# Patient Record
Sex: MALE | Race: WHITE | Employment: FULL TIME | URBAN - NONMETROPOLITAN AREA
[De-identification: names, ages, dates, MRNs, and addresses within clinical notes are randomized per-mention and may not be internally consistent; named-entity substitution may affect disease eponyms.]

---

## 2020-02-26 ENCOUNTER — HOSPITAL ENCOUNTER (EMERGENCY)
Age: 55
Discharge: HOME | End: 2020-02-26

## 2020-02-26 VITALS
OXYGEN SATURATION: 97 % | WEIGHT: 150 LBS | HEART RATE: 86 BPM | RESPIRATION RATE: 18 BRPM | DIASTOLIC BLOOD PRESSURE: 89 MMHG | TEMPERATURE: 98 F | BODY MASS INDEX: 21.47 KG/M2 | HEIGHT: 70 IN | SYSTOLIC BLOOD PRESSURE: 138 MMHG

## 2020-02-26 LAB
A/G RATIO: 1.2 (ref 1.1–2.2)
ALBUMIN SERPL-MCNC: 3.8 G/DL (ref 3.4–5)
ALP BLD-CCNC: 144 U/L (ref 40–129)
ALT SERPL-CCNC: 17 U/L (ref 10–40)
ANION GAP SERPL CALCULATED.3IONS-SCNC: 12 MMOL/L (ref 3–16)
AST SERPL-CCNC: 24 U/L (ref 15–37)
BASOPHILS ABSOLUTE: 0.1 K/UL (ref 0–0.2)
BASOPHILS RELATIVE PERCENT: 0.6 %
BILIRUB SERPL-MCNC: 0.4 MG/DL (ref 0–1)
BUN BLDV-MCNC: 17 MG/DL (ref 7–20)
CALCIUM SERPL-MCNC: 9.3 MG/DL (ref 8.3–10.6)
CHLORIDE BLD-SCNC: 101 MMOL/L (ref 99–110)
CO2: 27 MMOL/L (ref 21–32)
CREAT SERPL-MCNC: 0.8 MG/DL (ref 0.9–1.3)
EOSINOPHILS ABSOLUTE: 0.5 K/UL (ref 0–0.6)
EOSINOPHILS RELATIVE PERCENT: 3.8 %
GFR AFRICAN AMERICAN: >60
GFR NON-AFRICAN AMERICAN: >60
GLOBULIN: 3.2 G/DL
GLUCOSE BLD-MCNC: 153 MG/DL (ref 70–99)
HCT VFR BLD CALC: 38.1 % (ref 40.5–52.5)
HEMOGLOBIN: 12.9 G/DL (ref 13.5–17.5)
LACTIC ACID, SEPSIS: 1.7 MMOL/L (ref 0.4–1.9)
LYMPHOCYTES ABSOLUTE: 1.5 K/UL (ref 1–5.1)
LYMPHOCYTES RELATIVE PERCENT: 11.3 %
MCH RBC QN AUTO: 30.6 PG (ref 26–34)
MCHC RBC AUTO-ENTMCNC: 33.9 G/DL (ref 31–36)
MCV RBC AUTO: 90.3 FL (ref 80–100)
MONOCYTES ABSOLUTE: 1.2 K/UL (ref 0–1.3)
MONOCYTES RELATIVE PERCENT: 8.8 %
NEUTROPHILS ABSOLUTE: 10.2 K/UL (ref 1.7–7.7)
NEUTROPHILS RELATIVE PERCENT: 75.5 %
PDW BLD-RTO: 12.6 % (ref 12.4–15.4)
PLATELET # BLD: 230 K/UL (ref 135–450)
PMV BLD AUTO: 8.3 FL (ref 5–10.5)
POTASSIUM REFLEX MAGNESIUM: 4 MMOL/L (ref 3.5–5.1)
RBC # BLD: 4.22 M/UL (ref 4.2–5.9)
SODIUM BLD-SCNC: 140 MMOL/L (ref 136–145)
TOTAL PROTEIN: 7 G/DL (ref 6.4–8.2)
WBC # BLD: 13.5 K/UL (ref 4–11)

## 2020-02-26 PROCEDURE — 96375 TX/PRO/DX INJ NEW DRUG ADDON: CPT

## 2020-02-26 PROCEDURE — 90715 TDAP VACCINE 7 YRS/> IM: CPT | Performed by: PHYSICIAN ASSISTANT

## 2020-02-26 PROCEDURE — 83605 ASSAY OF LACTIC ACID: CPT

## 2020-02-26 PROCEDURE — 87070 CULTURE OTHR SPECIMN AEROBIC: CPT

## 2020-02-26 PROCEDURE — 6360000002 HC RX W HCPCS: Performed by: PHYSICIAN ASSISTANT

## 2020-02-26 PROCEDURE — 99282 EMERGENCY DEPT VISIT SF MDM: CPT

## 2020-02-26 PROCEDURE — 90471 IMMUNIZATION ADMIN: CPT | Performed by: PHYSICIAN ASSISTANT

## 2020-02-26 PROCEDURE — 87077 CULTURE AEROBIC IDENTIFY: CPT

## 2020-02-26 PROCEDURE — 87205 SMEAR GRAM STAIN: CPT

## 2020-02-26 PROCEDURE — 80053 COMPREHEN METABOLIC PANEL: CPT

## 2020-02-26 PROCEDURE — 87186 SC STD MICRODIL/AGAR DIL: CPT

## 2020-02-26 PROCEDURE — 10061 I&D ABSCESS COMP/MULTIPLE: CPT

## 2020-02-26 PROCEDURE — 36415 COLL VENOUS BLD VENIPUNCTURE: CPT

## 2020-02-26 PROCEDURE — 96365 THER/PROPH/DIAG IV INF INIT: CPT

## 2020-02-26 PROCEDURE — 87040 BLOOD CULTURE FOR BACTERIA: CPT

## 2020-02-26 PROCEDURE — 2580000003 HC RX 258: Performed by: PHYSICIAN ASSISTANT

## 2020-02-26 PROCEDURE — 85025 COMPLETE CBC W/AUTO DIFF WBC: CPT

## 2020-02-26 RX ORDER — KETOROLAC TROMETHAMINE 30 MG/ML
15 INJECTION, SOLUTION INTRAMUSCULAR; INTRAVENOUS ONCE
Status: COMPLETED | OUTPATIENT
Start: 2020-02-26 | End: 2020-02-26

## 2020-02-26 RX ORDER — SULFAMETHOXAZOLE AND TRIMETHOPRIM 800; 160 MG/1; MG/1
1 TABLET ORAL 2 TIMES DAILY
Qty: 20 TABLET | Refills: 0 | Status: SHIPPED | OUTPATIENT
Start: 2020-02-26 | End: 2020-03-07

## 2020-02-26 RX ORDER — CEPHALEXIN 500 MG/1
500 CAPSULE ORAL 4 TIMES DAILY
Qty: 40 CAPSULE | Refills: 0 | Status: SHIPPED | OUTPATIENT
Start: 2020-02-26 | End: 2020-03-07

## 2020-02-26 RX ADMIN — VANCOMYCIN HYDROCHLORIDE 1000 MG: 1 INJECTION, POWDER, LYOPHILIZED, FOR SOLUTION INTRAVENOUS at 15:33

## 2020-02-26 RX ADMIN — TETANUS TOXOID, REDUCED DIPHTHERIA TOXOID AND ACELLULAR PERTUSSIS VACCINE, ADSORBED 0.5 ML: 5; 2.5; 8; 8; 2.5 SUSPENSION INTRAMUSCULAR at 15:28

## 2020-02-26 RX ADMIN — KETOROLAC TROMETHAMINE 15 MG: 30 INJECTION, SOLUTION INTRAMUSCULAR; INTRAVENOUS at 15:28

## 2020-02-26 RX ADMIN — CEFEPIME 2 G: 2 INJECTION, POWDER, FOR SOLUTION INTRAVENOUS at 15:28

## 2020-02-26 ASSESSMENT — ENCOUNTER SYMPTOMS
SHORTNESS OF BREATH: 0
COLOR CHANGE: 1
VOMITING: 0
NAUSEA: 0

## 2020-02-26 ASSESSMENT — PAIN DESCRIPTION - DESCRIPTORS: DESCRIPTORS: PATIENT UNABLE TO DESCRIBE

## 2020-02-26 ASSESSMENT — PAIN DESCRIPTION - LOCATION: LOCATION: ARM;CHEST;BACK

## 2020-02-26 ASSESSMENT — PAIN SCALES - GENERAL: PAINLEVEL_OUTOF10: 7

## 2020-02-26 ASSESSMENT — PAIN DESCRIPTION - PAIN TYPE: TYPE: ACUTE PAIN

## 2020-02-26 ASSESSMENT — PAIN DESCRIPTION - ORIENTATION: ORIENTATION: RIGHT
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
## 2020-02-29 LAB
GRAM STAIN RESULT: ABNORMAL
ORGANISM: ABNORMAL
WOUND/ABSCESS: ABNORMAL

## 2020-02-29 NOTE — RESULT ENCOUNTER NOTE
Culture reviewed, culture is positive but resistant to antibiotics prescribed at discharge. Antibiotic needs to be changed to clindamycin 450 mg p.o. 3 times daily for 7 days. Please let patient know that he grew out MRSA.

## 2020-03-01 LAB
BLOOD CULTURE, ROUTINE: NORMAL
CULTURE, BLOOD 2: NORMAL

## 2021-06-01 ENCOUNTER — HOSPITAL ENCOUNTER (EMERGENCY)
Age: 56
Discharge: HOME | End: 2021-06-01
Attending: EMERGENCY MEDICINE
Payer: MEDICAID

## 2021-06-01 ENCOUNTER — APPOINTMENT (OUTPATIENT)
Dept: GENERAL RADIOLOGY | Age: 56
End: 2021-06-01
Payer: MEDICAID

## 2021-06-01 VITALS
TEMPERATURE: 98.3 F | WEIGHT: 140 LBS | RESPIRATION RATE: 18 BRPM | BODY MASS INDEX: 18.96 KG/M2 | DIASTOLIC BLOOD PRESSURE: 74 MMHG | OXYGEN SATURATION: 99 % | HEIGHT: 72 IN | HEART RATE: 78 BPM | SYSTOLIC BLOOD PRESSURE: 132 MMHG

## 2021-06-01 DIAGNOSIS — J18.9 PNEUMONIA DUE TO ORGANISM: Primary | ICD-10-CM

## 2021-06-01 LAB
A/G RATIO: 1.3 (ref 1.1–2.2)
ALBUMIN SERPL-MCNC: 4.1 G/DL (ref 3.4–5)
ALP BLD-CCNC: 124 U/L (ref 40–129)
ALT SERPL-CCNC: 12 U/L (ref 10–40)
ANION GAP SERPL CALCULATED.3IONS-SCNC: 9 MMOL/L (ref 3–16)
AST SERPL-CCNC: 19 U/L (ref 15–37)
BASOPHILS ABSOLUTE: 0.1 K/UL (ref 0–0.2)
BASOPHILS RELATIVE PERCENT: 0.3 %
BILIRUB SERPL-MCNC: 0.5 MG/DL (ref 0–1)
BUN BLDV-MCNC: 11 MG/DL (ref 7–20)
CALCIUM SERPL-MCNC: 9.4 MG/DL (ref 8.3–10.6)
CHLORIDE BLD-SCNC: 99 MMOL/L (ref 99–110)
CO2: 30 MMOL/L (ref 21–32)
CREAT SERPL-MCNC: 0.7 MG/DL (ref 0.9–1.3)
EKG ATRIAL RATE: 72 BPM
EKG DIAGNOSIS: NORMAL
EKG P AXIS: 45 DEGREES
EKG P-R INTERVAL: 150 MS
EKG Q-T INTERVAL: 382 MS
EKG QRS DURATION: 90 MS
EKG QTC CALCULATION (BAZETT): 418 MS
EKG R AXIS: 92 DEGREES
EKG T AXIS: -22 DEGREES
EKG VENTRICULAR RATE: 72 BPM
EOSINOPHILS ABSOLUTE: 0 K/UL (ref 0–0.6)
EOSINOPHILS RELATIVE PERCENT: 0.3 %
GFR AFRICAN AMERICAN: >60
GFR NON-AFRICAN AMERICAN: >60
GLOBULIN: 3.2 G/DL
GLUCOSE BLD-MCNC: 126 MG/DL (ref 70–99)
HCT VFR BLD CALC: 41.1 % (ref 40.5–52.5)
HEMOGLOBIN: 13.8 G/DL (ref 13.5–17.5)
LYMPHOCYTES ABSOLUTE: 0.5 K/UL (ref 1–5.1)
LYMPHOCYTES RELATIVE PERCENT: 2.7 %
MCH RBC QN AUTO: 31.2 PG (ref 26–34)
MCHC RBC AUTO-ENTMCNC: 33.5 G/DL (ref 31–36)
MCV RBC AUTO: 93.3 FL (ref 80–100)
MONOCYTES ABSOLUTE: 1.5 K/UL (ref 0–1.3)
MONOCYTES RELATIVE PERCENT: 8.1 %
NEUTROPHILS ABSOLUTE: 16 K/UL (ref 1.7–7.7)
NEUTROPHILS RELATIVE PERCENT: 88.6 %
PDW BLD-RTO: 13.5 % (ref 12.4–15.4)
PLATELET # BLD: 209 K/UL (ref 135–450)
PMV BLD AUTO: 8.6 FL (ref 5–10.5)
POTASSIUM REFLEX MAGNESIUM: 4.3 MMOL/L (ref 3.5–5.1)
RBC # BLD: 4.4 M/UL (ref 4.2–5.9)
SODIUM BLD-SCNC: 138 MMOL/L (ref 136–145)
TOTAL PROTEIN: 7.3 G/DL (ref 6.4–8.2)
TROPONIN: <0.01 NG/ML
WBC # BLD: 18.1 K/UL (ref 4–11)

## 2021-06-01 PROCEDURE — 93005 ELECTROCARDIOGRAM TRACING: CPT | Performed by: EMERGENCY MEDICINE

## 2021-06-01 PROCEDURE — 36415 COLL VENOUS BLD VENIPUNCTURE: CPT

## 2021-06-01 PROCEDURE — 80053 COMPREHEN METABOLIC PANEL: CPT

## 2021-06-01 PROCEDURE — U0005 INFEC AGEN DETEC AMPLI PROBE: HCPCS

## 2021-06-01 PROCEDURE — 84484 ASSAY OF TROPONIN QUANT: CPT

## 2021-06-01 PROCEDURE — 85025 COMPLETE CBC W/AUTO DIFF WBC: CPT

## 2021-06-01 PROCEDURE — 93010 ELECTROCARDIOGRAM REPORT: CPT | Performed by: INTERNAL MEDICINE

## 2021-06-01 PROCEDURE — 6370000000 HC RX 637 (ALT 250 FOR IP): Performed by: EMERGENCY MEDICINE

## 2021-06-01 PROCEDURE — 6360000002 HC RX W HCPCS: Performed by: EMERGENCY MEDICINE

## 2021-06-01 PROCEDURE — U0003 INFECTIOUS AGENT DETECTION BY NUCLEIC ACID (DNA OR RNA); SEVERE ACUTE RESPIRATORY SYNDROME CORONAVIRUS 2 (SARS-COV-2) (CORONAVIRUS DISEASE [COVID-19]), AMPLIFIED PROBE TECHNIQUE, MAKING USE OF HIGH THROUGHPUT TECHNOLOGIES AS DESCRIBED BY CMS-2020-01-R: HCPCS

## 2021-06-01 PROCEDURE — 71045 X-RAY EXAM CHEST 1 VIEW: CPT

## 2021-06-01 PROCEDURE — 99285 EMERGENCY DEPT VISIT HI MDM: CPT

## 2021-06-01 RX ORDER — FAMOTIDINE 20 MG/1
20 TABLET, FILM COATED ORAL ONCE
Status: COMPLETED | OUTPATIENT
Start: 2021-06-01 | End: 2021-06-01

## 2021-06-01 RX ORDER — LEVOFLOXACIN 750 MG/1
750 TABLET ORAL DAILY
Qty: 5 TABLET | Refills: 0 | Status: SHIPPED | OUTPATIENT
Start: 2021-06-01 | End: 2021-06-06

## 2021-06-01 RX ORDER — BUPRENORPHINE HYDROCHLORIDE AND NALOXONE HYDROCHLORIDE DIHYDRATE 8; 2 MG/1; MG/1
1 TABLET SUBLINGUAL DAILY
Status: DISCONTINUED | OUTPATIENT
Start: 2021-06-01 | End: 2021-06-01 | Stop reason: HOSPADM

## 2021-06-01 RX ORDER — LEVOFLOXACIN 500 MG/1
500 TABLET, FILM COATED ORAL ONCE
Status: DISCONTINUED | OUTPATIENT
Start: 2021-06-01 | End: 2021-06-01

## 2021-06-01 RX ORDER — BUPRENORPHINE HYDROCHLORIDE 8 MG/1
TABLET SUBLINGUAL
Status: DISCONTINUED
Start: 2021-06-01 | End: 2021-06-01 | Stop reason: HOSPADM

## 2021-06-01 RX ORDER — LEVOFLOXACIN 500 MG/1
750 TABLET, FILM COATED ORAL ONCE
Status: COMPLETED | OUTPATIENT
Start: 2021-06-01 | End: 2021-06-01

## 2021-06-01 RX ORDER — ASPIRIN 325 MG
325 TABLET ORAL ONCE
Status: COMPLETED | OUTPATIENT
Start: 2021-06-01 | End: 2021-06-01

## 2021-06-01 RX ADMIN — BUPRENORPHINE HYDROCHLORIDE AND NALOXONE HYDROCHLORIDE DIHYDRATE 1 TABLET: 8; 2 TABLET SUBLINGUAL at 13:20

## 2021-06-01 RX ADMIN — FAMOTIDINE 20 MG: 20 TABLET ORAL at 13:19

## 2021-06-01 RX ADMIN — ASPIRIN 325 MG: 325 TABLET, FILM COATED ORAL at 13:19

## 2021-06-01 RX ADMIN — LEVOFLOXACIN 750 MG: 500 TABLET, FILM COATED ORAL at 14:28

## 2021-06-01 RX ADMIN — LIDOCAINE HYDROCHLORIDE: 20 SOLUTION ORAL; TOPICAL at 13:19

## 2021-06-01 ASSESSMENT — ENCOUNTER SYMPTOMS
CHEST TIGHTNESS: 1
COUGH: 1
RHINORRHEA: 0
VOMITING: 0
PHOTOPHOBIA: 0
ABDOMINAL PAIN: 0
WHEEZING: 0
NAUSEA: 0
BACK PAIN: 0
DIARRHEA: 0
SHORTNESS OF BREATH: 0

## 2021-06-01 ASSESSMENT — PAIN SCALES - GENERAL: PAINLEVEL_OUTOF10: 4

## 2021-06-01 ASSESSMENT — PAIN DESCRIPTION - ORIENTATION: ORIENTATION: RIGHT;LEFT

## 2021-06-01 ASSESSMENT — PAIN DESCRIPTION - DESCRIPTORS: DESCRIPTORS: SHARP

## 2021-06-01 ASSESSMENT — PAIN DESCRIPTION - LOCATION: LOCATION: RIB CAGE

## 2021-06-01 ASSESSMENT — PAIN DESCRIPTION - FREQUENCY: FREQUENCY: INTERMITTENT

## 2021-06-01 NOTE — ED PROVIDER NOTES
Emergency Department Provider Note  Location: [facility] EMERGENCY DEPARTMENT  6/1/2021     Patient Identification  Terra Craig is a 54 y.o. male    Chief Complaint  URI (C/o NP couch. States \"I think I inhaled some of my stomach fluid. \")          HPI  (History provided by patient)  Patient is a 51-year-old male current smoker presents with \"bad chest cold\". Patient is overall difficult historian. He presents from Suboxone clinic where he was told to go to the emergency department because he complained of chest discomfort and a nonproductive cough. Patient states symptoms started yesterday. States he has burning sensation in his anterior chest that is worse when he coughs. Describes nonproductive cough. Endorses chills but denies fevers. States he has had some sputum production. No known sick contacts or exposures to COVID-19. He has not been vaccinated for COVID-19. Patient denies any exertional symptoms no pleurisy no associated diaphoresis nausea or other autonomic symptoms. I have reviewed the following nursing documentation:  Allergies: No Known Allergies    Past medical history:  has a past medical history of Back pain, chronic, Chronic back pain, and MRSA (methicillin resistant staph aureus) culture positive (02/26/2020). Past surgical history:  has no past surgical history on file. Home medications:   Prior to Admission medications    Medication Sig Start Date End Date Taking? Authorizing Provider   Buprenorphine HCl-Naloxone HCl (SUBOXONE SL) Place 8 mg under the tongue daily   Yes Historical Provider, MD   levoFLOXacin (LEVAQUIN) 750 MG tablet Take 1 tablet by mouth daily for 5 days 6/1/21 6/6/21 Yes Diogenes Calvo MD       Social history:  reports that he has quit smoking. His smoking use included cigarettes. He has never used smokeless tobacco. He reports that he does not drink alcohol and does not use drugs. Family history:  History reviewed.  No pertinent family history. ROS  Review of Systems   Constitutional: Positive for chills. Negative for fever. HENT: Negative for congestion and rhinorrhea. Eyes: Negative for photophobia and visual disturbance. Respiratory: Positive for cough and chest tightness. Negative for shortness of breath and wheezing. Cardiovascular: Negative for chest pain and palpitations. Gastrointestinal: Negative for abdominal pain, diarrhea, nausea and vomiting. Genitourinary: Negative for dysuria and hematuria. Musculoskeletal: Negative for back pain and neck pain. Skin: Negative for rash and wound. Neurological: Negative for syncope and weakness. Psychiatric/Behavioral: Negative for agitation and confusion. Exam  ED Triage Vitals [06/01/21 1235]   BP Temp Temp Source Pulse Resp SpO2 Height Weight   118/74 98.1 °F (36.7 °C) Oral 68 18 97 % 6' (1.829 m) 140 lb (63.5 kg)       Physical Exam  Vitals and nursing note reviewed. Constitutional:       General: He is not in acute distress. Appearance: He is well-developed. HENT:      Head: Normocephalic and atraumatic. Nose: Nose normal. No congestion. Eyes:      Extraocular Movements: Extraocular movements intact. Pupils: Pupils are equal, round, and reactive to light. Cardiovascular:      Rate and Rhythm: Normal rate and regular rhythm. Heart sounds: No murmur heard. Comments: Equal radial pulses  Pulmonary:      Effort: Pulmonary effort is normal.      Comments: Coarse breath sounds bilaterally otherwise no wheezes rales or rhonchi  Abdominal:      General: There is no distension. Palpations: Abdomen is soft. Tenderness: There is no abdominal tenderness. Musculoskeletal:         General: No deformity. Normal range of motion. Cervical back: Normal range of motion and neck supple. Skin:     General: Skin is warm. Findings: No rash. Neurological:      Mental Status: He is alert and oriented to person, place, and time. Motor: No abnormal muscle tone.       Coordination: Coordination normal.   Psychiatric:         Mood and Affect: Mood normal.         Behavior: Behavior normal.           ED Course    ED Medication Orders (From admission, onward)    Start Ordered     Status Ordering Provider    06/01/21 1445 06/01/21 1420  levoFLOXacin (LEVAQUIN) tablet 750 mg  ONCE     Question:  Antimicrobial Indications  Answer:  Pneumonia (CAP)    Acknowledged BELL GARCIA     06/01/21 1330 06/01/21 1310  buprenorphine-naloxone (SUBOXONE) 8-2 MG SL tablet 1 tablet  DAILY      Last MAR action: [facility] - [facility]    06/01/21 1315 06/01/21 1256  aspirin tablet 325 mg  ONCE      Last MAR action: Given - by Van Anderson on 06/01/21 at [facility]    06/01/21 1315 06/01/21 1256  aluminum & magnesium hydroxide-simethicone (MAALOX) 30 mL, lidocaine viscous hcl (XYLOCAINE) 5 mL (GI COCKTAIL)  ONCE      Last MAR action: Given - by Van Anderson on 06/01/21 at [facility]    06/01/21 1315 06/01/21 1256  famotidine (PEPCID) tablet 20 mg  ONCE      Last MAR action: Given - by Van Anderson on 06/01/21 at [facility]    06/01/21 1313 06/01/21 1313  buprenorphine (SUBUTEX) 8 MG SL tablet     Note to Pharmacy: Snehal Fajardo: cabinet override    Ordered           EKG  Normal sinus rhythm rate of 70 right axis deviation, borderline right bundle branch block pattern, no diagnostic ischemic changes noted, patient slightly uncooperative Best obtainable with baseline artifact, no STEMI pattern,       Radiology  XR CHEST PORTABLE    Result Date: 6/1/2021  EXAMINATION: ONE XRAY VIEW OF THE CHEST 6/1/2021 12:59 pm COMPARISON: 03/07/2009 HISTORY: ORDERING SYSTEM PROVIDED HISTORY: CP TECHNOLOGIST PROVIDED HISTORY: Reason for exam:->CP Reason for Exam: cp, uri symptoms ( cough) Acuity: Acute Type of Exam: Initial FINDINGS: There is bibasilar airspace R Axis 92 degrees    T Axis -22 degrees    Diagnosis       Normal sinus rhythmRightward axisLeft ventricular hypertrophy with repolarization abnormality vs ischemia Abnormal ECGConfirmed by Leigh Angela MD, Harley Medrano ([id number]) on 6/1/2021 2:21:11 PM         [facility]  Patient seen and evaluated. Relevant records reviewed. 42-year-old male who presents with cough chills sputum production chest tightness felt with coughing. On exam is overall well-appearing no acute distress reassuring vitals. His exam significant for coarse breath sounds though no focal findings. Chest x-ray is consistent with bibasilar pneumonia and he has a leukocytosis as well which supports this. Will order Covid swab given collection of his symptoms. This does not appear to be consistent with ACS. His EKG does show pulmonary disease pattern though no STEMI criteria. I have low concern for PE or dissection at this time given his story and exam.  We will plan for discharge PCP follow-up and return precautions and antibiotics. Patient agreeable to plan expressed understanding of plan. Clinical Impression:  1. Pneumonia due to organism          Disposition:  Discharge to home in good condition. Blood pressure 118/74, pulse 68, temperature 98.1 °F (36.7 °C), temperature source Oral, resp. rate 18, height 6' (1.829 m), weight 140 lb (63.5 kg), SpO2 97 %. Patient was given scripts for the following medications. I counseled patient how to take these medications. New Prescriptions    LEVOFLOXACIN (LEVAQUIN) 750 MG TABLET    Take 1 tablet by mouth daily for 5 days       Disposition referral (if applicable):  No follow-up provider specified. Total critical care time is 0 minutes, which excludes separately billable procedures and updating family. Time spent is specifically for management of the presenting complaint and symptoms initially, direct bedside care, reevaluation, review of records, and consultation.   There was a high probability of clinically significant life-threatening deterioration in the patient's condition, which required my urgent intervention. This chart was generated in part by using Dragon Dictation system and may contain errors related to that system including errors in grammar, punctuation, and spelling, as well as words and phrases that may be inappropriate. If there are any questions or concerns please feel free to contact the dictating provider for clarification.      Savi Lora MD  [address] Delgado MD  06/01/21 2322

## 2021-06-02 ENCOUNTER — CARE COORDINATION (OUTPATIENT)
Dept: CARE COORDINATION | Age: 56
End: 2021-06-02

## 2021-06-02 LAB — SARS-COV-2, PCR: NOT DETECTED

## 2021-06-02 NOTE — CARE COORDINATION
Outreach attempt regarding recent visit from the ED. Patient was unable to reach, d/t one number was busy, and other number could not be completed as dialed.      Plan    F/U ED attempt [id number] Valorie Lynch RN  Ambulatory Care Manager  [phone]

## 2021-06-03 ENCOUNTER — CARE COORDINATION (OUTPATIENT)
Dept: CARE COORDINATION | Age: 56
End: 2021-06-03

## 2021-06-03 NOTE — CARE COORDINATION
Outreach attempt regarding recent ED Visit.  Patient was unable to reach, d/t 1 number being invalid, and other number fast busy signal.       Plan   No further outreach at this time     [facility]  [phone]

## 2022-05-05 ENCOUNTER — HOSPITAL ENCOUNTER (EMERGENCY)
Age: 57
Discharge: HOME | End: 2022-05-05
Attending: EMERGENCY MEDICINE
Payer: MEDICAID

## 2022-05-05 VITALS
RESPIRATION RATE: 16 BRPM | HEIGHT: 70 IN | OXYGEN SATURATION: 100 % | WEIGHT: 150 LBS | HEART RATE: 91 BPM | BODY MASS INDEX: 21.47 KG/M2 | SYSTOLIC BLOOD PRESSURE: 140 MMHG | TEMPERATURE: 98.5 F | DIASTOLIC BLOOD PRESSURE: 86 MMHG

## 2022-05-05 DIAGNOSIS — S61.214A LACERATION OF RIGHT RING FINGER W/O FOREIGN BODY W/O DAMAGE TO NAIL, INITIAL ENCOUNTER: Primary | ICD-10-CM

## 2022-05-05 PROCEDURE — 99283 EMERGENCY DEPT VISIT LOW MDM: CPT

## 2022-05-05 ASSESSMENT — ENCOUNTER SYMPTOMS
SHORTNESS OF BREATH: 0
WHEEZING: 0

## 2022-05-05 ASSESSMENT — PAIN - FUNCTIONAL ASSESSMENT: PAIN_FUNCTIONAL_ASSESSMENT: NONE - DENIES PAIN

## 2022-05-05 NOTE — ED PROVIDER NOTES
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 Bernadine Slaughter is a 64 y.o. male who presents to the emergency department     Patient presents emergency department having a heavy object at work fall on his fingertips of the right hand with a special note to the middle ring and little fingers he sustained very superficial lacerations they need to be cleansed. We did so, and then cleansed him  He is up-to-date on his tetanus having received 1 4 years ago he says. He has no skeletal or bony abnormality no disruption of the nail plates is noted. Patient has no other abnormalities or injuries noted he is on Suboxone we see    The history is provided by the patient. Nursing Notes were reviewed. REVIEW OF SYSTEMS    (2-9 systems for level 4, 10 or more for level 5)     Review of Systems   Constitutional: Positive for activity change. Eyes: Negative for visual disturbance. Respiratory: Negative for shortness of breath and wheezing. Skin: Positive for wound. Allergic/Immunologic: Negative for immunocompromised state. All other systems reviewed and are negative. Except as noted above the remainder of the review of systems was reviewed and negative. PAST MEDICAL HISTORY     Past Medical History:   Diagnosis Date    Back pain, chronic     Chronic back pain     MRSA (methicillin resistant staph aureus) culture positive 02/26/2020    RIGHT FOREARM         SURGICAL HISTORY     History reviewed.  No pertinent surgical history. CURRENT MEDICATIONS       Previous Medications    BUPRENORPHINE HCL-NALOXONE HCL (SUBOXONE SL)    Place 8 mg under the tongue daily       ALLERGIES     Patient has no known allergies. FAMILY HISTORY     History reviewed. No pertinent family history. SOCIAL HISTORY       Social History     Socioeconomic History    Marital status:      Spouse name: None    Number of children: None    Years of education: None    Highest education level: None   Occupational History    None   Tobacco Use    Smoking status: Former Smoker     Types: Cigarettes    Smokeless tobacco: Never Used   Vaping Use    Vaping Use: Never used   Substance and Sexual Activity    Alcohol use: No    Drug use: No    Sexual activity: Not Currently   Other Topics Concern    None   Social History Narrative    None     Social Determinants of Health     Financial Resource Strain:     Difficulty of Paying Living Expenses: Not on file   Food Insecurity:     Worried About Running Out of Food in the Last Year: Not on file    Angel of Food in the Last Year: Not on file   Transportation Needs:     Lack of Transportation (Medical): Not on file    Lack of Transportation (Non-Medical):  Not on file   Physical Activity:     Days of Exercise per Week: Not on file    Minutes of Exercise per Session: Not on file   Stress:     Feeling of Stress : Not on file   Social Connections:     Frequency of Communication with Friends and Family: Not on file    Frequency of Social Gatherings with Friends and Family: Not on file    Attends Worship Services: Not on file    Active Member of Clubs or Organizations: Not on file    Attends Club or Organization Meetings: Not on file    Marital Status: Not on file   Intimate Partner Violence:     Fear of Current or Ex-Partner: Not on file    Emotionally Abused: Not on file    Physically Abused: Not on file    Sexually Abused: Not on file   Housing Stability:     Unable to Pay for Housing in the Last Year: Not on file    Number of Places Lived in the Last Year: Not on file    Unstable Housing in the Last Year: Not on file       SCREENINGS    [place] Coma Scale  Eye Opening: Spontaneous  Best Verbal Response: Oriented  Best Motor Response: Obeys commands  [place] Coma Scale Score: 15          PHYSICAL EXAM    (up to 7 for level 4, 8 or more for level 5)     ED Triage Vitals [05/05/22 1616]   BP Temp Temp Source Pulse Resp SpO2 Height Weight   (!) 140/86 98.5 °F (36.9 °C) Oral 91 16 100 % 5' 10\" (1.778 m) 150 lb (68 kg)       Physical Exam  Vitals and nursing note reviewed. Constitutional:       Appearance: Normal appearance. HENT:      Head: Normocephalic. Right Ear: Ear canal and external ear normal.      Left Ear: Ear canal and external ear normal.      Nose: Nose normal.      Mouth/Throat:      Mouth: Mucous membranes are moist.   Eyes:      Extraocular Movements: Extraocular movements intact. Pupils: Pupils are equal, round, and reactive to light. Cardiovascular:      Rate and Rhythm: Normal rate. Pulses: Normal pulses. Pulmonary:      Effort: Pulmonary effort is normal.   Musculoskeletal:         General: Tenderness present. Skin:     Capillary Refill: Capillary refill takes less than 2 seconds. Findings: Laceration and lesion present. Neurological:      General: No focal deficit present. Mental Status: He is alert.          DIAGNOSTIC RESULTS     EKG: All EKG's are interpreted by the Emergency Department Physician who either signs or Co-signs this chart in the absence of a cardiologist.        RADIOLOGY:   Non-plain film images such as CT, Ultrasound and MRI are read by the radiologist. Plain radiographic images are visualized and preliminarily interpreted by the emergency physician with the below findings:        Interpretation per the Radiologist below, if available at the time of this note:    No orders to display           LABS:  No results found for this visit on 05/05/22. EMERGENCY DEPARTMENT COURSE and DIFFERENTIAL DIAGNOSIS/MDM:     Vitals:    05/05/22 1616   BP: (!) 140/86   Pulse: 91   Resp: 16   Temp: 98.5 °F (36.9 °C)   TempSrc: Oral   SpO2: 100%   Weight: 150 lb (68 kg)   Height: 5' 10\" (1.778 m)           MDM      REASSESSMENT          CRITICAL CARE TIME     CONSULTS:  None      PROCEDURES:     Procedures    MEDICATIONS GIVEN THIS VISIT:  Medications - No data to display     FINAL IMPRESSION      1. Laceration of right ring finger w/o foreign body w/o damage to nail, initial encounter            DISPOSITION/PLAN   DISPOSITION Decision To Discharge 05/05/2022 06:39:34 PM      PATIENT REFERRED TO:  [facility]) Pre-Services  [phone]          DISCHARGE MEDICATIONS:  New Prescriptions    No medications on file       Controlled Substances Monitoring  No flowsheet data found. (Please note that portions of this note were completed with a voice recognition program.  Efforts were made to edit the dictations but occasionally words are mis-transcribed.)    Patient was advised to return to the Emergency Department if there was any worsening.     Jodi Aguilar MD (electronically signed)  Attending Emergency Physician         Teresita Puri MD  05/05/22 0630

## 2022-05-05 NOTE — Clinical Note
Georgie Stratton was seen and treated in our emergency department on 5/5/2022. He may return to work on 05/06/2022. Left hand work only x5 days     If you have any questions or concerns, please don't hesitate to call.       Murphy Pagan MD

## 2022-05-05 NOTE — ED NOTES
PSO and bandaids applied to wounds by Vielka Medellin, ED Tech with verbal order by Dr. Matthew Garcia.       Alicia Mueller RN  05/05/22 9317

## 2022-05-05 NOTE — ED NOTES
AVS provided and reviewed with the patient. The patient verbalized understanding of care at home, follow up care, and emergent symptoms to return for. No questions or concerns verbalized at this time. The patient is alert, oriented, stable, and ambulatory out of the department at the time of discharge.        Akanksha Roberts RN  05/05/22 4925

## 2022-12-15 ENCOUNTER — APPOINTMENT (OUTPATIENT)
Dept: GENERAL RADIOLOGY | Age: 57
End: 2022-12-15
Payer: MEDICAID

## 2022-12-15 ENCOUNTER — HOSPITAL ENCOUNTER (EMERGENCY)
Age: 57
Discharge: HOME | End: 2022-12-15
Attending: EMERGENCY MEDICINE
Payer: MEDICAID

## 2022-12-15 VITALS
HEIGHT: 70 IN | HEART RATE: 62 BPM | DIASTOLIC BLOOD PRESSURE: 68 MMHG | BODY MASS INDEX: 20.76 KG/M2 | SYSTOLIC BLOOD PRESSURE: 99 MMHG | RESPIRATION RATE: 18 BRPM | OXYGEN SATURATION: 98 % | TEMPERATURE: 98.2 F | WEIGHT: 145 LBS

## 2022-12-15 DIAGNOSIS — R07.9 CHEST PAIN, UNSPECIFIED TYPE: Primary | ICD-10-CM

## 2022-12-15 DIAGNOSIS — F19.10 DRUG ABUSE (HCC): ICD-10-CM

## 2022-12-15 LAB
A/G RATIO: 1.5 (ref 1.1–2.2)
ALBUMIN SERPL-MCNC: 4.2 G/DL (ref 3.4–5)
ALP BLD-CCNC: 105 U/L (ref 40–129)
ALT SERPL-CCNC: 23 U/L (ref 10–40)
ANION GAP SERPL CALCULATED.3IONS-SCNC: 11 MMOL/L (ref 3–16)
AST SERPL-CCNC: 32 U/L (ref 15–37)
BASOPHILS ABSOLUTE: 0.1 K/UL (ref 0–0.2)
BASOPHILS RELATIVE PERCENT: 1 %
BILIRUB SERPL-MCNC: 0.6 MG/DL (ref 0–1)
BUN BLDV-MCNC: 25 MG/DL (ref 7–20)
CALCIUM SERPL-MCNC: 9.4 MG/DL (ref 8.3–10.6)
CHLORIDE BLD-SCNC: 99 MMOL/L (ref 99–110)
CO2: 26 MMOL/L (ref 21–32)
CREAT SERPL-MCNC: 0.9 MG/DL (ref 0.9–1.3)
EKG ATRIAL RATE: 80 BPM
EKG DIAGNOSIS: NORMAL
EKG P AXIS: 81 DEGREES
EKG P-R INTERVAL: 142 MS
EKG Q-T INTERVAL: 402 MS
EKG QRS DURATION: 98 MS
EKG QTC CALCULATION (BAZETT): 463 MS
EKG R AXIS: 81 DEGREES
EKG T AXIS: 70 DEGREES
EKG VENTRICULAR RATE: 80 BPM
EOSINOPHILS ABSOLUTE: 0.4 K/UL (ref 0–0.6)
EOSINOPHILS RELATIVE PERCENT: 5.9 %
GFR SERPL CREATININE-BSD FRML MDRD: >60 ML/MIN/{1.73_M2}
GLUCOSE BLD-MCNC: 138 MG/DL (ref 70–99)
HCT VFR BLD CALC: 42.5 % (ref 40.5–52.5)
HEMOGLOBIN: 14.5 G/DL (ref 13.5–17.5)
LYMPHOCYTES ABSOLUTE: 1.2 K/UL (ref 1–5.1)
LYMPHOCYTES RELATIVE PERCENT: 18.3 %
MCH RBC QN AUTO: 30.1 PG (ref 26–34)
MCHC RBC AUTO-ENTMCNC: 34.2 G/DL (ref 31–36)
MCV RBC AUTO: 88.1 FL (ref 80–100)
MONOCYTES ABSOLUTE: 0.7 K/UL (ref 0–1.3)
MONOCYTES RELATIVE PERCENT: 10 %
NEUTROPHILS ABSOLUTE: 4.4 K/UL (ref 1.7–7.7)
NEUTROPHILS RELATIVE PERCENT: 64.8 %
PDW BLD-RTO: 13.2 % (ref 12.4–15.4)
PLATELET # BLD: 188 K/UL (ref 135–450)
PMV BLD AUTO: 8.7 FL (ref 5–10.5)
POTASSIUM REFLEX MAGNESIUM: 3.9 MMOL/L (ref 3.5–5.1)
RBC # BLD: 4.82 M/UL (ref 4.2–5.9)
SODIUM BLD-SCNC: 136 MMOL/L (ref 136–145)
TOTAL PROTEIN: 7 G/DL (ref 6.4–8.2)
TROPONIN: <0.01 NG/ML
WBC # BLD: 6.7 K/UL (ref 4–11)

## 2022-12-15 PROCEDURE — 84484 ASSAY OF TROPONIN QUANT: CPT

## 2022-12-15 PROCEDURE — 36415 COLL VENOUS BLD VENIPUNCTURE: CPT

## 2022-12-15 PROCEDURE — 93010 ELECTROCARDIOGRAM REPORT: CPT | Performed by: INTERNAL MEDICINE

## 2022-12-15 PROCEDURE — 93005 ELECTROCARDIOGRAM TRACING: CPT | Performed by: EMERGENCY MEDICINE

## 2022-12-15 PROCEDURE — 71045 X-RAY EXAM CHEST 1 VIEW: CPT

## 2022-12-15 PROCEDURE — 85025 COMPLETE CBC W/AUTO DIFF WBC: CPT

## 2022-12-15 PROCEDURE — 80053 COMPREHEN METABOLIC PANEL: CPT

## 2022-12-15 PROCEDURE — 99285 EMERGENCY DEPT VISIT HI MDM: CPT

## 2022-12-15 ASSESSMENT — PAIN - FUNCTIONAL ASSESSMENT
PAIN_FUNCTIONAL_ASSESSMENT: NONE - DENIES PAIN
PAIN_FUNCTIONAL_ASSESSMENT: NONE - DENIES PAIN

## 2022-12-15 NOTE — ED NOTES
Discharge instructions and medications reviewed with patient. Patient verbalized understanding of medications and follow up. All questions answered at this time. IV removed, dressing applied, and bleeding controlled. Skin warm, pink, and dry. Patient alert and oriented x4. Pt ambulated to lobby with a stable gait. Patient discharged home with 0 prescriptions.        Arlene Kim RN  12/15/22 5777

## 2022-12-15 NOTE — ED PROVIDER NOTES
eMERGENCY dEPARTMENT eNCOUnter      PtName: Dayana Capone  MRN: [id number]  [place] 1965  Date of evaluation: 12/15/2022  Provider: Angeline Stubbs, [address] [place] [address]       Chief Complaint   Patient presents with    Chest Pain     Patient states he had left sided chest pain 9/10, squeezing sensation and racing feeling last night. He currently denies pain, but states this pain has been intermittent over the last two weeks. Patient also states he used meth 2 weeks ago and is on suboxone and his last use prior to that was a year ago. HISTORY OF PRESENT ILLNESS   (Location/Symptom, Timing/Onset,Context/Setting, Quality, Duration, Modifying Factors, Severity) Note limiting factors. HPI    Dayana Capone is a 62 y.o. male who presents to the emergency department with chief complaint of chest pain. Has been ongoing intermittently for the last couple of months central no radiation pressure. Does admit to methamphetamine abuse 2 weeks ago. Not actively having chest pain. No fever or coughing. Denies history of endocarditis. Nursing Notes were reviewed. REVIEW OF SYSTEMS    (2+ forlevel 4; 10+ for level 5)     Review of Systems  See hpi for further details. Complete 10 point review of all systems performed and is otherwise negative unless noted above. PAST MEDICAL HISTORY     Past Medical History:   Diagnosis Date    Back pain, chronic     Chronic back pain     MRSA (methicillin resistant staph aureus) culture positive 02/26/2020    RIGHT FOREARM       SURGICAL HISTORY     History reviewed. No pertinent surgical history. CURRENT MEDICATIONS       Previous Medications    BUPRENORPHINE HCL-NALOXONE HCL (SUBOXONE SL)    Place 8 mg under the tongue daily       ALLERGIES     Patient has no known allergies. FAMILY HISTORY     History reviewed. No pertinent family history.        SOCIAL HISTORY       Social History     Socioeconomic History    Marital status:      Spouse name: None    Number of children: None    Years of education: None    Highest education level: None   Tobacco Use    Smoking status: Former     Types: Cigarettes    Smokeless tobacco: Never   Vaping Use    Vaping Use: Never used   Substance and Sexual Activity    Alcohol use: No    Drug use: Yes     Types: Methamphetamines (Crystal Meth)     Comment: Currently in rehab on suboxone, last reported use 2 weeks ago then a year prior to that. Sexual activity: Not Currently       SCREENINGS    [place] Coma Scale  Eye Opening: Spontaneous  Best Verbal Response: Oriented  Best Motor Response: Obeys commands  [place]y Coma Scale Score: 15      PHYSICAL EXAM    (5+ for level 4, 8+ for level 5)     ED Triage Vitals [12/15/22 1504]   BP Temp Temp Source Heart Rate Resp SpO2 Height Weight   112/68 98.2 °F (36.8 °C) Oral 71 22 98 % 5' 10\" (1.778 m) 145 lb (65.8 kg)       Physical Exam  Vitals and nursing note reviewed. Constitutional:       General: He is not in acute distress. Appearance: Normal appearance. He is well-developed. He is not ill-appearing, toxic-appearing or diaphoretic. HENT:      Head: Normocephalic and atraumatic. Right Ear: External ear normal.      Left Ear: External ear normal.      Nose: Nose normal.      Mouth/Throat:      Mouth: Mucous membranes are moist.      Pharynx: Oropharynx is clear. Eyes:      General: No scleral icterus. Right eye: No discharge. Left eye: No discharge. Conjunctiva/sclera: Conjunctivae normal.      Pupils: Pupils are equal, round, and reactive to light. Neck:      Vascular: No JVD. Trachea: No tracheal deviation. Cardiovascular:      Rate and Rhythm: Normal rate and regular rhythm. Pulses: Normal pulses. Heart sounds: Normal heart sounds. No murmur heard. No friction rub. No gallop. Pulmonary:      Effort: Pulmonary effort is normal. No respiratory distress. Breath sounds: No stridor.    Abdominal:      General: Abdomen is hours.  Troponin is negative. Recommend outpatient follow-up. Consideration of outpatient stress test. Pt given strict return precautions. Cxr reviewed by me as radiology is not currently reading plain films shows no acute abnormalities. Patient instructed to follow up with their primary care doctor in one-two days and return to the emergency department if worsening of the condition or any other concerns. Please see discharge instructions for further delineation regarding the specific discharge instructions explained and given to the patient. ED Course as of 12/15/22 1612   Thu Dec 15, 2022   1508 EKG 12 Lead  The interpretation by ED physician: Normal axis, sinus rhythm 80 bpm.  No obvious ischemic changes appreciated [JK]      ED Course User Index  [JK] Niurka Kennedy DO       CONSULTS:  None    PROCEDURES:  Unless otherwise noted below, none     Procedures    FINAL IMPRESSION      1. Chest pain, unspecified type    2. Drug abuse [facility])          DISPOSITION/PLAN   DISPOSITION Decision To Discharge 12/15/2022 03:37:49 PM      PATIENT REFERRED TO:  Md Thanh Babb    Schedule an appointment as soon as possible for a visit       [facility]) Pre-Services  [phone]  Schedule an appointment as soon as possible for a visit   if you cannot get an appointment w your pcp. [facility]  [address]  [phone]    If symptoms worsen      DISCHARGE MEDICATIONS:  New Prescriptions    No medications on file          (Please note:  Portions of this note were completed with a voice recognition program. Efforts were made to edit the dictations but occasionally words and phrases are mis-transcribed.)    Form v2016. J.[id number]    Marilyn Gordon DO (electronically signed)  Emergency Medicine Provider              Niurka Kennedy DO  12/15/22 1612

## 2023-09-07 ENCOUNTER — HOSPITAL ENCOUNTER (EMERGENCY)
Age: 58
Discharge: HOME | End: 2023-09-07
Attending: EMERGENCY MEDICINE
Payer: MEDICAID

## 2023-09-07 VITALS
OXYGEN SATURATION: 99 % | SYSTOLIC BLOOD PRESSURE: 137 MMHG | BODY MASS INDEX: 20.76 KG/M2 | TEMPERATURE: 98.1 F | WEIGHT: 145 LBS | RESPIRATION RATE: 16 BRPM | DIASTOLIC BLOOD PRESSURE: 76 MMHG | HEIGHT: 70 IN | HEART RATE: 63 BPM

## 2023-09-07 DIAGNOSIS — L03.116 CELLULITIS OF LEFT FOOT: Primary | ICD-10-CM

## 2023-09-07 PROCEDURE — 6370000000 HC RX 637 (ALT 250 FOR IP): Performed by: EMERGENCY MEDICINE

## 2023-09-07 PROCEDURE — 99283 EMERGENCY DEPT VISIT LOW MDM: CPT

## 2023-09-07 RX ORDER — DOXYCYCLINE HYCLATE 100 MG/1
100 CAPSULE ORAL 2 TIMES DAILY
Qty: 20 CAPSULE | Refills: 0 | Status: SHIPPED | OUTPATIENT
Start: 2023-09-07 | End: 2023-09-17

## 2023-09-07 RX ORDER — DOXYCYCLINE HYCLATE 100 MG
100 TABLET ORAL ONCE
Status: COMPLETED | OUTPATIENT
Start: 2023-09-07 | End: 2023-09-07

## 2023-09-07 RX ORDER — DOXYCYCLINE HYCLATE 100 MG/1
100 CAPSULE ORAL 2 TIMES DAILY
Qty: 20 CAPSULE | Refills: 0 | Status: SHIPPED | OUTPATIENT
Start: 2023-09-07 | End: 2023-09-07 | Stop reason: SDUPTHER

## 2023-09-07 RX ORDER — CEPHALEXIN 500 MG/1
500 CAPSULE ORAL 4 TIMES DAILY
Qty: 40 CAPSULE | Refills: 0 | Status: SHIPPED | OUTPATIENT
Start: 2023-09-07 | End: 2023-09-17

## 2023-09-07 RX ORDER — CEPHALEXIN 500 MG/1
500 CAPSULE ORAL ONCE
Status: COMPLETED | OUTPATIENT
Start: 2023-09-07 | End: 2023-09-07

## 2023-09-07 RX ORDER — CEPHALEXIN 500 MG/1
500 CAPSULE ORAL 4 TIMES DAILY
Qty: 40 CAPSULE | Refills: 0 | Status: SHIPPED | OUTPATIENT
Start: 2023-09-07 | End: 2023-09-07 | Stop reason: SDUPTHER

## 2023-09-07 RX ADMIN — CEPHALEXIN 500 MG: 500 CAPSULE ORAL at 18:23

## 2023-09-07 RX ADMIN — DOXYCYCLINE HYCLATE 100 MG: 100 TABLET, COATED ORAL at 18:23

## 2023-09-07 ASSESSMENT — PAIN DESCRIPTION - LOCATION: LOCATION: FOOT

## 2023-09-07 ASSESSMENT — PAIN SCALES - GENERAL: PAINLEVEL_OUTOF10: 5

## 2023-09-07 ASSESSMENT — PAIN - FUNCTIONAL ASSESSMENT: PAIN_FUNCTIONAL_ASSESSMENT: 0-10

## 2023-09-07 ASSESSMENT — PAIN DESCRIPTION - PAIN TYPE: TYPE: ACUTE PAIN

## 2023-09-07 ASSESSMENT — PAIN DESCRIPTION - ORIENTATION: ORIENTATION: LEFT

## 2023-09-07 ASSESSMENT — PAIN DESCRIPTION - DESCRIPTORS: DESCRIPTORS: ACHING

## 2023-09-07 NOTE — ED NOTES
Pt discharge instructions, follow up and rx x 2 reviewed with pt. Pt verbalized understanding. No further needs. Pt discharged at this time.         Tatiana Tran RN  09/07/23 8579

## 2023-09-07 NOTE — DISCHARGE INSTRUCTIONS
Keep your foot elevated is much as possible and leave it open to the air  Take both antibiotics till gone  Take Tylenol and ibuprofen for pain  If you do get worse proceed to [facility] for admission

## 2023-09-07 NOTE — ED PROVIDER NOTES
[facility]      Pt Name: Aziza Avial  MRN: [id number]  [address] [facility] [place] 1965  Date of evaluation: 9/7/2023  Provider: Hang Noel MD    [address]       Chief Complaint   Patient presents with    Foot Pain     Pt states left foot pain X2 days denies injury         HISTORY OF PRESENT ILLNESS   (Location/Symptom, Timing/Onset, Context/Setting, Quality, Duration, Modifying Factors, Severity)  Note limiting factors. Aziza Avila is a 62 y.o. male who presents to the emergency department     Patient presents with redness and warmth on the outer aspect of the left foot. He does have some very old shoes that are kind of like boots. He is on Suboxone for previous drug abuse. He is picking quite frequently in the room during my history says its been red for couple of days. He denies any nausea vomiting denies known high fever    The history is provided by the patient. Nursing Notes were reviewed. REVIEW OF SYSTEMS    (2-9 systems for level 4, 10 or more for level 5)     Review of Systems   Constitutional:  Positive for activity change and chills. Negative for fever. Skin:  Positive for rash. Neurological:  Negative for light-headedness. Psychiatric/Behavioral:  Negative for confusion. All other systems reviewed and are negative. Except as noted above the remainder of the review of systems was reviewed and negative. PAST MEDICAL HISTORY     Past Medical History:   Diagnosis Date    Back pain, chronic     Chronic back pain     MRSA (methicillin resistant staph aureus) culture positive 02/26/2020    RIGHT FOREARM         SURGICAL HISTORY     History reviewed. No pertinent surgical history. CURRENT MEDICATIONS       Previous Medications    BUPRENORPHINE HCL-NALOXONE HCL (SUBOXONE SL)    Place 8 mg under the tongue daily       ALLERGIES     Patient has no known allergies. FAMILY HISTORY     History reviewed.  No pertinent family

## 2024-07-02 ENCOUNTER — APPOINTMENT (OUTPATIENT)
Dept: GENERAL RADIOLOGY | Age: 59
DRG: 190 | End: 2024-07-02
Payer: MEDICAID

## 2024-07-02 ENCOUNTER — HOSPITAL ENCOUNTER (INPATIENT)
Age: 59
LOS: 2 days | Discharge: LEFT BEFORE DISCHARGE | DRG: 190 | End: 2024-07-04
Attending: EMERGENCY MEDICINE | Admitting: INTERNAL MEDICINE
Payer: MEDICAID

## 2024-07-02 DIAGNOSIS — R79.89 ELEVATED BRAIN NATRIURETIC PEPTIDE (BNP) LEVEL: ICD-10-CM

## 2024-07-02 DIAGNOSIS — R01.1 HEART MURMUR: ICD-10-CM

## 2024-07-02 DIAGNOSIS — I21.4 NSTEMI (NON-ST ELEVATED MYOCARDIAL INFARCTION) (HCC): Primary | ICD-10-CM

## 2024-07-02 DIAGNOSIS — J18.9 COMMUNITY ACQUIRED PNEUMONIA, UNSPECIFIED LATERALITY: ICD-10-CM

## 2024-07-02 DIAGNOSIS — F19.90 SUBSTANCE USE DISORDER: ICD-10-CM

## 2024-07-02 DIAGNOSIS — R07.9 CHEST PAIN, UNSPECIFIED TYPE: ICD-10-CM

## 2024-07-02 DIAGNOSIS — N17.9 AKI (ACUTE KIDNEY INJURY): ICD-10-CM

## 2024-07-02 LAB
ALBUMIN SERPL-MCNC: 4.1 G/DL (ref 3.4–5)
ALBUMIN/GLOB SERPL: 1.1 {RATIO} (ref 1.1–2.2)
ALP SERPL-CCNC: 200 U/L (ref 40–129)
ALT SERPL-CCNC: 59 U/L (ref 10–40)
ANION GAP SERPL CALCULATED.3IONS-SCNC: 12 MMOL/L (ref 3–16)
ANTI-XA UNFRAC HEPARIN: 0.1 IU/ML (ref 0.3–0.7)
ANTI-XA UNFRAC HEPARIN: <0.1 IU/ML (ref 0.3–0.7)
APTT BLD: 34.1 SEC (ref 22.1–36.4)
AST SERPL-CCNC: 75 U/L (ref 15–37)
BASE EXCESS BLDA CALC-SCNC: -5.7 MMOL/L (ref -3–3)
BASOPHILS # BLD: 0 K/UL (ref 0–0.2)
BASOPHILS NFR BLD: 0.5 %
BILIRUB SERPL-MCNC: 0.9 MG/DL (ref 0–1)
BUN SERPL-MCNC: 35 MG/DL (ref 7–20)
CALCIUM SERPL-MCNC: 9.4 MG/DL (ref 8.3–10.6)
CHLORIDE SERPL-SCNC: 97 MMOL/L (ref 99–110)
CK SERPL-CCNC: 278 U/L (ref 39–308)
CO2 BLDA-SCNC: 19.7 MMOL/L
CO2 SERPL-SCNC: 27 MMOL/L (ref 21–32)
COHGB MFR BLDA: 0.5 % (ref 0–1.5)
CREAT SERPL-MCNC: 1.7 MG/DL (ref 0.9–1.3)
DEPRECATED RDW RBC AUTO: 13.2 % (ref 12.4–15.4)
EOSINOPHIL # BLD: 0 K/UL (ref 0–0.6)
EOSINOPHIL NFR BLD: 0 %
GFR SERPLBLD CREATININE-BSD FMLA CKD-EPI: 46 ML/MIN/{1.73_M2}
GLUCOSE BLD-MCNC: 141 MG/DL (ref 70–99)
GLUCOSE SERPL-MCNC: 192 MG/DL (ref 70–99)
HCO3 BLDA-SCNC: 18.6 MMOL/L (ref 21–29)
HCT VFR BLD AUTO: 37.6 % (ref 40.5–52.5)
HGB BLD-MCNC: 12.7 G/DL (ref 13.5–17.5)
HGB BLDA-MCNC: 13.7 G/DL (ref 13.5–17.5)
INR PPP: 1.38 (ref 0.85–1.15)
LACTATE BLDV-SCNC: 2.5 MMOL/L (ref 0.4–1.9)
LACTATE BLDV-SCNC: 3.6 MMOL/L (ref 0.4–2)
LACTATE BLDV-SCNC: 4.7 MMOL/L (ref 0.4–1.9)
LYMPHOCYTES # BLD: 0.8 K/UL (ref 1–5.1)
LYMPHOCYTES NFR BLD: 8 %
MCH RBC QN AUTO: 30.7 PG (ref 26–34)
MCHC RBC AUTO-ENTMCNC: 33.8 G/DL (ref 31–36)
MCV RBC AUTO: 90.7 FL (ref 80–100)
METHGB MFR BLDA: 0.3 %
MONOCYTES # BLD: 1.3 K/UL (ref 0–1.3)
MONOCYTES NFR BLD: 13 %
NEUTROPHILS # BLD: 7.6 K/UL (ref 1.7–7.7)
NEUTROPHILS NFR BLD: 78.5 %
NT-PROBNP SERPL-MCNC: ABNORMAL PG/ML (ref 0–124)
O2 THERAPY: ABNORMAL
PCO2 BLDA: 33.2 MMHG (ref 35–45)
PERFORMED ON: ABNORMAL
PH BLDA: 7.37 [PH] (ref 7.35–7.45)
PLATELET # BLD AUTO: 239 K/UL (ref 135–450)
PMV BLD AUTO: 9.6 FL (ref 5–10.5)
PO2 BLDA: 140.3 MMHG (ref 75–108)
POTASSIUM SERPL-SCNC: 4.5 MMOL/L (ref 3.5–5.1)
PROCALCITONIN SERPL IA-MCNC: 6.64 NG/ML (ref 0–0.15)
PROT SERPL-MCNC: 7.9 G/DL (ref 6.4–8.2)
PROTHROMBIN TIME: 17.2 SEC (ref 11.9–14.9)
RBC # BLD AUTO: 4.15 M/UL (ref 4.2–5.9)
SAO2 % BLDA: 98.9 %
SODIUM SERPL-SCNC: 136 MMOL/L (ref 136–145)
TROPONIN, HIGH SENSITIVITY: 163 NG/L (ref 0–22)
TROPONIN, HIGH SENSITIVITY: 164 NG/L (ref 0–22)
TROPONIN, HIGH SENSITIVITY: 186 NG/L (ref 0–22)
WBC # BLD AUTO: 9.6 K/UL (ref 4–11)

## 2024-07-02 PROCEDURE — 83880 ASSAY OF NATRIURETIC PEPTIDE: CPT

## 2024-07-02 PROCEDURE — 6370000000 HC RX 637 (ALT 250 FOR IP): Performed by: INTERNAL MEDICINE

## 2024-07-02 PROCEDURE — 36600 WITHDRAWAL OF ARTERIAL BLOOD: CPT

## 2024-07-02 PROCEDURE — 94761 N-INVAS EAR/PLS OXIMETRY MLT: CPT

## 2024-07-02 PROCEDURE — 99285 EMERGENCY DEPT VISIT HI MDM: CPT

## 2024-07-02 PROCEDURE — 6370000000 HC RX 637 (ALT 250 FOR IP): Performed by: PHYSICIAN ASSISTANT

## 2024-07-02 PROCEDURE — 96365 THER/PROPH/DIAG IV INF INIT: CPT

## 2024-07-02 PROCEDURE — 96374 THER/PROPH/DIAG INJ IV PUSH: CPT

## 2024-07-02 PROCEDURE — 84484 ASSAY OF TROPONIN QUANT: CPT

## 2024-07-02 PROCEDURE — 80053 COMPREHEN METABOLIC PANEL: CPT

## 2024-07-02 PROCEDURE — 6360000002 HC RX W HCPCS: Performed by: PHYSICIAN ASSISTANT

## 2024-07-02 PROCEDURE — 80074 ACUTE HEPATITIS PANEL: CPT

## 2024-07-02 PROCEDURE — 85610 PROTHROMBIN TIME: CPT

## 2024-07-02 PROCEDURE — 2580000003 HC RX 258: Performed by: INTERNAL MEDICINE

## 2024-07-02 PROCEDURE — 85025 COMPLETE CBC W/AUTO DIFF WBC: CPT

## 2024-07-02 PROCEDURE — 6360000002 HC RX W HCPCS: Performed by: NURSE PRACTITIONER

## 2024-07-02 PROCEDURE — 2580000003 HC RX 258: Performed by: PHYSICIAN ASSISTANT

## 2024-07-02 PROCEDURE — 93005 ELECTROCARDIOGRAM TRACING: CPT | Performed by: NURSE PRACTITIONER

## 2024-07-02 PROCEDURE — 36415 COLL VENOUS BLD VENIPUNCTURE: CPT

## 2024-07-02 PROCEDURE — 84145 PROCALCITONIN (PCT): CPT

## 2024-07-02 PROCEDURE — 82550 ASSAY OF CK (CPK): CPT

## 2024-07-02 PROCEDURE — 85520 HEPARIN ASSAY: CPT

## 2024-07-02 PROCEDURE — 85730 THROMBOPLASTIN TIME PARTIAL: CPT

## 2024-07-02 PROCEDURE — 71045 X-RAY EXAM CHEST 1 VIEW: CPT

## 2024-07-02 PROCEDURE — 2700000000 HC OXYGEN THERAPY PER DAY

## 2024-07-02 PROCEDURE — 82803 BLOOD GASES ANY COMBINATION: CPT

## 2024-07-02 PROCEDURE — 93005 ELECTROCARDIOGRAM TRACING: CPT | Performed by: EMERGENCY MEDICINE

## 2024-07-02 PROCEDURE — 2060000000 HC ICU INTERMEDIATE R&B

## 2024-07-02 PROCEDURE — 83605 ASSAY OF LACTIC ACID: CPT

## 2024-07-02 PROCEDURE — 87040 BLOOD CULTURE FOR BACTERIA: CPT

## 2024-07-02 RX ORDER — SODIUM CHLORIDE 9 MG/ML
INJECTION, SOLUTION INTRAVENOUS CONTINUOUS
Status: DISCONTINUED | OUTPATIENT
Start: 2024-07-02 | End: 2024-07-03

## 2024-07-02 RX ORDER — 0.9 % SODIUM CHLORIDE 0.9 %
1000 INTRAVENOUS SOLUTION INTRAVENOUS ONCE
Status: DISCONTINUED | OUTPATIENT
Start: 2024-07-02 | End: 2024-07-04 | Stop reason: HOSPADM

## 2024-07-02 RX ORDER — LORAZEPAM 2 MG/ML
0.5 INJECTION INTRAMUSCULAR ONCE
Status: COMPLETED | OUTPATIENT
Start: 2024-07-02 | End: 2024-07-02

## 2024-07-02 RX ORDER — SODIUM CHLORIDE 0.9 % (FLUSH) 0.9 %
5-40 SYRINGE (ML) INJECTION EVERY 12 HOURS SCHEDULED
Status: DISCONTINUED | OUTPATIENT
Start: 2024-07-02 | End: 2024-07-04 | Stop reason: HOSPADM

## 2024-07-02 RX ORDER — ASPIRIN 81 MG/1
81 TABLET, CHEWABLE ORAL DAILY
Status: DISCONTINUED | OUTPATIENT
Start: 2024-07-03 | End: 2024-07-04 | Stop reason: HOSPADM

## 2024-07-02 RX ORDER — NITROGLYCERIN 20 MG/100ML
5-200 INJECTION INTRAVENOUS CONTINUOUS
Status: DISCONTINUED | OUTPATIENT
Start: 2024-07-02 | End: 2024-07-03

## 2024-07-02 RX ORDER — ONDANSETRON 4 MG/1
4 TABLET, ORALLY DISINTEGRATING ORAL EVERY 8 HOURS PRN
Status: DISCONTINUED | OUTPATIENT
Start: 2024-07-02 | End: 2024-07-04 | Stop reason: HOSPADM

## 2024-07-02 RX ORDER — ATORVASTATIN CALCIUM 40 MG/1
40 TABLET, FILM COATED ORAL NIGHTLY
Status: DISCONTINUED | OUTPATIENT
Start: 2024-07-02 | End: 2024-07-04 | Stop reason: HOSPADM

## 2024-07-02 RX ORDER — SODIUM CHLORIDE 9 MG/ML
INJECTION, SOLUTION INTRAVENOUS PRN
Status: DISCONTINUED | OUTPATIENT
Start: 2024-07-02 | End: 2024-07-04 | Stop reason: HOSPADM

## 2024-07-02 RX ORDER — HEPARIN SODIUM 1000 [USP'U]/ML
30 INJECTION, SOLUTION INTRAVENOUS; SUBCUTANEOUS PRN
Status: DISCONTINUED | OUTPATIENT
Start: 2024-07-02 | End: 2024-07-04 | Stop reason: HOSPADM

## 2024-07-02 RX ORDER — NITROGLYCERIN 0.4 MG/1
0.4 TABLET SUBLINGUAL EVERY 5 MIN PRN
Status: COMPLETED | OUTPATIENT
Start: 2024-07-02 | End: 2024-07-02

## 2024-07-02 RX ORDER — ACETAMINOPHEN 650 MG/1
650 SUPPOSITORY RECTAL EVERY 6 HOURS PRN
Status: DISCONTINUED | OUTPATIENT
Start: 2024-07-02 | End: 2024-07-04 | Stop reason: HOSPADM

## 2024-07-02 RX ORDER — 0.9 % SODIUM CHLORIDE 0.9 %
1000 INTRAVENOUS SOLUTION INTRAVENOUS ONCE
Status: COMPLETED | OUTPATIENT
Start: 2024-07-02 | End: 2024-07-02

## 2024-07-02 RX ORDER — HEPARIN SODIUM 1000 [USP'U]/ML
60 INJECTION, SOLUTION INTRAVENOUS; SUBCUTANEOUS ONCE
Status: DISCONTINUED | OUTPATIENT
Start: 2024-07-02 | End: 2024-07-03 | Stop reason: ALTCHOICE

## 2024-07-02 RX ORDER — HEPARIN SODIUM 10000 [USP'U]/100ML
1450 INJECTION, SOLUTION INTRAVENOUS CONTINUOUS
Status: DISCONTINUED | OUTPATIENT
Start: 2024-07-02 | End: 2024-07-04 | Stop reason: HOSPADM

## 2024-07-02 RX ORDER — HEPARIN SODIUM 1000 [USP'U]/ML
60 INJECTION, SOLUTION INTRAVENOUS; SUBCUTANEOUS PRN
Status: DISCONTINUED | OUTPATIENT
Start: 2024-07-02 | End: 2024-07-04 | Stop reason: HOSPADM

## 2024-07-02 RX ORDER — ACETAMINOPHEN 325 MG/1
650 TABLET ORAL EVERY 6 HOURS PRN
Status: DISCONTINUED | OUTPATIENT
Start: 2024-07-02 | End: 2024-07-04 | Stop reason: HOSPADM

## 2024-07-02 RX ORDER — ONDANSETRON 2 MG/ML
4 INJECTION INTRAMUSCULAR; INTRAVENOUS EVERY 6 HOURS PRN
Status: DISCONTINUED | OUTPATIENT
Start: 2024-07-02 | End: 2024-07-04 | Stop reason: HOSPADM

## 2024-07-02 RX ORDER — POLYETHYLENE GLYCOL 3350 17 G/17G
17 POWDER, FOR SOLUTION ORAL DAILY PRN
Status: DISCONTINUED | OUTPATIENT
Start: 2024-07-02 | End: 2024-07-04 | Stop reason: HOSPADM

## 2024-07-02 RX ORDER — SODIUM CHLORIDE 0.9 % (FLUSH) 0.9 %
5-40 SYRINGE (ML) INJECTION PRN
Status: DISCONTINUED | OUTPATIENT
Start: 2024-07-02 | End: 2024-07-04 | Stop reason: HOSPADM

## 2024-07-02 RX ADMIN — NITROGLYCERIN 0.4 MG: 0.4 TABLET SUBLINGUAL at 22:21

## 2024-07-02 RX ADMIN — SODIUM CHLORIDE 1000 ML: 9 INJECTION, SOLUTION INTRAVENOUS at 17:28

## 2024-07-02 RX ADMIN — CEFTRIAXONE SODIUM 1000 MG: 1 INJECTION, POWDER, FOR SOLUTION INTRAMUSCULAR; INTRAVENOUS at 19:37

## 2024-07-02 RX ADMIN — LORAZEPAM 0.5 MG: 2 INJECTION INTRAMUSCULAR; INTRAVENOUS at 22:01

## 2024-07-02 RX ADMIN — NITROGLYCERIN 0.5 INCH: 20 OINTMENT TOPICAL at 17:30

## 2024-07-02 RX ADMIN — NITROGLYCERIN 0.4 MG: 0.4 TABLET SUBLINGUAL at 22:15

## 2024-07-02 RX ADMIN — NITROGLYCERIN 0.4 MG: 0.4 TABLET SUBLINGUAL at 22:08

## 2024-07-02 RX ADMIN — SODIUM CHLORIDE, PRESERVATIVE FREE 10 ML: 5 INJECTION INTRAVENOUS at 22:02

## 2024-07-02 RX ADMIN — HEPARIN SODIUM 730 UNITS/HR: 10000 INJECTION, SOLUTION INTRAVENOUS at 17:46

## 2024-07-02 RX ADMIN — AZITHROMYCIN MONOHYDRATE 500 MG: 500 INJECTION, POWDER, LYOPHILIZED, FOR SOLUTION INTRAVENOUS at 20:35

## 2024-07-02 RX ADMIN — HEPARIN SODIUM 3700 UNITS: 1000 INJECTION INTRAVENOUS; SUBCUTANEOUS at 17:41

## 2024-07-02 ASSESSMENT — PAIN DESCRIPTION - DESCRIPTORS
DESCRIPTORS: SHARP;SHOOTING
DESCRIPTORS: SHARP;STABBING
DESCRIPTORS: PRESSURE
DESCRIPTORS: SHARP;SHOOTING
DESCRIPTORS: SHOOTING;SHARP

## 2024-07-02 ASSESSMENT — PAIN DESCRIPTION - ORIENTATION
ORIENTATION: LEFT

## 2024-07-02 ASSESSMENT — PAIN SCALES - GENERAL
PAINLEVEL_OUTOF10: 8
PAINLEVEL_OUTOF10: 10
PAINLEVEL_OUTOF10: 8

## 2024-07-02 ASSESSMENT — PAIN DESCRIPTION - LOCATION
LOCATION: CHEST

## 2024-07-02 ASSESSMENT — ENCOUNTER SYMPTOMS
EYE REDNESS: 0
CHEST TIGHTNESS: 1
RHINORRHEA: 0
VOMITING: 0
DIARRHEA: 0
SORE THROAT: 0
CONSTIPATION: 0
COLOR CHANGE: 0
SHORTNESS OF BREATH: 1
NAUSEA: 0
COUGH: 0
SINUS PRESSURE: 0
EYE DISCHARGE: 0
ABDOMINAL PAIN: 0
SINUS PAIN: 0

## 2024-07-02 ASSESSMENT — PAIN DESCRIPTION - PAIN TYPE
TYPE: ACUTE PAIN

## 2024-07-02 ASSESSMENT — PAIN - FUNCTIONAL ASSESSMENT: PAIN_FUNCTIONAL_ASSESSMENT: 0-10

## 2024-07-02 NOTE — ED NOTES
ED CARDIOLOGY CONSULT  RE-ABN EKG  1710-paged [facility] cardio  1720- returned page, transferred to [facility]

## 2024-07-02 NOTE — CONSULTS
Pharmacy to Manage Heparin Infusion per Hospital Nomogram    Dx: Chest Pain  Pt wt = 61.2 Kg  Baseline aPTT = pending    Heparin (weight-based) Infusion: CAD/STEMI/NSTEMI/UA/AFib)   Heparin 60 units/kg IVP bolus (max 4,000 units) followed by Heparin infusion at 12 units/kg/hr (recommended max initial rate: 1000 units/hr).  Recheck anti-Xa (unless aPTT being used) in 6 hours.  Goal anti-Xa 0.3-0.7 IU/mL  Goal aPTT =  seconds.    Pharmacy to manage Heparin - contact for questions.    Heparin 60 units/kg IV x 1 (max 4,000 units), then 12 units/kg/hr (recommended max initial rate 1,000 units/hr). Adjust infusion rate based off anti-Xa results below.     anti-Xa < 0.1    Heparin 60 units/kg bolus  Increase infusion by 4 units/kg/hr  anti-Xa 0.1-0.29 Heparin 30 units/kg bolus Increase infusion by 2 units/kg/hr  anti-Xa 0.3-0.7    No bolus No change   anti-Xa 0.71-0.8   No bolus Decrease infusion by 1 units/kg/hr  anti-Xa 0.81-0.99    No bolus Decrease infusion by 2 units/kg/hr  anti-Xa 1 or more     Hold heparin for 1 hour Decrease infusion by 3 units/kg/hr    Obtain anti-Xa 6 hours after bolus and 6 hours after any dose change until two consecutive therapeutic anti-Xa are achieved- then daily.    Tam Laguna, Yris  7/2/2024 at 5:14 PM    7/3/2024  Recent Labs     07/02/24  2337   ANTIXAUHEP 0.10*   - Heparin _1800_ units IVP bolus and then increase Heparin infusion to _850_ units/hr.   - Recheck Anti-Xa in 6 hours at 0630.  Shadi Mujica PharmD    7/3/2024 12:18 AM     7/3/2024  Recent Labs     07/03/24  0457   ANTIXAUHEP <0.10*   - Heparin _3700_ units IVP bolus and then increase Heparin infusion to _1090_ units/hr.   - Recheck Anti-Xa in 6 hours at 1200.  Shadi Mujica PharmD    7/3/2024 5:43 AM     7/3 1201  Anti Xa 0.22 IU/mL  Heparin 1800 units bolus and increase drip rate to 1210 units/hr  Recheck Anti Xa at 1900  Tam Laguna, PharmD  7/3/2024 at 12:48 PM    7/3 1911  Anti-Xa 0.25 at

## 2024-07-02 NOTE — ED PROVIDER NOTES
(0.5 inches Topical Given 7/2/24 1730)       ED Course as of 07/02/24 1910 Tue Jul 02, 2024 1708 EKG discussed with cardiologist who sent a message through her nursing staff who are taking the EKG over to the Cath Lab to be evaluated by them, advised to initiate heparin and additional nitro if he continues to have pain.  They will evaluate the patient.  Definitely admit as EKG changes are seen [AR]   1720 Spoke with Dr. Zamarripa cardiologist he advised he is currently at [place] he will touch base with the interventional list to make sure that the EKG is evaluated. [AR]   1738 Okay discussed patient again with Dr. Neri in person this time he advised to initiate IV nitro and admit to the ICU.  [AR]   1819 Went back to bedside and discussed further with the patient why we cannot let him have anything to drink or ice chips at this time.  I discussed the findings and our plan that we will admit him to the ICU.   [AR]   1820 We considered the diagnosis of PE was suspect that this is cardiac in origin however due to patient's creatinine and the fact that we have already initiated heparin due to our concerns over CAD we chose to hold off on CT in his chest at this time. [AR]      ED Course User Index  [AR] Shelly Gonzalez, DIMITRIOS        Is this patient to be included in the SEP-1 Core Measure due to severe sepsis or septic shock?   No   Exclusion criteria - the patient is NOT to be included for SEP-1 Core Measure due to:  2+ SIRS criteria are not met    CONSULTS: (Who and What was discussed)  IP CONSULT TO CARDIOLOGY  See above   Admitting team: accepted     CC/HPI Summary, DDx, ED Course, and Reassessment:   EKG just changes concerning for possible STEMI equivalent.  I discussed with attending ED provider who requested that we discussed this with cardiology page cardiology.  admission for NSTEMI, LUIS FELIPE. Labs also show elevated liver enzymes. EKG has a wellens sign, elevated troponin. Spoke with Dr seals, advised heparin

## 2024-07-02 NOTE — ED PROVIDER NOTES
Emergency Department Attending Provider Note  Location: [facility]  ED  7/2/2024     Patient Identification  Dayton Soto is a 59 y.o. male      Dayton Soto was evaluated in the Emergency Department for chest pain since yesterday evening.  Patient endorses snorting methamphetamine last night and shortly after developed chest pain.  Continues today.  Denies cardiac history.  Current smoker..     HPI: as noted above    Physical Exam: Nontoxic-appearing vital signs within normal limits.  No chest wall tenderness or crepitus clear breath sounds bilaterally regular rate and rhythm no murmur      EKG Interpretation  Rhythm is normal sinus rhythm  Rate is 90  Axis is normal  There is bilateral T wave in lead V3 and ST depressions in the lateral leads concerning for Wellens or acute ischemia  Qtc is 482      Patient seen and evaluated.  Relevant records reviewed.  Patient presents with symptoms noted above.  Patient presents with chest pain in setting of recent methamphetamine use.  EKG showing questionable Wellens sign nonspecific ischemic changes in the septal and lateral leads.  Serial troponins elevated in the upper 100s.  Started on nitro and heparin per recommendations of cardiology.  Noted BNP elevation as well.  Considered PE but think that this is less likely and considered PE study however he has elevated creatinine 1.7.  Therefore we will hold on PE study at this time and continue heparin.  Active chest pain.  Already received aspirin en route.  Will start nitroglycerin IV fluids heparin.  Discussed with cardiology recommends heparin and admission will continue to monitor.    Although initial history and physical exam information was obtained by LONNIE/NPP/MD/ (who also dictated a record of this visit), I personally saw the patient and made/approved the management plan and take responsibility for patient management. I, Dr. Quiroga, am the primary clinician of record.     I personally saw the  patient and independently provided 30 minutes of non-concurrent critical care out of the total shared critical care time excluding separately billed procedures.    This chart was generated in part by using Dragon Dictation system and may contain errors related to that system including errors in grammar, punctuation, and spelling, as well as words and phrases that may be inappropriate. If there are any questions or concerns please feel free to contact the dictating provider for clarification.     Elgin Quiroga MD   [employer]        Elgin Quiroga MD  07/02/24 1721       Elgin Quiroga MD  07/02/24 2100

## 2024-07-03 ENCOUNTER — APPOINTMENT (OUTPATIENT)
Dept: ULTRASOUND IMAGING | Age: 59
DRG: 190 | End: 2024-07-03
Payer: MEDICAID

## 2024-07-03 ENCOUNTER — APPOINTMENT (OUTPATIENT)
Age: 59
DRG: 190 | End: 2024-07-03
Attending: INTERNAL MEDICINE
Payer: MEDICAID

## 2024-07-03 LAB
ALBUMIN SERPL-MCNC: 3.4 G/DL (ref 3.4–5)
ALP SERPL-CCNC: 233 U/L (ref 40–129)
ALT SERPL-CCNC: 420 U/L (ref 10–40)
AMPHETAMINES UR QL SCN>1000 NG/ML: POSITIVE
ANION GAP SERPL CALCULATED.3IONS-SCNC: 13 MMOL/L (ref 3–16)
ANTI-XA UNFRAC HEPARIN: 0.22 IU/ML (ref 0.3–0.7)
ANTI-XA UNFRAC HEPARIN: 0.25 IU/ML (ref 0.3–0.7)
ANTI-XA UNFRAC HEPARIN: <0.1 IU/ML (ref 0.3–0.7)
AST SERPL-CCNC: 650 U/L (ref 15–37)
BARBITURATES UR QL SCN>200 NG/ML: ABNORMAL
BENZODIAZ UR QL SCN>200 NG/ML: ABNORMAL
BILIRUB DIRECT SERPL-MCNC: 0.8 MG/DL (ref 0–0.3)
BILIRUB INDIRECT SERPL-MCNC: 0.7 MG/DL (ref 0–1)
BILIRUB SERPL-MCNC: 1.5 MG/DL (ref 0–1)
BUN SERPL-MCNC: 41 MG/DL (ref 7–20)
CALCIUM SERPL-MCNC: 8.8 MG/DL (ref 8.3–10.6)
CANNABINOIDS UR QL SCN>50 NG/ML: ABNORMAL
CHLORIDE SERPL-SCNC: 101 MMOL/L (ref 99–110)
CHOLEST SERPL-MCNC: 125 MG/DL (ref 0–199)
CO2 SERPL-SCNC: 23 MMOL/L (ref 21–32)
COCAINE UR QL SCN: ABNORMAL
CREAT SERPL-MCNC: 1.7 MG/DL (ref 0.9–1.3)
DEPRECATED RDW RBC AUTO: 13.4 % (ref 12.4–15.4)
DRUG SCREEN COMMENT UR-IMP: ABNORMAL
ECHO AO ASC DIAM: 3 CM
ECHO AO ASCENDING AORTA INDEX: 1.74 CM/M2
ECHO AO ROOT DIAM: 2.9 CM
ECHO AO ROOT INDEX: 1.69 CM/M2
ECHO AR MAX VEL PISA: 3.9 M/S
ECHO AV AREA PEAK VELOCITY: 3.1 CM2
ECHO AV AREA/BSA PEAK VELOCITY: 1.8 CM2/M2
ECHO AV CUSP MM: 2 CM
ECHO AV PEAK GRADIENT: 4 MMHG
ECHO AV PEAK VELOCITY: 1.1 M/S
ECHO AV REGURGITANT PHT: 561 MS
ECHO AV VELOCITY RATIO: 0.73
ECHO BSA: 1.74 M2
ECHO EST RA PRESSURE: 15 MMHG
ECHO IVC PROX: 2.7 CM
ECHO LA AREA 2C: 18.9 CM2
ECHO LA AREA 4C: 18.4 CM2
ECHO LA DIAMETER INDEX: 1.8 CM/M2
ECHO LA DIAMETER: 3.1 CM
ECHO LA MAJOR AXIS: 6.1 CM
ECHO LA MINOR AXIS: 5.5 CM
ECHO LA TO AORTIC ROOT RATIO: 1.07
ECHO LA VOL BP: 51 ML (ref 18–58)
ECHO LA VOL MOD A2C: 53 ML (ref 18–58)
ECHO LA VOL MOD A4C: 44 ML (ref 18–58)
ECHO LA VOL/BSA BIPLANE: 30 ML/M2 (ref 16–34)
ECHO LA VOLUME INDEX MOD A2C: 31 ML/M2 (ref 16–34)
ECHO LA VOLUME INDEX MOD A4C: 26 ML/M2 (ref 16–34)
ECHO LV E' LATERAL VELOCITY: 10 CM/S
ECHO LV E' SEPTAL VELOCITY: 8 CM/S
ECHO LV EDV 3D: 133 ML
ECHO LV EDV A2C: 123 ML
ECHO LV EDV A4C: 107 ML
ECHO LV EDV INDEX 3D: 77 ML/M2
ECHO LV EDV INDEX A4C: 62 ML/M2
ECHO LV EDV NDEX A2C: 72 ML/M2
ECHO LV EJECTION FRACTION 3D: 34 %
ECHO LV EJECTION FRACTION A2C: 25 %
ECHO LV EJECTION FRACTION A4C: 40 %
ECHO LV EJECTION FRACTION BIPLANE: 33 % (ref 55–100)
ECHO LV ESV 3D: 87 ML
ECHO LV ESV A2C: 93 ML
ECHO LV ESV A4C: 64 ML
ECHO LV ESV INDEX 3D: 51 ML/M2
ECHO LV ESV INDEX A2C: 54 ML/M2
ECHO LV ESV INDEX A4C: 37 ML/M2
ECHO LV FRACTIONAL SHORTENING: 30 % (ref 28–44)
ECHO LV GLOBAL LONGITUDINAL STRAIN (GLS): -11.4 %
ECHO LV INTERNAL DIMENSION DIASTOLE INDEX: 2.67 CM/M2
ECHO LV INTERNAL DIMENSION DIASTOLIC: 4.6 CM (ref 4.2–5.9)
ECHO LV INTERNAL DIMENSION SYSTOLIC INDEX: 1.86 CM/M2
ECHO LV INTERNAL DIMENSION SYSTOLIC: 3.2 CM
ECHO LV ISOVOLUMETRIC RELAXATION TIME (IVRT): 148 MS
ECHO LV IVSD: 1.1 CM (ref 0.6–1)
ECHO LV MASS 2D: 169.9 G (ref 88–224)
ECHO LV MASS INDEX 2D: 98.8 G/M2 (ref 49–115)
ECHO LV POSTERIOR WALL DIASTOLIC: 1 CM (ref 0.6–1)
ECHO LV RELATIVE WALL THICKNESS RATIO: 0.43
ECHO LVOT AREA: 4.2 CM2
ECHO LVOT DIAM: 2.3 CM
ECHO LVOT MEAN GRADIENT: 1 MMHG
ECHO LVOT PEAK GRADIENT: 2 MMHG
ECHO LVOT PEAK VELOCITY: 0.8 M/S
ECHO LVOT STROKE VOLUME INDEX: 26.6 ML/M2
ECHO LVOT SV: 45.7 ML
ECHO LVOT VTI: 11 CM
ECHO MAIN PULMONARY ARTERY DIAMETER: 3.2 CM
ECHO MV A VELOCITY: 0.54 M/S
ECHO MV E DECELERATION TIME (DT): 161 MS
ECHO MV E VELOCITY: 0.41 M/S
ECHO MV E/A RATIO: 0.76
ECHO MV E/E' LATERAL: 4.1
ECHO MV E/E' RATIO (AVERAGED): 4.61
ECHO MV E/E' SEPTAL: 5.13
ECHO RA AREA 4C: 23.5 CM2
ECHO RA END SYSTOLIC VOLUME APICAL 4 CHAMBER INDEX BSA: 46 ML/M2
ECHO RA VOLUME: 79 ML
ECHO RIGHT VENTRICULAR SYSTOLIC PRESSURE (RVSP): 40 MMHG
ECHO RV BASAL DIMENSION: 5.3 CM
ECHO RV EJECTION FRACTION 3D: 31 %
ECHO RV FRACTIONAL AREA CHANGE: 26 %
ECHO RV FREE WALL PEAK S': 7 CM/S
ECHO RV GLOBAL SYSTOLIC STRAIN (GLS): -7.5 %
ECHO RV INTERNAL DIMENSION: 4.5 CM
ECHO RV LONGITUDINAL DIMENSION: 7.7 CM
ECHO RV MID DIMENSION: 4.2 CM
ECHO RV TAPSE: 0.9 CM (ref 1.7–?)
ECHO RV TAPSE: 0.9 CM (ref 1.7–?)
ECHO TV ALIASING VELOCITY (NYQUIST): 31 CM/S
ECHO TV REGURGITANT MAX VELOCITY: 2.5 M/S
ECHO TV REGURGITANT PEAK GRADIENT: 25 MMHG
ECHO TV REGURGITANT RADIUS PISA: 0.7 CM
EKG ATRIAL RATE: 87 BPM
EKG ATRIAL RATE: 93 BPM
EKG ATRIAL RATE: 93 BPM
EKG DIAGNOSIS: NORMAL
EKG P AXIS: 73 DEGREES
EKG P AXIS: 77 DEGREES
EKG P AXIS: 78 DEGREES
EKG P-R INTERVAL: 136 MS
EKG P-R INTERVAL: 138 MS
EKG P-R INTERVAL: 138 MS
EKG Q-T INTERVAL: 226 MS
EKG Q-T INTERVAL: 246 MS
EKG Q-T INTERVAL: 388 MS
EKG Q-T INTERVAL: 394 MS
EKG Q-T INTERVAL: 452 MS
EKG QRS DURATION: 86 MS
EKG QRS DURATION: 92 MS
EKG QRS DURATION: 96 MS
EKG QRS DURATION: 96 MS
EKG QRS DURATION: 98 MS
EKG QTC CALCULATION (BAZETT): 403 MS
EKG QTC CALCULATION (BAZETT): 422 MS
EKG QTC CALCULATION (BAZETT): 482 MS
EKG QTC CALCULATION (BAZETT): 489 MS
EKG QTC CALCULATION (BAZETT): 543 MS
EKG R AXIS: 51 DEGREES
EKG R AXIS: 58 DEGREES
EKG R AXIS: 62 DEGREES
EKG R AXIS: 69 DEGREES
EKG R AXIS: 80 DEGREES
EKG T AXIS: -1 DEGREES
EKG T AXIS: -12 DEGREES
EKG T AXIS: 11 DEGREES
EKG T AXIS: 16 DEGREES
EKG T AXIS: 21 DEGREES
EKG VENTRICULAR RATE: 177 BPM
EKG VENTRICULAR RATE: 191 BPM
EKG VENTRICULAR RATE: 87 BPM
EKG VENTRICULAR RATE: 93 BPM
EKG VENTRICULAR RATE: 93 BPM
FENTANYL SCREEN, URINE: ABNORMAL
GFR SERPLBLD CREATININE-BSD FMLA CKD-EPI: 46 ML/MIN/{1.73_M2}
GLUCOSE BLD-MCNC: 117 MG/DL (ref 70–99)
GLUCOSE SERPL-MCNC: 143 MG/DL (ref 70–99)
HAV IGM SERPL QL IA: NORMAL
HBV CORE IGM SERPL QL IA: NORMAL
HBV SURFACE AG SERPL QL IA: NORMAL
HCT VFR BLD AUTO: 36.4 % (ref 40.5–52.5)
HCV AB SERPL QL IA: NORMAL
HDLC SERPL-MCNC: 71 MG/DL (ref 40–60)
HGB BLD-MCNC: 12.3 G/DL (ref 13.5–17.5)
LACTATE BLDV-SCNC: 2.2 MMOL/L (ref 0.4–2)
LDLC SERPL CALC-MCNC: 39 MG/DL
MAGNESIUM SERPL-MCNC: 2 MG/DL (ref 1.8–2.4)
MCH RBC QN AUTO: 30.6 PG (ref 26–34)
MCHC RBC AUTO-ENTMCNC: 33.9 G/DL (ref 31–36)
MCV RBC AUTO: 90.4 FL (ref 80–100)
METHADONE UR QL SCN>300 NG/ML: ABNORMAL
OPIATES UR QL SCN>300 NG/ML: ABNORMAL
OXYCODONE UR QL SCN: ABNORMAL
PCP UR QL SCN>25 NG/ML: ABNORMAL
PERFORMED ON: ABNORMAL
PH UR STRIP: 6 [PH]
PLATELET # BLD AUTO: 211 K/UL (ref 135–450)
PMV BLD AUTO: 10 FL (ref 5–10.5)
POTASSIUM SERPL-SCNC: 5 MMOL/L (ref 3.5–5.1)
PROT SERPL-MCNC: 6.8 G/DL (ref 6.4–8.2)
RBC # BLD AUTO: 4.03 M/UL (ref 4.2–5.9)
SODIUM SERPL-SCNC: 137 MMOL/L (ref 136–145)
TRIGL SERPL-MCNC: 74 MG/DL (ref 0–150)
TROPONIN, HIGH SENSITIVITY: 161 NG/L (ref 0–22)
VLDLC SERPL CALC-MCNC: 15 MG/DL
WBC # BLD AUTO: 11 K/UL (ref 4–11)

## 2024-07-03 PROCEDURE — 6360000002 HC RX W HCPCS

## 2024-07-03 PROCEDURE — 2580000003 HC RX 258: Performed by: NURSE PRACTITIONER

## 2024-07-03 PROCEDURE — 2580000003 HC RX 258: Performed by: INTERNAL MEDICINE

## 2024-07-03 PROCEDURE — 6370000000 HC RX 637 (ALT 250 FOR IP): Performed by: INTERNAL MEDICINE

## 2024-07-03 PROCEDURE — 6360000002 HC RX W HCPCS: Performed by: INTERNAL MEDICINE

## 2024-07-03 PROCEDURE — 99291 CRITICAL CARE FIRST HOUR: CPT | Performed by: STUDENT IN AN ORGANIZED HEALTH CARE EDUCATION/TRAINING PROGRAM

## 2024-07-03 PROCEDURE — 93005 ELECTROCARDIOGRAM TRACING: CPT | Performed by: NURSE PRACTITIONER

## 2024-07-03 PROCEDURE — 80061 LIPID PANEL: CPT

## 2024-07-03 PROCEDURE — 80076 HEPATIC FUNCTION PANEL: CPT

## 2024-07-03 PROCEDURE — 85520 HEPARIN ASSAY: CPT

## 2024-07-03 PROCEDURE — 93010 ELECTROCARDIOGRAM REPORT: CPT | Performed by: INTERNAL MEDICINE

## 2024-07-03 PROCEDURE — 6370000000 HC RX 637 (ALT 250 FOR IP): Performed by: NURSE PRACTITIONER

## 2024-07-03 PROCEDURE — 83036 HEMOGLOBIN GLYCOSYLATED A1C: CPT

## 2024-07-03 PROCEDURE — 99254 IP/OBS CNSLTJ NEW/EST MOD 60: CPT | Performed by: INTERNAL MEDICINE

## 2024-07-03 PROCEDURE — 2000000000 HC ICU R&B

## 2024-07-03 PROCEDURE — 76376 3D RENDER W/INTRP POSTPROCES: CPT

## 2024-07-03 PROCEDURE — 93306 TTE W/DOPPLER COMPLETE: CPT | Performed by: INTERNAL MEDICINE

## 2024-07-03 PROCEDURE — 85027 COMPLETE CBC AUTOMATED: CPT

## 2024-07-03 PROCEDURE — 99223 1ST HOSP IP/OBS HIGH 75: CPT | Performed by: INTERNAL MEDICINE

## 2024-07-03 PROCEDURE — 83605 ASSAY OF LACTIC ACID: CPT

## 2024-07-03 PROCEDURE — 76376 3D RENDER W/INTRP POSTPROCES: CPT | Performed by: INTERNAL MEDICINE

## 2024-07-03 PROCEDURE — 83735 ASSAY OF MAGNESIUM: CPT

## 2024-07-03 PROCEDURE — 6360000002 HC RX W HCPCS: Performed by: NURSE PRACTITIONER

## 2024-07-03 PROCEDURE — 80307 DRUG TEST PRSMV CHEM ANLYZR: CPT

## 2024-07-03 PROCEDURE — 2500000003 HC RX 250 WO HCPCS: Performed by: NURSE PRACTITIONER

## 2024-07-03 PROCEDURE — 76705 ECHO EXAM OF ABDOMEN: CPT

## 2024-07-03 PROCEDURE — 93356 MYOCRD STRAIN IMG SPCKL TRCK: CPT | Performed by: INTERNAL MEDICINE

## 2024-07-03 PROCEDURE — 93005 ELECTROCARDIOGRAM TRACING: CPT | Performed by: STUDENT IN AN ORGANIZED HEALTH CARE EDUCATION/TRAINING PROGRAM

## 2024-07-03 PROCEDURE — 36415 COLL VENOUS BLD VENIPUNCTURE: CPT

## 2024-07-03 PROCEDURE — 80048 BASIC METABOLIC PNL TOTAL CA: CPT

## 2024-07-03 RX ORDER — HEPARIN SODIUM 1000 [USP'U]/ML
30 INJECTION, SOLUTION INTRAVENOUS; SUBCUTANEOUS ONCE
Status: COMPLETED | OUTPATIENT
Start: 2024-07-03 | End: 2024-07-03

## 2024-07-03 RX ORDER — ADENOSINE 3 MG/ML
6 INJECTION, SOLUTION INTRAVENOUS ONCE
Status: DISCONTINUED | OUTPATIENT
Start: 2024-07-03 | End: 2024-07-04

## 2024-07-03 RX ORDER — LORAZEPAM 2 MG/ML
1 INJECTION INTRAMUSCULAR ONCE
Status: COMPLETED | OUTPATIENT
Start: 2024-07-03 | End: 2024-07-03

## 2024-07-03 RX ORDER — METHOCARBAMOL 750 MG/1
750 TABLET, FILM COATED ORAL EVERY 6 HOURS PRN
Status: DISCONTINUED | OUTPATIENT
Start: 2024-07-03 | End: 2024-07-04 | Stop reason: HOSPADM

## 2024-07-03 RX ORDER — ESMOLOL HYDROCHLORIDE 10 MG/ML
25-300 INJECTION, SOLUTION INTRAVENOUS CONTINUOUS
Status: DISCONTINUED | OUTPATIENT
Start: 2024-07-03 | End: 2024-07-03

## 2024-07-03 RX ORDER — HEPARIN SODIUM 1000 [USP'U]/ML
3700 INJECTION, SOLUTION INTRAVENOUS; SUBCUTANEOUS ONCE
Status: COMPLETED | OUTPATIENT
Start: 2024-07-03 | End: 2024-07-03

## 2024-07-03 RX ORDER — HEPARIN SODIUM 1000 [USP'U]/ML
1800 INJECTION, SOLUTION INTRAVENOUS; SUBCUTANEOUS ONCE
Status: COMPLETED | OUTPATIENT
Start: 2024-07-03 | End: 2024-07-03

## 2024-07-03 RX ORDER — ADENOSINE 3 MG/ML
12 INJECTION, SOLUTION INTRAVENOUS ONCE
Status: DISCONTINUED | OUTPATIENT
Start: 2024-07-03 | End: 2024-07-04

## 2024-07-03 RX ORDER — MUPIROCIN 20 MG/G
OINTMENT TOPICAL 2 TIMES DAILY
Status: DISCONTINUED | OUTPATIENT
Start: 2024-07-03 | End: 2024-07-04 | Stop reason: HOSPADM

## 2024-07-03 RX ORDER — METOPROLOL SUCCINATE 25 MG/1
25 TABLET, EXTENDED RELEASE ORAL DAILY
Status: DISCONTINUED | OUTPATIENT
Start: 2024-07-03 | End: 2024-07-04

## 2024-07-03 RX ORDER — TRAZODONE HYDROCHLORIDE 50 MG/1
50 TABLET ORAL NIGHTLY PRN
Status: DISCONTINUED | OUTPATIENT
Start: 2024-07-03 | End: 2024-07-04 | Stop reason: HOSPADM

## 2024-07-03 RX ORDER — ADENOSINE 3 MG/ML
12 INJECTION, SOLUTION INTRAVENOUS ONCE
Status: COMPLETED | OUTPATIENT
Start: 2024-07-03 | End: 2024-07-03

## 2024-07-03 RX ORDER — BUPRENORPHINE AND NALOXONE 2; .5 MG/1; MG/1
2 FILM, SOLUBLE BUCCAL; SUBLINGUAL PRN
Status: DISCONTINUED | OUTPATIENT
Start: 2024-07-03 | End: 2024-07-04 | Stop reason: HOSPADM

## 2024-07-03 RX ORDER — LORAZEPAM 2 MG/ML
INJECTION INTRAMUSCULAR
Status: COMPLETED
Start: 2024-07-03 | End: 2024-07-03

## 2024-07-03 RX ORDER — LOPERAMIDE HYDROCHLORIDE 2 MG/1
2 CAPSULE ORAL 4 TIMES DAILY PRN
Status: DISCONTINUED | OUTPATIENT
Start: 2024-07-03 | End: 2024-07-04 | Stop reason: HOSPADM

## 2024-07-03 RX ORDER — NICOTINE 21 MG/24HR
1 PATCH, TRANSDERMAL 24 HOURS TRANSDERMAL DAILY PRN
Status: DISCONTINUED | OUTPATIENT
Start: 2024-07-03 | End: 2024-07-04 | Stop reason: HOSPADM

## 2024-07-03 RX ORDER — HYDROXYZINE PAMOATE 25 MG/1
50 CAPSULE ORAL EVERY 8 HOURS PRN
Status: DISCONTINUED | OUTPATIENT
Start: 2024-07-03 | End: 2024-07-04 | Stop reason: HOSPADM

## 2024-07-03 RX ADMIN — SODIUM CHLORIDE, PRESERVATIVE FREE 10 ML: 5 INJECTION INTRAVENOUS at 11:23

## 2024-07-03 RX ADMIN — AMIODARONE HYDROCHLORIDE 1 MG/MIN: 50 INJECTION, SOLUTION INTRAVENOUS at 12:48

## 2024-07-03 RX ADMIN — MUPIROCIN: 20 OINTMENT TOPICAL at 19:59

## 2024-07-03 RX ADMIN — HEPARIN SODIUM 3700 UNITS: 1000 INJECTION INTRAVENOUS; SUBCUTANEOUS at 05:56

## 2024-07-03 RX ADMIN — HEPARIN SODIUM 1800 UNITS: 1000 INJECTION INTRAVENOUS; SUBCUTANEOUS at 19:51

## 2024-07-03 RX ADMIN — HYDROXYZINE PAMOATE 50 MG: 25 CAPSULE ORAL at 02:05

## 2024-07-03 RX ADMIN — ASPIRIN 81 MG 81 MG: 81 TABLET ORAL at 11:23

## 2024-07-03 RX ADMIN — AMIODARONE HYDROCHLORIDE 0.5 MG/MIN: 50 INJECTION, SOLUTION INTRAVENOUS at 23:57

## 2024-07-03 RX ADMIN — DOXYCYCLINE 100 MG: 100 INJECTION, POWDER, LYOPHILIZED, FOR SOLUTION INTRAVENOUS at 15:14

## 2024-07-03 RX ADMIN — HEPARIN SODIUM 1800 UNITS: 1000 INJECTION INTRAVENOUS; SUBCUTANEOUS at 13:07

## 2024-07-03 RX ADMIN — LORAZEPAM 1 MG: 2 INJECTION INTRAMUSCULAR at 11:36

## 2024-07-03 RX ADMIN — CEFTRIAXONE SODIUM 1000 MG: 1 INJECTION, POWDER, FOR SOLUTION INTRAMUSCULAR; INTRAVENOUS at 10:38

## 2024-07-03 RX ADMIN — AMIODARONE HYDROCHLORIDE 150 MG: 50 INJECTION, SOLUTION INTRAVENOUS at 12:31

## 2024-07-03 RX ADMIN — LORAZEPAM 1 MG: 2 INJECTION INTRAMUSCULAR; INTRAVENOUS at 11:36

## 2024-07-03 RX ADMIN — AZITHROMYCIN MONOHYDRATE 500 MG: 500 INJECTION, POWDER, LYOPHILIZED, FOR SOLUTION INTRAVENOUS at 12:23

## 2024-07-03 RX ADMIN — ATORVASTATIN CALCIUM 40 MG: 40 TABLET, FILM COATED ORAL at 19:59

## 2024-07-03 RX ADMIN — ESMOLOL HYDROCHLORIDE 50 MCG/KG/MIN: 10 INJECTION INTRAVENOUS at 12:10

## 2024-07-03 RX ADMIN — BUPRENORPHINE AND NALOXONE 2 FILM: 2; .5 FILM, SOLUBLE BUCCAL; SUBLINGUAL at 02:05

## 2024-07-03 RX ADMIN — ADENOSINE 12 MG: 3 INJECTION INTRAVENOUS at 11:20

## 2024-07-03 RX ADMIN — BUPRENORPHINE AND NALOXONE 2 FILM: 2; .5 FILM, SOLUBLE BUCCAL; SUBLINGUAL at 09:26

## 2024-07-03 RX ADMIN — HEPARIN SODIUM 1800 UNITS: 1000 INJECTION INTRAVENOUS; SUBCUTANEOUS at 00:26

## 2024-07-03 RX ADMIN — TRAZODONE HYDROCHLORIDE 50 MG: 50 TABLET ORAL at 02:05

## 2024-07-03 RX ADMIN — AMIODARONE HYDROCHLORIDE 0.5 MG/MIN: 50 INJECTION, SOLUTION INTRAVENOUS at 18:19

## 2024-07-03 RX ADMIN — SODIUM CHLORIDE, PRESERVATIVE FREE 10 ML: 5 INJECTION INTRAVENOUS at 20:51

## 2024-07-03 ASSESSMENT — PAIN DESCRIPTION - DESCRIPTORS: DESCRIPTORS: DULL

## 2024-07-03 ASSESSMENT — PAIN DESCRIPTION - PAIN TYPE: TYPE: ACUTE PAIN

## 2024-07-03 ASSESSMENT — PAIN SCALES - GENERAL
PAINLEVEL_OUTOF10: 3
PAINLEVEL_OUTOF10: 6
PAINLEVEL_OUTOF10: 0

## 2024-07-03 ASSESSMENT — PAIN DESCRIPTION - LOCATION: LOCATION: CHEST

## 2024-07-03 ASSESSMENT — PAIN DESCRIPTION - ORIENTATION: ORIENTATION: LEFT;MID

## 2024-07-03 NOTE — PROGRESS NOTES
prescription bottle of generic Suboxone found in patient pocket, writer confiscated prescription bottle and counted pills with second RN. After counting and witnessing writer placed prescription bottle in a security sealed bag with patient information and took to pharmacy to be locked and stored during patient's hospital admission. 1 sealed bag was signed in and given to pharmacy at this time.

## 2024-07-03 NOTE — PROGRESS NOTES
Called to see around 9.45 p  Restless   Alert and communicative , BP   - one reading was low , now WNL  He was placed on NRB, does not seem to be in any resp  distress   Lungs - no wheezing or crackles , AE diminished  Urine tox pending   Rpt EKG_ NO acute finding no ST  elevation - my reading   Trop / ABG , CXR ordered   Trop down trending   CXR - no new findings - my reading   Given elevated BNP - IVF - on hold      ? Drug withdrawal  Pending ABG < consider ativan   Change O2 source - pending ABG  Stays in PCU now   BAM JEFFERS

## 2024-07-03 NOTE — CONSULTS
PULMONARY AND CRITICAL CARE INPATIENT CONSULTATION      7/3/2024    Patient Name:  Dayton Soto       1965       Evaluation was requested by Dr. Acosta regarding agitation.    HPI:   Patient is a 59 y.o. male with significant PMHx illicit drug use that presents to [facility] for chest pain. Pt was altered/agitated and given sedating medications. He is unable to give history. According to EMR, patient was admitted on 7/2/24 for chest pains and SOB. He had an elevated troponin and Cardiology was consulted for evaluation of NSTEMI. He also had an LUIS FELIPE and hypotension which was fluid resuscitated.    Today, pt's heart rate was above 150 and he became agitated wanting to go home and CODE VIOLET was called. He was given Ativan and transferred to ICU for further management.       Invasive Lines: PICC D#None   CVC D#None  Art Line D#None            ROS:   Review of Systems   Unable to perform ROS: Acuity of condition          Past Medical History:   Diagnosis Date    Back pain, chronic     Chronic back pain     MRSA (methicillin resistant staph aureus) culture positive 02/26/2020    RIGHT FOREARM        History reviewed. No pertinent surgical history.     History reviewed. No pertinent family history.     Social History     Tobacco Use    Smoking status: Former     Types: Cigarettes    Smokeless tobacco: Never   Substance Use Topics    Alcohol use: No        No Known Allergies      Vital Signs:  Blood pressure 93/75, pulse (!) 183, temperature 97.7 °F (36.5 °C), temperature source Oral, resp. rate 27, height 1.778 m (5' 10\"), weight 57.3 kg (126 lb 4.8 oz), SpO2 100 %.' on RA  Body mass index is 18.12 kg/m².  CVP:      Intake/Output Summary (Last 24 hours) at 7/3/2024 1300  Last data filed at 7/3/2024 1003  Gross per 24 hour   Intake 240 ml   Output 325 ml   Net -85 ml         PHYSICAL EXAM:  Physical Exam  Constitutional:       Appearance: He is ill-appearing.      Comments: sleepy   HENT:      Head:  Normocephalic and atraumatic.      Nose: Nose normal.      Mouth/Throat:      Pharynx: No oropharyngeal exudate.   Eyes:      General: No scleral icterus.        Right eye: No discharge.         Left eye: No discharge.   Cardiovascular:      Rate and Rhythm: Normal rate.      Heart sounds: No murmur heard.     No gallop.   Pulmonary:      Effort: Pulmonary effort is normal.      Breath sounds: Rales present. No wheezing.   Abdominal:      General: Abdomen is flat. Bowel sounds are normal. There is no distension.      Tenderness: There is no abdominal tenderness.   Musculoskeletal:         General: No swelling.      Cervical back: Normal range of motion.   Skin:     General: Skin is warm and dry.   Neurological:      Mental Status: He is oriented to person, place, and time.          Results:  CBC:   Recent Labs     07/02/24  1700 07/03/24  0430   WBC 9.6 11.0   HGB 12.7* 12.3*   HCT 37.6* 36.4*   MCV 90.7 90.4    211     BMP:   Recent Labs     07/02/24  1700 07/03/24  0430    137   K 4.5 5.0   CL 97* 101   CO2 27 23   BUN 35* 41*   CREATININE 1.7* 1.7*       Echocardiogram:  PFT:    Imaging:  I have reviewed radiology images personally.    US LIVER    Result Date: 7/3/2024  1. Findings may suggest acalculous cholecystitis, with mild gallbladder wall thickening and a mild-to-moderate amount of adjacent pericholecystic fluid and edema.  Suggest clinical correlation, and consider a HIDA scan for further characterization. 2. No evidence of biliary ductal dilatation or obstruction. 3. Nonspecific 2.5 cm nodular focus of increased echogenicity within the liver parenchyma, which appears to lie near the falciform ligament, most likely focal fatty deposition.  However, consider a routine non urgent dedicated liver mass protocol MRI for further characterization. 4. Mild perihepatic ascites. 5. Unremarkable sonographic appearance of the common bile duct, right kidney, and visualized pancreas.     XR CHEST

## 2024-07-03 NOTE — PLAN OF CARE
Problem: Pain  Goal: Verbalizes/displays adequate comfort level or baseline comfort level  7/3/2024 1247 by Shandra Kidd, RN  Outcome: Progressing  Flowsheets (Taken 7/3/2024 0800)  Verbalizes/displays adequate comfort level or baseline comfort level:   Encourage patient to monitor pain and request assistance   Assess pain using appropriate pain scale     Problem: Safety - Adult  Goal: Free from fall injury  Outcome: Progressing     Problem: ABCDS Injury Assessment  Goal: Absence of physical injury  Outcome: Progressing

## 2024-07-03 NOTE — PLAN OF CARE
Problem: Discharge Planning  Goal: Discharge to home or other facility with appropriate resources  7/3/2024 1839 by Che New RN  Outcome: Progressing  7/3/2024 1247 by Shandra Kidd RN  Outcome: Progressing  Flowsheets (Taken 7/3/2024 0800)  Discharge to home or other facility with appropriate resources:   Identify barriers to discharge with patient and caregiver   Identify discharge learning needs (meds, wound care, etc)   Arrange for needed discharge resources and transportation as appropriate     Problem: Pain  Goal: Verbalizes/displays adequate comfort level or baseline comfort level  7/3/2024 1839 by Che New RN  Outcome: Progressing  7/3/2024 1247 by Shandra Kidd RN  Outcome: Progressing  Flowsheets (Taken 7/3/2024 0800)  Verbalizes/displays adequate comfort level or baseline comfort level:   Encourage patient to monitor pain and request assistance   Assess pain using appropriate pain scale     Problem: Safety - Adult  Goal: Free from fall injury  7/3/2024 1839 by Che New RN  Outcome: Progressing  7/3/2024 1247 by Shandra Kidd RN  Outcome: Progressing     Problem: ABCDS Injury Assessment  Goal: Absence of physical injury  7/3/2024 1839 by Che New RN  Outcome: Progressing  7/3/2024 1247 by Shandra Kidd RN  Outcome: Progressing

## 2024-07-03 NOTE — PROGRESS NOTES
Responded to overhead page for code violet. Debrief form completed. Checked in with Pt to offer support after code. No needs at this time. Assured Pt of our continued availability for support if needed.    Joey Tsai         07/03/24 1159   Encounter Summary   Encounter Overview/Reason Crisis   Service Provided For Patient   Last Encounter    (7/3 code violet, debrief form completed, Pt had no needs)   Complexity of Encounter High   Begin Time 1130   End Time  1159   Total Time Calculated 29 min   Crisis   Type Code (comment)  (Violet)

## 2024-07-03 NOTE — PROGRESS NOTES
Pt admitted to floor around 2100 very restless/anxious, tachypneic, c/o chest pain, with low BP. Patient placed on NRB at this time, blood glucose was checked, and EKG was obtained.     @2126 SATINDER Fernandez paged tp come to bedside to assess patients status    @2136 Jim at bedside. Stat labs and CXR ordered.IVF held d/t elevated BNP. MD Tyler also at bedside to check patient. Spoke about possible COWS scale for drug withdrawal.     @2200 IV lorazepam ordered and given (see eMAR). Pt still appears anxious and is restless    @2208 patient c/o chest pain. BP improved, nitro subling. PRN given x3 with VSS (see eMAR).    @2230 pt appears more calm, RR is WNL with VSS. Still c/o CP after nitro PRN given. SATINDER Fernandez aware.

## 2024-07-03 NOTE — PROGRESS NOTES
Pt remains restless and anxious. COWS assessment ordered and performed at this time. Pt scored an 18 and was given PRN medications at this time (see eMAR). Plan of care ongoing

## 2024-07-03 NOTE — PROGRESS NOTES
PRN suboxone given for COWS 12. Patient off the unit at this time for renal ultrasound. [facility] notified. Marylin Gudino RN

## 2024-07-03 NOTE — PROGRESS NOTES
mod ar, severe tr,      Hypotension-secondary to nitroglycerin-nitroglycerin on hold, gentle IV fluids given     LUIS FELIPE-avoid nephrotoxic medications avoid hypotension gentle hydration with normal saline 75 cc/h total of 1 L     Elevated LFTs-, checked for hepatitis, repeat liver function test in the morning, liver, avoid hypotension   -hep panel neg  -u/s ordered,     Questionable ASD/ CXR  Given elevated procalcitonin-antibiotics started in the emergency room  Continue     Cardiac murmur, given history of drug abuse-rule out infective fsrcwqmjzjxw-jtjvlmlsowxkzq-hvyocdx     History of narcotic abuse and currently on Suboxone- sister confirmed that pt goes to [facility]  -on cows protocol    Physical Exam Performed:      General appearance:  No apparent distress, cachetic appearing and restless  Respiratory:  Normal respiratory effort.   Cardiovascular:  Regular rate and rhythm.  Abdomen:  Soft, non-tender, non-distended.  Musculoskeletal:  No edema  Neurologic:  Non-focal  Psychiatric:  Alert and oriented    /88   Pulse 73   Temp 97.7 °F (36.5 °C) (Oral)   Resp 18   Ht 1.778 m (5' 10\")   Wt 57.3 kg (126 lb 4.8 oz)   SpO2 98%   BMI 18.12 kg/m²     Diet: ADULT DIET; Regular  DVT Prophylaxis: []PPx LMWH  []SQ Heparin  []IPC/SCDs  []Eliquis  []Xarelto  []Coumadin  [x]Other -  heparin ggt    Code status: Full Code  PT/OT Eval Status:   [x]NOT yet ordered  []Ordered and Pending   []Seen with Recommendations for:  []Home independently  []Home w/ assist  []HHC  []SNF  []Acute Rehab    Anticipated Discharge Day/Date: transfer to  icu care    Anticipated Discharge Location: []Home  []HHC  []SNF  []Acute Rehab  []ECF  []LTAC  []Hospice  []Other -      Consults:      IP CONSULT TO CARDIOLOGY  IP CONSULT TO CARDIOLOGY  IP CONSULT TO CRITICAL CARE      This patient has a high likelihood of being discharged tomorrow and is appropriate for A1 Discharge Unit in AM pending clinical course overnight: []Yes   02/26/2020 03:18 PM     Organism:   Lab Results   Component Value Date/Time    ORG Staph aureus MRSA 02/26/2020 03:55 PM         Andrade Acosta MD

## 2024-07-03 NOTE — H&P
Hospital Medicine History & Physical      Date of Admission: 7/2/2024    Date of Service:  Pt seen/examined on 7/2/2024    [x]Admitted to Inpatient with expected LOS greater than two midnights due to medical therapy.  []Placed in Observation status.    Chief Admission Complaint: Chest pain    Presenting Admission History:      59 y.o. male who presented to [facility] with chest pain.  PMHx significant for narcotic abuse currently on Suboxoneand  active meth user.    He started with chest pain last evening.  Left-sided,varying  intensity associated with shortness of breath, and no radiation  He told that he suspects that the beer he had  must have had  methamphetamine.  Because it tasted different  He attributed his symptoms to this  Because of elevated troponin and EKG changes cardiology was consulted and started on IV heparin and nitroglycerin in the emergency room  Blood pressure dropped with nitroglycerin and currently nitroglycerin on hold  He does not seem to be in pain or respiratory distress  He is edentulous  and speech is not clear  He denies nausea vomiting diarrhea cough sputum or any urinary complaints.  Currently no focal neurological symptoms  Assessment/Plan:      Current Principal Problem:  NSTEMI (non-ST elevated myocardial infarction) (HCC)    Chest pain-/elevated troponin -non-ST elevation MI-admit to progressive care unit, telemetry, continue with IV heparin, pharmacy to dose, nitro was stopped because of low blood pressure  Aspirin, statin  No beta-blocker because of soft blood pressure  N.p.o.  Cardiology was consulted from the emergency room  Given history of meth-urine tox pending  Echocardiogram    Hypotension-secondary to nitroglycerin-nitroglycerin on hold, gentle IV fluids    LUIS FELIPE-avoid nephrotoxic medications avoid hypotension gentle hydration with normal saline 75 cc/h total of 1 L    Elevated liver function test-, check for hepatitis, repeat liver function test in the morning,  37.6*        Recent Labs     07/02/24  1700      K 4.5   CL 97*   CO2 27   BUN 35*   CREATININE 1.7*   CALCIUM 9.4     Recent Labs     07/02/24  1700 07/02/24  1801   PROBNP 30,208*  --    TROPHS 186* 164*     No results for input(s): \"LABA1C\" in the last 72 hours.  Recent Labs     07/02/24  1700   AST 75*   ALT 59*   BILITOT 0.9   ALKPHOS 200*     Recent Labs     07/02/24  1727   INR 1.38*        Sin Tyler MD

## 2024-07-03 NOTE — PLAN OF CARE
Asked to evaluate patient given sudden onset of tachycardia. Telemetry showed suspected SVT with HR in the 170's. Dr. Ochoa called to bedside.  Adenosine 12 mg given IV push by myself.  Patient with transient conversion to sinus before going back into rapid atrial arrhythmia.  Arrhythmia felt to be AVNRT.  IV amiodarone and IV esmolol ordered with plans to transfer to ICU however patient reported he was going to leave AMA.  Ultimately he was transferred to the ICU.  IV amiodarone was started however esmolol was held due to hypotension. Formal EP consult to come. Please reach out with needs.     Of note, contact by critical care team. Patient converted to SR after amiodarone bolus. Would continue amiodarone for now.

## 2024-07-03 NOTE — CONSULTS
changes.      CXR:      Assessment:  59 y.o. patient with:  Principal Problem:    NSTEMI (non-ST elevated myocardial infarction) ([facility])  Resolved Problems:    * No resolved hospital problems. *      Problem List Items Addressed This Visit       * (Principal) NSTEMI (non-ST elevated myocardial infarction) ([facility]) - Primary    Relevant Medications    heparin (porcine) injection 3,700 Units    heparin (porcine) injection 3,700 Units    heparin (porcine) injection 1,800 Units    heparin 25,000 units in dextrose 5% 250 mL (premix) infusion    nitroGLYCERIN 200 mcg/mL in dextrose 5%    aspirin chewable tablet 81 mg (Start on 7/3/2024  9:00 AM)    atorvastatin (LIPITOR) tablet 40 mg     Other Visit Diagnoses       Chest pain, unspecified type        Relevant Orders    Echo (TTE) complete (PRN contrast/bubble/strain/3D)    LUIS FELIPE (acute kidney injury) ([facility])        Elevated brain natriuretic peptide (BNP) level        Community acquired pneumonia, unspecified laterality        Relevant Medications    cefTRIAXone (ROCEPHIN) 1,000 mg in sodium chloride 0.9 % 50 mL IVPB (mini-bag) (Completed)    azithromycin (ZITHROMAX) 500 mg in sodium chloride 0.9 % 250 mL IVPB ([id number]) (Completed)    cefTRIAXone (ROCEPHIN) 1,000 mg in sodium chloride 0.9 % 50 mL IVPB (mini-bag) (Start on 7/3/2024  9:00 AM)    azithromycin (ZITHROMAX) 500 mg in sodium chloride 0.9 % 250 mL IVPB ([id number]) (Start on 7/3/2024  9:00 AM)    Substance use disorder        Heart murmur        Relevant Medications    heparin (porcine) injection 3,700 Units    heparin (porcine) injection 3,700 Units    heparin (porcine) injection 1,800 Units    heparin 25,000 units in dextrose 5% 250 mL (premix) infusion    nitroglycerin (NITRO-BID) 2 % ointment 0.5 inch (Completed)    nitroGLYCERIN 200 mcg/mL in dextrose 5%    aspirin chewable tablet 81 mg (Start on 7/3/2024  9:00 AM)    atorvastatin (LIPITOR) tablet 40 mg    nitroGLYCERIN (NITROSTAT) SL tablet 0.4 mg (Completed)

## 2024-07-04 ENCOUNTER — HOSPITAL ENCOUNTER (EMERGENCY)
Age: 59
Discharge: ANOTHER HOSPITAL | End: 2024-07-05
Attending: STUDENT IN AN ORGANIZED HEALTH CARE EDUCATION/TRAINING PROGRAM
Payer: MEDICAID

## 2024-07-04 ENCOUNTER — APPOINTMENT (OUTPATIENT)
Dept: GENERAL RADIOLOGY | Age: 59
End: 2024-07-04
Attending: STUDENT IN AN ORGANIZED HEALTH CARE EDUCATION/TRAINING PROGRAM
Payer: MEDICAID

## 2024-07-04 VITALS
HEART RATE: 72 BPM | BODY MASS INDEX: 18.08 KG/M2 | SYSTOLIC BLOOD PRESSURE: 119 MMHG | RESPIRATION RATE: 15 BRPM | HEIGHT: 70 IN | WEIGHT: 126.3 LBS | DIASTOLIC BLOOD PRESSURE: 76 MMHG | OXYGEN SATURATION: 100 % | TEMPERATURE: 97.8 F

## 2024-07-04 DIAGNOSIS — R94.31 PROLONGED Q-T INTERVAL ON ECG: ICD-10-CM

## 2024-07-04 DIAGNOSIS — E87.6 HYPOKALEMIA: ICD-10-CM

## 2024-07-04 DIAGNOSIS — I50.20 HEART FAILURE WITH REDUCED EJECTION FRACTION (HCC): ICD-10-CM

## 2024-07-04 DIAGNOSIS — E83.42 HYPOMAGNESEMIA: ICD-10-CM

## 2024-07-04 DIAGNOSIS — I21.4 NSTEMI (NON-ST ELEVATED MYOCARDIAL INFARCTION) (HCC): Primary | ICD-10-CM

## 2024-07-04 PROBLEM — I47.10 PSVT (PAROXYSMAL SUPRAVENTRICULAR TACHYCARDIA) (HCC): Status: ACTIVE | Noted: 2024-07-04

## 2024-07-04 PROBLEM — N17.9 AKI (ACUTE KIDNEY INJURY) (HCC): Status: ACTIVE | Noted: 2024-07-04

## 2024-07-04 LAB
ALBUMIN SERPL-MCNC: 3.7 G/DL (ref 3.4–5)
ALBUMIN/GLOB SERPL: 1.2 {RATIO} (ref 1.1–2.2)
ALP SERPL-CCNC: 253 U/L (ref 40–129)
ALT SERPL-CCNC: 264 U/L (ref 10–40)
ANION GAP SERPL CALCULATED.3IONS-SCNC: 13 MMOL/L (ref 3–16)
ANTI-XA UNFRAC HEPARIN: 0.26 IU/ML (ref 0.3–0.7)
ANTI-XA UNFRAC HEPARIN: 0.41 IU/ML (ref 0.3–0.7)
APTT BLD: 33.4 SEC (ref 22.1–36.4)
AST SERPL-CCNC: 176 U/L (ref 15–37)
BASOPHILS # BLD: 0.1 K/UL (ref 0–0.2)
BASOPHILS NFR BLD: 2 %
BILIRUB SERPL-MCNC: 0.4 MG/DL (ref 0–1)
BUN SERPL-MCNC: 23 MG/DL (ref 7–20)
CALCIUM SERPL-MCNC: 8.4 MG/DL (ref 8.3–10.6)
CHLORIDE SERPL-SCNC: 102 MMOL/L (ref 99–110)
CO2 SERPL-SCNC: 26 MMOL/L (ref 21–32)
CREAT SERPL-MCNC: 0.9 MG/DL (ref 0.9–1.3)
DEPRECATED RDW RBC AUTO: 13.3 % (ref 12.4–15.4)
EOSINOPHIL # BLD: 0.1 K/UL (ref 0–0.6)
EOSINOPHIL NFR BLD: 1 %
EST. AVERAGE GLUCOSE BLD GHB EST-MCNC: 102.5 MG/DL
FLUAV RNA UPPER RESP QL NAA+PROBE: NEGATIVE
FLUBV AG NPH QL: NEGATIVE
GFR SERPLBLD CREATININE-BSD FMLA CKD-EPI: >90 ML/MIN/{1.73_M2}
GLUCOSE SERPL-MCNC: 86 MG/DL (ref 70–99)
HBA1C MFR BLD: 5.2 %
HCT VFR BLD AUTO: 35.6 % (ref 40.5–52.5)
HGB BLD-MCNC: 12 G/DL (ref 13.5–17.5)
LYMPHOCYTES # BLD: 1.1 K/UL (ref 1–5.1)
LYMPHOCYTES NFR BLD: 16 %
MAGNESIUM SERPL-MCNC: 1.7 MG/DL (ref 1.8–2.4)
MCH RBC QN AUTO: 30.2 PG (ref 26–34)
MCHC RBC AUTO-ENTMCNC: 33.8 G/DL (ref 31–36)
MCV RBC AUTO: 89.3 FL (ref 80–100)
METAMYELOCYTES NFR BLD MANUAL: 2 %
MONOCYTES # BLD: 0.7 K/UL (ref 0–1.3)
MONOCYTES NFR BLD: 10 %
MYELOCYTES NFR BLD MANUAL: 3 %
NEUTROPHILS # BLD: 4.8 K/UL (ref 1.7–7.7)
NEUTROPHILS NFR BLD: 64 %
NEUTS BAND NFR BLD MANUAL: 1 % (ref 0–7)
NT-PROBNP SERPL-MCNC: 8665 PG/ML (ref 0–124)
PLATELET # BLD AUTO: 220 K/UL (ref 135–450)
PLATELET BLD QL SMEAR: ADEQUATE
PMV BLD AUTO: 9.5 FL (ref 5–10.5)
POTASSIUM SERPL-SCNC: 3.4 MMOL/L (ref 3.5–5.1)
PROMYELOCYTES NFR BLD MANUAL: 1 %
PROT SERPL-MCNC: 6.8 G/DL (ref 6.4–8.2)
RBC # BLD AUTO: 3.99 M/UL (ref 4.2–5.9)
SARS-COV-2 RDRP RESP QL NAA+PROBE: NOT DETECTED
SLIDE REVIEW: ABNORMAL
SODIUM SERPL-SCNC: 141 MMOL/L (ref 136–145)
TROPONIN, HIGH SENSITIVITY: 63 NG/L (ref 0–22)
WBC # BLD AUTO: 6.7 K/UL (ref 4–11)

## 2024-07-04 PROCEDURE — 6360000002 HC RX W HCPCS: Performed by: INTERNAL MEDICINE

## 2024-07-04 PROCEDURE — 96376 TX/PRO/DX INJ SAME DRUG ADON: CPT

## 2024-07-04 PROCEDURE — 96366 THER/PROPH/DIAG IV INF ADDON: CPT

## 2024-07-04 PROCEDURE — 80053 COMPREHEN METABOLIC PANEL: CPT

## 2024-07-04 PROCEDURE — 36415 COLL VENOUS BLD VENIPUNCTURE: CPT

## 2024-07-04 PROCEDURE — 2580000003 HC RX 258: Performed by: INTERNAL MEDICINE

## 2024-07-04 PROCEDURE — 87635 SARS-COV-2 COVID-19 AMP PRB: CPT

## 2024-07-04 PROCEDURE — 83880 ASSAY OF NATRIURETIC PEPTIDE: CPT

## 2024-07-04 PROCEDURE — 71045 X-RAY EXAM CHEST 1 VIEW: CPT

## 2024-07-04 PROCEDURE — 85520 HEPARIN ASSAY: CPT

## 2024-07-04 PROCEDURE — 99285 EMERGENCY DEPT VISIT HI MDM: CPT

## 2024-07-04 PROCEDURE — 85025 COMPLETE CBC W/AUTO DIFF WBC: CPT

## 2024-07-04 PROCEDURE — 96375 TX/PRO/DX INJ NEW DRUG ADDON: CPT

## 2024-07-04 PROCEDURE — 96365 THER/PROPH/DIAG IV INF INIT: CPT

## 2024-07-04 PROCEDURE — 87804 INFLUENZA ASSAY W/OPTIC: CPT

## 2024-07-04 PROCEDURE — 96368 THER/DIAG CONCURRENT INF: CPT

## 2024-07-04 PROCEDURE — 6370000000 HC RX 637 (ALT 250 FOR IP): Performed by: INTERNAL MEDICINE

## 2024-07-04 PROCEDURE — 93005 ELECTROCARDIOGRAM TRACING: CPT | Performed by: STUDENT IN AN ORGANIZED HEALTH CARE EDUCATION/TRAINING PROGRAM

## 2024-07-04 PROCEDURE — 6370000000 HC RX 637 (ALT 250 FOR IP): Performed by: STUDENT IN AN ORGANIZED HEALTH CARE EDUCATION/TRAINING PROGRAM

## 2024-07-04 PROCEDURE — 6360000002 HC RX W HCPCS: Performed by: STUDENT IN AN ORGANIZED HEALTH CARE EDUCATION/TRAINING PROGRAM

## 2024-07-04 PROCEDURE — 83735 ASSAY OF MAGNESIUM: CPT

## 2024-07-04 PROCEDURE — 84484 ASSAY OF TROPONIN QUANT: CPT

## 2024-07-04 PROCEDURE — 99291 CRITICAL CARE FIRST HOUR: CPT | Performed by: STUDENT IN AN ORGANIZED HEALTH CARE EDUCATION/TRAINING PROGRAM

## 2024-07-04 PROCEDURE — 2580000003 HC RX 258: Performed by: STUDENT IN AN ORGANIZED HEALTH CARE EDUCATION/TRAINING PROGRAM

## 2024-07-04 PROCEDURE — 85730 THROMBOPLASTIN TIME PARTIAL: CPT

## 2024-07-04 PROCEDURE — 2580000003 HC RX 258: Performed by: NURSE PRACTITIONER

## 2024-07-04 PROCEDURE — 96367 TX/PROPH/DG ADDL SEQ IV INF: CPT

## 2024-07-04 PROCEDURE — 99233 SBSQ HOSP IP/OBS HIGH 50: CPT | Performed by: INTERNAL MEDICINE

## 2024-07-04 PROCEDURE — 2500000003 HC RX 250 WO HCPCS: Performed by: NURSE PRACTITIONER

## 2024-07-04 RX ORDER — HEPARIN SODIUM 1000 [USP'U]/ML
60 INJECTION, SOLUTION INTRAVENOUS; SUBCUTANEOUS PRN
Status: DISCONTINUED | OUTPATIENT
Start: 2024-07-04 | End: 2024-07-05 | Stop reason: HOSPADM

## 2024-07-04 RX ORDER — METOPROLOL SUCCINATE 25 MG/1
12.5 TABLET, EXTENDED RELEASE ORAL DAILY
Status: DISCONTINUED | OUTPATIENT
Start: 2024-07-04 | End: 2024-07-04 | Stop reason: HOSPADM

## 2024-07-04 RX ORDER — HEPARIN SODIUM 1000 [USP'U]/ML
1800 INJECTION, SOLUTION INTRAVENOUS; SUBCUTANEOUS ONCE
Status: COMPLETED | OUTPATIENT
Start: 2024-07-04 | End: 2024-07-04

## 2024-07-04 RX ORDER — HEPARIN SODIUM 1000 [USP'U]/ML
60 INJECTION, SOLUTION INTRAVENOUS; SUBCUTANEOUS ONCE
Status: COMPLETED | OUTPATIENT
Start: 2024-07-04 | End: 2024-07-04

## 2024-07-04 RX ORDER — HEPARIN SODIUM 10000 [USP'U]/100ML
5-30 INJECTION, SOLUTION INTRAVENOUS CONTINUOUS
Status: DISCONTINUED | OUTPATIENT
Start: 2024-07-04 | End: 2024-07-04 | Stop reason: ALTCHOICE

## 2024-07-04 RX ORDER — HEPARIN SODIUM 10000 [USP'U]/100ML
16 INJECTION, SOLUTION INTRAVENOUS CONTINUOUS
Status: DISCONTINUED | OUTPATIENT
Start: 2024-07-04 | End: 2024-07-05 | Stop reason: HOSPADM

## 2024-07-04 RX ORDER — MAGNESIUM SULFATE IN WATER 40 MG/ML
2000 INJECTION, SOLUTION INTRAVENOUS ONCE
Status: COMPLETED | OUTPATIENT
Start: 2024-07-04 | End: 2024-07-05

## 2024-07-04 RX ORDER — LORAZEPAM 1 MG/1
1 TABLET ORAL EVERY 12 HOURS PRN
Status: DISCONTINUED | OUTPATIENT
Start: 2024-07-04 | End: 2024-07-04 | Stop reason: HOSPADM

## 2024-07-04 RX ORDER — HEPARIN SODIUM 1000 [USP'U]/ML
30 INJECTION, SOLUTION INTRAVENOUS; SUBCUTANEOUS PRN
Status: DISCONTINUED | OUTPATIENT
Start: 2024-07-04 | End: 2024-07-05 | Stop reason: HOSPADM

## 2024-07-04 RX ORDER — BUPRENORPHINE AND NALOXONE 8; 2 MG/1; MG/1
1 FILM, SOLUBLE BUCCAL; SUBLINGUAL DAILY
Status: DISCONTINUED | OUTPATIENT
Start: 2024-07-04 | End: 2024-07-04 | Stop reason: HOSPADM

## 2024-07-04 RX ORDER — POTASSIUM CHLORIDE 750 MG/1
40 TABLET, EXTENDED RELEASE ORAL ONCE
Status: COMPLETED | OUTPATIENT
Start: 2024-07-04 | End: 2024-07-04

## 2024-07-04 RX ADMIN — HEPARIN SODIUM 12 UNITS/KG/HR: 10000 INJECTION, SOLUTION INTRAVENOUS at 23:37

## 2024-07-04 RX ADMIN — DOXYCYCLINE 100 MG: 100 INJECTION, POWDER, LYOPHILIZED, FOR SOLUTION INTRAVENOUS at 01:29

## 2024-07-04 RX ADMIN — ASPIRIN 81 MG 81 MG: 81 TABLET ORAL at 08:56

## 2024-07-04 RX ADMIN — MAGNESIUM SULFATE HEPTAHYDRATE 2000 MG: 40 INJECTION, SOLUTION INTRAVENOUS at 23:05

## 2024-07-04 RX ADMIN — POTASSIUM CHLORIDE 40 MEQ: 750 TABLET, EXTENDED RELEASE ORAL at 23:04

## 2024-07-04 RX ADMIN — HEPARIN SODIUM 3800 UNITS: 1000 INJECTION INTRAVENOUS; SUBCUTANEOUS at 23:38

## 2024-07-04 RX ADMIN — BUPRENORPHINE AND NALOXONE 1 FILM: 8; 2 FILM, SOLUBLE BUCCAL; SUBLINGUAL at 08:57

## 2024-07-04 RX ADMIN — METOPROLOL SUCCINATE 12.5 MG: 25 TABLET, EXTENDED RELEASE ORAL at 08:56

## 2024-07-04 RX ADMIN — HEPARIN SODIUM 1800 UNITS: 1000 INJECTION INTRAVENOUS; SUBCUTANEOUS at 03:27

## 2024-07-04 RX ADMIN — CEFTRIAXONE SODIUM 1000 MG: 1 INJECTION, POWDER, FOR SOLUTION INTRAMUSCULAR; INTRAVENOUS at 08:56

## 2024-07-04 RX ADMIN — CEFTRIAXONE SODIUM 1000 MG: 1 INJECTION, POWDER, FOR SOLUTION INTRAMUSCULAR; INTRAVENOUS at 23:04

## 2024-07-04 ASSESSMENT — PAIN DESCRIPTION - LOCATION
LOCATION: CHEST
LOCATION: CHEST

## 2024-07-04 ASSESSMENT — PAIN DESCRIPTION - DESCRIPTORS
DESCRIPTORS: DISCOMFORT
DESCRIPTORS: DISCOMFORT;CRAMPING

## 2024-07-04 ASSESSMENT — PAIN SCALES - GENERAL
PAINLEVEL_OUTOF10: 7
PAINLEVEL_OUTOF10: 4

## 2024-07-04 ASSESSMENT — PAIN - FUNCTIONAL ASSESSMENT
PAIN_FUNCTIONAL_ASSESSMENT: 0-10
PAIN_FUNCTIONAL_ASSESSMENT: 0-10

## 2024-07-04 ASSESSMENT — PAIN DESCRIPTION - PAIN TYPE: TYPE: ACUTE PAIN

## 2024-07-04 NOTE — PROGRESS NOTES
Pulmonary & Critical Care Medicine ICU Progress Note      Events of Last 24 hours:   Pt with no complaints. He is asking for food this morning.       Invasive Lines: PICC D#None   CVC D#None  Art Line D#None            Vitals:  /82   Pulse 55   Temp 97.7 °F (36.5 °C) (Oral)   Resp 16   Ht 1.778 m (5' 10\")   Wt 57.3 kg (126 lb 4.8 oz)   SpO2 100%   BMI 18.12 kg/m²   Tmax:  CVP:        Intake/Output Summary (Last 24 hours) at 7/4/2024 0812  Last data filed at 7/4/2024 0400  Gross per 24 hour   Intake 1934.41 ml   Output 700 ml   Net 1234.41 ml       EXAM:  Physical Exam  Constitutional:       Appearance: He is ill-appearing.   HENT:      Head: Normocephalic and atraumatic.      Nose: Nose normal.      Mouth/Throat:      Pharynx: No oropharyngeal exudate.   Eyes:      General: No scleral icterus.        Right eye: No discharge.         Left eye: No discharge.   Cardiovascular:      Rate and Rhythm: Normal rate.      Heart sounds: No murmur heard.     No gallop.   Pulmonary:      Effort: Pulmonary effort is normal.      Breath sounds: Rales present. No wheezing.   Abdominal:      General: Abdomen is flat. Bowel sounds are normal. There is no distension.      Tenderness: There is no abdominal tenderness.   Musculoskeletal:         General: No swelling.      Cervical back: Normal range of motion.   Skin:     General: Skin is warm and dry.   Neurological:      Mental Status: He is alert and oriented to person, place, and time.          Medications:  Scheduled Meds:   metoprolol succinate  25 mg Oral Daily    mupirocin   Each Nostril BID    doxycycline (VIBRAMYCIN) IV  100 mg IntraVENous Q12H    sodium chloride flush  5-40 mL IntraVENous 2 times per day    aspirin  81 mg Oral Daily    atorvastatin  40 mg Oral Nightly    cefTRIAXone (ROCEPHIN) IV  1,000 mg IntraVENous Q24H    [Held by provider] sodium chloride  1,000 mL IntraVENous Once       PRN Meds:  nicotine, buprenorphine-naloxone, hydrOXYzine pamoate,  arrhythmias     Pt on RA and tolerating. Cardiology and EP following for arrhythmias and NSTEMI. Critical care will sign off, please call with any questions/concerns.      Ppx/Zavaleta/Lines  - PIV  - No zavaleta  - No GI ppx, Heparin for ACS        Critical care time spent reviewing labs/films, examining patient, collaborating with other physicians but excluding procedures for life threatening organ failure is 42 minutes.        Electronically signed by:  Matt Palmer MD    7/4/2024    8:12 AM.

## 2024-07-04 NOTE — PROGRESS NOTES
Followed up with Dayton Soto on 7/4/2024 at 1:32 PM. Patient left the ICU with a disposition of AMA. Patient cited personal and financial/legal obligations as reason. Advised patient to follow up with a primary care physician or return to the Emergency Department if symptoms worsen.   Leigh Oliveros RN

## 2024-07-04 NOTE — CONSULTS
Methamphetamines (Crystal Meth). He reports that he does not drink alcohol.     Family History:  Reviewed. family history is not on file.   No premature CAD.     Review of System:  All other systems reviewed except for that noted above. Pertinent negatives and positives are:   General: negative for fever, chills   Ophthalmic ROS: negative for - eye pain or loss of vision  ENT ROS: negative for - headaches, sore throat   Respiratory: negative for - cough, sputum  Cardiovascular: Reviewed in HPI  Gastrointestinal: negative for - abdominal pain, diarrhea, N/V  Hematology: negative for - bleeding, blood clots, bruising or jaundice  Genito-Urinary:  negative for - Dysuria or incontinence  Musculoskeletal: negative for - Joint swelling, muscle pain  Neurological: negative for - confusion, dizziness, headaches   Psychiatric: No anxiety, no depression.  Dermatological: negative for - rash    Physical Examination:  Vitals:    24 2100   BP: 109/86   Pulse: 73   Resp: 21   Temp:    SpO2: 100%        Intake/Output Summary (Last 24 hours) at 7/3/2024 2320  Last data filed at 7/3/2024 1808  Gross per 24 hour   Intake 1208.59 ml   Output 1025 ml   Net 183.59 ml     In: 1208.6 [P.O.:480; I.V.:266]  Out: 1025    Wt Readings from Last 3 Encounters:   24 57.3 kg (126 lb 4.8 oz)   23 65.8 kg (145 lb)   12/15/22 65.8 kg (145 lb)     Temp  Av.1 °F (36.7 °C)  Min: 97.7 °F (36.5 °C)  Max: 98.4 °F (36.9 °C)  Pulse  Av.5  Min: 65  Max: 188  BP  Min: 57/34  Max: 125/87  SpO2  Av.1 %  Min: 94 %  Max: 100 %    Telemetry: Sinus rhythm and supraventricular tachycardia  Constitutional: Alert.  Moderate distress.   Head: Normocephalic and atraumatic.   Mouth/Throat: Lips appear moist. Oropharynx is clear and moist.  Eyes: Conjunctivae normal. EOM are normal.   Neck: Neck supple. No lymphadenopathy. No rigidity. No JVD present.   Cardiovascular: Tachycardic rhythm.  No murmurs noted.  Pulmonary/Chest: Bilateral  tachycardia.  2. NSTEMI.  3. Cardiomyopathy.    Assessment and Plan:  1. Supraventricular tachycardia.  Patient is a follow-up.  59-year-old male with a medical history significant for polysubstance abuse who presents from home with chest pain and was found to be positive for methamphetamines.  He was found to have an NSTEMI however medical management was recommended.  He was started on heparin drip and went into supraventricular tachycardia.  EKG shows an irregular supraventricular bedside in the emergency prolongation the MT interval followed by termination of tachycardia.  Tachycardia reinitiated shortly on the prolongation of MT interval.  While baseline EKG while in tachycardia would be most consistent with atrial fibrillation, this response to adenosine is contrary to what she would expect with atrial fibrillation (see telemetry).  No clear RP relationship in SVT.   No significant change in QRS noted between tachycardia and NSR.  Based on response to adenosine, suspect AVNRT > AVRT.  AT unlikely given clear termination of tachycardia and P waves. Patient would like to be discharged however I think he can be convinced to stay in the hospital.  As he continues to be tachycardic, is having elevated troponin enzymes and possible strain on his myocardial blood flow we will start him on some amiodarone and metoprolol.    - Amiodarone bolus with drip.  Would consider weaning once starts on metoprolol.  - May consider esmolol drip.  - Continue metoprolol succinate.  - We will continue to follow along with you.    2. NSTEMI.  - Defer to cardiology.    3. Cardiomyopathy.  - Defer to cardiology.    Thank you for allowing me to participate in the care of Dayton Soto . If you have any questions/comments, please do not hesitate to contact us.    Markie Ochoa MD  Cardiac Electrophysiology  [facility]  [phone] [place] Office

## 2024-07-04 NOTE — PROGRESS NOTES
[facility]          Cardiology                                                                Progress Note    Admission date:  2024    Subjective:   CC PSVT  HPI pt feels better. Rhythm has been sinus since 1245 7/3/24.    Vitals:  Blood pressure 119/76, pulse 72, temperature 97.8 °F (36.6 °C), temperature source Axillary, resp. rate 15, height 1.778 m (5' 10\"), weight 57.3 kg (126 lb 4.8 oz), SpO2 100 %.  Temp  Av.8 °F (36.6 °C)  Min: 97.8 °F (36.6 °C)  Max: 97.8 °F (36.6 °C)  Pulse  Av  Min: 54  Max: 90  BP  Min: 80/59  Max: 125/87  SpO2  Av.2 %  Min: 94 %  Max: 100 %    24 hour I/O    Intake/Output Summary (Last 24 hours) at 2024 1247  Last data filed at 2024 0400  Gross per 24 hour   Intake 1694.41 ml   Output 700 ml   Net 994.41 ml       Objective:     Telemetry monitor: SR 60's    Physical Exam:  General Appearance:  comfortable  HEENT: pupils equal and reactive, no scleral  icterus  Skin:  Warm and dry  Heart:  Regular, normal apex, S1 and S2 normal  Lungs:  Clear, no wheezing  Abd: soft, non tender  Extremities:  No edema, no cyanosis  Psych: normal affect, oriented X 3      Lab Review     Renal Profile:   Lab Results   Component Value Date/Time    CREATININE 1.7 2024 04:30 AM    BUN 41 2024 04:30 AM     2024 04:30 AM    K 5.0 2024 04:30 AM     2024 04:30 AM    CO2 23 2024 04:30 AM     CBC:    Lab Results   Component Value Date/Time    WBC 11.0 2024 04:30 AM    RBC 4.03 2024 04:30 AM    HGB 12.3 2024 04:30 AM    HCT 36.4 2024 04:30 AM    MCV 90.4 2024 04:30 AM    RDW 13.4 2024 04:30 AM     2024 04:30 AM     BNP:  No results found for: \"BNP\"  Fasting Lipid Panel:    Lab Results   Component Value Date/Time    CHOL 125 2024 04:30 AM    HDL 71 2024 04:30 AM    TRIG 74 2024 04:30 AM     Cardiac Enzymes:  CK/MbTroponin  Lab Results   Component

## 2024-07-04 NOTE — DISCHARGE SUMMARY
deposition.  However, consider a routine non urgent dedicated liver mass protocol MRI for further characterization. 4. Mild perihepatic ascites. 5. Unremarkable sonographic appearance of the common bile duct, right kidney, and visualized pancreas.     XR CHEST PORTABLE    Result Date: 7/2/2024  EXAMINATION: ONE XRAY VIEW OF THE CHEST 7/2/2024 9:38 pm COMPARISON: Earlier today HISTORY: ORDERING SYSTEM PROVIDED HISTORY: hypoxia, tachypnea, poss. left lung PNA TECHNOLOGIST PROVIDED HISTORY: Reason for exam:->hypoxia, tachypnea, poss. left lung PNA Reason for Exam: hypoxia, tachypnea, poss. left lung PNA FINDINGS: Stable mild cardiomegaly.  Bibasilar consolidation is similar to the recent exam.  No pneumothorax.  No pleural effusion.     Bibasilar patchy consolidation is similar to the recent exam.     XR CHEST PORTABLE    Result Date: 7/2/2024  EXAMINATION: ONE XRAY VIEW OF THE CHEST 7/2/2024 5:00 pm COMPARISON: 12/15/2022 HISTORY: Acute chest pain. FINDINGS: Patient is mildly rotated.  Cardiomediastinal silhouette at the upper limit of normal.  Patchy opacity in the left lung.  Right lung is relatively clear. No significant pleural effusion.  No pneumothorax.  Osteopenia.     Patchy opacity in the left lung could be due to patient rotation versus acute airspace disease.       Consults:     IP CONSULT TO CARDIOLOGY  IP CONSULT TO CARDIOLOGY  IP CONSULT TO CRITICAL CARE    Labs:     Recent Labs     07/02/24 1700 07/03/24  0430   WBC 9.6 11.0   HGB 12.7* 12.3*   HCT 37.6* 36.4*    211     Recent Labs     07/02/24  1700 07/03/24  0430    137   K 4.5 5.0   CL 97* 101   CO2 27 23   BUN 35* 41*   CREATININE 1.7* 1.7*   CALCIUM 9.4 8.8   MG  --  2.00     Recent Labs     07/02/24  1700 07/02/24  1801 07/02/24  2143 07/02/24  2337   PROBNP 30,208*  --   --   --    TROPHS 186* 164* 163* 161*     Recent Labs     07/03/24  0430   LABA1C 5.2     Recent Labs     07/02/24  1700 07/03/24  0430   AST 75* 650*   ALT 59*  420*   BILIDIR  --  0.8*   BILITOT 0.9 1.5*   ALKPHOS 200* 233*     Recent Labs     07/02/24  1727 07/02/24  2337 07/03/24  0430   INR 1.38*  --   --    LACTA  --  3.6* 2.2*       Urine Cultures: No results found for: \"LABURIN\"  Blood Cultures:   Lab Results   Component Value Date/Time    BC  07/02/2024 07:32 PM     No Growth to date.  Any change in status will be called.     Lab Results   Component Value Date/Time    BLOODCULT2  07/02/2024 07:32 PM     No Growth to date.  Any change in status will be called.     Organism:   Lab Results   Component Value Date/Time    ORG Staph aureus MRSA 02/26/2020 03:55 PM       Signed:    Andrade Acosta MD

## 2024-07-05 ENCOUNTER — HOSPITAL ENCOUNTER (INPATIENT)
Age: 59
LOS: 4 days | Discharge: HOME | DRG: 190 | End: 2024-07-09
Attending: EMERGENCY MEDICINE | Admitting: INTERNAL MEDICINE
Payer: MEDICAID

## 2024-07-05 VITALS
HEIGHT: 70 IN | RESPIRATION RATE: 18 BRPM | WEIGHT: 140 LBS | TEMPERATURE: 98.4 F | BODY MASS INDEX: 20.04 KG/M2 | DIASTOLIC BLOOD PRESSURE: 79 MMHG | HEART RATE: 60 BPM | OXYGEN SATURATION: 97 % | SYSTOLIC BLOOD PRESSURE: 105 MMHG

## 2024-07-05 DIAGNOSIS — R93.1 ABNORMAL ECHOCARDIOGRAM: Primary | ICD-10-CM

## 2024-07-05 PROBLEM — R07.9 CHEST PAIN: Status: ACTIVE | Noted: 2024-07-05

## 2024-07-05 LAB
ALBUMIN SERPL-MCNC: 2.9 G/DL (ref 3.4–5)
ALBUMIN/GLOB SERPL: 0.9 {RATIO} (ref 1.1–2.2)
ALP SERPL-CCNC: 196 U/L (ref 40–129)
ALT SERPL-CCNC: 196 U/L (ref 10–40)
ANION GAP SERPL CALCULATED.3IONS-SCNC: 6 MMOL/L (ref 3–16)
ANTI-XA UNFRAC HEPARIN: <0.1 IU/ML (ref 0.3–0.7)
APTT BLD: 42.2 SEC (ref 22.1–36.4)
AST SERPL-CCNC: 112 U/L (ref 15–37)
BILIRUB SERPL-MCNC: 0.3 MG/DL (ref 0–1)
BUN SERPL-MCNC: 15 MG/DL (ref 7–20)
CALCIUM SERPL-MCNC: 8.1 MG/DL (ref 8.3–10.6)
CHLORIDE SERPL-SCNC: 104 MMOL/L (ref 99–110)
CO2 SERPL-SCNC: 27 MMOL/L (ref 21–32)
CREAT SERPL-MCNC: 0.7 MG/DL (ref 0.9–1.3)
EKG ATRIAL RATE: 67 BPM
EKG ATRIAL RATE: 92 BPM
EKG DIAGNOSIS: NORMAL
EKG DIAGNOSIS: NORMAL
EKG P AXIS: 67 DEGREES
EKG P AXIS: 73 DEGREES
EKG P-R INTERVAL: 120 MS
EKG P-R INTERVAL: 140 MS
EKG Q-T INTERVAL: 530 MS
EKG Q-T INTERVAL: 560 MS
EKG QRS DURATION: 108 MS
EKG QRS DURATION: 108 MS
EKG QTC CALCULATION (BAZETT): 591 MS
EKG QTC CALCULATION (BAZETT): 655 MS
EKG R AXIS: 65 DEGREES
EKG R AXIS: 86 DEGREES
EKG T AXIS: 32 DEGREES
EKG T AXIS: 49 DEGREES
EKG VENTRICULAR RATE: 67 BPM
EKG VENTRICULAR RATE: 92 BPM
GFR SERPLBLD CREATININE-BSD FMLA CKD-EPI: >90 ML/MIN/{1.73_M2}
GLUCOSE SERPL-MCNC: 89 MG/DL (ref 70–99)
POTASSIUM SERPL-SCNC: 3.8 MMOL/L (ref 3.5–5.1)
PROT SERPL-MCNC: 6 G/DL (ref 6.4–8.2)
SODIUM SERPL-SCNC: 137 MMOL/L (ref 136–145)
TROPONIN, HIGH SENSITIVITY: 46 NG/L (ref 0–22)
TROPONIN, HIGH SENSITIVITY: 48 NG/L (ref 0–22)
TROPONIN, HIGH SENSITIVITY: 51 NG/L (ref 0–22)

## 2024-07-05 PROCEDURE — 2580000003 HC RX 258: Performed by: STUDENT IN AN ORGANIZED HEALTH CARE EDUCATION/TRAINING PROGRAM

## 2024-07-05 PROCEDURE — 6370000000 HC RX 637 (ALT 250 FOR IP): Performed by: INTERNAL MEDICINE

## 2024-07-05 PROCEDURE — 99254 IP/OBS CNSLTJ NEW/EST MOD 60: CPT | Performed by: INTERNAL MEDICINE

## 2024-07-05 PROCEDURE — 80053 COMPREHEN METABOLIC PANEL: CPT

## 2024-07-05 PROCEDURE — 36415 COLL VENOUS BLD VENIPUNCTURE: CPT

## 2024-07-05 PROCEDURE — 6360000002 HC RX W HCPCS: Performed by: STUDENT IN AN ORGANIZED HEALTH CARE EDUCATION/TRAINING PROGRAM

## 2024-07-05 PROCEDURE — 2500000003 HC RX 250 WO HCPCS: Performed by: STUDENT IN AN ORGANIZED HEALTH CARE EDUCATION/TRAINING PROGRAM

## 2024-07-05 PROCEDURE — 85730 THROMBOPLASTIN TIME PARTIAL: CPT

## 2024-07-05 PROCEDURE — 2060000000 HC ICU INTERMEDIATE R&B

## 2024-07-05 PROCEDURE — 6360000002 HC RX W HCPCS: Performed by: INTERNAL MEDICINE

## 2024-07-05 PROCEDURE — 85520 HEPARIN ASSAY: CPT

## 2024-07-05 PROCEDURE — 93010 ELECTROCARDIOGRAM REPORT: CPT | Performed by: INTERNAL MEDICINE

## 2024-07-05 PROCEDURE — 2580000003 HC RX 258: Performed by: INTERNAL MEDICINE

## 2024-07-05 PROCEDURE — 93005 ELECTROCARDIOGRAM TRACING: CPT | Performed by: NURSE PRACTITIONER

## 2024-07-05 PROCEDURE — 84484 ASSAY OF TROPONIN QUANT: CPT

## 2024-07-05 PROCEDURE — 99232 SBSQ HOSP IP/OBS MODERATE 35: CPT | Performed by: INTERNAL MEDICINE

## 2024-07-05 RX ORDER — BUPRENORPHINE AND NALOXONE 8; 2 MG/1; MG/1
1 FILM, SOLUBLE BUCCAL; SUBLINGUAL DAILY
Status: DISCONTINUED | OUTPATIENT
Start: 2024-07-06 | End: 2024-07-05 | Stop reason: DRUGHIGH

## 2024-07-05 RX ORDER — HEPARIN SODIUM 1000 [USP'U]/ML
60 INJECTION, SOLUTION INTRAVENOUS; SUBCUTANEOUS ONCE
Status: COMPLETED | OUTPATIENT
Start: 2024-07-05 | End: 2024-07-05

## 2024-07-05 RX ORDER — MIDODRINE HYDROCHLORIDE 5 MG/1
5 TABLET ORAL 3 TIMES DAILY PRN
Status: DISCONTINUED | OUTPATIENT
Start: 2024-07-05 | End: 2024-07-09 | Stop reason: HOSPADM

## 2024-07-05 RX ORDER — SODIUM CHLORIDE 0.9 % (FLUSH) 0.9 %
5-40 SYRINGE (ML) INJECTION PRN
Status: DISCONTINUED | OUTPATIENT
Start: 2024-07-05 | End: 2024-07-09 | Stop reason: HOSPADM

## 2024-07-05 RX ORDER — ASPIRIN 325 MG
325 TABLET, DELAYED RELEASE (ENTERIC COATED) ORAL DAILY
Status: DISCONTINUED | OUTPATIENT
Start: 2024-07-05 | End: 2024-07-05

## 2024-07-05 RX ORDER — BUPRENORPHINE AND NALOXONE 8; 2 MG/1; MG/1
1 FILM, SOLUBLE BUCCAL; SUBLINGUAL DAILY
Status: CANCELLED | OUTPATIENT
Start: 2024-07-06

## 2024-07-05 RX ORDER — SODIUM CHLORIDE 0.9 % (FLUSH) 0.9 %
5-40 SYRINGE (ML) INJECTION EVERY 12 HOURS SCHEDULED
Status: DISCONTINUED | OUTPATIENT
Start: 2024-07-05 | End: 2024-07-09 | Stop reason: HOSPADM

## 2024-07-05 RX ORDER — SODIUM CHLORIDE 0.9 % (FLUSH) 0.9 %
5-40 SYRINGE (ML) INJECTION EVERY 12 HOURS SCHEDULED
Status: CANCELLED | OUTPATIENT
Start: 2024-07-05

## 2024-07-05 RX ORDER — ASPIRIN 81 MG/1
81 TABLET ORAL DAILY
Status: DISCONTINUED | OUTPATIENT
Start: 2024-07-06 | End: 2024-07-09 | Stop reason: HOSPADM

## 2024-07-05 RX ORDER — SODIUM CHLORIDE 9 MG/ML
INJECTION, SOLUTION INTRAVENOUS PRN
Status: DISCONTINUED | OUTPATIENT
Start: 2024-07-05 | End: 2024-07-09 | Stop reason: HOSPADM

## 2024-07-05 RX ORDER — FUROSEMIDE 10 MG/ML
20 INJECTION INTRAMUSCULAR; INTRAVENOUS DAILY
Status: DISCONTINUED | OUTPATIENT
Start: 2024-07-05 | End: 2024-07-06

## 2024-07-05 RX ORDER — NITROGLYCERIN 0.4 MG/1
0.4 TABLET SUBLINGUAL EVERY 5 MIN PRN
Status: DISCONTINUED | OUTPATIENT
Start: 2024-07-05 | End: 2024-07-09 | Stop reason: HOSPADM

## 2024-07-05 RX ORDER — SODIUM CHLORIDE 9 MG/ML
INJECTION, SOLUTION INTRAVENOUS PRN
Status: CANCELLED | OUTPATIENT
Start: 2024-07-05

## 2024-07-05 RX ORDER — BUPRENORPHINE AND NALOXONE 8; 2 MG/1; MG/1
2 FILM, SOLUBLE BUCCAL; SUBLINGUAL DAILY
Status: DISCONTINUED | OUTPATIENT
Start: 2024-07-05 | End: 2024-07-09 | Stop reason: HOSPADM

## 2024-07-05 RX ORDER — HEPARIN SODIUM 1000 [USP'U]/ML
60 INJECTION, SOLUTION INTRAVENOUS; SUBCUTANEOUS PRN
Status: DISCONTINUED | OUTPATIENT
Start: 2024-07-05 | End: 2024-07-06

## 2024-07-05 RX ORDER — BUPRENORPHINE AND NALOXONE 2; .5 MG/1; MG/1
2 FILM, SOLUBLE BUCCAL; SUBLINGUAL DAILY
Status: DISCONTINUED | OUTPATIENT
Start: 2024-07-05 | End: 2024-07-09 | Stop reason: HOSPADM

## 2024-07-05 RX ORDER — MAGNESIUM SULFATE IN WATER 40 MG/ML
2000 INJECTION, SOLUTION INTRAVENOUS PRN
Status: DISCONTINUED | OUTPATIENT
Start: 2024-07-05 | End: 2024-07-09 | Stop reason: HOSPADM

## 2024-07-05 RX ORDER — POTASSIUM CHLORIDE 7.45 MG/ML
10 INJECTION INTRAVENOUS PRN
Status: DISCONTINUED | OUTPATIENT
Start: 2024-07-05 | End: 2024-07-09 | Stop reason: HOSPADM

## 2024-07-05 RX ORDER — SODIUM CHLORIDE 0.9 % (FLUSH) 0.9 %
5-40 SYRINGE (ML) INJECTION PRN
Status: CANCELLED | OUTPATIENT
Start: 2024-07-05

## 2024-07-05 RX ORDER — ACETAMINOPHEN 650 MG/1
650 SUPPOSITORY RECTAL EVERY 6 HOURS PRN
Status: DISCONTINUED | OUTPATIENT
Start: 2024-07-05 | End: 2024-07-09 | Stop reason: HOSPADM

## 2024-07-05 RX ORDER — HEPARIN SODIUM 10000 [USP'U]/100ML
1020 INJECTION, SOLUTION INTRAVENOUS CONTINUOUS
Status: DISCONTINUED | OUTPATIENT
Start: 2024-07-05 | End: 2024-07-05

## 2024-07-05 RX ORDER — ACETAMINOPHEN 325 MG/1
650 TABLET ORAL EVERY 6 HOURS PRN
Status: DISCONTINUED | OUTPATIENT
Start: 2024-07-05 | End: 2024-07-09 | Stop reason: HOSPADM

## 2024-07-05 RX ORDER — HEPARIN SODIUM 1000 [USP'U]/ML
30 INJECTION, SOLUTION INTRAVENOUS; SUBCUTANEOUS PRN
Status: DISCONTINUED | OUTPATIENT
Start: 2024-07-05 | End: 2024-07-06

## 2024-07-05 RX ORDER — ONDANSETRON 4 MG/1
4 TABLET, ORALLY DISINTEGRATING ORAL EVERY 8 HOURS PRN
Status: DISCONTINUED | OUTPATIENT
Start: 2024-07-05 | End: 2024-07-09 | Stop reason: HOSPADM

## 2024-07-05 RX ORDER — POTASSIUM CHLORIDE 1500 MG/1
40 TABLET, EXTENDED RELEASE ORAL PRN
Status: DISCONTINUED | OUTPATIENT
Start: 2024-07-05 | End: 2024-07-09 | Stop reason: HOSPADM

## 2024-07-05 RX ORDER — POLYETHYLENE GLYCOL 3350 17 G/17G
17 POWDER, FOR SOLUTION ORAL DAILY PRN
Status: DISCONTINUED | OUTPATIENT
Start: 2024-07-05 | End: 2024-07-09 | Stop reason: HOSPADM

## 2024-07-05 RX ORDER — ONDANSETRON 2 MG/ML
4 INJECTION INTRAMUSCULAR; INTRAVENOUS EVERY 6 HOURS PRN
Status: DISCONTINUED | OUTPATIENT
Start: 2024-07-05 | End: 2024-07-09 | Stop reason: HOSPADM

## 2024-07-05 RX ORDER — SPIRONOLACTONE 25 MG/1
25 TABLET ORAL DAILY
Status: DISCONTINUED | OUTPATIENT
Start: 2024-07-05 | End: 2024-07-09 | Stop reason: HOSPADM

## 2024-07-05 RX ADMIN — ASPIRIN 325 MG: 325 TABLET, COATED ORAL at 12:15

## 2024-07-05 RX ADMIN — BUPRENORPHINE AND NALOXONE 2 FILM: 2; .5 FILM, SOLUBLE BUCCAL; SUBLINGUAL at 12:15

## 2024-07-05 RX ADMIN — CEFTRIAXONE SODIUM 1000 MG: 1 INJECTION, POWDER, FOR SOLUTION INTRAMUSCULAR; INTRAVENOUS at 20:26

## 2024-07-05 RX ADMIN — SODIUM CHLORIDE, PRESERVATIVE FREE 10 ML: 5 INJECTION INTRAVENOUS at 12:28

## 2024-07-05 RX ADMIN — SPIRONOLACTONE 25 MG: 25 TABLET ORAL at 14:42

## 2024-07-05 RX ADMIN — DOXYCYCLINE 100 MG: 100 INJECTION, POWDER, LYOPHILIZED, FOR SOLUTION INTRAVENOUS at 00:25

## 2024-07-05 RX ADMIN — MIDODRINE HYDROCHLORIDE 5 MG: 5 TABLET ORAL at 14:42

## 2024-07-05 RX ADMIN — BUPRENORPHINE AND NALOXONE 2 FILM: 8; 2 FILM, SOLUBLE BUCCAL; SUBLINGUAL at 12:15

## 2024-07-05 RX ADMIN — HEPARIN SODIUM 3800 UNITS: 1000 INJECTION INTRAVENOUS; SUBCUTANEOUS at 07:43

## 2024-07-05 RX ADMIN — HEPARIN SODIUM 1020 UNITS/HR: 10000 INJECTION, SOLUTION INTRAVENOUS at 12:26

## 2024-07-05 RX ADMIN — FUROSEMIDE 20 MG: 10 INJECTION, SOLUTION INTRAMUSCULAR; INTRAVENOUS at 14:41

## 2024-07-05 ASSESSMENT — PAIN - FUNCTIONAL ASSESSMENT
PAIN_FUNCTIONAL_ASSESSMENT: NONE - DENIES PAIN

## 2024-07-05 ASSESSMENT — PAIN SCALES - GENERAL
PAINLEVEL_OUTOF10: 0

## 2024-07-05 ASSESSMENT — LIFESTYLE VARIABLES
HOW MANY STANDARD DRINKS CONTAINING ALCOHOL DO YOU HAVE ON A TYPICAL DAY: PATIENT DOES NOT DRINK
HOW OFTEN DO YOU HAVE A DRINK CONTAINING ALCOHOL: NEVER

## 2024-07-05 ASSESSMENT — PAIN DESCRIPTION - PAIN TYPE: TYPE: ACUTE PAIN

## 2024-07-05 NOTE — PROGRESS NOTES
[facility]   Electrophysiology Follow up     Date: 2024  Reason for Consultation: Long QT   Consult Requesting Physician: Andrade Acosta MD     S:   - denies symptoms  - QT prolonged on EKG review  - No history of racing heart, palpitations or syncope    Physical Examination:  /62   Pulse 76   Temp 97.6 °F (36.4 °C) (Oral)   Resp 16   Ht 1.778 m (5' 10\")   Wt 59 kg (130 lb 1.1 oz)   SpO2 99%   BMI 18.66 kg/m²   Temp  Av °F (36.7 °C)  Min: 97.5 °F (36.4 °C)  Max: 98.4 °F (36.9 °C)  Pulse  Av  Min: 56  Max: 88  BP  Min: 93/60  Max: 146/94  SpO2  Av.2 %  Min: 97 %  Max: 100 %  No intake or output data in the 24 hours ending 24 1636    No acute distress  Chest clear, nonlabored, no rhonchi or wheeze  Heart is regular rate, regular rhythm, no murmurs, no lower extremity swelling  Abdomen is rounded, soft, nontender  Strength is grossly preserved, normal tone  No focal neurologic deficits  Appropriate mood and affect  No rashes or ecchymoses    Labs:    Lab Results   Component Value Date/Time     2024 11:14 AM    K 3.8 2024 11:14 AM     2024 11:14 AM    CO2 27 2024 11:14 AM    BUN 15 2024 11:14 AM    CREATININE 0.7 2024 11:14 AM    GLUCOSE 89 2024 11:14 AM    CALCIUM 8.1 2024 11:14 AM      No results found for: \"TSH\", \"TSHFT4\", \"TSHELE\", \"HHP5TAZ\", \"TSHHS\"  Lab Results   Component Value Date/Time    ALKPHOS 196 2024 11:14 AM     2024 11:14 AM     2024 11:14 AM    BILITOT 0.3 2024 11:14 AM    BILIDIR 0.8 2024 04:30 AM      Lab Results   Component Value Date    CKTOTAL 278 2024    TROPONINI <0.01 12/15/2022    TROPHS 48 (H) 2024        EKG 2024  Sinus rhythm  T wave inversions     ECHO     Left Ventricle: Moderately reduced left ventricular systolic function with a visually estimated EF of 30 - 35%. EF 3D is 34%. Left ventricle size is normal. Mild

## 2024-07-05 NOTE — CONSULTS
Low dose heparin hospitals  per Dr. Acosta :    - Dx: CP, pt.was admitted at [facility] on 7/2 left AMA on 7/4 ~13:00, went to [facility] ~21:00 on same day   - Therapeutic aPTT range :  sec/Anti-Xa : 0.3-0.7  - PLT=  220K on 7/4  - Wt: 59kg  -  Drip rate = 1020 units /hr per protocol  -  Will check level at 13:00.  Gifty Schwarz/Melissa. 7/5/24 12:01 PM EDT

## 2024-07-05 NOTE — PROGRESS NOTES
Pharmacy to Manage Heparin Infusion per Hospital Policy    Dx: chest pain  Pt wt = 63.5 kg Actual  Baseline aPTT = 33.4 seconds   Platelets: 220      Low Dose Heparin Infusion  Heparin 60 units/kg IVP bolus followed by Heparin infusion at 12 units/kg/hr (recommended initial max dose 1000 units/hr).  Recheck anti-Xa in 6 -8 hours after bolus.    Goal Anti-Xa = 0.3 - 0.7 IU/ml  Initial Bolus Dose: 3800 units  Initial Drip Rate: 12 units/kg/hr  Next anti-Xa 7/5 @ 0630    Pharmacy will continue to monitor.

## 2024-07-05 NOTE — ED NOTES
[facility] here to transport pt. Pt remains alert and without c/o's. Resps even and unlab

## 2024-07-05 NOTE — ED NOTES
Pt care assumed, pt continues to rest quietly with OU closed, resps even and unlab. Awaiting [facility] for transport

## 2024-07-05 NOTE — ED PROVIDER NOTES
Emergency Department Encounter    Patient: Dayton Soto  MRN: [id number]  : 1965  Date of Evaluation: 2024  ED Provider:  Bladimir Rene MD    Triage Chief Complaint:   Chest Pain (Pt signed out AMA from [facility] because he \"had to take care of some stuff at home.  Got home and started having chest pain and elevated heart rate again\")    [place]:  Dayton Soto is a 59 y.o. male with history seen below presenting with complaints of chest pain.  Patient was recently admitted to [facility] for NSTEMI, heart failure with reduced ejection fraction, intermittent tachycardia and possible pneumonia.  Patient was on heparin drip and being followed by electrophysiology.  Patient was also receiving IV antibiotics.  Patient left AGAINST MEDICAL ADVICE earlier today stating he \"needed to get some things done\".  States that when he has been home he felt like his heart rate out of data once again and so return to the ED.  Patient states he also had chest pain at home which is improving now that he is in the emergency department.  Denies shortness of breath.  States he does have mild cough with sputum production.  Denies fevers.  Denies abdominal pain, nausea vomiting, diarrhea constipation or urinary symptoms.  States he does have a history of meth abuse.  States he did not use today when he went home.    ROS - see HPI, below listed is current ROS at time of my eval:  At least 14 systems reviewed, negative other than HPI    Past Medical History:   Diagnosis Date    Back pain, chronic     Chronic back pain     MRSA (methicillin resistant staph aureus) culture positive 2020    RIGHT FOREARM     History reviewed. No pertinent surgical history.  History reviewed. No pertinent family history.  Social History     Socioeconomic History    Marital status:      Spouse name: Not on file    Number of children: Not on file    Years of education: Not on file    Highest education level: Not on file  words and phrases that may be inappropriate.  Efforts were made to edit the dictations.        Bladimir Rene MD  07/05/24 021

## 2024-07-05 NOTE — PLAN OF CARE
CHF Care Plan      Patient's EF (Ejection Fraction) is less than 40%    Heart Failure Medications:  Diuretics:: Furosemide and Spironolactone    (One of the following REQUIRED for EF </= 40%/SYSTOLIC FAILURE but MAY be used in EF% >40%/DIASTOLIC FAILURE)        ACE:: None        ARB:: None         ARNI:: None    (Beta Blockers)  NON- Evidenced Based Beta Blocker (for EF% >40%/DIASTOLIC FAILURE): None    Evidenced Based Beta Blocker::(REQUIRED for EF% <40%/SYSTOLIC FAILURE) None  ...................................................................................................................................................    Failed to redirect to the Timeline version of the [employer] SmartLink.      Patient's weights and intake/output reviewed    Daily Weight log at bedside, patient/family participation in use of log: \"yes    Patient's current weight today shows a difference of  10 lbs less than last documented weight.      Intake/Output Summary (Last 24 hours) at 7/5/2024 1714  Last data filed at 7/5/2024 1645  Gross per 24 hour   Intake --   Output 1100 ml   Net -1100 ml       Education Booklet Provided: yes    Comorbidities Reviewed Yes    Patient has a past medical history of Back pain, chronic, Chronic back pain, and MRSA (methicillin resistant staph aureus) culture positive.     >>For CHF and Comorbidity documentation on Education Time and Topics, please see Education Tab      Pt resting in bed at this time on room air. Pt denies shortness of breath. Pt without lower extremity edema.     Patient and/or Family's stated Goal of Care this Admission: reduce shortness of breath, increase activity tolerance, better understand heart failure and disease management, and be more comfortable prior to discharge        :

## 2024-07-05 NOTE — PROGRESS NOTES
Cardiology consult called to Cristina. Notified of pt's request for a diet order. Awaiting new orders. Amparo Romero RN

## 2024-07-05 NOTE — PROGRESS NOTES
Pt arrived to room 440 from [facility] ED. Provider notified of pt arrival. Pt AA&O x 4. Oriented to unit, room and call light. Bed alarm on. Amparo Romero RN

## 2024-07-05 NOTE — PROGRESS NOTES
Goal APTT =  sec  APTT (0535) = 42.2.  Subtherapeutic.  Give Heparin 3800 unit bolus and increase Heparin infusion to 16 units/kg/hr.  Next APTT scheduled for 6 hours post rate increase.  Calin Schreiber R.Ph.7/5/20247:42 AM

## 2024-07-05 NOTE — PLAN OF CARE
Problem: Safety - Adult  Goal: Free from fall injury  Outcome: Progressing     Problem: Discharge Planning  Goal: Discharge to home or other facility with appropriate resources  Outcome: Progressing     Problem: Pain  Goal: Verbalizes/displays adequate comfort level or baseline comfort level  Outcome: Progressing     Problem: ABCDS Injury Assessment  Goal: Absence of physical injury  Outcome: Progressing     Problem: Risk for Elopement  Goal: Patient will not exit the unit/facility without proper excort  Outcome: Progressing  Flowsheets (Taken 7/5/2024 1400)  Nursing Interventions for Elopement Risk:   Assist with personal care needs such as toileting, eating, dressing, as needed to reduce the risk of wandering   Collaborate with treatment team for drug withdrawal symptoms treatment   Communicate/escalate to charge nurse the risk of elopement

## 2024-07-05 NOTE — H&P
[facility] Medicine History & Physical      Date of Admission: 7/5/2024    Date of Service:  Pt seen/examined on 7/5/24     [x]Admitted to Inpatient with expected LOS greater than two midnights due to medical therapy.  []Placed in Observation status.    Chief Admission Complaint:  chest pain    Presenting Admission History:      59 y.o. male with hx of substance abuse(amphetamine) who presented to [facility] with chest pain and sob. Pt just left AMA on 7/4/24(where he was diagnosed with new cardiomyopathy/SVT/required icu care) and returned to [facility] ER with complaints of rapid ht rate and developing chest pain/pressure. He denies sob/f/chills/n/v/diarrhea. Pt was transferred back to [facility] for further workup. Heparin ggt was restarted.  -he notes ongoing cough with sputum production  -he denies any complaints currently    Assessment/Plan:      Current Principal Problem:  NSTEMI (non-ST elevated myocardial infarction) (HCC)    Chest pain- concern for nstemi  -tele  -cards consulted  -trended frida  -heparin ggt restarted  -restart asa  -prn nitro  -did not start statin given lfts  -No beta-blocker because of soft blood pressure    Tachycardia- noted on prior admit, currently resolved, concern for AVNRT  -EP was previously on board, given adenosine, required icu care  -had started amio ggton prior admit, weaned off  -monitored tele     Cardiomyopathy- new dx, echo done(ef 30-35%, mod global hk, mod ar, severe tr, )   -mgmt per cards    -on aldactone  -on iv lasix  -daily wts, I/os    Elevated LFTs-, checked for hepatitis, repeat liver function test    avoid hypotension   -acute hep panel neg  -u/s ordered(suggested acalculous cholecystitis, mild gb wall thickening, mod adj perichole fluid and edema, no biliary ductal dilatation or obstruction), 2.5 cm nodular focus in liver parenchyma likely focal fatty deposition-rec nonurgent liver mass protocol MRI)   -asked GI input    Questionable CXR  Given elevated

## 2024-07-05 NOTE — CONSULTS
CARDIOLOGY CONSULTATION        Patient Name: Dayton Soto  Date of admission: 7/5/2024  9:43 AM  Admission Dx: NSTEMI (non-ST elevated myocardial infarction) (HCC) [I21.4]  Chest pain [R07.9]  Requesting Physician: Andrade Acosta MD  Primary Care physician: Not, On File (Inactive)    Reason for Consultation/Chief Complaint: SOB    History of Present Illness:     Dayton Soto is a 59 y.o. man with h/o polysubstance abuse and new onset CHF admitted for SOB and atypical chest pain.  Patient was recently admitted for NSTEMI and left AMA yesterday before treatment could be completed.  Early in the morning today, he presented to [facility] with persistent symptoms so he was transferred back to [facility] for further care.  Patient not too talkative during the exam, but he reports fatigue and malaise.  Newly reduced LVEF of 30-34% was noted earlier this week, and patient was also treated for SVT and prolonged QT by EP service with amiodarone drip and beta blockers.  In ED, labs showed down trending troponin from peak of 186 now down to 63, elevated transaminases, low potassium and evidence of methamphetamine use.  Patient is currently mildly hypotensive, but he denies dizziness, SOB, palpitations or chest pain.  He is on a heparin drip that was started in the ED.    Past Medical History:   has a past medical history of Back pain, chronic, Chronic back pain, and MRSA (methicillin resistant staph aureus) culture positive.    Surgical History:   has no past surgical history on file.     Social History:   reports that he has quit smoking. His smoking use included cigarettes. He has never used smokeless tobacco. He reports current drug use. Drug: Methamphetamines (Crystal Meth). He reports that he does not drink alcohol.     Family History:  family history is not on file.      Home Medications:  Were reviewed and are listed in nursing record and/or below  Prior to Admission medications    Medication Sig Start Date End  results found for: \"PTINR\"  HgBA1c:  Lab Results   Component Value Date    LABA1C 5.2 07/03/2024     Lab Results   Component Value Date    CKTOTAL 278 07/02/2024    TROPONINI <0.01 12/15/2022       Lab Results   Component Value Date    CHOL 125 07/03/2024     Lab Results   Component Value Date    TRIG 74 07/03/2024     Lab Results   Component Value Date    HDL 71 (H) 07/03/2024     No components found for: \"LDLCHOLESTEROL\", \"LDLCALC\"  Lab Results   Component Value Date    VLDL 15 07/03/2024     No results found for: \"CHOLHDLRATIO\"     Cardiac Data:     Telemetry personally reviewed: sinus bradycardia    EKG 07/04/2024: NSR @ 92 bpm with PACs, prolonged QT, and possible anterior ischemia    Echo 07/02/2024: Summary    Left Ventricle: Moderately reduced left ventricular systolic function with a visually estimated EF of 30 - 35%. EF 3D is 34%. Left ventricle size is normal. Mild septal thickening. Septal flattening in diastole consistent with right ventricular volume overload. Moderate global hypokinesis present and more marked in the anterior and septal walls Global longitudinal strain is reduced with a value of -11.4%. Grade I diastolic dysfunction with normal LAP.    Right Ventricle: Reduced systolic function. RV EF 3D is 31%. Fractional area change (FAC) is 26%. Global longitudinal strain is -7.5%. RV Peak S' is 7 cm/s.    Aortic Valve: Mild to moderate regurgitation with a centrally directed jet.    Tricuspid Valve: Severe regurgitation. Reversed hepatic vein systolic flow. Mildly elevated RVSP, consistent with mild pulmonary hypertension. The estimated RVSP is 40 mmHg.    Right Atrium: Right atrium is severely dilated.    IVC/SVC: IVC diameter is greater than 21 mm and decreases less than 50% during inspiration; therefore the estimated right atrial pressure is elevated (~15 mmHg).    Image quality is adequate. Procedure performed with the patient in a supine position.       Stress Test:    Cardiac

## 2024-07-05 NOTE — PROGRESS NOTES
Pt had home dose of Suboxone in his pocket. Pt agreeable for nurse to send to pharmacy to locked up until he is discharged. Medication counted by two RN's. 8 mg tablets x 6 pills and 2 mg tablets x 10 pills were taken to security in a sealed security bag. Amparo Romero RN

## 2024-07-06 ENCOUNTER — APPOINTMENT (OUTPATIENT)
Age: 59
DRG: 190 | End: 2024-07-06
Attending: INTERNAL MEDICINE
Payer: MEDICAID

## 2024-07-06 PROBLEM — I50.21 ACUTE HFREF (HEART FAILURE WITH REDUCED EJECTION FRACTION) (HCC): Status: ACTIVE | Noted: 2024-07-06

## 2024-07-06 PROBLEM — R94.31 PROLONGED QT INTERVAL: Status: ACTIVE | Noted: 2024-07-06

## 2024-07-06 PROBLEM — I42.9 CARDIOMYOPATHY (HCC): Status: ACTIVE | Noted: 2024-07-06

## 2024-07-06 PROBLEM — I35.1 MODERATE AORTIC REGURGITATION: Status: ACTIVE | Noted: 2024-07-06

## 2024-07-06 PROBLEM — I07.1 SEVERE TRICUSPID REGURGITATION: Status: ACTIVE | Noted: 2024-07-06

## 2024-07-06 PROBLEM — F19.10 POLYSUBSTANCE ABUSE (HCC): Status: ACTIVE | Noted: 2024-07-06

## 2024-07-06 PROBLEM — R74.01 TRANSAMINITIS: Status: ACTIVE | Noted: 2024-07-06

## 2024-07-06 LAB
ANISOCYTOSIS BLD QL SMEAR: ABNORMAL
BACTERIA BLD CULT ORG #2: NORMAL
BACTERIA BLD CULT: NORMAL
BASOPHILS # BLD: 0.1 K/UL (ref 0–0.2)
BASOPHILS NFR BLD: 1 %
DEPRECATED RDW RBC AUTO: 13.3 % (ref 12.4–15.4)
EOSINOPHIL # BLD: 0.4 K/UL (ref 0–0.6)
EOSINOPHIL NFR BLD: 7 %
HCT VFR BLD AUTO: 35.1 % (ref 40.5–52.5)
HGB BLD-MCNC: 11.7 G/DL (ref 13.5–17.5)
LYMPHOCYTES # BLD: 1.3 K/UL (ref 1–5.1)
LYMPHOCYTES NFR BLD: 19 %
MACROCYTES BLD QL SMEAR: ABNORMAL
MCH RBC QN AUTO: 30.2 PG (ref 26–34)
MCHC RBC AUTO-ENTMCNC: 33.5 G/DL (ref 31–36)
MCV RBC AUTO: 90.2 FL (ref 80–100)
METAMYELOCYTES NFR BLD MANUAL: 1 %
MONOCYTES # BLD: 0.4 K/UL (ref 0–1.3)
MONOCYTES NFR BLD: 6 %
MYELOCYTES NFR BLD MANUAL: 3 %
NEUTROPHILS # BLD: 4 K/UL (ref 1.7–7.7)
NEUTROPHILS NFR BLD: 56 %
NEUTS BAND NFR BLD MANUAL: 5 % (ref 0–7)
PLATELET # BLD AUTO: 210 K/UL (ref 135–450)
PMV BLD AUTO: 9.3 FL (ref 5–10.5)
POIKILOCYTOSIS BLD QL SMEAR: ABNORMAL
POLYCHROMASIA BLD QL SMEAR: ABNORMAL
RBC # BLD AUTO: 3.89 M/UL (ref 4.2–5.9)
TOXIC GRANULES BLD QL SMEAR: PRESENT
VARIANT LYMPHS NFR BLD MANUAL: 2 % (ref 0–6)
WBC # BLD AUTO: 6.2 K/UL (ref 4–11)

## 2024-07-06 PROCEDURE — 6360000002 HC RX W HCPCS: Performed by: INTERNAL MEDICINE

## 2024-07-06 PROCEDURE — 2580000003 HC RX 258: Performed by: INTERNAL MEDICINE

## 2024-07-06 PROCEDURE — 6370000000 HC RX 637 (ALT 250 FOR IP): Performed by: INTERNAL MEDICINE

## 2024-07-06 PROCEDURE — 99232 SBSQ HOSP IP/OBS MODERATE 35: CPT | Performed by: NURSE PRACTITIONER

## 2024-07-06 PROCEDURE — 6370000000 HC RX 637 (ALT 250 FOR IP): Performed by: NURSE PRACTITIONER

## 2024-07-06 PROCEDURE — 2060000000 HC ICU INTERMEDIATE R&B

## 2024-07-06 PROCEDURE — 85025 COMPLETE CBC W/AUTO DIFF WBC: CPT

## 2024-07-06 RX ORDER — FUROSEMIDE 20 MG/1
20 TABLET ORAL DAILY
Status: DISCONTINUED | OUTPATIENT
Start: 2024-07-06 | End: 2024-07-09 | Stop reason: HOSPADM

## 2024-07-06 RX ORDER — VALSARTAN 80 MG/1
40 TABLET ORAL DAILY
Status: DISCONTINUED | OUTPATIENT
Start: 2024-07-06 | End: 2024-07-07

## 2024-07-06 RX ADMIN — SPIRONOLACTONE 25 MG: 25 TABLET ORAL at 09:39

## 2024-07-06 RX ADMIN — VALSARTAN 40 MG: 80 TABLET, FILM COATED ORAL at 11:49

## 2024-07-06 RX ADMIN — SODIUM CHLORIDE, PRESERVATIVE FREE 10 ML: 5 INJECTION INTRAVENOUS at 22:04

## 2024-07-06 RX ADMIN — ASPIRIN 81 MG: 81 TABLET, COATED ORAL at 09:39

## 2024-07-06 RX ADMIN — SODIUM CHLORIDE, PRESERVATIVE FREE 10 ML: 5 INJECTION INTRAVENOUS at 09:39

## 2024-07-06 RX ADMIN — FUROSEMIDE 20 MG: 10 INJECTION, SOLUTION INTRAMUSCULAR; INTRAVENOUS at 09:39

## 2024-07-06 RX ADMIN — CEFTRIAXONE SODIUM 1000 MG: 1 INJECTION, POWDER, FOR SOLUTION INTRAMUSCULAR; INTRAVENOUS at 21:08

## 2024-07-06 RX ADMIN — BUPRENORPHINE AND NALOXONE 2 FILM: 8; 2 FILM, SOLUBLE BUCCAL; SUBLINGUAL at 09:41

## 2024-07-06 RX ADMIN — BUPRENORPHINE AND NALOXONE 2 FILM: 2; .5 FILM, SOLUBLE BUCCAL; SUBLINGUAL at 09:41

## 2024-07-06 NOTE — PROGRESS NOTES
Hospital Medicine Progress Note      Date of Admission: 7/5/2024  Hospital Day: 2    Chief Admission Complaint:  chest pain     Subjective:  feels well, no overnight events    Presenting Admission History:         59 y.o. male with hx of substance abuse(amphetamine) who presented to [facility] with chest pain and sob. Pt just left AMA on 7/4/24(where he was diagnosed with new cardiomyopathy/SVT/required icu care) and returned to [facility] ER with complaints of rapid ht rate and developing chest pain/pressure. He denies sob/f/chills/n/v/diarrhea. Pt was transferred back to [facility] for further workup. Heparin ggt was restarted.  -he notes ongoing cough with sputum production  -he denies any complaints currently    Assessment/Plan:      Current Principal Problem:  NSTEMI (non-ST elevated myocardial infarction) (HCC)      Chest pain- concern for nstemi  -tele  -cards consulted  -trended frida  -heparin ggt restarted  -restart asa  -prn nitro  -did not start statin given lfts  -No beta-blocker because of soft blood pressure     Tachycardia- noted on prior admit, currently resolved, concern for AVNRT  -EP was previously on board, given adenosine, required icu care  -had started amio ggton prior admit, weaned off  -monitored tele   -qtc prolonged on EKG, EP felt acquired from amphet use), needs to abstain    Cardiomyopathy- new dx, echo done(ef 30-35%, mod global hk, mod ar, severe tr, )   -mgmt per cards    -on aldactone  -on iv lasix  -daily wts, I/os   -NILE planned 7/8 to rule out valve dz    Elevated LFTs-, checked for hepatitis, repeat liver function test    avoid hypotension   -acute hep panel neg  -u/s ordered(suggested acalculous cholecystitis, mild gb wall thickening, mod adj perichole fluid and edema, no biliary ductal dilatation or obstruction), 2.5 cm nodular focus in liver parenchyma likely focal fatty deposition-rec nonurgent liver mass protocol MRI)   -asked GI input     Questionable CXR  Given elevated  conjunction w/ labs as documented for evidence of drug toxicity.     Infusion Medications    sodium chloride       Scheduled Medications    furosemide  20 mg Oral Daily    valsartan  40 mg Oral Daily    cefTRIAXone (ROCEPHIN) IV  1,000 mg IntraVENous Q24H    sodium chloride flush  5-40 mL IntraVENous 2 times per day    buprenorphine-naloxone  2 Film SubLINGual Daily    And    buprenorphine-naloxone  2 Film SubLINGual Daily    aspirin  81 mg Oral Daily    spironolactone  25 mg Oral Daily     PRN Meds: sodium chloride flush, sodium chloride, potassium chloride **OR** potassium alternative oral replacement **OR** potassium chloride, magnesium sulfate, ondansetron **OR** ondansetron, polyethylene glycol, acetaminophen **OR** acetaminophen, nitroGLYCERIN, midodrine     Labs:  Personally reviewed and interpreted for clinical significance.     Recent Labs     07/04/24 2147 07/06/24  0527   WBC 6.7 6.2   HGB 12.0* 11.7*   HCT 35.6* 35.1*    210     Recent Labs     07/04/24 2147 07/05/24  1114    137   K 3.4* 3.8    104   CO2 26 27   BUN 23* 15   CREATININE 0.9 0.7*   CALCIUM 8.4 8.1*   MG 1.70*  --      Recent Labs     07/04/24 2147 07/05/24  1114 07/05/24  1837 07/05/24  2240   PROBNP 8,665*  --   --   --    TROPHS 63* 48* 51* 46*     No results for input(s): \"LABA1C\" in the last 72 hours.  Recent Labs     07/04/24 2147 07/05/24  1114   * 112*   * 196*   BILITOT 0.4 0.3   ALKPHOS 253* 196*     No results for input(s): \"INR\", \"LACTA\", \"TSH\" in the last 72 hours.    Urine Cultures: No results found for: \"LABURIN\"  Blood Cultures:   Lab Results   Component Value Date/Time    BC  07/02/2024 07:32 PM     No Growth to date.  Any change in status will be called.     Lab Results   Component Value Date/Time    BLOODCULT2  07/02/2024 07:32 PM     No Growth to date.  Any change in status will be called.     Organism:   Lab Results   Component Value Date/Time    ORG Staph aureus MRSA 02/26/2020

## 2024-07-06 NOTE — CONSULTS
Consultation Note    Patient Name: Dayton Soto  : 1965  Age: 59 y.o.     Admitting Physician: Andrade Acosta MD   Date of Admission: 2024  9:43 AM   Primary Care Physician: Not, On File (Inactive)        Dayton Soto is being seen at the request of Andrade Acosta MD for elevated liver enzymes, gallbladder wall thickening.    History of Present Illness:  59-year-old male history of polysubstance abuse with a amphetamine screen that is positive.  He has been admitted with chest pain and shortness of breath.  HE is being treated for an NSTEMI.  He had left AMA on .  He was diagnosed with new cardiomyopathy SVT and required ICU care.  He represented 2 days ago for admission after being transferred from [place].  He has a moderate elevation of his liver enzymes since last admission.  Gallbladder ultrasound with gallbladder wall edema.  No cholelithiasis.  He denies any pain to me.  No ductal dilation    He is not a great historian.    Liver enzymes are downtrending peak of alkaline phosphatase 233   total bilirubin 1.5.  2024 alkaline phosphatase 196   total bilirubin 1.3    GI History:  No prior EGD colonoscopy I can see.    Past Medical History:  Past Medical History:   Diagnosis Date    Back pain, chronic     Chronic back pain     MRSA (methicillin resistant staph aureus) culture positive 2020    RIGHT FOREARM        Past Surgical History:  No past surgical history on file.     Historical Medications:  Prior to Visit Medications    Medication Sig Taking? Authorizing Provider   Buprenorphine HCl-Naloxone HCl (SUBOXONE SL) Place 20 mg under the tongue daily Pt takes (2) 8 mg tablets and (2) 2 mg tablets daily in the morning  Provider, MD Dirk        Hospital Medications:  Current Facility-Administered Medications: furosemide (LASIX) tablet 20 mg, 20 mg, Oral, Daily  valsartan (DIOVAN) tablet 40 mg, 40 mg, Oral, Daily  cefTRIAXone    K 3.4* 3.8  --         Assessment:    Hospital Problems             Last Modified POA    * (Principal) NSTEMI (non-ST elevated myocardial infarction) (HCC) 7/5/2024 Yes    Chest pain 7/5/2024 Yes    Abnormal echocardiogram 7/5/2024 Yes    Acute HFrEF (heart failure with reduced ejection fraction) (HCC) 7/6/2024 Yes    Cardiomyopathy (HCC) 7/6/2024 Yes    Prolonged QT interval 7/6/2024 Yes    Severe tricuspid regurgitation 7/6/2024 Yes    Moderate aortic regurgitation 7/6/2024 Yes    Transaminitis 7/6/2024 Yes    Polysubstance abuse (HCC) 7/6/2024 Yes       Elevated liver enzymes with transient hyperbilirubinemia.  He denies any abdominal pain to me.  He is a poor historian overall.  Suspect the gallbladder wall edema is not truly a calculus cholecystitis.  He has potentially drug-induced liver injury from his amphetamines will also screen for hepatitis viruses. Also new cardiac issues with reduced EF He did have a elevated procalcitonin questionable chest x-ray and he is on antibiotics at this time.    Acute hepatitis panel negative.  However this does not rule out acute hepatitis C will order the RNA.    Plan:  HCV RNA  Favor DILI from amphetamine vs congestive hepatopathy seems to be resolving   Less concern for acalculous fanta      Joey Sarah MD    [facility]    [phone]. Also available via [employer]    Please note that some or all of this record was generated using voice recognition software. If there are any questions about the content of this document, please contact the author as some errors in translation may have occurred.

## 2024-07-06 NOTE — FLOWSHEET NOTE
07/05/24 2004   Assessment   Charting Type Shift assessment   Psychosocial   Psychosocial (WDL) WDL   Neurological   Neuro (WDL) WDL   HEENT (Head, Ears, Eyes, Nose, & Throat)   HEENT (WDL) X   Voice Difficulty talking   Teeth Edentulous   Respiratory   Respiratory (WDL) WDL   Respiratory Interventions H.O.B. elevated   Subcutaneous Air/Crepitus None   Respiratory Pattern Regular   Respiratory Depth Normal   Respiratory Quality/Effort Unlabored   Chest Assessment Chest expansion symmetrical;Trachea midline   L Breath Sounds Clear   R Breath Sounds Clear   Level of Activity/Mobility 1   Cardiac   Cardiac (WDL) X  (NSTEMI)   Cardiac Regularity Regular   Heart Sounds S1, S2   Cardiac Rhythm Sinus rhythm   Cardiac Monitor   Cardiac/Telemetry Monitor On Portable telemetry pack applied   Alarm Audible Yes   Alarms Set No   Gastrointestinal   Abdominal (WDL) WDL   Genitourinary   Genitourinary (WDL) WDL   Suprapubic Tenderness No   Dysuria (Pain/Burning w/Urination) No   Difficulty Urinating/Starting Stream No   Urine Assessment   Urinary Status Voiding   Peripheral Vascular   Peripheral Vascular (WDL) WDL   Edema None   Skin Integumentary    Skin Integumentary (WDL) X   Skin Color Pale;Pink   Skin Condition/Temp Dry;Cool   Skin Integrity Ecchymosis  (scabs)   Location scattered   Nails X   Nail Condition Thick;Discolored   Musculoskeletal   Musculoskeletal (WDL) WDL

## 2024-07-06 NOTE — PROGRESS NOTES
Bedside report received from Amparo Tipton RN  . Plan of care reviewed with patient. All questions answered. Bed locked and in lowest position. Bed alarm on, call light and bedside table within reach. White board updated. Patient reports all care needs are met at this time. Patient care is now transferred to writer.

## 2024-07-06 NOTE — PROGRESS NOTES
[facility]   Daily Progress Note    Admit Date:  7/5/2024  HPI:    Dayton Soto presented to [facility] ER with chest pain and shortness of breath.    Cardiology consulted for elevated troponin, new onset CHF, prolonged QT    PMH: Polysubstance abuse, recent diagnosis CHF and cardiomyopathy, SVT, chronic pain    Subjective:  Mr. Soto sitting up in bed, feeling much better.  Denies chest pain or palpitations.  Breathing improved.     Objective:   Patient Vitals for the past 24 hrs:   BP Temp Temp src Pulse Resp SpO2 Height Weight   07/06/24 0615 -- -- -- -- -- -- -- 58.4 kg (128 lb 11.2 oz)   07/06/24 0416 122/68 97.2 °F (36.2 °C) Oral 68 16 98 % -- --   07/05/24 2311 116/70 97.6 °F (36.4 °C) Oral 62 18 100 % -- --   07/05/24 2029 108/64 97.7 °F (36.5 °C) Oral 58 18 97 % -- --   07/05/24 2025 -- 97.6 °F (36.4 °C) -- 58 -- -- -- --   07/05/24 1645 115/71 98 °F (36.7 °C) Oral 69 18 100 % -- --   07/05/24 1430 100/62 -- -- -- -- -- -- --   07/05/24 1245 -- -- -- -- 16 -- -- --   07/05/24 1227 101/61 97.6 °F (36.4 °C) Oral 76 16 99 % -- --   07/05/24 1014 93/60 97.5 °F (36.4 °C) Oral 58 16 98 % 1.778 m (5' 10\") 59 kg (130 lb 1.1 oz)       Intake/Output Summary (Last 24 hours) at 7/6/2024 0926  Last data filed at 7/6/2024 0418  Gross per 24 hour   Intake 480 ml   Output 1100 ml   Net -620 ml     Wt Readings from Last 3 Encounters:   07/06/24 58.4 kg (128 lb 11.2 oz)   07/04/24 63.5 kg (140 lb)   07/03/24 57.3 kg (126 lb 4.8 oz)         ASSESSMENT:   Chest pain: resolved  HFrEF, acute: 1100 ml UOP on IV Lasix, symptoms and exam improved  Cardiomyopathy, non-ischemic: EF 30-35%, on spironolactone  NSTEMI type II: felt to be supply/ demand mismatch from CHF, tachycardia and substance abuse  PSVT: no further overnight  Prolonged QT: improved  Severe tricuspid regurgitation, moderate aortic regurgitation: will need NILE to r/o endocarditis with hx of substance abuse  Elevated liver transaminases:

## 2024-07-06 NOTE — PLAN OF CARE
Problem: Pain  Goal: Verbalizes/displays adequate comfort level or baseline comfort level  Outcome: Progressing  Note: Pt able to use pain scale 0-10, denies pain this shift.       Problem: Risk for Elopement  Goal: Patient will not exit the unit/facility without proper excort  Outcome: Progressing  Note: Pt A&O, independent, no actions this shift lead to risk of elopement.       Problem: Respiratory - Adult  Goal: Achieves optimal ventilation and oxygenation  Outcome: Progressing  Note: O2 sats greater than 92% on room air.      Problem: Cardiovascular - Adult  Goal: Absence of cardiac dysrhythmias or at baseline  Outcome: Progressing  Note: SB/NSR on tele.     CHF Care Plan      Patient's EF (Ejection Fraction) is less than 40%    Heart Failure Medications:  Diuretics:: Furosemide and Spironolactone    (One of the following REQUIRED for EF </= 40%/SYSTOLIC FAILURE but MAY be used in EF% >40%/DIASTOLIC FAILURE)        ACE:: None        ARB:: Valsartan         ARNI:: None    (Beta Blockers)  NON- Evidenced Based Beta Blocker (for EF% >40%/DIASTOLIC FAILURE): None    Evidenced Based Beta Blocker::(REQUIRED for EF% <40%/SYSTOLIC FAILURE) None  ...................................................................................................................................................    Failed to redirect to the Timeline version of the [employer] SmartLink.      Patient's weights and intake/output reviewed    Daily Weight log at bedside, patient/family participation in use of log: \"yes, provided    Patient's current weight today shows a difference of 2 lbs less than last documented weight.      Intake/Output Summary (Last 24 hours) at 7/6/2024 1810  Last data filed at 7/6/2024 1556  Gross per 24 hour   Intake 1330 ml   Output 2300 ml   Net -970 ml       Education Booklet Provided: yes    Comorbidities Reviewed Yes    Patient has a past medical history of Back pain, chronic, Chronic back pain, and MRSA (methicillin

## 2024-07-06 NOTE — PLAN OF CARE
Problem: Safety - Adult  Goal: Free from fall injury  7/5/2024 2316 by Hayley Naranjo RN  Outcome: Progressing  7/5/2024 1711 by Amparo Romero RN  Outcome: Progressing     Problem: Discharge Planning  Goal: Discharge to home or other facility with appropriate resources  7/5/2024 2316 by Hayley Naranjo RN  Outcome: Progressing  7/5/2024 1711 by Amparo Romero RN  Outcome: Progressing     Problem: Pain  Goal: Verbalizes/displays adequate comfort level or baseline comfort level  7/5/2024 2316 by Hayley Naranjo RN  Outcome: Progressing  7/5/2024 1711 by Amparo Romero RN  Outcome: Progressing     Problem: ABCDS Injury Assessment  Goal: Absence of physical injury  7/5/2024 2316 by Hayley Naranjo RN  Outcome: Progressing  7/5/2024 1711 by Amparo Romero RN  Outcome: Progressing     Problem: Risk for Elopement  Goal: Patient will not exit the unit/facility without proper excort  7/5/2024 2316 by Hayley Naranjo RN  Outcome: Progressing  Flowsheets (Taken 7/5/2024 2316)  Nursing Interventions for Elopement Risk: Assist with personal care needs such as toileting, eating, dressing, as needed to reduce the risk of wandering  7/5/2024 1711 by Amparo Romero RN  Outcome: Progressing  Flowsheets (Taken 7/5/2024 1400)  Nursing Interventions for Elopement Risk:   Assist with personal care needs such as toileting, eating, dressing, as needed to reduce the risk of wandering   Collaborate with treatment team for drug withdrawal symptoms treatment   Communicate/escalate to charge nurse the risk of elopement

## 2024-07-06 NOTE — PROGRESS NOTES
/70   Pulse 69   Temp 98 °F (36.7 °C) (Oral)   Resp 16   Ht 1.778 m (5' 10\")   Wt 58.4 kg (128 lb 11.2 oz)   SpO2 94%   BMI 18.47 kg/m²  on room air.  Pt resting quietly in bed, denies pain, discomfort or shortness of breath.  Lungs clear upper and middle lobes, fine crackles in bases.  NSR on tele.  Pt denies any needs at this time.  Bedside table, urinal, call light, fluids within reach.  Pt instructed to call out for any needs and assistance.  Will continue to monitor.  Jennifer Sweeney, RN  7/6/2024

## 2024-07-07 LAB
ALBUMIN SERPL-MCNC: 3 G/DL (ref 3.4–5)
ALP SERPL-CCNC: 175 U/L (ref 40–129)
ALT SERPL-CCNC: 148 U/L (ref 10–40)
ANION GAP SERPL CALCULATED.3IONS-SCNC: 8 MMOL/L (ref 3–16)
AST SERPL-CCNC: 83 U/L (ref 15–37)
BASOPHILS # BLD: 0 K/UL (ref 0–0.2)
BASOPHILS NFR BLD: 0 %
BILIRUB DIRECT SERPL-MCNC: <0.2 MG/DL (ref 0–0.3)
BILIRUB INDIRECT SERPL-MCNC: ABNORMAL MG/DL (ref 0–1)
BILIRUB SERPL-MCNC: <0.2 MG/DL (ref 0–1)
BUN SERPL-MCNC: 18 MG/DL (ref 7–20)
CALCIUM SERPL-MCNC: 8.4 MG/DL (ref 8.3–10.6)
CHLORIDE SERPL-SCNC: 101 MMOL/L (ref 99–110)
CO2 SERPL-SCNC: 29 MMOL/L (ref 21–32)
CREAT SERPL-MCNC: 0.8 MG/DL (ref 0.9–1.3)
DEPRECATED RDW RBC AUTO: 13.2 % (ref 12.4–15.4)
EOSINOPHIL # BLD: 0.2 K/UL (ref 0–0.6)
EOSINOPHIL NFR BLD: 2 %
GFR SERPLBLD CREATININE-BSD FMLA CKD-EPI: >90 ML/MIN/{1.73_M2}
GLUCOSE SERPL-MCNC: 127 MG/DL (ref 70–99)
HCT VFR BLD AUTO: 38.9 % (ref 40.5–52.5)
HGB BLD-MCNC: 13.2 G/DL (ref 13.5–17.5)
LYMPHOCYTES # BLD: 1.7 K/UL (ref 1–5.1)
LYMPHOCYTES NFR BLD: 19 %
MAGNESIUM SERPL-MCNC: 1.9 MG/DL (ref 1.8–2.4)
MCH RBC QN AUTO: 30.9 PG (ref 26–34)
MCHC RBC AUTO-ENTMCNC: 34 G/DL (ref 31–36)
MCV RBC AUTO: 90.8 FL (ref 80–100)
MONOCYTES # BLD: 0.9 K/UL (ref 0–1.3)
MONOCYTES NFR BLD: 10 %
MYELOCYTES NFR BLD MANUAL: 1 %
NEUTROPHILS # BLD: 6.2 K/UL (ref 1.7–7.7)
NEUTROPHILS NFR BLD: 62 %
NEUTS BAND NFR BLD MANUAL: 6 % (ref 0–7)
PHOSPHATE SERPL-MCNC: 3.3 MG/DL (ref 2.5–4.9)
PLATELET # BLD AUTO: 237 K/UL (ref 135–450)
PLATELET BLD QL SMEAR: ADEQUATE
PMV BLD AUTO: 8.8 FL (ref 5–10.5)
POTASSIUM SERPL-SCNC: 4.1 MMOL/L (ref 3.5–5.1)
PROT SERPL-MCNC: 6.2 G/DL (ref 6.4–8.2)
RBC # BLD AUTO: 4.29 M/UL (ref 4.2–5.9)
RBC MORPH BLD: NORMAL
SLIDE REVIEW: ABNORMAL
SODIUM SERPL-SCNC: 138 MMOL/L (ref 136–145)
WBC # BLD AUTO: 9 K/UL (ref 4–11)

## 2024-07-07 PROCEDURE — 2060000000 HC ICU INTERMEDIATE R&B

## 2024-07-07 PROCEDURE — 6370000000 HC RX 637 (ALT 250 FOR IP): Performed by: NURSE PRACTITIONER

## 2024-07-07 PROCEDURE — 87522 HEPATITIS C REVRS TRNSCRPJ: CPT

## 2024-07-07 PROCEDURE — 80076 HEPATIC FUNCTION PANEL: CPT

## 2024-07-07 PROCEDURE — 99232 SBSQ HOSP IP/OBS MODERATE 35: CPT | Performed by: NURSE PRACTITIONER

## 2024-07-07 PROCEDURE — 85025 COMPLETE CBC W/AUTO DIFF WBC: CPT

## 2024-07-07 PROCEDURE — 6370000000 HC RX 637 (ALT 250 FOR IP): Performed by: INTERNAL MEDICINE

## 2024-07-07 PROCEDURE — 80069 RENAL FUNCTION PANEL: CPT

## 2024-07-07 PROCEDURE — 2580000003 HC RX 258: Performed by: INTERNAL MEDICINE

## 2024-07-07 PROCEDURE — 83735 ASSAY OF MAGNESIUM: CPT

## 2024-07-07 PROCEDURE — 6360000002 HC RX W HCPCS: Performed by: INTERNAL MEDICINE

## 2024-07-07 PROCEDURE — 36415 COLL VENOUS BLD VENIPUNCTURE: CPT

## 2024-07-07 RX ORDER — HYDROXYZINE PAMOATE 25 MG/1
50 CAPSULE ORAL 3 TIMES DAILY PRN
Status: DISCONTINUED | OUTPATIENT
Start: 2024-07-07 | End: 2024-07-09 | Stop reason: HOSPADM

## 2024-07-07 RX ORDER — HYDROXYZINE HYDROCHLORIDE 50 MG/ML
50 INJECTION, SOLUTION INTRAMUSCULAR EVERY 6 HOURS PRN
Status: DISCONTINUED | OUTPATIENT
Start: 2024-07-07 | End: 2024-07-07

## 2024-07-07 RX ORDER — VALSARTAN 80 MG/1
40 TABLET ORAL EVERY EVENING
Status: DISCONTINUED | OUTPATIENT
Start: 2024-07-07 | End: 2024-07-09 | Stop reason: HOSPADM

## 2024-07-07 RX ADMIN — SODIUM CHLORIDE, PRESERVATIVE FREE 10 ML: 5 INJECTION INTRAVENOUS at 20:34

## 2024-07-07 RX ADMIN — BUPRENORPHINE AND NALOXONE 2 FILM: 8; 2 FILM, SOLUBLE BUCCAL; SUBLINGUAL at 09:30

## 2024-07-07 RX ADMIN — ASPIRIN 81 MG: 81 TABLET, COATED ORAL at 09:30

## 2024-07-07 RX ADMIN — BUPRENORPHINE AND NALOXONE 2 FILM: 2; .5 FILM, SOLUBLE BUCCAL; SUBLINGUAL at 09:30

## 2024-07-07 RX ADMIN — FUROSEMIDE 20 MG: 20 TABLET ORAL at 09:31

## 2024-07-07 RX ADMIN — SPIRONOLACTONE 25 MG: 25 TABLET ORAL at 09:30

## 2024-07-07 RX ADMIN — CEFTRIAXONE SODIUM 1000 MG: 1 INJECTION, POWDER, FOR SOLUTION INTRAMUSCULAR; INTRAVENOUS at 20:33

## 2024-07-07 RX ADMIN — SODIUM CHLORIDE, PRESERVATIVE FREE 10 ML: 5 INJECTION INTRAVENOUS at 09:31

## 2024-07-07 RX ADMIN — VALSARTAN 40 MG: 80 TABLET, FILM COATED ORAL at 17:39

## 2024-07-07 RX ADMIN — HYDROXYZINE PAMOATE 50 MG: 25 CAPSULE ORAL at 19:04

## 2024-07-07 NOTE — PROGRESS NOTES
Hospital Medicine Progress Note      Date of Admission: 7/5/2024  Hospital Day: 3    Chief Admission Complaint:  chest pain     Subjective:  feels well, no overnight events, aware of luciana tomorrow and npo mn     Presenting Admission History:          59 y.o. male with hx of substance abuse(amphetamine) who presented to [facility] with chest pain and sob. Pt just left AMA on 7/4/24(where he was diagnosed with new cardiomyopathy/SVT/required icu care) and returned to [facility] ER with complaints of rapid ht rate and developing chest pain/pressure. He denies sob/f/chills/n/v/diarrhea. Pt was transferred back to [facility] for further workup. Heparin ggt was restarted.  -he notes ongoing cough with sputum production  -he denies any complaints currently     Assessment/Plan:       Current Principal Problem:  NSTEMI (non-ST elevated myocardial infarction) (HCC)       Chest pain- concern for nstemi  -tele  -cards consulted  -trended frida  -heparin ggt restarted  -restart asa  -prn nitro  -did not start statin given lfts  -No beta-blocker because of soft blood pressure     Tachycardia- noted on prior admit, currently resolved, concern for AVNRT  -EP was previously on board, given adenosine, required icu care  -had started amio ggton prior admit, weaned off  -monitored tele   -qtc prolonged on EKG, EP felt acquired from amphet use), needs to abstain     Cardiomyopathy- new dx, echo done(ef 30-35%, mod global hk, mod ar, severe tr, )   -mgmt per cards    -on aldactone, arb  -on iv lasix, switched to po  -daily wts, I/os   -LUCIANA planned 7/8 to rule out valve dz     Elevated LFTs-, checked for hepatitis, repeat liver function test    avoid hypotension   -acute hep panel neg  -u/s ordered(suggested acalculous cholecystitis, mild gb wall thickening, mod adj perichole fluid and edema, no biliary ductal dilatation or obstruction), 2.5 cm nodular focus in liver parenchyma likely focal fatty deposition-rec nonurgent liver mass protocol    [x] Appropriate follow-up labs were ordered  [] Collateral history obtained from:        Medications:  Personally reviewed in detail in conjunction w/ labs as documented for evidence of drug toxicity.     Infusion Medications    sodium chloride       Scheduled Medications    valsartan  40 mg Oral QPM    furosemide  20 mg Oral Daily    cefTRIAXone (ROCEPHIN) IV  1,000 mg IntraVENous Q24H    sodium chloride flush  5-40 mL IntraVENous 2 times per day    buprenorphine-naloxone  2 Film SubLINGual Daily    And    buprenorphine-naloxone  2 Film SubLINGual Daily    aspirin  81 mg Oral Daily    spironolactone  25 mg Oral Daily     PRN Meds: sodium chloride flush, sodium chloride, potassium chloride **OR** potassium alternative oral replacement **OR** potassium chloride, magnesium sulfate, ondansetron **OR** ondansetron, polyethylene glycol, acetaminophen **OR** acetaminophen, nitroGLYCERIN, midodrine     Labs:  Personally reviewed and interpreted for clinical significance.     Recent Labs     07/04/24 2147 07/06/24 0527 07/07/24 0718   WBC 6.7 6.2 9.0   HGB 12.0* 11.7* 13.2*   HCT 35.6* 35.1* 38.9*    210 237     Recent Labs     07/04/24 2147 07/05/24 1114 07/07/24 0718    137 138   K 3.4* 3.8 4.1    104 101   CO2 26 27 29   BUN 23* 15 18   CREATININE 0.9 0.7* 0.8*   CALCIUM 8.4 8.1* 8.4   MG 1.70*  --  1.90   PHOS  --   --  3.3     Recent Labs     07/04/24 2147 07/05/24 1114 07/05/24  1837 07/05/24  2240   PROBNP 8,665*  --   --   --    TROPHS 63* 48* 51* 46*     No results for input(s): \"LABA1C\" in the last 72 hours.  Recent Labs     07/04/24 2147 07/05/24 1114 07/07/24  0718   * 112* 83*   * 196* 148*   BILIDIR  --   --  <0.2   BILITOT 0.4 0.3 <0.2   ALKPHOS 253* 196* 175*     No results for input(s): \"INR\", \"LACTA\", \"TSH\" in the last 72 hours.    Urine Cultures: No results found for: \"LABURIN\"  Blood Cultures:   Lab Results   Component Value Date/Time    BC No Growth after 4  days of incubation. 07/02/2024 07:32 PM     Lab Results   Component Value Date/Time    BLOODCULT2 No Growth after 4 days of incubation. 07/02/2024 07:32 PM     Organism:   Lab Results   Component Value Date/Time    ORG Staph aureus MRSA 02/26/2020 03:55 PM         Andrade Acosta MD

## 2024-07-07 NOTE — PLAN OF CARE
Problem: Pain  Goal: Verbalizes/displays adequate comfort level or baseline comfort level  Outcome: Progressing  Note: Pt denies chest pain or pain this shift. Pt anxious this evening, cross cover sent message regarding.       Problem: Respiratory - Adult  Goal: Achieves optimal ventilation and oxygenation  Outcome: Progressing  Note: O2 sats greater than 92% on room air.       Problem: Cardiovascular - Adult  Goal: Absence of cardiac dysrhythmias or at baseline  Outcome: Progressing  Note: SB/NSR on tele.     Problem: Genitourinary - Adult  Goal: Absence of urinary retention  Outcome: Progressing  Note: pt voiding per urinal, good urine output, see doc flow sheet.      CHF Care Plan      Patient's EF (Ejection Fraction) is less than 40%    Heart Failure Medications:  Diuretics:: Furosemide and Spironolactone    (One of the following REQUIRED for EF </= 40%/SYSTOLIC FAILURE but MAY be used in EF% >40%/DIASTOLIC FAILURE)        ACE:: None        ARB:: Valsartan         ARNI:: None    (Beta Blockers)  NON- Evidenced Based Beta Blocker (for EF% >40%/DIASTOLIC FAILURE): None    Evidenced Based Beta Blocker::(REQUIRED for EF% <40%/SYSTOLIC FAILURE) None  ...................................................................................................................................................    Failed to redirect to the Timeline version of the [employer] SmartLink.      Patient's weights and intake/output reviewed    Daily Weight log at bedside, patient/family participation in use of log: \"yes    Patient's current weight today shows a difference of 3 lbs less than last documented weight.      Intake/Output Summary (Last 24 hours) at 7/7/2024 1832  Last data filed at 7/7/2024 1740  Gross per 24 hour   Intake 2510 ml   Output 2400 ml   Net 110 ml       Education Booklet Provided: yes, already at bedside    Comorbidities Reviewed Yes    Patient has a past medical history of Back pain, chronic, Chronic back pain, and MRSA

## 2024-07-07 NOTE — PLAN OF CARE
HEART FAILURE CARE PLAN:    Comorbidities Reviewed: Yes   Patient has a past medical history of Back pain, chronic, Chronic back pain, and MRSA (methicillin resistant staph aureus) culture positive.     Weights Reviewed: Yes   Admission weight: 59 kg (130 lb 1.1 oz)   Wt Readings from Last 3 Encounters:   07/07/24 57.1 kg (125 lb 12.8 oz)   07/04/24 63.5 kg (140 lb)   07/03/24 57.3 kg (126 lb 4.8 oz)     Intake & Output Reviewed: Yes     Intake/Output Summary (Last 24 hours) at 7/7/2024 0639  Last data filed at 7/7/2024 0634  Gross per 24 hour   Intake 2510 ml   Output 2300 ml   Net 210 ml       ECHOCARDIOGRAM Reviewed: Yes   Patient's Ejection Fraction (EF) is Less than 40%    Medications Reviewed: Yes   SCHEDULED HOSPITAL MEDICATIONS:   furosemide  20 mg Oral Daily    valsartan  40 mg Oral Daily    cefTRIAXone (ROCEPHIN) IV  1,000 mg IntraVENous Q24H    sodium chloride flush  5-40 mL IntraVENous 2 times per day    buprenorphine-naloxone  2 Film SubLINGual Daily    And    buprenorphine-naloxone  2 Film SubLINGual Daily    aspirin  81 mg Oral Daily    spironolactone  25 mg Oral Daily     HOME MEDICATIONS:  Prior to Admission medications    Medication Sig Start Date End Date Taking? Authorizing Provider   Buprenorphine HCl-Naloxone HCl (SUBOXONE SL) Place 20 mg under the tongue daily Pt takes (2) 8 mg tablets and (2) 2 mg tablets daily in the morning    Provider, MD Dirk      Diet Reviewed: Yes   ADULT DIET; Regular    Goal of Care Reviewed: Yes   Patient and/or Family's stated Goal of Care this Admission: Reduce shortness of breath, increase activity tolerance, better understand heart failure and disease management, be more comfortable, and reduce lower extremity edema prior to discharge.     Electronically signed by Brandon Laguna RN on 7/7/2024 at 6:39 AM

## 2024-07-07 NOTE — PROGRESS NOTES
/61   Pulse 56   Temp 97.8 °F (36.6 °C) (Oral)   Resp 16   Ht 1.778 m (5' 10\")   Wt 57.1 kg (125 lb 12.8 oz)   SpO2 99%   BMI 18.05 kg/m²  on room air.  Pt resting quietly in bed, denies pain, discomfort or shortness of breath. Lungs clear upper and middle lobes, diminished with fine crackles in bases.  SB/NSR on tele.  Pt denies any needs at this time.  Bedside table, call light, fluids within reach.  Urinal in bathroom, pt up independently.  Pt instructed to call out for any needs and assistance.  Will continue to monitor.  Jennifer Sweeney RN  7/7/2024

## 2024-07-07 NOTE — PROGRESS NOTES
[facility]   Daily Progress Note    Admit Date:  7/5/2024  HPI:    Dayton Soto presented to [facility] ER with chest pain and shortness of breath.    Cardiology consulted for elevated troponin, new onset CHF, prolonged QT    PMH: Polysubstance abuse, recent diagnosis CHF and cardiomyopathy, SVT, chronic pain    Subjective:  Mr. Soto sitting up in bed, denies any shortness of breath or chest pain.    Objective:   Patient Vitals for the past 24 hrs:   BP Temp Temp src Pulse Resp SpO2 Weight   07/07/24 0855 102/61 97.8 °F (36.6 °C) Oral 56 16 99 % --   07/07/24 0634 110/65 97.4 °F (36.3 °C) Oral 50 16 97 % --   07/07/24 0600 -- -- -- -- -- -- 57.1 kg (125 lb 12.8 oz)   07/06/24 2102 104/74 97.4 °F (36.3 °C) Oral 61 16 96 % --   07/06/24 1553 93/60 97.5 °F (36.4 °C) Oral 59 16 97 % --   07/06/24 1146 122/75 97.7 °F (36.5 °C) Oral 60 16 96 % --         Intake/Output Summary (Last 24 hours) at 7/7/2024 0939  Last data filed at 7/7/2024 0933  Gross per 24 hour   Intake 2630 ml   Output 2300 ml   Net 330 ml       Wt Readings from Last 3 Encounters:   07/07/24 57.1 kg (125 lb 12.8 oz)   07/04/24 63.5 kg (140 lb)   07/03/24 57.3 kg (126 lb 4.8 oz)         ASSESSMENT:   Chest pain: resolved  HFrEF, acute: Weight down 3 pounds, 2300 mL urine output, euvolemia on exam, symptoms resolved  Cardiomyopathy, non-ischemic: EF 30-35%, on spironolactone and valsartan  NSTEMI type II: felt to be supply/ demand mismatch from CHF, tachycardia and substance abuse  PSVT: no further overnight  Prolonged QT: improved  Severe tricuspid regurgitation, moderate aortic regurgitation: will need NILE to r/o endocarditis with hx of substance abuse  Elevated liver transaminases: improving  Polysubstance abuse      PLAN:  Continue Lasix 20 mg p.o. daily  Continue spironolactone 25 qd  Retime valsartan to p.m.  NILE Monday to evaluate valvular disease  Ischemia evaluation as outpatient  Keep K >4 and Mag > 2   Daily renal panel and  mag Deirdre Schaffer, APRN - CNP, 7/7/2024, 9:39 AM  [facility]   [phone]       Telemetry independently reviewed and interpreted by me today and shows: normal sinus rhythm and sinus bradycardia, rate=50's  NYHA: III    Physical Exam:  General:  Awake, alert, NAD  Skin:  Warm and dry  Neck:  JVP normal upright  Chest:  Clear to auscultation posteriorly  Cardiovascular:  RRR, normal S1S2, + murmur, no g/r  Abdomen:  Soft, nontender, +bowel sounds  Extremities:  No edema      Medications:    furosemide  20 mg Oral Daily    valsartan  40 mg Oral Daily    cefTRIAXone (ROCEPHIN) IV  1,000 mg IntraVENous Q24H    sodium chloride flush  5-40 mL IntraVENous 2 times per day    buprenorphine-naloxone  2 Film SubLINGual Daily    And    buprenorphine-naloxone  2 Film SubLINGual Daily    aspirin  81 mg Oral Daily    spironolactone  25 mg Oral Daily      sodium chloride         Lab Data: Lab results independently reviewed and analyzed by myself 7/7/2024    CBC:   Recent Labs     07/04/24 2147 07/06/24  0527 07/07/24  0718   WBC 6.7 6.2 9.0   HGB 12.0* 11.7* 13.2*    210 237       BMP:    Recent Labs     07/04/24 2147 07/05/24  1114 07/07/24  0718    137 138   K 3.4* 3.8 4.1   CO2 26 27 29   BUN 23* 15 18   CREATININE 0.9 0.7* 0.8*       INR:  No results for input(s): \"INR\" in the last 72 hours.  BNP:    Recent Labs     07/04/24 2147   PROBNP 8,665*       Cardiac Enzymes:   Lab Results   Component Value Date    TROPHS 46 (H) 07/05/2024    TROPHS 51 (H) 07/05/2024    TROPHS 48 (H) 07/05/2024     Lipids:   Lab Results   Component Value Date/Time    TRIG 74 07/03/2024 04:30 AM    HDL 71 07/03/2024 04:30 AM       Cardiac Imaging:    Echo 07/02/2024: Summary    Left Ventricle: Moderately reduced left ventricular systolic function with a visually estimated EF of 30 - 35%. EF 3D is 34%. Left ventricle size is normal. Mild septal thickening. Septal flattening in diastole consistent with right ventricular

## 2024-07-07 NOTE — PROGRESS NOTES
Progress Note    Patient Dayton Soto  MRN: [id number]  YOB: 1965 Age: 59 y.o. Sex: male  Room: [address] [place] [address]       Admitting Physician: Andrade Acosta MD   Date of Admission: 7/5/2024  9:43 AM   Primary Care Physician: Not, On File (Inactive)     Subjective:  Dayton Soto was seen and examined. We are following for elevated liver enzymes.  -- NAEO having NILE tomorrow     ROS:  Constitutional: Denies fever, no change in appetite  Respiratory: Denies cough or shortness of breath  Cardiovascular: Denies chest pain or edema    Objective:  Vital Signs:   Vitals:    07/07/24 0855   BP: 102/61   Pulse: 56   Resp: 16   Temp: 97.8 °F (36.6 °C)   SpO2: 99%         Physical Exam:  General: Chronically ill-appearing 59-year-old male  HEENT: Sclera anicteric, mucosal membranes moist  Cardiovascular: Regular rate and rhythm.  No murmurs.  Respiratory: Respirations nonlabored, no crepitus  GI: Abdomen nondistended, soft, and nontender.  Normal active bowel sounds.  No masses palpable.   Rectal: Deferred  Musculoskeletal: No pitting edema of the lower legs.  Neurological: Gross memory appears intact.  Patient is alert and oriented, slightly tangential       Intake/Output:    Intake/Output Summary (Last 24 hours) at 7/7/2024 1132  Last data filed at 7/7/2024 0941  Gross per 24 hour   Intake 2390 ml   Output 1350 ml   Net 1040 ml        Current Medications:  Current Facility-Administered Medications   Medication Dose Route Frequency Provider Last Rate Last Admin    [START ON 7/8/2024] valsartan (DIOVAN) tablet 40 mg  40 mg Oral QPM Deirdre Schaffer APRN - CNP        furosemide (LASIX) tablet 20 mg  20 mg Oral Daily Deirdre Schaffer APRN - CNP   20 mg at 07/07/24 0931    cefTRIAXone (ROCEPHIN) 1,000 mg in sodium chloride 0.9 % 50 mL IVPB (mini-bag)  1,000 mg IntraVENous Q24H Andrade Acosta MD   Stopped at 07/06/24 2204    sodium chloride flush 0.9 % injection 5-40 mL  5-40 mL IntraVENous 2 times

## 2024-07-07 NOTE — PLAN OF CARE
Problem: Safety - Adult  Goal: Free from fall injury  Outcome: Progressing     Problem: Discharge Planning  Goal: Discharge to home or other facility with appropriate resources  Outcome: Progressing     Problem: Pain  Goal: Verbalizes/displays adequate comfort level or baseline comfort level  7/6/2024 2344 by Brandon Laguna RN  Outcome: Progressing  7/6/2024 1807 by Jennifer Sweeney RN  Outcome: Progressing  Note: Pt able to use pain scale 0-10, denies pain this shift.       Problem: ABCDS Injury Assessment  Goal: Absence of physical injury  Outcome: Progressing     Problem: Risk for Elopement  Goal: Patient will not exit the unit/facility without proper excort  7/6/2024 2344 by Brandon Laguna RN  Outcome: Progressing  7/6/2024 1807 by Jennifer Sweeney RN  Outcome: Progressing  Note: Pt A&O, independent, no actions this shift lead to risk of elopement.       Problem: Respiratory - Adult  Goal: Achieves optimal ventilation and oxygenation  7/6/2024 2344 by Brandon Laguna RN  Outcome: Progressing  7/6/2024 1807 by Jennifer Sweeney RN  Outcome: Progressing  Note: O2 sats greater than 92% on room air.      Problem: Cardiovascular - Adult  Goal: Absence of cardiac dysrhythmias or at baseline  7/6/2024 2344 by Brandon Laguna RN  Outcome: Progressing  7/6/2024 1807 by Jennifer Sweeney RN  Outcome: Progressing  Note: SB/NSR on tele.     CHF Care Plan      Patient's EF (Ejection Fraction) is less than 40%    Heart Failure Medications:  Diuretics:: Furosemide and Spironolactone    (One of the following REQUIRED for EF </= 40%/SYSTOLIC FAILURE but MAY be used in EF% >40%/DIASTOLIC FAILURE)        ACE:: None        ARB:: Valsartan         ARNI:: None    (Beta Blockers)  NON- Evidenced Based Beta Blocker (for EF% >40%/DIASTOLIC FAILURE): None    Evidenced Based Beta Blocker::(REQUIRED for EF% <40%/SYSTOLIC FAILURE)  None  ...................................................................................................................................................    Failed to redirect to the Timeline version of the [employer] SmartLink.      Patient's weights and intake/output reviewed    Daily Weight log at bedside, patient/family participation in use of log: \"yes, provided    Patient's current weight today shows a difference of 2 lbs less than last documented weight.      Intake/Output Summary (Last 24 hours) at 7/6/2024 1810  Last data filed at 7/6/2024 1556  Gross per 24 hour   Intake 1330 ml   Output 2300 ml   Net -970 ml       Education Booklet Provided: yes    Comorbidities Reviewed Yes    Patient has a past medical history of Back pain, chronic, Chronic back pain, and MRSA (methicillin resistant staph aureus) culture positive.     >>For CHF and Comorbidity documentation on Education Time and Topics, please see Education Tab      Pt resting in bed at this time on room air. Pt denies shortness of breath. Pt without lower extremity edema.     Patient and/or Family's stated Goal of Care this Admission: reduce shortness of breath, increase activity tolerance, better understand heart failure and disease management, and be more comfortable prior to discharge       Problem: Musculoskeletal - Adult  Goal: Return mobility to safest level of function  7/6/2024 2344 by Brandon Laguna RN  Outcome: Progressing  7/6/2024 1807 by Jennifer Sweeney RN  Outcome: Progressing  Note: Pt A&O, up independently.       Problem: Genitourinary - Adult  Goal: Absence of urinary retention  7/6/2024 2344 by Brandon Laguna RN  Outcome: Progressing  7/6/2024 1807 by Jennifer Sweeney RN  Outcome: Progressing  Note: Pt voided per urinal this shift.

## 2024-07-08 ENCOUNTER — APPOINTMENT (OUTPATIENT)
Dept: CARDIAC CATH/INVASIVE PROCEDURES | Age: 59
DRG: 190 | End: 2024-07-08
Attending: INTERNAL MEDICINE
Payer: MEDICAID

## 2024-07-08 ENCOUNTER — ANESTHESIA (OUTPATIENT)
Dept: CARDIAC CATH/INVASIVE PROCEDURES | Age: 59
End: 2024-07-08
Payer: MEDICAID

## 2024-07-08 ENCOUNTER — HOSPITAL ENCOUNTER (INPATIENT)
Dept: CARDIAC CATH/INVASIVE PROCEDURES | Age: 59
Discharge: HOME | DRG: 190 | End: 2024-07-10
Attending: EMERGENCY MEDICINE
Payer: MEDICAID

## 2024-07-08 ENCOUNTER — ANESTHESIA EVENT (OUTPATIENT)
Dept: CARDIAC CATH/INVASIVE PROCEDURES | Age: 59
End: 2024-07-08
Payer: MEDICAID

## 2024-07-08 PROBLEM — F19.11 HISTORY OF SUBSTANCE ABUSE (HCC): Status: ACTIVE | Noted: 2024-07-08

## 2024-07-08 PROBLEM — I36.1 NONRHEUMATIC TRICUSPID VALVE REGURGITATION: Status: ACTIVE | Noted: 2024-07-08

## 2024-07-08 LAB
ALBUMIN SERPL-MCNC: 3.4 G/DL (ref 3.4–5)
ANION GAP SERPL CALCULATED.3IONS-SCNC: 5 MMOL/L (ref 3–16)
BASOPHILS # BLD: 0.1 K/UL (ref 0–0.2)
BASOPHILS NFR BLD: 1 %
BUN SERPL-MCNC: 21 MG/DL (ref 7–20)
CALCIUM SERPL-MCNC: 8.8 MG/DL (ref 8.3–10.6)
CHLORIDE SERPL-SCNC: 100 MMOL/L (ref 99–110)
CO2 SERPL-SCNC: 33 MMOL/L (ref 21–32)
CREAT SERPL-MCNC: 0.8 MG/DL (ref 0.9–1.3)
DEPRECATED RDW RBC AUTO: 13.3 % (ref 12.4–15.4)
ECHO BSA: 1.71 M2
ECHO LV EDV A2C: 110 ML
ECHO LV EDV A4C: 125 ML
ECHO LV EDV INDEX A4C: 73 ML/M2
ECHO LV EDV NDEX A2C: 64 ML/M2
ECHO LV EJECTION FRACTION A2C: 36 %
ECHO LV EJECTION FRACTION A4C: 48 %
ECHO LV EJECTION FRACTION BIPLANE: 43 % (ref 55–100)
ECHO LV ESV A2C: 71 ML
ECHO LV ESV A4C: 65 ML
ECHO LV ESV INDEX A2C: 42 ML/M2
ECHO LV ESV INDEX A4C: 38 ML/M2
EOSINOPHIL # BLD: 0.6 K/UL (ref 0–0.6)
EOSINOPHIL NFR BLD: 7 %
GFR SERPLBLD CREATININE-BSD FMLA CKD-EPI: >90 ML/MIN/{1.73_M2}
GLUCOSE SERPL-MCNC: 95 MG/DL (ref 70–99)
HCT VFR BLD AUTO: 40 % (ref 40.5–52.5)
HGB BLD-MCNC: 13.4 G/DL (ref 13.5–17.5)
LYMPHOCYTES # BLD: 1.2 K/UL (ref 1–5.1)
LYMPHOCYTES NFR BLD: 14 %
MAGNESIUM SERPL-MCNC: 2.1 MG/DL (ref 1.8–2.4)
MCH RBC QN AUTO: 30 PG (ref 26–34)
MCHC RBC AUTO-ENTMCNC: 33.5 G/DL (ref 31–36)
MCV RBC AUTO: 89.5 FL (ref 80–100)
MONOCYTES # BLD: 0.9 K/UL (ref 0–1.3)
MONOCYTES NFR BLD: 10 %
MYELOCYTES NFR BLD MANUAL: 5 %
NEUTROPHILS # BLD: 6 K/UL (ref 1.7–7.7)
NEUTROPHILS NFR BLD: 60 %
NEUTS BAND NFR BLD MANUAL: 3 % (ref 0–7)
PHOSPHATE SERPL-MCNC: 3 MG/DL (ref 2.5–4.9)
PLATELET # BLD AUTO: 254 K/UL (ref 135–450)
PLATELET BLD QL SMEAR: ADEQUATE
PMV BLD AUTO: 8.7 FL (ref 5–10.5)
POTASSIUM SERPL-SCNC: 4.4 MMOL/L (ref 3.5–5.1)
RBC # BLD AUTO: 4.47 M/UL (ref 4.2–5.9)
RBC MORPH BLD: NORMAL
SLIDE REVIEW: ABNORMAL
SODIUM SERPL-SCNC: 138 MMOL/L (ref 136–145)
TOXIC GRANULES BLD QL SMEAR: PRESENT
WBC # BLD AUTO: 8.8 K/UL (ref 4–11)

## 2024-07-08 PROCEDURE — 6360000002 HC RX W HCPCS: Performed by: NURSE ANESTHETIST, CERTIFIED REGISTERED

## 2024-07-08 PROCEDURE — 93319 3D ECHO IMG CGEN CAR ANOMAL: CPT | Performed by: STUDENT IN AN ORGANIZED HEALTH CARE EDUCATION/TRAINING PROGRAM

## 2024-07-08 PROCEDURE — 93319 3D ECHO IMG CGEN CAR ANOMAL: CPT

## 2024-07-08 PROCEDURE — B24BZZ4 ULTRASONOGRAPHY OF HEART WITH AORTA, TRANSESOPHAGEAL: ICD-10-PCS | Performed by: STUDENT IN AN ORGANIZED HEALTH CARE EDUCATION/TRAINING PROGRAM

## 2024-07-08 PROCEDURE — 7100000011 HC PHASE II RECOVERY - ADDTL 15 MIN

## 2024-07-08 PROCEDURE — 3700000001 HC ADD 15 MINUTES (ANESTHESIA)

## 2024-07-08 PROCEDURE — 36415 COLL VENOUS BLD VENIPUNCTURE: CPT

## 2024-07-08 PROCEDURE — 2500000003 HC RX 250 WO HCPCS: Performed by: NURSE ANESTHETIST, CERTIFIED REGISTERED

## 2024-07-08 PROCEDURE — 2580000003 HC RX 258: Performed by: NURSE ANESTHETIST, CERTIFIED REGISTERED

## 2024-07-08 PROCEDURE — 85025 COMPLETE CBC W/AUTO DIFF WBC: CPT

## 2024-07-08 PROCEDURE — 93320 DOPPLER ECHO COMPLETE: CPT | Performed by: STUDENT IN AN ORGANIZED HEALTH CARE EDUCATION/TRAINING PROGRAM

## 2024-07-08 PROCEDURE — 6360000002 HC RX W HCPCS: Performed by: STUDENT IN AN ORGANIZED HEALTH CARE EDUCATION/TRAINING PROGRAM

## 2024-07-08 PROCEDURE — 99232 SBSQ HOSP IP/OBS MODERATE 35: CPT | Performed by: STUDENT IN AN ORGANIZED HEALTH CARE EDUCATION/TRAINING PROGRAM

## 2024-07-08 PROCEDURE — 80069 RENAL FUNCTION PANEL: CPT

## 2024-07-08 PROCEDURE — 2580000003 HC RX 258: Performed by: INTERNAL MEDICINE

## 2024-07-08 PROCEDURE — 2060000000 HC ICU INTERMEDIATE R&B

## 2024-07-08 PROCEDURE — 93312 ECHO TRANSESOPHAGEAL: CPT | Performed by: STUDENT IN AN ORGANIZED HEALTH CARE EDUCATION/TRAINING PROGRAM

## 2024-07-08 PROCEDURE — 83735 ASSAY OF MAGNESIUM: CPT

## 2024-07-08 PROCEDURE — 6370000000 HC RX 637 (ALT 250 FOR IP): Performed by: INTERNAL MEDICINE

## 2024-07-08 PROCEDURE — 7100000010 HC PHASE II RECOVERY - FIRST 15 MIN

## 2024-07-08 PROCEDURE — 6360000002 HC RX W HCPCS: Performed by: INTERNAL MEDICINE

## 2024-07-08 PROCEDURE — 6370000000 HC RX 637 (ALT 250 FOR IP): Performed by: NURSE PRACTITIONER

## 2024-07-08 PROCEDURE — 3700000000 HC ANESTHESIA ATTENDED CARE

## 2024-07-08 RX ORDER — PROPOFOL 10 MG/ML
INJECTION, EMULSION INTRAVENOUS PRN
Status: DISCONTINUED | OUTPATIENT
Start: 2024-07-08 | End: 2024-07-08 | Stop reason: SDUPTHER

## 2024-07-08 RX ORDER — LIDOCAINE HYDROCHLORIDE 20 MG/ML
INJECTION, SOLUTION EPIDURAL; INFILTRATION; INTRACAUDAL; PERINEURAL PRN
Status: DISCONTINUED | OUTPATIENT
Start: 2024-07-08 | End: 2024-07-08 | Stop reason: SDUPTHER

## 2024-07-08 RX ORDER — FUROSEMIDE 10 MG/ML
20 INJECTION INTRAMUSCULAR; INTRAVENOUS ONCE
Status: COMPLETED | OUTPATIENT
Start: 2024-07-08 | End: 2024-07-08

## 2024-07-08 RX ORDER — SODIUM CHLORIDE, SODIUM LACTATE, POTASSIUM CHLORIDE, CALCIUM CHLORIDE 600; 310; 30; 20 MG/100ML; MG/100ML; MG/100ML; MG/100ML
INJECTION, SOLUTION INTRAVENOUS CONTINUOUS PRN
Status: DISCONTINUED | OUTPATIENT
Start: 2024-07-08 | End: 2024-07-08 | Stop reason: SDUPTHER

## 2024-07-08 RX ADMIN — PROPOFOL 40 MG: 10 INJECTION, EMULSION INTRAVENOUS at 08:34

## 2024-07-08 RX ADMIN — BUPRENORPHINE AND NALOXONE 2 FILM: 2; .5 FILM, SOLUBLE BUCCAL; SUBLINGUAL at 10:21

## 2024-07-08 RX ADMIN — FUROSEMIDE 20 MG: 20 TABLET ORAL at 10:21

## 2024-07-08 RX ADMIN — CEFTRIAXONE SODIUM 1000 MG: 1 INJECTION, POWDER, FOR SOLUTION INTRAMUSCULAR; INTRAVENOUS at 20:38

## 2024-07-08 RX ADMIN — SPIRONOLACTONE 25 MG: 25 TABLET ORAL at 10:21

## 2024-07-08 RX ADMIN — ASPIRIN 81 MG: 81 TABLET, COATED ORAL at 10:21

## 2024-07-08 RX ADMIN — VALSARTAN 40 MG: 80 TABLET, FILM COATED ORAL at 17:30

## 2024-07-08 RX ADMIN — SODIUM CHLORIDE, SODIUM LACTATE, POTASSIUM CHLORIDE, AND CALCIUM CHLORIDE: .6; .31; .03; .02 INJECTION, SOLUTION INTRAVENOUS at 07:58

## 2024-07-08 RX ADMIN — BUPRENORPHINE AND NALOXONE 2 FILM: 8; 2 FILM, SOLUBLE BUCCAL; SUBLINGUAL at 10:21

## 2024-07-08 RX ADMIN — FUROSEMIDE 20 MG: 10 INJECTION, SOLUTION INTRAMUSCULAR; INTRAVENOUS at 14:48

## 2024-07-08 RX ADMIN — SODIUM CHLORIDE 100 ML/HR: 9 INJECTION, SOLUTION INTRAVENOUS at 20:35

## 2024-07-08 RX ADMIN — LIDOCAINE HYDROCHLORIDE 50 MG: 20 INJECTION, SOLUTION EPIDURAL; INFILTRATION; INTRACAUDAL; PERINEURAL at 08:30

## 2024-07-08 RX ADMIN — SODIUM CHLORIDE, PRESERVATIVE FREE 10 ML: 5 INJECTION INTRAVENOUS at 10:22

## 2024-07-08 RX ADMIN — PROPOFOL 100 MG: 10 INJECTION, EMULSION INTRAVENOUS at 08:30

## 2024-07-08 RX ADMIN — SODIUM CHLORIDE, PRESERVATIVE FREE 10 ML: 5 INJECTION INTRAVENOUS at 22:30

## 2024-07-08 RX ADMIN — ACETAMINOPHEN 650 MG: 325 TABLET ORAL at 20:40

## 2024-07-08 ASSESSMENT — PAIN SCALES - GENERAL: PAINLEVEL_OUTOF10: 1

## 2024-07-08 ASSESSMENT — PAIN - FUNCTIONAL ASSESSMENT: PAIN_FUNCTIONAL_ASSESSMENT: ACTIVITIES ARE NOT PREVENTED

## 2024-07-08 NOTE — PROCEDURES
7/8/2024    PROCEDURE PERFORMED:     1. A Complete Transesophageal echocardiogram with color and spectral doppler and 3-D imaging was performed.     INDICATIONS: Severe TR, Moderate AI; hx of substance abuse     PROCEDURE: The patient was brought to the lab in fasting state. The patient received adequate sedation with the assistance of anesthesia for the case. After ensuring adequate sedation, the esophagus was intubated and a complete transesophageal echocardiogram was completed. There were no complications related to the study.       CONCLUSION:   Mild TR/Mild AI with no evidence of vegetations/endocarditis.   2.   Possible small PFO; evidence of small right to left shunt with bubble study   3.   Mild global HK; LVEF 40-45%.       See formal report for full details.     PLAN:   1. Continue current cardiac medications.    Anoop Zamarripa DO  [facility] [place]   [phone] [place] office   [phone] [place]

## 2024-07-08 NOTE — PLAN OF CARE
Problem: Safety - Adult  Goal: Free from fall injury  Outcome: Progressing     Problem: Discharge Planning  Goal: Discharge to home or other facility with appropriate resources  Outcome: Progressing     Problem: Pain  Goal: Verbalizes/displays adequate comfort level or baseline comfort level  7/7/2024 2205 by Brandon Laguna RN  Outcome: Progressing  7/7/2024 1828 by Jennifer Sweeney RN  Outcome: Progressing  Note: Pt denies chest pain or pain this shift. Pt anxious this evening, cross cover sent message regarding.       Problem: ABCDS Injury Assessment  Goal: Absence of physical injury  Outcome: Progressing     Problem: Risk for Elopement  Goal: Patient will not exit the unit/facility without proper excort  Outcome: Progressing     Problem: Respiratory - Adult  Goal: Achieves optimal ventilation and oxygenation  7/7/2024 2205 by Brandon Laguna RN  Outcome: Progressing  7/7/2024 1828 by Jennifer Sweeney RN  Outcome: Progressing  Note: O2 sats greater than 92% on room air.       Problem: Cardiovascular - Adult  Goal: Absence of cardiac dysrhythmias or at baseline  7/7/2024 2205 by Brandon Laguna RN  Outcome: Progressing  7/7/2024 1828 by Jennifer Sweeney RN  Outcome: Progressing  Note: SB/NSR on tele.     Problem: Musculoskeletal - Adult  Goal: Return mobility to safest level of function  Outcome: Progressing     Problem: Genitourinary - Adult  Goal: Absence of urinary retention  7/7/2024 2205 by Brandon Laguna RN  Outcome: Progressing  7/7/2024 1828 by Jennifer Sweeney RN  Outcome: Progressing  Note: External catheter in place.

## 2024-07-08 NOTE — PROGRESS NOTES
/68   Pulse 51   Temp 97.6 °F (36.4 °C) (Oral)   Resp 16   Ht 1.778 m (5' 10\")   Wt 56.7 kg (125 lb 1.6 oz)   SpO2 98%   BMI 17.95 kg/m²  on room air.  Pt resting quietly in bed, denies pain, discomfort or shortness of breath.  Lungs clear upper and middle lobes, diminished with fine crackles in bases.  SB on tele.  Pt NPO, voided in urinal, consent for NILE was already completed and is in front of chart.  Pt awaiting transport for cath lab.  Pt denies any needs at this time.  Bedside table, call light within reach.  Jennifer Sweeney RN  7/8/2024     822 Pt off floor to cath lab, CMU made aware.  Jennifer Sweeney RN  7/8/2024

## 2024-07-08 NOTE — PROGRESS NOTES
[facility]   Daily Progress Note    Admit Date:  7/5/2024  HPI:    Dayton Soto presented to [facility] ER with chest pain and shortness of breath.    Cardiology consulted for elevated troponin, new onset CHF, prolonged QT    PMH: Polysubstance abuse, recent diagnosis CHF and cardiomyopathy, SVT, chronic pain    Subjective:  Mr. Soto seen before and after NILE.  NILE performed which showed only mild TR and mild AI.  No evidence of endocarditis.  Post sedation hypotension requiring 600 cc IV fluids during NILE.    Otherwise, patient denies any chest pain or shortness of breath.    Objective:   Patient Vitals for the past 24 hrs:   BP Temp Temp src Pulse Resp SpO2 Weight   07/08/24 1145 (!) 98/59 98 °F (36.7 °C) Oral 60 16 96 % --   07/08/24 1112 -- -- -- 55 -- 96 % --   07/08/24 1012 119/74 98.1 °F (36.7 °C) Oral 51 16 (!) 0 % --   07/08/24 0900 -- -- -- (!) 49 -- -- --   07/08/24 0857 -- -- -- -- -- (!) 82 % --   07/08/24 0826 97/81 -- -- 52 -- 97 % --   07/08/24 0820 114/77 -- -- (!) 49 -- -- --   07/08/24 0743 112/68 97.6 °F (36.4 °C) Oral 51 16 98 % --   07/08/24 0538 -- -- -- -- -- -- 56.7 kg (125 lb 1.6 oz)   07/08/24 0537 112/66 97.8 °F (36.6 °C) Oral (!) 49 16 96 % --   07/07/24 1924 120/73 97.7 °F (36.5 °C) Oral 54 16 98 % --   07/07/24 1737 (!) 100/47 -- -- 60 -- 97 % --   07/07/24 1549 (!) 100/57 97.9 °F (36.6 °C) Oral 54 16 96 % --       Intake/Output Summary (Last 24 hours) at 7/8/2024 1208  Last data filed at 7/8/2024 1021  Gross per 24 hour   Intake 1610 ml   Output 2850 ml   Net -1240 ml     Wt Readings from Last 3 Encounters:   07/08/24 56.7 kg (125 lb 1.6 oz)   07/04/24 63.5 kg (140 lb)   07/03/24 57.3 kg (126 lb 4.8 oz)         ASSESSMENT:   Chest pain: resolved  HFrEF, acute:  Cardiomyopathy, non-ischemic: EF 30-35%, on spironolactone and valsartan  NSTEMI type II: 2/2 supply/ demand mismatch from CHF, tachycardia and substance abuse  PSVT: no further overnight  Prolonged QT:      INR:  No results for input(s): \"INR\" in the last 72 hours.  BNP:    No results for input(s): \"PROBNP\" in the last 72 hours.  Cardiac Enzymes:   Lab Results   Component Value Date    TROPHS 46 (H) 07/05/2024    TROPHS 51 (H) 07/05/2024    TROPHS 48 (H) 07/05/2024     Lipids:   Lab Results   Component Value Date/Time    TRIG 74 07/03/2024 04:30 AM    HDL 71 07/03/2024 04:30 AM       Cardiac Imaging:    Echo 07/02/2024: Summary    Left Ventricle: Moderately reduced left ventricular systolic function with a visually estimated EF of 30 - 35%. EF 3D is 34%. Left ventricle size is normal. Mild septal thickening. Septal flattening in diastole consistent with right ventricular volume overload. Moderate global hypokinesis present and more marked in the anterior and septal walls Global longitudinal strain is reduced with a value of -11.4%. Grade I diastolic dysfunction with normal LAP.    Right Ventricle: Reduced systolic function. RV EF 3D is 31%. Fractional area change (FAC) is 26%. Global longitudinal strain is -7.5%. RV Peak S' is 7 cm/s.    Aortic Valve: Mild to moderate regurgitation with a centrally directed jet.    Tricuspid Valve: Severe regurgitation. Reversed hepatic vein systolic flow. Mildly elevated RVSP, consistent with mild pulmonary hypertension. The estimated RVSP is 40 mmHg.    Right Atrium: Right atrium is severely dilated.    IVC/SVC: IVC diameter is greater than 21 mm and decreases less than 50% during inspiration; therefore the estimated right atrial pressure is elevated (~15 mmHg).    Image quality is adequate. Procedure performed with the patient in a supine position.

## 2024-07-08 NOTE — ANESTHESIA PRE PROCEDURE
Department of Anesthesiology  Preprocedure Note       Name:  Dayton Soto   Age:  59 y.o.  :  1965                                          MRN:  [id number]         Date:  2024      Surgeon: * No surgeons listed *    Procedure: * No procedures listed *    Medications prior to admission:   Prior to Admission medications    Medication Sig Start Date End Date Taking? Authorizing Provider   Buprenorphine HCl-Naloxone HCl (SUBOXONE SL) Place 20 mg under the tongue daily Pt takes (2) 8 mg tablets and (2) 2 mg tablets daily in the morning    ProviderDirk MD       Current medications:    Current Facility-Administered Medications   Medication Dose Route Frequency Provider Last Rate Last Admin    valsartan (DIOVAN) tablet 40 mg  40 mg Oral QPM Deirdre Schaffer APRN - CNP   40 mg at 24 1739    hydrOXYzine pamoate (VISTARIL) capsule 50 mg  50 mg Oral TID PRN Casey Ma MD   50 mg at 24 1904    furosemide (LASIX) tablet 20 mg  20 mg Oral Daily Deirdre Schaffer APRN - CNP   20 mg at 24 0931    cefTRIAXone (ROCEPHIN) 1,000 mg in sodium chloride 0.9 % 50 mL IVPB (mini-bag)  1,000 mg IntraVENous Q24H Andrade Acosta MD   Stopped at 24    sodium chloride flush 0.9 % injection 5-40 mL  5-40 mL IntraVENous 2 times per day Andrade Acosta MD   10 mL at 24    sodium chloride flush 0.9 % injection 5-40 mL  5-40 mL IntraVENous PRN Andrade Acosta MD        0.9 % sodium chloride infusion   IntraVENous PRN Andrade Acosta MD        potassium chloride (KLOR-CON M) extended release tablet 40 mEq  40 mEq Oral PRN Andrade Acosta MD        Or    potassium bicarb-citric acid (EFFER-K) effervescent tablet 40 mEq  40 mEq Oral PRN Andrade Acosta MD        Or    potassium chloride 10 mEq/100 mL IVPB (Peripheral Line)  10 mEq IntraVENous PRN Andrade Acosta MD        magnesium sulfate 2000 mg in 50 mL IVPB premix  2,000 mg IntraVENous PRN Andrade Acosta

## 2024-07-08 NOTE — PLAN OF CARE
Problem: Respiratory - Adult  Goal: Achieves optimal ventilation and oxygenation  Outcome: Adequate for Discharge  Note: O2 sats greater than 92% on room air.       Problem: Cardiovascular - Adult  Goal: Absence of cardiac dysrhythmias or at baseline  Outcome: Adequate for Discharge  Note: SB/NSR on tele    CHF Care Plan      Patient's EF (Ejection Fraction) is less than 40%    Heart Failure Medications:  Diuretics:: Furosemide and Spironolactone    (One of the following REQUIRED for EF </= 40%/SYSTOLIC FAILURE but MAY be used in EF% >40%/DIASTOLIC FAILURE)        ACE:: None        ARB:: Valsartan         ARNI:: None    (Beta Blockers)  NON- Evidenced Based Beta Blocker (for EF% >40%/DIASTOLIC FAILURE): None    Evidenced Based Beta Blocker::(REQUIRED for EF% <40%/SYSTOLIC FAILURE) None  ...................................................................................................................................................    Failed to redirect to the Timeline version of the [facility] SmartLink.      Patient's weights and intake/output reviewed    Daily Weight log at bedside, patient/family participation in use of log: \"yes    Patient's current weight today shows a difference of 0 lbs less than last documented weight.      Intake/Output Summary (Last 24 hours) at 7/8/2024 1711  Last data filed at 7/8/2024 1635  Gross per 24 hour   Intake 2010 ml   Output 3225 ml   Net -1215 ml       Education Booklet Provided: yes    Comorbidities Reviewed Yes    Patient has a past medical history of Back pain, chronic, Chronic back pain, and MRSA (methicillin resistant staph aureus) culture positive.     >>For CHF and Comorbidity documentation on Education Time and Topics, please see Education Tab      Pt resting in bed at this time on room air. Pt denies shortness of breath. Pt without lower extremity edema.     Patient and/or Family's stated Goal of Care this Admission: increase activity tolerance, better understand heart

## 2024-07-08 NOTE — PROGRESS NOTES
Brief Pre-Op Note/Sedation Assessment      Dayton Soto  1965  [id number]  9:00 AM    Planned Procedure: Transesophageal Echocardiogram   Post Procedure Plan: Return to same level of care  Consent: I have discussed with the patient and/or the patient representative the indication, alternatives, and the possible risks and/or complications of the planned procedure and the anesthesia methods. The patient and/or patient representative appear to understand and agree to proceed.        Chief Complaint/Indication  Severe TR and Moderate AI on TTE; hx of substance abuse       Vital Signs:  BP 97/81   Pulse 52   Temp 97.6 °F (36.4 °C) (Oral)   Resp 16   Ht 1.778 m (5' 10\")   Wt 56.7 kg (125 lb 1.6 oz)   SpO2 97%   BMI 17.95 kg/m²     Allergies:  No Known Allergies  Medications:    Current Facility-Administered Medications   Medication Dose Route Frequency Provider Last Rate Last Admin    valsartan (DIOVAN) tablet 40 mg  40 mg Oral QPM Deirdre Schaffer APRN - CNP   40 mg at 07/07/24 1739    hydrOXYzine pamoate (VISTARIL) capsule 50 mg  50 mg Oral TID PRN Casey Ma MD   50 mg at 07/07/24 1904    furosemide (LASIX) tablet 20 mg  20 mg Oral Daily Deirdre Schaffer APRN - CNP   20 mg at 07/07/24 0931    cefTRIAXone (ROCEPHIN) 1,000 mg in sodium chloride 0.9 % 50 mL IVPB (mini-bag)  1,000 mg IntraVENous Q24H Andrade Acosta MD   Stopped at 07/07/24 2111    sodium chloride flush 0.9 % injection 5-40 mL  5-40 mL IntraVENous 2 times per day Andrade Acosta MD   10 mL at 07/07/24 2034    sodium chloride flush 0.9 % injection 5-40 mL  5-40 mL IntraVENous PRN Andrade Acosta MD        0.9 % sodium chloride infusion   IntraVENous PRN Andrade Acosta MD        potassium chloride (KLOR-CON M) extended release tablet 40 mEq  40 mEq Oral PRN Andrade Acosta MD        Or    potassium bicarb-citric acid (EFFER-K) effervescent tablet 40 mEq  40 mEq Oral PRN Andrade Acosta MD        Or    potassium  chloride 10 mEq/100 mL IVPB (Peripheral Line)  10 mEq IntraVENous PRN Andrade Acosta MD        magnesium sulfate 2000 mg in 50 mL IVPB premix  2,000 mg IntraVENous PRN Andrade Acosta MD        ondansetron (ZOFRAN-ODT) disintegrating tablet 4 mg  4 mg Oral Q8H PRN Andrade Acosta MD        Or    ondansetron (ZOFRAN) injection 4 mg  4 mg IntraVENous Q6H PRN Andrade Acosta MD        polyethylene glycol (GLYCOLAX) packet 17 g  17 g Oral Daily PRN Andrade Acosta MD        acetaminophen (TYLENOL) tablet 650 mg  650 mg Oral Q6H PRN Andrade Acosta MD        Or    acetaminophen (TYLENOL) suppository 650 mg  650 mg Rectal Q6H PRN Andrade Acosta MD        nitroGLYCERIN (NITROSTAT) SL tablet 0.4 mg  0.4 mg SubLINGual Q5 Min PRN Andrade Acosta MD        buprenorphine-naloxone (SUBOXONE) 8-2 MG SL film 2 Film  2 Film SubLINGual Daily Andrade Acosta MD   2 Film at 07/07/24 0930    And    buprenorphine-naloxone (SUBOXONE) 2-0.5 MG SL film 2 Film  2 Film SubLINGual Daily Andrade Acosta MD   2 Film at 07/07/24 0930    aspirin EC tablet 81 mg  81 mg Oral Daily José Miguel Cooney MD   81 mg at 07/07/24 0930    midodrine (PROAMATINE) tablet 5 mg  5 mg Oral TID PRN José Miguel Cooney MD   5 mg at 07/05/24 1442    spironolactone (ALDACTONE) tablet 25 mg  25 mg Oral Daily José Miguel Cooney MD   25 mg at 07/07/24 0930       Past Medical History:    Past Medical History:   Diagnosis Date    Back pain, chronic     Chronic back pain     MRSA (methicillin resistant staph aureus) culture positive 02/26/2020    RIGHT FOREARM       Surgical History:  No past surgical history on file.          Pre-Sedation:  Pre-Sedation Documentation and Exam:  I have assessed the patient and reviewed the H&P on the chart.    Prior History of Anesthesia Complications:   none    Modified Mallampati:  III (soft palate, base of uvula visible)    ASA Classification:  Class 3 - A patient with severe systemic disease that limits

## 2024-07-08 NOTE — ANESTHESIA POSTPROCEDURE EVALUATION
Department of Anesthesiology  Postprocedure Note    Patient: Dayton Soto  MRN: [id number]  YOB: 1965  Date of evaluation: 7/8/2024    Procedure Summary       Date: 07/08/24 Room / Location: [facility]    Anesthesia Start: 0827 Anesthesia Stop: 0854    Procedure: NILE (PRN CONTRAST/BUBBLE/3D) Diagnosis: Abnormal echocardiogram    Scheduled Providers: Anoop Zamarripa DO Responsible Provider: Cait Wallace MD    Anesthesia Type: MAC ASA Status: 3            Anesthesia Type: No value filed.    Dulce Phase I:      Dulce Phase II:      Anesthesia Post Evaluation    Comments: Postoperative Anesthesia Note    Name:    Dayton Soto  MRN:      [id number]    Patient Vitals in the past 12 hrs:  07/08/24 1012, BP:119/74, Temp:98.1 °F (36.7 °C), Temp src:Oral, Pulse:51, Resp:16, SpO2:(!) 0 %  07/08/24 0900, Pulse:(!) 49  07/08/24 0857, SpO2:(!) 82 %  07/08/24 0826, BP:97/81, Pulse:52, SpO2:97 %  07/08/24 0820, BP:114/77, Pulse:(!) 49  07/08/24 0743, BP:112/68, Temp:97.6 °F (36.4 °C), Temp src:Oral, Pulse:51, Resp:16, SpO2:98 %  07/08/24 0538, Weight:56.7 kg (125 lb 1.6 oz)  07/08/24 0537, BP:112/66, Temp:97.8 °F (36.6 °C), Temp src:Oral, Pulse:(!) 49, Resp:16, SpO2:96 %     LABS:    CBC  Lab Results       Component                Value               Date/Time                  WBC                      8.8                 07/08/2024 05:32 AM        HGB                      13.4 (L)            07/08/2024 05:32 AM        HCT                      40.0 (L)            07/08/2024 05:32 AM        PLT                      254                 07/08/2024 05:32 AM   RENAL  Lab Results       Component                Value               Date/Time                  NA                       138                 07/08/2024 05:32 AM        K                        4.4                 07/08/2024 05:32 AM        K                        3.8                 07/05/2024 11:14 AM        CL

## 2024-07-08 NOTE — PROGRESS NOTES
Gastroenterology Progress Note      Patient Dayton Soto  MRN: [id number]  YOB: 1965 Age: 59 y.o. Sex: male  Room: [address] [place] [address]       Admitting Physician: Andrade Acosta MD   Date of Admission: 7/5/2024  9:43 AM   Primary Care Physician: Not, On File (Inactive)     Subjective:  We are following for elevated liver tests. Dayton Soto was seen and examined. Offered no complaint.  Denies abdominal pain, nausea, vomiting, fever, chills, constipation, diarrhea, hematochezia or melenic stools.     Current Hospital Schedued Meds   valsartan  40 mg Oral QPM    furosemide  20 mg Oral Daily    cefTRIAXone (ROCEPHIN) IV  1,000 mg IntraVENous Q24H    sodium chloride flush  5-40 mL IntraVENous 2 times per day    buprenorphine-naloxone  2 Film SubLINGual Daily    And    buprenorphine-naloxone  2 Film SubLINGual Daily    aspirin  81 mg Oral Daily    spironolactone  25 mg Oral Daily     Current Hospital IV Meds   sodium chloride       Current Hospital Prn Meds  hydrOXYzine pamoate, sodium chloride flush, sodium chloride, potassium chloride **OR** potassium alternative oral replacement **OR** potassium chloride, magnesium sulfate, ondansetron **OR** ondansetron, polyethylene glycol, acetaminophen **OR** acetaminophen, nitroGLYCERIN, midodrine    I/O last 3 completed shifts:  In: 2730 [P.O.:2720; I.V.:10]  Out: 2400 [Urine:2400]    Physical Exam:  VITAL SIGNS: /77   Pulse (!) 49   Temp 97.6 °F (36.4 °C) (Oral)   Resp 16   Ht 1.778 m (5' 10\")   Wt 56.7 kg (125 lb 1.6 oz)   SpO2 98%   BMI 17.95 kg/m²   Wt Readings from Last 3 Encounters:   07/08/24 56.7 kg (125 lb 1.6 oz)   07/04/24 63.5 kg (140 lb)   07/03/24 57.3 kg (126 lb 4.8 oz)     Constitutional: Well developed, Well nourished, No acute distress, Non-toxic appearance.   HENT: Normocephalic, Atraumatic, Bilateral external ears normal, Oropharynx moist, No oral exudates, Nose normal.   Eyes: Conjunctiva normal, No discharge.   Neck:  Normal range of motion, No tenderness  Cardiovascular: Normal heart rate, Normal rhythm, No murmurs, No rubs, No gallops.   Thorax & Lungs: Normal breath sounds, No respiratory distress  Abdomen:  normal bowel sounds, soft, non tender, non distended,  Skin: Warm, Dry, No erythema, No rash  Lower Extremities: Intact distal pulses, No edema  Neurologic: Alert & oriented x 3      Labs:  Recent Labs     07/06/24  0527 07/07/24  0718 07/08/24  0532   WBC 6.2 9.0 8.8   HGB 11.7* 13.2* 13.4*   HCT 35.1* 38.9* 40.0*    237 254       Recent Labs     07/05/24  1114 07/07/24  0718 07/08/24  0532    138 138   K 3.8 4.1 4.4    101 100   CO2 27 29 33*   BUN 15 18 21*       Lab Results   Component Value Date/Time    ALKPHOS 175 07/07/2024 07:18 AM     07/07/2024 07:18 AM    AST 83 07/07/2024 07:18 AM    BILITOT <0.2 07/07/2024 07:18 AM    BILIDIR <0.2 07/07/2024 07:18 AM         Lab Results   Component Value Date    INR 1.38 (H) 07/02/2024    PROTIME 17.2 (H) 07/02/2024       Assessment and Plan:   59-year-old male with medical history of substance use disorder (amphetamine), SVT and recent diagnosis of CHF admitted for chest pain and shortness of breath.  Found to have acute congestive heart failure and cardiomyopathy.  GI consulted for elevated liver enzymes.     CT abdomen and pelvis 7/2/2024 noted suggestive acalculous cholecystitis with mild gallbladder wall thickening and mild to moderate amount of pericholecystic fluid and edema.  No evidence of biliary ductal dilation or obstruction.  Nonspecific 2.5 cm nodular focus within the liver parenchyma near the falciform ligament likely focal fat deposition.  Mild perihepatic ascites.    Impression:   Acute exacerbation of congestive heart failure (LVEF 30-35%)  Severe tricuspid regurgitation, moderate aortic regurgitation.  Polysubstance abuse  Abnormal liver test - improving.  Likely due to congestive hepatopathy versus drug-induced liver

## 2024-07-08 NOTE — CARE COORDINATION
Case Management Assessment  Initial Evaluation    Date/Time of Evaluation: 7/8/2024 10:40 AM  Assessment Completed by: Deann Bonds RN    If patient is discharged prior to next notation, then this note serves as note for discharge by case management.    Patient Name: Dayton Soto                   YOB: 1965  Diagnosis: NSTEMI (non-ST elevated myocardial infarction) (HCC) [I21.4]  Chest pain [R07.9]                   Date / Time: 7/5/2024  9:43 AM    Patient Admission Status: Inpatient   Readmission Risk (Low < 19, Mod (19-27), High > 27): Readmission Risk Score: 12.3    Current PCP: Not, On File (Inactive)  PCP verified by CM? (P) No (does not have PCP at this time. Has insurance. ref. to resident clinic. added to AVS.)    Chart Reviewed: Yes      History Provided by: (P) Patient  Patient Orientation: (P) Alert and Oriented    Patient Cognition: (P) Alert    Hospitalization in the last 30 days (Readmission):  Yes    If yes, Readmission Assessment in CM Navigator will be completed.    Advance Directives:      Code Status: Full Code   Patient's Primary Decision Maker is: (P) Legal Next of Kin      Discharge Planning:    Patient lives with: (P) Family Members, Other (Comment) (staying with sister in [place], does not know address) Type of Home: (P) Trailer/Mobile Home  Primary Care Giver: (P) Self  Patient Support Systems include: (P) Family Members, Friends/Neighbors   Current Financial resources: (P) Medicaid  Current community resources: (P) None  Current services prior to admission: (P) None            Current DME:              Type of Home Care services:  (P) None    ADLS  Prior functional level: (P) Independent in ADLs/IADLs  Current functional level: (P) Independent in ADLs/IADLs    PT AM-PAC:   /24  OT AM-PAC:   /24    Family can provide assistance at DC: (P) No  Would you like Case Management to discuss the discharge plan with any other family members/significant others, and if so, who? (P)  No  Plans to Return to Present Housing: (P) Yes  Other Identified Issues/Barriers to RETURNING to current housing: none  Potential Assistance needed at discharge: (P) Transportation            Potential DME:  none  Patient expects to discharge to: (P) Trailer/mobile home  Plan for transportation at discharge: (P) Self    Financial    Payor: [employer] [place] MEDICAID / Plan: [facility] MEDICA / Product Type: *No Product type* /     Does insurance require precert for SNF: Yes    Potential assistance Purchasing Medications: (P) Yes (meds to beds at d/c.)  Meds-to-Beds request: Yes      [id number] - [place] - [address] [facility] - P [phone] - F [phone]  [address] [facility] [address]  Phone: [phone] Fax: [phone]    [id number] - [place], [place] - [address] - P [phone] - F [phone]  [address] [place] [address]  Phone: [phone] Fax: [phone]      Notes:    Factors facilitating achievement of predicted outcomes: Family support, Motivated, Cooperative, and Pleasant    Barriers to discharge: none    Additional Case Management Notes: Met with the Pt at the bedside. Pt is IPTA. Sitting up at edge of bed eating breakfast during assessment. Pt was AMA discharge last admit, readmitted a few hours later. Pt is currently staying with sister in [place] and will plan to dc there again. May need transport at d/c. Meds to beds at d/c.     The Plan for Transition of Care is related to the following treatment goals of NSTEMI (non-ST elevated myocardial infarction) (HCC) [I21.4]  Chest pain [R07.9]       The Patient and/or Patient Representative Agree with the Discharge Plan? (P) Yes    Deann Bonds RN  Case Management Department  Ph: [phone] Fax: [phone]

## 2024-07-08 NOTE — PROGRESS NOTES
Hospital Medicine Progress Note      Date of Admission: 7/5/2024  Hospital Day: 4      Chief Admission Complaint:  chest pain     Subjective:  feels well, no overnight events, had NILE today     Presenting Admission History:          59 y.o. male with hx of substance abuse(amphetamine) who presented to [facility] with chest pain and sob. Pt just left AMA on 7/4/24(where he was diagnosed with new cardiomyopathy/SVT/required icu care) and returned to [facility] ER with complaints of rapid ht rate and developing chest pain/pressure. He denies sob/f/chills/n/v/diarrhea. Pt was transferred back to [facility] for further workup. Heparin ggt was restarted.  -he notes ongoing cough with sputum production  -he denies any complaints currently     Assessment/Plan:       Current Principal Problem:  NSTEMI (non-ST elevated myocardial infarction) (HCC)       Chest pain- concern for nstemi  -tele  -cards consulted  -trended frida  -heparin ggt restarted  -restart asa  -prn nitro  -did not start statin given lfts  -No beta-blocker because of soft blood pressure     Tachycardia- noted on prior admit, currently resolved, concern for AVNRT  -EP was previously on board, given adenosine, required icu care  -had started amio ggton prior admit, weaned off  -monitored tele   -qtc prolonged on EKG, EP felt acquired from amphet use), needs to abstain     Cardiomyopathy- new dx, echo done(ef 30-35%, mod global hk, mod ar, severe tr, )   -mgmt per cards    -on aldactone, arb  -on iv lasix, switched to po  -daily wts, I/os   -NILE  7/8 to rule out valve dz , noted mild TR/mild AI w/o veg/endocarditis, possible small PFO(small rt to left shunt)     Elevated LFTs-, checked for hepatitis, repeat liver function test    avoid hypotension   -acute hep panel neg  -u/s ordered(suggested acalculous cholecystitis, mild gb wall thickening, mod adj perichole fluid and edema, no biliary ductal dilatation or obstruction), 2.5 cm nodular focus in liver parenchyma

## 2024-07-08 NOTE — PLAN OF CARE
HEART FAILURE CARE PLAN:    Comorbidities Reviewed: Yes   Patient has a past medical history of Back pain, chronic, Chronic back pain, and MRSA (methicillin resistant staph aureus) culture positive.     Weights Reviewed: Yes   Admission weight: 59 kg (130 lb 1.1 oz)   Wt Readings from Last 3 Encounters:   07/08/24 56.7 kg (125 lb 1.6 oz)   07/04/24 63.5 kg (140 lb)   07/03/24 57.3 kg (126 lb 4.8 oz)     Intake & Output Reviewed: Yes     Intake/Output Summary (Last 24 hours) at 7/8/2024 0541  Last data filed at 7/8/2024 0537  Gross per 24 hour   Intake 2010 ml   Output 2400 ml   Net -390 ml         ECHOCARDIOGRAM Reviewed: Yes   Patient's Ejection Fraction (EF) is Less than 40%    Medications Reviewed: Yes   SCHEDULED HOSPITAL MEDICATIONS:   valsartan  40 mg Oral QPM    furosemide  20 mg Oral Daily    cefTRIAXone (ROCEPHIN) IV  1,000 mg IntraVENous Q24H    sodium chloride flush  5-40 mL IntraVENous 2 times per day    buprenorphine-naloxone  2 Film SubLINGual Daily    And    buprenorphine-naloxone  2 Film SubLINGual Daily    aspirin  81 mg Oral Daily    spironolactone  25 mg Oral Daily     HOME MEDICATIONS:  Prior to Admission medications    Medication Sig Start Date End Date Taking? Authorizing Provider   Buprenorphine HCl-Naloxone HCl (SUBOXONE SL) Place 20 mg under the tongue daily Pt takes (2) 8 mg tablets and (2) 2 mg tablets daily in the morning    Provider, MD Dirk      Diet Reviewed: Yes   ADULT DIET; Regular    Goal of Care Reviewed: Yes   Patient and/or Family's stated Goal of Care this Admission: Reduce shortness of breath, increase activity tolerance, better understand heart failure and disease management, be more comfortable, and reduce lower extremity edema prior to discharge.     Electronically signed by Brandon Laguna RN on 7/8/2024 at 5:41 AM

## 2024-07-08 NOTE — PROGRESS NOTES
Pt back to floor from cath lab.  Vital signs stable.  Gag reflex present.  A.m medications given at this time.  Pt denies any needs at this time.  Bedside table, call light, fluids within reach.  Pt instructed to call out for any needs and assistance.  Jennifer Sweeney RN  7/8/2024

## 2024-07-08 NOTE — PROGRESS NOTES
Report given to patients nurse. Pt transferred to room 440. CMU called and monitor is on and verified.

## 2024-07-09 VITALS
TEMPERATURE: 97.7 F | BODY MASS INDEX: 17.77 KG/M2 | OXYGEN SATURATION: 96 % | HEIGHT: 70 IN | HEART RATE: 60 BPM | SYSTOLIC BLOOD PRESSURE: 110 MMHG | WEIGHT: 124.1 LBS | RESPIRATION RATE: 18 BRPM | DIASTOLIC BLOOD PRESSURE: 66 MMHG

## 2024-07-09 LAB
ALBUMIN SERPL-MCNC: 3.5 G/DL (ref 3.4–5)
ALP SERPL-CCNC: 160 U/L (ref 40–129)
ALT SERPL-CCNC: 120 U/L (ref 10–40)
ANION GAP SERPL CALCULATED.3IONS-SCNC: 8 MMOL/L (ref 3–16)
AST SERPL-CCNC: 60 U/L (ref 15–37)
BASOPHILS # BLD: 0 K/UL (ref 0–0.2)
BASOPHILS NFR BLD: 0 %
BILIRUB DIRECT SERPL-MCNC: <0.2 MG/DL (ref 0–0.3)
BILIRUB INDIRECT SERPL-MCNC: ABNORMAL MG/DL (ref 0–1)
BILIRUB SERPL-MCNC: <0.2 MG/DL (ref 0–1)
BUN SERPL-MCNC: 23 MG/DL (ref 7–20)
CALCIUM SERPL-MCNC: 9.1 MG/DL (ref 8.3–10.6)
CHLORIDE SERPL-SCNC: 98 MMOL/L (ref 99–110)
CO2 SERPL-SCNC: 33 MMOL/L (ref 21–32)
CREAT SERPL-MCNC: 1 MG/DL (ref 0.9–1.3)
DEPRECATED RDW RBC AUTO: 13.4 % (ref 12.4–15.4)
EOSINOPHIL # BLD: 0.5 K/UL (ref 0–0.6)
EOSINOPHIL NFR BLD: 6 %
GFR SERPLBLD CREATININE-BSD FMLA CKD-EPI: 87 ML/MIN/{1.73_M2}
GLUCOSE SERPL-MCNC: 102 MG/DL (ref 70–99)
HCT VFR BLD AUTO: 40.2 % (ref 40.5–52.5)
HCV RNA SERPL NAA+PROBE-ACNC: NOT DETECTED IU/ML
HCV RNA SERPL NAA+PROBE-LOG IU: NOT DETECTED LOG IU/ML
HCV RNA SERPL QL NAA+PROBE: NOT DETECTED
HGB BLD-MCNC: 13.6 G/DL (ref 13.5–17.5)
LYMPHOCYTES # BLD: 2.3 K/UL (ref 1–5.1)
LYMPHOCYTES NFR BLD: 27 %
MAGNESIUM SERPL-MCNC: 2.2 MG/DL (ref 1.8–2.4)
MCH RBC QN AUTO: 30.2 PG (ref 26–34)
MCHC RBC AUTO-ENTMCNC: 33.8 G/DL (ref 31–36)
MCV RBC AUTO: 89.5 FL (ref 80–100)
MONOCYTES # BLD: 0.6 K/UL (ref 0–1.3)
MONOCYTES NFR BLD: 7 %
MYELOCYTES NFR BLD MANUAL: 1 %
NEUTROPHILS # BLD: 5.1 K/UL (ref 1.7–7.7)
NEUTROPHILS NFR BLD: 46 %
NEUTS BAND NFR BLD MANUAL: 13 % (ref 0–7)
PHOSPHATE SERPL-MCNC: 3.6 MG/DL (ref 2.5–4.9)
PLATELET # BLD AUTO: 271 K/UL (ref 135–450)
PLATELET BLD QL SMEAR: ADEQUATE
PMV BLD AUTO: 8.2 FL (ref 5–10.5)
POTASSIUM SERPL-SCNC: 4.4 MMOL/L (ref 3.5–5.1)
PROT SERPL-MCNC: 7 G/DL (ref 6.4–8.2)
RBC # BLD AUTO: 4.5 M/UL (ref 4.2–5.9)
RBC MORPH BLD: NORMAL
SLIDE REVIEW: ABNORMAL
SODIUM SERPL-SCNC: 139 MMOL/L (ref 136–145)
TOXIC GRANULES BLD QL SMEAR: PRESENT
WBC # BLD AUTO: 8.5 K/UL (ref 4–11)

## 2024-07-09 PROCEDURE — 83735 ASSAY OF MAGNESIUM: CPT

## 2024-07-09 PROCEDURE — 2580000003 HC RX 258: Performed by: INTERNAL MEDICINE

## 2024-07-09 PROCEDURE — 80069 RENAL FUNCTION PANEL: CPT

## 2024-07-09 PROCEDURE — 6370000000 HC RX 637 (ALT 250 FOR IP): Performed by: INTERNAL MEDICINE

## 2024-07-09 PROCEDURE — 6360000002 HC RX W HCPCS: Performed by: INTERNAL MEDICINE

## 2024-07-09 PROCEDURE — 80076 HEPATIC FUNCTION PANEL: CPT

## 2024-07-09 PROCEDURE — 36415 COLL VENOUS BLD VENIPUNCTURE: CPT

## 2024-07-09 PROCEDURE — 6370000000 HC RX 637 (ALT 250 FOR IP): Performed by: NURSE PRACTITIONER

## 2024-07-09 PROCEDURE — 85025 COMPLETE CBC W/AUTO DIFF WBC: CPT

## 2024-07-09 RX ORDER — ASPIRIN 81 MG/1
81 TABLET ORAL DAILY
Qty: 30 TABLET | Refills: 3 | Status: SHIPPED | OUTPATIENT
Start: 2024-07-10

## 2024-07-09 RX ORDER — FUROSEMIDE 20 MG/1
20 TABLET ORAL DAILY
Qty: 60 TABLET | Refills: 3 | Status: SHIPPED | OUTPATIENT
Start: 2024-07-10

## 2024-07-09 RX ORDER — VALSARTAN 40 MG/1
40 TABLET ORAL EVERY EVENING
Qty: 30 TABLET | Refills: 3 | Status: ON HOLD | OUTPATIENT
Start: 2024-07-09 | End: 2025-01-31 | Stop reason: HOSPADM

## 2024-07-09 RX ORDER — SPIRONOLACTONE 25 MG/1
25 TABLET ORAL DAILY
Qty: 30 TABLET | Refills: 3 | Status: SHIPPED | OUTPATIENT
Start: 2024-07-10

## 2024-07-09 RX ADMIN — BUPRENORPHINE AND NALOXONE 2 FILM: 2; .5 FILM, SOLUBLE BUCCAL; SUBLINGUAL at 10:12

## 2024-07-09 RX ADMIN — ASPIRIN 81 MG: 81 TABLET, COATED ORAL at 10:11

## 2024-07-09 RX ADMIN — SODIUM CHLORIDE, PRESERVATIVE FREE 10 ML: 5 INJECTION INTRAVENOUS at 10:11

## 2024-07-09 RX ADMIN — FUROSEMIDE 20 MG: 20 TABLET ORAL at 10:11

## 2024-07-09 RX ADMIN — SPIRONOLACTONE 25 MG: 25 TABLET ORAL at 10:11

## 2024-07-09 RX ADMIN — BUPRENORPHINE AND NALOXONE 2 FILM: 8; 2 FILM, SOLUBLE BUCCAL; SUBLINGUAL at 10:12

## 2024-07-09 RX ADMIN — CEFTRIAXONE SODIUM 1000 MG: 1 INJECTION, POWDER, FOR SOLUTION INTRAMUSCULAR; INTRAVENOUS at 14:58

## 2024-07-09 NOTE — CARE COORDINATION
DC order noted. NN identified. Please consult if needs arise.      Lyft ride requested.  to call RN upon arrival.

## 2024-07-09 NOTE — PLAN OF CARE
Problem: Safety - Adult  Goal: Free from fall injury  7/9/2024 0553 by Venita Martinez RN  Outcome: Progressing  7/9/2024 0258 by Ellen Jain RN  Outcome: Progressing     Problem: Discharge Planning  Goal: Discharge to home or other facility with appropriate resources  7/9/2024 0553 by Venita Martinez RN  Outcome: Progressing  7/9/2024 0258 by Ellen Jain RN  Outcome: Progressing     Problem: Pain  Goal: Verbalizes/displays adequate comfort level or baseline comfort level  7/9/2024 0553 by Venita Martinez RN  Outcome: Progressing  7/9/2024 0258 by Ellen Jain RN  Outcome: Progressing     Problem: ABCDS Injury Assessment  Goal: Absence of physical injury  7/9/2024 0553 by Venita Martinez RN  Outcome: Progressing  7/9/2024 0258 by Ellen Jain RN  Outcome: Progressing     Problem: Risk for Elopement  Goal: Patient will not exit the unit/facility without proper excort  7/9/2024 0553 by Venita Martinez RN  Outcome: Progressing  7/9/2024 0258 by Ellen Jain RN  Outcome: Progressing     Problem: Respiratory - Adult  Goal: Achieves optimal ventilation and oxygenation  7/9/2024 0553 by Venita Martinez RN  Outcome: Progressing  7/9/2024 0258 by Ellen Jain RN  Outcome: Progressing  7/8/2024 1710 by Jennifer Sweeney RN  Outcome: Adequate for Discharge  Note: O2 sats greater than 92% on room air.       Problem: Cardiovascular - Adult  Goal: Absence of cardiac dysrhythmias or at baseline  7/9/2024 0553 by Venita Martinez RN  Outcome: Progressing  7/9/2024 0258 by Ellen Jain RN  Outcome: Adequate for Discharge  7/8/2024 1710 by Jennifer Sweeney RN  Outcome: Adequate for Discharge  Note: SB/NSR on tele     Problem: Musculoskeletal - Adult  Goal: Return mobility to safest level of function  7/9/2024 0553 by Venita Martinez RN  Outcome: Progressing  7/9/2024 0258 by Ellen Jain RN  Outcome: Progressing  7/8/2024 1710 by Jennifer Sweeney, RN  Outcome:

## 2024-07-09 NOTE — DISCHARGE SUMMARY
Hospital Medicine Discharge Summary    Patient: Dayton Soto   : 1965     Admit Date: 2024   Discharge Date: 2024    Disposition:  [x]Home   []HHC  []SNF  []ECF  []Acute Rehab  []LTAC  []Hospice  Code status:  [x]Full  []DNR/CCA  []Limited (DNR/CCA with Do Not Intubate)  []DNRCC  Condition at Discharge: Stable  Primary Care Provider: Not, On File (Inactive)    Admitting Provider: Andrade Acosta MD  Discharge Provider: Andrade Acosta MD     Discharge Diagnoses:      Active Hospital Problems    Diagnosis     Nonrheumatic tricuspid valve regurgitation [I36.1]     History of substance abuse ([facility]) [F19.11]     Acute HFrEF (heart failure with reduced ejection fraction) ([facility]) [I50.21]     Cardiomyopathy ([facility]) [I42.9]     Prolonged QT interval [R94.31]     Severe tricuspid regurgitation [I07.1]     Nonrheumatic aortic valve insufficiency [I35.1]     Transaminitis [R74.01]     Polysubstance abuse ([facility]) [F19.10]     Chest pain [R07.9]     Abnormal echocardiogram [R93.1]     NSTEMI (non-ST elevated myocardial infarction) ([facility]) [I21.4]        Presenting Admission History:        59 y.o. male with hx of substance abuse(amphetamine) who presented to [facility] with chest pain and sob. Pt just left AMA on 24(where he was diagnosed with new cardiomyopathy/SVT/required icu care) and returned to [facility] ER with complaints of rapid ht rate and developing chest pain/pressure. He denies sob/f/chills/n/v/diarrhea. Pt was transferred back to [facility] for further workup. Heparin ggt was restarted.  -he notes ongoing cough with sputum production  -he denies any complaints currently     Assessment/Plan:        Chest pain- concern for nstemi type 2(demand from CHF)  -tele  -cards consulted  -trended frida  -heparin ggt restarted  -restarted asa, continued on dc  -prn nitro  -did not start statin given lfts  -No beta-blocker because of soft blood pressure   -will need formal ischemic eval as outpt, cards will  (SUBOXONE SL) Place 20 mg under the tongue daily Pt takes (2) 8 mg tablets and (2) 2 mg tablets daily in the morningHistorical Med           Discharge Medication List as of 7/9/2024  3:16 PM          Significant Test Results    NILE (PRN contrast/bubbles/3D) - Inpatient Order    Result Date: 7/8/2024    Left Ventricle: Mildly reduced left ventricular systolic function with a visually estimated EF of 40 - 45%. EF by 2D Simpsons Biplane is 43%. Left ventricle size is normal. Mild global hypokinesis present. No evidence of LV thrombus.   No evidence of vegetation(s)/endocarditis visualized.   Aortic Valve: Mild regurgitation with a centrally directed jet.   Mitral Valve: Valve structure is normal. Mild regurgitation.   Tricuspid Valve: Valve structure is normal. Mild regurgitation.   Pulmonic Valve: Trace regurgitation.   Left Atrium: Normal sized appendage. No left atrial appendage thrombus noted. No left atrial appendage mass noted.   Interatrial Septum: Grade I Positive (1 to 9 bubbles). Agitated saline study was suggestive of intracardiac shunt across the atrial septum. Mild right to left shunt was noted.   Image quality is excellent.     XR CHEST PORTABLE    Result Date: 7/4/2024  EXAMINATION: ONE XRAY VIEW OF THE CHEST 7/4/2024 10:14 pm COMPARISON: 07/02/2024 HISTORY: ORDERING SYSTEM PROVIDED HISTORY: chest pain TECHNOLOGIST PROVIDED HISTORY: Reason for exam:->chest pain Reason for Exam: chest pain FINDINGS: Heart is mildly enlarged.  There are prominent perihilar/interstitial markings, with bibasilar opacities, slightly increased from previous exam. There is no definite pleural effusion or pneumothorax.  The visualized bones are unremarkable.     Cardiomegaly with slightly increased bibasilar airspace opacities, concerning for pulmonary edema or pneumonia.     Echo (TTE) complete (PRN contrast/bubble/strain/3D)    Result Date: 7/3/2024    Left Ventricle: Moderately reduced left ventricular systolic function with

## 2024-07-09 NOTE — PLAN OF CARE
CHF Care Plan      Patient's EF (Ejection Fraction) is less than 40%    Heart Failure Medications:  Diuretics:: Furosemide and Spironolactone    (One of the following REQUIRED for EF </= 40%/SYSTOLIC FAILURE but MAY be used in EF% >40%/DIASTOLIC FAILURE)        ACE:: None        ARB:: Valsartan         ARNI:: None    (Beta Blockers)  NON- Evidenced Based Beta Blocker (for EF% >40%/DIASTOLIC FAILURE): None    Evidenced Based Beta Blocker::(REQUIRED for EF% <40%/SYSTOLIC FAILURE) None  ...................................................................................................................................................    Failed to redirect to the Timeline version of the nvite SmartLink.      Patient's weights and intake/output reviewed    Daily Weight log at bedside, patient/family participation in use of log: \"yes    Patient's last wt.  entered in epic shows a difference of 15 lbs less than last documented weight.wt. from previous day.      Intake/Output Summary (Last 24 hours) at 7/9/2024 0249  Last data filed at 7/9/2024 0022  Gross per 24 hour   Intake 2237.92 ml   Output 3700 ml   Net -1462.08 ml       Education Booklet Provided: yes    Comorbidities Reviewed Yes    Patient has a past medical history of Back pain, chronic, Chronic back pain, and MRSA (methicillin resistant staph aureus) culture positive.     >>For CHF and Comorbidity documentation on Education Time and Topics, please see Education Tab      Pt resting in bed at this time on room air. Pt denies shortness of breath. Pt without lower extremity edema.     Patient and/or Family's stated Goal of Care this Admission: increase activity tolerance prior to discharge        :

## 2024-07-09 NOTE — PLAN OF CARE
Problem: Safety - Adult  Goal: Free from fall injury  7/9/2024 1634 by Christoph Rao RN  Outcome: Adequate for Discharge  7/9/2024 0553 by Venita Martinez RN  Outcome: Progressing  7/9/2024 0258 by Ellen Jain RN  Outcome: Progressing     Problem: Discharge Planning  Goal: Discharge to home or other facility with appropriate resources  7/9/2024 1634 by Christoph Rao RN  Outcome: Adequate for Discharge  7/9/2024 0553 by Venita Martinez RN  Outcome: Progressing  7/9/2024 0258 by Ellen Jain RN  Outcome: Progressing     Problem: Pain  Goal: Verbalizes/displays adequate comfort level or baseline comfort level  7/9/2024 1634 by Christoph Rao RN  Outcome: Adequate for Discharge  7/9/2024 0553 by Venita Martinez RN  Outcome: Progressing  7/9/2024 0258 by Ellen Jain RN  Outcome: Progressing     Problem: ABCDS Injury Assessment  Goal: Absence of physical injury  7/9/2024 1634 by Christoph Rao RN  Outcome: Adequate for Discharge  7/9/2024 0553 by Venita Martinez RN  Outcome: Progressing  7/9/2024 0258 by Ellen Jain RN  Outcome: Progressing     Problem: Risk for Elopement  Goal: Patient will not exit the unit/facility without proper excort  7/9/2024 1634 by Christoph Rao RN  Outcome: Adequate for Discharge  7/9/2024 0553 by Venita Martinez RN  Outcome: Progressing  7/9/2024 0258 by Ellen Jain RN  Outcome: Progressing     Problem: Respiratory - Adult  Goal: Achieves optimal ventilation and oxygenation  7/9/2024 1634 by Christoph Rao RN  Outcome: Adequate for Discharge  7/9/2024 0553 by Venita Martinez RN  Outcome: Progressing  7/9/2024 0258 by Ellen Jain RN  Outcome: Progressing     Problem: Cardiovascular - Adult  Goal: Absence of cardiac dysrhythmias or at baseline  7/9/2024 1634 by Christoph Rao RN  Outcome: Adequate for Discharge  7/9/2024 0553 by Martinez, Venita, RN  Outcome: Progressing  7/9/2024 0258 by Ellen Jain, TARIQ  Outcome:  Adequate for Discharge     Problem: Musculoskeletal - Adult  Goal: Return mobility to safest level of function  7/9/2024 1634 by Christoph Rao RN  Outcome: Adequate for Discharge  7/9/2024 0553 by Venita Martinez RN  Outcome: Progressing  7/9/2024 0258 by Ellen Jain RN  Outcome: Progressing     Problem: Genitourinary - Adult  Goal: Absence of urinary retention  7/9/2024 1634 by Christoph Rao RN  Outcome: Adequate for Discharge  7/9/2024 0553 by Venita Martinez RN  Outcome: Progressing  7/9/2024 0258 by Ellen Jain RN  Outcome: Progressing

## 2024-07-09 NOTE — PROGRESS NOTES
Pt discharged. Transport via uber arranged by CM. Patient taken down to main entrance via wheelchair to await ride.

## 2024-07-12 ENCOUNTER — FOLLOWUP TELEPHONE ENCOUNTER (OUTPATIENT)
Dept: TELEMETRY | Age: 59
End: 2024-07-12

## 2024-07-12 NOTE — TELEPHONE ENCOUNTER
Second attempt at hospital follow up.  No voicemail box.   Will attempt again at a later time. Nenita Maza RN

## 2024-07-12 NOTE — TELEPHONE ENCOUNTER
Third and final attempt at hospital follow up.  No answer and no availability to leave VM.   Nenita Maza RN

## 2024-07-21 NOTE — PROGRESS NOTES
Physician Progress Note      PATIENT:               TIFFANIE MARES  CSN #:                  [id number]  :                       1965  ADMIT DATE:       2024 9:43 AM  DISCH DATE:        2024 4:42 PM  RESPONDING  PROVIDER #:        Andrade Cabezas MD          QUERY TEXT:    Pt admitted with CHF.  Noted documentation of \"pneumonia\" on the assessment   and plan per EP cardiology consultant .  If possible, please document in   progress notes and discharge summary:    The medical record reflects the following:  Risk Factors: CHF, substance abuse, SVT, cardiomyopathy,    Clinical Indicators: Per assessment/plan  EP cardiology note -\"Pneumonia\"  Per Attending provider note -\"he notes ongoing cough with sputum   production...Questionable CXR, Given elevated procalcitonin-antibiotics   started in the emergency room , Continued iv rocephin\"  on - Procalcitonin 6.64 prior ED visit  on  CXR- Cardiomegaly with slightly increased bibasilar airspace opacities,   concerning for pulmonary edema or pneumonia.  WBC ranges 6.2-9.0/Bands 1-13% during admission    Treatment: IV Rocephin, cardiology consult, labs, imaging    Thank you,  Maude Davalos RN BSN CRCR CCDS  [email or web address]  Options provided:  -- Treated for suspected bacterial pneumonia  -- Pneumonia ruled out  -- Other - I will add my own diagnosis  -- Disagree - Not applicable / Not valid  -- Disagree - Clinically unable to determine / Unknown  -- Refer to Clinical Documentation Reviewer    PROVIDER RESPONSE TEXT:    Pneumonia ruled out    Query created by: Irene Davalos on 2024 3:01 PM      Electronically signed by:  Andrade Cabezas MD 2024 1:53 PM

## 2024-11-06 NOTE — PROGRESS NOTES
Pt notified, voiced understanding.  No PT at this time.    Shift assessments completed and documented. Scheduled medications administered as ordered. VSS. Care plan and education reviewed with patient. Patient acknowledges understanding. Patient reports all care needs are met at this time and is resting comfortably in bed. Call light and bedside table within reach. Bed locked and in lowest position.

## 2025-01-27 ENCOUNTER — APPOINTMENT (OUTPATIENT)
Dept: GENERAL RADIOLOGY | Age: 60
End: 2025-01-27
Payer: MEDICAID

## 2025-01-27 ENCOUNTER — APPOINTMENT (OUTPATIENT)
Dept: CT IMAGING | Age: 60
End: 2025-01-27
Payer: MEDICAID

## 2025-01-27 ENCOUNTER — HOSPITAL ENCOUNTER (INPATIENT)
Age: 60
LOS: 5 days | Discharge: HOME | End: 2025-02-01
Attending: EMERGENCY MEDICINE | Admitting: INTERNAL MEDICINE
Payer: MEDICAID

## 2025-01-27 DIAGNOSIS — J18.9 PNEUMONIA OF LEFT LOWER LOBE DUE TO INFECTIOUS ORGANISM: ICD-10-CM

## 2025-01-27 DIAGNOSIS — R79.89 ELEVATED D-DIMER: ICD-10-CM

## 2025-01-27 DIAGNOSIS — J44.9 CHRONIC OBSTRUCTIVE PULMONARY DISEASE, UNSPECIFIED COPD TYPE (HCC): ICD-10-CM

## 2025-01-27 DIAGNOSIS — E86.0 DEHYDRATION: ICD-10-CM

## 2025-01-27 DIAGNOSIS — I42.9 CARDIOMYOPATHY, UNSPECIFIED TYPE (HCC): ICD-10-CM

## 2025-01-27 DIAGNOSIS — R09.02 HYPOXEMIA: ICD-10-CM

## 2025-01-27 DIAGNOSIS — J18.9 MULTIFOCAL PNEUMONIA: Primary | ICD-10-CM

## 2025-01-27 DIAGNOSIS — Z86.79 HISTORY OF CARDIAC ARRHYTHMIA: ICD-10-CM

## 2025-01-27 DIAGNOSIS — R07.9 LEFT-SIDED CHEST PAIN: ICD-10-CM

## 2025-01-27 DIAGNOSIS — E87.20 LACTIC ACIDOSIS: ICD-10-CM

## 2025-01-27 DIAGNOSIS — I24.9 ACUTE CORONARY SYNDROME (HCC): ICD-10-CM

## 2025-01-27 PROBLEM — A41.9 SEPSIS (HCC): Status: ACTIVE | Noted: 2025-01-27

## 2025-01-27 LAB
ALBUMIN SERPL-MCNC: 3.7 G/DL (ref 3.4–5)
ALBUMIN/GLOB SERPL: 0.9 {RATIO} (ref 1.1–2.2)
ALP SERPL-CCNC: 50 U/L (ref 40–129)
ALT SERPL-CCNC: 9 U/L (ref 10–40)
AMORPH SED URNS QL MICRO: ABNORMAL /HPF
ANION GAP SERPL CALCULATED.3IONS-SCNC: 14 MMOL/L (ref 3–16)
AST SERPL-CCNC: 33 U/L (ref 15–37)
BACTERIA URNS QL MICRO: ABNORMAL /HPF
BASE EXCESS BLDV CALC-SCNC: 1.6 MMOL/L (ref -3–3)
BASOPHILS # BLD: 0 K/UL (ref 0–0.2)
BASOPHILS NFR BLD: 1 %
BILIRUB SERPL-MCNC: 1.6 MG/DL (ref 0–1)
BILIRUB UR QL STRIP.AUTO: NEGATIVE
BUN SERPL-MCNC: 60 MG/DL (ref 7–20)
CALCIUM SERPL-MCNC: 8.9 MG/DL (ref 8.3–10.6)
CHLORIDE SERPL-SCNC: 90 MMOL/L (ref 99–110)
CLARITY UR: CLEAR
CO2 BLDV-SCNC: 30 MMOL/L
CO2 SERPL-SCNC: 29 MMOL/L (ref 21–32)
COHGB MFR BLDV: 2.4 % (ref 0–1.5)
COLOR UR: ABNORMAL
CREAT SERPL-MCNC: 1.5 MG/DL (ref 0.9–1.3)
D-DIMER QUANTITATIVE: 3.04 UG/ML FEU (ref 0–0.6)
DEPRECATED RDW RBC AUTO: 13.4 % (ref 12.4–15.4)
EKG ATRIAL RATE: 103 BPM
EKG DIAGNOSIS: NORMAL
EKG P AXIS: 65 DEGREES
EKG P-R INTERVAL: 144 MS
EKG Q-T INTERVAL: 376 MS
EKG QRS DURATION: 100 MS
EKG QTC CALCULATION (BAZETT): 492 MS
EKG R AXIS: 43 DEGREES
EKG T AXIS: 67 DEGREES
EKG VENTRICULAR RATE: 103 BPM
EOSINOPHIL # BLD: 0 K/UL (ref 0–0.6)
EOSINOPHIL NFR BLD: 0 %
EPI CELLS #/AREA URNS HPF: ABNORMAL /HPF (ref 0–5)
FLUAV RNA UPPER RESP QL NAA+PROBE: NEGATIVE
FLUBV AG NPH QL: NEGATIVE
GFR SERPLBLD CREATININE-BSD FMLA CKD-EPI: 53 ML/MIN/{1.73_M2}
GLUCOSE SERPL-MCNC: 111 MG/DL (ref 70–99)
GLUCOSE UR STRIP.AUTO-MCNC: NEGATIVE MG/DL
HCO3 BLDV-SCNC: 28 MMOL/L (ref 23–29)
HCT VFR BLD AUTO: 40.6 % (ref 40.5–52.5)
HGB BLD-MCNC: 13.5 G/DL (ref 13.5–17.5)
HGB UR QL STRIP.AUTO: ABNORMAL
KETONES UR STRIP.AUTO-MCNC: NEGATIVE MG/DL
LACTATE BLDV-SCNC: 3.6 MMOL/L (ref 0.4–1.9)
LEUKOCYTE ESTERASE UR QL STRIP.AUTO: NEGATIVE
LYMPHOCYTES # BLD: 0.3 K/UL (ref 1–5.1)
LYMPHOCYTES NFR BLD: 7 %
MAGNESIUM SERPL-MCNC: 1.8 MG/DL (ref 1.8–2.4)
MCH RBC QN AUTO: 30.5 PG (ref 26–34)
MCHC RBC AUTO-ENTMCNC: 33.3 G/DL (ref 31–36)
MCV RBC AUTO: 91.7 FL (ref 80–100)
METHGB MFR BLDV: 0.3 %
MONOCYTES # BLD: 0.2 K/UL (ref 0–1.3)
MONOCYTES NFR BLD: 5 %
NEUTROPHILS # BLD: 3.8 K/UL (ref 1.7–7.7)
NEUTROPHILS NFR BLD: 77 %
NEUTS BAND NFR BLD MANUAL: 10 % (ref 0–7)
NITRITE UR QL STRIP.AUTO: NEGATIVE
NT-PROBNP SERPL-MCNC: 3863 PG/ML (ref 0–124)
O2 CT VFR BLDV CALC: 13 VOL %
O2 THERAPY: ABNORMAL
PCO2 BLDV: 50.5 MMHG (ref 40–50)
PH BLDV: 7.36 [PH] (ref 7.35–7.45)
PH UR STRIP.AUTO: 6 [PH] (ref 5–8)
PLATELET # BLD AUTO: 137 K/UL (ref 135–450)
PMV BLD AUTO: 10 FL (ref 5–10.5)
PO2 BLDV: 34.7 MMHG (ref 25–40)
POTASSIUM SERPL-SCNC: 4 MMOL/L (ref 3.5–5.1)
PROT SERPL-MCNC: 7.6 G/DL (ref 6.4–8.2)
PROT UR STRIP.AUTO-MCNC: 100 MG/DL
RBC # BLD AUTO: 4.43 M/UL (ref 4.2–5.9)
RBC #/AREA URNS HPF: ABNORMAL /HPF (ref 0–4)
SAO2 % BLDV: 64 %
SARS-COV-2 RDRP RESP QL NAA+PROBE: NOT DETECTED
SODIUM SERPL-SCNC: 133 MMOL/L (ref 136–145)
SP GR UR STRIP.AUTO: 1.02 (ref 1–1.03)
TROPONIN, HIGH SENSITIVITY: 9 NG/L (ref 0–22)
UA COMPLETE W REFLEX CULTURE PNL UR: ABNORMAL
UA DIPSTICK W REFLEX MICRO PNL UR: YES
URN SPEC COLLECT METH UR: ABNORMAL
UROBILINOGEN UR STRIP-ACNC: 1 E.U./DL
WBC # BLD AUTO: 4.4 K/UL (ref 4–11)
WBC #/AREA URNS HPF: ABNORMAL /HPF (ref 0–5)

## 2025-01-27 PROCEDURE — 6360000002 HC RX W HCPCS: Performed by: EMERGENCY MEDICINE

## 2025-01-27 PROCEDURE — 87804 INFLUENZA ASSAY W/OPTIC: CPT

## 2025-01-27 PROCEDURE — 84484 ASSAY OF TROPONIN QUANT: CPT

## 2025-01-27 PROCEDURE — 96365 THER/PROPH/DIAG IV INF INIT: CPT

## 2025-01-27 PROCEDURE — 94761 N-INVAS EAR/PLS OXIMETRY MLT: CPT

## 2025-01-27 PROCEDURE — 36415 COLL VENOUS BLD VENIPUNCTURE: CPT

## 2025-01-27 PROCEDURE — 85025 COMPLETE CBC W/AUTO DIFF WBC: CPT

## 2025-01-27 PROCEDURE — 96367 TX/PROPH/DG ADDL SEQ IV INF: CPT

## 2025-01-27 PROCEDURE — 6360000004 HC RX CONTRAST MEDICATION: Performed by: EMERGENCY MEDICINE

## 2025-01-27 PROCEDURE — 2500000003 HC RX 250 WO HCPCS: Performed by: EMERGENCY MEDICINE

## 2025-01-27 PROCEDURE — 6360000002 HC RX W HCPCS: Performed by: INTERNAL MEDICINE

## 2025-01-27 PROCEDURE — 87635 SARS-COV-2 COVID-19 AMP PRB: CPT

## 2025-01-27 PROCEDURE — 2580000003 HC RX 258: Performed by: INTERNAL MEDICINE

## 2025-01-27 PROCEDURE — 5A0945A ASSISTANCE WITH RESPIRATORY VENTILATION, 24-96 CONSECUTIVE HOURS, HIGH NASAL FLOW/VELOCITY: ICD-10-PCS | Performed by: INTERNAL MEDICINE

## 2025-01-27 PROCEDURE — 81001 URINALYSIS AUTO W/SCOPE: CPT

## 2025-01-27 PROCEDURE — 93005 ELECTROCARDIOGRAM TRACING: CPT | Performed by: EMERGENCY MEDICINE

## 2025-01-27 PROCEDURE — 85379 FIBRIN DEGRADATION QUANT: CPT

## 2025-01-27 PROCEDURE — 99285 EMERGENCY DEPT VISIT HI MDM: CPT

## 2025-01-27 PROCEDURE — 2060000000 HC ICU INTERMEDIATE R&B

## 2025-01-27 PROCEDURE — 80053 COMPREHEN METABOLIC PANEL: CPT

## 2025-01-27 PROCEDURE — 2580000003 HC RX 258: Performed by: EMERGENCY MEDICINE

## 2025-01-27 PROCEDURE — 96368 THER/DIAG CONCURRENT INF: CPT

## 2025-01-27 PROCEDURE — 83880 ASSAY OF NATRIURETIC PEPTIDE: CPT

## 2025-01-27 PROCEDURE — 82803 BLOOD GASES ANY COMBINATION: CPT

## 2025-01-27 PROCEDURE — 2500000003 HC RX 250 WO HCPCS: Performed by: INTERNAL MEDICINE

## 2025-01-27 PROCEDURE — 87040 BLOOD CULTURE FOR BACTERIA: CPT

## 2025-01-27 PROCEDURE — 93010 ELECTROCARDIOGRAM REPORT: CPT | Performed by: INTERNAL MEDICINE

## 2025-01-27 PROCEDURE — 83605 ASSAY OF LACTIC ACID: CPT

## 2025-01-27 PROCEDURE — 2700000000 HC OXYGEN THERAPY PER DAY

## 2025-01-27 PROCEDURE — 87641 MR-STAPH DNA AMP PROBE: CPT

## 2025-01-27 PROCEDURE — 6370000000 HC RX 637 (ALT 250 FOR IP): Performed by: EMERGENCY MEDICINE

## 2025-01-27 PROCEDURE — 71260 CT THORAX DX C+: CPT

## 2025-01-27 PROCEDURE — 83735 ASSAY OF MAGNESIUM: CPT

## 2025-01-27 PROCEDURE — 96375 TX/PRO/DX INJ NEW DRUG ADDON: CPT

## 2025-01-27 RX ORDER — IOPAMIDOL 755 MG/ML
75 INJECTION, SOLUTION INTRAVASCULAR
Status: COMPLETED | OUTPATIENT
Start: 2025-01-27 | End: 2025-01-27

## 2025-01-27 RX ORDER — ACETAMINOPHEN 325 MG/1
650 TABLET ORAL EVERY 6 HOURS PRN
Status: DISCONTINUED | OUTPATIENT
Start: 2025-01-27 | End: 2025-02-01 | Stop reason: HOSPADM

## 2025-01-27 RX ORDER — SODIUM CHLORIDE 0.9 % (FLUSH) 0.9 %
5-40 SYRINGE (ML) INJECTION PRN
Status: DISCONTINUED | OUTPATIENT
Start: 2025-01-27 | End: 2025-02-01 | Stop reason: HOSPADM

## 2025-01-27 RX ORDER — IPRATROPIUM BROMIDE AND ALBUTEROL SULFATE 2.5; .5 MG/3ML; MG/3ML
2 SOLUTION RESPIRATORY (INHALATION) ONCE
Status: COMPLETED | OUTPATIENT
Start: 2025-01-27 | End: 2025-01-28

## 2025-01-27 RX ORDER — ONDANSETRON 2 MG/ML
4 INJECTION INTRAMUSCULAR; INTRAVENOUS EVERY 6 HOURS PRN
Status: DISCONTINUED | OUTPATIENT
Start: 2025-01-27 | End: 2025-02-01 | Stop reason: HOSPADM

## 2025-01-27 RX ORDER — SODIUM CHLORIDE 9 MG/ML
INJECTION, SOLUTION INTRAVENOUS PRN
Status: DISCONTINUED | OUTPATIENT
Start: 2025-01-27 | End: 2025-02-01 | Stop reason: HOSPADM

## 2025-01-27 RX ORDER — IPRATROPIUM BROMIDE AND ALBUTEROL SULFATE 2.5; .5 MG/3ML; MG/3ML
2 SOLUTION RESPIRATORY (INHALATION) ONCE
Status: COMPLETED | OUTPATIENT
Start: 2025-01-27 | End: 2025-01-27

## 2025-01-27 RX ORDER — ONDANSETRON 4 MG/1
4 TABLET, ORALLY DISINTEGRATING ORAL EVERY 8 HOURS PRN
Status: DISCONTINUED | OUTPATIENT
Start: 2025-01-27 | End: 2025-02-01 | Stop reason: HOSPADM

## 2025-01-27 RX ORDER — ACETAMINOPHEN 650 MG/1
650 SUPPOSITORY RECTAL EVERY 6 HOURS PRN
Status: DISCONTINUED | OUTPATIENT
Start: 2025-01-27 | End: 2025-02-01 | Stop reason: HOSPADM

## 2025-01-27 RX ORDER — PREDNISONE 20 MG/1
40 TABLET ORAL DAILY
Status: COMPLETED | OUTPATIENT
Start: 2025-01-28 | End: 2025-02-01

## 2025-01-27 RX ORDER — ENOXAPARIN SODIUM 100 MG/ML
40 INJECTION SUBCUTANEOUS DAILY
Status: DISCONTINUED | OUTPATIENT
Start: 2025-01-28 | End: 2025-01-28 | Stop reason: SDUPTHER

## 2025-01-27 RX ORDER — POLYETHYLENE GLYCOL 3350 17 G/17G
17 POWDER, FOR SOLUTION ORAL DAILY PRN
Status: DISCONTINUED | OUTPATIENT
Start: 2025-01-27 | End: 2025-02-01 | Stop reason: HOSPADM

## 2025-01-27 RX ORDER — SODIUM CHLORIDE 0.9 % (FLUSH) 0.9 %
5-40 SYRINGE (ML) INJECTION EVERY 12 HOURS SCHEDULED
Status: DISCONTINUED | OUTPATIENT
Start: 2025-01-27 | End: 2025-02-01 | Stop reason: HOSPADM

## 2025-01-27 RX ORDER — SODIUM CHLORIDE 9 MG/ML
INJECTION, SOLUTION INTRAVENOUS CONTINUOUS
Status: DISCONTINUED | OUTPATIENT
Start: 2025-01-27 | End: 2025-01-29

## 2025-01-27 RX ORDER — ASPIRIN 81 MG/1
81 TABLET ORAL DAILY
Status: DISCONTINUED | OUTPATIENT
Start: 2025-01-28 | End: 2025-01-29

## 2025-01-27 RX ADMIN — IOPAMIDOL 75 ML: 755 INJECTION, SOLUTION INTRAVENOUS at 14:39

## 2025-01-27 RX ADMIN — SODIUM CHLORIDE: 9 INJECTION, SOLUTION INTRAVENOUS at 13:55

## 2025-01-27 RX ADMIN — SODIUM CHLORIDE 1000 MG: 900 INJECTION INTRAVENOUS at 13:22

## 2025-01-27 RX ADMIN — WATER 125 MG: 1 INJECTION INTRAMUSCULAR; INTRAVENOUS; SUBCUTANEOUS at 13:12

## 2025-01-27 RX ADMIN — CEFEPIME 2000 MG: 2 INJECTION, POWDER, FOR SOLUTION INTRAVENOUS at 22:03

## 2025-01-27 RX ADMIN — IPRATROPIUM BROMIDE AND ALBUTEROL SULFATE 2 DOSE: 2.5; .5 SOLUTION RESPIRATORY (INHALATION) at 13:15

## 2025-01-27 RX ADMIN — SODIUM CHLORIDE: 9 INJECTION, SOLUTION INTRAVENOUS at 22:01

## 2025-01-27 RX ADMIN — AZITHROMYCIN 500 MG: 500 INJECTION, POWDER, LYOPHILIZED, FOR SOLUTION INTRAVENOUS at 14:06

## 2025-01-27 RX ADMIN — VANCOMYCIN HYDROCHLORIDE 1500 MG: 1 INJECTION, POWDER, LYOPHILIZED, FOR SOLUTION INTRAVENOUS at 17:05

## 2025-01-27 RX ADMIN — SODIUM CHLORIDE, PRESERVATIVE FREE 10 ML: 5 INJECTION INTRAVENOUS at 22:04

## 2025-01-27 ASSESSMENT — PAIN - FUNCTIONAL ASSESSMENT
PAIN_FUNCTIONAL_ASSESSMENT: NONE - DENIES PAIN
PAIN_FUNCTIONAL_ASSESSMENT: NONE - DENIES PAIN
PAIN_FUNCTIONAL_ASSESSMENT: 0-10
PAIN_FUNCTIONAL_ASSESSMENT: NONE - DENIES PAIN

## 2025-01-27 ASSESSMENT — ENCOUNTER SYMPTOMS
COUGH: 1
SHORTNESS OF BREATH: 1

## 2025-01-27 ASSESSMENT — PAIN DESCRIPTION - LOCATION: LOCATION: CHEST

## 2025-01-27 ASSESSMENT — PAIN DESCRIPTION - ORIENTATION: ORIENTATION: LEFT

## 2025-01-27 ASSESSMENT — PAIN DESCRIPTION - DESCRIPTORS: DESCRIPTORS: SHARP

## 2025-01-27 NOTE — ED NOTES
Pt will not keep oxygen mask and pulse ox on, RN explained to pt that he needs to keep the equipment on to improve his condition. Pt verbalized understanding.

## 2025-01-27 NOTE — ED NOTES
Pt continues to remove pulse ox and mask. RN at bedside encouraging pt to keep mask on  and to leave pulse ox in place so that he can be monitored. Pt verbalized understanding.

## 2025-01-27 NOTE — PLAN OF CARE
58 yo male PMH of methamphetamine abuse, cardiomyopathy, presents with dyspnea, found to have PNA and requiring 6 L O2.     PCU

## 2025-01-27 NOTE — ED NOTES
PT continues to take pulses ox and O2 off, RN explained to pt again why he needs to keep equipment on him. RN explained to pt that if he continues to take oxygen off, that he will need to be placed on Bi-pap. Pt states that he will not wear bi-pap and does not want it placed on him. Pt verbalized understanding of what can happen if he does not keep oxygen and pulse ox on him.

## 2025-01-27 NOTE — ED TRIAGE NOTES
Pt has been having sob for 4 days, increasing in severity. Pt at 86% on mask at this time. Pt complains of chest pain

## 2025-01-27 NOTE — ED PROVIDER NOTES
[facility] EMERGENCY DEPARTMENT  EMERGENCY DEPARTMENT ENCOUNTER      Pt Name: Dayton Soto  MRN: [id number]  Birthdate 1965  Date of evaluation: 1/27/2025  Provider: RONNI HOGAN MD    CHIEF COMPLAINT       Chief Complaint   Patient presents with    Shortness of Breath     Pt states that he developed sob 4 days ago. He states that it just has been increasing         HISTORY OF PRESENT ILLNESS   (Location/Symptom, Timing/Onset, Context/Setting, Quality, Duration, Modifying Factors, Severity)  Note limiting factors.     Dayton Soto is a 59 y.o. male who presents to the emergency department     This patient presented to the emergency department history of apparently coughing for the last 4 days at home he is unaware of possibilities as far as aspiration he said that his girlfriend apparently came from [place] and was coughing and he thinks that she gave it to him  He is coughing more he did have what sounds like maybe a non-STEMI on July 2 to July 4 he was admitted he does have a history of AMA history of narcotic abuse, methamphetamine abuse  Suboxone usage  Previous echo did show a moderate MR and severe TR but no endocarditis  His echo in July showed an EF of 35% the patient really voiced no other complaints his main complaint was chest pain and congestion more on the left side          Nursing Notes were reviewed.    REVIEW OF SYSTEMS    (2-9 systems for level 4, 10 or more for level 5)     Review of Systems   Constitutional:  Positive for activity change, appetite change, chills, fatigue and fever.   HENT:  Positive for congestion.    Respiratory:  Positive for cough and shortness of breath.    Neurological:  Positive for light-headedness. Negative for facial asymmetry.   All other systems reviewed and are negative.      Except as noted above the remainder of the review of systems was reviewed and negative.       PAST MEDICAL HISTORY     Past Medical History:   Diagnosis Date    Back pain,

## 2025-01-28 ENCOUNTER — APPOINTMENT (OUTPATIENT)
Dept: ULTRASOUND IMAGING | Age: 60
End: 2025-01-28
Payer: MEDICAID

## 2025-01-28 PROBLEM — J18.9 MULTIFOCAL PNEUMONIA: Status: ACTIVE | Noted: 2025-01-28

## 2025-01-28 PROBLEM — R79.89 ELEVATED D-DIMER: Status: ACTIVE | Noted: 2025-01-28

## 2025-01-28 PROBLEM — J96.01 ACUTE RESPIRATORY FAILURE WITH HYPOXIA: Status: ACTIVE | Noted: 2025-01-28

## 2025-01-28 PROBLEM — J44.9 CHRONIC OBSTRUCTIVE PULMONARY DISEASE (HCC): Status: ACTIVE | Noted: 2025-01-28

## 2025-01-28 PROBLEM — E43 SEVERE PROTEIN-CALORIE MALNUTRITION (HCC): Status: ACTIVE | Noted: 2025-01-28

## 2025-01-28 PROBLEM — Z86.79 HISTORY OF CARDIAC ARRHYTHMIA: Status: ACTIVE | Noted: 2025-01-28

## 2025-01-28 LAB
AMPHETAMINES UR QL SCN>1000 NG/ML: ABNORMAL
ANION GAP SERPL CALCULATED.3IONS-SCNC: 12 MMOL/L (ref 3–16)
BARBITURATES UR QL SCN>200 NG/ML: ABNORMAL
BASE EXCESS BLDA CALC-SCNC: 1.8 MMOL/L (ref -3–3)
BASE EXCESS BLDV CALC-SCNC: 6.1 MMOL/L
BASOPHILS # BLD: 0 K/UL (ref 0–0.2)
BASOPHILS NFR BLD: 0 %
BENZODIAZ UR QL SCN>200 NG/ML: ABNORMAL
BUN SERPL-MCNC: 39 MG/DL (ref 7–20)
CALCIUM SERPL-MCNC: 8 MG/DL (ref 8.3–10.6)
CANNABINOIDS UR QL SCN>50 NG/ML: ABNORMAL
CHLORIDE SERPL-SCNC: 97 MMOL/L (ref 99–110)
CO2 BLDA-SCNC: 26.5 MMOL/L
CO2 BLDV-SCNC: 31 MMOL/L
CO2 SERPL-SCNC: 28 MMOL/L (ref 21–32)
COCAINE UR QL SCN: ABNORMAL
COHGB MFR BLDA: 0.1 % (ref 0–1.5)
COHGB MFR BLDV: 0.6 %
CREAT SERPL-MCNC: 1 MG/DL (ref 0.9–1.3)
DEPRECATED RDW RBC AUTO: 13.3 % (ref 12.4–15.4)
DRUG SCREEN COMMENT UR-IMP: ABNORMAL
EKG ATRIAL RATE: 141 BPM
EKG DIAGNOSIS: NORMAL
EKG Q-T INTERVAL: 280 MS
EKG QRS DURATION: 98 MS
EKG QTC CALCULATION (BAZETT): 413 MS
EKG R AXIS: 48 DEGREES
EKG T AXIS: 38 DEGREES
EKG VENTRICULAR RATE: 131 BPM
EOSINOPHIL # BLD: 0 K/UL (ref 0–0.6)
EOSINOPHIL NFR BLD: 0 %
FENTANYL SCREEN, URINE: ABNORMAL
GFR SERPLBLD CREATININE-BSD FMLA CKD-EPI: 86 ML/MIN/{1.73_M2}
GLUCOSE BLD-MCNC: 121 MG/DL (ref 70–99)
GLUCOSE BLD-MCNC: 129 MG/DL (ref 70–99)
GLUCOSE BLD-MCNC: 143 MG/DL (ref 70–99)
GLUCOSE SERPL-MCNC: 145 MG/DL (ref 70–99)
HCO3 BLDA-SCNC: 25.3 MMOL/L (ref 21–29)
HCO3 BLDV-SCNC: 30 MMOL/L (ref 23–29)
HCT VFR BLD AUTO: 34.9 % (ref 40.5–52.5)
HGB BLD-MCNC: 12 G/DL (ref 13.5–17.5)
HGB BLDA-MCNC: 13.9 G/DL (ref 13.5–17.5)
LACTATE BLDV-SCNC: 2 MMOL/L (ref 0.4–2)
LYMPHOCYTES # BLD: 0.1 K/UL (ref 1–5.1)
LYMPHOCYTES NFR BLD: 2 %
MAGNESIUM SERPL-MCNC: 2.35 MG/DL (ref 1.8–2.4)
MCH RBC QN AUTO: 31.1 PG (ref 26–34)
MCHC RBC AUTO-ENTMCNC: 34.4 G/DL (ref 31–36)
MCV RBC AUTO: 90.4 FL (ref 80–100)
METAMYELOCYTES NFR BLD MANUAL: 1 %
METHADONE UR QL SCN>300 NG/ML: POSITIVE
METHGB MFR BLDA: 0.1 %
METHGB MFR BLDV: 0.1 %
MONOCYTES # BLD: 0.1 K/UL (ref 0–1.3)
MONOCYTES NFR BLD: 2 %
MRSA DNA SPEC QL NAA+PROBE: ABNORMAL
NEUTROPHILS # BLD: 3.6 K/UL (ref 1.7–7.7)
NEUTROPHILS NFR BLD: 85 %
NEUTS BAND NFR BLD MANUAL: 10 % (ref 0–7)
O2 CT VFR BLDV CALC: 18 ML/DL
O2 THERAPY: ABNORMAL
O2 THERAPY: ABNORMAL
OPIATES UR QL SCN>300 NG/ML: ABNORMAL
ORGANISM: ABNORMAL
OXYCODONE UR QL SCN: ABNORMAL
PCO2 BLDA: 36.2 MMHG (ref 35–45)
PCO2 BLDV: 40 MMHG (ref 40–50)
PCP UR QL SCN>25 NG/ML: ABNORMAL
PERFORMED ON: ABNORMAL
PH BLDA: 7.46 [PH] (ref 7.35–7.45)
PH BLDV: 7.49 [PH] (ref 7.35–7.45)
PH UR STRIP: 6 [PH]
PLATELET # BLD AUTO: 127 K/UL (ref 135–450)
PLATELET BLD QL SMEAR: ABNORMAL
PMV BLD AUTO: 10.7 FL (ref 5–10.5)
PO2 BLDA: 50.6 MMHG (ref 75–108)
PO2 BLDV: 95 MMHG
POIKILOCYTOSIS BLD QL SMEAR: ABNORMAL
POTASSIUM SERPL-SCNC: 3.9 MMOL/L (ref 3.5–5.1)
PROCALCITONIN SERPL IA-MCNC: >100 NG/ML (ref 0–0.15)
RBC # BLD AUTO: 3.86 M/UL (ref 4.2–5.9)
SAO2 % BLDA: 88.1 %
SAO2 % BLDV: 98 %
SLIDE REVIEW: ABNORMAL
SODIUM SERPL-SCNC: 137 MMOL/L (ref 136–145)
TSH SERPL DL<=0.005 MIU/L-ACNC: 0.73 UIU/ML (ref 0.27–4.2)
VANCOMYCIN SERPL-MCNC: 4.4 UG/ML
WBC # BLD AUTO: 3.7 K/UL (ref 4–11)

## 2025-01-28 PROCEDURE — 6370000000 HC RX 637 (ALT 250 FOR IP): Performed by: INTERNAL MEDICINE

## 2025-01-28 PROCEDURE — 87077 CULTURE AEROBIC IDENTIFY: CPT

## 2025-01-28 PROCEDURE — 2580000003 HC RX 258: Performed by: INTERNAL MEDICINE

## 2025-01-28 PROCEDURE — 87070 CULTURE OTHR SPECIMN AEROBIC: CPT

## 2025-01-28 PROCEDURE — 2500000003 HC RX 250 WO HCPCS: Performed by: INTERNAL MEDICINE

## 2025-01-28 PROCEDURE — 84443 ASSAY THYROID STIM HORMONE: CPT

## 2025-01-28 PROCEDURE — 36600 WITHDRAWAL OF ARTERIAL BLOOD: CPT

## 2025-01-28 PROCEDURE — 6360000002 HC RX W HCPCS: Performed by: INTERNAL MEDICINE

## 2025-01-28 PROCEDURE — 2000000000 HC ICU R&B

## 2025-01-28 PROCEDURE — 36415 COLL VENOUS BLD VENIPUNCTURE: CPT

## 2025-01-28 PROCEDURE — 2700000000 HC OXYGEN THERAPY PER DAY

## 2025-01-28 PROCEDURE — 99291 CRITICAL CARE FIRST HOUR: CPT | Performed by: INTERNAL MEDICINE

## 2025-01-28 PROCEDURE — 94761 N-INVAS EAR/PLS OXIMETRY MLT: CPT

## 2025-01-28 PROCEDURE — 82803 BLOOD GASES ANY COMBINATION: CPT

## 2025-01-28 PROCEDURE — 80307 DRUG TEST PRSMV CHEM ANLYZR: CPT

## 2025-01-28 PROCEDURE — 93010 ELECTROCARDIOGRAM REPORT: CPT | Performed by: INTERNAL MEDICINE

## 2025-01-28 PROCEDURE — 94640 AIRWAY INHALATION TREATMENT: CPT

## 2025-01-28 PROCEDURE — 83735 ASSAY OF MAGNESIUM: CPT

## 2025-01-28 PROCEDURE — 87449 NOS EACH ORGANISM AG IA: CPT

## 2025-01-28 PROCEDURE — 80048 BASIC METABOLIC PNL TOTAL CA: CPT

## 2025-01-28 PROCEDURE — 93005 ELECTROCARDIOGRAM TRACING: CPT | Performed by: INTERNAL MEDICINE

## 2025-01-28 PROCEDURE — 87186 SC STD MICRODIL/AGAR DIL: CPT

## 2025-01-28 PROCEDURE — 85025 COMPLETE CBC W/AUTO DIFF WBC: CPT

## 2025-01-28 PROCEDURE — 87205 SMEAR GRAM STAIN: CPT

## 2025-01-28 PROCEDURE — 83605 ASSAY OF LACTIC ACID: CPT

## 2025-01-28 PROCEDURE — 84145 PROCALCITONIN (PCT): CPT

## 2025-01-28 PROCEDURE — 80202 ASSAY OF VANCOMYCIN: CPT

## 2025-01-28 PROCEDURE — 86403 PARTICLE AGGLUT ANTBDY SCRN: CPT

## 2025-01-28 RX ORDER — MECOBALAMIN 5000 MCG
5 TABLET,DISINTEGRATING ORAL NIGHTLY
Status: DISCONTINUED | OUTPATIENT
Start: 2025-01-28 | End: 2025-02-01 | Stop reason: HOSPADM

## 2025-01-28 RX ORDER — VANCOMYCIN 750 MG/150ML
750 INJECTION, SOLUTION INTRAVENOUS EVERY 8 HOURS
Status: DISCONTINUED | OUTPATIENT
Start: 2025-01-28 | End: 2025-01-29

## 2025-01-28 RX ORDER — MUPIROCIN 20 MG/G
OINTMENT TOPICAL 2 TIMES DAILY
Status: DISCONTINUED | OUTPATIENT
Start: 2025-01-28 | End: 2025-02-01 | Stop reason: HOSPADM

## 2025-01-28 RX ORDER — VALSARTAN 80 MG/1
40 TABLET ORAL EVERY EVENING
Status: DISCONTINUED | OUTPATIENT
Start: 2025-01-28 | End: 2025-01-31

## 2025-01-28 RX ORDER — FUROSEMIDE 20 MG/1
20 TABLET ORAL DAILY
Status: DISCONTINUED | OUTPATIENT
Start: 2025-01-28 | End: 2025-02-01 | Stop reason: HOSPADM

## 2025-01-28 RX ORDER — BUPRENORPHINE AND NALOXONE 8; 2 MG/1; MG/1
2.5 FILM, SOLUBLE BUCCAL; SUBLINGUAL DAILY
Status: DISCONTINUED | OUTPATIENT
Start: 2025-01-28 | End: 2025-02-01 | Stop reason: HOSPADM

## 2025-01-28 RX ORDER — DEXMEDETOMIDINE HYDROCHLORIDE 4 UG/ML
.1-1.5 INJECTION, SOLUTION INTRAVENOUS CONTINUOUS
Status: DISCONTINUED | OUTPATIENT
Start: 2025-01-28 | End: 2025-01-30

## 2025-01-28 RX ORDER — SPIRONOLACTONE 25 MG/1
25 TABLET ORAL DAILY
Status: DISCONTINUED | OUTPATIENT
Start: 2025-01-28 | End: 2025-02-01 | Stop reason: HOSPADM

## 2025-01-28 RX ORDER — ENOXAPARIN SODIUM 100 MG/ML
40 INJECTION SUBCUTANEOUS DAILY
Status: DISCONTINUED | OUTPATIENT
Start: 2025-01-28 | End: 2025-01-29

## 2025-01-28 RX ADMIN — AMIODARONE HYDROCHLORIDE 150 MG: 50 INJECTION, SOLUTION INTRAVENOUS at 10:44

## 2025-01-28 RX ADMIN — MUPIROCIN: 20 OINTMENT TOPICAL at 23:20

## 2025-01-28 RX ADMIN — PREDNISONE 40 MG: 20 TABLET ORAL at 08:48

## 2025-01-28 RX ADMIN — SODIUM CHLORIDE, PRESERVATIVE FREE 10 ML: 5 INJECTION INTRAVENOUS at 21:30

## 2025-01-28 RX ADMIN — VANCOMYCIN 750 MG: 750 INJECTION, SOLUTION INTRAVENOUS at 23:32

## 2025-01-28 RX ADMIN — CEFEPIME 2000 MG: 2 INJECTION, POWDER, FOR SOLUTION INTRAVENOUS at 23:24

## 2025-01-28 RX ADMIN — AMIODARONE HYDROCHLORIDE 0.5 MG/MIN: 50 INJECTION, SOLUTION INTRAVENOUS at 23:30

## 2025-01-28 RX ADMIN — ASPIRIN 81 MG: 81 TABLET, COATED ORAL at 08:47

## 2025-01-28 RX ADMIN — CEFEPIME 2000 MG: 2 INJECTION, POWDER, FOR SOLUTION INTRAVENOUS at 08:58

## 2025-01-28 RX ADMIN — VANCOMYCIN 750 MG: 750 INJECTION, SOLUTION INTRAVENOUS at 08:43

## 2025-01-28 RX ADMIN — VANCOMYCIN 750 MG: 750 INJECTION, SOLUTION INTRAVENOUS at 16:10

## 2025-01-28 RX ADMIN — CEFEPIME 2000 MG: 2 INJECTION, POWDER, FOR SOLUTION INTRAVENOUS at 15:13

## 2025-01-28 RX ADMIN — BUPRENORPHINE HYDROCHLORIDE, NALOXONE HYDROCHLORIDE 2.5 FILM: 8; 2 FILM, SOLUBLE BUCCAL; SUBLINGUAL at 08:48

## 2025-01-28 RX ADMIN — Medication 5 MG: at 21:22

## 2025-01-28 RX ADMIN — AMIODARONE HYDROCHLORIDE 1 MG/MIN: 50 INJECTION, SOLUTION INTRAVENOUS at 10:58

## 2025-01-28 RX ADMIN — IPRATROPIUM BROMIDE AND ALBUTEROL SULFATE 2 DOSE: 2.5; .5 SOLUTION RESPIRATORY (INHALATION) at 02:54

## 2025-01-28 RX ADMIN — DEXMEDETOMIDINE HYDROCHLORIDE 0.4 MCG/KG/HR: 4 INJECTION, SOLUTION INTRAVENOUS at 10:09

## 2025-01-28 ASSESSMENT — PAIN SCALES - GENERAL: PAINLEVEL_OUTOF10: 0

## 2025-01-28 NOTE — PROGRESS NOTES
Patients Oxygen 73%. MD at [facility]. On 15L. Non rebreather added intermittently by RT.  Stat EKG. ABG. Cardizem. Plans to transfer to icu.

## 2025-01-28 NOTE — PROGRESS NOTES
Comprehensive Nutrition Assessment    Type and Reason for Visit:  Initial, Consult (consult for malnutrition)    Nutrition Recommendations/Plan:   Continue NPO status until patient is medically cleared to receive nutrition therapy.   Monitor respiratory status and plan of care.   Monitor nutrition-related labs, bowel function, and weight trends.      Malnutrition Assessment:  Malnutrition Status:  Severe malnutrition (01/28/25 1116)    Context:  Acute Illness     Findings of the 6 clinical characteristics of malnutrition:  Energy Intake:  50% or less of estimated energy requirements for 5 or more days  Weight Loss:  No weight loss     Body Fat Loss:  Moderate body fat loss Orbital, Buccal region   Muscle Mass Loss:  Moderate muscle mass loss Temples (temporalis), Clavicles (pectoralis & deltoids), Hand (interosseous)  Fluid Accumulation:  No fluid accumulation     Strength:  Not Performed    Nutrition Assessment:    patient is severely malnourished r/t impaired respiratory function and inadequate protein-energy intake AEB patient presented with SOB + cough x 4 days PTA, polysubstance abuse, poor po intake PTA, and muscle wasting + fat loss; he is at risk for further compromise d/t patient is not being cooperative with RN and interventions at this time, NPO status, and impaired respiratory status; will continue NPO status and monitor nutrition plan of care    Nutrition Related Findings:    patient appears confused this am during rounds; he wasn't cooperating with the RN and interventions this am during rounds; possible intubation today; patient is edentulous; he presented with c/o SOB and cough x 4 days PTA; patient is very thin; hx of polysubstance abuse; patient reported that he his girlfriend visited him from [place] and that she was sick and passed it onto him; patient was transferred to ICU this am Wound Type: None       Current Nutrition Intake & Therapies:    Average Meal Intake: NPO  Average Supplements

## 2025-01-28 NOTE — PROGRESS NOTES
Pt transferred to ICU from Pcu for increased o2 demands, + pneumonia.    Pt awake, alert & oriented- following commands, pulling at NRB.      RR 24 BP 98/86 (92) o2 94% on 100% NRB     Dr. Pacheco at bedside, new orders released.     4 Eyes Skin Assessment     NAME:  Dayton Soto  YOB: 1965  MEDICAL RECORD NUMBER:  [id number]    The patient is being assessed for  Transfer to New Unit    I agree that at least one RN has performed a thorough Head to Toe Skin Assessment on the patient. ALL assessment sites listed below have been assessed.      Areas assessed by both nurses:    Head, Face, Ears, Shoulders, Back, Chest, Arms, Elbows, Hands, Sacrum. Buttock, Coccyx, Ischium, Legs. Feet and Heels, and Under Medical Devices         Does the Patient have a Wound? No noted wound(s)       Alex Prevention initiated by RN: Yes  Wound Care Orders initiated by RN: No    Pressure Injury (Stage 3,4, Unstageable, DTI, NWPT, and Complex wounds) if present, place Wound referral order by RN under : No    New Ostomies, if present place, Ostomy referral order under : No     Nurse 1 eSignature: Electronically signed by Ada Anand RN on 1/28/25 at 10:04 AM EST    **SHARE this note so that the co-signing nurse can place an eSignature**    Nurse 2 eSignature: {Esignature:[id number]}

## 2025-01-28 NOTE — PROGRESS NOTES
01/28/25 1013   Oxygen Therapy/Pulse Ox   Pulse (!) 177   Respirations 16   SpO2 93 %   Blood Gas  Performed? Yes   $ABG $Arterial Puncture   Danny's Test #1 Pos   Site #1 Right Radial   Site Prepped #1 Yes   Number of Attempts #1 1   Pressure Held #1 Yes   Complications #1 None   Post-procedure #1 Standard   Specimen Status #1 To lab   How Tolerated? Tolerated well

## 2025-01-28 NOTE — CONSULTS
[facility]                                 Pulmonary/Critical care  Consult /Progress Note :                                                                   ,    CC :worsening SOB ,hypotension ,A fib   Patient is being seen at the request of Dr Villegas  for a consultation for Sepsos ,pneumonia    Subjective  HPI   59 y.o. male who has history of 40 PPY smoking and drug abuse . Hospital with past  MRSA/ bactermia >? Pneumonia  , meth usage, cardiomyopathy presented to ED with chief complaint of cough, shortness of breath     Patient was transferred at [facility].      Patient reported that the cough started 4 days ago progressively worsening reports that he is able to eat and drink and that he does not aspirate normally.        Was in contact with sick people .     Patient otherwise reports having shortness of breath on exertion no alleviating factor no associated chest pain abdominal pain diarrhea or dysuria.    PAST MEDICAL HISTORY:  Past Medical History:   Diagnosis Date    Back pain, chronic     Chronic back pain     MRSA (methicillin resistant staph aureus) culture positive 02/26/2020    RIGHT FOREARM     PAST SURGICAL HISTORY:  History reviewed. No pertinent surgical history.    FAMILY HISTORY:  family history is not on file.    SOCIAL HISTORY:   reports that he has quit smoking. His smoking use included cigarettes. He has never used smokeless tobacco.    Scheduled Meds:   [Held by provider] furosemide  20 mg Oral Daily    buprenorphine-naloxone  2.5 Film SubLINGual Daily    [Held by provider] spironolactone  25 mg Oral Daily    [Held by provider] valsartan  40 mg Oral QPM    melatonin  5 mg Oral Nightly    enoxaparin  40 mg SubCUTAneous Daily    vancomycin  750 mg IntraVENous Q8H    cefepime  2,000 mg IntraVENous Q8H    aspirin  81 mg Oral Daily    sodium chloride flush  5-40 mL

## 2025-01-28 NOTE — PROGRESS NOTES
Dr. Pacheco saw the pt. Pt  can be agitated at times and get out of bed or demand things he can't do or have. Pt refusing CVC at this time and at times talking about signing out AMA. Education done with the pt. Dr. Pacheco ok with a MAP>60 if pt awake and arousable.

## 2025-01-28 NOTE — CONSULTS
[facility]   Pharmacy Pharmacokinetic Monitoring Service - Vancomycin     Dayton Soto is a 59 y.o. male starting on vancomycin therapy for CAP. Pharmacy consulted by Dr. Hameed for monitoring and adjustment.    Target Concentration: Goal AUC/ELIANA 400-600 mg*hr/L    Additional Antimicrobials: cefepime    Pertinent Laboratory Values:   Wt Readings from Last 1 Encounters:   01/27/25 58.4 kg (128 lb 12.8 oz)     Temp Readings from Last 1 Encounters:   01/27/25 98.7 °F (37.1 °C) (Oral)     Estimated Creatinine Clearance: 44 mL/min (A) (based on SCr of 1.5 mg/dL (H)).  Recent Labs     01/27/25  1229   CREATININE 1.5*   BUN 60*   WBC 4.4     Procalcitonin:     Pertinent Cultures:  Culture Date Source Results        MRSA Nasal Swab: not ordered. Order placed by pharmacy.    Plan:  Dosing recommendations based on Bayesian software  Vancomycin 1500 mg x1 given in ED  SeCr is elevated above baseline.  Obtain a random vanc level with AM labs and re-dose according to results and current renal function.  Renal labs as indicated   Vancomycin concentration ordered for 1/28 with AM labs  Pharmacy will continue to monitor patient and adjust therapy as indicated    Thank you for the consult,  Isabel Thomason RPH  1/27/2025 9:42 PM

## 2025-01-28 NOTE — PROGRESS NOTES
1/28  Vanc random = 4.4 mcg/mL at 0430.  Renal function has improved since last night.  Will schedule vancomycin 750 mg q8h.  Check vanc trough this evening (1/28 at 2300).  Jefferson Black, PharmD  1/28/2025 6:35 AM

## 2025-01-28 NOTE — H&P
Hospital Medicine History & Physical      PCP: Not, On File (Inactive)    Date of Admission: 1/27/2025    Date of Service: Pt seen/examined on 1/27/2025    Pt seen/examined face to face on and admitted as inpatient with expected LOS to be two days but can change depending on diagnostic work up and treatment response.     Chief Complaint:    Chief Complaint   Patient presents with    Shortness of Breath     Pt states that he developed sob 4 days ago. He states that it just has been increasing            ASSESSMENT AND PLAN:    Active Hospital Problems    Diagnosis Date Noted    Sepsis (HCC) [A41.9] 01/27/2025     Community-acquired pneumonia:  Was started on vancomycin, cefepime  Will continue for now  MRSA, strep antigen, Legionella pending    Acute COPD exacerbation:  Etiology due to pneumonia  DuoNebs, prednisone, antibiotic as above    Malnutrition,  Nutrition consulted, appreciated    Acute renal failure: IVF and recheck next    Elevated bilirubin:  Right upper quadrant ultrasound to check for intra and extrahepatic dilation  No current any abdominal symptoms, soft      .Due to the above diagnosis makes the patient higher risk for morbidity and mortality requiring testing and treatment      Discussion with the primary ER physician in regards to symptoms, history, physical exam, diagnosis and treatment, collaborative decision was to admit the patient.    Diet: NPO except meds ordered past midnight    DVT Prophylaxis: lovenox    Dispo:   Expected LOS of two days      History Of Present Illness:      59 y.o. male who presented to [facility] with past medical history of MRSA, meth usage, cardiomyopathy presented to ED with chief complaint of cough, shortness of breath    Patient was transferred at [place].  Patient reported that the cough started 4 days ago progressively worsening reports that he is able to eat and drink and that he does not aspirate normally.  Patient reports that his girlfriend came

## 2025-01-28 NOTE — PLAN OF CARE
Problem: Discharge Planning  Goal: Discharge to home or other facility with appropriate resources  Outcome: Progressing     Problem: Respiratory - Adult  Goal: Achieves optimal ventilation and oxygenation  Outcome: Progressing     Problem: Skin/Tissue Integrity - Adult  Goal: Skin integrity remains intact  Outcome: Progressing  Goal: Incisions, wounds, or drain sites healing without S/S of infection  Outcome: Progressing  Goal: Oral mucous membranes remain intact  Outcome: Progressing

## 2025-01-28 NOTE — PROGRESS NOTES
Pt arrived to unit via transport. Upon arrival pt handed writer his personal bottle of buprenorphine with 1 tablet inside. Verified with Charge Nurse. Will send down to pharmacy.   Electronically signed by Deborah Avila RN on 1/27/2025 at 9:38 PM      Arelis Bautista RN

## 2025-01-28 NOTE — CONSULTS
CARDIOLOGY CONSULTATION        Patient Name: Dayton oSto  Date of admission: 1/27/2025 11:55 AM  Admission Dx: Dehydration [E86.0]  Hypoxemia [R09.02]  Lactic acidosis [E87.20]  History of cardiac arrhythmia [Z86.79]  Elevated d-dimer [R79.89]  Sepsis (HCC) [A41.9]  Pneumonia of left lower lobe due to infectious organism [J18.9]  Chronic obstructive pulmonary disease, unspecified COPD type (HCC) [J44.9]  Multifocal pneumonia [J18.9]  Cardiomyopathy, unspecified type (HCC) [I42.9]  Left-sided chest pain [R07.9]  Requesting Physician: Cindy Hameed DO  Primary Care physician: Not, On File (Inactive)    Reason for Consultation/Chief Complaint: Acute hypoxic respiratory failure, new onset A-fib    History of Present Illness:     Dayton Soto is a 59 y.o. patient with a prior medical history notable for substance abuse/methamphetamines, cardiomyopathy with moderate LV dsyfunction noted at time of evaluation for IE 7/2024, who presented to the hospital with complaints of shortness of breath and cough.    Patient has not been following up with cardiology on OP basis that I can tell.     ED course/Vital signs on presentation: Patient reported 4 days of progressively worsening symptoms with inability to eat/drink.  Tachycardic at presentation with low-normal blood pressure.  Required high flow/nonrebreather at 1 point yesterday evening but oxygen requirements had seemingly improved.  CT chest showed multifocal pneumonia versus aspiration, worst appearing left lower lobe, esophagitis, mediastinal adenopathy, no PE.  EKG showed sinus tachycardia with artifact.  Labs notable for elevated proBNP 3800, lactate 3.6, creatinine 1.5 from baseline 0.8-1.0.    This morning patients oxygen requirements abruptly worsened around 10 AM. Notedonset of atrial fibrillation at 9AM. Sinus with PACs on tele review 80's prior to this. At time of Rapid response called, patient was tachycardic to 180s, noted new atrial fibrillation.  Mild regurgitation.    Tricuspid Valve: Valve structure is normal. Mild regurgitation.    Pulmonic Valve: Trace regurgitation.    Left Atrium: Normal sized appendage. No left atrial appendage thrombus noted. No left atrial appendage mass noted.    Interatrial Septum: Grade I Positive (1 to 9 bubbles). Agitated saline study was suggestive of intracardiac shunt across the atrial septum. Mild right to left shunt was noted.    Image quality is excellent.      TTE 7/2024    Left Ventricle: Moderately reduced left ventricular systolic function with a visually estimated EF of 30 - 35%. EF 3D is 34%. Left ventricle size is normal. Mild septal thickening. Septal flattening in diastole consistent with right ventricular volume overload. Moderate global hypokinesis present and more marked in the anterior and septal walls Global longitudinal strain is reduced with a value of -11.4%. Grade I diastolic dysfunction with normal LAP.    Right Ventricle: Reduced systolic function. RV EF 3D is 31%. Fractional area change (FAC) is 26%. Global longitudinal strain is -7.5%. RV Peak S' is 7 cm/s.    Aortic Valve: Mild to moderate regurgitation with a centrally directed jet.    Tricuspid Valve: Severe regurgitation. Reversed hepatic vein systolic flow. Mildly elevated RVSP, consistent with mild pulmonary hypertension. The estimated RVSP is 40 mmHg.    Right Atrium: Right atrium is severely dilated.    IVC/SVC: IVC diameter is greater than 21 mm and decreases less than 50% during inspiration; therefore the estimated right atrial pressure is elevated (~15 mmHg).    Image quality is adequate. Procedure performed with the patient in a supine position.      Impression and Plan:      New onset atrial fibrillation with RVR  Prior Hx SVT   -atrial fibrillation new diagnosis, Rates 160-180s.    -with improved oxygenation, expected RVR will improve   -I rec starting amiodarone bolus and gtt next 24 hrs given onset 9 AM this morning, and see if we can

## 2025-01-28 NOTE — PROGRESS NOTES
Patient transferred to icu. Report given to icu nurse. Care transferred at this time. Doctor at bedside. RT at bedside.    Attempted to call sibling on contact list with no answer.

## 2025-01-28 NOTE — PROGRESS NOTES
Progress Note    Admit Date:  1/27/2025    Admitted for multifocal PNA    Subjective:  Mr. Soto noted to be hypoxic today.  Unable to tolerate Vapotherm or NRBM.  Possible new onset atrial fibrillation.  Discussed with Dr. Gallagher, transfer to ICU for further care.     I was called for severe hypoxia. He also went into rapid a fib.    Objective:   Patient Vitals for the past 4 hrs:   BP Temp Temp src Pulse Resp SpO2   01/28/25 0942 (!) 93/57 -- -- -- -- --   01/28/25 0732 99/69 97.7 °F (36.5 °C) Oral 84 21 94 %          Intake/Output Summary (Last 24 hours) at 1/28/2025 0950  Last data filed at 1/28/2025 0732  Gross per 24 hour   Intake 60 ml   Output 350 ml   Net -290 ml       Physical Exam:      Gen: Ill appearing Alert.   Eyes: PERRL. No sclera icterus. No conjunctival injection.   ENT: No discharge. Pharynx clear.   Neck: No JVD.  Trachea midline.  Resp: + accessory muscle use.+ crackles. No wheezes. + rhonchi.   CV: Tachycardia, irregular rate and rhythm. No murmur.  No rub. No edema.   Capillary Refill: Brisk,< 3 seconds   Peripheral Pulses: +2 palpable, equal bilaterally   GI: Non-tender. Non-distended.  Normal bowel sounds.  Skin: Warm and dry. No nodule on exposed extremities. No rash on exposed extremities.   M/S: No cyanosis. No joint deformity. No clubbing.   Neuro: Awake. Grossly nonfocal    Psych: Oriented x 3. No anxiety or agitation.      Scheduled Meds:   furosemide  20 mg Oral Daily    buprenorphine-naloxone  2.5 Film SubLINGual Daily    spironolactone  25 mg Oral Daily    valsartan  40 mg Oral QPM    melatonin  5 mg Oral Nightly    enoxaparin  40 mg SubCUTAneous Daily    vancomycin  750 mg IntraVENous Q8H    cefepime  2,000 mg IntraVENous Q8H    aspirin  81 mg Oral Daily    sodium chloride flush  5-40 mL IntraVENous 2 times per day    predniSONE  40 mg Oral Daily       Continuous Infusions:   sodium chloride 125 mL/hr at 01/27/25 2201    sodium chloride         PRN Meds:  sodium chloride flush,

## 2025-01-28 NOTE — PROGRESS NOTES
Speech Language Pathology  Attempt Note     Name: Dayton Soto  : 1965  Medical Diagnosis: Dehydration [E86.0]  Hypoxemia [R09.02]  Lactic acidosis [E87.20]  History of cardiac arrhythmia [Z86.79]  Elevated d-dimer [R79.89]  Sepsis (HCC) [A41.9]  Pneumonia of left lower lobe due to infectious organism [J18.9]  Chronic obstructive pulmonary disease, unspecified COPD type (HCC) [J44.9]  Multifocal pneumonia [J18.9]  Cardiomyopathy, unspecified type (HCC) [I42.9]  Left-sided chest pain [R07.9]      Pt transferred to ICU after order for BSE received. SLP will need new orders to eval when pt is appropriate for assessment for po intake. No charges.    Thank you,    Daisha Lord M.A. SLP  Speech-Language Pathologist  [place] Lic #[id number]  [phone]

## 2025-01-28 NOTE — PROGRESS NOTES
Blood pressure 99/69, pulse 84, temperature 97.7 °F (36.5 °C), temperature source Oral, resp. rate 21, height 1.778 m (5' 10\"), weight 58.4 kg (128 lb 12.8 oz), SpO2 94%.    Shift assessment completed see flow sheet. Patient in bed alert and oriented x4. Patient on 7L O2, showing no signs of distress. Morning medications given per order. Patient has no other needs at this time. Standard safety measures in place.

## 2025-01-28 NOTE — PROGRESS NOTES
Admission questions and med rec complete. Perfect serve sent to MD to make him aware. Deborah Avila RN

## 2025-01-28 NOTE — CARE COORDINATION
Case Management Assessment  Initial Evaluation    Date/Time of Evaluation: 1/28/2025 4:30  Assessment Completed by: Teresa Boeck, RN    If patient is discharged prior to next notation, then this note serves as note for discharge by case management.    Patient Name: Dayton Soto                   YOB: 1965  Diagnosis: Dehydration [E86.0]  Hypoxemia [R09.02]  Lactic acidosis [E87.20]  History of cardiac arrhythmia [Z86.79]  Elevated d-dimer [R79.89]  Sepsis (HCC) [A41.9]  Pneumonia of left lower lobe due to infectious organism [J18.9]  Chronic obstructive pulmonary disease, unspecified COPD type (HCC) [J44.9]  Multifocal pneumonia [J18.9]  Cardiomyopathy, unspecified type (HCC) [I42.9]  Left-sided chest pain [R07.9]                   Date / Time: 1/27/2025 11:55 AM    Patient Admission Status: Inpatient   Readmission Risk (Low < 19, Mod (19-27), High > 27): Readmission Risk Score: 14.6    Current PCP: Not, On File (Inactive)  PCP verified by CM? (P) Yes (No PCP. [facility] information given.)    Chart Reviewed: Yes      History Provided by: Patient  Patient Orientation: Alert and Oriented, Person, Place, Situation    Patient Cognition: Alert    Hospitalization in the last 30 days (Readmission):  No    If yes, Readmission Assessment in CM Navigator will be completed.    Advance Directives:      Code Status: Full Code   Patient's Primary Decision Maker is: Legal Next of Kin (Sister listed in EPIC)      Discharge Planning:    Patient lives with: (P) Friends Type of Home: (P) Apartment  Primary Care Giver: Self  Patient Support Systems include: Friends/Neighbors   Current Financial resources: (P) Medicaid  Current community resources: (P) None  Current services prior to admission: (P) None            Current DME:              Type of Home Care services:  (P) None    ADLS  Prior functional level: (P) Independent in ADLs/IADLs  Current functional level: (P) Independent in ADLs/IADLs    PT AM-PAC:

## 2025-01-28 NOTE — PROGRESS NOTES
Wife came to pcu to see patient. I explained how the patient status declined and he is now in icu.

## 2025-01-28 NOTE — PROGRESS NOTES
Speech Language Pathology  Attempt Note     Name: Dayton Soto  : 1965  Medical Diagnosis: Dehydration [E86.0]  Hypoxemia [R09.02]  Lactic acidosis [E87.20]  History of cardiac arrhythmia [Z86.79]  Elevated d-dimer [R79.89]  Sepsis (HCC) [A41.9]  Pneumonia of left lower lobe due to infectious organism [J18.9]  Chronic obstructive pulmonary disease, unspecified COPD type (HCC) [J44.9]  Multifocal pneumonia [J18.9]  Cardiomyopathy, unspecified type (HCC) [I42.9]  Left-sided chest pain [R07.9]      SLP attempted to see pt for bedside swallow evaluation (BSE). Order acknowledged and chart reviewed for completion of eval. Rhonda/RN okays entry but then joins SLP in luke to state pt's HR is elevated. O2 sats also 80-84%. Rhonda/RN present and tending to pt. Evaluation / treatment unable to be completed due to pt status. SLP to re-attempt as pt's condition and schedule allows. RN aware. No charges.    Thank you,    Daisha Lord M.A. SLP  Speech-Language Pathologist  [place] Lic #[id number]  [phone]

## 2025-01-28 NOTE — ACP (ADVANCE CARE PLANNING)
Advance Care Planning     General Advance Care Planning (ACP) Conversation    Date of Conversation: 1/28/2025  Conducted with:   Other persons present: None    Healthcare Decision Maker: No healthcare decision makers have been documented.     Today we - patient has a sister listed in chart that can act as POA.   Content/Action Overview:  DECLINED ACP Conversation - will revisit periodically  Reviewed DNR/DNI and patient elects Full Code (Attempt Resuscitation)        Length of Voluntary ACP Conversation in minutes:  <16 minutes (Non-Billable)    Teresa Boeck, RN

## 2025-01-28 NOTE — PROGRESS NOTES
Speech Language Pathology  Swallowing Disorders and Dysphagia  Clinical Bedside Swallow Assessment  [facility]    ***paste diet rec's here    NAME:Dayton Soto  : 1965 (59 y.o.)   MRN: [id number]  ROOM: /0303-01  ADMISSION DATE: 2025  Chief Complaint   Patient presents with    Shortness of Breath     Pt states that he developed sob 4 days ago. He states that it just has been increasing     Past Medical History:   Diagnosis Date    Back pain, chronic     Chronic back pain     MRSA (methicillin resistant staph aureus) culture positive 2020    RIGHT FOREARM     History reviewed. No pertinent surgical history.      DATE ONSET: 2025    Date of Evaluation: 2025   Evaluating Therapist: AYESHA Holland    Chart Reviewed: : [x] Yes [] No     Pain: The patient does not complain of pain***    RN Ok'd SLP Entry: Yes [x]  Requested Hold []  RN Name: ***      Data Review:        Current Diet: Diet NPO Exceptions are: Sips of Water with Meds    Lab Values:    CBC:  Lab Results   Component Value Date    WBC 3.7 (L) 2025    HGB 12.0 (L) 2025    HCT 34.9 (L) 2025    MCV 90.4 2025     (L) 2025       Basic Metabolic Panel  Lab Results   Component Value Date/Time     2025 04:30 AM    CL 97 2025 04:30 AM    CO2 28 2025 04:30 AM    GLUCOSE 145 2025 04:30 AM    BUN 39 2025 04:30 AM    CREATININE 1.0 2025 04:30 AM       HEPATIC PANEL   Lab Results   Component Value Date/Time    AST 33 2025 12:29 PM    ALT 9 2025 12:29 PM       Lab Results   Component Value Date    CKTOTAL 278 2024    TROPONINI <0.01 12/15/2022       Lab Results   Component Value Date/Time    INR 1.38 2024 05:27 PM       Magnesium:    Lab Results   Component Value Date/Time    MG 1.80 2025 12:29 PM       U/A:    Lab Results   Component Value Date/Time    COLORU DARK YELLOW 2025 03:53 PM

## 2025-01-29 ENCOUNTER — APPOINTMENT (OUTPATIENT)
Age: 60
End: 2025-01-29
Attending: INTERNAL MEDICINE
Payer: MEDICAID

## 2025-01-29 PROBLEM — I48.91 NEW ONSET A-FIB (HCC): Status: ACTIVE | Noted: 2025-01-29

## 2025-01-29 PROBLEM — I95.9 HYPOTENSION: Status: ACTIVE | Noted: 2025-01-29

## 2025-01-29 PROBLEM — J44.1 COPD WITH ACUTE EXACERBATION (HCC): Status: ACTIVE | Noted: 2025-01-28

## 2025-01-29 LAB
ANION GAP SERPL CALCULATED.3IONS-SCNC: 12 MMOL/L (ref 3–16)
ANISOCYTOSIS BLD QL SMEAR: ABNORMAL
BASOPHILS # BLD: 0 K/UL (ref 0–0.2)
BASOPHILS NFR BLD: 0 %
BUN SERPL-MCNC: 49 MG/DL (ref 7–20)
CALCIUM SERPL-MCNC: 7.8 MG/DL (ref 8.3–10.6)
CHLORIDE SERPL-SCNC: 105 MMOL/L (ref 99–110)
CO2 SERPL-SCNC: 22 MMOL/L (ref 21–32)
CREAT SERPL-MCNC: 1 MG/DL (ref 0.9–1.3)
DEPRECATED RDW RBC AUTO: 13.7 % (ref 12.4–15.4)
ECHO AO ASC DIAM: 3.1 CM
ECHO AO ASCENDING AORTA INDEX: 1.78 CM/M2
ECHO AO ROOT DIAM: 3.4 CM
ECHO AO ROOT INDEX: 1.95 CM/M2
ECHO AR MAX VEL PISA: 3 M/S
ECHO AV MEAN GRADIENT: 2 MMHG
ECHO AV MEAN VELOCITY: 0.8 M/S
ECHO AV PEAK GRADIENT: 4 MMHG
ECHO AV PEAK VELOCITY: 1 M/S
ECHO AV VELOCITY RATIO: 0.7
ECHO AV VTI: 17.6 CM
ECHO BSA: 1.74 M2
ECHO EST RA PRESSURE: 8 MMHG
ECHO IVC EXP: 2.1 CM
ECHO IVC INSP: 1.3 CM
ECHO LA AREA 2C: 16.7 CM2
ECHO LA AREA 4C: 16.7 CM2
ECHO LA MAJOR AXIS: 6.5 CM
ECHO LA MINOR AXIS: 4.9 CM
ECHO LA VOL BP: 41 ML (ref 18–58)
ECHO LA VOL MOD A2C: 41 ML (ref 18–58)
ECHO LA VOL MOD A4C: 53 ML (ref 18–58)
ECHO LA VOL/BSA BIPLANE: 24 ML/M2 (ref 16–34)
ECHO LA VOLUME INDEX MOD A2C: 24 ML/M2 (ref 16–34)
ECHO LA VOLUME INDEX MOD A4C: 30 ML/M2 (ref 16–34)
ECHO LV EF PHYSICIAN: 33 %
ECHO LV FRACTIONAL SHORTENING: 18 % (ref 28–44)
ECHO LV INTERNAL DIMENSION DIASTOLE INDEX: 2.53 CM/M2
ECHO LV INTERNAL DIMENSION DIASTOLIC: 4.4 CM (ref 4.2–5.9)
ECHO LV INTERNAL DIMENSION SYSTOLIC INDEX: 2.07 CM/M2
ECHO LV INTERNAL DIMENSION SYSTOLIC: 3.6 CM
ECHO LV ISOVOLUMETRIC RELAXATION TIME (IVRT): 98 MS
ECHO LV IVSD: 0.9 CM (ref 0.6–1)
ECHO LV MASS 2D: 147.8 G (ref 88–224)
ECHO LV MASS INDEX 2D: 85 G/M2 (ref 49–115)
ECHO LV POSTERIOR WALL DIASTOLIC: 1.1 CM (ref 0.6–1)
ECHO LV RELATIVE WALL THICKNESS RATIO: 0.5
ECHO LVOT AV VTI INDEX: 0.67
ECHO LVOT MEAN GRADIENT: 1 MMHG
ECHO LVOT PEAK GRADIENT: 2 MMHG
ECHO LVOT PEAK VELOCITY: 0.7 M/S
ECHO LVOT VTI: 11.8 CM
ECHO MV E VELOCITY: 0.6 M/S
ECHO MV LVOT VTI INDEX: 1.46
ECHO MV MAX VELOCITY: 0.8 M/S
ECHO MV MEAN GRADIENT: 3 MMHG
ECHO MV MEAN VELOCITY: 0.6 M/S
ECHO MV PEAK GRADIENT: 3 MMHG
ECHO MV VTI: 17.2 CM
ECHO PV MAX VELOCITY: 0.6 M/S
ECHO PV MEAN GRADIENT: 1 MMHG
ECHO PV MEAN VELOCITY: 0.5 M/S
ECHO PV PEAK GRADIENT: 2 MMHG
ECHO PV VTI: 11 CM
ECHO RA AREA 4C: 18.1 CM2
ECHO RA END SYSTOLIC VOLUME APICAL 4 CHAMBER INDEX BSA: 28 ML/M2
ECHO RA VOLUME: 48 ML
ECHO RIGHT VENTRICULAR SYSTOLIC PRESSURE (RVSP): 24 MMHG
ECHO RV BASAL DIMENSION: 4.5 CM
ECHO RV FREE WALL PEAK S': 8.1 CM/S
ECHO RV LONGITUDINAL DIMENSION: 7 CM
ECHO RV MID DIMENSION: 3.5 CM
ECHO RV TAPSE: 1.1 CM (ref 1.7–?)
ECHO TV REGURGITANT MAX VELOCITY: 2 M/S
ECHO TV REGURGITANT PEAK GRADIENT: 16 MMHG
EOSINOPHIL # BLD: 0 K/UL (ref 0–0.6)
EOSINOPHIL NFR BLD: 0 %
GFR SERPLBLD CREATININE-BSD FMLA CKD-EPI: 86 ML/MIN/{1.73_M2}
GLUCOSE BLD-MCNC: 116 MG/DL (ref 70–99)
GLUCOSE BLD-MCNC: 123 MG/DL (ref 70–99)
GLUCOSE BLD-MCNC: 193 MG/DL (ref 70–99)
GLUCOSE SERPL-MCNC: 139 MG/DL (ref 70–99)
HCT VFR BLD AUTO: 37.9 % (ref 40.5–52.5)
HGB BLD-MCNC: 12.9 G/DL (ref 13.5–17.5)
LEGIONELLA AG UR QL: NORMAL
LEGIONELLA AG UR QL: NORMAL
LYMPHOCYTES # BLD: 0.3 K/UL (ref 1–5.1)
LYMPHOCYTES NFR BLD: 3 %
MCH RBC QN AUTO: 30.9 PG (ref 26–34)
MCHC RBC AUTO-ENTMCNC: 33.9 G/DL (ref 31–36)
MCV RBC AUTO: 91.2 FL (ref 80–100)
MONOCYTES # BLD: 0.6 K/UL (ref 0–1.3)
MONOCYTES NFR BLD: 9 %
MYELOCYTES NFR BLD MANUAL: 1 %
NEUTROPHILS # BLD: 5.8 K/UL (ref 1.7–7.7)
NEUTROPHILS NFR BLD: 81 %
NEUTS BAND NFR BLD MANUAL: 4 % (ref 0–7)
PERFORMED ON: ABNORMAL
PLATELET # BLD AUTO: 158 K/UL (ref 135–450)
PLATELET BLD QL SMEAR: ADEQUATE
PMV BLD AUTO: 10.7 FL (ref 5–10.5)
POIKILOCYTOSIS BLD QL SMEAR: ABNORMAL
POTASSIUM SERPL-SCNC: 4 MMOL/L (ref 3.5–5.1)
RBC # BLD AUTO: 4.16 M/UL (ref 4.2–5.9)
S PNEUM AG UR QL: ABNORMAL
S PNEUM AG UR QL: ABNORMAL
SLIDE REVIEW: ABNORMAL
SODIUM SERPL-SCNC: 139 MMOL/L (ref 136–145)
VANCOMYCIN TROUGH SERPL-MCNC: 15.1 UG/ML (ref 10–20)
VARIANT LYMPHS NFR BLD MANUAL: 2 % (ref 0–6)
WBC # BLD AUTO: 6.7 K/UL (ref 4–11)

## 2025-01-29 PROCEDURE — 2580000003 HC RX 258: Performed by: INTERNAL MEDICINE

## 2025-01-29 PROCEDURE — 36415 COLL VENOUS BLD VENIPUNCTURE: CPT

## 2025-01-29 PROCEDURE — 6360000002 HC RX W HCPCS: Performed by: INTERNAL MEDICINE

## 2025-01-29 PROCEDURE — 99233 SBSQ HOSP IP/OBS HIGH 50: CPT | Performed by: INTERNAL MEDICINE

## 2025-01-29 PROCEDURE — 93306 TTE W/DOPPLER COMPLETE: CPT

## 2025-01-29 PROCEDURE — 93306 TTE W/DOPPLER COMPLETE: CPT | Performed by: INTERNAL MEDICINE

## 2025-01-29 PROCEDURE — 80048 BASIC METABOLIC PNL TOTAL CA: CPT

## 2025-01-29 PROCEDURE — 6370000000 HC RX 637 (ALT 250 FOR IP): Performed by: INTERNAL MEDICINE

## 2025-01-29 PROCEDURE — 2700000000 HC OXYGEN THERAPY PER DAY

## 2025-01-29 PROCEDURE — 85025 COMPLETE CBC W/AUTO DIFF WBC: CPT

## 2025-01-29 PROCEDURE — 2500000003 HC RX 250 WO HCPCS: Performed by: INTERNAL MEDICINE

## 2025-01-29 PROCEDURE — 94761 N-INVAS EAR/PLS OXIMETRY MLT: CPT

## 2025-01-29 PROCEDURE — 2000000000 HC ICU R&B

## 2025-01-29 RX ORDER — ENOXAPARIN SODIUM 100 MG/ML
1 INJECTION SUBCUTANEOUS 2 TIMES DAILY
Status: DISCONTINUED | OUTPATIENT
Start: 2025-01-29 | End: 2025-01-30

## 2025-01-29 RX ORDER — VANCOMYCIN 1 G/200ML
1000 INJECTION, SOLUTION INTRAVENOUS EVERY 12 HOURS
Status: DISCONTINUED | OUTPATIENT
Start: 2025-01-29 | End: 2025-01-29

## 2025-01-29 RX ORDER — PANTOPRAZOLE SODIUM 40 MG/1
40 TABLET, DELAYED RELEASE ORAL
Status: DISCONTINUED | OUTPATIENT
Start: 2025-01-29 | End: 2025-02-01 | Stop reason: HOSPADM

## 2025-01-29 RX ADMIN — BUPRENORPHINE HYDROCHLORIDE, NALOXONE HYDROCHLORIDE 2.5 FILM: 8; 2 FILM, SOLUBLE BUCCAL; SUBLINGUAL at 08:15

## 2025-01-29 RX ADMIN — PREDNISONE 40 MG: 20 TABLET ORAL at 08:15

## 2025-01-29 RX ADMIN — Medication 5 MG: at 19:57

## 2025-01-29 RX ADMIN — PANTOPRAZOLE SODIUM 40 MG: 40 TABLET, DELAYED RELEASE ORAL at 10:54

## 2025-01-29 RX ADMIN — ENOXAPARIN SODIUM 60 MG: 100 INJECTION SUBCUTANEOUS at 19:57

## 2025-01-29 RX ADMIN — CEFEPIME 2000 MG: 2 INJECTION, POWDER, FOR SOLUTION INTRAVENOUS at 08:16

## 2025-01-29 RX ADMIN — CEFTRIAXONE SODIUM 2000 MG: 2 INJECTION, POWDER, FOR SOLUTION INTRAMUSCULAR; INTRAVENOUS at 10:55

## 2025-01-29 RX ADMIN — SODIUM CHLORIDE, PRESERVATIVE FREE 10 ML: 5 INJECTION INTRAVENOUS at 19:57

## 2025-01-29 RX ADMIN — MUPIROCIN: 20 OINTMENT TOPICAL at 08:15

## 2025-01-29 RX ADMIN — SODIUM CHLORIDE, PRESERVATIVE FREE 10 ML: 5 INJECTION INTRAVENOUS at 08:17

## 2025-01-29 RX ADMIN — MUPIROCIN: 20 OINTMENT TOPICAL at 19:57

## 2025-01-29 RX ADMIN — ENOXAPARIN SODIUM 60 MG: 100 INJECTION SUBCUTANEOUS at 10:54

## 2025-01-29 RX ADMIN — SODIUM CHLORIDE: 9 INJECTION, SOLUTION INTRAVENOUS at 08:18

## 2025-01-29 RX ADMIN — AMIODARONE HYDROCHLORIDE 0.5 MG/MIN: 50 INJECTION, SOLUTION INTRAVENOUS at 16:33

## 2025-01-29 NOTE — PROGRESS NOTES
Valve: Mild regurgitation with a centrally directed jet.    Mitral Valve: Valve structure is normal. Mild regurgitation.    Tricuspid Valve: Valve structure is normal. Mild regurgitation.    Pulmonic Valve: Trace regurgitation.    Left Atrium: Normal sized appendage. No left atrial appendage thrombus noted. No left atrial appendage mass noted.    Interatrial Septum: Grade I Positive (1 to 9 bubbles). Agitated saline study was suggestive of intracardiac shunt across the atrial septum. Mild right to left shunt was noted.    Image quality is excellent.    Signed by: Anoop Zamarripa DO on 7/8/2024 12:41 PM   Assessment/Plan:    Acute Hypoxic Respiratory Failure  - O2 sats 73 on RA, no home O2 at baseline   - unable to tolerate vapotherm and NRM  - + tachypnea, +dyspnea, +hypoxia and distress   - discussed with Dr. Pacheco, transfer to ICU  - likely will need intubated as unable to tolerate high oxygen- keeps - pulling off vapo therm.  - ABG ordered  -Transferred to ICU and placed on Vapotherm.  Currently on 8 L of oxygen.    Pneumonia- multifocal   - community acquired, suspect a gram positive organism    Strep pneumo Ag positive   - Treat with antibiotics Cefepime and Vancomycin D#3  Change abx to ceftriaxone   - check cultures  - d-dimer elevated- CTPA as above    COPD  AE  - prednisone ordered  - inhaled bronchodilators   - check sputum cultures   - pulmonary consulted     New onset Atrial Fibrillation   - EKG ordered   - cardiology consulted.   - TSH ordered and pending   - check magnesium today, pending   - monitor on telemetry   -On amiodarone drip.  Full dose anticoagulation.    Hx of CHF  Cardiomyopathy  - echo as above EF 40-45%  - holding Diovan, lasix and aldactone at this time due to hypotension and elevated creatinine   - appears compensated  - daily weights, low sodium diet (when advanced) and strict I/O    Agitation   On precedex drip  Wean and dc if possible     Thickening of esophagus on CT   Likely

## 2025-01-29 NOTE — PROGRESS NOTES
Patient is on bed sitting. Ongoing Echo. On precedex 0.1 mcg/kg/hr, on Amiodarone 0.5 mg/min. Not in distress. HFNC at 8l/min saturation of 98%. Hand off report given to Nurse Llamas

## 2025-01-29 NOTE — PROGRESS NOTES
4 Eyes Skin Assessment     NAME:  Dayton Soto  YOB: 1965  MEDICAL RECORD NUMBER:  [id number]    The patient is being assessed for  Shift Handoff    I agree that at least one RN has performed a thorough Head to Toe Skin Assessment on the patient. ALL assessment sites listed below have been assessed.      Areas assessed by both nurses:    Head, Face, Ears, Shoulders, Back, Chest, Arms, Elbows, Hands, Sacrum. Buttock, Coccyx, Ischium, Legs. Feet and Heels, and Under Medical Devices             Does the Patient have a Wound? No noted wound(s)       Alex Prevention initiated by RN: Yes  Wound Care Orders initiated by RN: No    Pressure Injury (Stage 3,4, Unstageable, DTI, NWPT, and Complex wounds) if present, place Wound referral order by RN under : No    New Ostomies, if present place, Ostomy referral order under : No     Nurse 1 eSignature: Electronically signed by Bladimir Rose RN on 1/29/25 at 5:33 AM EST    **SHARE this note so that the co-signing nurse can place an eSignature**    Nurse 2 eSignature: Electronically signed by Flora Welsh RN on 1/29/25 at 5:37 AM EST    Patient is able to demonstrate the ability to move from a reclining position to an upright position within the recliner.

## 2025-01-29 NOTE — PROGRESS NOTES
1/28  Vanc trough = 15.1 mcg/mL at 2306.  Calculated AUC of 601.  Will change vancomycin to 1000 mg q12.  Recheck vanc trough this evening (1/29 at 2000).  Corey CrenshawD  1/29/2025 12:31 AM

## 2025-01-29 NOTE — PROGRESS NOTES
Patient on bed, awake, alert and oriented. On high flow nasal  cannula at 10l/min saturation 98%. Following commands. HR - Afib range . On precedex 0.2mcg/kg/hr. Amiodarone 0.5mg/min. Decrease precedex to 0.1mcg/kg/hr. Witng ongoing NS at 50ml/hr. NPO maintained with sips of water. Can turn to side with minimal assistance. Ambulatory going to [facility]. Uses urinal. Has 3 PIV on the right hand. BP is soft - 70/57 MAP 63 - Hospitalist was aware, followed up by cardiologist. EF 40-45%. Active bowel sounds. RR - 14. Rhonchi sounds heard on the lower lobes. Positioned sitting on bed. With pillow support on his back. Call light given and instructed. Bed alarm on. Side rails up x2.

## 2025-01-29 NOTE — PROGRESS NOTES
[facility]                                 Pulmonary/Critical care  Consult /Progress Note :                                                                   ,    CC :worsening SOB ,hypotension ,A fib   Patient is being seen at the request of Dr Villegas  for a consultation for Sepsos ,pneumonia    Subjective  Doing better  On precedex 0.3  Off IVF   Start diet   No chest pain   MAP 65       PHYSICAL EXAM:  Vitals:    01/29/25 0800   BP:    Pulse: (!) 106   Resp: 29   Temp: 97.7 °F (36.5 °C)   SpO2: 99%         Gen: No distress.   Eyes: PERRL. No sclera icterus. No conjunctival injection.   ENT: No discharge. Pharynx clear.   Neck: Trachea midline. No obvious mass.    Resp: Rhonchi bilateral  CV: Regular rate. Regular rhythm. No murmur or rub. No edema. Peripheral pulses are 2+.  Capillary refill is less than 3 seconds.  GI: Non-tender. Non-distended. No hernia.   Skin: Warm and dry. No nodule on exposed extremities.   Lymph: No cervical LAD. No supraclavicular LAD.   M/S: No cyanosis. No joint deformity. No clubbing.   Neuro: Awake. Alert. Moves all four extremities.   Psych: Oriented x 3. No anxiety.     LABS:  CBC:   Recent Labs     01/27/25  1229 01/28/25  0431 01/29/25  0432   WBC 4.4 3.7* 6.7   HGB 13.5 12.0* 12.9*   HCT 40.6 34.9* 37.9*   MCV 91.7 90.4 91.2    127* 158     BMP:   Recent Labs     01/27/25  1229 01/28/25  0430 01/29/25  0432   * 137 139   K 4.0 3.9 4.0   CL 90* 97* 105   CO2 29 28 22   BUN 60* 39* 49*   CREATININE 1.5* 1.0 1.0     LIVER PROFILE:   Recent Labs     01/27/25  1229   AST 33   ALT 9*   BILITOT 1.6*   ALKPHOS 50     PT/INR: No results for input(s): \"PROTIME\", \"INR\" in the last 72 hours.  APTT: No results for input(s): \"APTT\" in the last 72 hours.  UA:  Recent Labs     01/27/25  1553 01/28/25  0055   COLORU DARK YELLOW*  --    PHUR 6.0 6.0   WBCUA

## 2025-01-29 NOTE — PROGRESS NOTES
Physician Progress Note      PATIENT:               TIFFANIE MARES  [facility] #:                  [id number]  :                       1965  ADMIT DATE:       2025 11:55 AM  DISCH DATE:  RESPONDING  PROVIDER #:        Brittany Shin MD          QUERY TEXT:    Patient admitted with pneumonia. Documentation reflects sepsis on H/P. If   possible, please document in the progress notes and discharge summary if ***   was:    The medical record reflects the following:  Risk Factors: PNA, acute respiratory failure, COPD, CHF, severe malnutrition    Clinical Indicators: Per H/P- \"sepsis 2025\"  Per attending progress note -\"Pneumonia- multifocal\"  Per cardiology note -\"Multi-focal PNA/sepsis\"  On arrival to ED - , RR 24, acute respiratory failure, WBC   4.4/Bands 10%->WBC 3.7/Bands 10, procalcitonin >100, lactic acid 3.6    Treatment: labs, blood culture-NGTD, IV Vancomycin/cefepime, CT imaging, O2,  Options provided:  -- Sepsis present on arrival confirmed  -- Sepsis ruled out after study  -- Other - I will add my own diagnosis  -- Disagree - Not applicable / Not valid  -- Disagree - Clinically unable to determine / Unknown  -- Refer to Clinical Documentation Reviewer    PROVIDER RESPONSE TEXT:    Sepsis ruled out after study.    Query created by: Irene Davalos on 2025 12:50 PM      Electronically signed by:  Brittany Shin MD 2025 12:55 PM

## 2025-01-29 NOTE — PROGRESS NOTES
[facility] Daily Progress Note      Admit Date:  1/27/2025    Subjective:  Mr. Soto is being seen today for f/u afib and cardiomyopathy. He feels better today and says SOB is improved. No complaints now. He refused central line for pressors and is on 8L HFNC.     Objective:   BP (!) 77/59   Pulse 92   Temp 97.5 °F (36.4 °C) (Temporal)   Resp 14   Ht 1.778 m (5' 10\")   Wt 61 kg (134 lb 7.7 oz)   SpO2 99%   BMI 19.30 kg/m²     Intake/Output Summary (Last 24 hours) at 1/29/2025 0729  Last data filed at 1/29/2025 0015  Gross per 24 hour   Intake 3770.54 ml   Output 950 ml   Net 2820.54 ml       TELEMETRY: afib 97-109bpm     Physical Exam:  General:  Awake, alert, NAD  Skin:  Warm and dry  Neck:  JVD none  Chest:  +crackles right base  Cardiovascular: +irregularly irregular; S1S2  Abdomen:  Soft NT  Extremities:  no edema    Medications:    vancomycin  1,000 mg IntraVENous Q12H    [Held by provider] furosemide  20 mg Oral Daily    buprenorphine-naloxone  2.5 Film SubLINGual Daily    [Held by provider] spironolactone  25 mg Oral Daily    [Held by provider] valsartan  40 mg Oral QPM    melatonin  5 mg Oral Nightly    enoxaparin  40 mg SubCUTAneous Daily    cefepime  2,000 mg IntraVENous Q8H    mupirocin   Topical BID    aspirin  81 mg Oral Daily    sodium chloride flush  5-40 mL IntraVENous 2 times per day    predniSONE  40 mg Oral Daily      dexmedeTOMIDine HCl in NaCl 0.1 mcg/kg/hr (01/29/25 0015)    amiodarone 0.5 mg/min (01/29/25 0015)    sodium chloride Stopped (01/28/25 4704)    sodium chloride       perflutren lipid microspheres, sodium chloride flush, sodium chloride, ondansetron **OR** ondansetron, polyethylene glycol, acetaminophen **OR** acetaminophen    Lab Data:  CBC:   Recent Labs     01/27/25  1229 01/28/25  0431 01/29/25  0432   WBC 4.4 3.7* 6.7   HGB 13.5 12.0* 12.9*   HCT 40.6 34.9* 37.9*   MCV 91.7 90.4 91.2    127* 158     BMP:   Recent Labs     01/27/25  1224  gtt   -HR's improved but remains afib   -given CM history I would AC with full dose lovenox 60mg SQ BID   -d/c baby aspirin; no definite indication     2.    Cardiomyopathy:   -HFmEF dx July 2024 and presumed NICM   -prior type 2 NSTEMI and ischemia eval planned but lost to f/u   -no angina and would not pursue ischemia eval currently   -no OP f/u or regular GDMT   -cannot use GDMT with hypotension now   -restart diovan 40 qd, aldactone 25 qd when BP improved   -consider switch to Entresto and will need beta blocker and SGLT2i ideally   -ECHO pending to compare to 7/24 study    3.       Multi-focal PNA/sepsis:   -IV abx per ICU team   -ween O2 as tolerated   -hypotensive 70's systolic but refuses central line to use pressors    4.        Hx substance abuse:   -tox screen positive for methatone   -continue suboxone    Note afib RVR and cardiomyopathy increases his morbidity and mortality requiring med tx and testing.    Patient Active Problem List    Diagnosis Date Noted    Multifocal pneumonia 01/28/2025    Chronic obstructive pulmonary disease (HCC) 01/28/2025    History of cardiac arrhythmia 01/28/2025    Elevated d-dimer 01/28/2025    Acute respiratory failure with hypoxia 01/28/2025    Severe protein-calorie malnutrition ([facility]) 01/28/2025    Sepsis ([facility]) 01/27/2025    Nonrheumatic tricuspid valve regurgitation 07/08/2024    History of substance abuse ([facility]) 07/08/2024    Acute HFrEF (heart failure with reduced ejection fraction) ([facility]) 07/06/2024    Cardiomyopathy ([facility]) 07/06/2024    Prolonged QT interval 07/06/2024    Severe tricuspid regurgitation 07/06/2024    Nonrheumatic aortic valve insufficiency 07/06/2024    Transaminitis 07/06/2024    Polysubstance abuse ([facility]) 07/06/2024    Left-sided chest pain 07/05/2024    Abnormal echocardiogram 07/05/2024    PSVT (paroxysmal supraventricular tachycardia) ([facility]) 07/04/2024    LUIS FELIPE (acute kidney injury) ([facility]) 07/04/2024    NSTEMI (non-ST elevated myocardial infarction)

## 2025-01-29 NOTE — PLAN OF CARE
Problem: Respiratory - Adult  Goal: Achieves optimal ventilation and oxygenation  Outcome: Progressing  Flowsheets (Taken 1/28/2025 2000)  Achieves optimal ventilation and oxygenation: Assess for changes in respiratory status     Problem: Skin/Tissue Integrity - Adult  Goal: Skin integrity remains intact  Description: 1.  Monitor for areas of redness and/or skin breakdown  2.  Assess vascular access sites hourly  3.  Every 4-6 hours minimum:  Change oxygen saturation probe site  4.  Every 4-6 hours:  If on nasal continuous positive airway pressure, respiratory therapy assess nares and determine need for appliance change or resting period  Recent Flowsheet Documentation  Taken 1/28/2025 2000 by Bladimir Rose RN  Skin Integrity Remains Intact: Monitor for areas of redness and/or skin breakdown  Goal: Incisions, wounds, or drain sites healing without S/S of infection  Recent Flowsheet Documentation  Taken 1/28/2025 2000 by Bladimir Rose RN  Incisions, Wounds, or Drain Sites Healing Without Sign and Symptoms of Infection: ADMISSION and DAILY: Assess and document risk factors for pressure ulcer development  Goal: Oral mucous membranes remain intact  Recent Flowsheet Documentation  Taken 1/28/2025 2000 by Bladimir Rose RN  Oral Mucous Membranes Remain Intact: Assess oral mucosa and hygiene practices     Problem: Discharge Planning  Goal: Discharge to home or other facility with appropriate resources  Recent Flowsheet Documentation  Taken 1/28/2025 2000 by Bladimir Rose RN  Discharge to home or other facility with appropriate resources: Identify barriers to discharge with patient and caregiver  Taken 1/28/2025 1040 by Jennifer Elizabeth RN  Discharge to home or other facility with appropriate resources:   Identify barriers to discharge with patient and caregiver   Arrange for needed discharge resources and transportation as appropriate   Identify discharge learning needs

## 2025-01-29 NOTE — DISCHARGE INSTRUCTIONS
Heart Failure Resources:  Heart Failure Interactive Workbook:  Go to [email or web address].Valderm/[email or web address] for a Free Heart Failure Interactive Workbook provided by The American [employer] Association. This interactive workbook will provide information on Healthier Living with Heart Failure. Please copy and paste link into search bar. Use your mouse to scroll through the pages.    HF Lanse yovany:   Heart Failure Free smart phone yovany available for iPhone and Android download. Use your phone to track sodium intake, fluid intake, symptoms, and weight.     Low Sodium Diet / Recipes:  Go to www.[employer].Semetric website for “renal” diet which is Low Sodium! [employer] is a dialysis company, but this website offers free seasonal cookbooks. Each quarter, they will release 25-30 new recipes with a breakdown of calories, sodium, and glucose. You can also go to wwwNG [employer]/recipes website for free recipes.     Discharge Instruction Video:  Scan the QR code below with your camera and click the canva.com link to open the video and watch educational information on Heart Failure and Medications from one of our nurses.   [email or web address]/edit    Home Exercise Program:   Identification of Green/Yellow/Red zones:  You should be able to identify when you feel good (green zone), if you have 1-2 symptoms of HF (yellow zone), or if you are in need of medical attention (red zone).  In your CHF education folder you were provided a “stop light tool” to outline this information.     We want to you to rate your exertion levels:    Our therapy team has discussed means of identification with you such as the \"Mann scale.\"  The Mann rating scale ranges from 6 to 20, where 6 means \"no exertion at all\" and 20 means \"maximal exertion.\" The goal is to use this to gauge how much effort it is taking for you to do your normal daily tasks.   You should be able to recognize when too much exertion is  being expended.    Elements of Energy Conservation:   Prioritize/Plan: Decide what needs to be done today, and what can wait for a later date, write to do lists, plan ahead to avoid extra trips, and gather supplies and equipment needed before starting an activity.   Position: Avoid tiring and awkward posture that may impair breathing.  Pace: Slow and steady pace, never rushing!  Pursed lip breathing.  Pursed Lip Breathing: \"Smell the roses, blow out the candles\".

## 2025-01-30 LAB
ANION GAP SERPL CALCULATED.3IONS-SCNC: 8 MMOL/L (ref 3–16)
ANISOCYTOSIS BLD QL SMEAR: ABNORMAL
BACTERIA SPEC RESP CULT: ABNORMAL
BACTERIA SPEC RESP CULT: ABNORMAL
BASOPHILS # BLD: 0 K/UL (ref 0–0.2)
BASOPHILS NFR BLD: 0 %
BUN SERPL-MCNC: 39 MG/DL (ref 7–20)
CALCIUM SERPL-MCNC: 7.9 MG/DL (ref 8.3–10.6)
CHLORIDE SERPL-SCNC: 101 MMOL/L (ref 99–110)
CO2 SERPL-SCNC: 28 MMOL/L (ref 21–32)
CREAT SERPL-MCNC: 0.8 MG/DL (ref 0.9–1.3)
DEPRECATED RDW RBC AUTO: 14.1 % (ref 12.4–15.4)
EOSINOPHIL # BLD: 0.1 K/UL (ref 0–0.6)
EOSINOPHIL NFR BLD: 1 %
GFR SERPLBLD CREATININE-BSD FMLA CKD-EPI: >90 ML/MIN/{1.73_M2}
GLUCOSE SERPL-MCNC: 122 MG/DL (ref 70–99)
GRAM STN SPEC: ABNORMAL
HCT VFR BLD AUTO: 36.7 % (ref 40.5–52.5)
HGB BLD-MCNC: 12.5 G/DL (ref 13.5–17.5)
LYMPHOCYTES # BLD: 1 K/UL (ref 1–5.1)
LYMPHOCYTES NFR BLD: 12 %
MCH RBC QN AUTO: 31 PG (ref 26–34)
MCHC RBC AUTO-ENTMCNC: 34 G/DL (ref 31–36)
MCV RBC AUTO: 91.1 FL (ref 80–100)
MONOCYTES # BLD: 0.2 K/UL (ref 0–1.3)
MONOCYTES NFR BLD: 3 %
NEUTROPHILS # BLD: 6.7 K/UL (ref 1.7–7.7)
NEUTROPHILS NFR BLD: 83 %
NEUTS BAND NFR BLD MANUAL: 1 % (ref 0–7)
ORGANISM: ABNORMAL
PLATELET # BLD AUTO: 159 K/UL (ref 135–450)
PLATELET BLD QL SMEAR: ADEQUATE
PMV BLD AUTO: 9.7 FL (ref 5–10.5)
POIKILOCYTOSIS BLD QL SMEAR: ABNORMAL
POTASSIUM SERPL-SCNC: 4.1 MMOL/L (ref 3.5–5.1)
RBC # BLD AUTO: 4.03 M/UL (ref 4.2–5.9)
SLIDE REVIEW: ABNORMAL
SODIUM SERPL-SCNC: 137 MMOL/L (ref 136–145)
WBC # BLD AUTO: 8 K/UL (ref 4–11)

## 2025-01-30 PROCEDURE — 99233 SBSQ HOSP IP/OBS HIGH 50: CPT | Performed by: INTERNAL MEDICINE

## 2025-01-30 PROCEDURE — 6370000000 HC RX 637 (ALT 250 FOR IP): Performed by: INTERNAL MEDICINE

## 2025-01-30 PROCEDURE — 2580000003 HC RX 258: Performed by: INTERNAL MEDICINE

## 2025-01-30 PROCEDURE — 36415 COLL VENOUS BLD VENIPUNCTURE: CPT

## 2025-01-30 PROCEDURE — 2700000000 HC OXYGEN THERAPY PER DAY

## 2025-01-30 PROCEDURE — 2000000000 HC ICU R&B

## 2025-01-30 PROCEDURE — 2500000003 HC RX 250 WO HCPCS: Performed by: INTERNAL MEDICINE

## 2025-01-30 PROCEDURE — 80048 BASIC METABOLIC PNL TOTAL CA: CPT

## 2025-01-30 PROCEDURE — 6360000002 HC RX W HCPCS: Performed by: INTERNAL MEDICINE

## 2025-01-30 PROCEDURE — 85025 COMPLETE CBC W/AUTO DIFF WBC: CPT

## 2025-01-30 PROCEDURE — 94761 N-INVAS EAR/PLS OXIMETRY MLT: CPT

## 2025-01-30 RX ORDER — ENOXAPARIN SODIUM 100 MG/ML
1 INJECTION SUBCUTANEOUS 2 TIMES DAILY
Status: DISCONTINUED | OUTPATIENT
Start: 2025-01-30 | End: 2025-02-01 | Stop reason: HOSPADM

## 2025-01-30 RX ORDER — DIGOXIN 0.25 MG/ML
250 INJECTION INTRAMUSCULAR; INTRAVENOUS EVERY 6 HOURS
Status: COMPLETED | OUTPATIENT
Start: 2025-01-30 | End: 2025-01-30

## 2025-01-30 RX ORDER — AMIODARONE HYDROCHLORIDE 200 MG/1
400 TABLET ORAL 2 TIMES DAILY
Status: DISCONTINUED | OUTPATIENT
Start: 2025-01-30 | End: 2025-02-01 | Stop reason: HOSPADM

## 2025-01-30 RX ORDER — METOPROLOL SUCCINATE 25 MG/1
12.5 TABLET, EXTENDED RELEASE ORAL DAILY
Status: DISCONTINUED | OUTPATIENT
Start: 2025-01-30 | End: 2025-02-01 | Stop reason: HOSPADM

## 2025-01-30 RX ADMIN — AMIODARONE HYDROCHLORIDE 400 MG: 200 TABLET ORAL at 08:15

## 2025-01-30 RX ADMIN — ENOXAPARIN SODIUM 60 MG: 100 INJECTION SUBCUTANEOUS at 08:15

## 2025-01-30 RX ADMIN — CEFTRIAXONE SODIUM 2000 MG: 2 INJECTION, POWDER, FOR SOLUTION INTRAMUSCULAR; INTRAVENOUS at 14:34

## 2025-01-30 RX ADMIN — ENOXAPARIN SODIUM 60 MG: 100 INJECTION SUBCUTANEOUS at 20:22

## 2025-01-30 RX ADMIN — MUPIROCIN: 20 OINTMENT TOPICAL at 07:36

## 2025-01-30 RX ADMIN — PANTOPRAZOLE SODIUM 40 MG: 40 TABLET, DELAYED RELEASE ORAL at 05:39

## 2025-01-30 RX ADMIN — DIGOXIN 250 MCG: 0.25 INJECTION INTRAMUSCULAR; INTRAVENOUS at 20:22

## 2025-01-30 RX ADMIN — BUPRENORPHINE HYDROCHLORIDE, NALOXONE HYDROCHLORIDE 2.5 FILM: 8; 2 FILM, SOLUBLE BUCCAL; SUBLINGUAL at 08:16

## 2025-01-30 RX ADMIN — DIGOXIN 250 MCG: 0.25 INJECTION INTRAMUSCULAR; INTRAVENOUS at 14:36

## 2025-01-30 RX ADMIN — SODIUM CHLORIDE, PRESERVATIVE FREE 10 ML: 5 INJECTION INTRAVENOUS at 20:25

## 2025-01-30 RX ADMIN — METOPROLOL SUCCINATE 12.5 MG: 25 TABLET, EXTENDED RELEASE ORAL at 08:14

## 2025-01-30 RX ADMIN — DIGOXIN 250 MCG: 0.25 INJECTION INTRAMUSCULAR; INTRAVENOUS at 08:15

## 2025-01-30 RX ADMIN — PREDNISONE 40 MG: 20 TABLET ORAL at 08:15

## 2025-01-30 RX ADMIN — Medication 5 MG: at 20:21

## 2025-01-30 RX ADMIN — MUPIROCIN: 20 OINTMENT TOPICAL at 20:24

## 2025-01-30 RX ADMIN — PANTOPRAZOLE SODIUM 40 MG: 40 TABLET, DELAYED RELEASE ORAL at 16:00

## 2025-01-30 RX ADMIN — ACETAMINOPHEN 650 MG: 325 TABLET ORAL at 20:21

## 2025-01-30 RX ADMIN — SODIUM CHLORIDE, PRESERVATIVE FREE 10 ML: 5 INJECTION INTRAVENOUS at 07:36

## 2025-01-30 RX ADMIN — AMIODARONE HYDROCHLORIDE 400 MG: 200 TABLET ORAL at 20:21

## 2025-01-30 RX ADMIN — ACETAMINOPHEN 650 MG: 325 TABLET ORAL at 08:14

## 2025-01-30 ASSESSMENT — PAIN DESCRIPTION - LOCATION
LOCATION: BACK
LOCATION: GENERALIZED

## 2025-01-30 ASSESSMENT — PAIN SCALES - GENERAL
PAINLEVEL_OUTOF10: 3
PAINLEVEL_OUTOF10: 3
PAINLEVEL_OUTOF10: 0

## 2025-01-30 ASSESSMENT — PAIN SCALES - WONG BAKER: WONGBAKER_NUMERICALRESPONSE: NO HURT

## 2025-01-30 NOTE — PROGRESS NOTES
Blood pressure 92/76, pulse (!) 123, temperature 98.1 °F (36.7 °C), temperature source Oral, resp. rate 16, height 1.702 m (5' 7\"), weight 64 kg (141 lb), SpO2 100%.    Shift assessment complete. Patient continues on 4.5 L O2 via HFNC. States he is coughing frequently today but non-productive. See doc flow. Nightly medications given see MAR. Patient with no complaints at this time. Call light and bedside table within easy reach.

## 2025-01-30 NOTE — CARE COORDINATION
Patient will dc to home with friends. Printout of [facility] locations for PCP given previously.    Cardiology has not signed off on patient yet.

## 2025-01-30 NOTE — PROGRESS NOTES
Blood pressure 95/76, pulse (!) 105, temperature 98.1 °F (36.7 °C), temperature source Oral, resp. rate 13, height 1.702 m (5' 7\"), weight 64 kg (141 lb), SpO2 100%.    Patient is on room air with O2 saturations staying >95%.   No complaints of SOB voiced.   No other changes at this time.

## 2025-01-30 NOTE — PROGRESS NOTES
P Pulmonary, Critical Care and Sleep Specialists                                 Pulmonary/Critical care  Consult /Progress Note :                                                                   ,    CC :worsening SOB ,hypotension ,A fib   Patient is being seen at the request of Dr Villegas  for a consultation for Sepsos ,pneumonia    Subjective  Doing better  Off Precedex  Off IVF     No chest pain   MAP 65       PHYSICAL EXAM:  Vitals:    01/30/25 0900   BP: (!) 70/54   Pulse: (!) 129   Resp: 15   Temp:    SpO2:          Gen: No distress.   Eyes: PERRL. No sclera icterus. No conjunctival injection.   ENT: No discharge. Pharynx clear.   Neck: Trachea midline. No obvious mass.    Resp: Rhonchi bilateral  CV: Regular rate. Regular rhythm. No murmur or rub. No edema. Peripheral pulses are 2+.  Capillary refill is less than 3 seconds.  GI: Non-tender. Non-distended. No hernia.   Skin: Warm and dry. No nodule on exposed extremities.   Lymph: No cervical LAD. No supraclavicular LAD.   M/S: No cyanosis. No joint deformity. No clubbing.   Neuro: Awake. Alert. Moves all four extremities.   Psych: Oriented x 3. No anxiety.     LABS:  CBC:   Recent Labs     01/28/25  0431 01/29/25  0432 01/30/25  0420   WBC 3.7* 6.7 8.0   HGB 12.0* 12.9* 12.5*   HCT 34.9* 37.9* 36.7*   MCV 90.4 91.2 91.1   * 158 159     BMP:   Recent Labs     01/28/25  0430 01/29/25  0432 01/30/25  0420    139 137   K 3.9 4.0 4.1   CL 97* 105 101   CO2 28 22 28   BUN 39* 49* 39*   CREATININE 1.0 1.0 0.8*     LIVER PROFILE:   Recent Labs     01/27/25  1229   AST 33   ALT 9*   BILITOT 1.6*   ALKPHOS 50     PT/INR: No results for input(s): \"PROTIME\", \"INR\" in the last 72 hours.  APTT: No results for input(s): \"APTT\" in the last 72 hours.  UA:  Recent Labs     01/27/25  1553 01/28/25  0055   COLORU DARK YELLOW*  --    PHUR 6.0 6.0   WBCUA 0-2  --    RBCUA  0-2  --    BACTERIA Rare*  --    CLARITYU Clear  --    LEUKOCYTESUR Negative  --    UROBILINOGEN 1.0  --    BILIRUBINUR Negative  --    BLOODU SMALL*  --    GLUCOSEU Negative  --    AMORPHOUS Rare  --        Microbiology:  Sent pan culture    Imaging:  Chest imaging was reviewed by me and showed large consolidation     Imaging:  Chest imaging was reviewed by me and showed   Severe multifocal pneumonia versus aspiration, greatest in the left lower  lobe.     Moderate wall thickening of the mid to distal esophagus favored to represent  esophagitis rather than mass.     Left hilar/mediastinal adenopathy.     Negative for pulmonary embolus.    ASSESSMENT:   Acute Hypoxic resp failure  Sepsis  A fib   Necrotizing Pneumonia   Cardiomyopathy   COPD   Cardiomyopathy with EF 45 %               PLAN:  Given 30 cc/k ,tolerated,stop IVF as had   Stop IVF maintenance   Get 2 d echo,EF was 35-40 %  Amio 400 mg bid   Digoxin   Metoprolol   Lovenox   POCUS dilated IVC  Awake ,alert with MAP 70 ,systolic 80-90 ,even he ambulated  *-work up for pneumonia   *-strep ,legionella  *-sputum culture strep pneumonia P strep ,MSSA ,On Rocephine Day /10   *- procal  *_ Agree with IV abx,Rocephin 2 g IV Rocephin   *-need repeat CT to make sure of resolution in 6 -8 weeks    If not improving ,will consider bronch   BD  Steroids if wheezing   No more boluses ,if hypotensive then will need line and pressors ,he declined central line for now  Urine drug screem  IV vanco ,cefepime       Ana Pacheco M.D.  1/30/2025  9:15 AM

## 2025-01-30 NOTE — PROGRESS NOTES
HEART FAILURE CARE PLAN:    Comorbidities Reviewed: Yes   Patient has a past medical history of Back pain, chronic, Chronic back pain, and MRSA (methicillin resistant staph aureus) culture positive.     Weights Reviewed: Yes   Admission weight: 63.5 kg (140 lb)   Wt Readings from Last 3 Encounters:   01/29/25 64 kg (141 lb)   07/09/24 56.3 kg (124 lb 1.6 oz)   07/04/24 63.5 kg (140 lb)     Intake & Output Reviewed: Yes     Intake/Output Summary (Last 24 hours) at 1/29/2025 1940  Last data filed at 1/29/2025 1633  Gross per 24 hour   Intake 4816.11 ml   Output 550 ml   Net 4266.11 ml       ECHOCARDIOGRAM Reviewed: Yes   Patient's Ejection Fraction (EF) is less than or equal to 40%. Discuss HFrEF Guideline Directed Medical Therapy (GDMT) with Cardiologist or Hospitalist:          Medications Reviewed: Yes   SCHEDULED HOSPITAL MEDICATIONS:   enoxaparin  1 mg/kg SubCUTAneous BID    cefTRIAXone (ROCEPHIN) IV  2,000 mg IntraVENous Q24H    pantoprazole  40 mg Oral BID AC    [Held by provider] furosemide  20 mg Oral Daily    buprenorphine-naloxone  2.5 Film SubLINGual Daily    [Held by provider] spironolactone  25 mg Oral Daily    [Held by provider] valsartan  40 mg Oral QPM    melatonin  5 mg Oral Nightly    mupirocin   Topical BID    sodium chloride flush  5-40 mL IntraVENous 2 times per day    predniSONE  40 mg Oral Daily     HOME MEDICATIONS:  Prior to Admission medications    Medication Sig Start Date End Date Taking? Authorizing Provider   Buprenorphine HCl-Naloxone HCl (SUBOXONE SL) Place 20 mg under the tongue daily Pt takes (2) 8 mg tablets and (2) 2 mg tablets daily in the morning   Yes Provider, MD Dirk   aspirin 81 MG EC tablet Take 1 tablet by mouth daily (Always take with food) 7/10/24   Andrade Acosta MD   valsartan (DIOVAN) 40 MG tablet Take 1 tablet by mouth every evening Hold for SBP < 90 7/9/24   Andrade Acosta MD   spironolactone (ALDACTONE) 25 MG tablet Take 1 tablet by mouth daily Hold

## 2025-01-30 NOTE — PLAN OF CARE
Problem: Respiratory - Adult  Goal: Achieves optimal ventilation and oxygenation  Outcome: Progressing     Problem: Skin/Tissue Integrity - Adult  Goal: Skin integrity remains intact  Description: 1.  Monitor for areas of redness and/or skin breakdown  2.  Assess vascular access sites hourly  3.  Every 4-6 hours minimum:  Change oxygen saturation probe site  4.  Every 4-6 hours:  If on nasal continuous positive airway pressure, respiratory therapy assess nares and determine need for appliance change or resting period  Outcome: Progressing  Flowsheets (Taken 1/29/2025 1939)  Skin Integrity Remains Intact:   Monitor for areas of redness and/or skin breakdown   Assess vascular access sites hourly     Problem: Neurosensory - Adult  Goal: Achieves stable or improved neurological status  Outcome: Progressing     Problem: Gastrointestinal - Adult  Goal: Minimal or absence of nausea and vomiting  Outcome: Progressing     Problem: Safety - Adult  Goal: Free from fall injury  Outcome: Progressing     Problem: Skin/Tissue Integrity  Goal: Skin integrity remains intact  Description: 1.  Monitor for areas of redness and/or skin breakdown  2.  Assess vascular access sites hourly  3.  Every 4-6 hours minimum:  Change oxygen saturation probe site  4.  Every 4-6 hours:  If on nasal continuous positive airway pressure, respiratory therapy assess nares and determine need for appliance change or resting period  Outcome: Progressing  Flowsheets (Taken 1/29/2025 1939)  Skin Integrity Remains Intact:   Monitor for areas of redness and/or skin breakdown   Assess vascular access sites hourly

## 2025-01-30 NOTE — PROGRESS NOTES
AM assessment completed. AM labs reviewed. Pt on RA. Amio gtt continues -130's at times. PIV x3 WNL. No needs expressed. Will monitor.

## 2025-01-30 NOTE — PROGRESS NOTES
Progress Note    Admit Date:  1/27/2025    Admitted for multifocal PNA    Subjective:  Mr. Soto was transferred to the ICU yesterday for worsening hypoxia.  He was initially on Vapotherm and subsequently high flow oxygen.  Currently on 8 L.  Was agitated.  Placed on Precedex drip.  On amiodarone drip for A-fib with RVR    1/30   Continues to have an elevated HR  IV amio--> po amio  Iv dig started   On RA     Objective:        Intake/Output Summary (Last 24 hours) at 1/30/2025 0747  Last data filed at 1/30/2025 0547  Gross per 24 hour   Intake 1557.24 ml   Output 250 ml   Net 1307.24 ml       Physical Exam:      Gen: Ill appearing Alert.   Eyes: PERRL. No sclera icterus. No conjunctival injection.   ENT: No discharge. Pharynx clear.   Neck: No JVD.  Trachea midline.  Resp: no accessory muscle use.+ crackles. No wheezes. no rhonchi.   CV:  irregular rate and rhythm. No murmur.  No rub. No edema.   Capillary Refill: Brisk,< 3 seconds   Peripheral Pulses: +2 palpable, equal bilaterally   GI: Non-tender. Non-distended.  Normal bowel sounds.  Skin: Warm and dry. No nodule on exposed extremities. No rash on exposed extremities.   M/S: No cyanosis. No joint deformity. No clubbing.   Neuro: Awake. Grossly nonfocal    Psych: Oriented x 3. No anxiety or agitation.      Scheduled Meds:   enoxaparin  1 mg/kg SubCUTAneous BID    cefTRIAXone (ROCEPHIN) IV  2,000 mg IntraVENous Q24H    pantoprazole  40 mg Oral BID AC    [Held by provider] furosemide  20 mg Oral Daily    buprenorphine-naloxone  2.5 Film SubLINGual Daily    [Held by provider] spironolactone  25 mg Oral Daily    [Held by provider] valsartan  40 mg Oral QPM    melatonin  5 mg Oral Nightly    mupirocin   Topical BID    sodium chloride flush  5-40 mL IntraVENous 2 times per day    predniSONE  40 mg Oral Daily       Continuous Infusions:   dexmedeTOMIDine HCl in NaCl Stopped (01/29/25 1049)    amiodarone 0.5 mg/min (01/30/25 6892)    sodium chloride         PRN

## 2025-01-30 NOTE — PROGRESS NOTES
[facility] Daily Progress Note      Admit Date:  1/27/2025    Subjective:  Mr. Soto is being seen today for f/u afib and cardiomyopathy. He feels better today denies any complaints now. He refused central line for pressors. Now on room air only     Objective:   BP 92/75   Pulse (!) 110   Temp 98.1 °F (36.7 °C) (Oral)   Resp 12   Ht 1.702 m (5' 7\")   Wt 64 kg (141 lb)   SpO2 100%   BMI 22.08 kg/m²     Intake/Output Summary (Last 24 hours) at 1/30/2025 0724  Last data filed at 1/30/2025 0547  Gross per 24 hour   Intake 1557.24 ml   Output 250 ml   Net 1307.24 ml       TELEMETRY: afib 110-120bpm     Physical Exam:  General:  Awake, alert, NAD  Skin:  Warm and dry  Neck:  JVD none  Chest:  +crackles right base but improved  Cardiovascular: +irregularly irregular; S1S2  Abdomen:  Soft NT  Extremities:  no edema    Medications:    enoxaparin  1 mg/kg SubCUTAneous BID    cefTRIAXone (ROCEPHIN) IV  2,000 mg IntraVENous Q24H    pantoprazole  40 mg Oral BID AC    [Held by provider] furosemide  20 mg Oral Daily    buprenorphine-naloxone  2.5 Film SubLINGual Daily    [Held by provider] spironolactone  25 mg Oral Daily    [Held by provider] valsartan  40 mg Oral QPM    melatonin  5 mg Oral Nightly    mupirocin   Topical BID    sodium chloride flush  5-40 mL IntraVENous 2 times per day    predniSONE  40 mg Oral Daily      dexmedeTOMIDine HCl in NaCl Stopped (01/29/25 1049)    amiodarone 0.5 mg/min (01/30/25 0583)    sodium chloride       perflutren lipid microspheres, sodium chloride flush, sodium chloride, ondansetron **OR** ondansetron, polyethylene glycol, acetaminophen **OR** acetaminophen    Lab Data:  CBC:   Recent Labs     01/28/25  0431 01/29/25  0432 01/30/25  0420   WBC 3.7* 6.7 8.0   HGB 12.0* 12.9* 12.5*   HCT 34.9* 37.9* 36.7*   MCV 90.4 91.2 91.1   * 158 159     BMP:   Recent Labs     01/28/25  0430 01/29/25  0432 01/30/25  0420    139 137   K 3.9 4.0 4.1   CL 97* 105 101   CO2

## 2025-01-31 LAB
ANION GAP SERPL CALCULATED.3IONS-SCNC: 5 MMOL/L (ref 3–16)
BUN SERPL-MCNC: 29 MG/DL (ref 7–20)
CALCIUM SERPL-MCNC: 8.3 MG/DL (ref 8.3–10.6)
CHLORIDE SERPL-SCNC: 104 MMOL/L (ref 99–110)
CO2 SERPL-SCNC: 32 MMOL/L (ref 21–32)
CREAT SERPL-MCNC: 0.8 MG/DL (ref 0.9–1.3)
DEPRECATED RDW RBC AUTO: 13.9 % (ref 12.4–15.4)
GFR SERPLBLD CREATININE-BSD FMLA CKD-EPI: >90 ML/MIN/{1.73_M2}
GLUCOSE SERPL-MCNC: 100 MG/DL (ref 70–99)
HCT VFR BLD AUTO: 34.1 % (ref 40.5–52.5)
HGB BLD-MCNC: 11.7 G/DL (ref 13.5–17.5)
MCH RBC QN AUTO: 31.5 PG (ref 26–34)
MCHC RBC AUTO-ENTMCNC: 34.3 G/DL (ref 31–36)
MCV RBC AUTO: 91.9 FL (ref 80–100)
PLATELET # BLD AUTO: 181 K/UL (ref 135–450)
PMV BLD AUTO: 9.6 FL (ref 5–10.5)
POTASSIUM SERPL-SCNC: 4.3 MMOL/L (ref 3.5–5.1)
RBC # BLD AUTO: 3.72 M/UL (ref 4.2–5.9)
SODIUM SERPL-SCNC: 141 MMOL/L (ref 136–145)
WBC # BLD AUTO: 10.4 K/UL (ref 4–11)

## 2025-01-31 PROCEDURE — 6370000000 HC RX 637 (ALT 250 FOR IP): Performed by: INTERNAL MEDICINE

## 2025-01-31 PROCEDURE — 85027 COMPLETE CBC AUTOMATED: CPT

## 2025-01-31 PROCEDURE — 99232 SBSQ HOSP IP/OBS MODERATE 35: CPT | Performed by: INTERNAL MEDICINE

## 2025-01-31 PROCEDURE — 2060000000 HC ICU INTERMEDIATE R&B

## 2025-01-31 PROCEDURE — 6360000002 HC RX W HCPCS: Performed by: INTERNAL MEDICINE

## 2025-01-31 PROCEDURE — 2580000003 HC RX 258: Performed by: INTERNAL MEDICINE

## 2025-01-31 PROCEDURE — 80048 BASIC METABOLIC PNL TOTAL CA: CPT

## 2025-01-31 PROCEDURE — 2500000003 HC RX 250 WO HCPCS: Performed by: INTERNAL MEDICINE

## 2025-01-31 PROCEDURE — 99233 SBSQ HOSP IP/OBS HIGH 50: CPT | Performed by: INTERNAL MEDICINE

## 2025-01-31 PROCEDURE — 36415 COLL VENOUS BLD VENIPUNCTURE: CPT

## 2025-01-31 RX ORDER — PREDNISONE 20 MG/1
TABLET ORAL
Qty: 13 TABLET | Refills: 0 | Status: SHIPPED | OUTPATIENT
Start: 2025-02-01

## 2025-01-31 RX ORDER — CEFDINIR 300 MG/1
300 CAPSULE ORAL 2 TIMES DAILY
Qty: 10 CAPSULE | Refills: 0 | Status: SHIPPED | OUTPATIENT
Start: 2025-01-31 | End: 2025-02-05

## 2025-01-31 RX ORDER — METOPROLOL SUCCINATE 25 MG/1
12.5 TABLET, EXTENDED RELEASE ORAL DAILY
Qty: 30 TABLET | Refills: 1 | Status: SHIPPED | OUTPATIENT
Start: 2025-02-01

## 2025-01-31 RX ORDER — PANTOPRAZOLE SODIUM 40 MG/1
40 TABLET, DELAYED RELEASE ORAL
Qty: 60 TABLET | Refills: 1 | Status: SHIPPED | OUTPATIENT
Start: 2025-01-31

## 2025-01-31 RX ORDER — AMIODARONE HYDROCHLORIDE 200 MG/1
TABLET ORAL
Qty: 60 TABLET | Refills: 0 | Status: SHIPPED | OUTPATIENT
Start: 2025-01-31

## 2025-01-31 RX ADMIN — AMIODARONE HYDROCHLORIDE 400 MG: 200 TABLET ORAL at 07:57

## 2025-01-31 RX ADMIN — CEFTRIAXONE SODIUM 2000 MG: 2 INJECTION, POWDER, FOR SOLUTION INTRAMUSCULAR; INTRAVENOUS at 09:42

## 2025-01-31 RX ADMIN — SODIUM CHLORIDE, PRESERVATIVE FREE 10 ML: 5 INJECTION INTRAVENOUS at 21:06

## 2025-01-31 RX ADMIN — PANTOPRAZOLE SODIUM 40 MG: 40 TABLET, DELAYED RELEASE ORAL at 16:38

## 2025-01-31 RX ADMIN — AMIODARONE HYDROCHLORIDE 400 MG: 200 TABLET ORAL at 21:06

## 2025-01-31 RX ADMIN — ENOXAPARIN SODIUM 60 MG: 100 INJECTION SUBCUTANEOUS at 21:06

## 2025-01-31 RX ADMIN — METOPROLOL SUCCINATE 12.5 MG: 25 TABLET, EXTENDED RELEASE ORAL at 07:56

## 2025-01-31 RX ADMIN — Medication 5 MG: at 21:06

## 2025-01-31 RX ADMIN — ENOXAPARIN SODIUM 60 MG: 100 INJECTION SUBCUTANEOUS at 09:53

## 2025-01-31 RX ADMIN — PANTOPRAZOLE SODIUM 40 MG: 40 TABLET, DELAYED RELEASE ORAL at 06:08

## 2025-01-31 RX ADMIN — PREDNISONE 40 MG: 20 TABLET ORAL at 07:56

## 2025-01-31 RX ADMIN — MUPIROCIN: 20 OINTMENT TOPICAL at 07:57

## 2025-01-31 RX ADMIN — SODIUM CHLORIDE, PRESERVATIVE FREE 10 ML: 5 INJECTION INTRAVENOUS at 09:54

## 2025-01-31 RX ADMIN — SACUBITRIL AND VALSARTAN 0.5 TABLET: 24; 26 TABLET, FILM COATED ORAL at 21:06

## 2025-01-31 RX ADMIN — BUPRENORPHINE HYDROCHLORIDE, NALOXONE HYDROCHLORIDE 2.5 FILM: 8; 2 FILM, SOLUBLE BUCCAL; SUBLINGUAL at 08:02

## 2025-01-31 RX ADMIN — SACUBITRIL AND VALSARTAN 0.5 TABLET: 24; 26 TABLET, FILM COATED ORAL at 11:32

## 2025-01-31 NOTE — PROGRESS NOTES
[facility]                                 Pulmonary/Critical care  Consult /Progress Note :                                                                   ,    CC :worsening SOB ,hypotension ,A fib   Patient is being seen at the request of Dr Villegas  for a consultation for Sepsos ,pneumonia    Subjective  Doing better  Off Precedex  Off IVF   HR sinus  80   No chest pain   MAP 65       PHYSICAL EXAM:  Vitals:    01/31/25 0900   BP: 105/71   Pulse: 67   Resp: 16   Temp:    SpO2: 94%         Gen: No distress.   Eyes: PERRL. No sclera icterus. No conjunctival injection.   ENT: No discharge. Pharynx clear.   Neck: Trachea midline. No obvious mass.    Resp: Rhonchi bilateral  CV: Regular rate. Regular rhythm. No murmur or rub. No edema. Peripheral pulses are 2+.  Capillary refill is less than 3 seconds.  GI: Non-tender. Non-distended. No hernia.   Skin: Warm and dry. No nodule on exposed extremities.   Lymph: No cervical LAD. No supraclavicular LAD.   M/S: No cyanosis. No joint deformity. No clubbing.   Neuro: Awake. Alert. Moves all four extremities.   Psych: Oriented x 3. No anxiety.     LABS:  CBC:   Recent Labs     01/29/25  0432 01/30/25  0420 01/31/25  0514   WBC 6.7 8.0 10.4   HGB 12.9* 12.5* 11.7*   HCT 37.9* 36.7* 34.1*   MCV 91.2 91.1 91.9    159 181     BMP:   Recent Labs     01/29/25  0432 01/30/25  0420 01/31/25  0514    137 141   K 4.0 4.1 4.3    101 104   CO2 22 28 32   BUN 49* 39* 29*   CREATININE 1.0 0.8* 0.8*     LIVER PROFILE:   No results for input(s): \"AST\", \"ALT\", \"LIPASE\", \"AMYLASE\", \"BILIDIR\", \"BILITOT\", \"ALKPHOS\" in the last 72 hours.    Invalid input(s): \"ALB\"    PT/INR: No results for input(s): \"PROTIME\", \"INR\" in the last 72 hours.  APTT: No results for input(s): \"APTT\" in the last 72 hours.  UA:  No results for input(s): \"NITRITE\", \"COLORU\",  \"PHUR\", \"LABCAST\", \"WBCUA\", \"RBCUA\", \"MUCUS\", \"TRICHOMONAS\", \"YEAST\", \"BACTERIA\", \"CLARITYU\", \"SPECGRAV\", \"LEUKOCYTESUR\", \"UROBILINOGEN\", \"BILIRUBINUR\", \"BLOODU\", \"GLUCOSEU\", \"AMORPHOUS\" in the last 72 hours.    Invalid input(s): \"KETONESU\"      Microbiology:  Sent pan culture    Imaging:  Chest imaging was reviewed by me and showed large consolidation     Imaging:  Chest imaging was reviewed by me and showed   Severe multifocal pneumonia versus aspiration, greatest in the left lower  lobe.     Moderate wall thickening of the mid to distal esophagus favored to represent  esophagitis rather than mass.     Left hilar/mediastinal adenopathy.     Negative for pulmonary embolus.    ASSESSMENT:   Acute Hypoxic resp failure  Sepsis  A fib   Necrotizing Pneumonia   Cardiomyopathy   COPD   Cardiomyopathy with EF 45 %               PLAN:  Given 30 cc/k ,tolerated,stop IVF as had   Stop IVF maintenance   Get 2 d echo,EF was 35-40 %  Amio 400 mg bid   Metoprolol   Lovenox as per cardiology  POCUS dilated IVC  Awake ,alert with MAP 70 ,systolic 80-90 ,even he ambulated  *-work up for pneumonia .MSSA and strep pneumonia     *-sputum culture strep pneumonia P strep ,MSSA ,On Rocephine Day /10   *- procal  *_ Agree with IV abx,Rocephin 2 g IV Rocephin Days ,had 1 day vancomycin and cefepime   *-need repeat CT to make sure of resolution in 6 -8 weeks    If not improving ,will consider bronch   BD  Steroids if wheezing   No more boluses ,if hypotensive then will need line and pressors ,he declined central line for now  Urine drug screem   Trnasfer step down   [id number] Score for Atrial Fibrillation Stroke Risk   Risk   Factors  Component Value   C CHF No 0   H HTN No 0   A2 Age >= 75 No,  (59 y.o.) 0   D DM No 0   S2 Prior Stroke/TIA No 0   V Vascular Disease Yes 1   A Age 65-74 No,  (59 y.o.) 0   Sc Sex male 0    [id number]  Score  1   Score last updated 1/31/25 9:12 AM EST    Click here for a link to the UpToDate guideline

## 2025-01-31 NOTE — PROGRESS NOTES
HEART FAILURE CARE PLAN:    Comorbidities Reviewed: Yes   Patient has a past medical history of Back pain, chronic, Chronic back pain, and MRSA (methicillin resistant staph aureus) culture positive.     Weights Reviewed: Yes   Admission weight: 63.5 kg (140 lb)   Wt Readings from Last 3 Encounters:   01/29/25 64 kg (141 lb)   07/09/24 56.3 kg (124 lb 1.6 oz)   07/04/24 63.5 kg (140 lb)     Intake & Output Reviewed: Yes     Intake/Output Summary (Last 24 hours) at 1/30/2025 2125  Last data filed at 1/30/2025 1809  Gross per 24 hour   Intake 380.46 ml   Output 700 ml   Net -319.54 ml       ECHOCARDIOGRAM Reviewed: Yes   Patient's Ejection Fraction (EF) is less than or equal to 40%. Discuss HFrEF Guideline Directed Medical Therapy (GDMT) with Cardiologist or Hospitalist:          Medications Reviewed: Yes   SCHEDULED HOSPITAL MEDICATIONS:   metoprolol succinate  12.5 mg Oral Daily    amiodarone  400 mg Oral BID    enoxaparin  1 mg/kg SubCUTAneous BID    cefTRIAXone (ROCEPHIN) IV  2,000 mg IntraVENous Q24H    pantoprazole  40 mg Oral BID AC    [Held by provider] furosemide  20 mg Oral Daily    buprenorphine-naloxone  2.5 Film SubLINGual Daily    [Held by provider] spironolactone  25 mg Oral Daily    [Held by provider] valsartan  40 mg Oral QPM    melatonin  5 mg Oral Nightly    mupirocin   Topical BID    sodium chloride flush  5-40 mL IntraVENous 2 times per day    predniSONE  40 mg Oral Daily     HOME MEDICATIONS:  Prior to Admission medications    Medication Sig Start Date End Date Taking? Authorizing Provider   Buprenorphine HCl-Naloxone HCl (SUBOXONE SL) Place 20 mg under the tongue daily Pt takes (2) 8 mg tablets and (2) 2 mg tablets daily in the morning   Yes Provider, MD Dirk   aspirin 81 MG EC tablet Take 1 tablet by mouth daily (Always take with food) 7/10/24   Andrade Acosta MD   valsartan (DIOVAN) 40 MG tablet Take 1 tablet by mouth every evening Hold for SBP < 90 7/9/24   Andrade Acosta MD

## 2025-01-31 NOTE — PROGRESS NOTES
Enoxaparin Monitoring    Recent Labs     01/30/25  0420 01/31/25  0514   CREATININE 0.8* 0.8*     Recent Labs     01/31/25  0514   HGB 11.7*   HCT 34.1*        Estimated Creatinine Clearance: 90 mL/min (A) (based on SCr of 0.8 mg/dL (L)).  Wt Readings from Last 1 Encounters:   01/29/25 64 kg (141 lb)       The guidance below is to provide initial recommendations for dosing. If recommended dose does not align well with patient's current clinical picture, communications with the care team will occur to determine most appropriate medication and dose.    Do not exceed enoxaparin 40mg daily or UFH 5000 units SUBQ TID in patients with epidurals, lumbar drains, or external ventricular drains    TABLE 2.  ENOXAPARIN TREATMENT DOSING   (Based on 1mg/kg BID for DVT/PE/AFib)   Patient Weight (kg)     50.9 and below .9 151-189.9 190 or greater         Estimated CrCl  (ml/min) 30 or greater Recommend [facility] standardized UFH infusion, apixaban or rivaroxaban 1mg/kg SUBQ BID 1mg/kg SUBQ BID if anti-Xa levels are feasible per institution.  Alternatively,  recommend switch to [facility] standardized UFH infusion     Recommend switch to [facility] standardized UFH infusion.      15-29 Recommend [facility] standardized UFH infusion or apixaban 1mg/kg SUBQ daily Recommend switch to [facility] standardized UFH infusion     Less than 15 or Dialysis Recommend switch to [facility] standardized UFH infusion.     Changed to treatment dosing last night    Waqar Corcoran [facility] 1/31/2025 12:45 PM

## 2025-01-31 NOTE — PROGRESS NOTES
Spoke with outpatient pharmacy able to run meds via insurance for Entresto and Jardiance for 30 day supply and both covered via [place] medicaid insurance with zero copay

## 2025-01-31 NOTE — PROGRESS NOTES
[facility] [place] Daily Progress Note      Admit Date:  1/27/2025    Subjective:  Mr. Soto is being seen today for f/u afib and cardiomyopathy. He feels better today denies any complaints now and comfortable. Now on room air only .Converted to NSR last night.    Objective:   /88   Pulse 62   Temp 97.8 °F (36.6 °C) (Temporal)   Resp 19   Ht 1.702 m (5' 7\")   Wt 64 kg (141 lb)   SpO2 96%   BMI 22.08 kg/m²     Intake/Output Summary (Last 24 hours) at 1/31/2025 0733  Last data filed at 1/30/2025 1809  Gross per 24 hour   Intake --   Output 700 ml   Net -700 ml       TELEMETRY: NSR 75bpm     Physical Exam:  General:  Awake, alert, NAD  Skin:  Warm and dry  Neck:  JVD none  Chest:  +soft crackles right base improved  Cardiovascular: regular; S1S2  Abdomen:  Soft NT  Extremities:  no edema    Medications:    metoprolol succinate  12.5 mg Oral Daily    amiodarone  400 mg Oral BID    enoxaparin  1 mg/kg SubCUTAneous BID    cefTRIAXone (ROCEPHIN) IV  2,000 mg IntraVENous Q24H    pantoprazole  40 mg Oral BID AC    [Held by provider] furosemide  20 mg Oral Daily    buprenorphine-naloxone  2.5 Film SubLINGual Daily    [Held by provider] spironolactone  25 mg Oral Daily    [Held by provider] valsartan  40 mg Oral QPM    melatonin  5 mg Oral Nightly    mupirocin   Topical BID    sodium chloride flush  5-40 mL IntraVENous 2 times per day    predniSONE  40 mg Oral Daily      sodium chloride       perflutren lipid microspheres, sodium chloride flush, sodium chloride, ondansetron **OR** ondansetron, polyethylene glycol, acetaminophen **OR** acetaminophen    Lab Data:  CBC:   Recent Labs     01/29/25  0432 01/30/25  0420 01/31/25  0514   WBC 6.7 8.0 10.4   HGB 12.9* 12.5* 11.7*   HCT 37.9* 36.7* 34.1*   MCV 91.2 91.1 91.9    159 181     BMP:   Recent Labs     01/29/25  0432 01/30/25  0420 01/31/25  0514    137 141   K 4.0 4.1 4.3    101 104   CO2 22 28 32   BUN 49* 39* 29*   CREATININE 1.0  cardiomyopathy increases his morbidity and mortality requiring med tx and testing.    Patient Active Problem List    Diagnosis Date Noted    New onset a-fib ([facility]) 01/29/2025    Hypotension 01/29/2025    Multifocal pneumonia 01/28/2025    COPD with acute exacerbation ([facility]) 01/28/2025    History of cardiac arrhythmia 01/28/2025    Elevated d-dimer 01/28/2025    Acute respiratory failure with hypoxia 01/28/2025    Severe protein-calorie malnutrition ([facility]) 01/28/2025    Sepsis ([facility]) 01/27/2025    Nonrheumatic tricuspid valve regurgitation 07/08/2024    History of substance abuse ([facility]) 07/08/2024    Acute HFrEF (heart failure with reduced ejection fraction) ([facility]) 07/06/2024    Cardiomyopathy ([facility]) 07/06/2024    Prolonged QT interval 07/06/2024    Severe tricuspid regurgitation 07/06/2024    Nonrheumatic aortic valve insufficiency 07/06/2024    Transaminitis 07/06/2024    Polysubstance abuse ([facility]) 07/06/2024    Left-sided chest pain 07/05/2024    Abnormal echocardiogram 07/05/2024    PSVT (paroxysmal supraventricular tachycardia) ([facility]) 07/04/2024    LUIS FELIPE (acute kidney injury) ([facility]) 07/04/2024    NSTEMI (non-ST elevated myocardial infarction) ([facility]) 07/02/2024     Julio Cesar Morin MD, MD 1/31/2025 7:33 AM

## 2025-01-31 NOTE — FLOWSHEET NOTE
01/31/25 1545   Vital Signs   Temp 97.9 °F (36.6 °C)   Temp Source Oral   Pulse 52   Heart Rate Source Monitor   Respirations 15   /66   MAP (Calculated) 79   BP Location Right upper arm   BP Method Automatic   Patient Position High fowlers     Transfer assessment & vital signs completed. Pt oriented to room and call light. Pt denies any further needs at this time. Call light within reach, pt is stable.

## 2025-01-31 NOTE — PLAN OF CARE
Problem: Respiratory - Adult  Goal: Achieves optimal ventilation and oxygenation  Outcome: Progressing     Problem: Skin/Tissue Integrity - Adult  Goal: Skin integrity remains intact  Description: 1.  Monitor for areas of redness and/or skin breakdown  2.  Assess vascular access sites hourly  3.  Every 4-6 hours minimum:  Change oxygen saturation probe site  4.  Every 4-6 hours:  If on nasal continuous positive airway pressure, respiratory therapy assess nares and determine need for appliance change or resting period  Outcome: Progressing  Flowsheets (Taken 1/30/2025 2017)  Skin Integrity Remains Intact: Monitor for areas of redness and/or skin breakdown     Problem: Cardiovascular - Adult  Goal: Maintains optimal cardiac output and hemodynamic stability  Outcome: Progressing  Flowsheets (Taken 1/30/2025 2017)  Maintains optimal cardiac output and hemodynamic stability: Monitor blood pressure and heart rate     Problem: Safety - Adult  Goal: Free from fall injury  Outcome: Progressing     Problem: Skin/Tissue Integrity  Goal: Skin integrity remains intact  Description: 1.  Monitor for areas of redness and/or skin breakdown  2.  Assess vascular access sites hourly  3.  Every 4-6 hours minimum:  Change oxygen saturation probe site  4.  Every 4-6 hours:  If on nasal continuous positive airway pressure, respiratory therapy assess nares and determine need for appliance change or resting period  Outcome: Progressing  Flowsheets (Taken 1/30/2025 2017)  Skin Integrity Remains Intact: Monitor for areas of redness and/or skin breakdown

## 2025-01-31 NOTE — FLOWSHEET NOTE
01/31/25 0700   Vital Signs   Temp 97.8 °F (36.6 °C)   Pulse 62   Heart Rate Source Monitor   Respirations 19   /88   MAP (Calculated) 98   MAP (mmHg) 98   BP Method Automatic   Oxygen Therapy   SpO2 96 %   O2 Device None (Room air)     Patient assessment complete,sitting up on side of bed.Call light in reach.

## 2025-01-31 NOTE — PROGRESS NOTES
[facility]   HEART FAILURE PROGRAM      NAME:  Dayton Soto  AGE: 59 y.o.   GENDER: male  : 1965  TODAY'S DATE:  2025    Subjective:     VISIT TYPE: Education    ADMIT DATE: 2025    PAST MEDICAL HISTORY:      Diagnosis Date    Back pain, chronic     Chronic back pain     MRSA (methicillin resistant staph aureus) culture positive 2020    RIGHT FOREARM     HOME MEDICATIONS:  Prior to Admission medications    Medication Sig Start Date End Date Taking? Authorizing Provider   amiodarone (CORDARONE) 200 MG tablet 2 bid - 5 days, 2 qd- 1 week, 1 qd thereafter 25  Yes Brittany Shin MD   empagliflozin (JARDIANCE) 10 MG tablet Take 1 tablet by mouth daily 25  Yes Brittany Shin MD   apixaban (ELIQUIS) 5 MG TABS tablet Take 1 tablet by mouth 2 times daily 25  Yes Brittany Shin MD   metoprolol succinate (TOPROL XL) 25 MG extended release tablet Take 0.5 tablets by mouth daily 25  Yes Brittany Shin MD   sacubitril-valsartan (ENTRESTO) 24-26 MG per tablet Take 0.5 tablets by mouth 2 times daily 25  Yes Brittany Shin MD   predniSONE (DELTASONE) 20 MG tablet 2 qd- 2 days, 1 1/2 qd- 3 days, 1 qd- 3 days, 1/2 qd- 3 days 25  Yes Brittany Shin MD   pantoprazole (PROTONIX) 40 MG tablet Take 1 tablet by mouth 2 times daily (before meals) 25  Yes Brittany Shin MD   cefdinir (OMNICEF) 300 MG capsule Take 1 capsule by mouth 2 times daily for 5 days 25 Yes Brittany Shin MD   Buprenorphine HCl-Naloxone HCl (SUBOXONE SL) Place 20 mg under the tongue daily Pt takes (2) 8 mg tablets and (2) 2 mg tablets daily in the morning   Yes Provider, MD Dirk   aspirin 81 MG EC tablet Take 1 tablet by mouth daily (Always take with food) 7/10/24   Andrade Acosta MD   spironolactone (ALDACTONE) 25 MG tablet Take 1 tablet by mouth daily Hold for

## 2025-01-31 NOTE — PROGRESS NOTES
Patient educated on discharge instructions as well as new medications use, dosage, administration and possible side effects.  Patient verified knowledge. IV removed without difficulty and dry dressing in place. Telemetry monitor removed. Pt left facility in stable condition to [facility] with all of their personal belongings.  Plan of care reviewed with caregivers

## 2025-01-31 NOTE — PROGRESS NOTES
Blood pressure 92/64, pulse 69, temperature 97.5 °F (36.4 °C), temperature source Oral, resp. rate 17, height 1.702 m (5' 7\"), weight 64 kg (141 lb), SpO2 98%.    Shift assessment complete. See doc flow. Nightly medications given see MAR. Patient complains of generalized pain, PRN tylenol given See MAR. Call light and bedside table within easy reach.

## 2025-01-31 NOTE — PROGRESS NOTES
[facility]   COPD PROGRAM      NAME:  Dayton Soto  AGE: 59 y.o.   GENDER: male  : 1965  TODAY'S DATE:  2025    Subjective:     VISIT TYPE: Education    ADMIT DATE: 2025    PAST MEDICAL HISTORY:      Diagnosis Date    Back pain, chronic     Chronic back pain     MRSA (methicillin resistant staph aureus) culture positive 2020    RIGHT FOREARM     HOME MEDICATIONS:  Prior to Admission medications    Medication Sig Start Date End Date Taking? Authorizing Provider   amiodarone (CORDARONE) 200 MG tablet 2 bid - 5 days, 2 qd- 1 week, 1 qd thereafter 25  Yes Brittany Shin MD   empagliflozin (JARDIANCE) 10 MG tablet Take 1 tablet by mouth daily 25  Yes Brittany Shin MD   apixaban (ELIQUIS) 5 MG TABS tablet Take 1 tablet by mouth 2 times daily 25  Yes Brittany Shin MD   metoprolol succinate (TOPROL XL) 25 MG extended release tablet Take 0.5 tablets by mouth daily 25  Yes Brittany Shin MD   sacubitril-valsartan (ENTRESTO) 24-26 MG per tablet Take 0.5 tablets by mouth 2 times daily 25  Yes Brittany Shin MD   predniSONE (DELTASONE) 20 MG tablet 2 qd- 2 days, 1 1/2 qd- 3 days, 1 qd- 3 days, 1/2 qd- 3 days 25  Yes Brittany Shin MD   pantoprazole (PROTONIX) 40 MG tablet Take 1 tablet by mouth 2 times daily (before meals) 25  Yes Brittany Shin MD   cefdinir (OMNICEF) 300 MG capsule Take 1 capsule by mouth 2 times daily for 5 days 25 Yes Brittany Shin MD   Buprenorphine HCl-Naloxone HCl (SUBOXONE SL) Place 20 mg under the tongue daily Pt takes (2) 8 mg tablets and (2) 2 mg tablets daily in the morning   Yes Provider, MD Dirk   aspirin 81 MG EC tablet Take 1 tablet by mouth daily (Always take with food) 7/10/24   Andrade Acosta MD   spironolactone (ALDACTONE) 25 MG tablet Take 1 tablet by mouth daily Hold for SBP<95 mmHg  7/10/24   Andrade Acosta MD   furosemide (LASIX) 20 MG tablet Take 1 tablet by mouth daily 7/10/24   Andrade Acosta MD      Objective:     ADMISSION DIAGNOSIS:   Dehydration [E86.0]  Hypoxemia [R09.02]  Lactic acidosis [E87.20]  History of cardiac arrhythmia [Z86.79]  Elevated d-dimer [R79.89]  Sepsis (HCC) [A41.9]  Pneumonia of left lower lobe due to infectious organism [J18.9]  Chronic obstructive pulmonary disease, unspecified COPD type (HCC) [J44.9]  Multifocal pneumonia [J18.9]  Cardiomyopathy, unspecified type (HCC) [I42.9]  Left-sided chest pain [R07.9]    CXR Findings:  No results found.    Assessment:     Patient sitting in bed at this time on room air.  Pt denies shortness of breath; no complaints of chest pain. Stated at home was having shortness of breath.    [facility] will call patient on 2/3/25 at 1pm to discuss needs/scheduling for follow up appointment.     Provided COPD Nurse Resource number at [phone] for any further questions or assistance with resources.    Education:     EDUCATION STATUS: Patient listened but would encourage reinforcement of education.  [x]  Provided both written and verbal education on COPD signs & symptoms  [x]  Received verbal acknowledgment/understanding of COPD related causes  [x]  Provided instructions on inhaler's  [x]  Provided instructions on Nebulizer device   [x]  Provided recommendations on breathing techniques / positions  [x]  Provided recommendations on managing stress and anxiety   [x]  Provided information on Pulmonary Rehabilitation   [x]  Provided information on Lung Cancer Screening  []  Provided recommendations for smoking cessation programs    Former smoker    EDUCATIONAL MATERIALS PROVIDED:    [x]  [employer]: Managing your COPD Booklet  [x]  Follow-up Appointment Reminder  [x]  ALA: Getting the Most Out of Medication Delivery Devices  [x]  ALA: My COPD Action Plan  [x]  [facility]: [facility]

## 2025-01-31 NOTE — CARE COORDINATION
INTERDISCIPLINARY PLAN OF CARE CONFERENCE    Date/Time: 1/31/2025 2:42 PM  Completed by: Julia Felipe RN, Case Management      Patient Name:  Dayton Soto  YOB: 1965  Admitting Diagnosis: Dehydration [E86.0]  Hypoxemia [R09.02]  Lactic acidosis [E87.20]  History of cardiac arrhythmia [Z86.79]  Elevated d-dimer [R79.89]  Sepsis (HCC) [A41.9]  Pneumonia of left lower lobe due to infectious organism [J18.9]  Chronic obstructive pulmonary disease, unspecified COPD type (HCC) [J44.9]  Multifocal pneumonia [J18.9]  Cardiomyopathy, unspecified type (HCC) [I42.9]  Left-sided chest pain [R07.9]     Admit Date/Time:  1/27/2025 11:55 AM    Chart reviewed. Interdisciplinary team contacted or reviewed plan related to patient progress and discharge plans.   Disciplines included Case Management, Nursing, and Dietitian.    Current Status:Stable  PT/OT recommendation for discharge plan of care: N/A    Expected D/C Disposition:  Home  Confirmed plan with patient  Yes   Met with:patient  Discharge Plan Comments: Reviewed chart and met with pt at bedside.States plan remains to return home with friends. May need home care at dc. Noted pt is on RA. Will follow.     Home O2 in place on admit: No  Pt informed of need to bring portable home O2 tank on day of discharge for nursing to connect prior to leaving:  Not Indicated  Verbalized agreement/Understanding:  Not Indicated

## 2025-01-31 NOTE — PLAN OF CARE
Problem: Discharge Planning  Goal: Discharge to home or other facility with appropriate resources  1/31/2025 0902 by Dionne Carson RN  Outcome: Progressing  1/30/2025 2122 by Dionne Guzmán RN  Outcome: Progressing  Flowsheets (Taken 1/30/2025 2017)  Discharge to home or other facility with appropriate resources: Identify barriers to discharge with patient and caregiver     Problem: Respiratory - Adult  Goal: Achieves optimal ventilation and oxygenation  1/31/2025 0902 by Dionne Carson RN  Outcome: Progressing  Flowsheets (Taken 1/31/2025 0438 by Dionne Guzmán RN)  Achieves optimal ventilation and oxygenation: Assess for changes in respiratory status  1/30/2025 2122 by Dionne Guzmán RN  Outcome: Progressing     Problem: Skin/Tissue Integrity - Adult  Goal: Skin integrity remains intact  Description: 1.  Monitor for areas of redness and/or skin breakdown  2.  Assess vascular access sites hourly  3.  Every 4-6 hours minimum:  Change oxygen saturation probe site  4.  Every 4-6 hours:  If on nasal continuous positive airway pressure, respiratory therapy assess nares and determine need for appliance change or resting period  1/31/2025 0902 by Dionne Carson RN  Outcome: Progressing  1/30/2025 2122 by Dionne Guzmán RN  Outcome: Progressing  Flowsheets (Taken 1/30/2025 2017)  Skin Integrity Remains Intact: Monitor for areas of redness and/or skin breakdown  Goal: Incisions, wounds, or drain sites healing without S/S of infection  1/31/2025 0902 by Dionne Carson RN  Outcome: Progressing  1/30/2025 2122 by Dionne Guzmán RN  Outcome: Progressing  Flowsheets (Taken 1/30/2025 2017)  Incisions, Wounds, or Drain Sites Healing Without Sign and Symptoms of Infection: ADMISSION and DAILY: Assess and document risk factors for pressure ulcer development  Goal: Oral mucous membranes remain intact  1/31/2025 0902 by Dionne Carson RN  Outcome: Progressing  1/30/2025 2122 by Doinne Guzmán

## 2025-01-31 NOTE — PROGRESS NOTES
Progress Note    Admit Date:  1/27/2025    Admitted for multifocal PNA    Subjective:  Mr. Soto was transferred to the ICU yesterday for worsening hypoxia.  He was initially on Vapotherm and subsequently high flow oxygen.  Currently on 8 L.  Was agitated.  Placed on Precedex drip.  On amiodarone drip for A-fib with RVR    1/30   Continues to have an elevated HR  IV amio--> po amio  Iv dig started   On RA     1/31   Converted to NSR    Objective:        Intake/Output Summary (Last 24 hours) at 1/31/2025 1036  Last data filed at 1/30/2025 1809  Gross per 24 hour   Intake --   Output 450 ml   Net -450 ml       Physical Exam:      Gen: Ill appearing Alert.   Eyes: PERRL. No sclera icterus. No conjunctival injection.   ENT: No discharge. Pharynx clear.   Neck: No JVD.  Trachea midline.  Resp: no accessory muscle use.+ crackles. No wheezes. no rhonchi.   CV:  regular rate and rhythm. No murmur.  No rub. No edema.   Capillary Refill: Brisk,< 3 seconds   Peripheral Pulses: +2 palpable, equal bilaterally   GI: Non-tender. Non-distended.  Normal bowel sounds.  Skin: Warm and dry. No nodule on exposed extremities. No rash on exposed extremities.   M/S: No cyanosis. No joint deformity. No clubbing.   Neuro: Awake. Grossly nonfocal    Psych: Oriented x 3. No anxiety or agitation.      Scheduled Meds:   sacubitril-valsartan  0.5 tablet Oral BID    empagliflozin  10 mg Oral Daily    metoprolol succinate  12.5 mg Oral Daily    amiodarone  400 mg Oral BID    enoxaparin  1 mg/kg SubCUTAneous BID    cefTRIAXone (ROCEPHIN) IV  2,000 mg IntraVENous Q24H    pantoprazole  40 mg Oral BID AC    [Held by provider] furosemide  20 mg Oral Daily    buprenorphine-naloxone  2.5 Film SubLINGual Daily    [Held by provider] spironolactone  25 mg Oral Daily    melatonin  5 mg Oral Nightly    mupirocin   Topical BID    sodium chloride flush  5-40 mL IntraVENous 2 times per day    predniSONE  40 mg Oral Daily       Continuous Infusions:   sodium

## 2025-01-31 NOTE — DISCHARGE SUMMARY
Name:  Dayton Soto  Room:  3001/3001-01  MRN:    [id number]    Discharge Summary      This discharge summary is in conjunction with a complete physical exam done on the day of discharge.      Discharging Physician: MARIELENA WONG MD      Admit: 1/27/2025  Discharge:  1/31/2025     Diagnoses this Admission    Principal Problem:    Sepsis (HCC)  Active Problems:    Left-sided chest pain    Multifocal pneumonia    COPD with acute exacerbation (HCC)    History of cardiac arrhythmia    Elevated d-dimer    Acute respiratory failure with hypoxia    Severe protein-calorie malnutrition (HCC)    New onset a-fib (HCC)    Hypotension  Resolved Problems:    * No resolved hospital problems. *          Procedures (Please Review Full Report for Details)      Consults    IP CONSULT TO PULMONOLOGY  PHARMACY TO DOSE VANCOMYCIN  PHARMACY TO DOSE MEDICATION  PHARMACY TO DOSE VANCOMYCIN  IP CONSULT TO DIETITIAN  IP CONSULT TO CARDIOLOGY      HPI:    59 y.o. male who presented to [facility] with past medical history of MRSA, meth usage, cardiomyopathy presented to ED with chief complaint of cough, shortness of breath     Patient was transferred at [place].  Patient reported that the cough started 4 days ago progressively worsening reports that he is able to eat and drink and that he does not aspirate normally.  Patient reports that his girlfriend came from [place] when he saw him coughing and he thinks that she may have given him something.  Patient otherwise reports having shortness of breath on exertion no alleviating factor no associated chest pain abdominal pain diarrhea or dysuria.      Physical Exam at Discharge:  /76   Pulse 67   Temp 97.8 °F (36.6 °C) (Temporal)   Resp 13   Ht 1.702 m (5' 7\")   Wt 64 kg (141 lb)   SpO2 99%   BMI 22.08 kg/m²       GenAlert.   Eyes: PERRL. No sclera icterus. No conjunctival injection.   ENT: No discharge. Pharynx clear.   Neck: No JVD.  Trachea midline.  Resp: no

## 2025-02-01 VITALS
HEART RATE: 65 BPM | TEMPERATURE: 97.5 F | WEIGHT: 137.3 LBS | BODY MASS INDEX: 21.55 KG/M2 | HEIGHT: 67 IN | RESPIRATION RATE: 18 BRPM | OXYGEN SATURATION: 96 % | SYSTOLIC BLOOD PRESSURE: 117 MMHG | DIASTOLIC BLOOD PRESSURE: 64 MMHG

## 2025-02-01 LAB
BACTERIA BLD CULT ORG #2: NORMAL
BACTERIA BLD CULT: NORMAL

## 2025-02-01 PROCEDURE — 6370000000 HC RX 637 (ALT 250 FOR IP): Performed by: INTERNAL MEDICINE

## 2025-02-01 PROCEDURE — 99239 HOSP IP/OBS DSCHRG MGMT >30: CPT | Performed by: INTERNAL MEDICINE

## 2025-02-01 PROCEDURE — 2500000003 HC RX 250 WO HCPCS: Performed by: INTERNAL MEDICINE

## 2025-02-01 PROCEDURE — 6360000002 HC RX W HCPCS: Performed by: INTERNAL MEDICINE

## 2025-02-01 PROCEDURE — 2580000003 HC RX 258: Performed by: INTERNAL MEDICINE

## 2025-02-01 RX ADMIN — PREDNISONE 40 MG: 20 TABLET ORAL at 10:04

## 2025-02-01 RX ADMIN — SODIUM CHLORIDE, PRESERVATIVE FREE 10 ML: 5 INJECTION INTRAVENOUS at 10:05

## 2025-02-01 RX ADMIN — CEFTRIAXONE SODIUM 2000 MG: 2 INJECTION, POWDER, FOR SOLUTION INTRAMUSCULAR; INTRAVENOUS at 10:08

## 2025-02-01 RX ADMIN — PANTOPRAZOLE SODIUM 40 MG: 40 TABLET, DELAYED RELEASE ORAL at 07:01

## 2025-02-01 RX ADMIN — BUPRENORPHINE HYDROCHLORIDE, NALOXONE HYDROCHLORIDE 2.5 FILM: 8; 2 FILM, SOLUBLE BUCCAL; SUBLINGUAL at 10:02

## 2025-02-01 RX ADMIN — EMPAGLIFLOZIN 10 MG: 10 TABLET, FILM COATED ORAL at 10:05

## 2025-02-01 RX ADMIN — ENOXAPARIN SODIUM 60 MG: 100 INJECTION SUBCUTANEOUS at 10:02

## 2025-02-01 RX ADMIN — AMIODARONE HYDROCHLORIDE 400 MG: 200 TABLET ORAL at 10:04

## 2025-02-01 RX ADMIN — SACUBITRIL AND VALSARTAN 0.5 TABLET: 24; 26 TABLET, FILM COATED ORAL at 10:04

## 2025-02-01 RX ADMIN — METOPROLOL SUCCINATE 12.5 MG: 25 TABLET, EXTENDED RELEASE ORAL at 10:04

## 2025-02-01 NOTE — FLOWSHEET NOTE
01/31/25 2351   Vital Signs   Temp 97.3 °F (36.3 °C)   Temp Source Oral   Pulse 55   Heart Rate Source Monitor   Respirations 16   /67   MAP (Calculated) 79   BP Location Right upper arm   BP Method Automatic   Patient Position Semi fowlers   Oxygen Therapy   SpO2 97 %   O2 Device None (Room air)     Pt reassessment complete.  No changes noted.  Denies needs.  Call light within reach.

## 2025-02-01 NOTE — FLOWSHEET NOTE
02/01/25 1001   Vital Signs   Temp 97.5 °F (36.4 °C)   Temp Source Oral   Pulse 65   Heart Rate Source Monitor   Respirations 18   /64   MAP (Calculated) 82   BP Location Left upper arm   BP Method Automatic   Patient Position Sitting   Oxygen Therapy   SpO2 96 %   Pulse Oximetry Type Intermittent   Pulse Oximeter Device Mode Intermittent   O2 Device None (Room air)     Patient is resting showing no s/s of distress. Patient is alert and oriented. Meds were given, see MAR. Patient is denying any needs. Bed is in lowest position and call light is within reach. Will continue to monitor. Shift assessment complete, see flowsheets.

## 2025-02-01 NOTE — CARE COORDINATION
Order for dc noted. Spoke with pt who will return home with friend. Noted no home O2 needed. Sat is 94% on RA with activity. Chart reviewed and no other dc needs identified.

## 2025-02-01 NOTE — FLOWSHEET NOTE
01/31/25 1925   Handoff   Communication Given Shift Handoff   Handoff Given To TARIQ Hogue   Handoff Received From TARIQ Leong   Handoff Communication Face to Face   Time Handoff Given 1925   End of Shift Check Performed Yes     EOS report given to TARIQ Hogue. Pt in bed, call light within reach. Pt is stable, care is transferred.

## 2025-02-01 NOTE — FLOWSHEET NOTE
01/31/25 2058   Vital Signs   Temp 97.9 °F (36.6 °C)   Temp Source Oral   Pulse 50   Heart Rate Source Monitor   Respirations 16   /76   MAP (Calculated) 88   BP Location Right upper arm   BP Method Automatic   Patient Position Semi fowlers   Oxygen Therapy   SpO2 97 %   O2 Device None (Room air)     Pt assessment complete.  Pt lying in bed quietly.  Lung sounds diminished with rhonchi.  Pt on room air.  Pt SB with HR of 50.  Nightly medications given.  Denies needs at this time.  Call light within reach.

## 2025-02-01 NOTE — PROGRESS NOTES
[facility]                                 Pulmonary/Critical care  Consult /Progress Note :                                                                   ,    CC :worsening SOB ,hypotension ,A fib   Patient is being seen at the request of Dr Villegas  for a consultation for Sepsos ,pneumonia    Subjective  Doing great  Room air  Wbc down       PHYSICAL EXAM:  Vitals:    02/01/25 1001   BP: 117/64   Pulse: 65   Resp: 18   Temp: 97.5 °F (36.4 °C)   SpO2: 96%         Gen: No distress.   Eyes: PERRL. No sclera icterus. No conjunctival injection.   ENT: No discharge. Pharynx clear.   Neck: Trachea midline. No obvious mass.    Resp: Rhonchi bilateral  CV: Regular rate. Regular rhythm. No murmur or rub. No edema. Peripheral pulses are 2+.  Capillary refill is less than 3 seconds.  GI: Non-tender. Non-distended. No hernia.   Skin: Warm and dry. No nodule on exposed extremities.   Lymph: No cervical LAD. No supraclavicular LAD.   M/S: No cyanosis. No joint deformity. No clubbing.   Neuro: Awake. Alert. Moves all four extremities.   Psych: Oriented x 3. No anxiety.     LABS:  CBC:   Recent Labs     01/30/25  0420 01/31/25  0514   WBC 8.0 10.4   HGB 12.5* 11.7*   HCT 36.7* 34.1*   MCV 91.1 91.9    181     BMP:   Recent Labs     01/30/25  0420 01/31/25  0514    141   K 4.1 4.3    104   CO2 28 32   BUN 39* 29*   CREATININE 0.8* 0.8*     LIVER PROFILE:   No results for input(s): \"AST\", \"ALT\", \"LIPASE\", \"AMYLASE\", \"BILIDIR\", \"BILITOT\", \"ALKPHOS\" in the last 72 hours.    Invalid input(s): \"ALB\"    PT/INR: No results for input(s): \"PROTIME\", \"INR\" in the last 72 hours.  APTT: No results for input(s): \"APTT\" in the last 72 hours.  UA:  No results for input(s): \"NITRITE\", \"COLORU\", \"PHUR\", \"LABCAST\", \"WBCUA\", \"RBCUA\", \"MUCUS\", \"TRICHOMONAS\", \"YEAST\", \"BACTERIA\", \"CLARITYU\", \"SPECGRAV\",

## 2025-02-01 NOTE — PROGRESS NOTES
RN went and got suboxone prescription that was in pharmacy and given to patient. Sweta, Charge RN verified'    Electronically signed by Kayla Gutierrez RN on 2/1/2025 at 11:08 AM

## 2025-02-01 NOTE — DISCHARGE SUMMARY
Name:  Dayton Soto  Room:  /0324-01  MRN:    [id number]    Discharge Summary      This discharge summary is in conjunction with a complete physical exam done on the day of discharge.      Discharging Physician: STEFANIE QURESHI MD      Admit: 1/27/2025  Discharge:  2/1/2025     Diagnoses this Admission    Principal Problem:    Sepsis (HCC)  Active Problems:    Left-sided chest pain    Multifocal pneumonia    Chronic obstructive pulmonary disease (HCC)    History of cardiac arrhythmia    Elevated d-dimer    Acute respiratory failure with hypoxia    Severe protein-calorie malnutrition (HCC)    New onset a-fib (HCC)    Hypotension  Resolved Problems:    * No resolved hospital problems. *      Procedures (Please Review Full Report for Details)  Mechanical ventilation and endotracheal intubation    Consults    IP CONSULT TO PULMONOLOGY  PHARMACY TO DOSE VANCOMYCIN  PHARMACY TO DOSE MEDICATION  PHARMACY TO DOSE VANCOMYCIN  IP CONSULT TO DIETITIAN  IP CONSULT TO CARDIOLOGY      HPI:    59 y.o. male who presented to [facility] with past medical history of MRSA, meth usage, cardiomyopathy presented to ED with chief complaint of cough, shortness of breath     Patient was transferred at [place].  Patient reported that the cough started 4 days ago progressively worsening reports that he is able to eat and drink and that he does not aspirate normally.  Patient reports that his girlfriend came from [place] when he saw him coughing and he thinks that she may have given him something.  Patient otherwise reports having shortness of breath on exertion no alleviating factor no associated chest pain abdominal pain diarrhea or dysuria.    Physical Exam at Discharge:  /64   Pulse 65   Temp 97.5 °F (36.4 °C) (Oral)   Resp 18   Ht 1.702 m (5' 7\")   Wt 62.3 kg (137 lb 4.8 oz)   SpO2 96%   BMI 21.50 kg/m²     Gen: awake and Alert.   Eyes: PERRL. No sclera icterus. No conjunctival injection.   ENT: No

## 2025-02-05 ENCOUNTER — APPOINTMENT (OUTPATIENT)
Dept: GENERAL RADIOLOGY | Age: 60
DRG: 720 | End: 2025-02-05
Payer: MEDICAID

## 2025-02-05 ENCOUNTER — HOSPITAL ENCOUNTER (INPATIENT)
Age: 60
DRG: 720 | End: 2025-02-05
Attending: EMERGENCY MEDICINE | Admitting: STUDENT IN AN ORGANIZED HEALTH CARE EDUCATION/TRAINING PROGRAM
Payer: MEDICAID

## 2025-02-05 ENCOUNTER — APPOINTMENT (OUTPATIENT)
Dept: CT IMAGING | Age: 60
DRG: 720 | End: 2025-02-05
Payer: MEDICAID

## 2025-02-05 DIAGNOSIS — F15.10 METHAMPHETAMINE USE (HCC): ICD-10-CM

## 2025-02-05 DIAGNOSIS — F11.29 OPIOID DEPENDENCE WITH OPIOID-INDUCED DISORDER (HCC): ICD-10-CM

## 2025-02-05 DIAGNOSIS — R79.89 ELEVATED TROPONIN: ICD-10-CM

## 2025-02-05 DIAGNOSIS — J96.01 ACUTE RESPIRATORY FAILURE WITH HYPOXIA (HCC): Primary | ICD-10-CM

## 2025-02-05 DIAGNOSIS — J18.9 MULTIFOCAL PNEUMONIA: ICD-10-CM

## 2025-02-05 PROBLEM — J96.00 ACUTE RESPIRATORY FAILURE: Status: ACTIVE | Noted: 2025-02-05

## 2025-02-05 LAB
ALBUMIN SERPL-MCNC: 3.8 G/DL (ref 3.4–5)
ALBUMIN/GLOB SERPL: 1.2 {RATIO} (ref 1.1–2.2)
ALP SERPL-CCNC: 89 U/L (ref 40–129)
ALT SERPL-CCNC: 26 U/L (ref 10–40)
AMPHETAMINES UR QL SCN>1000 NG/ML: POSITIVE
ANION GAP SERPL CALCULATED.3IONS-SCNC: 16 MMOL/L (ref 3–16)
APTT BLD: 51.7 SEC (ref 22.1–36.4)
AST SERPL-CCNC: 27 U/L (ref 15–37)
BACTERIA URNS QL MICRO: ABNORMAL /HPF
BARBITURATES UR QL SCN>200 NG/ML: ABNORMAL
BASE EXCESS BLDA CALC-SCNC: -10.5 MMOL/L (ref -3–3)
BASE EXCESS BLDA CALC-SCNC: -11.6 MMOL/L (ref -3–3)
BASE EXCESS BLDA CALC-SCNC: -13.3 MMOL/L (ref -3–3)
BASE EXCESS BLDV CALC-SCNC: -1.8 MMOL/L (ref -3–3)
BASE EXCESS BLDV CALC-SCNC: -6.2 MMOL/L (ref -3–3)
BASOPHILS # BLD: 0 K/UL (ref 0–0.2)
BASOPHILS NFR BLD: 0.1 %
BENZODIAZ UR QL SCN>200 NG/ML: POSITIVE
BILIRUB SERPL-MCNC: 0.8 MG/DL (ref 0–1)
BILIRUB UR QL STRIP.AUTO: NEGATIVE
BUN SERPL-MCNC: 21 MG/DL (ref 7–20)
CALCIUM SERPL-MCNC: 8.9 MG/DL (ref 8.3–10.6)
CANNABINOIDS UR QL SCN>50 NG/ML: ABNORMAL
CHLORIDE SERPL-SCNC: 99 MMOL/L (ref 99–110)
CLARITY UR: CLEAR
CO2 BLDA-SCNC: 23.9 MMOL/L
CO2 BLDA-SCNC: 23.9 MMOL/L
CO2 BLDA-SCNC: 24.8 MMOL/L
CO2 BLDV-SCNC: 22 MMOL/L
CO2 BLDV-SCNC: 23 MMOL/L
CO2 SERPL-SCNC: 26 MMOL/L (ref 21–32)
COARSE GRAN CASTS #/AREA URNS LPF: ABNORMAL /LPF (ref 0–2)
COCAINE UR QL SCN: ABNORMAL
COHGB MFR BLDA: 0.5 % (ref 0–1.5)
COHGB MFR BLDA: 0.7 % (ref 0–1.5)
COHGB MFR BLDA: 0.8 % (ref 0–1.5)
COHGB MFR BLDV: 2.6 % (ref 0–1.5)
COHGB MFR BLDV: 4.9 % (ref 0–1.5)
COLOR UR: YELLOW
CREAT SERPL-MCNC: 0.8 MG/DL (ref 0.9–1.3)
DEPRECATED RDW RBC AUTO: 14.3 % (ref 12.4–15.4)
DRUG SCREEN COMMENT UR-IMP: ABNORMAL
EOSINOPHIL # BLD: 0 K/UL (ref 0–0.6)
EOSINOPHIL NFR BLD: 0 %
EPI CELLS #/AREA URNS HPF: ABNORMAL /HPF (ref 0–5)
FENTANYL SCREEN, URINE: ABNORMAL
FLUAV RNA UPPER RESP QL NAA+PROBE: NEGATIVE
FLUBV AG NPH QL: NEGATIVE
GFR SERPLBLD CREATININE-BSD FMLA CKD-EPI: >90 ML/MIN/{1.73_M2}
GLUCOSE BLD-MCNC: 104 MG/DL (ref 70–99)
GLUCOSE BLD-MCNC: 199 MG/DL (ref 70–99)
GLUCOSE SERPL-MCNC: 171 MG/DL (ref 70–99)
GLUCOSE UR STRIP.AUTO-MCNC: 100 MG/DL
HCO3 BLDA-SCNC: 20.9 MMOL/L (ref 21–29)
HCO3 BLDA-SCNC: 21.5 MMOL/L (ref 21–29)
HCO3 BLDA-SCNC: 22 MMOL/L (ref 21–29)
HCO3 BLDV-SCNC: 21.2 MMOL/L (ref 23–29)
HCO3 BLDV-SCNC: 21.2 MMOL/L (ref 23–29)
HCT VFR BLD AUTO: 45.2 % (ref 40.5–52.5)
HGB BLD-MCNC: 14.4 G/DL (ref 13.5–17.5)
HGB BLDA-MCNC: 14.3 G/DL (ref 13.5–17.5)
HGB BLDA-MCNC: 15.4 G/DL (ref 13.5–17.5)
HGB BLDA-MCNC: 15.5 G/DL (ref 13.5–17.5)
HGB UR QL STRIP.AUTO: ABNORMAL
HYALINE CASTS #/AREA URNS LPF: ABNORMAL /LPF (ref 0–2)
INR PPP: 1.67 (ref 0.85–1.15)
KETONES UR STRIP.AUTO-MCNC: NEGATIVE MG/DL
LACTATE BLDV-SCNC: 2.5 MMOL/L (ref 0.4–1.9)
LACTATE BLDV-SCNC: 3.8 MMOL/L (ref 0.4–1.9)
LACTATE BLDV-SCNC: 3.8 MMOL/L (ref 0.4–2)
LEUKOCYTE ESTERASE UR QL STRIP.AUTO: NEGATIVE
LYMPHOCYTES # BLD: 1.2 K/UL (ref 1–5.1)
LYMPHOCYTES NFR BLD: 2.6 %
MAGNESIUM SERPL-MCNC: 1.81 MG/DL (ref 1.8–2.4)
MCH RBC QN AUTO: 29.3 PG (ref 26–34)
MCHC RBC AUTO-ENTMCNC: 31.8 G/DL (ref 31–36)
MCV RBC AUTO: 92.1 FL (ref 80–100)
METHADONE UR QL SCN>300 NG/ML: ABNORMAL
METHGB MFR BLDA: 0.2 %
METHGB MFR BLDA: 0.2 %
METHGB MFR BLDA: 0.4 %
METHGB MFR BLDV: 0.3 %
METHGB MFR BLDV: 0.3 %
MONOCYTES # BLD: 1.5 K/UL (ref 0–1.3)
MONOCYTES NFR BLD: 3.3 %
MUCOUS THREADS #/AREA URNS LPF: ABNORMAL /LPF
NEUTROPHILS # BLD: 44.2 K/UL (ref 1.7–7.7)
NEUTROPHILS NFR BLD: 94 %
NITRITE UR QL STRIP.AUTO: NEGATIVE
O2 THERAPY: ABNORMAL
OPIATES UR QL SCN>300 NG/ML: ABNORMAL
OXYCODONE UR QL SCN: ABNORMAL
PCO2 BLDA: 78.8 MMHG (ref 35–45)
PCO2 BLDA: 90.9 MMHG (ref 35–45)
PCO2 BLDA: 95.5 MMHG (ref 35–45)
PCO2 BLDV: 31.8 MMHG (ref 40–50)
PCO2 BLDV: 50 MMHG (ref 40–50)
PCP UR QL SCN>25 NG/ML: ABNORMAL
PERFORMED ON: ABNORMAL
PERFORMED ON: ABNORMAL
PH BLDA: 6.96 [PH] (ref 7.35–7.45)
PH BLDA: 7 [PH] (ref 7.35–7.45)
PH BLDA: 7.05 [PH] (ref 7.35–7.45)
PH BLDV: 7.25 [PH] (ref 7.35–7.45)
PH BLDV: 7.44 [PH] (ref 7.35–7.45)
PH UR STRIP.AUTO: 6 [PH] (ref 5–8)
PH UR STRIP: 6 [PH]
PLATELET # BLD AUTO: 537 K/UL (ref 135–450)
PMV BLD AUTO: 8 FL (ref 5–10.5)
PO2 BLDA: 50.2 MMHG (ref 75–108)
PO2 BLDA: 52.1 MMHG (ref 75–108)
PO2 BLDA: 59.3 MMHG (ref 75–108)
PO2 BLDV: 26.7 MMHG (ref 25–40)
PO2 BLDV: 38.4 MMHG (ref 25–40)
POTASSIUM SERPL-SCNC: 4.1 MMOL/L (ref 3.5–5.1)
PROT SERPL-MCNC: 7.1 G/DL (ref 6.4–8.2)
PROT UR STRIP.AUTO-MCNC: 30 MG/DL
PROTHROMBIN TIME: 19.8 SEC (ref 11.9–14.9)
RBC # BLD AUTO: 4.91 M/UL (ref 4.2–5.9)
RBC #/AREA URNS HPF: ABNORMAL /HPF (ref 0–4)
SAO2 % BLDA: 61.6 %
SAO2 % BLDA: 66.6 %
SAO2 % BLDA: 77.3 %
SAO2 % BLDV: 53 %
SAO2 % BLDV: 63 %
SARS-COV-2 RDRP RESP QL NAA+PROBE: NOT DETECTED
SODIUM SERPL-SCNC: 141 MMOL/L (ref 136–145)
SP GR UR STRIP.AUTO: >=1.03 (ref 1–1.03)
TROPONIN, HIGH SENSITIVITY: 177 NG/L (ref 0–22)
TROPONIN, HIGH SENSITIVITY: 186 NG/L (ref 0–22)
TROPONIN, HIGH SENSITIVITY: 265 NG/L (ref 0–22)
TROPONIN, HIGH SENSITIVITY: 313 NG/L (ref 0–22)
UA COMPLETE W REFLEX CULTURE PNL UR: YES
UA DIPSTICK W REFLEX MICRO PNL UR: YES
URN SPEC COLLECT METH UR: ABNORMAL
UROBILINOGEN UR STRIP-ACNC: 0.2 E.U./DL
WBC # BLD AUTO: 47 K/UL (ref 4–11)
WBC #/AREA URNS HPF: ABNORMAL /HPF (ref 0–5)

## 2025-02-05 PROCEDURE — 85025 COMPLETE CBC W/AUTO DIFF WBC: CPT

## 2025-02-05 PROCEDURE — 36556 INSERT NON-TUNNEL CV CATH: CPT

## 2025-02-05 PROCEDURE — 99292 CRITICAL CARE ADDL 30 MIN: CPT | Performed by: INTERNAL MEDICINE

## 2025-02-05 PROCEDURE — 80307 DRUG TEST PRSMV CHEM ANLYZR: CPT

## 2025-02-05 PROCEDURE — 0BH17EZ INSERTION OF ENDOTRACHEAL AIRWAY INTO TRACHEA, VIA NATURAL OR ARTIFICIAL OPENING: ICD-10-PCS | Performed by: EMERGENCY MEDICINE

## 2025-02-05 PROCEDURE — 94761 N-INVAS EAR/PLS OXIMETRY MLT: CPT

## 2025-02-05 PROCEDURE — 96374 THER/PROPH/DIAG INJ IV PUSH: CPT

## 2025-02-05 PROCEDURE — 85610 PROTHROMBIN TIME: CPT

## 2025-02-05 PROCEDURE — 96368 THER/DIAG CONCURRENT INF: CPT

## 2025-02-05 PROCEDURE — 2700000000 HC OXYGEN THERAPY PER DAY

## 2025-02-05 PROCEDURE — 2580000003 HC RX 258: Performed by: EMERGENCY MEDICINE

## 2025-02-05 PROCEDURE — 94002 VENT MGMT INPAT INIT DAY: CPT

## 2025-02-05 PROCEDURE — 2580000003 HC RX 258

## 2025-02-05 PROCEDURE — 87086 URINE CULTURE/COLONY COUNT: CPT

## 2025-02-05 PROCEDURE — 99292 CRITICAL CARE ADDL 30 MIN: CPT

## 2025-02-05 PROCEDURE — 99291 CRITICAL CARE FIRST HOUR: CPT | Performed by: INTERNAL MEDICINE

## 2025-02-05 PROCEDURE — 2580000003 HC RX 258: Performed by: INTERNAL MEDICINE

## 2025-02-05 PROCEDURE — 96361 HYDRATE IV INFUSION ADD-ON: CPT

## 2025-02-05 PROCEDURE — 06HN33Z INSERTION OF INFUSION DEVICE INTO LEFT FEMORAL VEIN, PERCUTANEOUS APPROACH: ICD-10-PCS | Performed by: EMERGENCY MEDICINE

## 2025-02-05 PROCEDURE — 2500000003 HC RX 250 WO HCPCS: Performed by: EMERGENCY MEDICINE

## 2025-02-05 PROCEDURE — 36592 COLLECT BLOOD FROM PICC: CPT

## 2025-02-05 PROCEDURE — 93005 ELECTROCARDIOGRAM TRACING: CPT | Performed by: EMERGENCY MEDICINE

## 2025-02-05 PROCEDURE — 87040 BLOOD CULTURE FOR BACTERIA: CPT

## 2025-02-05 PROCEDURE — 6360000004 HC RX CONTRAST MEDICATION: Performed by: EMERGENCY MEDICINE

## 2025-02-05 PROCEDURE — 96366 THER/PROPH/DIAG IV INF ADDON: CPT

## 2025-02-05 PROCEDURE — 99291 CRITICAL CARE FIRST HOUR: CPT

## 2025-02-05 PROCEDURE — 80053 COMPREHEN METABOLIC PANEL: CPT

## 2025-02-05 PROCEDURE — 96375 TX/PRO/DX INJ NEW DRUG ADDON: CPT

## 2025-02-05 PROCEDURE — 87804 INFLUENZA ASSAY W/OPTIC: CPT

## 2025-02-05 PROCEDURE — 6370000000 HC RX 637 (ALT 250 FOR IP)

## 2025-02-05 PROCEDURE — 83605 ASSAY OF LACTIC ACID: CPT

## 2025-02-05 PROCEDURE — 96367 TX/PROPH/DG ADDL SEQ IV INF: CPT

## 2025-02-05 PROCEDURE — 5A1955Z RESPIRATORY VENTILATION, GREATER THAN 96 CONSECUTIVE HOURS: ICD-10-PCS | Performed by: INTERNAL MEDICINE

## 2025-02-05 PROCEDURE — 36415 COLL VENOUS BLD VENIPUNCTURE: CPT

## 2025-02-05 PROCEDURE — 96365 THER/PROPH/DIAG IV INF INIT: CPT

## 2025-02-05 PROCEDURE — 6360000002 HC RX W HCPCS: Performed by: EMERGENCY MEDICINE

## 2025-02-05 PROCEDURE — 6360000002 HC RX W HCPCS

## 2025-02-05 PROCEDURE — 2500000003 HC RX 250 WO HCPCS

## 2025-02-05 PROCEDURE — 6370000000 HC RX 637 (ALT 250 FOR IP): Performed by: INTERNAL MEDICINE

## 2025-02-05 PROCEDURE — 2000000000 HC ICU R&B

## 2025-02-05 PROCEDURE — 82803 BLOOD GASES ANY COMBINATION: CPT

## 2025-02-05 PROCEDURE — 0DH67UZ INSERTION OF FEEDING DEVICE INTO STOMACH, VIA NATURAL OR ARTIFICIAL OPENING: ICD-10-PCS | Performed by: INTERNAL MEDICINE

## 2025-02-05 PROCEDURE — 31500 INSERT EMERGENCY AIRWAY: CPT

## 2025-02-05 PROCEDURE — 87635 SARS-COV-2 COVID-19 AMP PRB: CPT

## 2025-02-05 PROCEDURE — 94640 AIRWAY INHALATION TREATMENT: CPT

## 2025-02-05 PROCEDURE — 71045 X-RAY EXAM CHEST 1 VIEW: CPT

## 2025-02-05 PROCEDURE — 2500000003 HC RX 250 WO HCPCS: Performed by: INTERNAL MEDICINE

## 2025-02-05 PROCEDURE — 51702 INSERT TEMP BLADDER CATH: CPT

## 2025-02-05 PROCEDURE — 84484 ASSAY OF TROPONIN QUANT: CPT

## 2025-02-05 PROCEDURE — 5A09C5K ASSISTANCE WITH RESPIRATORY VENTILATION, 8-24 CONSECUTIVE HOURS, INTUBATED PRONE POSITIONING: ICD-10-PCS | Performed by: INTERNAL MEDICINE

## 2025-02-05 PROCEDURE — 6370000000 HC RX 637 (ALT 250 FOR IP): Performed by: EMERGENCY MEDICINE

## 2025-02-05 PROCEDURE — 81001 URINALYSIS AUTO W/SCOPE: CPT

## 2025-02-05 PROCEDURE — 83735 ASSAY OF MAGNESIUM: CPT

## 2025-02-05 PROCEDURE — 87641 MR-STAPH DNA AMP PROBE: CPT

## 2025-02-05 PROCEDURE — 85730 THROMBOPLASTIN TIME PARTIAL: CPT

## 2025-02-05 PROCEDURE — 3E033XZ INTRODUCTION OF VASOPRESSOR INTO PERIPHERAL VEIN, PERCUTANEOUS APPROACH: ICD-10-PCS | Performed by: INTERNAL MEDICINE

## 2025-02-05 PROCEDURE — 6360000002 HC RX W HCPCS: Performed by: INTERNAL MEDICINE

## 2025-02-05 PROCEDURE — 71260 CT THORAX DX C+: CPT

## 2025-02-05 RX ORDER — MIDAZOLAM HYDROCHLORIDE 5 MG/ML
INJECTION INTRAMUSCULAR; INTRAVENOUS
Status: DISCONTINUED
Start: 2025-02-05 | End: 2025-02-05

## 2025-02-05 RX ORDER — METHYLPREDNISOLONE SODIUM SUCCINATE 125 MG/2ML
INJECTION INTRAMUSCULAR; INTRAVENOUS
Status: DISCONTINUED
Start: 2025-02-05 | End: 2025-02-05

## 2025-02-05 RX ORDER — HYDROCORTISONE SODIUM SUCCINATE 100 MG/2ML
100 INJECTION INTRAMUSCULAR; INTRAVENOUS EVERY 8 HOURS
Status: DISCONTINUED | OUTPATIENT
Start: 2025-02-05 | End: 2025-02-07

## 2025-02-05 RX ORDER — SODIUM CHLORIDE 9 MG/ML
INJECTION, SOLUTION INTRAVENOUS PRN
Status: ACTIVE | OUTPATIENT
Start: 2025-02-05

## 2025-02-05 RX ORDER — IPRATROPIUM BROMIDE AND ALBUTEROL SULFATE 2.5; .5 MG/3ML; MG/3ML
SOLUTION RESPIRATORY (INHALATION)
Status: DISCONTINUED
Start: 2025-02-05 | End: 2025-02-05

## 2025-02-05 RX ORDER — FENTANYL CITRATE 50 UG/ML
50 INJECTION, SOLUTION INTRAMUSCULAR; INTRAVENOUS
Status: DISCONTINUED | OUTPATIENT
Start: 2025-02-05 | End: 2025-02-09 | Stop reason: SDUPTHER

## 2025-02-05 RX ORDER — METRONIDAZOLE 500 MG/100ML
500 INJECTION, SOLUTION INTRAVENOUS EVERY 8 HOURS
Status: DISCONTINUED | OUTPATIENT
Start: 2025-02-05 | End: 2025-02-08

## 2025-02-05 RX ORDER — IPRATROPIUM BROMIDE AND ALBUTEROL SULFATE 2.5; .5 MG/3ML; MG/3ML
1 SOLUTION RESPIRATORY (INHALATION) EVERY 6 HOURS
Status: DISCONTINUED | OUTPATIENT
Start: 2025-02-05 | End: 2025-02-05

## 2025-02-05 RX ORDER — IPRATROPIUM BROMIDE AND ALBUTEROL SULFATE 2.5; .5 MG/3ML; MG/3ML
1 SOLUTION RESPIRATORY (INHALATION) EVERY 6 HOURS
Status: DISCONTINUED | OUTPATIENT
Start: 2025-02-06 | End: 2025-02-13

## 2025-02-05 RX ORDER — POTASSIUM CHLORIDE 7.45 MG/ML
10 INJECTION INTRAVENOUS PRN
Status: ACTIVE | OUTPATIENT
Start: 2025-02-05

## 2025-02-05 RX ORDER — HEPARIN SODIUM 1000 [USP'U]/ML
60 INJECTION, SOLUTION INTRAVENOUS; SUBCUTANEOUS ONCE
Status: COMPLETED | OUTPATIENT
Start: 2025-02-05 | End: 2025-02-05

## 2025-02-05 RX ORDER — ACETAMINOPHEN 325 MG/1
650 TABLET ORAL EVERY 6 HOURS PRN
Status: DISPENSED | OUTPATIENT
Start: 2025-02-05

## 2025-02-05 RX ORDER — VANCOMYCIN 1 G/200ML
1000 INJECTION, SOLUTION INTRAVENOUS EVERY 12 HOURS
Status: DISCONTINUED | OUTPATIENT
Start: 2025-02-06 | End: 2025-02-05

## 2025-02-05 RX ORDER — POLYETHYLENE GLYCOL 3350 17 G/17G
17 POWDER, FOR SOLUTION ORAL DAILY PRN
Status: DISPENSED | OUTPATIENT
Start: 2025-02-05

## 2025-02-05 RX ORDER — 0.9 % SODIUM CHLORIDE 0.9 %
1000 INTRAVENOUS SOLUTION INTRAVENOUS ONCE
Status: COMPLETED | OUTPATIENT
Start: 2025-02-05 | End: 2025-02-05

## 2025-02-05 RX ORDER — ROCURONIUM BROMIDE 10 MG/ML
INJECTION, SOLUTION INTRAVENOUS
Status: COMPLETED
Start: 2025-02-05 | End: 2025-02-05

## 2025-02-05 RX ORDER — MIDAZOLAM HYDROCHLORIDE 1 MG/ML
2 INJECTION, SOLUTION INTRAMUSCULAR; INTRAVENOUS
Status: DISCONTINUED | OUTPATIENT
Start: 2025-02-05 | End: 2025-02-09

## 2025-02-05 RX ORDER — PROPOFOL 10 MG/ML
5-50 INJECTION, EMULSION INTRAVENOUS ONCE
Status: COMPLETED | OUTPATIENT
Start: 2025-02-05 | End: 2025-02-05

## 2025-02-05 RX ORDER — VECURONIUM BROMIDE 1 MG/ML
10 INJECTION, POWDER, LYOPHILIZED, FOR SOLUTION INTRAVENOUS ONCE
Status: COMPLETED | OUTPATIENT
Start: 2025-02-05 | End: 2025-02-05

## 2025-02-05 RX ORDER — PROPOFOL 10 MG/ML
5-50 INJECTION, EMULSION INTRAVENOUS CONTINUOUS
Status: DISCONTINUED | OUTPATIENT
Start: 2025-02-05 | End: 2025-02-09

## 2025-02-05 RX ORDER — KETAMINE HYDROCHLORIDE 100 MG/ML
1 INJECTION, SOLUTION INTRAMUSCULAR; INTRAVENOUS ONCE
Status: COMPLETED | OUTPATIENT
Start: 2025-02-05 | End: 2025-02-05

## 2025-02-05 RX ORDER — ROCURONIUM BROMIDE 10 MG/ML
0.6 INJECTION, SOLUTION INTRAVENOUS ONCE
Status: COMPLETED | OUTPATIENT
Start: 2025-02-05 | End: 2025-02-05

## 2025-02-05 RX ORDER — SODIUM CHLORIDE 0.9 % (FLUSH) 0.9 %
5-40 SYRINGE (ML) INJECTION EVERY 12 HOURS SCHEDULED
Status: ACTIVE | OUTPATIENT
Start: 2025-02-05

## 2025-02-05 RX ORDER — FENTANYL CITRATE-0.9 % NACL/PF 10 MCG/ML
25-200 PLASTIC BAG, INJECTION (ML) INTRAVENOUS CONTINUOUS
Status: DISCONTINUED | OUTPATIENT
Start: 2025-02-05 | End: 2025-02-09

## 2025-02-05 RX ORDER — ROCURONIUM BROMIDE 10 MG/ML
1 INJECTION, SOLUTION INTRAVENOUS ONCE
Status: COMPLETED | OUTPATIENT
Start: 2025-02-05 | End: 2025-02-05

## 2025-02-05 RX ORDER — ACETAMINOPHEN 650 MG/1
650 SUPPOSITORY RECTAL EVERY 6 HOURS PRN
Status: DISPENSED | OUTPATIENT
Start: 2025-02-05

## 2025-02-05 RX ORDER — POTASSIUM CHLORIDE 750 MG/1
40 TABLET, EXTENDED RELEASE ORAL PRN
Status: ACTIVE | OUTPATIENT
Start: 2025-02-05

## 2025-02-05 RX ORDER — PROPOFOL 10 MG/ML
INJECTION, EMULSION INTRAVENOUS
Status: COMPLETED
Start: 2025-02-05 | End: 2025-02-05

## 2025-02-05 RX ORDER — SODIUM CHLORIDE 9 MG/ML
30 INJECTION, SOLUTION INTRAVENOUS ONCE
Status: COMPLETED | OUTPATIENT
Start: 2025-02-05 | End: 2025-02-05

## 2025-02-05 RX ORDER — SODIUM CHLORIDE 0.9 % (FLUSH) 0.9 %
10 SYRINGE (ML) INJECTION PRN
Status: ACTIVE | OUTPATIENT
Start: 2025-02-05

## 2025-02-05 RX ORDER — AMIODARONE HYDROCHLORIDE 200 MG/1
400 TABLET ORAL DAILY
Status: DISCONTINUED | OUTPATIENT
Start: 2025-02-05 | End: 2025-02-11

## 2025-02-05 RX ORDER — HEPARIN SODIUM 1000 [USP'U]/ML
60 INJECTION, SOLUTION INTRAVENOUS; SUBCUTANEOUS PRN
Status: DISCONTINUED | OUTPATIENT
Start: 2025-02-05 | End: 2025-02-08

## 2025-02-05 RX ORDER — KETAMINE HYDROCHLORIDE 100 MG/ML
INJECTION, SOLUTION INTRAMUSCULAR; INTRAVENOUS
Status: COMPLETED
Start: 2025-02-05 | End: 2025-02-05

## 2025-02-05 RX ORDER — ONDANSETRON 2 MG/ML
4 INJECTION INTRAMUSCULAR; INTRAVENOUS EVERY 6 HOURS PRN
Status: DISCONTINUED | OUTPATIENT
Start: 2025-02-05 | End: 2025-02-14

## 2025-02-05 RX ORDER — MIDAZOLAM HYDROCHLORIDE 1 MG/ML
5 INJECTION, SOLUTION INTRAVENOUS CONTINUOUS
Status: DISCONTINUED | OUTPATIENT
Start: 2025-02-05 | End: 2025-02-08

## 2025-02-05 RX ORDER — NOREPINEPHRINE BITARTRATE 0.06 MG/ML
1-100 INJECTION, SOLUTION INTRAVENOUS CONTINUOUS
Status: DISCONTINUED | OUTPATIENT
Start: 2025-02-05 | End: 2025-02-09

## 2025-02-05 RX ORDER — PANTOPRAZOLE SODIUM 40 MG/1
40 TABLET, DELAYED RELEASE ORAL ONCE
Status: DISCONTINUED | OUTPATIENT
Start: 2025-02-05 | End: 2025-02-05

## 2025-02-05 RX ORDER — SODIUM CHLORIDE 9 MG/ML
INJECTION, SOLUTION INTRAVENOUS CONTINUOUS
Status: DISCONTINUED | OUTPATIENT
Start: 2025-02-05 | End: 2025-02-05

## 2025-02-05 RX ORDER — IPRATROPIUM BROMIDE AND ALBUTEROL SULFATE 2.5; .5 MG/3ML; MG/3ML
1 SOLUTION RESPIRATORY (INHALATION) ONCE
Status: COMPLETED | OUTPATIENT
Start: 2025-02-05 | End: 2025-02-05

## 2025-02-05 RX ORDER — DEXMEDETOMIDINE HYDROCHLORIDE 4 UG/ML
.1-1.5 INJECTION, SOLUTION INTRAVENOUS CONTINUOUS
Status: DISCONTINUED | OUTPATIENT
Start: 2025-02-05 | End: 2025-02-05

## 2025-02-05 RX ORDER — LORAZEPAM 2 MG/ML
1 INJECTION INTRAMUSCULAR ONCE
Status: COMPLETED | OUTPATIENT
Start: 2025-02-05 | End: 2025-02-05

## 2025-02-05 RX ORDER — HEPARIN SODIUM 10000 [USP'U]/100ML
20 INJECTION, SOLUTION INTRAVENOUS CONTINUOUS
Status: DISCONTINUED | OUTPATIENT
Start: 2025-02-05 | End: 2025-02-08

## 2025-02-05 RX ORDER — LINEZOLID 2 MG/ML
600 INJECTION, SOLUTION INTRAVENOUS EVERY 12 HOURS
Status: DISCONTINUED | OUTPATIENT
Start: 2025-02-05 | End: 2025-02-08

## 2025-02-05 RX ORDER — MAGNESIUM SULFATE 1 G/100ML
1000 INJECTION INTRAVENOUS PRN
Status: ACTIVE | OUTPATIENT
Start: 2025-02-05

## 2025-02-05 RX ORDER — HEPARIN SODIUM 1000 [USP'U]/ML
30 INJECTION, SOLUTION INTRAVENOUS; SUBCUTANEOUS ONCE
Status: COMPLETED | OUTPATIENT
Start: 2025-02-05 | End: 2025-02-05

## 2025-02-05 RX ORDER — KETAMINE HYDROCHLORIDE 100 MG/ML
25 INJECTION, SOLUTION INTRAMUSCULAR; INTRAVENOUS ONCE
Status: COMPLETED | OUTPATIENT
Start: 2025-02-05 | End: 2025-02-05

## 2025-02-05 RX ORDER — ONDANSETRON 4 MG/1
4 TABLET, ORALLY DISINTEGRATING ORAL EVERY 8 HOURS PRN
Status: DISCONTINUED | OUTPATIENT
Start: 2025-02-05 | End: 2025-02-14

## 2025-02-05 RX ORDER — HEPARIN SODIUM 1000 [USP'U]/ML
30 INJECTION, SOLUTION INTRAVENOUS; SUBCUTANEOUS PRN
Status: DISCONTINUED | OUTPATIENT
Start: 2025-02-05 | End: 2025-02-08

## 2025-02-05 RX ORDER — IOPAMIDOL 755 MG/ML
75 INJECTION, SOLUTION INTRAVASCULAR
Status: COMPLETED | OUTPATIENT
Start: 2025-02-05 | End: 2025-02-05

## 2025-02-05 RX ADMIN — IPRATROPIUM BROMIDE AND ALBUTEROL SULFATE 1 DOSE: .5; 2.5 SOLUTION RESPIRATORY (INHALATION) at 16:03

## 2025-02-05 RX ADMIN — SODIUM CHLORIDE 5 ML/HR: 0.9 INJECTION, SOLUTION INTRAVENOUS at 20:21

## 2025-02-05 RX ADMIN — IPRATROPIUM BROMIDE AND ALBUTEROL SULFATE 1 DOSE: 2.5; .5 SOLUTION RESPIRATORY (INHALATION) at 11:40

## 2025-02-05 RX ADMIN — VASOPRESSIN 0.03 UNITS/MIN: 20 INJECTION INTRAVENOUS at 18:32

## 2025-02-05 RX ADMIN — FAMOTIDINE 20 MG: 10 INJECTION INTRAVENOUS at 20:16

## 2025-02-05 RX ADMIN — SODIUM CHLORIDE 1000 ML: 9 INJECTION, SOLUTION INTRAVENOUS at 09:44

## 2025-02-05 RX ADMIN — KETAMINE HYDROCHLORIDE 30 MG: 100 INJECTION INTRAMUSCULAR; INTRAVENOUS at 13:12

## 2025-02-05 RX ADMIN — CISATRACURIUM BESYLATE 2 MCG/KG/MIN: 200 INJECTION, SOLUTION INTRAVENOUS at 18:35

## 2025-02-05 RX ADMIN — IPRATROPIUM BROMIDE AND ALBUTEROL SULFATE 1 DOSE: .5; 2.5 SOLUTION RESPIRATORY (INHALATION) at 22:34

## 2025-02-05 RX ADMIN — MIDAZOLAM HYDROCHLORIDE 5 MG/HR: 1 INJECTION, SOLUTION INTRAVENOUS at 18:27

## 2025-02-05 RX ADMIN — HEPARIN SODIUM 1900 UNITS: 1000 INJECTION INTRAVENOUS; SUBCUTANEOUS at 19:55

## 2025-02-05 RX ADMIN — CEFEPIME 2000 MG: 2 INJECTION, POWDER, FOR SOLUTION INTRAVENOUS at 11:53

## 2025-02-05 RX ADMIN — AMIODARONE HYDROCHLORIDE 400 MG: 200 TABLET ORAL at 18:20

## 2025-02-05 RX ADMIN — SODIUM CHLORIDE: 0.9 INJECTION, SOLUTION INTRAVENOUS at 18:14

## 2025-02-05 RX ADMIN — SODIUM CHLORIDE 5 MCG/MIN: 9 INJECTION, SOLUTION INTRAVENOUS at 13:00

## 2025-02-05 RX ADMIN — SODIUM BICARBONATE 50 MEQ: 84 INJECTION INTRAVENOUS at 19:47

## 2025-02-05 RX ADMIN — ROCURONIUM BROMIDE 62 MG: 10 INJECTION, SOLUTION INTRAVENOUS at 10:36

## 2025-02-05 RX ADMIN — VANCOMYCIN HYDROCHLORIDE 1500 MG: 1 INJECTION, POWDER, LYOPHILIZED, FOR SOLUTION INTRAVENOUS at 14:06

## 2025-02-05 RX ADMIN — LINEZOLID 600 MG: 600 INJECTION, SOLUTION INTRAVENOUS at 18:42

## 2025-02-05 RX ADMIN — KETAMINE HYDROCHLORIDE 60 MG: 100 INJECTION, SOLUTION INTRAMUSCULAR; INTRAVENOUS at 10:30

## 2025-02-05 RX ADMIN — PROPOFOL 30 MCG/KG/MIN: 10 INJECTION, EMULSION INTRAVENOUS at 21:35

## 2025-02-05 RX ADMIN — SODIUM CHLORIDE 30 ML/KG/HR: 9 INJECTION, SOLUTION INTRAVENOUS at 12:16

## 2025-02-05 RX ADMIN — IOPAMIDOL 75 ML: 755 INJECTION, SOLUTION INTRAVENOUS at 11:34

## 2025-02-05 RX ADMIN — KETAMINE HYDROCHLORIDE 60 MG: 100 INJECTION INTRAMUSCULAR; INTRAVENOUS at 10:30

## 2025-02-05 RX ADMIN — LORAZEPAM 1 MG: 2 INJECTION INTRAMUSCULAR; INTRAVENOUS at 09:43

## 2025-02-05 RX ADMIN — KETAMINE HYDROCHLORIDE 0.2 MG/KG/HR: 100 INJECTION, SOLUTION, CONCENTRATE INTRAMUSCULAR; INTRAVENOUS at 13:22

## 2025-02-05 RX ADMIN — PROPOFOL 20 MCG/KG/MIN: 10 INJECTION, EMULSION INTRAVENOUS at 11:38

## 2025-02-05 RX ADMIN — VECURONIUM BROMIDE 10 MG: 1 INJECTION, POWDER, LYOPHILIZED, FOR SOLUTION INTRAVENOUS at 15:00

## 2025-02-05 RX ADMIN — DEXMEDETOMIDINE HYDROCHLORIDE 1.4 MCG/KG/HR: 4 INJECTION, SOLUTION INTRAVENOUS at 18:03

## 2025-02-05 RX ADMIN — SODIUM BICARBONATE: 84 INJECTION, SOLUTION INTRAVENOUS at 20:00

## 2025-02-05 RX ADMIN — NOREPINEPHRINE BITARTRATE 40 MCG/MIN: 64 SOLUTION INTRAVENOUS at 18:06

## 2025-02-05 RX ADMIN — CEFEPIME 2000 MG: 2 INJECTION, POWDER, FOR SOLUTION INTRAVENOUS at 22:36

## 2025-02-05 RX ADMIN — HEPARIN SODIUM 12 UNITS/KG/HR: 10000 INJECTION, SOLUTION INTRAVENOUS at 15:15

## 2025-02-05 RX ADMIN — Medication 150 MCG/HR: at 18:23

## 2025-02-05 RX ADMIN — PROPOFOL 30 MCG/KG/MIN: 10 INJECTION, EMULSION INTRAVENOUS at 18:50

## 2025-02-05 RX ADMIN — SODIUM CHLORIDE, PRESERVATIVE FREE 10 ML: 5 INJECTION INTRAVENOUS at 20:17

## 2025-02-05 RX ADMIN — ACETAMINOPHEN 650 MG: 650 SUPPOSITORY RECTAL at 18:20

## 2025-02-05 RX ADMIN — VECURONIUM BROMIDE 1 MCG/KG/MIN: 1 INJECTION, POWDER, LYOPHILIZED, FOR SOLUTION INTRAVENOUS at 14:49

## 2025-02-05 RX ADMIN — METRONIDAZOLE 500 MG: 500 INJECTION, SOLUTION INTRAVENOUS at 20:26

## 2025-02-05 RX ADMIN — DEXMEDETOMIDINE HYDROCHLORIDE 0.2 MCG/KG/HR: 100 INJECTION, SOLUTION INTRAVENOUS at 10:41

## 2025-02-05 RX ADMIN — HEPARIN SODIUM 3700 UNITS: 1000 INJECTION INTRAVENOUS; SUBCUTANEOUS at 15:13

## 2025-02-05 RX ADMIN — ROCURONIUM BROMIDE 37 MG: 10 INJECTION, SOLUTION INTRAVENOUS at 13:33

## 2025-02-05 RX ADMIN — HYDROCORTISONE SODIUM SUCCINATE 100 MG: 100 INJECTION, POWDER, FOR SOLUTION INTRAMUSCULAR; INTRAVENOUS at 18:20

## 2025-02-05 ASSESSMENT — PULMONARY FUNCTION TESTS
PIF_VALUE: 39
PIF_VALUE: 41
PIF_VALUE: 41

## 2025-02-05 NOTE — ED NOTES
10:30 AM  Ketamine administered per Lennie RN with Dr. Quiroga at bedside.    10:30 AM   Jeffry administered per TARIQ Hogue with Dr. Quiroga at bedside.     10:33 AM  Pt intubated with an 8.0 tube 24 at the lip with positive color change. Breath sounds heard by Dr. Quiroga.

## 2025-02-05 NOTE — ED PROVIDER NOTES
Emergency Department Provider Note  Location: [facility] EMERGENCY DEPARTMENT  2/5/2025     Patient Identification  Dayton Soto is a 59 y.o. male    Chief Complaint  Shortness of Breath (Pt states that he used meth about 4 hours PTA and states that he has increased SOB and chest pain. Pt states that he was seen last week and diagnosed with pneumonia.)          HPI  (History provided by patient and EMS)  Patient is a 59-year-old male with history of polysubstance use, A-fib, valvular disease, on Eliquis on amiodarone, MRSA, meth use, cardiomyopathy reportedly recently diagnosed with pneumonia and on antibiotics, arrives by EMS early in the morning for shortness of breath.  Patient reports that he smoked methamphetamine about 4 to 5 hours ago and endorses progressive shortness of breath over the following hours.  Denies any chest pain.  Overall difficult historian patient is with pressured tangential speech, tachypneic, mildly tachycardic, nondiaphoretic.  With nonrebreather mask with no oxygen and a normal O2 sat that he is refusing to take off.      Nursing Notes were all reviewed and agreed with, or any disagreements were addressed in the HPI:  Allergies: No Known Allergies    Past medical history:  has a past medical history of Back pain, chronic, Chronic back pain, and MRSA (methicillin resistant staph aureus) culture positive (02/26/2020).    Past surgical history:  has no past surgical history on file.    Home medications:   Prior to Admission medications    Medication Sig Start Date End Date Taking? Authorizing Provider   amiodarone (CORDARONE) 200 MG tablet 2 bid - 5 days, 2 qd- 1 week, 1 qd thereafter 1/31/25   Brittany Shin MD   empagliflozin (JARDIANCE) 10 MG tablet Take 1 tablet by mouth daily 1/31/25   Brittany Shin MD   apixaban (ELIQUIS) 5 MG TABS tablet Take 1 tablet by mouth 2 times daily 1/31/25   Brittany Shin MD   metoprolol succinate (TOPROL

## 2025-02-05 NOTE — PROGRESS NOTES
Patient well-known to me from previous admission at [facility] for multifocal pneumonia and rapid atrial fibrillation.  There he was treated for his infection.  Echo showed LVEF moderately reduced, and in line with previous known cardiomyopathy.  History of substance abuse.  No ischemic workup undertaken during hospitalization.  Plan was for outpatient follow-up for GDMT and reassessment.  Patient presenting back to [facility] ED today with multifocal pneumonia, worsening imaging appearance, acute hypoxic respiratory failure, need for mechanical ventilation.    While high sensitive troponin is elevated, this is likely 2/2 acute myocardial injury.  No ischemic symptoms endorsed at last evaluation in January. Patient is a poor Cath Lab candidate owing to history of substance abuse and lack follow-up.  I would prioritize critical care above all else to treat multifocal PNA and respiratory failure - first bed available is where he should be placed.  Our cardiology service is happy to see him during hospitalization if acute needs.      Kehinde Blackwood MD, [facility]  Cardiovascular Disease  [facility]  [phone] [place] Office  [phone] [place] Office

## 2025-02-05 NOTE — PROGRESS NOTES
Wasted 90 ml ketamine from ketamine gtt with HOANG Aguirre RN.   Electronically signed by Lisset Mills RN on 2/5/2025 at 6:29 PM    Electronically signed by Fani Aguirre RN on 2/5/2025 at 6:30 PM

## 2025-02-05 NOTE — CONSULTS
Pharmacy Note  Vancomycin Consult    Dayton Soto is a 59 y.o. male started on Vancomycin for pneumonia; consult received from ZEYAD Nicole to manage therapy. Also receiving the following antibiotics: cefepime.    Allergies:  Patient has no known allergies.       Recent Labs     02/05/25  0936   CREATININE 0.8*       Recent Labs     02/05/25  0936   WBC 47.0*       Estimated Creatinine Clearance: 88 mL/min (A) (based on SCr of 0.8 mg/dL (L)).      Intake/Output Summary (Last 24 hours) at 2/5/2025 1801  Last data filed at 2/5/2025 1607  Gross per 24 hour   Intake 3469 ml   Output --   Net 3469 ml       Wt Readings from Last 1 Encounters:   02/01/25 62.3 kg (137 lb 4.8 oz)         There is no height or weight on file to calculate BMI.    Loading dose (critically ill or in ICU, require dialysis or renal replacement therapy): Vancomycin 25 mg/kg IVPB x 1 (maximum 2500 mg).  Maintenance dose: 10-20 mg/kg (maximum: 2000 mg/dose and 4500 mg/day) starting at the next dosing interval determined by renal function  Pulse dose: fluctuating renal function, LUIS FELIPE, ESRD  CRRT: 7.5-10 mg/kg q12h   Goal Vancomycin trough: 15-20 mcg/mL   Goal Vancomycin AUC: 400-600     Assessment/Plan:  Will initiate Vancomycin with a one time loading dose of 1500 mg x1, followed by 1000 mg IV every 12 hours. Calculated Vancomycin AUC = 494 mg/L*h with an estimated steady-state vancomycin trough = 12.7 mcg/mL. Vancomycin level ordered for 2/6 @ 1300. Timing of trough level will be determined based on culture results, renal function, and clinical response.     Thank you for the consult.

## 2025-02-05 NOTE — CONSULTS
Reason for referral and CC: Septic shock, pneumonia    HISTORY OF PRESENT ILLNESS: 58 yo male with a h/o polysubstance abuse, cardiomyopathy with EF 30% and a recent admission for pneumonia presented to [facility] ED with SOB. He admitted to using meth 4 hours before arrival.  Found working quickly progressed to where he was emergently intubated and started on vasopressors.  He was very difficult to oxygenate and ventilate on the ventilator and never maintain sats above 85% or so and was frequently lower.  SpO2 sats 70s to 80s.  On high-dose vasopressors.  Patient is sedated and unable to provide history.  Fever 103.  Only minimal sputum on suctioning    Past Medical History:   Diagnosis Date    Back pain, chronic     Chronic back pain     MRSA (methicillin resistant staph aureus) culture positive 02/26/2020    RIGHT FOREARM     History reviewed. No pertinent surgical history.  Family History  family history is not on file.    Social History:  reports that he has quit smoking. His smoking use included cigarettes. He has never used smokeless tobacco.   reports no history of alcohol use.    ALLERGIES:  Patient has No Known Allergies.  Continuous Infusions:   heparin (PORCINE) Infusion 12 Units/kg/hr (02/05/25 1515)    sodium chloride      sodium chloride 125 mL/hr at 02/05/25 1814    VASOpressin 20 Units in sodium chloride 0.9 % 100 mL infusion      norepinephrine 40 mcg/min (02/05/25 1806)    cisatracurium besylate (NIMBEX) 200 mg in sodium chloride 0.9 % 100 mL infusion      fentaNYL      midazolam       Scheduled Meds:   cefepime  2,000 mg IntraVENous Q12H    sodium chloride flush  5-40 mL IntraVENous 2 times per day    amiodarone  400 mg Oral Daily    hydrocortisone sodium succinate PF  100 mg IntraVENous Q8H    famotidine (PEPCID) injection  20 mg IntraVENous BID    linezolid  600 mg IntraVENous Q12H     PRN Meds:  heparin (porcine), heparin (porcine), sodium chloride flush, sodium chloride, potassium chloride  moderate interstitial edema is present throughout the bilateral lungs  with worsening diffuse consolidation throughout the bilateral lungs, most  significant in the bilateral lower lobes.     Upper Abdomen: A the nasogastric tube reaches the gastric antrum.     Soft Tissues/Bones: Degenerative changes involve the thoracic spine.     IMPRESSION:  1. Worsening diffuse bilateral airspace disease favoring aspiration pneumonia  rather than pulmonary edema.  2. Interstitial edema.  3. Trace left pleural effusion.  4. Mediastinal adenopathy, likely reactive.    ASSESSMENT:  Severe hypoxic and hypercarbic respiratory failure  Severe pneumonia and ARDS  Possible aspiration  Methamphetamine use  Septic shock  Cardiomyopathy of unknown etiology present on admission and EF 30 to 35% with global hypokinesis    PLAN:  Mechanical ventilation as per my orders.  The ventilator was adjusted by me at the bedside for unstable, life threatening respiratory failure.  IV Propofol for sedation, target RASS -2, with daily spontaneous awakening trial.  Fentanyl PRN pain, gtt as needed  Head of bed 30 degrees or higher at all times  Daily spontaneous breathing trial once PEEP less than 8, FiO2 less than 55%.   Propofol and fentanyl and Versed drips while paralyzed do not decrease sedation, paralyzed  Cisatracurium drip  Proning protocol  Heparin gtt  Zyvox and cefepime and flagyl  Stress dose steroids  Maintenance IV fluids  Titrate vasopressors to maintain MAP 65  The patient is critically ill and may not survive the night  Due to at least single organ failure and risk of rapid deterioration, I spent 120 minutes of Critical care time reviewing labs/films, examining patient, collaborating with other physicians. This does not include time performing critical procedures.     Thank you Willie Navarrete MD for this consult

## 2025-02-05 NOTE — ED NOTES
Pt states that he is feeling worse. Pt's resp have increased and pt's HR increased. MD aware.   Nasal Turnover Hinge Flap Text: The defect edges were debeveled with a #15 scalpel blade.  Given the size, depth, location of the defect and the defect being full thickness a nasal turnover hinge flap was deemed most appropriate.  Using a sterile surgical marker, an appropriate hinge flap was drawn incorporating the defect. The area thus outlined was incised with a #15 scalpel blade. The flap was designed to recreate the nasal mucosal lining and the alar rim. The skin margins were undermined to an appropriate distance in all directions utilizing iris scissors.

## 2025-02-05 NOTE — PROGRESS NOTES
80 mls of vecuronium gtt wasted at this time with witness.  Electronically signed by Fani Aguirre RN on 2/5/2025 at 6:40 PM     Electronically signed by Julianne Drummond RN on 2/5/2025 at 7:03 PM     syncope

## 2025-02-05 NOTE — PROGRESS NOTES
Pharmacy to Manage Heparin Infusion per Hospital Nomogram      Dx: afib  Low dose weight based heparin [place] [facility]  Pt wt 62.3 kg(actual weight)  Baseline aPTT = ordered/pending  Patient was on Eliquis at home.    Heparin low dose weight based infusion is ordered for patient. Per chart review, patient has received an oral Factor Xa inhibitor (Eliquis) within the last 72 hours. As oral Factor Xa inhibitors can impact the anti-Xa test calibrated to unfractionated heparin, Heparin infusion will be monitored and adjusted using aPtt's per [facility] Policy.   The goal is to allow a washout of oral factor Xa-inhibitors by using aPTT for 72 hours, then change to ant-Xa levels for UFH.     Heparin (weight-based) Infusion: CAD/STEMI/UA/AFIB  Heparin 60 units/kg IVP bolus (max 4000 units) followed by Heparin infusion at 12 units/kg/hr (recommended max initial rate: 1000 units/hr).  Check  aPTT in 6 hours    aPTT < 59                   Heparin 60 units/kg bolus (max 4,000 units)     Increase infusion by 4 units/kg/hr  aPTT 59 - 72.9            Heparin 30 units/kg bolus (max 2,000 units)     Increase infusion by 2 units/kg/hr  aPTT 73 - 102             No bolus                                                    No change   aPTT 102.1 - 109        No bolus                                                    Decrease infusion by 1 units/kg/hr  aPTT 109.1 - 122.9     No bolus                                                    Decrease infusion by 2 units/kg/hr  aPTT 123 or greater    Hold heparin for 1 hour                            Decrease infusion by 3 units/kg/hr    When aPTT  is within target range for two consecutive times, check aPTT once daily with morning labs.  Pharmacy will continue to monitor and adjust as necessary.

## 2025-02-06 ENCOUNTER — APPOINTMENT (OUTPATIENT)
Dept: GENERAL RADIOLOGY | Age: 60
DRG: 720 | End: 2025-02-06
Payer: MEDICAID

## 2025-02-06 PROBLEM — J80 ARDS (ADULT RESPIRATORY DISTRESS SYNDROME): Status: ACTIVE | Noted: 2025-02-06

## 2025-02-06 PROBLEM — F15.10 METHAMPHETAMINE USE (HCC): Status: ACTIVE | Noted: 2025-02-06

## 2025-02-06 PROBLEM — R65.21 SEPTIC SHOCK (HCC): Status: ACTIVE | Noted: 2025-01-27

## 2025-02-06 PROBLEM — R57.9 SHOCK: Status: ACTIVE | Noted: 2025-01-27

## 2025-02-06 LAB
ALBUMIN SERPL-MCNC: 2.5 G/DL (ref 3.4–5)
ALP SERPL-CCNC: 128 U/L (ref 40–129)
ALT SERPL-CCNC: 74 U/L (ref 10–40)
ANION GAP SERPL CALCULATED.3IONS-SCNC: 10 MMOL/L (ref 3–16)
ANION GAP SERPL CALCULATED.3IONS-SCNC: 8 MMOL/L (ref 3–16)
APPEARANCE BRONCH: ABNORMAL
APTT BLD: 68.1 SEC (ref 22.1–36.4)
APTT BLD: 69.6 SEC (ref 22.1–36.4)
APTT BLD: 83.8 SEC (ref 22.1–36.4)
AST SERPL-CCNC: 66 U/L (ref 15–37)
BACTERIA UR CULT: NORMAL
BASE EXCESS BLDA CALC-SCNC: -2 MMOL/L (ref -3–3)
BASE EXCESS BLDA CALC-SCNC: -5.4 MMOL/L (ref -3–3)
BASOPHILS # BLD: 0 K/UL (ref 0–0.2)
BASOPHILS NFR BLD: 0 %
BILIRUB DIRECT SERPL-MCNC: 0.3 MG/DL (ref 0–0.3)
BILIRUB INDIRECT SERPL-MCNC: 0.1 MG/DL (ref 0–1)
BILIRUB SERPL-MCNC: 0.4 MG/DL (ref 0–1)
BUN SERPL-MCNC: 27 MG/DL (ref 7–20)
BUN SERPL-MCNC: 30 MG/DL (ref 7–20)
CALCIUM SERPL-MCNC: 6.9 MG/DL (ref 8.3–10.6)
CALCIUM SERPL-MCNC: 7.4 MG/DL (ref 8.3–10.6)
CHLORIDE SERPL-SCNC: 101 MMOL/L (ref 99–110)
CHLORIDE SERPL-SCNC: 102 MMOL/L (ref 99–110)
CLOT SPEC QL: ABNORMAL
CO2 BLDA-SCNC: 27.1 MMOL/L
CO2 BLDA-SCNC: 28.8 MMOL/L
CO2 SERPL-SCNC: 28 MMOL/L (ref 21–32)
CO2 SERPL-SCNC: 31 MMOL/L (ref 21–32)
COHGB MFR BLDA: 0.3 % (ref 0–1.5)
COHGB MFR BLDA: 0.3 % (ref 0–1.5)
COLOR BRONCH: ABNORMAL
CREAT SERPL-MCNC: 1.2 MG/DL (ref 0.9–1.3)
CREAT SERPL-MCNC: 1.2 MG/DL (ref 0.9–1.3)
DEPRECATED RDW RBC AUTO: 14.8 % (ref 12.4–15.4)
EKG ATRIAL RATE: 103 BPM
EKG DIAGNOSIS: NORMAL
EKG P AXIS: 53 DEGREES
EKG P-R INTERVAL: 144 MS
EKG Q-T INTERVAL: 372 MS
EKG QRS DURATION: 124 MS
EKG QTC CALCULATION (BAZETT): 487 MS
EKG R AXIS: -36 DEGREES
EKG T AXIS: 98 DEGREES
EKG VENTRICULAR RATE: 103 BPM
EOSINOPHIL # BLD: 0 K/UL (ref 0–0.6)
EOSINOPHIL NFR BLD: 0 %
EOSINOPHIL NFR BRONCH MANUAL: 1 %
GFR SERPLBLD CREATININE-BSD FMLA CKD-EPI: 69 ML/MIN/{1.73_M2}
GFR SERPLBLD CREATININE-BSD FMLA CKD-EPI: 69 ML/MIN/{1.73_M2}
GLUCOSE BLD-MCNC: 196 MG/DL (ref 70–99)
GLUCOSE BLD-MCNC: 198 MG/DL (ref 70–99)
GLUCOSE BLD-MCNC: 237 MG/DL (ref 70–99)
GLUCOSE BLD-MCNC: 243 MG/DL (ref 70–99)
GLUCOSE BLD-MCNC: 291 MG/DL (ref 70–99)
GLUCOSE BLD-MCNC: 331 MG/DL (ref 70–99)
GLUCOSE SERPL-MCNC: 237 MG/DL (ref 70–99)
GLUCOSE SERPL-MCNC: 287 MG/DL (ref 70–99)
HCO3 BLDA-SCNC: 24.9 MMOL/L (ref 21–29)
HCO3 BLDA-SCNC: 26.8 MMOL/L (ref 21–29)
HCT VFR BLD AUTO: 41.2 % (ref 40.5–52.5)
HGB BLD-MCNC: 13.1 G/DL (ref 13.5–17.5)
HGB BLDA-MCNC: 12.9 G/DL (ref 13.5–17.5)
HGB BLDA-MCNC: 13.9 G/DL (ref 13.5–17.5)
INR PPP: 1.65 (ref 0.85–1.15)
LACTATE BLDV-SCNC: 3.9 MMOL/L (ref 0.4–2)
LYMPHOCYTES # BLD: 0.7 K/UL (ref 1–5.1)
LYMPHOCYTES NFR BLD: 1 %
LYMPHOCYTES NFR BRONCH MANUAL: 2 % (ref 5–10)
MACROPHAGES NFR BRONCH MANUAL: 4 % (ref 90–95)
MCH RBC QN AUTO: 29.8 PG (ref 26–34)
MCHC RBC AUTO-ENTMCNC: 31.7 G/DL (ref 31–36)
MCV RBC AUTO: 93.8 FL (ref 80–100)
METHGB MFR BLDA: 0.1 %
METHGB MFR BLDA: 0.1 %
MONOCYTES # BLD: 2.1 K/UL (ref 0–1.3)
MONOCYTES NFR BLD: 3 %
MONOCYTES NFR BRONCH MANUAL: 5 %
NEUTROPHILS # BLD: 67.2 K/UL (ref 1.7–7.7)
NEUTROPHILS NFR BLD: 95 %
NEUTROPHILS NFR BRONCH MANUAL: 88 % (ref 5–10)
NEUTS BAND NFR BLD MANUAL: 1 % (ref 0–7)
NUC CELL # BRONCH MANUAL: 8466 /CUMM
O2 CT VFR BLDA CALC: 19 ML/DL
O2 THERAPY: ABNORMAL
O2 THERAPY: ABNORMAL
PATH INTERP BLD-IMP: YES
PCO2 BLDA: 65.4 MMHG (ref 35–45)
PCO2 BLDA: 72.8 MMHG (ref 35–45)
PERFORMED ON: ABNORMAL
PH BLDA: 7.15 [PH] (ref 7.35–7.45)
PH BLDA: 7.23 [PH] (ref 7.35–7.45)
PLATELET # BLD AUTO: 399 K/UL (ref 135–450)
PLATELET BLD QL SMEAR: ADEQUATE
PMV BLD AUTO: 8.4 FL (ref 5–10.5)
PO2 BLDA: 79.9 MMHG (ref 75–108)
PO2 BLDA: 98.6 MMHG (ref 75–108)
POIKILOCYTOSIS BLD QL SMEAR: ABNORMAL
POLYCHROMASIA BLD QL SMEAR: ABNORMAL
POTASSIUM SERPL-SCNC: 5.3 MMOL/L (ref 3.5–5.1)
POTASSIUM SERPL-SCNC: 5.7 MMOL/L (ref 3.5–5.1)
PROT SERPL-MCNC: 5.5 G/DL (ref 6.4–8.2)
PROTHROMBIN TIME: 19.7 SEC (ref 11.9–14.9)
RBC # BLD AUTO: 4.39 M/UL (ref 4.2–5.9)
RBC # FLD MANUAL: ABNORMAL /CUMM
SAO2 % BLDA: 93.3 %
SAO2 % BLDA: 95.3 %
SLIDE REVIEW: ABNORMAL
SODIUM SERPL-SCNC: 140 MMOL/L (ref 136–145)
SODIUM SERPL-SCNC: 140 MMOL/L (ref 136–145)
TOTAL CELLS COUNTED BRONCH: 100
TOXIC GRANULES BLD QL SMEAR: PRESENT
WBC # BLD AUTO: 70 K/UL (ref 4–11)

## 2025-02-06 PROCEDURE — 2500000003 HC RX 250 WO HCPCS: Performed by: INTERNAL MEDICINE

## 2025-02-06 PROCEDURE — 6360000002 HC RX W HCPCS: Performed by: INTERNAL MEDICINE

## 2025-02-06 PROCEDURE — 85025 COMPLETE CBC W/AUTO DIFF WBC: CPT

## 2025-02-06 PROCEDURE — 94761 N-INVAS EAR/PLS OXIMETRY MLT: CPT

## 2025-02-06 PROCEDURE — 0B9D8ZX DRAINAGE OF RIGHT MIDDLE LUNG LOBE, VIA NATURAL OR ARTIFICIAL OPENING ENDOSCOPIC, DIAGNOSTIC: ICD-10-PCS | Performed by: INTERNAL MEDICINE

## 2025-02-06 PROCEDURE — 94640 AIRWAY INHALATION TREATMENT: CPT

## 2025-02-06 PROCEDURE — 2000000000 HC ICU R&B

## 2025-02-06 PROCEDURE — 6370000000 HC RX 637 (ALT 250 FOR IP)

## 2025-02-06 PROCEDURE — 82803 BLOOD GASES ANY COMBINATION: CPT

## 2025-02-06 PROCEDURE — 36592 COLLECT BLOOD FROM PICC: CPT

## 2025-02-06 PROCEDURE — 6360000002 HC RX W HCPCS

## 2025-02-06 PROCEDURE — 31624 DX BRONCHOSCOPE/LAVAGE: CPT | Performed by: INTERNAL MEDICINE

## 2025-02-06 PROCEDURE — 31622 DX BRONCHOSCOPE/WASH: CPT

## 2025-02-06 PROCEDURE — 99233 SBSQ HOSP IP/OBS HIGH 50: CPT | Performed by: INTERNAL MEDICINE

## 2025-02-06 PROCEDURE — 83605 ASSAY OF LACTIC ACID: CPT

## 2025-02-06 PROCEDURE — 2580000003 HC RX 258: Performed by: INTERNAL MEDICINE

## 2025-02-06 PROCEDURE — 6370000000 HC RX 637 (ALT 250 FOR IP): Performed by: INTERNAL MEDICINE

## 2025-02-06 PROCEDURE — 2700000000 HC OXYGEN THERAPY PER DAY

## 2025-02-06 PROCEDURE — 99223 1ST HOSP IP/OBS HIGH 75: CPT | Performed by: STUDENT IN AN ORGANIZED HEALTH CARE EDUCATION/TRAINING PROGRAM

## 2025-02-06 PROCEDURE — 99291 CRITICAL CARE FIRST HOUR: CPT | Performed by: INTERNAL MEDICINE

## 2025-02-06 PROCEDURE — 36415 COLL VENOUS BLD VENIPUNCTURE: CPT

## 2025-02-06 PROCEDURE — 89051 BODY FLUID CELL COUNT: CPT

## 2025-02-06 PROCEDURE — 94003 VENT MGMT INPAT SUBQ DAY: CPT

## 2025-02-06 PROCEDURE — 80076 HEPATIC FUNCTION PANEL: CPT

## 2025-02-06 PROCEDURE — 85730 THROMBOPLASTIN TIME PARTIAL: CPT

## 2025-02-06 PROCEDURE — 80048 BASIC METABOLIC PNL TOTAL CA: CPT

## 2025-02-06 PROCEDURE — 31645 BRNCHSC W/THER ASPIR 1ST: CPT | Performed by: INTERNAL MEDICINE

## 2025-02-06 PROCEDURE — 87205 SMEAR GRAM STAIN: CPT

## 2025-02-06 PROCEDURE — 2500000003 HC RX 250 WO HCPCS

## 2025-02-06 PROCEDURE — 87070 CULTURE OTHR SPECIMN AEROBIC: CPT

## 2025-02-06 PROCEDURE — 93010 ELECTROCARDIOGRAM REPORT: CPT | Performed by: INTERNAL MEDICINE

## 2025-02-06 PROCEDURE — 85610 PROTHROMBIN TIME: CPT

## 2025-02-06 RX ORDER — MUPIROCIN 20 MG/G
OINTMENT TOPICAL 2 TIMES DAILY
Status: COMPLETED | OUTPATIENT
Start: 2025-02-06 | End: 2025-02-10

## 2025-02-06 RX ORDER — INSULIN GLARGINE 100 [IU]/ML
10 INJECTION, SOLUTION SUBCUTANEOUS ONCE
Status: COMPLETED | OUTPATIENT
Start: 2025-02-06 | End: 2025-02-06

## 2025-02-06 RX ORDER — DEXTROSE MONOHYDRATE 100 MG/ML
INJECTION, SOLUTION INTRAVENOUS CONTINUOUS PRN
Status: ACTIVE | OUTPATIENT
Start: 2025-02-06

## 2025-02-06 RX ORDER — INSULIN LISPRO 100 [IU]/ML
0-4 INJECTION, SOLUTION INTRAVENOUS; SUBCUTANEOUS EVERY 4 HOURS
Status: DISCONTINUED | OUTPATIENT
Start: 2025-02-06 | End: 2025-02-20

## 2025-02-06 RX ORDER — LIDOCAINE HYDROCHLORIDE 40 MG/ML
SOLUTION TOPICAL
Status: DISPENSED
Start: 2025-02-06 | End: 2025-02-06

## 2025-02-06 RX ORDER — GLUCAGON 1 MG/ML
1 KIT INJECTION PRN
Status: ACTIVE | OUTPATIENT
Start: 2025-02-06

## 2025-02-06 RX ORDER — HEPARIN SODIUM 1000 [USP'U]/ML
30 INJECTION, SOLUTION INTRAVENOUS; SUBCUTANEOUS ONCE
Status: COMPLETED | OUTPATIENT
Start: 2025-02-06 | End: 2025-02-06

## 2025-02-06 RX ADMIN — HYDROCORTISONE SODIUM SUCCINATE 100 MG: 100 INJECTION, POWDER, FOR SOLUTION INTRAMUSCULAR; INTRAVENOUS at 01:40

## 2025-02-06 RX ADMIN — METRONIDAZOLE 500 MG: 500 INJECTION, SOLUTION INTRAVENOUS at 20:07

## 2025-02-06 RX ADMIN — HEPARIN SODIUM 1900 UNITS: 1000 INJECTION INTRAVENOUS; SUBCUTANEOUS at 18:56

## 2025-02-06 RX ADMIN — FAMOTIDINE 20 MG: 10 INJECTION INTRAVENOUS at 08:14

## 2025-02-06 RX ADMIN — CEFEPIME 2000 MG: 2 INJECTION, POWDER, FOR SOLUTION INTRAVENOUS at 22:42

## 2025-02-06 RX ADMIN — HYDROCORTISONE SODIUM SUCCINATE 100 MG: 100 INJECTION, POWDER, FOR SOLUTION INTRAMUSCULAR; INTRAVENOUS at 17:38

## 2025-02-06 RX ADMIN — METRONIDAZOLE 500 MG: 500 INJECTION, SOLUTION INTRAVENOUS at 04:15

## 2025-02-06 RX ADMIN — MIDAZOLAM HYDROCHLORIDE 5 MG/HR: 1 INJECTION, SOLUTION INTRAVENOUS at 10:17

## 2025-02-06 RX ADMIN — PROPOFOL 30 MCG/KG/MIN: 10 INJECTION, EMULSION INTRAVENOUS at 14:56

## 2025-02-06 RX ADMIN — NOREPINEPHRINE BITARTRATE 10 MCG/MIN: 64 SOLUTION INTRAVENOUS at 01:39

## 2025-02-06 RX ADMIN — Medication 150 MCG/HR: at 07:19

## 2025-02-06 RX ADMIN — FAMOTIDINE 20 MG: 10 INJECTION INTRAVENOUS at 20:50

## 2025-02-06 RX ADMIN — Medication 150 MCG/HR: at 22:35

## 2025-02-06 RX ADMIN — SODIUM ZIRCONIUM CYCLOSILICATE 10 G: 10 POWDER, FOR SUSPENSION ORAL at 14:08

## 2025-02-06 RX ADMIN — AMIODARONE HYDROCHLORIDE 400 MG: 200 TABLET ORAL at 08:14

## 2025-02-06 RX ADMIN — MUPIROCIN: 20 OINTMENT TOPICAL at 20:50

## 2025-02-06 RX ADMIN — LINEZOLID 600 MG: 600 INJECTION, SOLUTION INTRAVENOUS at 17:39

## 2025-02-06 RX ADMIN — CISATRACURIUM BESYLATE 1.5 MCG/KG/MIN: 200 INJECTION, SOLUTION INTRAVENOUS at 14:45

## 2025-02-06 RX ADMIN — IPRATROPIUM BROMIDE AND ALBUTEROL SULFATE 1 DOSE: .5; 2.5 SOLUTION RESPIRATORY (INHALATION) at 19:13

## 2025-02-06 RX ADMIN — SODIUM ZIRCONIUM CYCLOSILICATE 10 G: 10 POWDER, FOR SUSPENSION ORAL at 20:51

## 2025-02-06 RX ADMIN — INSULIN GLARGINE 10 UNITS: 100 INJECTION, SOLUTION SUBCUTANEOUS at 14:08

## 2025-02-06 RX ADMIN — IPRATROPIUM BROMIDE AND ALBUTEROL SULFATE 1 DOSE: .5; 2.5 SOLUTION RESPIRATORY (INHALATION) at 11:04

## 2025-02-06 RX ADMIN — HYDROCORTISONE SODIUM SUCCINATE 100 MG: 100 INJECTION, POWDER, FOR SOLUTION INTRAMUSCULAR; INTRAVENOUS at 10:27

## 2025-02-06 RX ADMIN — HEPARIN SODIUM 18 UNITS/KG/HR: 10000 INJECTION, SOLUTION INTRAVENOUS at 14:47

## 2025-02-06 RX ADMIN — LINEZOLID 600 MG: 600 INJECTION, SOLUTION INTRAVENOUS at 05:37

## 2025-02-06 RX ADMIN — Medication 150 MCG/HR: at 14:46

## 2025-02-06 RX ADMIN — CEFEPIME 2000 MG: 2 INJECTION, POWDER, FOR SOLUTION INTRAVENOUS at 10:28

## 2025-02-06 RX ADMIN — SODIUM BICARBONATE: 84 INJECTION, SOLUTION INTRAVENOUS at 03:58

## 2025-02-06 RX ADMIN — IPRATROPIUM BROMIDE AND ALBUTEROL SULFATE 1 DOSE: .5; 2.5 SOLUTION RESPIRATORY (INHALATION) at 06:58

## 2025-02-06 RX ADMIN — METRONIDAZOLE 500 MG: 500 INJECTION, SOLUTION INTRAVENOUS at 12:03

## 2025-02-06 RX ADMIN — MUPIROCIN: 20 OINTMENT TOPICAL at 08:14

## 2025-02-06 RX ADMIN — INSULIN LISPRO 3 UNITS: 100 INJECTION, SOLUTION INTRAVENOUS; SUBCUTANEOUS at 12:04

## 2025-02-06 RX ADMIN — SODIUM ZIRCONIUM CYCLOSILICATE 10 G: 10 POWDER, FOR SUSPENSION ORAL at 08:14

## 2025-02-06 RX ADMIN — PROPOFOL 30 MCG/KG/MIN: 10 INJECTION, EMULSION INTRAVENOUS at 07:54

## 2025-02-06 RX ADMIN — Medication 150 MCG/HR: at 00:23

## 2025-02-06 RX ADMIN — SODIUM CHLORIDE, PRESERVATIVE FREE 10 ML: 5 INJECTION INTRAVENOUS at 08:15

## 2025-02-06 RX ADMIN — VASOPRESSIN 0.03 UNITS/MIN: 20 INJECTION INTRAVENOUS at 03:59

## 2025-02-06 RX ADMIN — VASOPRESSIN 0.03 UNITS/MIN: 20 INJECTION INTRAVENOUS at 14:47

## 2025-02-06 ASSESSMENT — PULMONARY FUNCTION TESTS
PIF_VALUE: 35
PIF_VALUE: 37
PIF_VALUE: 41
PIF_VALUE: 39
PIF_VALUE: 37
PIF_VALUE: 36

## 2025-02-06 ASSESSMENT — PAIN SCALES - GENERAL
PAINLEVEL_OUTOF10: 0
PAINLEVEL_OUTOF10: 0

## 2025-02-06 NOTE — CONSULTS
CARDIOLOGY CONSULTATION        Patient Name: Dayton Soto  Date of admission: 2/5/2025  8:47 AM  Admission Dx: Acute respiratory failure [J96.00]  Methamphetamine use (HCC) [F15.10]  Elevated troponin [R79.89]  Acute respiratory failure with hypoxia [J96.01]  Multifocal pneumonia [J18.9]  Requesting Physician: Fran Palacio MD  Primary Care physician: Not, On File (Inactive)    Reason for Consultation/Chief Complaint: \"NSTEMI\"    History of Present Illness:     Dayton Soto is a 59 y.o. patient with past medical history for substance abuse/methamphetamine abuse, cardiomyopathy with moderate LV dysfunction, PAF who presented to the with acute respiratory distress.  Patient was reportedly admitted to smoking methamphetamines a few hours before coming to the hospital. emergently had to be intubated.  Patient difficult to oxygenate and remain severely hypoxemic for several hours.  Patient also with septic shock on Levophed and vasopressin.  Severe acidosis with a pH around 6.9 which is slowly improving to 7.2 today.  Patient troponin also elevated and he was started on heparin drip.  Chest x-ray shows extensive bilateral infiltrates.  He remains intubated, sedated, proned and on paralytics.  He remains critically ill.      Past Medical History:   has a past medical history of Back pain, chronic, Chronic back pain, and MRSA (methicillin resistant staph aureus) culture positive.    Surgical History:   has no past surgical history on file.     Social History:   reports that he has quit smoking. His smoking use included cigarettes. He has never used smokeless tobacco. He reports current drug use. Drug: Methamphetamines (Crystal Meth). He reports that he does not drink alcohol.     Family History:  family history is not on file.      Home Medications:  Were reviewed and are listed in nursing record and/or below  Prior to Admission medications    Medication Sig Start Date End Date Taking? Authorizing Provider  below.      Objective:     Vitals:    02/06/25 1414   BP:    Pulse:    Resp:    Temp:    SpO2: 97%            PHYSICAL EXAM:    General:  Acutely ill, proned   Head:  Normocephalic, atraumatic   Eyes:  Unable to assess   Nose: Unable to assess   Throat: ETT tube   Neck: Unable to assess for JVD as patient is prone   Lungs:   Course breath sounds bilaterally, Chemical breath sounds bilaterally   Chest Wall:     Heart:  Unable to assess, patient is proned.   Abdomen:   Unable to assess patient is prone   Extremities: No cyanosis, clubbing or pitting edema.  Warm extremities.   Vascular: 2+ radial   Skin: Skin color, texture, turgor are normal with no rashes or ulceration.    Pysch: Intubated and sedated   Neurologic: Intubated, sedated.  On paralytics        Labs:   CBC:   Lab Results   Component Value Date/Time    WBC 70.0 02/06/2025 04:54 AM    RBC 4.39 02/06/2025 04:54 AM    HGB 13.1 02/06/2025 04:54 AM    HCT 41.2 02/06/2025 04:54 AM    MCV 93.8 02/06/2025 04:54 AM    RDW 14.8 02/06/2025 04:54 AM     02/06/2025 04:54 AM     CMP:  Lab Results   Component Value Date/Time     02/06/2025 02:15 PM    K 5.3 02/06/2025 02:15 PM     02/06/2025 02:15 PM    CO2 31 02/06/2025 02:15 PM    BUN 30 02/06/2025 02:15 PM    CREATININE 1.2 02/06/2025 02:15 PM    GFRAA >60 06/01/2021 01:20 PM    AGRATIO 1.2 02/05/2025 09:36 AM    LABGLOM 69 02/06/2025 02:15 PM    LABGLOM >60 12/15/2022 03:15 PM    GLUCOSE 237 02/06/2025 02:15 PM    CALCIUM 6.9 02/06/2025 02:15 PM    BILITOT 0.4 02/06/2025 04:54 AM    ALKPHOS 128 02/06/2025 04:54 AM    AST 66 02/06/2025 04:54 AM    ALT 74 02/06/2025 04:54 AM     PT/INR:  No results found for: \"PTINR\"  HgBA1c:  Lab Results   Component Value Date    LABA1C 5.2 07/03/2024     Lab Results   Component Value Date    CKTOTAL 278 07/02/2024    TROPONINI <0.01 12/15/2022       Lab Results   Component Value Date    CHOL 125 07/03/2024     Lab Results   Component Value Date    TRIG 74  tricuspid regurgitation    Nonrheumatic aortic valve insufficiency    Transaminitis    Polysubstance abuse (HCC)    Nonrheumatic tricuspid valve regurgitation    History of substance abuse (HCC)    Septic shock (HCC)    HCAP (healthcare-associated pneumonia)    Chronic obstructive pulmonary disease (HCC)    History of cardiac arrhythmia    Elevated troponin    Acute respiratory failure with hypoxia    Severe protein-calorie malnutrition (HCC)    New onset a-fib (HCC)    Hypotension    Acute respiratory failure    Methamphetamine use (HCC)         I will address the patient's cardiac risk factors and adjusted pharmacologic treatment as needed. In addition, I have reinforced the need for patient directed risk factor modification.  All questions and concerns were addressed to the patient/family. Alternatives to my treatment were discussed.     Thank you for allowing us to participate in the care of Dayton Soto. Please call me with any questions [phone].    Anoop Zamarripa, DO   Cardiovascular Disease  [facility]  [phone] [place] Office  [phone] [place] Office  2/6/2025 2:52 PM

## 2025-02-06 NOTE — PROGRESS NOTES
02/05/25 2200   Patient Observation   Pulse 92   Respirations 26   SpO2 (!) 84 %   Breath Sounds   Right Upper Lobe Clear   Right Middle Lobe Crackles   Right Lower Lobe Crackles   Left Upper Lobe Clear   Left Lower Lobe Crackles   Vent Information   Vent Mode AC/VC   Ventilator Settings   FiO2  100 %   Vt (Set, mL) 450 mL   Resp Rate (Set) 26 bpm   PEEP/CPAP (cmH2O) 16   Vent Patient Data (Readings)   Vt (Measured) 459 mL   Peak Inspiratory Pressure (cmH2O) 41 cmH2O   Rate Measured 26 br/min   Minute Volume (L/min) 12 Liters   Peak Inspiratory Flow (lpm) 75 L/sec   Mean Airway Pressure (cmH2O) 24 cmH20   I:E Ratio 1:2.5   Flow Sensitivity 3 L/min   Vent Alarm Settings   High Pressure (cmH2O) 50 cmH2O   Low Minute Volume (lpm) 4 L/min   Low Exhaled Vt (ml) 250 mL   RR High (bpm) 40 br/min   Apnea (secs) 20 secs   Additional Respiratoray Assessments   Humidification Source HME   ETT    Placement Date: 02/05/25   Airway Tube Size: 8 mm  Location: Oral   Secured At 26 cm   Measured From Lips   ETT Placement Left   Secured By Commercial tube collins   Site Assessment Dry        Started 2 hours ago: sudden onset of fatigue, weakness, achiness and nausea.  History of anemia and HTN

## 2025-02-06 NOTE — PROGRESS NOTES
Pt arrived via squad from [facility]. Pt arrived proned.  aircare team and physician unable to get pts sats above 70%. Dr. Lei and RT at bedside adjusting ventilator. Pt moved over to bed from cot. Pt left proned and never supine. Pt turned as much as possible to remove tube collins and change to prone supplies. Some mepilex's applied to areas we could get to. 3 Rns, nurse manager, RT, ICU physician and  MICU team at bedside.

## 2025-02-06 NOTE — PROGRESS NOTES
Low dose Heparin GTT:  An aPTT was checked at 1846.  Previusly scheduled for 2130.  Result has posted at 51.7 sec.  Will adjust now given subtherapeutic.  Will give the half bolus dose of 1,900 units to be conservative given it's a little earlier than intended initially and increase the infusion to 16units/kg/hr (10mL/hr).   Ranulfo Briscoe RPH PharmD 2/5/2025 7:27 PM

## 2025-02-06 NOTE — PROGRESS NOTES
RASS -5. Medically paralyzed  - Propofol infusing at 30 mcg/kg/min.  - Fentanyl infusing at 150 mcg//h.   - Versed infusing at 5 mg/h.   - Nimbex infusing at 1.5 mcg/kg/min    81/60 (68)  LEVOPHED infusing at a rate of 9 mcg/min.   VASOPRESSIN infusing at 0.03 units/min

## 2025-02-06 NOTE — PROGRESS NOTES
Patient placed on 100% FiO2 and PC at 22Pi to achieve adequate VT. Patient returned to [facility] post bronch and BAL of RML sent to lab. Patient tolerated well.

## 2025-02-06 NOTE — CONSULTS
Pharmacy consulted to dose IV Vanco.  Patient was on IV Vanco earlier today and the order was discontinued and the patient placed on IV Zyvox by the Intensivist.  If vanco is desired please re-consult Rx and discontinue the Zyvox for clarity of orders/ antimicrobial coverage.  Thank you .  Ranulfo Briscoe RPH PharmD 2/5/2025 7:35 PM

## 2025-02-06 NOTE — PROGRESS NOTES
Pulmonary & Critical Care Medicine ICU Progress Note    CC: pneumonia    Events of Last 24 hours:   Propofol 30  Versed 5  Fentanyl 150  Levo 8  Vaspressin 0.03  Heparin gtt    Invasive Lines: Right femoral cvc    MV:  2/5/25-  Vent Mode: AC/VC Resp Rate (Set): 26 bpm/Vt (Set, mL): 450 mL/ /FiO2 : 80 %  Recent Labs     02/05/25 2013 02/06/25 0525   PHART 7.053* 7.152*   KMG6PDJ 78.8* 72.8*   PO2ART 59.3* 98.6       IV:   heparin (PORCINE) Infusion 16 Units/kg/hr (02/05/25 1956)    sodium chloride 5 mL/hr at 02/06/25 0406    VASOpressin 20 Units in sodium chloride 0.9 % 100 mL infusion 0.03 Units/min (02/06/25 0406)    norepinephrine 8 mcg/min (02/06/25 0606)    cisatracurium besylate (NIMBEX) 200 mg in sodium chloride 0.9 % 100 mL infusion 1.5 mcg/kg/min (02/06/25 0406)    fentaNYL 150 mcg/hr (02/06/25 0719)    midazolam 5 mg/hr (02/06/25 0406)    propofol 30 mcg/kg/min (02/06/25 0406)    sodium bicarbonate 150 mEq in dextrose 5 % 1,000 mL infusion 150 mL/hr at 02/06/25 0406       Vitals:  BP (!) 87/65   Pulse 77   Temp 96.8 °F (36 °C) (Bladder)   Resp 26   SpO2 95%   on vent  Eyes: PERRL. Conjunctivae anicteric.   ENT: Normal nose. ETT in place   Neck:  Trachea is midline.   Respiratory: No accessory muscle usage.  No wheezes. No rales. + Rhonchi.  Cardiovascular: Normal S1S2. No digit clubbing. No digit cyanosis. No LE edema.    Gastrointestinal: No mass palpated. No tenderness palpated.   Skin: No rash on the exposed extremities.   Psychiatric: sedated.    Scheduled Meds:   cefepime  2,000 mg IntraVENous Q12H    mupirocin   Each Nostril BID    sodium chloride flush  5-40 mL IntraVENous 2 times per day    amiodarone  400 mg Oral Daily    hydrocortisone sodium succinate PF  100 mg IntraVENous Q8H    famotidine (PEPCID) injection  20 mg IntraVENous BID    linezolid  600 mg IntraVENous Q12H    metroNIDAZOLE  500 mg IntraVENous Q8H    ipratropium 0.5 mg-albuterol 2.5 mg  1 Dose Inhalation Q6H     PRN  Meds:  heparin (porcine), heparin (porcine), sodium chloride flush, sodium chloride, potassium chloride **OR** potassium alternative oral replacement **OR** potassium chloride, magnesium sulfate, ondansetron **OR** ondansetron, polyethylene glycol, acetaminophen **OR** acetaminophen, fentanNYL, midazolam    Results:  CBC:   Recent Labs     02/05/25  0936 02/06/25  0454   WBC 47.0* 70.0*   HGB 14.4 13.1*   HCT 45.2 41.2   MCV 92.1 93.8   * 399     BMP:   Recent Labs     02/05/25  0936 02/06/25  0454    140   K 4.1 5.7*   CL 99 102   CO2 26 28   BUN 21* 27*   CREATININE 0.8* 1.2     LIVER PROFILE:   Recent Labs     02/05/25  0936 02/06/25  0454   AST 27 66*   ALT 26 74*   BILIDIR  --  0.3   BILITOT 0.8 0.4   ALKPHOS 89 128       Cultures:  2/5/25 blood cx  2/5/25 Resp cx:     Films:  Chest imaging was reviewed by me and showed CTPA: Pulmonary Arteries: Motion artifact degrades image quality.  There is no  acute pulmonary thromboembolus.     Mediastinum: The heart is unremarkable.  There are enlarged mediastinal lymph  nodes.  An index aortopulmonary window lymph node measures 1.3 x 2.1 cm.     Lungs/pleura: An endotracheal tube is insitu.  The airway is patent.  There  is no pneumothorax there is a trace left pleural effusion with atelectasis.     New moderate interstitial edema is present throughout the bilateral lungs  with worsening diffuse consolidation throughout the bilateral lungs, most significant in the bilateral lower lobes. Upper Abdomen: A the nasogastric tube reaches the gastric antrum.    IMPRESSION:  1. Worsening diffuse bilateral airspace disease favoring aspiration pneumonia  rather than pulmonary edema.  2. Interstitial edema.  3. Trace left pleural effusion.  4. Mediastinal adenopathy, likely reactive.     CXR     ASSESSMENT:  Severe hypoxic and hypercarbic respiratory failure  Severe pneumonia and ARDS  Possible aspiration  Methamphetamine use  Septic shock  Elevated

## 2025-02-06 NOTE — PROGRESS NOTES
02/05/25 1901   Patient Observation   Pulse (!) 112   Respirations 22   SpO2 (!) 78 %   Breath Sounds   Right Upper Lobe Clear   Right Middle Lobe Crackles   Right Lower Lobe Crackles   Left Upper Lobe Clear   Left Lower Lobe Crackles   Vent Information   Vent Mode AC/VC   Ventilator Settings   FiO2  100 %   Vt (Set, mL) 450 mL   Resp Rate (Set) 22 bpm   PEEP/CPAP (cmH2O) 16   Vent Patient Data (Readings)   Vt (Measured) 456 mL   Peak Inspiratory Pressure (cmH2O) 41 cmH2O   Rate Measured 22 br/min   Minute Volume (L/min) 10 Liters   Peak Inspiratory Flow (lpm) 70 L/sec   Mean Airway Pressure (cmH2O) 23 cmH20   Plateau Pressure (cm H2O) 37 cm H2O   Driving Pressure 21   I:E Ratio 1:2.9   Flow Sensitivity 3 L/min   Vent Alarm Settings   High Pressure (cmH2O) 50 cmH2O   Low Minute Volume (lpm) 4 L/min   Low Exhaled Vt (ml) 250 mL   RR High (bpm) 40 br/min   Apnea (secs) 20 secs   Additional Respiratoray Assessments   Humidification Source HME   ETT    Placement Date: 02/05/25   Airway Tube Size: 8 mm  Location: Oral   Secured At 26 cm   Measured From Lips   ETT Placement Left   Secured By Commercial tube collins   Site Assessment Dry

## 2025-02-06 NOTE — PLAN OF CARE
Problem: Discharge Planning  Goal: Discharge to home or other facility with appropriate resources  Outcome: Progressing     Problem: Safety - Adult  Goal: Free from fall injury  2/6/2025 0844 by Julianne Drummond RN  Outcome: Progressing  2/6/2025 0153 by Flora Welsh RN  Outcome: Progressing     Problem: Pain  Goal: Verbalizes/displays adequate comfort level or baseline comfort level  2/6/2025 0844 by Julianne Drummond RN  Outcome: Progressing  2/6/2025 0153 by Flora Welsh RN  Outcome: Progressing  Flowsheets (Taken 2/6/2025 0153)  Verbalizes/displays adequate comfort level or baseline comfort level:   Implement non-pharmacological measures as appropriate and evaluate response   Administer analgesics based on type and severity of pain and evaluate response     Problem: Skin/Tissue Integrity  Goal: Skin integrity remains intact  Description: 1.  Monitor for areas of redness and/or skin breakdown  2.  Assess vascular access sites hourly  3.  Every 4-6 hours minimum:  Change oxygen saturation probe site  4.  Every 4-6 hours:  If on nasal continuous positive airway pressure, respiratory therapy assess nares and determine need for appliance change or resting period  2/6/2025 0844 by Julianne Drummond RN  Outcome: Progressing  2/6/2025 0153 by Flora Welsh RN  Outcome: Progressing  Flowsheets  Taken 2/6/2025 0153  Skin Integrity Remains Intact:   Monitor for areas of redness and/or skin breakdown   Every 4-6 hours minimum: Change oxygen saturation probe site  Taken 2/6/2025 0028  Skin Integrity Remains Intact:   Monitor for areas of redness and/or skin breakdown   Every 4-6 hours minimum: Change oxygen saturation probe site     Problem: Respiratory - Adult  Goal: Achieves optimal ventilation and oxygenation  2/6/2025 0844 by Julianne Drummond RN  Outcome: Progressing  2/6/2025 0153 by Flora Welsh RN  Outcome: Progressing  Flowsheets (Taken 2/6/2025 0153)  Achieves optimal ventilation and oxygenation:   Assess for  Progressing  Flowsheets (Taken 2/6/2025 0153)  Hemodynamic stability and optimal renal function maintained:   Monitor labs and assess for signs and symptoms of volume excess or deficit   Monitor intake, output and patient weight  Goal: Glucose maintained within prescribed range  2/6/2025 0844 by Julianne Drummond RN  Outcome: Progressing  2/6/2025 0153 by Flora Welsh RN  Outcome: Progressing  Flowsheets (Taken 2/6/2025 0153)  Glucose maintained within prescribed range:   Monitor blood glucose as ordered   Assess for signs and symptoms of hyperglycemia and hypoglycemia     Problem: Hematologic - Adult  Goal: Maintains hematologic stability  2/6/2025 0844 by Julianne Drummond RN  Outcome: Progressing  2/6/2025 0153 by Flora Welsh RN  Outcome: Progressing  Flowsheets (Taken 2/6/2025 0153)  Maintains hematologic stability: Assess for signs and symptoms of bleeding or hemorrhage     Problem: ABCDS Injury Assessment  Goal: Absence of physical injury  Outcome: Progressing

## 2025-02-06 NOTE — PROGRESS NOTES
Pharmacy - Low Dose Heparin  APTT drawn at 1741 today = 69.6 seconds (goal =  seconds).  Current heparin drip rate = 18 units/kg/hr.  Per protocol, give heparin 1900 units IV bolus x 1 and increase heparin drip to 20 units/kg/hr. Draw APTT 6 hours after bolus dose given and drip rate increased. Will adjust to goal APTT =  seconds. Pharmacy will continue to monitor and adjust as necessary.

## 2025-02-06 NOTE — PROGRESS NOTES
2/6  aPTT = 68.1 sec at 0818.  Give 1900 unit heparin bolus and increase heparin gtt to 18 units/kg/hr.  Recheck aPTT in 6 hours.

## 2025-02-06 NOTE — PROGRESS NOTES
RT Inhaler-Nebulizer Bronchodilator Protocol Note    There is a bronchodilator order in the chart from a provider indicating to follow the RT Bronchodilator Protocol and there is an “Initiate RT Inhaler-Nebulizer Bronchodilator Protocol” order as well (see protocol at bottom of note).    CXR Findings:  XR CHEST PORTABLE    Result Date: 2/5/2025  1. Endotracheal tube 5.4 cm above the riley. 2. Nasogastric tube advances to conklin the stomach. 3. Extensive bilateral infiltrates worse in the mid lower lungs overall slightly worse compared to the previous evaluation.     XR CHEST PORTABLE    Result Date: 2/5/2025  1. ET tube in place, approximately 1.5 cm above the riley. It should be withdrawn by 4 cm. 2. Extensive airspace disease in the lower lung of fields, unchanged from prior examination.     XR CHEST PORTABLE    Result Date: 2/5/2025  1. Dense pneumonic infiltrates in the right lower lung field and in the left mid and lower lung field, with or without atelectatic changes.  These findings are mostly new as compared to previous portable chest x-ray. 2. Minimal bilateral pleural effusions or pleural thickening at the CP angles. 3. Minimal pulmonary vascular prominence.       The findings from the last RT Protocol Assessment were as follows:   History Pulmonary Disease: None or smoker <15 pack years  Respiratory Pattern: Use of accessory muscles, prolonged exhalation, or RR 26-30 bpm  Breath Sounds: Slightly diminished and/or crackles  Cough: Unable to generate effective cough  Indication for Bronchodilator Therapy: Decreased or absent breath sounds  Bronchodilator Assessment Score: 12    Aerosolized bronchodilator medication orders have been revised according to the RT Inhaler-Nebulizer Bronchodilator Protocol below.    Respiratory Therapist to perform RT Therapy Protocol Assessment initially then follow the protocol.  Repeat RT Therapy Protocol Assessment PRN for score 0-3 or on second treatment, BID, and PRN for

## 2025-02-06 NOTE — PROGRESS NOTES
Comprehensive Nutrition Assessment    Type and Reason for Visit:  Initial (no consult or + screen but patient is intubated, sedated, and paralyzed in ICU)    Nutrition Recommendations/Plan:   Continue NPO status until patient is medially cleared to receive nutrition therapy.   Monitor respiratory/vent status, sedation type/amount (propofol is currently infusing at 30 mcg x 24 hours which = 296 kcals from lipids), TG checks, and plan of care.   Please obtain an actual, current weight for this patient - last weight was obtained on 2/1/25 when patient was d/c'd from [facility] prior to this admission.   Monitor nutrition-related labs, bowel function, and weight trends.      Malnutrition Assessment:  Malnutrition Status:  Severe malnutrition (02/06/25 1218)    Context:  Acute Illness     Findings of the 6 clinical characteristics of malnutrition:  Energy Intake:  50% or less of estimated energy requirements for 5 or more days  Weight Loss:  No weight loss     Body Fat Loss:  Moderate body fat loss Orbital, Buccal region   Muscle Mass Loss:  Moderate muscle mass loss Temples (temporalis), Clavicles (pectoralis & deltoids), Hand (interosseous)  Fluid Accumulation:  No fluid accumulation     Strength:  Not Performed    Nutrition Assessment:    patient is severely malnourished r/t impaired respiratory status and inadequate protein-energy intake AEB intubation, poor po intake PTA, recent admission, hx of polysubstance abuse patient used meth ~ 4 hours PTA), and muscle wasting + fat loss; he is at risk for further compromise d/t intubation, NPO status, and altered nutrition-related labs; will continue NPO status and provide TF recommendations    Nutrition Related Findings:    patient is intubated, sedated with 30 mcg propofol, and paralyzed at this time; patient was recently d/c'd on 2/1/25; patient presented with SOB and chest pain after using meth ~ 4 hours PTA; + bronch today; patient was intubated on 2/5/25; MIVF d/c'd

## 2025-02-06 NOTE — PROGRESS NOTES
ABG results reported to Dr. Lei.     Increase RR by 2   Give one amp bicarb  Change IVF to D5 w/ 150meq bicarb @ 150 mL/hr.   ABG 30 minutes

## 2025-02-06 NOTE — PROCEDURES
PROCEDURE NOTE  Date: 2/6/2025   Name: Dayton Soto  YOB: 1965    Procedures  See History and Physical or progress note for additional findings. Pertinent changes recorded below if present  Pre/post procedure diagnosis: pneumonia    Allergies and medications have been reviewed    HENT: Airway patent and reviewed. ETT in place.  Cardiovascular: Normal rate, regular rhythm, normal heart sounds.   Pulmonary/Chest: No wheezes. No rhonchi. No rales.   Abdominal: Soft. Bowel sounds are normal. No distension.    ASA: Class 4 - A patient with an incapacitating systemic disease that is a constant threat to life  Level of Sedation Plan: Continue ICU sedation    Post Procedure Plan   Continue ICU care.   ______________________     The risks and benefits as well as alternatives to the procedure have been discussed with the POA.  The  POA understands and agrees to proceed. Signed Consent in chart.    PROCEDURE:  BRONCHOSCOPY      The risks and benefits as well as alternatives to the procedure have been discussed with the patient or POA.  The patient or POA understands and agrees to proceed. Consent signed.    DESCRIPTION OF PROCEDURE: A time out was taken. ICU sedation continued.      The scope was passed with ease via the ETT.  A complete airway inspection was performed. Normal anatomy. No endobronchial lesion was identified. Mucosa appeared normal. There were a couple areas of adherent bilious appearing secretions on the tracheal wall which was removed with saline lavage and suction.  There is a small amount of scattered yellow secretions on the left side.  There was some yellow secretions in the right lower lobe.  Therapeutic aspiration completed.   Washings were obtained throughout the airways.  A Bronchoalveolar lavage was obtained from the [facility] with good return.        The patient tolerated the procedure well. EBL none. Recovery will be per endoscopy protocol. Will discharge home or return to same level of

## 2025-02-06 NOTE — PROGRESS NOTES
2/6  aPTT = 83.8 sec at 0146.  Continue heparin gtt at 16 units/kg/hr.  Recheck aPTT in 6 hours.  Jefferson Black, PharmD  2/6/2025 2:23 AM

## 2025-02-06 NOTE — PROGRESS NOTES
Shift assessment completed, see flow sheet.   RASS -5. Medically paralyzed  - Propofol infusing at 30 mcg/kg/min.  - Fentanyl infusing at 150 mcg//h.   - Versed infusing at 5 mg/h.   - .Nimbex infusing at 1.5 mcg/kg/min     Intubated and sedated on AC # 8 ETT, at 24 LL. 26 / 450 / 80 %/ +14  SpO2 95%. Respirations are easy, even, and unlabored.   Bilateral lung sounds diminished.      VSS  BP: 91/68 (76)   LEVOPHED infusing at a rate of 8 mcg/min.   VASOPRESSIN infusing at 0.03 units/min    OG in place at 65.     PICC/CVC/PIV, WNL.    All lines and monitoring devices in place. Leonard is patent and secured.  Wrist restraints not needed d/t paralytic.   Bed in lowest position with wheels locked.

## 2025-02-06 NOTE — PROGRESS NOTES
IM Progress Note    Admit Date:  2/5/2025  1    Interval history:  59 y.o. male who presented to [facility] with septic shock.  PMHx significant for polysubstance use-methamphetamine, A-fib, valvular disease, on Eliquis on amiodarone, MRSA, cardiomyopathy      Intubated, paralyzed for severe hypoxic resp failure  Hypotensive shock on levo, vaso   Started heparin gtt      Subjective:  Mr. Soto seen paralyzed and proned in ICU bed on vent support   No distress  Fio2 improved from 100 to 80 %, PEEP at 14  BP remains low on pressors     Objective:   BP (!) 80/62   Pulse 77   Temp 97.8 °F (36.6 °C) (Bladder)   Resp 26   SpO2 95%     Intake/Output Summary (Last 24 hours) at 2/6/2025 0715  Last data filed at 2/6/2025 0642  Gross per 24 hour   Intake 6667.97 ml   Output 1100 ml   Net 5567.97 ml       Physical Exam:      General:  middle aged male proned on vent sedated, paralyzed  Oral ETT and OG noted  . Appears to be not in any distress  Mucous Membranes:  Pink , anicteric  Neck: No JVD, no carotid bruit, no thyromegaly  Chest: diminished leatha with scattered crackles, in prone position   Cardiovascular:  RRR S1S2 heard, no murmurs or gallops  Abdomen:  Soft, undistended, non tender, no organomegaly, BS present  Extremities: No edema or cyanosis. Distal pulses well felt  Neurological : sedated       Medications:   Scheduled Medications:    cefepime  2,000 mg IntraVENous Q12H    sodium chloride flush  5-40 mL IntraVENous 2 times per day    amiodarone  400 mg Oral Daily    hydrocortisone sodium succinate PF  100 mg IntraVENous Q8H    famotidine (PEPCID) injection  20 mg IntraVENous BID    linezolid  600 mg IntraVENous Q12H    metroNIDAZOLE  500 mg IntraVENous Q8H    ipratropium 0.5 mg-albuterol 2.5 mg  1 Dose Inhalation Q6H     I   heparin (PORCINE) Infusion 16 Units/kg/hr (02/05/25 1956)    sodium chloride 5 mL/hr at 02/06/25 0406    VASOpressin 20 Units in sodium chloride 0.9 % 100 mL infusion 0.03 Units/min  Atrium: Right atrium is mildly dilated.    Image quality is adequate. Procedure performed with the patient in a sitting position.    No significant changes compared to ECHO study July 2024.       Assessment & Plan:      # Acute hypoxic and hypercapnic respiratory failure secondary to multifocal pneumonia- cannot rule out aspirational given imaging findings   - intubated in ER  on 2/5, and now  on mechanical ventilation  -Critical care following  -Patient remains on fentanyl, propofol, Versed, Precedex for sedation and paralyzed and proned for severe hypoxia   -Patient remains critically ill  -Monitor for development/worsening ARDS and consider diuretics as needed especially in the setting of patient's known HFrEF  - continue zyvox , cefepime and flagyl     # Septic shock secondary to multifocal pneumonia  - leatha pna on imaging and wbc upto 47>70 K  -Blood cultures pending  -Continue empiric linezolid and cefepime as well as Flagyl  -Stress dose steroids with Solu-Cortef  -Remains hypotensive despite being on  Levophed and vasopressin    Maintain euglycemia   - wean as able  - poor prognosis      #Severe acidemia: Metabolic and respiratory acidosis:  -On bicarb  drip  -Appreciate ICU assistance for ventilator management    # ARF - sec to sepitc shock and hypotension  - hyperkalemic and acidotic , on bicarb fluids  - add lokelma   - might need CRRT      #Atrial fibrillation: On Eliquis  #NSTEMI, likely type II secondary to septic shock  #History of HFrEF, cardiomyopathy  - elevated troponin started on -Heparin drip  -On amiodarone for Afibb - cardiology consulted  - add statins once able         #Polysubstance use, methamphetamine use     Heparin gtt  Full code for now  Very poor prognosis    Willie Navarrete MD, 2/6/2025 7:15 AM

## 2025-02-06 NOTE — PROGRESS NOTES
Pt swam side to side today q2 hr. No plans of flipping patient until oxygenation improves hopefully tomorrow.

## 2025-02-06 NOTE — PROGRESS NOTES
4 Eyes Skin Assessment     NAME:  Dayton Soto  YOB: 1965  MEDICAL RECORD NUMBER:  [id number]    The patient is being assessed for  Admission    I agree that at least one RN has performed a thorough Head to Toe Skin Assessment on the patient. ALL assessment sites listed below have been assessed.      Areas assessed by both nurses:    Head, Face, Ears, Shoulders, Back, Chest, Arms, Elbows, Hands, Sacrum. Buttock, Coccyx, Ischium, Legs. Feet and Heels, and Under Medical Devices         Does the Patient have a Wound? Yes wound(s) were present on assessment. LDA wound assessment was Initiated and completed by RN     LYNDSAY on Right Buttock.   Alex Prevention initiated by RN: Yes  Wound Care Orders initiated by RN: Yes    Pressure Injury (Stage 3,4, Unstageable, DTI, NWPT, and Complex wounds) if present, place Wound referral order by RN under : No    New Ostomies, if present place, Ostomy referral order under : No     Nurse 1 eSignature: Electronically signed by Flora Welsh RN on 2/6/25 at 3:31 AM EST    **SHARE this note so that the co-signing nurse can place an eSignature**    Nurse 2 eSignature: Electronically signed by Dionne Guzmán RN on 2/6/25 at 5:48 AM EST     Patient is not able to demonstrated the ability to move from a reclining position to an upright position within the recliner. Patient is confused, demented and /or unable to follow instruction.

## 2025-02-06 NOTE — H&P
Hospital Medicine History & Physical      Date of Admission: 2/5/2025    Date of Service:  Pt seen/examined on 2/5/2025    [x]Admitted to Inpatient with expected LOS greater than two midnights due to medical therapy.  []Placed in Observation status.    Chief Admission Complaint: Cough, shortness of breath    Presenting Admission History:      59 y.o. male who presented to [facility] with septic shock.  PMHx significant for polysubstance use-methamphetamine, A-fib, valvular disease, on Eliquis on amiodarone, MRSA, cardiomyopathy  .      Patient was really diagnosed with pneumonia and was on antibiotics.  On arrival to the ED, noted to be short of breath and that he smoked methamphetamine about 4 to 5 hours prior to admission with worsening shortness of breath.    In the ED, initial heart rate 104, respiratory rate 36, blood pressure 156/105.  BMP was actually largely unremarkable.  Lactic acid was 3.8 on arrival, troponin was 186 initially 1.313 and then back on the 265.  CBC showing white blood cell count of 47.  INR 1.7.  Influenza, COVID-negative.  Initial VBG showing 7.44/32/27.  Initial chest x-ray showed dense pneumonic infiltrates in the right lower lung field in the left mid and lower lung field.  CTPA showed worsening diffuse bilateral airspace disease favoring aspiration pneumonia other rather than pulmonary edema.    Throughout ED stay, patient continued to decline and started breathing faster, becoming hypoxic, more tachycardic and febrile.  It appears he had a rapid decline and was subsequently intubated at 10:33 AM.      On my exam, patient was already paralyzed, prone, intubated and sedated.  He was noted to be severely hypoxic despite being on a PEEP of 16, FiO2 100% and proned.  Patient appears to have poor prognosis.  Labs on my exam revealed a positive amphetamine and benzo screen.  His ABG was showing 6.96/96/50    Assessment/Plan:      Current Principal Problem:  Acute respiratory  overall slightly worse compared to the previous evaluation.  Effusions likely.  Heart and mediastinum appear stable.     1. Endotracheal tube 5.4 cm above the riley. 2. Nasogastric tube advances to conklin the stomach. 3. Extensive bilateral infiltrates worse in the mid lower lungs overall slightly worse compared to the previous evaluation.     CT CHEST PULMONARY EMBOLISM W CONTRAST    Result Date: 2/5/2025  EXAMINATION: CTA OF THE CHEST 2/5/2025 11:34 am TECHNIQUE: CTA of the chest was performed after the administration of intravenous contrast.  Multiplanar reformatted images are provided for review.  MIP images are provided for review. Automated exposure control, iterative reconstruction, and/or weight based adjustment of the mA/kV was utilized to reduce the radiation dose to as low as reasonably achievable. COMPARISON: 02/05/2025 and 01/27/2025 HISTORY: ORDERING SYSTEM PROVIDED HISTORY: resp failure TECHNOLOGIST PROVIDED HISTORY: Reason for exam:->resp failure Additional Contrast?->1 Reason for Exam: resp failure FINDINGS: Pulmonary Arteries: Motion artifact degrades image quality.  There is no acute pulmonary thromboembolus. Mediastinum: The heart is unremarkable.  There are enlarged mediastinal lymph nodes.  An index aortopulmonary window lymph node measures 1.3 x 2.1 cm. Lungs/pleura: An endotracheal tube is insitu.  The airway is patent.  There is no pneumothorax there is a trace left pleural effusion with atelectasis. New moderate interstitial edema is present throughout the bilateral lungs with worsening diffuse consolidation throughout the bilateral lungs, most significant in the bilateral lower lobes. Upper Abdomen: A the nasogastric tube reaches the gastric antrum. Soft Tissues/Bones: Degenerative changes involve the thoracic spine.     1. Worsening diffuse bilateral airspace disease favoring aspiration pneumonia rather than pulmonary edema. 2. Interstitial edema. 3. Trace left pleural effusion. 4.  patient

## 2025-02-06 NOTE — PROGRESS NOTES
02/06/25 0200   Patient Observation   Pulse 89   Respirations 26   Breath Sounds   Right Upper Lobe Clear   Right Middle Lobe Crackles   Right Lower Lobe Crackles   Left Upper Lobe Clear   Left Lower Lobe Crackles   Vent Information   Vent Mode AC/VC   Ventilator Settings   FiO2  100 %   Vt (Set, mL) 450 mL   Resp Rate (Set) 26 bpm   PEEP/CPAP (cmH2O) 16   Vent Patient Data (Readings)   Vt (Measured) 464 mL   Peak Inspiratory Pressure (cmH2O) 41 cmH2O   Rate Measured 26 br/min   Minute Volume (L/min) 12 Liters   Peak Inspiratory Flow (lpm) 75 L/sec   Mean Airway Pressure (cmH2O) 23 cmH20   I:E Ratio 1:2.5   Flow Sensitivity 3 L/min   Vent Alarm Settings   High Pressure (cmH2O) 50 cmH2O   Low Minute Volume (lpm) 4 L/min   Low Exhaled Vt (ml) 250 mL   RR High (bpm) 40 br/min   Apnea (secs) 20 secs   Additional Respiratoray Assessments   Humidification Source HME   Airway Clearance   Suction ET Tube   Suction Device Inline suction catheter   Sputum Method Obtained Endotracheal   Sputum Amount Scant   ETT    Placement Date: 02/05/25   Airway Tube Size: 8 mm  Location: Oral   Secured At 26 cm   Measured From Lips   ETT Placement (S)  Left  (pt is prone)   Secured By Commercial tube collins   Site Assessment Dry   Ventilator Associated Pneumonia Bundle   Oral Care Performed Mouthwash with chlorhexidine;Mouth swabbed;Mouth suctioned   Anti-Embolism Intervention Medication  (Heparin drip)

## 2025-02-06 NOTE — PROGRESS NOTES
Rounding completed with Dr. lopez and multidisciplinary team. POC, labs, lines and assessment reviewed.   - Stop IVF  - Start insulin SS  - Bronch  - repeat BMP at 1500

## 2025-02-06 NOTE — PLAN OF CARE
Problem: Safety - Adult  Goal: Free from fall injury  Outcome: Progressing     Problem: Pain  Goal: Verbalizes/displays adequate comfort level or baseline comfort level  Outcome: Progressing  Flowsheets (Taken 2/6/2025 0153)  Verbalizes/displays adequate comfort level or baseline comfort level:   Implement non-pharmacological measures as appropriate and evaluate response   Administer analgesics based on type and severity of pain and evaluate response     Problem: Skin/Tissue Integrity  Goal: Skin integrity remains intact  Description: 1.  Monitor for areas of redness and/or skin breakdown  2.  Assess vascular access sites hourly  3.  Every 4-6 hours minimum:  Change oxygen saturation probe site  4.  Every 4-6 hours:  If on nasal continuous positive airway pressure, respiratory therapy assess nares and determine need for appliance change or resting period  Outcome: Progressing  Flowsheets  Taken 2/6/2025 0153  Skin Integrity Remains Intact:   Monitor for areas of redness and/or skin breakdown   Every 4-6 hours minimum: Change oxygen saturation probe site  Taken 2/6/2025 0028  Skin Integrity Remains Intact:   Monitor for areas of redness and/or skin breakdown   Every 4-6 hours minimum: Change oxygen saturation probe site     Problem: Respiratory - Adult  Goal: Achieves optimal ventilation and oxygenation  Outcome: Progressing  Flowsheets (Taken 2/6/2025 0153)  Achieves optimal ventilation and oxygenation:   Assess for changes in respiratory status   Assess for changes in mentation and behavior   Oxygen supplementation based on oxygen saturation or arterial blood gases   Position to facilitate oxygenation and minimize respiratory effort     Problem: Cardiovascular - Adult  Goal: Maintains optimal cardiac output and hemodynamic stability  Outcome: Progressing  Flowsheets (Taken 2/6/2025 0153)  Maintains optimal cardiac output and hemodynamic stability:   Monitor blood pressure and heart rate   Assess for signs of  Therapy (GDMT) with Cardiologist or Hospitalist:          Medications Reviewed: Yes   SCHEDULED HOSPITAL MEDICATIONS:   cefepime  2,000 mg IntraVENous Q12H    sodium chloride flush  5-40 mL IntraVENous 2 times per day    amiodarone  400 mg Oral Daily    hydrocortisone sodium succinate PF  100 mg IntraVENous Q8H    famotidine (PEPCID) injection  20 mg IntraVENous BID    linezolid  600 mg IntraVENous Q12H    metroNIDAZOLE  500 mg IntraVENous Q8H    ipratropium 0.5 mg-albuterol 2.5 mg  1 Dose Inhalation Q6H     HOME MEDICATIONS:  Prior to Admission medications    Medication Sig Start Date End Date Taking? Authorizing Provider   amiodarone (CORDARONE) 200 MG tablet 2 bid - 5 days, 2 qd- 1 week, 1 qd thereafter 1/31/25   Brittany Shin MD   empagliflozin (JARDIANCE) 10 MG tablet Take 1 tablet by mouth daily 1/31/25   Brittany Shin MD   apixaban (ELIQUIS) 5 MG TABS tablet Take 1 tablet by mouth 2 times daily 1/31/25   Brittany Shin MD   metoprolol succinate (TOPROL XL) 25 MG extended release tablet Take 0.5 tablets by mouth daily 2/1/25   Brittany Shin MD   sacubitril-valsartan (ENTRESTO) 24-26 MG per tablet Take 0.5 tablets by mouth 2 times daily 1/31/25   Brittany Shin MD   predniSONE (DELTASONE) 20 MG tablet 2 qd- 2 days, 1 1/2 qd- 3 days, 1 qd- 3 days, 1/2 qd- 3 days 2/1/25   Brittany Shin MD   pantoprazole (PROTONIX) 40 MG tablet Take 1 tablet by mouth 2 times daily (before meals) 1/31/25   Brittany Shin MD   aspirin 81 MG EC tablet Take 1 tablet by mouth daily (Always take with food) 7/10/24   Andrade Acosta MD   spironolactone (ALDACTONE) 25 MG tablet Take 1 tablet by mouth daily Hold for SBP<95 mmHg 7/10/24   Andrade Acosta MD   furosemide (LASIX) 20 MG tablet Take 1 tablet by mouth daily 7/10/24   Andrade Acosta MD   Buprenorphine HCl-Naloxone HCl (SUBOXONE SL) Place 20 mg under the tongue daily Pt takes

## 2025-02-06 NOTE — PROGRESS NOTES
Pt remains vented/sedated and Paralyzed on the Vent. Vent setting AC/VC 26/450/100/16.  ET Tube # 8 in place. LL 26. OG at 65 cm.  Heparin infusing at 16 Units/kg/hr.  Vasopressin infusing at 0.03 Unit/min.  Levophed infusing at 15 mcg/min.  Nimbex infusing at 1.5 mcg/kg/min.  Versed infusing at 5 mg/hr.  Propofol infusing at 30 mcg/kg/min.   Fentanyl infusing at 150 mcg//h.    Bicarb 150 mEq in Dex 5% infusing at 150 ml/hr.  CVC in Left Fem. TLC along with 2 PIV's.  Temperature sensing zavaleta in place.  Pt Prone and reverse trendelenburg position. Swimming his arms and legs.

## 2025-02-06 NOTE — PROGRESS NOTES
4 Eyes Skin Assessment     NAME:  Dayton Soto  YOB: 1965  MEDICAL RECORD NUMBER:  [id number]    The patient is being assessed for  Shift Handoff    I agree that at least one RN has performed a thorough Head to Toe Skin Assessment on the patient. ALL assessment sites listed below have been assessed.      Areas assessed by both nurses:    Head, Face, Ears, Shoulders, Back, Chest, Arms, Elbows, Hands, Sacrum. Buttock, Coccyx, Ischium, Legs. Feet and Heels, and Under Medical Devices         Does the Patient have a Wound? Yes wound(s) were present on assessment. LDA wound assessment was Initiated and completed by RN       Alex Prevention initiated by RN: Yes  Wound Care Orders initiated by RN: Yes    Pressure Injury (Stage 3,4, Unstageable, DTI, NWPT, and Complex wounds) if present, place Wound referral order by RN under : No    New Ostomies, if present place, Ostomy referral order under : No     Nurse 1 eSignature: Electronically signed by Julianne Drummond RN on 2/6/25 at 2:27 PM EST    **SHARE this note so that the co-signing nurse can place an eSignature**    Nurse 2 eSignature: Electronically signed by Anoop Koch RN on 2/7/25 at 1:24 AM EST

## 2025-02-06 NOTE — PROGRESS NOTES
RT Inhaler-Nebulizer Bronchodilator Protocol Note    There is a bronchodilator order in the chart from a provider indicating to follow the RT Bronchodilator Protocol and there is an “Initiate RT Inhaler-Nebulizer Bronchodilator Protocol” order as well (see protocol at bottom of note).    CXR Findings:  XR CHEST PORTABLE    Result Date: 2/5/2025  1. Endotracheal tube 5.4 cm above the riley. 2. Nasogastric tube advances to conklin the stomach. 3. Extensive bilateral infiltrates worse in the mid lower lungs overall slightly worse compared to the previous evaluation.     XR CHEST PORTABLE    Result Date: 2/5/2025  1. ET tube in place, approximately 1.5 cm above the riley. It should be withdrawn by 4 cm. 2. Extensive airspace disease in the lower lung of fields, unchanged from prior examination.     XR CHEST PORTABLE    Result Date: 2/5/2025  1. Dense pneumonic infiltrates in the right lower lung field and in the left mid and lower lung field, with or without atelectatic changes.  These findings are mostly new as compared to previous portable chest x-ray. 2. Minimal bilateral pleural effusions or pleural thickening at the CP angles. 3. Minimal pulmonary vascular prominence.       The findings from the last RT Protocol Assessment were as follows:   History Pulmonary Disease: (P) Chronic pulmonary disease  Respiratory Pattern: (P) Use of accessory muscles, prolonged exhalation, or RR 26-30 bpm  Breath Sounds: (P) Slightly diminished and/or crackles  Cough: (P) Unable to generate effective cough  Indication for Bronchodilator Therapy: (P) Decreased or absent breath sounds  Bronchodilator Assessment Score: (P) 14    Aerosolized bronchodilator medication orders have been revised according to the RT Inhaler-Nebulizer Bronchodilator Protocol below.    Respiratory Therapist to perform RT Therapy Protocol Assessment initially then follow the protocol.  Repeat RT Therapy Protocol Assessment PRN for score 0-3 or on second treatment,  BID, and PRN for scores above 3.    No Indications - adjust the frequency to every 6 hours PRN wheezing or bronchospasm, if no treatments needed after 48 hours then discontinue using Per Protocol order mode.     If indication present, adjust the RT bronchodilator orders based on the Bronchodilator Assessment Score as indicated below.  Use Inhaler orders unless patient has one or more of the following: on home nebulizer, not able to hold breath for 10 seconds, is not alert and oriented, cannot activate and use MDI correctly, or respiratory rate 25 breaths per minute or more, then use the equivalent nebulizer order(s) with same Frequency and PRN reasons based on the score.  If a patient is on this medication at home then do not decrease Frequency below that used at home.    0-3 - enter or revise RT bronchodilator order(s) to equivalent RT Bronchodilator order with Frequency of every 4 hours PRN for wheezing or increased work of breathing using Per Protocol order mode.        4-6 - enter or revise RT Bronchodilator order(s) to two equivalent RT bronchodilator orders with one order with BID Frequency and one order with Frequency of every 4 hours PRN wheezing or increased work of breathing using Per Protocol order mode.        7-10 - enter or revise RT Bronchodilator order(s) to two equivalent RT bronchodilator orders with one order with TID Frequency and one order with Frequency of every 4 hours PRN wheezing or increased work of breathing using Per Protocol order mode.       11-13 - enter or revise RT Bronchodilator order(s) to one equivalent RT bronchodilator order with QID Frequency and an Albuterol order with Frequency of every 4 hours PRN wheezing or increased work of breathing using Per Protocol order mode.      Greater than 13 - enter or revise RT Bronchodilator order(s) to one equivalent RT bronchodilator order with every 4 hours Frequency and an Albuterol order with Frequency of every 2 hours PRN wheezing or

## 2025-02-07 ENCOUNTER — APPOINTMENT (OUTPATIENT)
Dept: GENERAL RADIOLOGY | Age: 60
DRG: 720 | End: 2025-02-07
Payer: MEDICAID

## 2025-02-07 LAB
ANION GAP SERPL CALCULATED.3IONS-SCNC: 6 MMOL/L (ref 3–16)
APTT BLD: 84 SEC (ref 22.1–36.4)
APTT BLD: 87.4 SEC (ref 22.1–36.4)
BASE EXCESS BLDA CALC-SCNC: 4.4 MMOL/L (ref -3–3)
BASOPHILS # BLD: 0.1 K/UL (ref 0–0.2)
BASOPHILS NFR BLD: 0.3 %
BUN SERPL-MCNC: 34 MG/DL (ref 7–20)
CALCIUM SERPL-MCNC: 7.1 MG/DL (ref 8.3–10.6)
CHLORIDE SERPL-SCNC: 99 MMOL/L (ref 99–110)
CO2 BLDA-SCNC: 31.8 MMOL/L
CO2 SERPL-SCNC: 31 MMOL/L (ref 21–32)
COHGB MFR BLDA: 0 % (ref 0–1.5)
CREAT SERPL-MCNC: 1.3 MG/DL (ref 0.9–1.3)
DEPRECATED RDW RBC AUTO: 14.5 % (ref 12.4–15.4)
EOSINOPHIL # BLD: 0 K/UL (ref 0–0.6)
EOSINOPHIL NFR BLD: 0 %
GFR SERPLBLD CREATININE-BSD FMLA CKD-EPI: 63 ML/MIN/{1.73_M2}
GLUCOSE BLD-MCNC: 138 MG/DL (ref 70–99)
GLUCOSE BLD-MCNC: 165 MG/DL (ref 70–99)
GLUCOSE BLD-MCNC: 169 MG/DL (ref 70–99)
GLUCOSE BLD-MCNC: 172 MG/DL (ref 70–99)
GLUCOSE BLD-MCNC: 192 MG/DL (ref 70–99)
GLUCOSE BLD-MCNC: 200 MG/DL (ref 70–99)
GLUCOSE SERPL-MCNC: 160 MG/DL (ref 70–99)
HCO3 BLDA-SCNC: 30.3 MMOL/L (ref 21–29)
HCT VFR BLD AUTO: 32.7 % (ref 40.5–52.5)
HGB BLD-MCNC: 10.7 G/DL (ref 13.5–17.5)
HGB BLDA-MCNC: 11.7 G/DL (ref 13.5–17.5)
LYMPHOCYTES # BLD: 0.4 K/UL (ref 1–5.1)
LYMPHOCYTES NFR BLD: 1.4 %
MCH RBC QN AUTO: 29.5 PG (ref 26–34)
MCHC RBC AUTO-ENTMCNC: 32.7 G/DL (ref 31–36)
MCV RBC AUTO: 90.4 FL (ref 80–100)
METHGB MFR BLDA: 0.3 %
MONOCYTES # BLD: 1.2 K/UL (ref 0–1.3)
MONOCYTES NFR BLD: 3.8 %
MRSA DNA SPEC QL NAA+PROBE: NORMAL
NEUTROPHILS # BLD: 29 K/UL (ref 1.7–7.7)
NEUTROPHILS NFR BLD: 94.5 %
O2 THERAPY: ABNORMAL
PATH INTERP BLD-IMP: NORMAL
PCO2 BLDA: 50.8 MMHG (ref 35–45)
PERFORMED ON: ABNORMAL
PH BLDA: 7.39 [PH] (ref 7.35–7.45)
PLATELET # BLD AUTO: 338 K/UL (ref 135–450)
PMV BLD AUTO: 8.3 FL (ref 5–10.5)
PO2 BLDA: 92.5 MMHG (ref 75–108)
POTASSIUM SERPL-SCNC: 4.5 MMOL/L (ref 3.5–5.1)
RBC # BLD AUTO: 3.61 M/UL (ref 4.2–5.9)
SAO2 % BLDA: 96.9 %
SODIUM SERPL-SCNC: 136 MMOL/L (ref 136–145)
WBC # BLD AUTO: 30.7 K/UL (ref 4–11)

## 2025-02-07 PROCEDURE — 2580000003 HC RX 258: Performed by: INTERNAL MEDICINE

## 2025-02-07 PROCEDURE — 2500000003 HC RX 250 WO HCPCS: Performed by: INTERNAL MEDICINE

## 2025-02-07 PROCEDURE — 3E0G76Z INTRODUCTION OF NUTRITIONAL SUBSTANCE INTO UPPER GI, VIA NATURAL OR ARTIFICIAL OPENING: ICD-10-PCS | Performed by: INTERNAL MEDICINE

## 2025-02-07 PROCEDURE — 6370000000 HC RX 637 (ALT 250 FOR IP): Performed by: INTERNAL MEDICINE

## 2025-02-07 PROCEDURE — 36592 COLLECT BLOOD FROM PICC: CPT

## 2025-02-07 PROCEDURE — 2000000000 HC ICU R&B

## 2025-02-07 PROCEDURE — 85025 COMPLETE CBC W/AUTO DIFF WBC: CPT

## 2025-02-07 PROCEDURE — 71045 X-RAY EXAM CHEST 1 VIEW: CPT

## 2025-02-07 PROCEDURE — 99233 SBSQ HOSP IP/OBS HIGH 50: CPT | Performed by: STUDENT IN AN ORGANIZED HEALTH CARE EDUCATION/TRAINING PROGRAM

## 2025-02-07 PROCEDURE — 94761 N-INVAS EAR/PLS OXIMETRY MLT: CPT

## 2025-02-07 PROCEDURE — 80048 BASIC METABOLIC PNL TOTAL CA: CPT

## 2025-02-07 PROCEDURE — 6360000002 HC RX W HCPCS

## 2025-02-07 PROCEDURE — 99233 SBSQ HOSP IP/OBS HIGH 50: CPT | Performed by: INTERNAL MEDICINE

## 2025-02-07 PROCEDURE — 99291 CRITICAL CARE FIRST HOUR: CPT | Performed by: INTERNAL MEDICINE

## 2025-02-07 PROCEDURE — 6360000002 HC RX W HCPCS: Performed by: INTERNAL MEDICINE

## 2025-02-07 PROCEDURE — 94640 AIRWAY INHALATION TREATMENT: CPT

## 2025-02-07 PROCEDURE — 82803 BLOOD GASES ANY COMBINATION: CPT

## 2025-02-07 PROCEDURE — 2500000003 HC RX 250 WO HCPCS

## 2025-02-07 PROCEDURE — 94003 VENT MGMT INPAT SUBQ DAY: CPT

## 2025-02-07 PROCEDURE — 85730 THROMBOPLASTIN TIME PARTIAL: CPT

## 2025-02-07 PROCEDURE — 2700000000 HC OXYGEN THERAPY PER DAY

## 2025-02-07 PROCEDURE — 6370000000 HC RX 637 (ALT 250 FOR IP)

## 2025-02-07 RX ORDER — HYDROCORTISONE SODIUM SUCCINATE 100 MG/2ML
100 INJECTION INTRAMUSCULAR; INTRAVENOUS EVERY 12 HOURS
Status: DISCONTINUED | OUTPATIENT
Start: 2025-02-07 | End: 2025-02-08

## 2025-02-07 RX ADMIN — PROPOFOL 30 MCG/KG/MIN: 10 INJECTION, EMULSION INTRAVENOUS at 01:12

## 2025-02-07 RX ADMIN — METRONIDAZOLE 500 MG: 500 INJECTION, SOLUTION INTRAVENOUS at 20:59

## 2025-02-07 RX ADMIN — SODIUM CHLORIDE, PRESERVATIVE FREE 10 ML: 5 INJECTION INTRAVENOUS at 21:04

## 2025-02-07 RX ADMIN — FAMOTIDINE 20 MG: 10 INJECTION INTRAVENOUS at 10:28

## 2025-02-07 RX ADMIN — CEFEPIME 2000 MG: 2 INJECTION, POWDER, FOR SOLUTION INTRAVENOUS at 10:33

## 2025-02-07 RX ADMIN — HYDROCORTISONE SODIUM SUCCINATE 100 MG: 100 INJECTION, POWDER, FOR SOLUTION INTRAMUSCULAR; INTRAVENOUS at 01:40

## 2025-02-07 RX ADMIN — SODIUM CHLORIDE, PRESERVATIVE FREE 10 ML: 5 INJECTION INTRAVENOUS at 10:29

## 2025-02-07 RX ADMIN — MUPIROCIN: 20 OINTMENT TOPICAL at 21:03

## 2025-02-07 RX ADMIN — CISATRACURIUM BESYLATE 1.5 MCG/KG/MIN: 200 INJECTION, SOLUTION INTRAVENOUS at 03:48

## 2025-02-07 RX ADMIN — PROPOFOL 30 MCG/KG/MIN: 10 INJECTION, EMULSION INTRAVENOUS at 08:16

## 2025-02-07 RX ADMIN — HEPARIN SODIUM 20 UNITS/KG/HR: 10000 INJECTION, SOLUTION INTRAVENOUS at 13:23

## 2025-02-07 RX ADMIN — VASOPRESSIN 0.03 UNITS/MIN: 20 INJECTION INTRAVENOUS at 03:47

## 2025-02-07 RX ADMIN — METRONIDAZOLE 500 MG: 500 INJECTION, SOLUTION INTRAVENOUS at 04:07

## 2025-02-07 RX ADMIN — METRONIDAZOLE 500 MG: 500 INJECTION, SOLUTION INTRAVENOUS at 12:38

## 2025-02-07 RX ADMIN — INSULIN LISPRO 1 UNITS: 100 INJECTION, SOLUTION INTRAVENOUS; SUBCUTANEOUS at 10:27

## 2025-02-07 RX ADMIN — INSULIN LISPRO 1 UNITS: 100 INJECTION, SOLUTION INTRAVENOUS; SUBCUTANEOUS at 20:59

## 2025-02-07 RX ADMIN — IPRATROPIUM BROMIDE AND ALBUTEROL SULFATE 1 DOSE: .5; 2.5 SOLUTION RESPIRATORY (INHALATION) at 19:14

## 2025-02-07 RX ADMIN — MUPIROCIN: 20 OINTMENT TOPICAL at 10:29

## 2025-02-07 RX ADMIN — LINEZOLID 600 MG: 600 INJECTION, SOLUTION INTRAVENOUS at 06:14

## 2025-02-07 RX ADMIN — MIDAZOLAM HYDROCHLORIDE 5 MG/HR: 1 INJECTION, SOLUTION INTRAVENOUS at 06:17

## 2025-02-07 RX ADMIN — CEFEPIME 2000 MG: 2 INJECTION, POWDER, FOR SOLUTION INTRAVENOUS at 23:30

## 2025-02-07 RX ADMIN — IPRATROPIUM BROMIDE AND ALBUTEROL SULFATE 1 DOSE: .5; 2.5 SOLUTION RESPIRATORY (INHALATION) at 08:01

## 2025-02-07 RX ADMIN — NOREPINEPHRINE BITARTRATE 8 MCG/MIN: 64 SOLUTION INTRAVENOUS at 13:21

## 2025-02-07 RX ADMIN — AMIODARONE HYDROCHLORIDE 400 MG: 200 TABLET ORAL at 10:28

## 2025-02-07 RX ADMIN — FAMOTIDINE 20 MG: 10 INJECTION INTRAVENOUS at 21:03

## 2025-02-07 RX ADMIN — Medication 150 MCG/HR: at 06:18

## 2025-02-07 RX ADMIN — LINEZOLID 600 MG: 600 INJECTION, SOLUTION INTRAVENOUS at 18:28

## 2025-02-07 RX ADMIN — IPRATROPIUM BROMIDE AND ALBUTEROL SULFATE 1 DOSE: .5; 2.5 SOLUTION RESPIRATORY (INHALATION) at 02:44

## 2025-02-07 RX ADMIN — IPRATROPIUM BROMIDE AND ALBUTEROL SULFATE 1 DOSE: .5; 2.5 SOLUTION RESPIRATORY (INHALATION) at 13:09

## 2025-02-07 RX ADMIN — HYDROCORTISONE SODIUM SUCCINATE 100 MG: 100 INJECTION, POWDER, FOR SOLUTION INTRAMUSCULAR; INTRAVENOUS at 14:00

## 2025-02-07 RX ADMIN — Medication 100 MCG/HR: at 13:12

## 2025-02-07 ASSESSMENT — PULMONARY FUNCTION TESTS
PIF_VALUE: 25
PIF_VALUE: 33
PIF_VALUE: 35
PIF_VALUE: 32
PIF_VALUE: 29
PIF_VALUE: 31

## 2025-02-07 ASSESSMENT — PAIN SCALES - GENERAL
PAINLEVEL_OUTOF10: 0

## 2025-02-07 NOTE — PROGRESS NOTES
02/06/25 2300   Patient Observation   Pulse 70   Respirations 28   Breath Sounds   Right Upper Lobe Diminished   Right Middle Lobe Diminished   Right Lower Lobe Diminished   Left Upper Lobe Diminished   Left Lower Lobe Diminished   Vent Information   Vent Mode AC/VC   Ventilator Settings   FiO2  (S)  50 %  (decreased to 50%)   Vt (Set, mL) 450 mL   Resp Rate (Set) 28 bpm   PEEP/CPAP (cmH2O) 14   Vent Patient Data (Readings)   Vt (Measured) 461 mL   Peak Inspiratory Pressure (cmH2O) 37 cmH2O   Rate Measured 28 br/min   Minute Volume (L/min) 12.9 Liters   Peak Inspiratory Flow (lpm) 75 L/sec   Mean Airway Pressure (cmH2O) 21 cmH20   I:E Ratio 1:2.3   Flow Sensitivity 3 L/min   Vent Alarm Settings   High Pressure (cmH2O) 50 cmH2O   Low Minute Volume (lpm) 4 L/min   Low Exhaled Vt (ml) 250 mL   RR High (bpm) 40 br/min   Apnea (secs) 20 secs   Additional Respiratoray Assessments   Humidification Source HME   Airway Clearance   Suction ET Tube   Suction Device Inline suction catheter   Sputum Method Obtained Endotracheal   Sputum Amount Scant   ETT    Placement Date: 02/05/25   Airway Tube Size: 8 mm  Location: Oral   Secured At 26 cm   Measured From Lips   ETT Placement Left   Secured By Commercial tube collins   Site Assessment Dry

## 2025-02-07 NOTE — PROGRESS NOTES
OPO called for update. Updated on current plan of care, continuous gtts, and current VS.    FiO2 decreased to 60%, PEEP remains at 14. SpO2 currently 100%.    Levophed down to 9 mcg/min, vaso remains at 0.03. BP currently 98/67 (78).    No other changes assessed or reported. Will continue to monitor.

## 2025-02-07 NOTE — PROGRESS NOTES
HEART FAILURE CARE PLAN:    Comorbidities Reviewed: Yes   Patient has a past medical history of Back pain, chronic, Chronic back pain, and MRSA (methicillin resistant staph aureus) culture positive.     Weights Reviewed: Yes   Admission weight:     Wt Readings from Last 3 Encounters:   02/01/25 62.3 kg (137 lb 4.8 oz)   07/09/24 56.3 kg (124 lb 1.6 oz)   07/04/24 63.5 kg (140 lb)     Intake & Output Reviewed: Yes     Intake/Output Summary (Last 24 hours) at 2/6/2025 2153  Last data filed at 2/6/2025 2012  Gross per 24 hour   Intake 4783.38 ml   Output 1100 ml   Net 3683.38 ml       ECHOCARDIOGRAM Reviewed: Yes   Patient's Ejection Fraction (EF) is less than or equal to 40%. Discuss HFrEF Guideline Directed Medical Therapy (GDMT) with Cardiologist or Hospitalist:          Medications Reviewed: Yes   SCHEDULED HOSPITAL MEDICATIONS:   cefepime  2,000 mg IntraVENous Q12H    mupirocin   Each Nostril BID    sodium zirconium cyclosilicate  10 g Oral TID    insulin lispro  0-4 Units SubCUTAneous Q4H    lidocaine        sodium chloride flush  5-40 mL IntraVENous 2 times per day    amiodarone  400 mg Oral Daily    hydrocortisone sodium succinate PF  100 mg IntraVENous Q8H    famotidine (PEPCID) injection  20 mg IntraVENous BID    linezolid  600 mg IntraVENous Q12H    metroNIDAZOLE  500 mg IntraVENous Q8H    ipratropium 0.5 mg-albuterol 2.5 mg  1 Dose Inhalation Q6H     HOME MEDICATIONS:  Prior to Admission medications    Medication Sig Start Date End Date Taking? Authorizing Provider   amiodarone (CORDARONE) 200 MG tablet 2 bid - 5 days, 2 qd- 1 week, 1 qd thereafter 1/31/25   Brittany Shin MD   empagliflozin (JARDIANCE) 10 MG tablet Take 1 tablet by mouth daily 1/31/25   Brittany Shin MD   apixaban (ELIQUIS) 5 MG TABS tablet Take 1 tablet by mouth 2 times daily 1/31/25   Brittany Shin MD   metoprolol succinate (TOPROL XL) 25 MG extended release tablet Take 0.5 tablets by mouth daily

## 2025-02-07 NOTE — WOUND CARE
Pt seen for wound care.  Pt turned supine this am.  No redness or skin breakdown noted to bony prominences.  Foam dressings removed and skin assessed.  Call for any signs of skin breakdown.  Discussed with staff rn.

## 2025-02-07 NOTE — PROGRESS NOTES
RT Inhaler-Nebulizer Bronchodilator Protocol Note    There is a bronchodilator order in the chart from a provider indicating to follow the RT Bronchodilator Protocol and there is an “Initiate RT Inhaler-Nebulizer Bronchodilator Protocol” order as well (see protocol at bottom of note).    CXR Findings:  XR CHEST PORTABLE    Result Date: 2/5/2025  1. Endotracheal tube 5.4 cm above the riley. 2. Nasogastric tube advances to conklin the stomach. 3. Extensive bilateral infiltrates worse in the mid lower lungs overall slightly worse compared to the previous evaluation.     XR CHEST PORTABLE    Result Date: 2/5/2025  1. ET tube in place, approximately 1.5 cm above the riley. It should be withdrawn by 4 cm. 2. Extensive airspace disease in the lower lung of fields, unchanged from prior examination.     XR CHEST PORTABLE    Result Date: 2/5/2025  1. Dense pneumonic infiltrates in the right lower lung field and in the left mid and lower lung field, with or without atelectatic changes.  These findings are mostly new as compared to previous portable chest x-ray. 2. Minimal bilateral pleural effusions or pleural thickening at the CP angles. 3. Minimal pulmonary vascular prominence.       The findings from the last RT Protocol Assessment were as follows:   History Pulmonary Disease: Chronic pulmonary disease  Respiratory Pattern: Use of accessory muscles, prolonged exhalation, or RR 26-30 bpm  Breath Sounds: Slightly diminished and/or crackles  Cough: Unable to generate effective cough  Indication for Bronchodilator Therapy: Decreased or absent breath sounds  Bronchodilator Assessment Score: 14    Aerosolized bronchodilator medication orders have been revised according to the RT Inhaler-Nebulizer Bronchodilator Protocol below.    Respiratory Therapist to perform RT Therapy Protocol Assessment initially then follow the protocol.  Repeat RT Therapy Protocol Assessment PRN for score 0-3 or on second treatment, BID, and PRN for scores

## 2025-02-07 NOTE — PROGRESS NOTES
Rate was decreased from 28 to 26, PEEP was decreased from 12 to 10 and FiO2 was decreased from 50% to 45% by Dr. Lei.

## 2025-02-07 NOTE — PROGRESS NOTES
02/07/25 0246   Patient Observation   Pulse 66   Respirations 28   Breath Sounds   Right Upper Lobe Diminished   Right Middle Lobe Diminished   Right Lower Lobe Diminished   Left Upper Lobe Diminished   Left Lower Lobe Diminished   Ventilator Settings   FiO2  (S)  40 %  (decreased to 40%)   Vt (Set, mL) 450 mL   Resp Rate (Set) 28 bpm   PEEP/CPAP (cmH2O) 12   Vent Patient Data (Readings)   Vt (Measured) 464 mL   Peak Inspiratory Pressure (cmH2O) 32 cmH2O   Rate Measured 28 br/min   Minute Volume (L/min) 12.9 Liters   Peak Inspiratory Flow (lpm) 75 L/sec   Mean Airway Pressure (cmH2O) 19 cmH20   I:E Ratio 1:2.3   Flow Sensitivity 3 L/min   Vent Alarm Settings   High Pressure (cmH2O) 50 cmH2O   Low Minute Volume (lpm) 4 L/min   Low Exhaled Vt (ml) 250 mL   RR High (bpm) 40 br/min   Apnea (secs) 20 secs   Additional Respiratoray Assessments   Humidification Source HME   ETT    Placement Date: 02/05/25   Airway Tube Size: 8 mm  Location: Oral   Secured At 26 cm   Measured From Lips   ETT Placement Left   Secured By Commercial tube collins   Site Assessment Dry   Treatment   $Treatment Type $Inhaled Therapy/Meds

## 2025-02-07 NOTE — PROGRESS NOTES
2/7  aPTT = 84 sec at 0640.  Continue heparin gtt at 20 units/kg/hr.  Check Anti-Xa tomorrow in the AM (2/8 at 0600).  Jefferson Black PharmD  2/7/2025 7:06 AM

## 2025-02-07 NOTE — PROGRESS NOTES
Interdisciplinary rounding completed. Orders received from Dr. Lei to supine Pt, stop nimbex, wean versed gtt off, obtain a CXR and initiate TF's. Pt's Sister updated on plan of care via telephone. Will continue to monitor.

## 2025-02-07 NOTE — PROGRESS NOTES
IM Progress Note    Admit Date:  2/5/2025  2    Interval history:  59 y.o. male who presented to [facility] with septic shock.  PMHx significant for polysubstance use-methamphetamine, A-fib, valvular disease, on Eliquis on amiodarone, MRSA, cardiomyopathy      Intubated, paralyzed for severe hypoxic resp failure  Hypotensive shock on levo, vaso   Started heparin gtt  Remains in prone ventilation     2/6  Fio2 improved from 100 to 80 %, PEEP at 14  BP remains low on pressors   Remains on nimbex , proned         Subjective:  Mr. Soto seen now on 50 %fio2 and PEEP down to 12   Remains paralyzed and proned     on vent support   No distress  Had bronch with clearance of biliary secretions     Objective:   BP 90/63   Pulse 71   Temp 97.3 °F (36.3 °C) (Temporal)   Resp 28   Ht 1.702 m (5' 7.01\")   Wt 64.3 kg (141 lb 12.8 oz)   SpO2 95%   BMI 22.20 kg/m²     Intake/Output Summary (Last 24 hours) at 2/7/2025 0709  Last data filed at 2/7/2025 0634  Gross per 24 hour   Intake 3619.29 ml   Output 995 ml   Net 2624.29 ml       Physical Exam:      General:  middle aged male on vent sedated, paralyzed  Oral ETT and OG noted  . Appears to be not in any distress  Mucous Membranes:  Pink , anicteric  Neck: No JVD, no carotid bruit, no thyromegaly  Chest: improving leatha air entry with resolved crackles,    Cardiovascular:  RRR S1S2 heard, no murmurs or gallops  Abdomen:  Soft, undistended, non tender, no organomegaly, BS present  Extremities: No edema or cyanosis. Distal pulses well felt  Neurological : sedated       Medications:   Scheduled Medications:    cefepime  2,000 mg IntraVENous Q12H    mupirocin   Each Nostril BID    sodium zirconium cyclosilicate  10 g Oral TID    insulin lispro  0-4 Units SubCUTAneous Q4H    sodium chloride flush  5-40 mL IntraVENous 2 times per day    amiodarone  400 mg Oral Daily    hydrocortisone sodium succinate PF  100 mg IntraVENous Q8H    famotidine (PEPCID) injection  20 mg IntraVENous  Findings consistent with myxomatous degeneration. Moderately thickened leaflets. Mild regurgitation. No stenosis noted.    Tricuspid Valve: Thickened leaflets. Mild to moderate regurgitation. Normal RVSP. Est RA pressure is 8 mmHg. The estimated RVSP is 32mmHg.    Interatrial Septum: No obvious color flow across atrial septum on current study. Previous positive right to left bubble study on NILE 7/8/24.    Right Atrium: Right atrium is mildly dilated.    Image quality is adequate. Procedure performed with the patient in a sitting position.    No significant changes compared to ECHO study July 2024.       Bronchoscopy     No endobronchial lesion was identified. Mucosa appeared normal. There were a couple areas of adherent bilious appearing secretions on the tracheal wall which was removed with saline lavage and suction.  There is a small amount of scattered yellow secretions on the left side.  There was some yellow secretions in the right lower lobe.  Therapeutic aspiration completed     Cultures  Blood- NGTD  Sputum - pending  BAL pending      Assessment & Plan:      # Acute hypoxic and hypercapnic respiratory failure secondary to multifocal pneumonia- cannot rule out aspirational given imaging findings       - intubated in ER  on 2/5, and now  on mechanical ventilation  -Critical care following  -Patient remains on fentanyl, propofol, Versed, Precedex for sedation and paralyzed and proned for severe hypoxia   -Patient remains critically ill  -Monitor for development/worsening ARDS and consider diuretics as needed especially in the setting of patient's known HFrEF  - continue zyvox , cefepime and flagyl  - BP improving, wbc better     # Septic shock secondary to multifocal pneumonia  - leatha pna on imaging and wbc upto 47>70 K  -Blood cultures pending  -Continue empiric linezolid and cefepime as well as Flagyl  -Stress dose steroids with Solu-Cortef  -Remains hypotensive despite being on  Levophed and vasopressin

## 2025-02-07 NOTE — PROGRESS NOTES
CARDIOLOGY Progress Note        Patient Name: Dayton Soto  Date of admission: 2/5/2025  8:47 AM  Admission Dx: Acute respiratory failure [J96.00]  Methamphetamine use (HCC) [F15.10]  Elevated troponin [R79.89]  Acute respiratory failure with hypoxia [J96.01]  Multifocal pneumonia [J18.9]  Requesting Physician: Fran Palacio MD  Primary Care physician: Not, On File (Inactive)    Reason for Consultation/Chief Complaint: \"NSTEMI\"    Subjective: Patient seen and examined.  Patient is prone.  Oxygenation seems to be improving.  Levophed being weaned.  Still hypotensive.    History of Present Illness:     Dayton Soto is a 59 y.o. patient with past medical history for substance abuse/methamphetamine abuse, cardiomyopathy with moderate LV dysfunction, PAF who presented to the with acute respiratory distress.  Patient was reportedly admitted to smoking methamphetamines a few hours before coming to the hospital. emergently had to be intubated.  Patient difficult to oxygenate and remain severely hypoxemic for several hours.  Patient also with septic shock on Levophed and vasopressin.  Severe acidosis with a pH around 6.9 which is slowly improving to 7.2 today.  Patient troponin also elevated and he was started on heparin drip.  Chest x-ray shows extensive bilateral infiltrates.  He remains intubated, sedated, proned and on paralytics.  He remains critically ill.      Past Medical History:   has a past medical history of Back pain, chronic, Chronic back pain, and MRSA (methicillin resistant staph aureus) culture positive.    Surgical History:   has no past surgical history on file.     Social History:   reports that he has quit smoking. His smoking use included cigarettes. He has never used smokeless tobacco. He reports current drug use. Drug: Methamphetamines (Crystal Meth). He reports that he does not drink alcohol.     Family History:  family history is not on file.      Home Medications:  Were reviewed and  \"LDLCHOLESTEROL\", \"LDLCALC\"  Lab Results   Component Value Date    VLDL 15 07/03/2024     No results found for: \"CHOLHDLRATIO\"     Cardiac Data:     EKG: Sinus tachycardia, left axis deviation, LVH, nonspecific ST abnormalities in lateral leads    Telemetry personally reviewed: Sinus rhythm 80s    07/05/24     TTE EF=30-35%     NILE (PRN CONTRAST/BUBBLE/3D) 07/08/2024 12:41 PM (Final)   Interpretation Summary    Left Ventricle: Mildly reduced left ventricular systolic function with a visually estimated EF of 40 - 45%. EF by 2D Simpsons Biplane is 43%. Left ventricle size is normal. Mild global hypokinesis present. No evidence of LV thrombus.    No evidence of vegetation(s)/endocarditis visualized.    Aortic Valve: Mild regurgitation with a centrally directed jet.    Mitral Valve: Valve structure is normal. Mild regurgitation.    Tricuspid Valve: Valve structure is normal. Mild regurgitation.    Pulmonic Valve: Trace regurgitation.    Left Atrium: Normal sized appendage. No left atrial appendage thrombus noted. No left atrial appendage mass noted.    Interatrial Septum: Grade I Positive (1 to 9 bubbles). Agitated saline study was suggestive of intracardiac shunt across the atrial septum. Mild right to left shunt was noted.    Image quality is excellent.        ECHO 1/29/25:    Left Ventricle: Moderately reduced left ventricular systolic function with a visually estimated EF of 30 - 35%. Left ventricle size is normal. Moderate global hypokinesis present. Indeterminate diastolic function due to atrial fibrillation.    Right Ventricle: Right ventricle is mildly dilated. Moderately reduced systolic function.    Aortic Valve: Trileaflet valve. Mild sclerosis of the aortic valve cusps. Mild to moderate regurgitation.    Mitral Valve: Findings consistent with myxomatous degeneration. Moderately thickened leaflets. Mild regurgitation. No stenosis noted.    Tricuspid Valve: Thickened leaflets. Mild to moderate regurgitation.

## 2025-02-07 NOTE — PROGRESS NOTES
02/06/25 1913   Patient Observation   Pulse 72   Respirations 28   Breath Sounds   Right Upper Lobe Diminished   Right Middle Lobe Diminished   Right Lower Lobe Diminished   Left Lower Lobe Diminished   Vent Information   Vent Mode AC/VC   Ventilator Settings   FiO2  (S)  60 %  (decreased to 60%)   Vt (Set, mL) 450 mL   Resp Rate (Set) 28 bpm   PEEP/CPAP (cmH2O) 14   Vent Patient Data (Readings)   Vt (Measured) 462 mL   Peak Inspiratory Pressure (cmH2O) 37 cmH2O   Rate Measured 28 br/min   Minute Volume (L/min) 13 Liters   Peak Inspiratory Flow (lpm) 75 L/sec   Mean Airway Pressure (cmH2O) 22 cmH20   I:E Ratio 1:2.3   Flow Sensitivity 3 L/min   Vent Alarm Settings   High Pressure (cmH2O) 50 cmH2O   Low Minute Volume (lpm) 4 L/min   Low Exhaled Vt (ml) 250 mL   RR High (bpm) 40 br/min   Apnea (secs) 20 secs   Additional Respiratoray Assessments   Humidification Source HME   ETT    Placement Date: 02/05/25   Airway Tube Size: 8 mm  Location: Oral   Secured At 26 cm   Measured From Lips   ETT Placement Left   Secured By Commercial tube collins   Site Assessment Dry   Treatment   $Treatment Type $Inhaled Therapy/Meds

## 2025-02-07 NOTE — FLOWSHEET NOTE
02/07/25 0900   Vitals   Temp 98.5 °F (36.9 °C)   Temp Source Bladder   Pulse 74   Heart Rate Source Monitor   Respirations 22   BP (!) 91/59   MAP (Calculated) 70   MAP (mmHg) 69   BP Method Automatic   RASS   Mathews Agitation Sedation Scale (RASS) -5   Oxygen Therapy   SpO2 96 %   O2 Device Ventilator   FiO2  50 %   PEEP/CPAP (cm H2O) 12 cm H20     Vital signs stable. Shift assessment, completed, see flow sheet. RASS -5. Intubated and sedated with # 8 ETT, at 26 LL. 28 / 450 / 50 % / 12. Remains prone at this time. NSR, Respirations are easy, even, and unlabored. Bilateral lung sounds diminished. OG in place at 65, with 270 ml residual, Bowel sounds active. L femoral triple lumen CVC WNL, with Nimbex @ 1.5 mcg/kg/min, Fentanyl @ 125 mcg/hr, versed 5 mg/hr, heparin 20 units/kg/hr, Versed 4 mg/HR, Vasopressin 0.03 units/min, Levophed 4 mcg/min Leonard in place and draining clear yellow urine.  Bed in lowest position. Bed alarm on. Will continue to monitor

## 2025-02-07 NOTE — PROGRESS NOTES
Pulmonary & Critical Care Medicine ICU Progress Note    CC: pneumonia    Events of Last 24 hours: oxygenation improving quickly. This may have been mostly a chemical pneumonitis from aspiration  Propofol 30  Versed 5  Fentanyl 150  Levo 4  Vaspressin 0.03  Heparin gtt    Invasive Lines: Right femoral cvc    MV:  2/5/25-  Vent Mode: AC/VC Resp Rate (Set): 28 bpm/Vt (Set, mL): 450 mL/ /FiO2 : 40 %  Recent Labs     02/06/25  1435 02/07/25  0456   PHART 7.230* 7.393   PRX3EXC 65.4* 50.8*   PO2ART 79.9 92.5       IV:   dextrose      heparin (PORCINE) Infusion 20 Units/kg/hr (02/07/25 0634)    sodium chloride 5 mL/hr at 02/07/25 0634    VASOpressin 20 Units in sodium chloride 0.9 % 100 mL infusion 0.03 Units/min (02/07/25 0634)    norepinephrine 4 mcg/min (02/07/25 0634)    cisatracurium besylate (NIMBEX) 200 mg in sodium chloride 0.9 % 100 mL infusion 1.5 mcg/kg/min (02/07/25 0634)    fentaNYL 150 mcg/hr (02/07/25 0634)    midazolam 5 mg/hr (02/07/25 0634)    propofol 30 mcg/kg/min (02/07/25 0634)       Vitals:  BP 90/63   Pulse 71   Temp 97.3 °F (36.3 °C) (Temporal)   Resp 28   Ht 1.702 m (5' 7.01\")   Wt 64.3 kg (141 lb 12.8 oz)   SpO2 95%   BMI 22.20 kg/m²   on vent  Eyes: PERRL. Conjunctivae anicteric.   ENT: Normal nose. ETT in place   Neck:  Trachea is midline.   Respiratory: No accessory muscle usage.  No wheezes. No rales. + Rhonchi.  Cardiovascular: Normal S1S2. No digit clubbing. No digit cyanosis. No LE edema.    Gastrointestinal: No mass palpated. No tenderness palpated.   Skin: No rash on the exposed extremities.   Psychiatric: sedated.    Scheduled Meds:   cefepime  2,000 mg IntraVENous Q12H    mupirocin   Each Nostril BID    sodium zirconium cyclosilicate  10 g Oral TID    insulin lispro  0-4 Units SubCUTAneous Q4H    sodium chloride flush  5-40 mL IntraVENous 2 times per day    amiodarone  400 mg Oral Daily    hydrocortisone sodium succinate PF  100 mg IntraVENous Q8H    famotidine (PEPCID)  injection  20 mg IntraVENous BID    linezolid  600 mg IntraVENous Q12H    metroNIDAZOLE  500 mg IntraVENous Q8H    ipratropium 0.5 mg-albuterol 2.5 mg  1 Dose Inhalation Q6H     PRN Meds:  glucose, dextrose bolus **OR** dextrose bolus, glucagon (rDNA), dextrose, heparin (porcine), heparin (porcine), sodium chloride flush, sodium chloride, potassium chloride **OR** potassium alternative oral replacement **OR** potassium chloride, magnesium sulfate, ondansetron **OR** ondansetron, polyethylene glycol, acetaminophen **OR** acetaminophen, fentanNYL, midazolam    Results:  CBC:   Recent Labs     02/05/25  0936 02/06/25  0454 02/07/25  0456   WBC 47.0* 70.0* 30.7*   HGB 14.4 13.1* 10.7*   HCT 45.2 41.2 32.7*   MCV 92.1 93.8 90.4   * 399 338     BMP:   Recent Labs     02/06/25  0454 02/06/25  1415 02/07/25  0456    140 136   K 5.7* 5.3* 4.5    101 99   CO2 28 31 31   BUN 27* 30* 34*   CREATININE 1.2 1.2 1.3     LIVER PROFILE:   Recent Labs     02/05/25  0936 02/06/25  0454   AST 27 66*   ALT 26 74*   BILIDIR  --  0.3   BILITOT 0.8 0.4   ALKPHOS 89 128       Cultures:  2/5/25 blood cx  2/5/25 Resp cx:     Films:  Chest imaging was reviewed by me and showed CTPA: Pulmonary Arteries: Motion artifact degrades image quality.  There is no  acute pulmonary thromboembolus.     Mediastinum: The heart is unremarkable.  There are enlarged mediastinal lymph  nodes.  An index aortopulmonary window lymph node measures 1.3 x 2.1 cm.     Lungs/pleura: An endotracheal tube is insitu.  The airway is patent.  There  is no pneumothorax there is a trace left pleural effusion with atelectasis.     New moderate interstitial edema is present throughout the bilateral lungs  with worsening diffuse consolidation throughout the bilateral lungs, most significant in the bilateral lower lobes. Upper Abdomen: A the nasogastric tube reaches the gastric antrum.    IMPRESSION:  1. Worsening diffuse bilateral airspace disease favoring

## 2025-02-07 NOTE — PROGRESS NOTES
100 ml's of nimbex wasted and witnessed with TARIQ Caballero. Electronically signed by Vinod Handley RN on 2/7/2025 at 12:53 PM     Ada Anand RN, BSN

## 2025-02-07 NOTE — PROGRESS NOTES
Upon completing pt belongings assessment, bottle of suboxone found in pants pocket. Med count completed with TARIQ High. Suboxone bagged and walked to pharmacy.

## 2025-02-07 NOTE — PROGRESS NOTES
2/7  aPTT = 87.4 sec at 0105.  Continue heparin gtt at 20 units/kg/hr.  Recheck aPTT in 6 hours.  Jefferson Black PharmD  2/7/2025 2:19 AM

## 2025-02-08 ENCOUNTER — APPOINTMENT (OUTPATIENT)
Dept: GENERAL RADIOLOGY | Age: 60
DRG: 720 | End: 2025-02-08
Payer: MEDICAID

## 2025-02-08 LAB
ANION GAP SERPL CALCULATED.3IONS-SCNC: 6 MMOL/L (ref 3–16)
ANTI-XA UNFRAC HEPARIN: 0.64 IU/ML (ref 0.3–0.7)
BACTERIA SPEC RESP CULT: NORMAL
BASE EXCESS BLDA CALC-SCNC: 4.9 MMOL/L (ref -3–3)
BASOPHILS # BLD: 0.1 K/UL (ref 0–0.2)
BASOPHILS NFR BLD: 0.5 %
BUN SERPL-MCNC: 41 MG/DL (ref 7–20)
CALCIUM SERPL-MCNC: 7.1 MG/DL (ref 8.3–10.6)
CHLORIDE SERPL-SCNC: 103 MMOL/L (ref 99–110)
CO2 BLDA-SCNC: 30.5 MMOL/L
CO2 SERPL-SCNC: 31 MMOL/L (ref 21–32)
COHGB MFR BLDA: 0.3 % (ref 0–1.5)
CREAT SERPL-MCNC: 1.4 MG/DL (ref 0.9–1.3)
DEPRECATED RDW RBC AUTO: 14.4 % (ref 12.4–15.4)
EOSINOPHIL # BLD: 0 K/UL (ref 0–0.6)
EOSINOPHIL NFR BLD: 0 %
GFR SERPLBLD CREATININE-BSD FMLA CKD-EPI: 58 ML/MIN/{1.73_M2}
GLUCOSE BLD-MCNC: 167 MG/DL (ref 70–99)
GLUCOSE BLD-MCNC: 173 MG/DL (ref 70–99)
GLUCOSE BLD-MCNC: 192 MG/DL (ref 70–99)
GLUCOSE BLD-MCNC: 207 MG/DL (ref 70–99)
GLUCOSE BLD-MCNC: 250 MG/DL (ref 70–99)
GLUCOSE SERPL-MCNC: 205 MG/DL (ref 70–99)
GRAM STN SPEC: NORMAL
HCO3 BLDA-SCNC: 29.2 MMOL/L (ref 21–29)
HCT VFR BLD AUTO: 29.4 % (ref 40.5–52.5)
HGB BLD-MCNC: 9.8 G/DL (ref 13.5–17.5)
HGB BLDA-MCNC: 10.7 G/DL (ref 13.5–17.5)
LYMPHOCYTES # BLD: 0.2 K/UL (ref 1–5.1)
LYMPHOCYTES NFR BLD: 0.8 %
MCH RBC QN AUTO: 30.6 PG (ref 26–34)
MCHC RBC AUTO-ENTMCNC: 33.5 G/DL (ref 31–36)
MCV RBC AUTO: 91.4 FL (ref 80–100)
METHGB MFR BLDA: 0.1 %
MONOCYTES # BLD: 0.6 K/UL (ref 0–1.3)
MONOCYTES NFR BLD: 3.4 %
NEUTROPHILS # BLD: 17.3 K/UL (ref 1.7–7.7)
NEUTROPHILS NFR BLD: 95.3 %
O2 THERAPY: ABNORMAL
PCO2 BLDA: 41.9 MMHG (ref 35–45)
PERFORMED ON: ABNORMAL
PH BLDA: 7.46 [PH] (ref 7.35–7.45)
PLATELET # BLD AUTO: 287 K/UL (ref 135–450)
PMV BLD AUTO: 8.4 FL (ref 5–10.5)
PO2 BLDA: 121.3 MMHG (ref 75–108)
POTASSIUM SERPL-SCNC: 4 MMOL/L (ref 3.5–5.1)
RBC # BLD AUTO: 3.21 M/UL (ref 4.2–5.9)
SAO2 % BLDA: 98.5 %
SODIUM SERPL-SCNC: 140 MMOL/L (ref 136–145)
WBC # BLD AUTO: 18.2 K/UL (ref 4–11)

## 2025-02-08 PROCEDURE — 80048 BASIC METABOLIC PNL TOTAL CA: CPT

## 2025-02-08 PROCEDURE — 6370000000 HC RX 637 (ALT 250 FOR IP): Performed by: INTERNAL MEDICINE

## 2025-02-08 PROCEDURE — 71045 X-RAY EXAM CHEST 1 VIEW: CPT

## 2025-02-08 PROCEDURE — 36592 COLLECT BLOOD FROM PICC: CPT

## 2025-02-08 PROCEDURE — 6360000002 HC RX W HCPCS

## 2025-02-08 PROCEDURE — 2500000003 HC RX 250 WO HCPCS

## 2025-02-08 PROCEDURE — 6370000000 HC RX 637 (ALT 250 FOR IP)

## 2025-02-08 PROCEDURE — 82803 BLOOD GASES ANY COMBINATION: CPT

## 2025-02-08 PROCEDURE — 94003 VENT MGMT INPAT SUBQ DAY: CPT

## 2025-02-08 PROCEDURE — 2580000003 HC RX 258: Performed by: INTERNAL MEDICINE

## 2025-02-08 PROCEDURE — 2000000000 HC ICU R&B

## 2025-02-08 PROCEDURE — 94640 AIRWAY INHALATION TREATMENT: CPT

## 2025-02-08 PROCEDURE — 2700000000 HC OXYGEN THERAPY PER DAY

## 2025-02-08 PROCEDURE — 99233 SBSQ HOSP IP/OBS HIGH 50: CPT | Performed by: INTERNAL MEDICINE

## 2025-02-08 PROCEDURE — 85025 COMPLETE CBC W/AUTO DIFF WBC: CPT

## 2025-02-08 PROCEDURE — 99291 CRITICAL CARE FIRST HOUR: CPT | Performed by: INTERNAL MEDICINE

## 2025-02-08 PROCEDURE — 2500000003 HC RX 250 WO HCPCS: Performed by: INTERNAL MEDICINE

## 2025-02-08 PROCEDURE — 85520 HEPARIN ASSAY: CPT

## 2025-02-08 PROCEDURE — 94761 N-INVAS EAR/PLS OXIMETRY MLT: CPT

## 2025-02-08 PROCEDURE — 6360000002 HC RX W HCPCS: Performed by: INTERNAL MEDICINE

## 2025-02-08 RX ORDER — CARBOXYMETHYLCELLULOSE SODIUM 10 MG/ML
1 GEL OPHTHALMIC 3 TIMES DAILY
Status: DISCONTINUED | OUTPATIENT
Start: 2025-02-08 | End: 2025-02-10

## 2025-02-08 RX ORDER — LEVOFLOXACIN 5 MG/ML
750 INJECTION, SOLUTION INTRAVENOUS EVERY 24 HOURS
Status: DISCONTINUED | OUTPATIENT
Start: 2025-02-08 | End: 2025-02-10

## 2025-02-08 RX ORDER — ENOXAPARIN SODIUM 100 MG/ML
1 INJECTION SUBCUTANEOUS 2 TIMES DAILY
Status: DISCONTINUED | OUTPATIENT
Start: 2025-02-08 | End: 2025-02-08

## 2025-02-08 RX ORDER — DEXMEDETOMIDINE HYDROCHLORIDE 4 UG/ML
.1-1.5 INJECTION, SOLUTION INTRAVENOUS CONTINUOUS
Status: DISCONTINUED | OUTPATIENT
Start: 2025-02-08 | End: 2025-02-11

## 2025-02-08 RX ADMIN — APIXABAN 5 MG: 5 TABLET, FILM COATED ORAL at 11:17

## 2025-02-08 RX ADMIN — FENTANYL CITRATE 50 MCG: 50 INJECTION, SOLUTION INTRAMUSCULAR; INTRAVENOUS at 17:02

## 2025-02-08 RX ADMIN — IPRATROPIUM BROMIDE AND ALBUTEROL SULFATE 1 DOSE: .5; 2.5 SOLUTION RESPIRATORY (INHALATION) at 03:08

## 2025-02-08 RX ADMIN — HEPARIN SODIUM 20 UNITS/KG/HR: 10000 INJECTION, SOLUTION INTRAVENOUS at 02:36

## 2025-02-08 RX ADMIN — PROPOFOL 30 MCG/KG/MIN: 10 INJECTION, EMULSION INTRAVENOUS at 08:23

## 2025-02-08 RX ADMIN — INSULIN LISPRO 2 UNITS: 100 INJECTION, SOLUTION INTRAVENOUS; SUBCUTANEOUS at 08:35

## 2025-02-08 RX ADMIN — MIDAZOLAM HYDROCHLORIDE 2 MG: 1 INJECTION, SOLUTION INTRAMUSCULAR; INTRAVENOUS at 02:46

## 2025-02-08 RX ADMIN — MIDAZOLAM HYDROCHLORIDE 2 MG: 1 INJECTION, SOLUTION INTRAMUSCULAR; INTRAVENOUS at 11:22

## 2025-02-08 RX ADMIN — MUPIROCIN: 20 OINTMENT TOPICAL at 20:48

## 2025-02-08 RX ADMIN — INSULIN LISPRO 1 UNITS: 100 INJECTION, SOLUTION INTRAVENOUS; SUBCUTANEOUS at 20:58

## 2025-02-08 RX ADMIN — INSULIN LISPRO 1 UNITS: 100 INJECTION, SOLUTION INTRAVENOUS; SUBCUTANEOUS at 16:12

## 2025-02-08 RX ADMIN — MIDAZOLAM HYDROCHLORIDE 2 MG: 1 INJECTION, SOLUTION INTRAMUSCULAR; INTRAVENOUS at 16:54

## 2025-02-08 RX ADMIN — FAMOTIDINE 20 MG: 10 INJECTION INTRAVENOUS at 20:59

## 2025-02-08 RX ADMIN — MUPIROCIN: 20 OINTMENT TOPICAL at 08:22

## 2025-02-08 RX ADMIN — DEXMEDETOMIDINE HYDROCHLORIDE 0.6 MCG/KG/HR: 4 INJECTION, SOLUTION INTRAVENOUS at 11:17

## 2025-02-08 RX ADMIN — CARBOXYMETHYLCELLULOSE SODIUM 1 DROP: 10 GEL OPHTHALMIC at 16:55

## 2025-02-08 RX ADMIN — CARBOXYMETHYLCELLULOSE SODIUM 1 DROP: 10 GEL OPHTHALMIC at 20:48

## 2025-02-08 RX ADMIN — Medication 75 MCG/HR: at 07:39

## 2025-02-08 RX ADMIN — PROPOFOL 20 MCG/KG/MIN: 10 INJECTION, EMULSION INTRAVENOUS at 17:09

## 2025-02-08 RX ADMIN — SODIUM CHLORIDE, PRESERVATIVE FREE 10 ML: 5 INJECTION INTRAVENOUS at 20:48

## 2025-02-08 RX ADMIN — IPRATROPIUM BROMIDE AND ALBUTEROL SULFATE 1 DOSE: .5; 2.5 SOLUTION RESPIRATORY (INHALATION) at 14:38

## 2025-02-08 RX ADMIN — IPRATROPIUM BROMIDE AND ALBUTEROL SULFATE 1 DOSE: .5; 2.5 SOLUTION RESPIRATORY (INHALATION) at 08:22

## 2025-02-08 RX ADMIN — FAMOTIDINE 20 MG: 10 INJECTION INTRAVENOUS at 08:22

## 2025-02-08 RX ADMIN — LINEZOLID 600 MG: 600 INJECTION, SOLUTION INTRAVENOUS at 06:28

## 2025-02-08 RX ADMIN — APIXABAN 5 MG: 5 TABLET, FILM COATED ORAL at 20:59

## 2025-02-08 RX ADMIN — IPRATROPIUM BROMIDE AND ALBUTEROL SULFATE 1 DOSE: .5; 2.5 SOLUTION RESPIRATORY (INHALATION) at 18:57

## 2025-02-08 RX ADMIN — MIDAZOLAM HYDROCHLORIDE 2 MG: 1 INJECTION, SOLUTION INTRAMUSCULAR; INTRAVENOUS at 15:03

## 2025-02-08 RX ADMIN — MIDAZOLAM HYDROCHLORIDE 2 MG: 1 INJECTION, SOLUTION INTRAMUSCULAR; INTRAVENOUS at 10:17

## 2025-02-08 RX ADMIN — METRONIDAZOLE 500 MG: 500 INJECTION, SOLUTION INTRAVENOUS at 03:55

## 2025-02-08 RX ADMIN — Medication 100 MCG/HR: at 19:58

## 2025-02-08 RX ADMIN — LEVOFLOXACIN 750 MG: 5 INJECTION, SOLUTION INTRAVENOUS at 08:37

## 2025-02-08 RX ADMIN — DEXMEDETOMIDINE HYDROCHLORIDE 1.4 MCG/KG/HR: 4 INJECTION, SOLUTION INTRAVENOUS at 22:47

## 2025-02-08 RX ADMIN — NOREPINEPHRINE BITARTRATE 2 MCG/MIN: 64 SOLUTION INTRAVENOUS at 02:38

## 2025-02-08 RX ADMIN — AMIODARONE HYDROCHLORIDE 400 MG: 200 TABLET ORAL at 08:22

## 2025-02-08 RX ADMIN — DEXMEDETOMIDINE HYDROCHLORIDE 1 MCG/KG/HR: 4 INJECTION, SOLUTION INTRAVENOUS at 17:06

## 2025-02-08 RX ADMIN — HYDROCORTISONE SODIUM SUCCINATE 100 MG: 100 INJECTION, POWDER, FOR SOLUTION INTRAMUSCULAR; INTRAVENOUS at 02:25

## 2025-02-08 RX ADMIN — PROPOFOL 20 MCG/KG/MIN: 10 INJECTION, EMULSION INTRAVENOUS at 03:06

## 2025-02-08 ASSESSMENT — PULMONARY FUNCTION TESTS
PIF_VALUE: 29
PIF_VALUE: 23
PIF_VALUE: 25
PIF_VALUE: 28
PIF_VALUE: 27
PIF_VALUE: 27

## 2025-02-08 ASSESSMENT — PAIN SCALES - GENERAL
PAINLEVEL_OUTOF10: 0

## 2025-02-08 NOTE — PROGRESS NOTES
Pulmonary & Critical Care Medicine ICU Progress Note    CC: pneumonia    Events of Last 24 hours: oxygenation improving quickly. This may have been mostly a chemical pneumonitis from aspiration  Propofol 30  Fentanyl 150  Levo 4  Heparin gtt    Invasive Lines: Right femoral cvc    MV:  2/5/25-  Vent Mode: AC/VC Resp Rate (Set): 26 bpm/Vt (Set, mL): 450 mL/ /FiO2 : 40 %  Recent Labs     02/07/25  0456 02/08/25  0552   PHART 7.393 7.461*   BQN0HIA 50.8* 41.9   PO2ART 92.5 121.3*       IV:   dextrose      heparin (PORCINE) Infusion 20 Units/kg/hr (02/08/25 0624)    sodium chloride 5 mL/hr at 02/08/25 0624    norepinephrine 2 mcg/min (02/08/25 0624)    fentaNYL 75 mcg/hr (02/08/25 0739)    propofol 40 mcg/kg/min (02/08/25 0624)       Vitals:  BP (!) 91/57   Pulse 73   Temp 98 °F (36.7 °C) (Bladder)   Resp 26   Ht 1.702 m (5' 7.01\")   Wt 66.2 kg (145 lb 15.1 oz)   SpO2 95%   BMI 22.85 kg/m²   on vent  Eyes: PERRL. Conjunctivae anicteric.   ENT: Normal nose. ETT in place   Neck:  Trachea is midline.   Respiratory: No accessory muscle usage.  No wheezes. No rales. + Rhonchi.  Cardiovascular: Normal S1S2. No digit clubbing. No digit cyanosis. No LE edema.    Gastrointestinal: No mass palpated. No tenderness palpated.   Skin: No rash on the exposed extremities.   Psychiatric: sedated.    Scheduled Meds:   hydrocortisone sodium succinate PF  100 mg IntraVENous Q12H    cefepime  2,000 mg IntraVENous Q12H    mupirocin   Each Nostril BID    insulin lispro  0-4 Units SubCUTAneous Q4H    sodium chloride flush  5-40 mL IntraVENous 2 times per day    amiodarone  400 mg Oral Daily    famotidine (PEPCID) injection  20 mg IntraVENous BID    linezolid  600 mg IntraVENous Q12H    metroNIDAZOLE  500 mg IntraVENous Q8H    ipratropium 0.5 mg-albuterol 2.5 mg  1 Dose Inhalation Q6H     PRN Meds:  glucose, dextrose bolus **OR** dextrose bolus, glucagon (rDNA), dextrose, heparin (porcine), heparin (porcine), sodium chloride flush,  sodium chloride, potassium chloride **OR** potassium alternative oral replacement **OR** potassium chloride, magnesium sulfate, ondansetron **OR** ondansetron, polyethylene glycol, acetaminophen **OR** acetaminophen, fentanNYL, midazolam    Results:  CBC:   Recent Labs     02/06/25  0454 02/07/25  0456 02/08/25  0552   WBC 70.0* 30.7* 18.2*   HGB 13.1* 10.7* 9.8*   HCT 41.2 32.7* 29.4*   MCV 93.8 90.4 91.4    338 287     BMP:   Recent Labs     02/06/25  1415 02/07/25  0456 02/08/25  0552    136 140   K 5.3* 4.5 4.0    99 103   CO2 31 31 31   BUN 30* 34* 41*   CREATININE 1.2 1.3 1.4*     LIVER PROFILE:   Recent Labs     02/05/25  0936 02/06/25  0454   AST 27 66*   ALT 26 74*   BILIDIR  --  0.3   BILITOT 0.8 0.4   ALKPHOS 89 128       Cultures:  2/5/25 blood cx NGTD  2/6/25 Resp cx: ngtd  Urine cx negative    Films:  Chest imaging was reviewed by me and showed CTPA: Pulmonary Arteries: Motion artifact degrades image quality.  There is no  acute pulmonary thromboembolus.     Mediastinum: The heart is unremarkable.  There are enlarged mediastinal lymph  nodes.  An index aortopulmonary window lymph node measures 1.3 x 2.1 cm.     Lungs/pleura: An endotracheal tube is insitu.  The airway is patent.  There  is no pneumothorax there is a trace left pleural effusion with atelectasis.     New moderate interstitial edema is present throughout the bilateral lungs  with worsening diffuse consolidation throughout the bilateral lungs, most significant in the bilateral lower lobes. Upper Abdomen: A the nasogastric tube reaches the gastric antrum.    IMPRESSION:  1. Worsening diffuse bilateral airspace disease favoring aspiration pneumonia  rather than pulmonary edema.  2. Interstitial edema.  3. Trace left pleural effusion.  4. Mediastinal adenopathy, likely reactive.     2/8/25 CXR: IMPRESSION:  Scattered infiltrates most pronounced in the mid and lower lungs. Support tubes as described

## 2025-02-08 NOTE — PROGRESS NOTES
Reassessment complete.    Patient continues intubated. Patient awake and following commands, agitated at this time. RASS + 2. Sedation increased. Sister at bedside. Physical assessment unchanged at this time. Patient repositioned for comfort. Bilateral soft wrist restraints in place for patient safety and the safety of all lines and tubes.     Propofol infusing at 40 mcg/kg/min.  Fentanyl infusing at 75 mcg//h.   Precedex infusing at 0.6 mcg/kg/h.

## 2025-02-08 NOTE — PROGRESS NOTES
02/07/25 1914   Patient Observation   Pulse 71   Respirations 26   SpO2 95 %   Breath Sounds   Right Upper Lobe Diminished   Right Middle Lobe Diminished   Right Lower Lobe Diminished   Left Upper Lobe Diminished   Left Lower Lobe Diminished   Vent Information   Vent Mode AC/VC   Ventilator Settings   FiO2  40 %   Vt (Set, mL) 450 mL   Resp Rate (Set) 26 bpm   PEEP/CPAP (cmH2O) 10   Vent Patient Data (Readings)   Vt (Measured) 462 mL   Peak Inspiratory Pressure (cmH2O) 29 cmH2O   Rate Measured 26 br/min   Minute Volume (L/min) 12 Liters   Peak Inspiratory Flow (lpm) 78 L/sec   Mean Airway Pressure (cmH2O) 16 cmH20   I:E Ratio 1:2.5   Flow Sensitivity 3 L/min   Vent Alarm Settings   High Pressure (cmH2O) 40 cmH2O   Low Minute Volume (lpm) 4 L/min   Low Exhaled Vt (ml) 250 mL   RR High (bpm) 40 br/min   Apnea (secs) 20 secs   Additional Respiratoray Assessments   Humidification Source HME   Airway Clearance   Suction ET Tube   Suction Device Inline suction catheter   Sputum Method Obtained Endotracheal   Sputum Amount Scant   ETT    Placement Date: 02/05/25   Airway Tube Size: 8 mm  Location: Oral   Secured At 26 cm   Measured From Lips   ETT Placement (S)  Left  (moved to left)   Secured By Commercial tube collins   Site Assessment Dry   Treatment   $Treatment Type $Inhaled Therapy/Meds

## 2025-02-08 NOTE — PROGRESS NOTES
Shift assessment complete.    Patient seen intubated and sedated. RASS - 3, CPOT 0. Patient intubated with a # 8.0 ETT at the 26 LL. Ventilator settings are currently AC 26/450/40/+8. Patient with no s/s of immediate distress noted.    Physical assessment as charted in flow sheets.    Scheduled medications given per orders.    Propofol infusing at 30 mcg/kg/min.  Fentanyl infusing at 75 mcg//h.   LEVOPHED infusing at a rate of 1 mcg/min.   Heparin gtt STOPPED at this time.    Peripheral IV C/D/I and functioning properly.  Central line dressing is clean, dry and intact with no signs of drainage. All tubing is current and dated. IPA caps applied to all access hubs.     OG intact and secure, tube feed running at goal rate of 40 mL/hr with 60 q3h water flushes.    Leonard intact and secure, clear yellow urine draining.    Patient repositioned for comfort. Bilateral soft wrist restraints in place for patient safety and the safety of all lines and tubes.

## 2025-02-08 NOTE — PLAN OF CARE
Problem: Discharge Planning  Goal: Discharge to home or other facility with appropriate resources  Outcome: Progressing     Problem: Safety - Adult  Goal: Free from fall injury  Outcome: Progressing     Problem: Pain  Goal: Verbalizes/displays adequate comfort level or baseline comfort level  Outcome: Progressing     Problem: Skin/Tissue Integrity  Goal: Skin integrity remains intact  Description: 1.  Monitor for areas of redness and/or skin breakdown  2.  Assess vascular access sites hourly  3.  Every 4-6 hours minimum:  Change oxygen saturation probe site  4.  Every 4-6 hours:  If on nasal continuous positive airway pressure, respiratory therapy assess nares and determine need for appliance change or resting period  Outcome: Progressing  Flowsheets (Taken 2/8/2025 1214)  Skin Integrity Remains Intact: Monitor for areas of redness and/or skin breakdown     Problem: Respiratory - Adult  Goal: Achieves optimal ventilation and oxygenation  Outcome: Progressing     Problem: Cardiovascular - Adult  Goal: Maintains optimal cardiac output and hemodynamic stability  Outcome: Progressing  Goal: Absence of cardiac dysrhythmias or at baseline  Outcome: Progressing     Problem: Skin/Tissue Integrity - Adult  Goal: Skin integrity remains intact  Description: 1.  Monitor for areas of redness and/or skin breakdown  2.  Assess vascular access sites hourly  3.  Every 4-6 hours minimum:  Change oxygen saturation probe site  4.  Every 4-6 hours:  If on nasal continuous positive airway pressure, respiratory therapy assess nares and determine need for appliance change or resting period  Outcome: Progressing  Flowsheets (Taken 2/8/2025 1214)  Skin Integrity Remains Intact: Monitor for areas of redness and/or skin breakdown  Goal: Incisions, wounds, or drain sites healing without S/S of infection  Outcome: Progressing  Goal: Oral mucous membranes remain intact  Outcome: Progressing     Problem: Infection - Adult  Goal: Absence of  from restraints (restraint for interference with medical device): Every 2 hours: Monitor safety, psychosocial status, comfort, nutrition and hydration     Problem: Anxiety  Goal: Will report anxiety at manageable levels  Description: INTERVENTIONS:  1. Administer medication as ordered  2. Teach and rehearse alternative coping skills  3. Provide emotional support with 1:1 interaction with staff  Outcome: Progressing     Problem: Coping  Goal: Pt/Family able to verbalize concerns and demonstrate effective coping strategies  Description: INTERVENTIONS:  1. Assist patient/family to identify coping skills, available support systems and cultural and spiritual values  2. Provide emotional support, including active listening and acknowledgement of concerns of patient and caregivers  3. Reduce environmental stimuli, as able  4. Instruct patient/family in relaxation techniques, as appropriate  5. Assess for spiritual pain/suffering and initiate Spiritual Care, Psychosocial Clinical Specialist consults as needed  Outcome: Progressing

## 2025-02-08 NOTE — PROGRESS NOTES
02/08/25 1438   Patient Observation   Pulse 57   Respirations 22   SpO2 96 %   Patient Observations 8.0 ETT at the 26cm lip   Breath Sounds   Breath Sounds Bilateral Diminished   Vent Information   Equipment Changed HME   Vent Mode AC/VC   Ventilator Settings   FiO2  40 %   Vt (Set, mL) 450 mL   Resp Rate (Set) 22 bpm   PEEP/CPAP (cmH2O) 5   Vent Patient Data (Readings)   Vt Mandatory Exp (mL) 450 mL   Vt (Measured) 463 mL   Peak Inspiratory Pressure (cmH2O) 23 cmH2O   Rate Measured 22 br/min   Minute Volume (L/min) 10.2 Liters   Peak Inspiratory Flow (lpm) 75 L/sec   Mean Airway Pressure (cmH2O) 10 cmH20   I:E Ratio 1:2   Flow Sensitivity 3 L/min   Backup Apnea On   Additional Respiratoray Assessments   Humidification Source HME   Ambu Bag With Mask At Bedside Yes   ETT    Placement Date: 02/05/25   Airway Tube Size: 8 mm  Location: Oral   Secured At 26 cm   Measured From Lips   ETT Placement Left   Secured By Commercial tube parker   Site Assessment Dry   Tie/Parker Changed No   Treatment   $Treatment Type $Inhaled Therapy/Meds

## 2025-02-08 NOTE — PROGRESS NOTES
02/07/25 2300   Patient Observation   Pulse 70   Respirations 26   SpO2 97 %   Breath Sounds   Right Upper Lobe Diminished   Right Middle Lobe Diminished   Right Lower Lobe Diminished   Left Upper Lobe Diminished   Left Lower Lobe Diminished   Vent Information   Vent Mode AC/VC   Ventilator Settings   FiO2  40 %   Vt (Set, mL) 450 mL   Resp Rate (Set) 26 bpm   PEEP/CPAP (cmH2O) (S)  8  (decreased to 8)   Vent Patient Data (Readings)   Vt (Measured) 455 mL   Peak Inspiratory Pressure (cmH2O) 25 cmH2O   Rate Measured 28 br/min   Minute Volume (L/min) 13 Liters   Peak Inspiratory Flow (lpm) 75 L/sec   Mean Airway Pressure (cmH2O) 13 cmH20   I:E Ratio 1:2.5   Flow Sensitivity 3 L/min   Vent Alarm Settings   High Pressure (cmH2O) 40 cmH2O   Low Minute Volume (lpm) 4 L/min   Low Exhaled Vt (ml) 250 mL   RR High (bpm) 40 br/min   Apnea (secs) 20 secs   Additional Respiratoray Assessments   Humidification Source HME   ETT    Placement Date: 02/05/25   Airway Tube Size: 8 mm  Location: Oral   Secured At 26 cm   Measured From Lips   ETT Placement (S)  Right  (moved to right)   Secured By Commercial tube collins   Site Assessment Dry

## 2025-02-08 NOTE — PROGRESS NOTES
Care rounds complete with Dr. Lei. Add precedex to facilitate decrease in other sedation. RT started SBT at this time. Decrease RR to 22 when off SBT.

## 2025-02-08 NOTE — CARE COORDINATION
Case Management Assessment  Initial Evaluation    Date/Time of Evaluation: 2/8/2025 3:21 PM  Assessment Completed by: Teresa Boeck, RN    If patient is discharged prior to next notation, then this note serves as note for discharge by case management.    Patient Name: Dayton Soto                   YOB: 1965  Diagnosis: Acute respiratory failure [J96.00]  Methamphetamine use (HCC) [F15.10]  Elevated troponin [R79.89]  Acute respiratory failure with hypoxia [J96.01]  Multifocal pneumonia [J18.9]                   Date / Time: 2/5/2025  8:47 AM    Patient Admission Status: Inpatient   Readmission Risk (Low < 19, Mod (19-27), High > 27): Readmission Risk Score: 22.1    Current PCP: Not, On File (Inactive)  PCP verified by ?      Chart Reviewed: Yes      History Provided by: (P)  (Spoke with sister for assessment questions. Patient on ventilator)  Patient Orientation: (P)  (Spoke with sister for assessment questions. Patient on ventilator)    Patient Cognition: (P)  (Spoke with sister for assessment questions. Patient on ventilator)    Hospitalization in the last 30 days (Readmission):  Yes    If yes, Readmission Assessment in  Navigator will be completed.    Advance Directives:      Code Status: Full Code   Patient's Primary Decision Maker is: (P) Legal Next of Kin (Currently the daughter but when able pt will create a POA)      Discharge Planning:    Patient lives with: (P) Friends Type of Home: (P) Other (Comment) (has been staying with a friend but is not working out. Plan TBD)  Primary Care Giver: (P) Self  Patient Support Systems include: (P) Family Members   Current Financial resources: (P) Medicaid  Current community resources: (P) None  Current services prior to admission: (P) None            Current DME:              Type of Home Care services:  (P) None    ADLS  Prior functional level: (P) Independent in ADLs/IADLs  Current functional level: (P) Independent in ADLs/IADLs    PT AM-PAC:    /24  OT AM-PAC:   /24    Family can provide assistance at DC: (P) Yes  Would you like Case Management to discuss the discharge plan with any other family members/significant others, and if so, who? (P) No  Plans to Return to Present Housing: (P) Unknown at present  Other Identified Issues/Barriers to RETURNING to current housing: TBD  Potential Assistance needed at discharge: (P) N/A            Potential DME:    Patient expects to discharge to: (P) Apartment  Plan for transportation at discharge:      Financial    Payor: [email or web address] [place] MEDICAID / Plan: [email or web address] [place] MEDICA / Product Type: *No Product type* /     Does insurance require precert for SNF: Yes    Potential assistance Purchasing Medications: (P) No  Meds-to-Beds request: Yes      [id number] - [place], [place] - [address] - P [phone] - F [phone]  [address] [facility] [place] [address]  Phone: [phone] Fax: [phone]    [id number] - [place], [place] - [address] - P [phone] - F [phone]  [address] [place] [address]  Phone: [phone] Fax: [phone]      Notes:    Factors facilitating achievement of predicted outcomes: Family support    Barriers to discharge: TBD    Additional Case Management Notes: Spoke with sister of patient for assessment questions. Patient on ventilator. Patient was independent with self care prior to recent health issues. The patient drives. No oxygen, cpap, bipap or walking devices used.    The patient has been staying with a friend but that has not been working out per the sister.     The patient has a daughter that had been living in [place] for the last 5 years. The daughter has been back in the [place] since October.     The patient will need to speak to the  about POA paperwork one is is off the ventilator and is alert.   Following for needs.         The Plan for Transition of Care is related to the following treatment goals of Acute

## 2025-02-08 NOTE — PROGRESS NOTES
Reassessment complete.    Patient continues intubated. Patient agitated at this time, attempting to sit up and pull at ETT. PRN sedation given and precedex increased. Physical assessment unchanged from previous. Patient with bilateral soft wrist restraints in place for patient safety.    Propofol infusing at 20 mcg/kg/min.  Fentanyl infusing at 75 mcg//h.   Precedex infusing at 0.8 mcg/kg/h.

## 2025-02-08 NOTE — PROGRESS NOTES
02/08/25 0237   Patient Observation   Pulse 76   Respirations 26   SpO2 93 %   Breath Sounds   Right Upper Lobe Diminished   Right Middle Lobe Diminished   Right Lower Lobe Diminished   Left Upper Lobe Diminished   Left Lower Lobe Diminished   Vent Information   Vent Mode AC/VC   $Ventilation $Subsequent Day   Ventilator Settings   FiO2  40 %   Vt (Set, mL) 450 mL   Resp Rate (Set) 26 bpm   PEEP/CPAP (cmH2O) 8   Vent Patient Data (Readings)   Vt (Measured) 462 mL   Peak Inspiratory Pressure (cmH2O) 25 cmH2O   Rate Measured 31 br/min   Minute Volume (L/min) 12.7 Liters   Peak Inspiratory Flow (lpm) 75 L/sec   Mean Airway Pressure (cmH2O) 13 cmH20   I:E Ratio 1:2.5   Flow Sensitivity 3 L/min   Vent Alarm Settings   High Pressure (cmH2O) 40 cmH2O   Low Minute Volume (lpm) 4 L/min   Low Exhaled Vt (ml) 250 mL   RR High (bpm) 40 br/min   Apnea (secs) 20 secs   Additional Respiratoray Assessments   Humidification Source HME   ETT    Placement Date: 02/05/25   Airway Tube Size: 8 mm  Location: Oral   Secured At 26 cm   Measured From Lips   ETT Placement (S)  Center  (moved to center)   Secured By Commercial tube collins   Site Assessment Dry

## 2025-02-08 NOTE — PROGRESS NOTES
RT Inhaler-Nebulizer Bronchodilator Protocol Note    There is a bronchodilator order in the chart from a provider indicating to follow the RT Bronchodilator Protocol and there is an “Initiate RT Inhaler-Nebulizer Bronchodilator Protocol” order as well (see protocol at bottom of note).    CXR Findings:  No results found.    The findings from the last RT Protocol Assessment were as follows:   History Pulmonary Disease: Chronic pulmonary disease  Respiratory Pattern: Use of accessory muscles, prolonged exhalation, or RR 26-30 bpm  Breath Sounds: Slightly diminished and/or crackles  Cough: Unable to generate effective cough  Indication for Bronchodilator Therapy: Decreased or absent breath sounds  Bronchodilator Assessment Score: 14    Aerosolized bronchodilator medication orders have been revised according to the RT Inhaler-Nebulizer Bronchodilator Protocol below.    Respiratory Therapist to perform RT Therapy Protocol Assessment initially then follow the protocol.  Repeat RT Therapy Protocol Assessment PRN for score 0-3 or on second treatment, BID, and PRN for scores above 3.    No Indications - adjust the frequency to every 6 hours PRN wheezing or bronchospasm, if no treatments needed after 48 hours then discontinue using Per Protocol order mode.     If indication present, adjust the RT bronchodilator orders based on the Bronchodilator Assessment Score as indicated below.  Use Inhaler orders unless patient has one or more of the following: on home nebulizer, not able to hold breath for 10 seconds, is not alert and oriented, cannot activate and use MDI correctly, or respiratory rate 25 breaths per minute or more, then use the equivalent nebulizer order(s) with same Frequency and PRN reasons based on the score.  If a patient is on this medication at home then do not decrease Frequency below that used at home.    0-3 - enter or revise RT bronchodilator order(s) to equivalent RT Bronchodilator order with Frequency of  every 4 hours PRN for wheezing or increased work of breathing using Per Protocol order mode.        4-6 - enter or revise RT Bronchodilator order(s) to two equivalent RT bronchodilator orders with one order with BID Frequency and one order with Frequency of every 4 hours PRN wheezing or increased work of breathing using Per Protocol order mode.        7-10 - enter or revise RT Bronchodilator order(s) to two equivalent RT bronchodilator orders with one order with TID Frequency and one order with Frequency of every 4 hours PRN wheezing or increased work of breathing using Per Protocol order mode.       11-13 - enter or revise RT Bronchodilator order(s) to one equivalent RT bronchodilator order with QID Frequency and an Albuterol order with Frequency of every 4 hours PRN wheezing or increased work of breathing using Per Protocol order mode.      Greater than 13 - enter or revise RT Bronchodilator order(s) to one equivalent RT bronchodilator order with every 4 hours Frequency and an Albuterol order with Frequency of every 2 hours PRN wheezing or increased work of breathing using Per Protocol order mode.       Electronically signed by Efrain Wright RCP on 2/7/2025 at 7:18 PM

## 2025-02-08 NOTE — PROGRESS NOTES
SBT started at 1020 5/5. RSBI 36.       02/08/25 1058   Patient Observation   Pulse 79   Respirations 17   SpO2 95 %   Patient Observations 8.0 ETT at the 26cm lip   Breath Sounds   Respiratory Pattern Regular   Breath Sounds Bilateral Diminished   Vent Information   Ventilator Safety Check Performed Pre-Use Yes   Equipment Changed HME   Vent Mode CPAP/PS   Ventilator Settings   FiO2  40 %   PEEP/CPAP (cmH2O) 5   Pressure Support (cm H2O) 5 cm H2O   Vent Patient Data (Readings)   Vt (Measured) 630 mL   Peak Inspiratory Pressure (cmH2O) 27 cmH2O   Rate Measured 24 br/min   Minute Volume (L/min) 12 Liters   Peak Inspiratory Flow (lpm) 75 L/sec   Mean Airway Pressure (cmH2O) 12 cmH20   I:E Ratio 1:2   Flow Sensitivity 3 L/min   Backup Apnea On   Backup Rate 18 Breaths Per Minute   Backup Vt 400   Additional Respiratoray Assessments   Humidification Source HME   Ambu Bag With Mask At Bedside Yes   ETT    Placement Date: 02/05/25   Airway Tube Size: 8 mm  Location: Oral   Secured At 26 cm   Measured From Lips   ETT Placement Right   Secured By Commercial tube parker   Site Assessment Dry   Tie/Parker Changed No   Ventilator Associated Pneumonia Bundle   Elevation of Head of Bed to 30-45 Degrees  Yes

## 2025-02-08 NOTE — PROGRESS NOTES
IM Progress Note    Admit Date:  2/5/2025  3    Interval history:  59 y.o. male who presented to [facility] with septic shock.  PMHx significant for polysubstance use-methamphetamine, A-fib, valvular disease, on Eliquis on amiodarone, MRSA, cardiomyopathy      Intubated, paralyzed for severe hypoxic resp failure  Hypotensive shock on levo, vaso   Started heparin gtt  Remains in prone ventilation     2/6  Fio2 improved from 100 to 80 %, PEEP at 14  BP remains low on pressors   Remains on nimbex , proned     2/7- on 50 %fio2 and PEEP down to 12   Remains paralyzed and proned     Subjective:  Mr. Soto seen now on 40 %fio2 and PEEP down to 8  Off paralysis  and now supine    Weaning levo      on vent support   No distress  Had bronch with clearance of biliary secretions     Objective:   BP (!) 91/57   Pulse 73   Temp 98 °F (36.7 °C) (Bladder)   Resp 26   Ht 1.702 m (5' 7.01\")   Wt 66.2 kg (145 lb 15.1 oz)   SpO2 95%   BMI 22.85 kg/m²     Intake/Output Summary (Last 24 hours) at 2/8/2025 0701  Last data filed at 2/8/2025 0624  Gross per 24 hour   Intake 2363.09 ml   Output 1850 ml   Net 513.09 ml       Physical Exam:      General:  middle aged male on vent sedated  Oral ETT and OG noted  . Appears to be not in any distress  Mucous Membranes:  Pink , anicteric  Neck: No JVD, no carotid bruit, no thyromegaly  Chest: improving leatha air entry with resolved crackles,    Cardiovascular:  RRR S1S2 heard, no murmurs or gallops  Abdomen:  Soft, undistended, non tender, no organomegaly, BS present  Extremities: No edema or cyanosis. Distal pulses well felt  Neurological : sedated       Medications:   Scheduled Medications:    hydrocortisone sodium succinate PF  100 mg IntraVENous Q12H    cefepime  2,000 mg IntraVENous Q12H    mupirocin   Each Nostril BID    insulin lispro  0-4 Units SubCUTAneous Q4H    sodium chloride flush  5-40 mL IntraVENous 2 times per day    amiodarone  400 mg Oral Daily    famotidine (PEPCID)  consistent with myxomatous degeneration. Moderately thickened leaflets. Mild regurgitation. No stenosis noted.    Tricuspid Valve: Thickened leaflets. Mild to moderate regurgitation. Normal RVSP. Est RA pressure is 8 mmHg. The estimated RVSP is 32mmHg.    Interatrial Septum: No obvious color flow across atrial septum on current study. Previous positive right to left bubble study on NILE 7/8/24.    Right Atrium: Right atrium is mildly dilated.    Image quality is adequate. Procedure performed with the patient in a sitting position.    No significant changes compared to ECHO study July 2024.       Bronchoscopy     No endobronchial lesion was identified. Mucosa appeared normal. There were a couple areas of adherent bilious appearing secretions on the tracheal wall which was removed with saline lavage and suction.  There is a small amount of scattered yellow secretions on the left side.  There was some yellow secretions in the right lower lobe.  Therapeutic aspiration completed     Cultures  Blood- NGTD  Sputum - pending  BAL pending      Assessment & Plan:      # Acute hypoxic and hypercapnic respiratory failure secondary to multifocal pneumonia- cannot rule out aspirational given imaging findings       - intubated in ER  on 2/5, and now  on mechanical ventilation  -Critical care following  -Patient remains on fentanyl, propofol, Versed, Precedex for sedation and paralyzed and proned for severe hypoxia   -Patient remains critically ill  -Monitor for development/worsening ARDS and consider diuretics as needed especially in the setting of patient's known HFrEF  - continue zyvox , cefepime and flagyl  - BP improving, wbc better     # Septic shock secondary to multifocal pneumonia  - leatha pna on imaging and wbc upto 47>70 K  -Blood cultures pending  -Continue empiric linezolid and cefepime as well as Flagyl  -Stress dose steroids with Solu-Cortef  -was hypotensive  and was on  Levophed and vasopressin   - weaning levo now

## 2025-02-08 NOTE — PROGRESS NOTES
2/8  Anti-Xa = 0.64 at 0552.  Continue heparin gtt at 20 units/kg/hr.  Continue to check Anti-Xa daily.  Jefferson Black PharmD  2/8/2025 6:40 AM

## 2025-02-09 ENCOUNTER — APPOINTMENT (OUTPATIENT)
Dept: GENERAL RADIOLOGY | Age: 60
DRG: 720 | End: 2025-02-09
Payer: MEDICAID

## 2025-02-09 LAB
ANION GAP SERPL CALCULATED.3IONS-SCNC: 7 MMOL/L (ref 3–16)
BASE EXCESS BLDA CALC-SCNC: 5.1 MMOL/L (ref -3–3)
BASE EXCESS BLDA CALC-SCNC: 5.6 MMOL/L (ref -3–3)
BUN SERPL-MCNC: 46 MG/DL (ref 7–20)
CALCIUM SERPL-MCNC: 7.3 MG/DL (ref 8.3–10.6)
CHLORIDE SERPL-SCNC: 104 MMOL/L (ref 99–110)
CO2 BLDA-SCNC: 29.7 MMOL/L
CO2 BLDA-SCNC: 30.7 MMOL/L
CO2 SERPL-SCNC: 31 MMOL/L (ref 21–32)
COHGB MFR BLDA: 0.3 % (ref 0–1.5)
COHGB MFR BLDA: 0.3 % (ref 0–1.5)
CREAT SERPL-MCNC: 1.4 MG/DL (ref 0.9–1.3)
DEPRECATED RDW RBC AUTO: 14.3 % (ref 12.4–15.4)
GFR SERPLBLD CREATININE-BSD FMLA CKD-EPI: 58 ML/MIN/{1.73_M2}
GLUCOSE BLD-MCNC: 109 MG/DL (ref 70–99)
GLUCOSE BLD-MCNC: 127 MG/DL (ref 70–99)
GLUCOSE BLD-MCNC: 165 MG/DL (ref 70–99)
GLUCOSE BLD-MCNC: 85 MG/DL (ref 70–99)
GLUCOSE BLD-MCNC: 90 MG/DL (ref 70–99)
GLUCOSE BLD-MCNC: 98 MG/DL (ref 70–99)
GLUCOSE SERPL-MCNC: 160 MG/DL (ref 70–99)
HCO3 BLDA-SCNC: 28.5 MMOL/L (ref 21–29)
HCO3 BLDA-SCNC: 29.5 MMOL/L (ref 21–29)
HCT VFR BLD AUTO: 28.1 % (ref 40.5–52.5)
HGB BLD-MCNC: 9.4 G/DL (ref 13.5–17.5)
HGB BLDA-MCNC: 10.3 G/DL (ref 13.5–17.5)
HGB BLDA-MCNC: 10.5 G/DL (ref 13.5–17.5)
MCH RBC QN AUTO: 30.3 PG (ref 26–34)
MCHC RBC AUTO-ENTMCNC: 33.3 G/DL (ref 31–36)
MCV RBC AUTO: 90.9 FL (ref 80–100)
METHGB MFR BLDA: 0.1 %
METHGB MFR BLDA: 0.2 %
O2 THERAPY: ABNORMAL
O2 THERAPY: ABNORMAL
PCO2 BLDA: 37.5 MMHG (ref 35–45)
PCO2 BLDA: 40.3 MMHG (ref 35–45)
PERFORMED ON: ABNORMAL
PERFORMED ON: NORMAL
PH BLDA: 7.48 [PH] (ref 7.35–7.45)
PH BLDA: 7.5 [PH] (ref 7.35–7.45)
PLATELET # BLD AUTO: 241 K/UL (ref 135–450)
PMV BLD AUTO: 8.3 FL (ref 5–10.5)
PO2 BLDA: 65.6 MMHG (ref 75–108)
PO2 BLDA: 80.2 MMHG (ref 75–108)
POTASSIUM SERPL-SCNC: 3.7 MMOL/L (ref 3.5–5.1)
RBC # BLD AUTO: 3.09 M/UL (ref 4.2–5.9)
SAO2 % BLDA: 94.5 %
SAO2 % BLDA: 96.6 %
SODIUM SERPL-SCNC: 142 MMOL/L (ref 136–145)
TROPONIN, HIGH SENSITIVITY: 46 NG/L (ref 0–22)
WBC # BLD AUTO: 10.1 K/UL (ref 4–11)

## 2025-02-09 PROCEDURE — 99291 CRITICAL CARE FIRST HOUR: CPT | Performed by: INTERNAL MEDICINE

## 2025-02-09 PROCEDURE — 84484 ASSAY OF TROPONIN QUANT: CPT

## 2025-02-09 PROCEDURE — 6370000000 HC RX 637 (ALT 250 FOR IP): Performed by: INTERNAL MEDICINE

## 2025-02-09 PROCEDURE — 6360000002 HC RX W HCPCS

## 2025-02-09 PROCEDURE — 94761 N-INVAS EAR/PLS OXIMETRY MLT: CPT

## 2025-02-09 PROCEDURE — 2580000003 HC RX 258: Performed by: INTERNAL MEDICINE

## 2025-02-09 PROCEDURE — 94640 AIRWAY INHALATION TREATMENT: CPT

## 2025-02-09 PROCEDURE — 85027 COMPLETE CBC AUTOMATED: CPT

## 2025-02-09 PROCEDURE — 71045 X-RAY EXAM CHEST 1 VIEW: CPT

## 2025-02-09 PROCEDURE — 99233 SBSQ HOSP IP/OBS HIGH 50: CPT | Performed by: INTERNAL MEDICINE

## 2025-02-09 PROCEDURE — 94003 VENT MGMT INPAT SUBQ DAY: CPT

## 2025-02-09 PROCEDURE — 5A0935A ASSISTANCE WITH RESPIRATORY VENTILATION, LESS THAN 24 CONSECUTIVE HOURS, HIGH NASAL FLOW/VELOCITY: ICD-10-PCS | Performed by: INTERNAL MEDICINE

## 2025-02-09 PROCEDURE — 93005 ELECTROCARDIOGRAM TRACING: CPT | Performed by: INTERNAL MEDICINE

## 2025-02-09 PROCEDURE — 6360000002 HC RX W HCPCS: Performed by: INTERNAL MEDICINE

## 2025-02-09 PROCEDURE — 80048 BASIC METABOLIC PNL TOTAL CA: CPT

## 2025-02-09 PROCEDURE — 82803 BLOOD GASES ANY COMBINATION: CPT

## 2025-02-09 PROCEDURE — 2500000003 HC RX 250 WO HCPCS: Performed by: INTERNAL MEDICINE

## 2025-02-09 PROCEDURE — 2000000000 HC ICU R&B

## 2025-02-09 PROCEDURE — 6370000000 HC RX 637 (ALT 250 FOR IP)

## 2025-02-09 PROCEDURE — 2700000000 HC OXYGEN THERAPY PER DAY

## 2025-02-09 PROCEDURE — 2500000003 HC RX 250 WO HCPCS

## 2025-02-09 RX ORDER — FENTANYL CITRATE 50 UG/ML
50 INJECTION, SOLUTION INTRAMUSCULAR; INTRAVENOUS
Status: DISCONTINUED | OUTPATIENT
Start: 2025-02-09 | End: 2025-02-09

## 2025-02-09 RX ORDER — BUPRENORPHINE HYDROCHLORIDE AND NALOXONE HYDROCHLORIDE DIHYDRATE 8; 2 MG/1; MG/1
1 TABLET SUBLINGUAL 2 TIMES DAILY
Status: DISPENSED | OUTPATIENT
Start: 2025-02-09

## 2025-02-09 RX ORDER — ATORVASTATIN CALCIUM 40 MG/1
40 TABLET, FILM COATED ORAL NIGHTLY
Status: DISPENSED | OUTPATIENT
Start: 2025-02-09

## 2025-02-09 RX ADMIN — DEXMEDETOMIDINE HYDROCHLORIDE 1.5 MCG/KG/HR: 4 INJECTION, SOLUTION INTRAVENOUS at 03:02

## 2025-02-09 RX ADMIN — IPRATROPIUM BROMIDE AND ALBUTEROL SULFATE 1 DOSE: .5; 2.5 SOLUTION RESPIRATORY (INHALATION) at 08:22

## 2025-02-09 RX ADMIN — DEXMEDETOMIDINE HYDROCHLORIDE 1.5 MCG/KG/HR: 4 INJECTION, SOLUTION INTRAVENOUS at 06:33

## 2025-02-09 RX ADMIN — MUPIROCIN: 20 OINTMENT TOPICAL at 19:42

## 2025-02-09 RX ADMIN — MIDAZOLAM HYDROCHLORIDE 2 MG: 1 INJECTION, SOLUTION INTRAMUSCULAR; INTRAVENOUS at 07:44

## 2025-02-09 RX ADMIN — IPRATROPIUM BROMIDE AND ALBUTEROL SULFATE 1 DOSE: .5; 2.5 SOLUTION RESPIRATORY (INHALATION) at 19:00

## 2025-02-09 RX ADMIN — Medication 125 MCG/HR: at 06:29

## 2025-02-09 RX ADMIN — ATORVASTATIN CALCIUM 40 MG: 40 TABLET, FILM COATED ORAL at 19:42

## 2025-02-09 RX ADMIN — FENTANYL CITRATE 50 MCG: 50 INJECTION, SOLUTION INTRAMUSCULAR; INTRAVENOUS at 04:10

## 2025-02-09 RX ADMIN — SODIUM CHLORIDE, PRESERVATIVE FREE 10 ML: 5 INJECTION INTRAVENOUS at 19:56

## 2025-02-09 RX ADMIN — MUPIROCIN: 20 OINTMENT TOPICAL at 08:43

## 2025-02-09 RX ADMIN — DEXMEDETOMIDINE HYDROCHLORIDE 0.5 MCG/KG/HR: 4 INJECTION, SOLUTION INTRAVENOUS at 20:38

## 2025-02-09 RX ADMIN — CARBOXYMETHYLCELLULOSE SODIUM 1 DROP: 10 GEL OPHTHALMIC at 19:56

## 2025-02-09 RX ADMIN — BUPRENORPHINE HYDROCHLORIDE AND NALOXONE HYDROCHLORIDE DIHYDRATE 1 TABLET: 8; 2 TABLET SUBLINGUAL at 19:42

## 2025-02-09 RX ADMIN — CARBOXYMETHYLCELLULOSE SODIUM 1 DROP: 10 GEL OPHTHALMIC at 08:43

## 2025-02-09 RX ADMIN — PROPOFOL 20 MCG/KG/MIN: 10 INJECTION, EMULSION INTRAVENOUS at 06:30

## 2025-02-09 RX ADMIN — IPRATROPIUM BROMIDE AND ALBUTEROL SULFATE 1 DOSE: .5; 2.5 SOLUTION RESPIRATORY (INHALATION) at 00:30

## 2025-02-09 RX ADMIN — APIXABAN 5 MG: 5 TABLET, FILM COATED ORAL at 08:43

## 2025-02-09 RX ADMIN — DEXMEDETOMIDINE HYDROCHLORIDE 1.5 MCG/KG/HR: 4 INJECTION, SOLUTION INTRAVENOUS at 10:19

## 2025-02-09 RX ADMIN — LEVOFLOXACIN 750 MG: 5 INJECTION, SOLUTION INTRAVENOUS at 09:20

## 2025-02-09 RX ADMIN — APIXABAN 5 MG: 5 TABLET, FILM COATED ORAL at 19:42

## 2025-02-09 RX ADMIN — IPRATROPIUM BROMIDE AND ALBUTEROL SULFATE 1 DOSE: .5; 2.5 SOLUTION RESPIRATORY (INHALATION) at 14:09

## 2025-02-09 RX ADMIN — FAMOTIDINE 20 MG: 10 INJECTION INTRAVENOUS at 19:42

## 2025-02-09 RX ADMIN — ONDANSETRON 4 MG: 2 INJECTION INTRAMUSCULAR; INTRAVENOUS at 13:05

## 2025-02-09 RX ADMIN — MIDAZOLAM HYDROCHLORIDE 2 MG: 1 INJECTION, SOLUTION INTRAMUSCULAR; INTRAVENOUS at 01:11

## 2025-02-09 RX ADMIN — ACETAMINOPHEN 650 MG: 325 TABLET ORAL at 15:53

## 2025-02-09 RX ADMIN — MIDAZOLAM HYDROCHLORIDE 2 MG: 1 INJECTION, SOLUTION INTRAMUSCULAR; INTRAVENOUS at 04:00

## 2025-02-09 RX ADMIN — FAMOTIDINE 20 MG: 10 INJECTION INTRAVENOUS at 08:43

## 2025-02-09 ASSESSMENT — PULMONARY FUNCTION TESTS
PIF_VALUE: 20
PIF_VALUE: 27

## 2025-02-09 ASSESSMENT — PAIN SCALES - WONG BAKER: WONGBAKER_NUMERICALRESPONSE: NO HURT

## 2025-02-09 ASSESSMENT — PAIN SCALES - GENERAL
PAINLEVEL_OUTOF10: 6
PAINLEVEL_OUTOF10: 0
PAINLEVEL_OUTOF10: 0
PAINLEVEL_OUTOF10: 5
PAINLEVEL_OUTOF10: 0
PAINLEVEL_OUTOF10: 8

## 2025-02-09 NOTE — PROGRESS NOTES
Patient with c/o chest pain. Troponin obtained. EKG obtained. Jessy JIMÉNEZ shown EKG and updated on troponin with no new orders at this time. EKG placed in soft chart.

## 2025-02-09 NOTE — PROGRESS NOTES
Report given to TARIQ White.    Propofol at 15, precedex at 1.5, and fentanyl at 125.  RASS -2/-3, no kicking or swinging arms. Still following commands despite increased sedation.  HR now dropping to the high 50s when pt is not being stimulated.  MAP remains > 65, levo off the entire shift.   ml this shift.  WBC normalized, went from 18.2 to 10.1.   Vent settings unchanged, ABG mostly unchanged.    Pt stable, care transferred.

## 2025-02-09 NOTE — PROGRESS NOTES
02/09/25 0300   Patient Observation   Pulse 68   Respirations 17   SpO2 91 %   Patient Observations ETT 8.0/ 26@lips   Breath Sounds   Respiratory Pattern Regular   Right Upper Lobe Diminished   Right Middle Lobe Diminished   Right Lower Lobe Diminished   Left Upper Lobe Diminished   Left Lower Lobe Diminished   Vent Information   Vent Mode AC/VC   Ventilator Settings   FiO2  40 %   Vt (Set, mL) 450 mL   Resp Rate (Set) 22 bpm   PEEP/CPAP (cmH2O) 5   Vent Patient Data (Readings)   Vt (Measured) 535 mL   Peak Inspiratory Pressure (cmH2O) 20 cmH2O   Minute Volume (L/min) 14.7 Liters   Peak Inspiratory Flow (lpm) 75 L/sec   I:E Ratio 1:3.1   Flow Sensitivity 3 L/min   Backup Apnea On   Backup Rate 22 Breaths Per Minute   Backup Vt 450   Vent Alarm Settings   High Pressure (cmH2O) 50 cmH2O   Low Minute Volume (lpm) 4 L/min   High Minute Volume (lpm) 20 L/min   Low Exhaled Vt (ml) 250 mL   High Exhaled Vt (ml) 1000 mL   RR High (bpm) 40 br/min   Apnea (secs) 20 secs   Additional Respiratoray Assessments   Humidification Source HME   Ambu Bag With Mask At Bedside Yes   Airway Clearance   Suction ET Tube   Suction Device Inline suction catheter   Sputum Method Obtained Endotracheal   Sputum Amount Moderate   Sputum Color/Odor Tan;Bloody   Sputum Consistency Thick   ETT    Placement Date: 02/05/25   Airway Tube Size: 8 mm  Location: Oral   Secured At 26 cm   Measured From Lips   ETT Placement Left  (moved by RT)   Secured By Commercial tube parker   Site Assessment Dry   Cuff Pressure   (mov)   Tie/Parker Changed No

## 2025-02-09 NOTE — PROGRESS NOTES
Shift assessment complete.    Patient seen intubated and sedated. RASS +1, CPOT 0. Patient sitting up in bed and kicking arms/legs, but tolerating ventilator at this time. Patient intubated with a # 8.0 ETT at the 26 LL. Ventilator settings are currently AC 22/450/40/+8. Patient with no s/s of immediate distress noted. PRN Versed given to calm patient.    Physical assessment as charted in flow sheets.    Scheduled medications given per order. PO amiodarone held r/t HR 59.    Propofol infusing at 15 mcg/kg/min.  Fentanyl infusing at 125 mcg//h.   Precedex infusing at 1.5 mcg/kg/h.     Peripheral IV C/D/I and functioning properly.  Central line dressing is clean, dry and intact with no signs of drainage. All tubing is current and dated. IPA caps applied to all access hubs.     Leonard intact and secure, clear yellow urine draining.    OG intact and secure, tube feed running at goal rate of 40 mL/hr with 60 q3h water flushes.    Patient repositioned for comfort. Bilateral soft wrist restraints in place for patient safety and the safety of all lines and tubes.

## 2025-02-09 NOTE — PROGRESS NOTES
Result   1. Stable support tubes, as described.   2. Stable cardiomegaly, with a persistent small left pleural effusion.   3. Stable multifocal airspace opacities throughout both lungs, likely a   combination of asymmetric edema and multifocal pneumonia.         XR CHEST PORTABLE   Final Result   Scattered infiltrates most pronounced in the mid and lower lungs.      Support tubes as described above.         XR CHEST 1 VIEW   Final Result   1. Patchy consolidative changes of the bilateral lungs are mildly improved   compared to the prior exam.   2. Lines and tubes as above.         XR CHEST PORTABLE   Final Result   1. Endotracheal tube 5.4 cm above the riley.   2. Nasogastric tube advances to conklin the stomach.   3. Extensive bilateral infiltrates worse in the mid lower lungs overall   slightly worse compared to the previous evaluation.         CT CHEST PULMONARY EMBOLISM W CONTRAST   Final Result   1. Worsening diffuse bilateral airspace disease favoring aspiration pneumonia   rather than pulmonary edema.   2. Interstitial edema.   3. Trace left pleural effusion.   4. Mediastinal adenopathy, likely reactive.         XR CHEST PORTABLE   Final Result   1. ET tube in place, approximately 1.5 cm above the riley. It should be   withdrawn by 4 cm.   2. Extensive airspace disease in the lower lung of fields, unchanged from   prior examination.         XR CHEST PORTABLE   Final Result   1. Dense pneumonic infiltrates in the right lower lung field and in the left   mid and lower lung field, with or without atelectatic changes.  These   findings are mostly new as compared to previous portable chest x-ray.   2. Minimal bilateral pleural effusions or pleural thickening at the CP angles.   3. Minimal pulmonary vascular prominence.         XR CHEST PORTABLE    (Results Pending)     Urine drug screen - amphetamine, benzo      ECHO 1/25      Left Ventricle: Moderately reduced left ventricular systolic function with a visually  estimated EF of 30 - 35%. Left ventricle size is normal. Moderate global hypokinesis present. Indeterminate diastolic function due to atrial fibrillation.    Right Ventricle: Right ventricle is mildly dilated. Moderately reduced systolic function.    Aortic Valve: Trileaflet valve. Mild sclerosis of the aortic valve cusps. Mild to moderate regurgitation.    Mitral Valve: Findings consistent with myxomatous degeneration. Moderately thickened leaflets. Mild regurgitation. No stenosis noted.    Tricuspid Valve: Thickened leaflets. Mild to moderate regurgitation. Normal RVSP. Est RA pressure is 8 mmHg. The estimated RVSP is 32mmHg.    Interatrial Septum: No obvious color flow across atrial septum on current study. Previous positive right to left bubble study on NILE 7/8/24.    Right Atrium: Right atrium is mildly dilated.    Image quality is adequate. Procedure performed with the patient in a sitting position.    No significant changes compared to ECHO study July 2024.       Bronchoscopy     No endobronchial lesion was identified. Mucosa appeared normal. There were a couple areas of adherent bilious appearing secretions on the tracheal wall which was removed with saline lavage and suction.  There is a small amount of scattered yellow secretions on the left side.  There was some yellow secretions in the right lower lobe.  Therapeutic aspiration completed     Cultures  Blood- NGTD    BAL NRF      Assessment & Plan:      # Acute hypoxic and hypercapnic respiratory failure secondary to multifocal pneumonia- cannot rule out aspirational given imaging findings       - intubated in ER  on 2/5, and now  on mechanical ventilation  -Critical care following  -Patient remains on fentanyl, propofol, Versed, Precedex for sedation and paralyzed and proned for severe hypoxia   -Patient remains critically ill  -Monitor for development/worsening ARDS and consider diuretics as needed especially in the setting of patient's known HFrEF  -  continue zyvox , cefepime and flagyl  - BP improving, wbc better     # Septic shock secondary to multifocal pneumonia  - leatha pna on imaging and wbc upto 47>70 K >10  -Blood cultures remain neg  -given empiric linezolid and cefepime as well as Flagyl  -Stress dose steroids with Solu-Cortef given and now off   -was hypotensive  and was on  Levophed and vasopressin   - BP improved and off pressors. Stop steroids,   - - BAL with NRF - downgraded abx to levaquin      #Severe acidemia: Metabolic and respiratory acidosis:  -was On bicarb  drip and improved       # ARF - sec to sepitc shock and hypotension  - hyperkalemic and acidotic , on bicarb fluids -given lokelma improved  - UOP maintained       #Atrial fibrillation: On Eliquis  #NSTEMI, likely type II secondary to septic shock  #History of HFrEF, cardiomyopathy  - elevated troponin started on -Heparin >eliquis   -On amiodarone for Afibb - cardiology consulted  - add statins once able         #Polysubstance use, methamphetamine use     Heparin gtt>eliquis   Can start TF  Full code for now  Improving prognosis     Willie Navarrete MD, 2/9/2025 7:34 AM

## 2025-02-09 NOTE — PROGRESS NOTES
Dr. Lei updated on ABG results. Patient with increasing agitation. Ok to extubate to precedex only. RT notified. Fentanyl gtt stopped.

## 2025-02-09 NOTE — PROGRESS NOTES
02/08/25 1858   Patient Observation   Pulse 59   Respirations 22   SpO2 94 %   Patient Observations ETT 8.0/ 26@lips   Breath Sounds   Respiratory Pattern Regular   Right Upper Lobe Diminished   Right Middle Lobe Diminished   Right Lower Lobe Diminished   Left Upper Lobe Diminished   Left Lower Lobe Diminished   Vent Information   Vent Mode AC/VC   Ventilator Settings   FiO2  40 %   Vt (Set, mL) 450 mL   Resp Rate (Set) 22 bpm   PEEP/CPAP (cmH2O) 5   Vent Patient Data (Readings)   Vt (Measured) 461 mL   Peak Inspiratory Pressure (cmH2O) 27 cmH2O   Rate Measured 22 br/min   Minute Volume (L/min) 10.1 Liters   Peak Inspiratory Flow (lpm) 75 L/sec   Mean Airway Pressure (cmH2O) 11 cmH20   I:E Ratio 1:3.1   Flow Sensitivity 3 L/min   Backup Apnea On   Backup Rate 22 Breaths Per Minute   Backup Vt 450   Vent Alarm Settings   High Pressure (cmH2O) 50 cmH2O   Low Minute Volume (lpm) 4 L/min   High Minute Volume (lpm) 20 L/min   Low Exhaled Vt (ml) 250 mL   High Exhaled Vt (ml) 1000 mL   RR High (bpm) 40 br/min   Apnea (secs) 20 secs   Additional Respiratoray Assessments   Humidification Source HME   Ambu Bag With Mask At Bedside Yes   Airway Clearance   Suction ET Tube   Suction Device Inline suction catheter   Sputum Method Obtained Endotracheal   Sputum Amount Scant   Sputum Color/Odor Brown   Sputum Consistency Thick   ETT    Placement Date: 02/05/25   Airway Tube Size: 8 mm  Location: Oral   Secured At 26 cm   Measured From Lips   ETT Placement Right  (moved by RT)   Secured By Commercial tube parker   Site Assessment Dry   Cuff Pressure   (mov)   Tie/Parker Changed No   Treatment   $Treatment Type $Inhaled Therapy/Meds        Humira Counseling:  I discussed with the patient the risks of adalimumab including but not limited to myelosuppression, immunosuppression, autoimmune hepatitis, demyelinating diseases, lymphoma, and serious infections.  The patient understands that monitoring is required including a PPD at baseline and must alert us or the primary physician if symptoms of infection or other concerning signs are noted.

## 2025-02-09 NOTE — PROGRESS NOTES
RN attempted to decrease propofol while increasing precedex throughout the night. Pt had remained mostly calm, occasionally kicking feet or swinging arms but was able to be redirected by RN and would go back to resting calmly, only requiring a PRN versed one time.     Precedex was at max 1.5, fentanyl was up to 125, and propofol was down to 5. After being on this sedation for about 50 minutes; pt began thrashing wildly in bed, punching side rails, swinging legs in the air attempting to kick RN. Very drastic increase in agitation from decreasing propofol by 5. RR had jumped to the 30s-40s, SpO2 down to the 70s, /97.    To get pt lower than a RASS +3/+4, pt required a propofol increase from 5 to 15 and then 20. Precedex remained at 1.5 and fentanyl remained at 125. Pt also had to receive a PRN versed 2 mg and PRN fentanyl 50 mcg.    Will continue to assess sedation needs for possible weaning if pt remains calm.

## 2025-02-09 NOTE — PROGRESS NOTES
Pulmonary & Critical Care Medicine ICU Progress Note    CC: pneumonia    Events of Last 24 hours: agitated    Invasive Lines: Right femoral cvc    MV:  2/5/25-  Vent Mode: AC/VC Resp Rate (Set): 22 bpm/Vt (Set, mL): 450 mL/ /FiO2 : 40 %  Recent Labs     02/08/25 0552 02/09/25  0348   PHART 7.461* 7.482*   [id number] 41.9 40.3   PO2ART 121.3* 80.2       IV:   dexmedeTOMIDine HCl in NaCl 1.5 mcg/kg/hr (02/09/25 0636)    dextrose      sodium chloride 5 mL/hr at 02/09/25 0636    norepinephrine Stopped (02/08/25 0932)    fentaNYL 125 mcg/hr (02/09/25 0636)    propofol 15 mcg/kg/min (02/09/25 0636)       Vitals:  BP 95/65   Pulse 57   Temp 98 °F (36.7 °C) (Bladder)   Resp 24   Ht 1.702 m (5' 7.01\")   Wt 67.4 kg (148 lb 9.4 oz)   SpO2 92%   BMI 23.27 kg/m²   on vent  Eyes: PERRL. Conjunctivae anicteric.   ENT: Normal nose. ETT in place   Neck:  Trachea is midline.   Respiratory: No accessory muscle usage.  No wheezes. No rales. + Rhonchi.  Cardiovascular: Normal S1S2. No digit clubbing. No digit cyanosis. No LE edema.    Gastrointestinal: No mass palpated. No tenderness palpated.   Skin: No rash on the exposed extremities.   Psychiatric: sedated.    Scheduled Meds:   levofloxacin  750 mg IntraVENous Q24H    apixaban  5 mg Oral BID    carboxymethylcellulose PF  1 drop Both Eyes TID    mupirocin   Each Nostril BID    insulin lispro  0-4 Units SubCUTAneous Q4H    sodium chloride flush  5-40 mL IntraVENous 2 times per day    amiodarone  400 mg Oral Daily    famotidine (PEPCID) injection  20 mg IntraVENous BID    ipratropium 0.5 mg-albuterol 2.5 mg  1 Dose Inhalation Q6H     PRN Meds:  fentanNYL, glucose, dextrose bolus **OR** dextrose bolus, glucagon (rDNA), dextrose, sodium chloride flush, sodium chloride, potassium chloride **OR** potassium alternative oral replacement **OR** potassium chloride, magnesium sulfate, ondansetron **OR** ondansetron, polyethylene glycol, acetaminophen **OR** acetaminophen,  midazolam    Results:  CBC:   Recent Labs     02/07/25  0456 02/08/25  0552 02/09/25  0348   WBC 30.7* 18.2* 10.1   HGB 10.7* 9.8* 9.4*   HCT 32.7* 29.4* 28.1*   MCV 90.4 91.4 90.9    287 241     BMP:   Recent Labs     02/07/25  0456 02/08/25  0552 02/09/25  0348    140 142   K 4.5 4.0 3.7   CL 99 103 104   CO2 31 31 31   BUN 34* 41* 46*   CREATININE 1.3 1.4* 1.4*     LIVER PROFILE:   No results for input(s): \"AST\", \"ALT\", \"LIPASE\", \"AMYLASE\", \"BILIDIR\", \"BILITOT\", \"ALKPHOS\" in the last 72 hours.    Invalid input(s): \"ALB\"      Cultures:  2/5/25 blood cx NGTD  2/6/25 Resp cx: ngtd  Urine cx negative    Films:  Chest imaging was reviewed by me and showed CTPA: Pulmonary Arteries: Motion artifact degrades image quality.  There is no  acute pulmonary thromboembolus.     Mediastinum: The heart is unremarkable.  There are enlarged mediastinal lymph  nodes.  An index aortopulmonary window lymph node measures 1.3 x 2.1 cm.     Lungs/pleura: An endotracheal tube is insitu.  The airway is patent.  There  is no pneumothorax there is a trace left pleural effusion with atelectasis.     New moderate interstitial edema is present throughout the bilateral lungs  with worsening diffuse consolidation throughout the bilateral lungs, most significant in the bilateral lower lobes. Upper Abdomen: A the nasogastric tube reaches the gastric antrum.    IMPRESSION:  1. Worsening diffuse bilateral airspace disease favoring aspiration pneumonia  rather than pulmonary edema.  2. Interstitial edema.  3. Trace left pleural effusion.  4. Mediastinal adenopathy, likely reactive.     2/8/25 CXR: IMPRESSION:  Scattered infiltrates most pronounced in the mid and lower lungs. Support tubes as described above.      ASSESSMENT:  Severe hypoxic and hypercarbic respiratory failure  Aspiration pneumonia   Methamphetamine use  Septic shock  Elevated troponin  Cardiomyopathy of unknown etiology present on admission and EF 30 to 35% with global

## 2025-02-09 NOTE — PROGRESS NOTES
HEART FAILURE CARE PLAN:    Comorbidities Reviewed: Yes   Patient has a past medical history of Back pain, chronic, Chronic back pain, and MRSA (methicillin resistant staph aureus) culture positive.     Weights Reviewed: Yes   Admission weight: 64.3 kg (141 lb 12.8 oz)   Wt Readings from Last 3 Encounters:   02/08/25 66.2 kg (145 lb 15.1 oz)   02/01/25 62.3 kg (137 lb 4.8 oz)   07/09/24 56.3 kg (124 lb 1.6 oz)     Intake & Output Reviewed: Yes     Intake/Output Summary (Last 24 hours) at 2/8/2025 2151  Last data filed at 2/8/2025 2050  Gross per 24 hour   Intake 2647.39 ml   Output 1150 ml   Net 1497.39 ml       ECHOCARDIOGRAM Reviewed: Yes   Patient's Ejection Fraction (EF) is less than or equal to 40%. Discuss HFrEF Guideline Directed Medical Therapy (GDMT) with Cardiologist or Hospitalist:          Medications Reviewed: Yes   SCHEDULED HOSPITAL MEDICATIONS:   levofloxacin  750 mg IntraVENous Q24H    apixaban  5 mg Oral BID    carboxymethylcellulose PF  1 drop Both Eyes TID    mupirocin   Each Nostril BID    insulin lispro  0-4 Units SubCUTAneous Q4H    sodium chloride flush  5-40 mL IntraVENous 2 times per day    amiodarone  400 mg Oral Daily    famotidine (PEPCID) injection  20 mg IntraVENous BID    ipratropium 0.5 mg-albuterol 2.5 mg  1 Dose Inhalation Q6H     HOME MEDICATIONS:  Prior to Admission medications    Medication Sig Start Date End Date Taking? Authorizing Provider   amiodarone (CORDARONE) 200 MG tablet 2 bid - 5 days, 2 qd- 1 week, 1 qd thereafter 1/31/25   Brittany Shin MD   empagliflozin (JARDIANCE) 10 MG tablet Take 1 tablet by mouth daily 1/31/25   Brittany Shin MD   apixaban (ELIQUIS) 5 MG TABS tablet Take 1 tablet by mouth 2 times daily 1/31/25   Brittany Shin MD   metoprolol succinate (TOPROL XL) 25 MG extended release tablet Take 0.5 tablets by mouth daily 2/1/25   Brittany Shin MD   sacubitril-valsartan (ENTRESTO) 24-26 MG per tablet

## 2025-02-09 NOTE — PROGRESS NOTES
02/08/25 2239   Patient Observation   Pulse 60   Respirations 23   SpO2 95 %   Patient Observations ETT 8.0/26@lips   Breath Sounds   Respiratory Pattern Regular   Right Upper Lobe Diminished   Right Middle Lobe Diminished   Right Lower Lobe Diminished   Left Upper Lobe Diminished   Left Lower Lobe Diminished   Vent Information   Equipment Changed HME   Vent Mode AC/VC   Ventilator Settings   FiO2  40 %   Vt (Set, mL) 450 mL   Resp Rate (Set) 22 bpm   PEEP/CPAP (cmH2O) 5   Vent Patient Data (Readings)   Vt (Measured) 462 mL   Peak Inspiratory Pressure (cmH2O) 29 cmH2O   Minute Volume (L/min) 10.2 Liters   Peak Inspiratory Flow (lpm) 75 L/sec   Mean Airway Pressure (cmH2O) 10 cmH20   I:E Ratio 1:3.1   Flow Sensitivity 3 L/min   Backup Apnea On   Backup Rate 22 Breaths Per Minute   Backup Vt 450   Vent Alarm Settings   High Pressure (cmH2O) 50 cmH2O   Low Minute Volume (lpm) 4 L/min   High Minute Volume (lpm) 20 L/min   Low Exhaled Vt (ml) 250 mL   High Exhaled Vt (ml) 1000 mL   RR High (bpm) 40 br/min   Apnea (secs) 20 secs   Additional Respiratoray Assessments   Humidification Source HME   Ambu Bag With Mask At Bedside Yes   Airway Clearance   Suction ET Tube   Suction Device Inline suction catheter   Sputum Method Obtained Endotracheal   Sputum Amount Moderate   Sputum Color/Odor Tan;Bloody   Sputum Consistency Thick   ETT    Placement Date: 02/05/25   Airway Tube Size: 8 mm  Location: Oral   Secured At 26 cm   Measured From Lips   ETT Placement Center  (moved by RT)   Site Assessment Dry   Cuff Pressure   (mov)   Tie/Parker Changed No

## 2025-02-09 NOTE — PROGRESS NOTES
70 mL fentanyl wasted with Polina POTTER witness  Electronically signed by Cindy Romero RN on 2/9/2025 at 5:52 PM    Electronically signed by Polina Zazueta RN on 2/9/2025 at 5:53 PM

## 2025-02-10 PROBLEM — I48.0 PAF (PAROXYSMAL ATRIAL FIBRILLATION) (HCC): Status: ACTIVE | Noted: 2025-01-29

## 2025-02-10 PROBLEM — J69.0 ASPIRATION PNEUMONIA (HCC): Status: ACTIVE | Noted: 2025-02-10

## 2025-02-10 PROBLEM — R45.1 AGITATION: Status: ACTIVE | Noted: 2025-02-10

## 2025-02-10 LAB
ANION GAP SERPL CALCULATED.3IONS-SCNC: 8 MMOL/L (ref 3–16)
BACTERIA BLD CULT ORG #2: NORMAL
BACTERIA BLD CULT: NORMAL
BUN SERPL-MCNC: 35 MG/DL (ref 7–20)
CALCIUM SERPL-MCNC: 7.5 MG/DL (ref 8.3–10.6)
CHLORIDE SERPL-SCNC: 105 MMOL/L (ref 99–110)
CO2 SERPL-SCNC: 30 MMOL/L (ref 21–32)
CREAT SERPL-MCNC: 1.2 MG/DL (ref 0.9–1.3)
DEPRECATED RDW RBC AUTO: 14.5 % (ref 12.4–15.4)
EKG ATRIAL RATE: 77 BPM
EKG DIAGNOSIS: NORMAL
EKG P AXIS: 57 DEGREES
EKG P-R INTERVAL: 144 MS
EKG Q-T INTERVAL: 354 MS
EKG QRS DURATION: 102 MS
EKG QTC CALCULATION (BAZETT): 400 MS
EKG R AXIS: 24 DEGREES
EKG T AXIS: 117 DEGREES
EKG VENTRICULAR RATE: 77 BPM
GFR SERPLBLD CREATININE-BSD FMLA CKD-EPI: 69 ML/MIN/{1.73_M2}
GLUCOSE BLD-MCNC: 125 MG/DL (ref 70–99)
GLUCOSE BLD-MCNC: 62 MG/DL (ref 70–99)
GLUCOSE BLD-MCNC: 67 MG/DL (ref 70–99)
GLUCOSE BLD-MCNC: 72 MG/DL (ref 70–99)
GLUCOSE BLD-MCNC: 75 MG/DL (ref 70–99)
GLUCOSE BLD-MCNC: 80 MG/DL (ref 70–99)
GLUCOSE BLD-MCNC: 88 MG/DL (ref 70–99)
GLUCOSE SERPL-MCNC: 72 MG/DL (ref 70–99)
HCT VFR BLD AUTO: 36.2 % (ref 40.5–52.5)
HGB BLD-MCNC: 12.1 G/DL (ref 13.5–17.5)
MCH RBC QN AUTO: 30.5 PG (ref 26–34)
MCHC RBC AUTO-ENTMCNC: 33.6 G/DL (ref 31–36)
MCV RBC AUTO: 91 FL (ref 80–100)
NT-PROBNP SERPL-MCNC: ABNORMAL PG/ML (ref 0–124)
PERFORMED ON: ABNORMAL
PERFORMED ON: NORMAL
PLATELET # BLD AUTO: 209 K/UL (ref 135–450)
PMV BLD AUTO: 8.3 FL (ref 5–10.5)
POTASSIUM SERPL-SCNC: 4.3 MMOL/L (ref 3.5–5.1)
RBC # BLD AUTO: 3.97 M/UL (ref 4.2–5.9)
SODIUM SERPL-SCNC: 143 MMOL/L (ref 136–145)
WBC # BLD AUTO: 8.3 K/UL (ref 4–11)

## 2025-02-10 PROCEDURE — 6370000000 HC RX 637 (ALT 250 FOR IP): Performed by: INTERNAL MEDICINE

## 2025-02-10 PROCEDURE — 85027 COMPLETE CBC AUTOMATED: CPT

## 2025-02-10 PROCEDURE — 94761 N-INVAS EAR/PLS OXIMETRY MLT: CPT

## 2025-02-10 PROCEDURE — 2700000000 HC OXYGEN THERAPY PER DAY

## 2025-02-10 PROCEDURE — 76937 US GUIDE VASCULAR ACCESS: CPT

## 2025-02-10 PROCEDURE — 94640 AIRWAY INHALATION TREATMENT: CPT

## 2025-02-10 PROCEDURE — 6370000000 HC RX 637 (ALT 250 FOR IP)

## 2025-02-10 PROCEDURE — 2000000000 HC ICU R&B

## 2025-02-10 PROCEDURE — 99233 SBSQ HOSP IP/OBS HIGH 50: CPT | Performed by: INTERNAL MEDICINE

## 2025-02-10 PROCEDURE — 92526 ORAL FUNCTION THERAPY: CPT

## 2025-02-10 PROCEDURE — 83880 ASSAY OF NATRIURETIC PEPTIDE: CPT

## 2025-02-10 PROCEDURE — 93010 ELECTROCARDIOGRAM REPORT: CPT | Performed by: INTERNAL MEDICINE

## 2025-02-10 PROCEDURE — 2580000003 HC RX 258: Performed by: INTERNAL MEDICINE

## 2025-02-10 PROCEDURE — 6360000002 HC RX W HCPCS: Performed by: INTERNAL MEDICINE

## 2025-02-10 PROCEDURE — 92610 EVALUATE SWALLOWING FUNCTION: CPT

## 2025-02-10 PROCEDURE — 80048 BASIC METABOLIC PNL TOTAL CA: CPT

## 2025-02-10 PROCEDURE — 2500000003 HC RX 250 WO HCPCS: Performed by: INTERNAL MEDICINE

## 2025-02-10 PROCEDURE — 2500000003 HC RX 250 WO HCPCS

## 2025-02-10 PROCEDURE — 36592 COLLECT BLOOD FROM PICC: CPT

## 2025-02-10 PROCEDURE — 99291 CRITICAL CARE FIRST HOUR: CPT | Performed by: INTERNAL MEDICINE

## 2025-02-10 RX ORDER — FUROSEMIDE 10 MG/ML
20 INJECTION INTRAMUSCULAR; INTRAVENOUS ONCE
Status: COMPLETED | OUTPATIENT
Start: 2025-02-10 | End: 2025-02-10

## 2025-02-10 RX ORDER — AMOXICILLIN AND CLAVULANATE POTASSIUM 562.5; 437.5; 62.5 MG/1; MG/1; MG/1
1 TABLET, MULTILAYER, EXTENDED RELEASE ORAL EVERY 12 HOURS SCHEDULED
Status: COMPLETED | OUTPATIENT
Start: 2025-02-10 | End: 2025-02-11

## 2025-02-10 RX ADMIN — AMIODARONE HYDROCHLORIDE 400 MG: 200 TABLET ORAL at 07:51

## 2025-02-10 RX ADMIN — AMOXICILLIN AND CLAVULANATE POTASSIUM 1 TABLET: 562.5; 437.5; 62.5 TABLET, MULTILAYER, EXTENDED RELEASE ORAL at 21:16

## 2025-02-10 RX ADMIN — FAMOTIDINE 20 MG: 10 INJECTION INTRAVENOUS at 20:22

## 2025-02-10 RX ADMIN — IPRATROPIUM BROMIDE AND ALBUTEROL SULFATE 1 DOSE: .5; 2.5 SOLUTION RESPIRATORY (INHALATION) at 19:06

## 2025-02-10 RX ADMIN — IPRATROPIUM BROMIDE AND ALBUTEROL SULFATE 1 DOSE: .5; 2.5 SOLUTION RESPIRATORY (INHALATION) at 06:37

## 2025-02-10 RX ADMIN — APIXABAN 5 MG: 5 TABLET, FILM COATED ORAL at 20:23

## 2025-02-10 RX ADMIN — BUPRENORPHINE HYDROCHLORIDE AND NALOXONE HYDROCHLORIDE DIHYDRATE 1 TABLET: 8; 2 TABLET SUBLINGUAL at 07:51

## 2025-02-10 RX ADMIN — AMOXICILLIN AND CLAVULANATE POTASSIUM 1 TABLET: 562.5; 437.5; 62.5 TABLET, MULTILAYER, EXTENDED RELEASE ORAL at 09:53

## 2025-02-10 RX ADMIN — IPRATROPIUM BROMIDE AND ALBUTEROL SULFATE 1 DOSE: .5; 2.5 SOLUTION RESPIRATORY (INHALATION) at 13:10

## 2025-02-10 RX ADMIN — FAMOTIDINE 20 MG: 10 INJECTION INTRAVENOUS at 07:51

## 2025-02-10 RX ADMIN — ATORVASTATIN CALCIUM 40 MG: 40 TABLET, FILM COATED ORAL at 20:23

## 2025-02-10 RX ADMIN — DEXTROSE MONOHYDRATE 125 ML: 100 INJECTION, SOLUTION INTRAVENOUS at 04:40

## 2025-02-10 RX ADMIN — SODIUM CHLORIDE, PRESERVATIVE FREE 10 ML: 5 INJECTION INTRAVENOUS at 07:51

## 2025-02-10 RX ADMIN — ACETAMINOPHEN 650 MG: 325 TABLET ORAL at 23:18

## 2025-02-10 RX ADMIN — BUPRENORPHINE HYDROCHLORIDE AND NALOXONE HYDROCHLORIDE DIHYDRATE 1 TABLET: 8; 2 TABLET SUBLINGUAL at 20:22

## 2025-02-10 RX ADMIN — APIXABAN 5 MG: 5 TABLET, FILM COATED ORAL at 07:51

## 2025-02-10 RX ADMIN — DEXTROSE MONOHYDRATE 125 ML: 100 INJECTION, SOLUTION INTRAVENOUS at 22:16

## 2025-02-10 RX ADMIN — IPRATROPIUM BROMIDE AND ALBUTEROL SULFATE 1 DOSE: .5; 2.5 SOLUTION RESPIRATORY (INHALATION) at 00:35

## 2025-02-10 RX ADMIN — MUPIROCIN: 20 OINTMENT TOPICAL at 07:51

## 2025-02-10 RX ADMIN — SODIUM CHLORIDE, PRESERVATIVE FREE 10 ML: 5 INJECTION INTRAVENOUS at 20:23

## 2025-02-10 RX ADMIN — MUPIROCIN: 20 OINTMENT TOPICAL at 20:22

## 2025-02-10 RX ADMIN — ACETAMINOPHEN 650 MG: 325 TABLET ORAL at 14:19

## 2025-02-10 RX ADMIN — FUROSEMIDE 20 MG: 10 INJECTION, SOLUTION INTRAMUSCULAR; INTRAVENOUS at 09:53

## 2025-02-10 ASSESSMENT — PAIN DESCRIPTION - ORIENTATION: ORIENTATION: LEFT

## 2025-02-10 ASSESSMENT — PAIN DESCRIPTION - LOCATION
LOCATION: CHEST
LOCATION: HEAD

## 2025-02-10 ASSESSMENT — PAIN SCALES - GENERAL
PAINLEVEL_OUTOF10: 4
PAINLEVEL_OUTOF10: 3
PAINLEVEL_OUTOF10: 0
PAINLEVEL_OUTOF10: 0

## 2025-02-10 ASSESSMENT — PAIN SCALES - WONG BAKER: WONGBAKER_NUMERICALRESPONSE: NO HURT

## 2025-02-10 ASSESSMENT — PAIN DESCRIPTION - DESCRIPTORS: DESCRIPTORS: ACHING

## 2025-02-10 NOTE — PROGRESS NOTES
HEART FAILURE CARE PLAN:    Comorbidities Reviewed: Yes   Patient has a past medical history of Back pain, chronic, Chronic back pain, and MRSA (methicillin resistant staph aureus) culture positive.     Weights Reviewed: Yes   Admission weight: 64.3 kg (141 lb 12.8 oz)   Wt Readings from Last 3 Encounters:   02/09/25 67.4 kg (148 lb 9.4 oz)   02/01/25 62.3 kg (137 lb 4.8 oz)   07/09/24 56.3 kg (124 lb 1.6 oz)     Intake & Output Reviewed: Yes     Intake/Output Summary (Last 24 hours) at 2/9/2025 2024  Last data filed at 2/9/2025 2002  Gross per 24 hour   Intake 1806.73 ml   Output 1575 ml   Net 231.73 ml       ECHOCARDIOGRAM Reviewed: Yes   Patient's Ejection Fraction (EF) is less than or equal to 40%. Discuss HFrEF Guideline Directed Medical Therapy (GDMT) with Cardiologist or Hospitalist:          Medications Reviewed: Yes   SCHEDULED HOSPITAL MEDICATIONS:   atorvastatin  40 mg Oral Nightly    buprenorphine-naloxone  1 tablet SubLINGual BID    levofloxacin  750 mg IntraVENous Q24H    apixaban  5 mg Oral BID    carboxymethylcellulose PF  1 drop Both Eyes TID    mupirocin   Each Nostril BID    insulin lispro  0-4 Units SubCUTAneous Q4H    sodium chloride flush  5-40 mL IntraVENous 2 times per day    amiodarone  400 mg Oral Daily    famotidine (PEPCID) injection  20 mg IntraVENous BID    ipratropium 0.5 mg-albuterol 2.5 mg  1 Dose Inhalation Q6H     HOME MEDICATIONS:  Prior to Admission medications    Medication Sig Start Date End Date Taking? Authorizing Provider   amiodarone (CORDARONE) 200 MG tablet 2 bid - 5 days, 2 qd- 1 week, 1 qd thereafter 1/31/25   Brittany Shin MD   empagliflozin (JARDIANCE) 10 MG tablet Take 1 tablet by mouth daily 1/31/25   Brittany Shin MD   apixaban (ELIQUIS) 5 MG TABS tablet Take 1 tablet by mouth 2 times daily 1/31/25   Brittany Shin MD   metoprolol succinate (TOPROL XL) 25 MG extended release tablet Take 0.5 tablets by mouth daily 2/1/25

## 2025-02-10 NOTE — CARE COORDINATION
Met with patient at bedside to discuss who the patient would like to be his POA. The patient would like his sister to be POA.     services ordered.  Spoke with  to discuss the plan.

## 2025-02-10 NOTE — PROGRESS NOTES
IM Progress Note    Admit Date:  2/5/2025  5    Interval history:  59 y.o. male who presented to [facility] with septic shock.  PMHx significant for polysubstance use-methamphetamine, A-fib, valvular disease, on Eliquis on amiodarone, MRSA, cardiomyopathy      Intubated, paralyzed for severe hypoxic resp failure  Hypotensive shock on levo, vaso   Started heparin gtt  Remains in prone ventilation       Subjective:     Mr. Soto seen sitting up in bed  Extubated yesterday  on Vapotherm 25L/75%  On precedex     Objective:   /78   Pulse 77   Temp 99.2 °F (37.3 °C) (Bladder)   Resp 20   Ht 1.702 m (5' 7.01\")   Wt 67.2 kg (148 lb 2.4 oz)   SpO2 96%   BMI 23.20 kg/m²     Intake/Output Summary (Last 24 hours) at 2/10/2025 0747  Last data filed at 2/10/2025 0629  Gross per 24 hour   Intake 764.09 ml   Output 1285 ml   Net -520.91 ml       Physical Exam:      General:  middle aged male   . Appears to be not in any distress  Mucous Membranes:  Pink , anicteric  Neck: No JVD, no carotid bruit, no thyromegaly  Chest: improving leatha air entry with resolved crackles,    Cardiovascular:  RRR S1S2 heard, no murmurs or gallops  Abdomen:  Soft, undistended, non tender, no organomegaly, BS present  Extremities: No edema or cyanosis. Distal pulses well felt  Neurological :  on vapotherm      Medications:   Scheduled Medications:    atorvastatin  40 mg Oral Nightly    buprenorphine-naloxone  1 tablet SubLINGual BID    levofloxacin  750 mg IntraVENous Q24H    apixaban  5 mg Oral BID    mupirocin   Each Nostril BID    insulin lispro  0-4 Units SubCUTAneous Q4H    sodium chloride flush  5-40 mL IntraVENous 2 times per day    amiodarone  400 mg Oral Daily    famotidine (PEPCID) injection  20 mg IntraVENous BID    ipratropium 0.5 mg-albuterol 2.5 mg  1 Dose Inhalation Q6H     I   dexmedeTOMIDine HCl in NaCl 0.5 mcg/kg/hr (02/10/25 0629)    dextrose      sodium chloride 5 mL/hr at 02/10/25 0629     glucose, dextrose bolus  Est RA pressure is 8 mmHg. The estimated RVSP is 32mmHg.    Interatrial Septum: No obvious color flow across atrial septum on current study. Previous positive right to left bubble study on NILE 7/8/24.    Right Atrium: Right atrium is mildly dilated.    Image quality is adequate. Procedure performed with the patient in a sitting position.    No significant changes compared to ECHO study July 2024.       Bronchoscopy     No endobronchial lesion was identified. Mucosa appeared normal. There were a couple areas of adherent bilious appearing secretions on the tracheal wall which was removed with saline lavage and suction.  There is a small amount of scattered yellow secretions on the left side.  There was some yellow secretions in the right lower lobe.  Therapeutic aspiration completed     Cultures  Blood- NGTD    BAL NRF      Assessment & Plan:      # Acute hypoxic and hypercapnic respiratory failure secondary to multifocal pneumonia- cannot rule out aspirational given imaging findings   - intubated in ER  on 2/5, and now  on mechanical ventilation  -Critical care following  -Patient remains on fentanyl, propofol, Versed, Precedex for sedation and paralyzed and proned for severe hypoxia   -Patient remains critically ill  -Monitor for development/worsening ARDS and consider diuretics as needed especially in the setting of patient's known HFrEF  - continue zyvox , cefepime and flagyl  - BP improving, wbc better  -extubated 2/9  Now on Vapotherm  On precedex      # Septic shock secondary to multifocal pneumonia  - leatha pna on imaging and wbc upto 47>70 K >10  -Blood cultures remain neg  -given empiric linezolid and cefepime as well as Flagyl  -Stress dose steroids with Solu-Cortef given and now off   -was hypotensive  and was on  Levophed and vasopressin   - BP improved and off pressors. Stop steroids,   - - BAL with NRF - downgraded abx to augmentin     #Severe acidemia: Metabolic and respiratory acidosis:  -was On bicarb

## 2025-02-10 NOTE — ACP (ADVANCE CARE PLANNING)
Advance Care Planning     Advance Care Planning Inpatient Note  [facility] Department    Today's Date: 2/10/2025  Unit: [facility] ICU    Received request from IDT Member.  Upon review of chart and communication with care team, patient's decision making abilities are not in question.. Patient was/were present in the room during visit.    Goals of ACP Conversation:  Discuss advance care planning documents    Health Care Decision Makers:       Primary Decision Maker: GomezReubenKeira - Brother/Sister - [phone]  Summary:  Completed New Documents    Advance Care Planning Documents (Patient Wishes):  Healthcare Power of /Advance Directive Appointment of Health Care Agent  Living Will/Advance Directive     Assessment:    Interventions:  Discussed and provided education on state decision maker hierarchy  Assisted in the completion of documents according to patient's wishes at this time    Care Preferences Communicated:   No    Outcomes/Plan:  ACP Discussion: Completed  New advance directive completed.  Returned original document(s) to patient, as well as copies for distribution to appointed agents  Copy of advance directive given to staff to scan into medical record.    Electronically signed by DESMOND Yoon on 2/10/2025 at 11:24 AM

## 2025-02-10 NOTE — PROGRESS NOTES
Shift assessment complete- see flowsheets.  Pt is A&Ox4.  VSS  Pt is anxious causing RR to increase. 25L 75% on vapotherm.  Precedex infusing at 0.5 mcg/kg/h.   PIV x2 WDL. L fem CVC to be removed since 2nd line obtained. Flushed at this time.  Plan of care and goals reviewed. Call light within reach.  Bed in lowest position with wheels locked. No needs expressed at this time. Will continue to monitor.

## 2025-02-10 NOTE — PROGRESS NOTES
Comprehensive Nutrition Assessment    Type and Reason for Visit:  Reassess    Nutrition Recommendations/Plan:   Continue NPO status until patient is medically cleared + cleared by SLP for po nutrition with appropriate po consistencies.   Monitor SLP notes and whether diet can be advanced on 2/11/25.   Monitor nutrition-related labs, bowel function, and weight trends.      Malnutrition Assessment:  Malnutrition Status:  Severe malnutrition (02/10/25 1254)    Context:  Acute Illness     Findings of the 6 clinical characteristics of malnutrition:  Energy Intake:  50% or less of estimated energy requirements for 5 or more days  Weight Loss:  No weight loss     Body Fat Loss:  Moderate body fat loss Orbital, Buccal region   Muscle Mass Loss:  Moderate muscle mass loss Temples (temporalis), Clavicles (pectoralis & deltoids), Hand (interosseous)  Fluid Accumulation:  No fluid accumulation     Strength:  Not Performed    Nutrition Assessment:    patient is declining from a nutritional standpoint AEB patient was extubated on 2/9/25 and is NPO at this time + waiting to be evaluated by SLP; he remains at risk for further compromise d/t NPO status, malnutrition, and poor po intake PTA; will continue NPO status and monitor nutrition status    Nutrition Related Findings:    patient was extubated on 2/9/25; he is NPO at this time; SLP evaluated patient after rounds today and recommended NPO status continue; no documented BM; he is A & O x 4; on low-dose SSI, pepcid, and suboxone Wound Type: Deep Tissue Injury (DTI on R buttocks)       Current Nutrition Intake & Therapies:    Average Meal Intake: NPO  Average Supplements Intake: NPO  Diet NPO    Anthropometric Measures:  Height: 178 cm (5' 10.08\")  Ideal Body Weight (IBW): 166 lbs (75 kg)    Admission Body Weight: 62.3 kg (137 lb 5.6 oz) (obtained on 2/1/25; actual weight)  Current Body Weight: 67.2 kg (148 lb 2.4 oz) (obtained on 2/10/25; actual weight), 89.2 % IBW. Weight  Source: Bed scale  Current BMI (kg/m2): 21.2  Usual Body Weight: 58.4 kg (128 lb 12 oz) (obtained on 1/27/25; actual weight)     % Weight Change (Calculated): 15.1  Weight Adjustment For: No Adjustment                 BMI Categories: Normal Weight (BMI 18.5-24.9)    Estimated Daily Nutrient Needs:  Energy Requirements Based On: Kcal/kg  Weight Used for Energy Requirements: Current  Energy (kcal/day): 1882 - 2016 kcals based on 28-30 kcals/kg/CBW  Weight Used for Protein Requirements: Current  Protein (g/day): 81 - 101 g protein based on 1.2-1.5 g/kg/CBW  Method Used for Fluid Requirements: 1 ml/kcal  Fluid (ml/day): 1882 - 2016 ml    Nutrition Diagnosis:   Severe malnutrition, in context of acute illness or injury related to inadequate protein-energy intake, impaired respiratory function as evidenced by NPO or clear liquid status due to medical condition, swallow study results, poor intake prior to admission, loss of subcutaneous fat, muscle loss, lab values, wounds    Nutrition Interventions:   Food and/or Nutrient Delivery: Continue NPO  Nutrition Education/Counseling: No recommendation at this time  Coordination of Nutrition Care: Continue to monitor while inpatient, Coordination of Care, Interdisciplinary Rounds, Speech Therapy  Plan of Care discussed with: ICU Team    Goals:  Goals: Initiate PO diet, by next RD assessment  Type of Goal: New goal  Previous Goal Met: Goal(s) Achieved    Nutrition Monitoring and Evaluation:   Behavioral-Environmental Outcomes: None Identified  Food/Nutrient Intake Outcomes: Progression of Nutrition  Physical Signs/Symptoms Outcomes: Biochemical Data, Chewing or Swallowing, Nutrition Focused Physical Findings, Skin, Weight    Discharge Planning:    Too soon to determine     Ameena Whiting RD, LD  Contact: [phone]

## 2025-02-10 NOTE — PLAN OF CARE
Problem: Discharge Planning  Goal: Discharge to home or other facility with appropriate resources  2/10/2025 0807 by Julianne Drummond RN  Outcome: Progressing  2/9/2025 2024 by Anoop Koch RN  Outcome: Progressing  Flowsheets (Taken 2/9/2025 2000)  Discharge to home or other facility with appropriate resources: Identify barriers to discharge with patient and caregiver     Problem: Safety - Adult  Goal: Free from fall injury  2/10/2025 0807 by Julianne Drummond RN  Outcome: Progressing  2/9/2025 2024 by Anoop Koch RN  Outcome: Progressing     Problem: Pain  Goal: Verbalizes/displays adequate comfort level or baseline comfort level  2/10/2025 0807 by Julianne Drummond RN  Outcome: Progressing  2/9/2025 2024 by Anoop Koch RN  Outcome: Progressing  Flowsheets (Taken 2/9/2025 2000)  Verbalizes/displays adequate comfort level or baseline comfort level: Encourage patient to monitor pain and request assistance     Problem: Skin/Tissue Integrity  Goal: Skin integrity remains intact  Description: 1.  Monitor for areas of redness and/or skin breakdown  2.  Assess vascular access sites hourly  3.  Every 4-6 hours minimum:  Change oxygen saturation probe site  4.  Every 4-6 hours:  If on nasal continuous positive airway pressure, respiratory therapy assess nares and determine need for appliance change or resting period  2/10/2025 0807 by Julianne Drummond RN  Outcome: Progressing  2/9/2025 2024 by Anoop Koch RN  Outcome: Progressing  Flowsheets (Taken 2/9/2025 2000)  Skin Integrity Remains Intact: Monitor for areas of redness and/or skin breakdown     Problem: Respiratory - Adult  Goal: Achieves optimal ventilation and oxygenation  2/10/2025 0807 by Julianne Drummond RN  Outcome: Progressing  2/9/2025 2024 by Anoop Koch RN  Outcome: Progressing  Flowsheets (Taken 2/9/2025 2000)  Achieves optimal ventilation and oxygenation: Assess for changes in respiratory status     Problem: Cardiovascular - Adult  Goal:  Adult  Goal: Absence of infection at discharge  2/10/2025 0807 by Julianne Drummond RN  Outcome: Progressing  2/9/2025 2024 by Anoop Koch RN  Outcome: Progressing  Flowsheets (Taken 2/9/2025 2000)  Absence of infection at discharge: Assess and monitor for signs and symptoms of infection  Goal: Absence of infection during hospitalization  2/10/2025 0807 by Julianne Drummond RN  Outcome: Progressing  2/9/2025 2024 by Anoop Koch RN  Outcome: Progressing  Flowsheets (Taken 2/9/2025 2000)  Absence of infection during hospitalization: Assess and monitor for signs and symptoms of infection  Goal: Absence of fever/infection during anticipated neutropenic period  2/10/2025 0807 by Julianne Drummond RN  Outcome: Progressing  2/9/2025 2024 by Anoop Koch RN  Outcome: Progressing  Flowsheets (Taken 2/9/2025 2000)  Absence of fever/infection during anticipated neutropenic period: Monitor white blood cell count     Problem: Metabolic/Fluid and Electrolytes - Adult  Goal: Electrolytes maintained within normal limits  2/10/2025 0807 by Julianne Drummond RN  Outcome: Progressing  2/9/2025 2024 by Anoop Koch RN  Outcome: Progressing  Flowsheets (Taken 2/9/2025 2000)  Electrolytes maintained within normal limits: Monitor labs and assess patient for signs and symptoms of electrolyte imbalances  Goal: Hemodynamic stability and optimal renal function maintained  2/10/2025 0807 by Julianne Drummond RN  Outcome: Progressing  2/9/2025 2024 by Anoop Koch RN  Outcome: Progressing  Flowsheets (Taken 2/9/2025 2000)  Hemodynamic stability and optimal renal function maintained: Monitor labs and assess for signs and symptoms of volume excess or deficit  Goal: Glucose maintained within prescribed range  2/10/2025 0807 by Julianne Drummond RN  Outcome: Progressing  2/9/2025 2024 by Anoop Koch RN  Outcome: Progressing  Flowsheets (Taken 2/9/2025 2000)  Glucose maintained within prescribed range: Monitor blood glucose as ordered      Problem: Hematologic - Adult  Goal: Maintains hematologic stability  2/10/2025 0807 by Julianne Drummond RN  Outcome: Progressing  2/9/2025 2024 by Anoop Koch RN  Outcome: Progressing  Flowsheets (Taken 2/9/2025 2000)  Maintains hematologic stability: Assess for signs and symptoms of bleeding or hemorrhage     Problem: ABCDS Injury Assessment  Goal: Absence of physical injury  2/10/2025 0807 by Julianne Drummond RN  Outcome: Progressing  2/9/2025 2024 by Anoop Koch RN  Outcome: Progressing     Problem: Nutrition Deficit:  Goal: Optimize nutritional status  2/10/2025 0807 by Julianne Drummond RN  Outcome: Progressing  2/9/2025 2024 by Anoop Koch RN  Outcome: Progressing     Problem: Safety - Medical Restraint  Goal: Remains free of injury from restraints (Restraint for Interference with Medical Device)  Description: INTERVENTIONS:  1. Determine that other, less restrictive measures have been tried or would not be effective before applying the restraint  2. Evaluate the patient's condition at the time of restraint application  3. Inform patient/family regarding the reason for restraint  4. Q2H: Monitor safety, psychosocial status, comfort, nutrition and hydration  2/10/2025 0807 by Julianne Drummond RN  Outcome: Progressing  2/9/2025 2024 by Anoop Koch RN  Outcome: Progressing  Flowsheets  Taken 2/9/2025 1000 by Cindy Romero RN  Remains free of injury from restraints (restraint for interference with medical device): Every 2 hours: Monitor safety, psychosocial status, comfort, nutrition and hydration  Taken 2/9/2025 0800 by Cindy Romero RN  Remains free of injury from restraints (restraint for interference with medical device): Every 2 hours: Monitor safety, psychosocial status, comfort, nutrition and hydration     Problem: Anxiety  Goal: Will report anxiety at manageable levels  Description: INTERVENTIONS:  1. Administer medication as ordered  2. Teach and rehearse alternative coping skills  3. Provide

## 2025-02-10 NOTE — PROGRESS NOTES
[facility] Vascular Access  Ultrasound Guided Peripheral Insertion Procedure Note.     Dayton Soto   Admitted- 2/5/2025  8:47 AM  Admission diagnosis- Acute respiratory failure [J96.00]  Methamphetamine use (HCC) [F15.10]  Elevated troponin [R79.89]  Acute respiratory failure with hypoxia [J96.01]  Multifocal pneumonia [J18.9]      Attending Physician- Willie Navarrete MD  Ordering Physician- Dr. Shin  Indication for Insertion: Limited Access          USG PIV placed to right upper arm using sterile technique. Brisk blood return noted, line flushes with ease. Patient tolerated well. Bed returned to lowest position, call light within reach. See US images below.

## 2025-02-10 NOTE — PROGRESS NOTES
Report given to TARIQ Whitaker.    Vapotherm was initially weaned down to 60% FiO2 and 20 LPM, then had to be increased to 90% FiO2 momentarily d/t desaturations. RT gave breathing trx and FiO2 was able to be weaned down some, then increased again. Pt very congested and coughing up thick, green, blood tinged mucous. Currently at 75% FiO2 and 25 LPM.    Precedex remains at 0.5 mcg/kg/hr. Attempted to wean but pt gets anxious about his breathing which in turn decreases his SpO2. Calling RN into room stating he can't breathe throughout the night, stating \"I'm gonna die\".    Leonard catheter removed, urinal at bedside. Pt has voided since catheter removal.    Pt stable, care transferred.

## 2025-02-10 NOTE — PROGRESS NOTES
Pulmonary & Critical Care Medicine ICU Progress Note    CC: PNA    Events of Last 24 hours:   Extubated yesterday   Vapotherm: Flow 25 LPM  FiO2 75% FiO2  agitated  Precedex 0.5 mcg/kg/hr for agitation   Hemoptysis x 1       Invasive Lines: Right femoral cvc    MV:  2/5/25-  Vent Mode: AC/VC Resp Rate (Set): 22 bpm/Vt (Set, mL): 450 mL/ /FiO2 : 75 %  Recent Labs     02/09/25  0348 02/09/25  1051   PHART 7.482* 7.499*   WYH8TWM 40.3 37.5   PO2ART 80.2 65.6*       IV:   dexmedeTOMIDine HCl in NaCl 0.5 mcg/kg/hr (02/10/25 0629)    dextrose      sodium chloride 5 mL/hr at 02/10/25 0629       Vitals:  /78   Pulse 77   Temp 99.2 °F (37.3 °C) (Bladder)   Resp 20   Ht 1.702 m (5' 7.01\")   Wt 67.2 kg (148 lb 2.4 oz)   SpO2 96%   BMI 23.20 kg/m²   on Vaptherm   Constitutional:  No acute distress. .   HEENT: no scleral icterus  Neck: No tracheal deviation present.    Cardiovascular: Normal heart sounds.   Pulmonary/Chest: No wheezes. + rhonchi. No rales. No decreased breath sounds.  No accessory muscle usage or stridor.   Abdominal: Soft.   Musculoskeletal: No cyanosis. No clubbing.  Skin: Skin is warm and dry.        Scheduled Meds:   atorvastatin  40 mg Oral Nightly    buprenorphine-naloxone  1 tablet SubLINGual BID    levofloxacin  750 mg IntraVENous Q24H    apixaban  5 mg Oral BID    carboxymethylcellulose PF  1 drop Both Eyes TID    mupirocin   Each Nostril BID    insulin lispro  0-4 Units SubCUTAneous Q4H    sodium chloride flush  5-40 mL IntraVENous 2 times per day    amiodarone  400 mg Oral Daily    famotidine (PEPCID) injection  20 mg IntraVENous BID    ipratropium 0.5 mg-albuterol 2.5 mg  1 Dose Inhalation Q6H     PRN Meds:  glucose, dextrose bolus **OR** dextrose bolus, glucagon (rDNA), dextrose, sodium chloride flush, sodium chloride, potassium chloride **OR** potassium alternative oral replacement **OR** potassium chloride, magnesium sulfate, ondansetron **OR** ondansetron, polyethylene glycol,  acetaminophen **OR** acetaminophen    Results:  CBC:   Recent Labs     02/08/25  0552 02/09/25  0348 02/10/25  0435   WBC 18.2* 10.1 8.3   HGB 9.8* 9.4* 12.1*   HCT 29.4* 28.1* 36.2*   MCV 91.4 90.9 91.0    241 209     BMP:   Recent Labs     02/08/25  0552 02/09/25  0348 02/10/25  0435    142 143   K 4.0 3.7 4.3    104 105   CO2 31 31 30   BUN 41* 46* 35*   CREATININE 1.4* 1.4* 1.2     LIVER PROFILE:   No results for input(s): \"AST\", \"ALT\", \"LIPASE\", \"AMYLASE\", \"BILIDIR\", \"BILITOT\", \"ALKPHOS\" in the last 72 hours.    Invalid input(s): \"ALB\"      Cultures:  2/5/25 blood cx NGTD  2/6/25 Resp cx: ngtd  2/5/25 urine cx negative    Films:  Chest x-ray 2/9 imaging reviewed by me and showed  1. Stable support tubes, as described.   2. Stable cardiomegaly, with a persistent small left pleural effusion.   3. Stable multifocal airspace opacities throughout both lungs, likely a   combination of asymmetric edema and multifocal pneumonia.       CTPA 2/5  1. Worsening diffuse bilateral airspace disease favoring aspiration pneumonia  rather than pulmonary edema.  2. Interstitial edema.  3. Trace left pleural effusion.  4. Mediastinal adenopathy, likely reactive.         ASSESSMENT:  Severe acute hypoxic respiratory failure required MV/Prone  Aspiration pneumonia   Methamphetamine use  Agitation   Septic shock  Cardiomyopathy of unknown etiology present on admission and EF 30 to 35% with global hypokinesis  History of Afib on Eliquis and Amiodarone      PLAN:  HFNC/Vapotherm for life-threatening acute hypoxemic respiratory failure and titrate to maintain SaO2 >92%  Wean off precedex drip   Levaquin D#3---> Augmentin. Completed Zyvox, cefepime and Flagyl for 3 days.   Follow up Cx   Home medications were addressed   Speech pathology evaluation   D/C Femoral line   Watch hemoptysis   Lasix 20 mg IV x 1   Blood sugar control ISS, with goal 150-180  GI prophylaxis: Pepcid  DVT prophylaxis: Eliquis   MRSA prophylaxis:  Bactroban        This patient is critically ill. I spent 32 minutes directly engaged in the delivery of critical care to this patient, which was directed towards the patient's critical illness as listed above. This included direct patient contact, management of life support systems review of data (i.e.: imaging and lab), discussion with team members, and this time excludes time spent on procedures.

## 2025-02-10 NOTE — PROGRESS NOTES
Speech Language Pathology  Swallowing Disorders and Dysphagia  Clinical Bedside Swallow Assessment  Facility/Department: [facility]    Instrumentation: Not clinically indicated at this time. Will monitor for need during scheduled follow-up sessions  Diet recommendation: NPO; Ice chips with SLP/RN only (oral care must be completed prior); defer po meds to physician risk tolerance, recommend consider meds whole in puree if administered  Risk management: upright for all intake, stay upright for at least 30 mins after intake, small bites/sips, 1:1 supervision with intake, oral care q4 hrs to reduce adverse affects in the event of aspiration, increase physical mobility as able, slow rate of intake, STRICT aspiration precautions, and hold PO and contact SLP if s/s of aspiration or worsening respiratory status develop.    NAME:Dayton Soto  : 1965 (59 y.o.)   MRN: [id number]  ROOM: [address] [place] [address]  ADMISSION DATE: 2025  Chief Complaint   Patient presents with    Shortness of Breath     Pt states that he used meth about 4 hours PTA and states that he has increased SOB and chest pain. Pt states that he was seen last week and diagnosed with pneumonia.     Past Medical History:   Diagnosis Date    Back pain, chronic     Chronic back pain     MRSA (methicillin resistant staph aureus) culture positive 2020    RIGHT FOREARM     History reviewed. No pertinent surgical history.      DATE ONSET: 2025    Date of Evaluation: 2/10/2025   Evaluating Therapist: AYESHA Holland    Chart Reviewed: : [x] Yes [] No     Pain: The patient does not complain of pain    RN Ok'd SLP Entry: Yes [x]  Requested Hold []  RN Name: Julianne      Data Review:        Current Diet: Diet NPO    Lab Values:    CBC:  Lab Results   Component Value Date    WBC 8.3 02/10/2025    HGB 12.1 (L) 02/10/2025    HCT 36.2 (L) 02/10/2025    MCV 91.0 02/10/2025     02/10/2025       Basic Metabolic Panel  Lab Results   Component Value Date/Time  administered  Risk management: upright for all intake, stay upright for at least 30 mins after intake, small bites/sips, 1:1 supervision with intake, oral care q4 hrs to reduce adverse affects in the event of aspiration, increase physical mobility as able, slow rate of intake, STRICT aspiration precautions, and hold PO and contact SLP if s/s of aspiration or worsening respiratory status develop.    Prognosis: Good    Recommended Intervention:   Dysphagia treatment  Ice chips with SLP/RN only                 Dysphagia Therapeutic Intervention:   Oral care  Patient/family education  Therapeutic PO trials      Goals:  Short Term Goals:  Timeframe for Short Term Goals: (5 days 02/15/2025)  Goal 1: The patient will tolerate repeat BSE when able for ongoing assessment  Goal 2: The patient will tolerate recommended diet with no clinical s/s of aspiration 5/5  Goal 3: The patient will recall/perform recommended compensatory strategies given min cues  Consider instrumental assessment pending presentations on clinical re-assessment and respiratory status  Long Term Goals:   Timeframe for Long Term Goals: (7 days 02/17/2025)  Goal 1: The patient will tolerate least restrictive diet with no clinical s/s of aspiration or worsening respiratory/pulmonary status    Treatment:  Skilled instruction completed with patient re: evidenced based practice regarding recommendations and POC, importance of oral care to reduce adverse affects in the event of aspiration, and instruction of recommended compensatory strategies developed based upon clinical exam. Pt able to recall/demonstrate compensatory strategies with mod cues.      Pt Education: SLP educated the patient re: Role of SLP, rationale for completion of assessment, anatomical components of swallow structures as they pertain to airway protection, results of assessment, recommendations, and POC  Pt Education Response: verbalized understanding, demonstrated understanding, would benefit

## 2025-02-10 NOTE — PROGRESS NOTES
02/10/25 1131   Encounter Summary   Encounter Overview/Reason Advance Care Planning   Encounter Code  Assessment by  services   Service Provided For Patient   Referral/Consult From Nurse   Support System Family members   Last Encounter  02/10/25   Complexity of Encounter High   Begin Time 1000   End Time  1100   Total Time Calculated 60 min   Advance Care Planning   Type ACP conversation;Completed AD/ACP document(s)

## 2025-02-11 ENCOUNTER — APPOINTMENT (OUTPATIENT)
Dept: GENERAL RADIOLOGY | Age: 60
DRG: 720 | End: 2025-02-11
Payer: MEDICAID

## 2025-02-11 PROBLEM — R04.2 HEMOPTYSIS: Status: ACTIVE | Noted: 2025-02-11

## 2025-02-11 PROBLEM — I50.23 ACUTE ON CHRONIC SYSTOLIC CONGESTIVE HEART FAILURE (HCC): Status: ACTIVE | Noted: 2024-07-06

## 2025-02-11 LAB
ANION GAP SERPL CALCULATED.3IONS-SCNC: 11 MMOL/L (ref 3–16)
BUN SERPL-MCNC: 30 MG/DL (ref 7–20)
CALCIUM SERPL-MCNC: 8.2 MG/DL (ref 8.3–10.6)
CHLORIDE SERPL-SCNC: 103 MMOL/L (ref 99–110)
CO2 SERPL-SCNC: 30 MMOL/L (ref 21–32)
CREAT SERPL-MCNC: 1.2 MG/DL (ref 0.9–1.3)
DEPRECATED RDW RBC AUTO: 14.2 % (ref 12.4–15.4)
EKG ATRIAL RATE: 77 BPM
EKG DIAGNOSIS: NORMAL
EKG P AXIS: 63 DEGREES
EKG P-R INTERVAL: 142 MS
EKG Q-T INTERVAL: 434 MS
EKG QRS DURATION: 106 MS
EKG QTC CALCULATION (BAZETT): 491 MS
EKG R AXIS: 55 DEGREES
EKG T AXIS: 153 DEGREES
EKG VENTRICULAR RATE: 77 BPM
GFR SERPLBLD CREATININE-BSD FMLA CKD-EPI: 69 ML/MIN/{1.73_M2}
GLUCOSE BLD-MCNC: 105 MG/DL (ref 70–99)
GLUCOSE BLD-MCNC: 117 MG/DL (ref 70–99)
GLUCOSE BLD-MCNC: 133 MG/DL (ref 70–99)
GLUCOSE BLD-MCNC: 55 MG/DL (ref 70–99)
GLUCOSE BLD-MCNC: 66 MG/DL (ref 70–99)
GLUCOSE BLD-MCNC: 69 MG/DL (ref 70–99)
GLUCOSE BLD-MCNC: 77 MG/DL (ref 70–99)
GLUCOSE BLD-MCNC: 82 MG/DL (ref 70–99)
GLUCOSE BLD-MCNC: 90 MG/DL (ref 70–99)
GLUCOSE BLD-MCNC: 98 MG/DL (ref 70–99)
GLUCOSE SERPL-MCNC: 85 MG/DL (ref 70–99)
HCT VFR BLD AUTO: 32.9 % (ref 40.5–52.5)
HGB BLD-MCNC: 11 G/DL (ref 13.5–17.5)
MAGNESIUM SERPL-MCNC: 1.78 MG/DL (ref 1.8–2.4)
MAGNESIUM SERPL-MCNC: 2.03 MG/DL (ref 1.8–2.4)
MCH RBC QN AUTO: 30.7 PG (ref 26–34)
MCHC RBC AUTO-ENTMCNC: 33.6 G/DL (ref 31–36)
MCV RBC AUTO: 91.3 FL (ref 80–100)
PERFORMED ON: ABNORMAL
PERFORMED ON: NORMAL
PLATELET # BLD AUTO: 290 K/UL (ref 135–450)
PMV BLD AUTO: 8.4 FL (ref 5–10.5)
POTASSIUM SERPL-SCNC: 3.7 MMOL/L (ref 3.5–5.1)
RBC # BLD AUTO: 3.6 M/UL (ref 4.2–5.9)
SODIUM SERPL-SCNC: 144 MMOL/L (ref 136–145)
TROPONIN, HIGH SENSITIVITY: 26 NG/L (ref 0–22)
WBC # BLD AUTO: 10.5 K/UL (ref 4–11)

## 2025-02-11 PROCEDURE — 99222 1ST HOSP IP/OBS MODERATE 55: CPT | Performed by: INTERNAL MEDICINE

## 2025-02-11 PROCEDURE — 6370000000 HC RX 637 (ALT 250 FOR IP): Performed by: INTERNAL MEDICINE

## 2025-02-11 PROCEDURE — 94640 AIRWAY INHALATION TREATMENT: CPT

## 2025-02-11 PROCEDURE — 80048 BASIC METABOLIC PNL TOTAL CA: CPT

## 2025-02-11 PROCEDURE — 83735 ASSAY OF MAGNESIUM: CPT

## 2025-02-11 PROCEDURE — 94010 BREATHING CAPACITY TEST: CPT

## 2025-02-11 PROCEDURE — 2580000003 HC RX 258: Performed by: INTERNAL MEDICINE

## 2025-02-11 PROCEDURE — 2500000003 HC RX 250 WO HCPCS

## 2025-02-11 PROCEDURE — 93005 ELECTROCARDIOGRAM TRACING: CPT | Performed by: INTERNAL MEDICINE

## 2025-02-11 PROCEDURE — 99233 SBSQ HOSP IP/OBS HIGH 50: CPT | Performed by: INTERNAL MEDICINE

## 2025-02-11 PROCEDURE — 85027 COMPLETE CBC AUTOMATED: CPT

## 2025-02-11 PROCEDURE — 94669 MECHANICAL CHEST WALL OSCILL: CPT

## 2025-02-11 PROCEDURE — 6360000002 HC RX W HCPCS: Performed by: INTERNAL MEDICINE

## 2025-02-11 PROCEDURE — 71045 X-RAY EXAM CHEST 1 VIEW: CPT

## 2025-02-11 PROCEDURE — 2000000000 HC ICU R&B

## 2025-02-11 PROCEDURE — 94761 N-INVAS EAR/PLS OXIMETRY MLT: CPT

## 2025-02-11 PROCEDURE — 36415 COLL VENOUS BLD VENIPUNCTURE: CPT

## 2025-02-11 PROCEDURE — 2060000000 HC ICU INTERMEDIATE R&B

## 2025-02-11 PROCEDURE — 84484 ASSAY OF TROPONIN QUANT: CPT

## 2025-02-11 PROCEDURE — 2500000003 HC RX 250 WO HCPCS: Performed by: INTERNAL MEDICINE

## 2025-02-11 PROCEDURE — 6370000000 HC RX 637 (ALT 250 FOR IP)

## 2025-02-11 PROCEDURE — 99232 SBSQ HOSP IP/OBS MODERATE 35: CPT | Performed by: INTERNAL MEDICINE

## 2025-02-11 PROCEDURE — 93010 ELECTROCARDIOGRAM REPORT: CPT | Performed by: INTERNAL MEDICINE

## 2025-02-11 PROCEDURE — 2700000000 HC OXYGEN THERAPY PER DAY

## 2025-02-11 PROCEDURE — 92526 ORAL FUNCTION THERAPY: CPT

## 2025-02-11 RX ORDER — METOPROLOL SUCCINATE 25 MG/1
25 TABLET, EXTENDED RELEASE ORAL DAILY
Status: DISCONTINUED | OUTPATIENT
Start: 2025-02-11 | End: 2025-02-15

## 2025-02-11 RX ORDER — FUROSEMIDE 10 MG/ML
20 INJECTION INTRAMUSCULAR; INTRAVENOUS DAILY
Status: DISCONTINUED | OUTPATIENT
Start: 2025-02-12 | End: 2025-02-13

## 2025-02-11 RX ORDER — DEXTROSE MONOHYDRATE AND SODIUM CHLORIDE 5; .9 G/100ML; G/100ML
INJECTION, SOLUTION INTRAVENOUS CONTINUOUS
Status: DISCONTINUED | OUTPATIENT
Start: 2025-02-11 | End: 2025-02-14

## 2025-02-11 RX ORDER — SPIRONOLACTONE 25 MG/1
25 TABLET ORAL DAILY
Status: DISPENSED | OUTPATIENT
Start: 2025-02-11

## 2025-02-11 RX ORDER — SACUBITRIL AND VALSARTAN 24; 26 MG/1; MG/1
1 TABLET, FILM COATED ORAL 2 TIMES DAILY
Status: DISPENSED | OUTPATIENT
Start: 2025-02-11

## 2025-02-11 RX ORDER — FUROSEMIDE 10 MG/ML
20 INJECTION INTRAMUSCULAR; INTRAVENOUS ONCE
Status: COMPLETED | OUTPATIENT
Start: 2025-02-11 | End: 2025-02-11

## 2025-02-11 RX ORDER — AMIODARONE HYDROCHLORIDE 200 MG/1
100 TABLET ORAL DAILY
Status: DISPENSED | OUTPATIENT
Start: 2025-02-12

## 2025-02-11 RX ADMIN — FAMOTIDINE 20 MG: 10 INJECTION INTRAVENOUS at 19:52

## 2025-02-11 RX ADMIN — FUROSEMIDE 20 MG: 10 INJECTION, SOLUTION INTRAMUSCULAR; INTRAVENOUS at 09:55

## 2025-02-11 RX ADMIN — SODIUM CHLORIDE, PRESERVATIVE FREE 10 ML: 5 INJECTION INTRAVENOUS at 19:59

## 2025-02-11 RX ADMIN — POTASSIUM BICARBONATE 30 MEQ: 391 TABLET, EFFERVESCENT ORAL at 09:55

## 2025-02-11 RX ADMIN — SACUBITRIL AND VALSARTAN 1 TABLET: 24; 26 TABLET, FILM COATED ORAL at 14:41

## 2025-02-11 RX ADMIN — DEXTROSE AND SODIUM CHLORIDE: 5; .9 INJECTION, SOLUTION INTRAVENOUS at 10:39

## 2025-02-11 RX ADMIN — IPRATROPIUM BROMIDE AND ALBUTEROL SULFATE 1 DOSE: .5; 2.5 SOLUTION RESPIRATORY (INHALATION) at 06:32

## 2025-02-11 RX ADMIN — AMOXICILLIN AND CLAVULANATE POTASSIUM 1 TABLET: 562.5; 437.5; 62.5 TABLET, MULTILAYER, EXTENDED RELEASE ORAL at 09:55

## 2025-02-11 RX ADMIN — ACETAMINOPHEN 650 MG: 325 TABLET ORAL at 12:05

## 2025-02-11 RX ADMIN — APIXABAN 2.5 MG: 5 TABLET, FILM COATED ORAL at 19:53

## 2025-02-11 RX ADMIN — AMOXICILLIN AND CLAVULANATE POTASSIUM 1 TABLET: 562.5; 437.5; 62.5 TABLET, MULTILAYER, EXTENDED RELEASE ORAL at 19:56

## 2025-02-11 RX ADMIN — APIXABAN 5 MG: 5 TABLET, FILM COATED ORAL at 09:55

## 2025-02-11 RX ADMIN — BUPRENORPHINE HYDROCHLORIDE AND NALOXONE HYDROCHLORIDE DIHYDRATE 1 TABLET: 8; 2 TABLET SUBLINGUAL at 08:14

## 2025-02-11 RX ADMIN — ATORVASTATIN CALCIUM 40 MG: 40 TABLET, FILM COATED ORAL at 19:53

## 2025-02-11 RX ADMIN — SPIRONOLACTONE 25 MG: 25 TABLET ORAL at 14:41

## 2025-02-11 RX ADMIN — DEXTROSE MONOHYDRATE 125 ML: 100 INJECTION, SOLUTION INTRAVENOUS at 06:04

## 2025-02-11 RX ADMIN — SACUBITRIL AND VALSARTAN 1 TABLET: 24; 26 TABLET, FILM COATED ORAL at 19:52

## 2025-02-11 RX ADMIN — FAMOTIDINE 20 MG: 10 INJECTION INTRAVENOUS at 08:14

## 2025-02-11 RX ADMIN — BUPRENORPHINE HYDROCHLORIDE AND NALOXONE HYDROCHLORIDE DIHYDRATE 1 TABLET: 8; 2 TABLET SUBLINGUAL at 19:52

## 2025-02-11 RX ADMIN — DEXTROSE MONOHYDRATE 125 ML: 100 INJECTION, SOLUTION INTRAVENOUS at 05:31

## 2025-02-11 RX ADMIN — METOPROLOL SUCCINATE 25 MG: 25 TABLET, EXTENDED RELEASE ORAL at 14:41

## 2025-02-11 RX ADMIN — IPRATROPIUM BROMIDE AND ALBUTEROL SULFATE 1 DOSE: .5; 2.5 SOLUTION RESPIRATORY (INHALATION) at 12:56

## 2025-02-11 RX ADMIN — IPRATROPIUM BROMIDE AND ALBUTEROL SULFATE 1 DOSE: .5; 2.5 SOLUTION RESPIRATORY (INHALATION) at 18:58

## 2025-02-11 ASSESSMENT — COPD QUESTIONNAIRES
CAT_TOTALSCORE: 0
QUESTION2_CHESTPHLEGM: 0
QUESTION4_WALKINCLINE: 0
QUESTION8_ENERGYLEVEL: 0
QUESTION7_SLEEPQUALITY: 0
QUESTION5_HOMEACTIVITIES: 0
QUESTION3_CHESTTIGHTNESS: 0
QUESTION1_COUGHFREQUENCY: 0
QUESTION6_LEAVINGHOUSE: 0

## 2025-02-11 ASSESSMENT — PAIN DESCRIPTION - LOCATION: LOCATION: HEAD

## 2025-02-11 ASSESSMENT — PAIN SCALES - GENERAL: PAINLEVEL_OUTOF10: 2

## 2025-02-11 NOTE — CARE COORDINATION
Patient is on 8L of oxygen. Downgraded to PCU.   Patient is from [facility] with friend. Sister is POA. Document in paper chart.   No oxygen at home. Watch for new 02.

## 2025-02-11 NOTE — PLAN OF CARE
Problem: Safety - Adult  Goal: Free from fall injury  2/10/2025 1944 by Dionne Guzmán RN  Outcome: Progressing  2/10/2025 0807 by Julianne Drummond RN  Outcome: Progressing     Problem: Pain  Goal: Verbalizes/displays adequate comfort level or baseline comfort level  2/10/2025 1944 by Dionne Guzmán RN  Outcome: Progressing  2/10/2025 0807 by Julianne Drummond RN  Outcome: Progressing     Problem: Skin/Tissue Integrity  Goal: Skin integrity remains intact  Description: 1.  Monitor for areas of redness and/or skin breakdown  2.  Assess vascular access sites hourly  3.  Every 4-6 hours minimum:  Change oxygen saturation probe site  4.  Every 4-6 hours:  If on nasal continuous positive airway pressure, respiratory therapy assess nares and determine need for appliance change or resting period  2/10/2025 1944 by Dionne Guzmán RN  Outcome: Progressing  Flowsheets (Taken 2/10/2025 1943)  Skin Integrity Remains Intact:   Monitor for areas of redness and/or skin breakdown   Assess vascular access sites hourly   Every 4-6 hours minimum: Change oxygen saturation probe site  2/10/2025 0807 by Julianne Drummond RN  Outcome: Progressing     Problem: Respiratory - Adult  Goal: Achieves optimal ventilation and oxygenation  2/10/2025 1944 by Dionne Guzmán RN  Outcome: Progressing  2/10/2025 0807 by Julianne Drummond RN  Outcome: Progressing

## 2025-02-11 NOTE — PROGRESS NOTES
4 Eyes Skin Assessment     NAME:  Dayton Soto  YOB: 1965  MEDICAL RECORD NUMBER:  [id number]    The patient is being assessed for  Other Low Alex Scale    I agree that at least one RN has performed a thorough Head to Toe Skin Assessment on the patient. ALL assessment sites listed below have been assessed.      Areas assessed by both nurses:    Head, Face, Ears, Shoulders, Back, Chest, Arms, Elbows, Hands, Sacrum. Buttock, Coccyx, Ischium, Legs. Feet and Heels, and Under Medical Devices   Scattered bruising.       Does the Patient have a Wound? No noted wound(s)       Alex Prevention initiated by RN: Yes  Wound Care Orders initiated by RN: No    Pressure Injury (Stage 3,4, Unstageable, DTI, NWPT, and Complex wounds) if present, place Wound referral order by RN under : No    New Ostomies, if present place, Ostomy referral order under : No     Patient is not able to demonstrate the ability to move from a reclining position to an upright position within the recliner due to Weakness.    Nurse 1 eSignature: Electronically signed by Dionne Guzmán RN on 2/11/25 at 5:44 AM EST    **SHARE this note so that the co-signing nurse can place an eSignature**    Nurse 2 eSignature: Electronically signed by Jessika Johns RN on 2/11/25 at 5:46 AM EST

## 2025-02-11 NOTE — CONSULTS
[facility]   CONSULTATION  [phone]        Reason for Consultation/Chief Complaint: \"I have been having chest pain.\"  Cardiology re consulted for chest pain per Dr. LEXY Zamarripa signed off on patient on 2/7/2025 for NSTEMI  Today c/o chest pain. Difficult to maintain oxygenation  Intermittent hemoptysis.  Chest pain in left upper quadrant of abdomen associated with fluttering in chest.      History of Present Illness:  Dayton Soto is a 59 y.o. patient who presented to [facility] 2/5/2025 with acute respiratory distress and was intubated.  He had admitted to smoking methamphetamines a few hours prior to coming to hospital.       Dayton Soto is a 59 y.o. patient with past medical history for substance abuse/methamphetamine abuse, cardiomyopathy with moderate LV dysfunction, PAF who presented to the with acute respiratory distress.  Patient was reportedly admitted to smoking methamphetamines a few hours before coming to the hospital. emergently had to be intubated.  Patient difficult to oxygenate and remain severely hypoxemic for several hours.  Patient also with septic shock on Levophed and vasopressin.  Severe acidosis with a pH around 6.9 which is slowly improving to 7.2 today.  Patient troponin also elevated and he was started on heparin drip.  Chest x-ray shows extensive bilateral infiltrates.  He remains intubated, sedated, proned and on paralytics.  He remains critically ill.     PMH  MRSA and chronic back pain    Past Medical History:   has a past medical history of Back pain, chronic, Chronic back pain, and MRSA (methicillin resistant staph aureus) culture positive.    Surgical History:   has no past surgical history on file.     Social History:   reports that he has quit smoking. His smoking use included cigarettes. He has never used smokeless tobacco. He reports current drug use. Drug: Methamphetamines (Crystal Meth). He reports that he does not drink alcohol.     Family

## 2025-02-11 NOTE — PROGRESS NOTES
Patient called out stating his vision seemed off and everything was looking hazy. Writer checked patients blood sugar results = 62. PRN dextrose bolus given. See MAR.

## 2025-02-11 NOTE — PROGRESS NOTES
Pulmonary & Critical Care Medicine ICU Progress Note    CC: PNA    Events of Last 24 hours:   Vapotherm---> HFNC 8 L   Precedex off    Hemoptysis x 1 - trace today       Invasive Lines: Right femoral cvc- removed 2/10    MV:  2/5/25-  Vent Mode: AC/VC Resp Rate (Set): 22 bpm/Vt (Set, mL): 450 mL/ /FiO2 : 75 %  Recent Labs     02/09/25  0348 02/09/25  1051   PHART 7.482* 7.499*   ZCI4PDR 40.3 37.5   PO2ART 80.2 65.6*       IV:   dexmedeTOMIDine HCl in NaCl 0.5 mcg/kg/hr (02/10/25 0629)    dextrose      sodium chloride 5 mL/hr at 02/10/25 0629       Vitals:  BP (!) 151/92   Pulse 80   Temp 98 °F (36.7 °C) (Oral)   Resp 20   Ht 1.78 m (5' 10.08\")   Wt 67 kg (147 lb 11.3 oz)   SpO2 94%   BMI 21.15 kg/m²   on 8L  Constitutional:  No acute distress. .   HEENT: no scleral icterus  Neck: No tracheal deviation present.    Cardiovascular: Normal heart sounds.   Pulmonary/Chest: Few wheezes. + rhonchi. Few rales. No decreased breath sounds.  No accessory muscle usage or stridor.   Abdominal: Soft.   Musculoskeletal: No cyanosis. No clubbing.  Skin: Skin is warm and dry.        Scheduled Meds:   amoxicillin-clavulanate  1 tablet Oral 2 times per day    atorvastatin  40 mg Oral Nightly    buprenorphine-naloxone  1 tablet SubLINGual BID    apixaban  5 mg Oral BID    insulin lispro  0-4 Units SubCUTAneous Q4H    sodium chloride flush  5-40 mL IntraVENous 2 times per day    amiodarone  400 mg Oral Daily    famotidine (PEPCID) injection  20 mg IntraVENous BID    ipratropium 0.5 mg-albuterol 2.5 mg  1 Dose Inhalation Q6H     PRN Meds:  glucose, dextrose bolus **OR** dextrose bolus, glucagon (rDNA), dextrose, sodium chloride flush, sodium chloride, potassium chloride **OR** potassium alternative oral replacement **OR** potassium chloride, magnesium sulfate, ondansetron **OR** ondansetron, polyethylene glycol, acetaminophen **OR** acetaminophen    Results:  CBC:   Recent Labs     02/09/25  0348 02/10/25  0435 02/11/25  0520

## 2025-02-11 NOTE — PROGRESS NOTES
Speech Language Pathology  Swallowing Disorders and Dysphagia    Dysphagia Treatment/Follow-Up Note  [place] [facility]    Recommendations: SLP recommends to continue with NPO; Ice chips with SLP/RN only (oral care must be completed prior) until MBS can be complete;  defer po meds to physician risk tolerance, recommend consider meds whole in puree if administered   Risk Management: upright for all intake, stay upright for at least 30 mins after intake, small bites/sips, 1:1 supervision with intake, oral care q4 hrs to reduce adverse affects in the event of aspiration, increase physical mobility as able, slow rate of intake, and STRICT aspiration precautions    Recommend MBS to assess oropharyngeal swallow function    NAME:Dayton Soto  : 1965 (59 y.o.)   MRN: [id number]  ROOM: [address] [place] [address]  ADMISSION DATE: 2025  PATIENT DIAGNOSIS(ES): Acute respiratory failure [J96.00]  Methamphetamine use (HCC) [F15.10]  Elevated troponin [R79.89]  Acute respiratory failure with hypoxia [J96.01]  Multifocal pneumonia [J18.9]  No Known Allergies    DATE ONSET: 2025    Pain: The patient does not complain of pain       Current Diet: Diet NPO      Dysphagia Treatment and Impressions:  Subjective: Pt seen in room at bedside with RN (Julianne) permission  Noteworthy events reported/Chart Review: RN and RT report O2 decreased to 6L  Patient tolerance to current diet and treatment:Pt stats he does not feel very well today and is anxious.      Respiratory Status: Pt with SPO2% of 91 on 6 LPM HFNC with RR of 21  Oral Care Status:    Edentulous  Oral Care Completed?   [x] Yes   [] No  Completed by pt after set up by SLP via pasted toothbrush  Pt may benefit from ongoing education re: benefits of oral care for decreasing adverse outcomes associated with aspiration    Liquid PO Trials:   Ice chips: Oral swallow appears WFL. No overt clinical signs of dysphagia observed.  IDDSI 0 Thin:  Assessed via tsp (5cc), cup,

## 2025-02-11 NOTE — PROGRESS NOTES
IM Progress Note    Admit Date:  2/5/2025  6    Interval history:  59 y.o. male who presented to [facility] with septic shock.  PMHx significant for polysubstance use-methamphetamine, A-fib, valvular disease, on Eliquis on amiodarone, MRSA, cardiomyopathy      Intubated, paralyzed for severe hypoxic resp failure  Hypotensive shock on levo, vaso   Started heparin gtt  Remains in prone ventilation       Subjective:     Mr. Soto seen sitting up in bed  on Vapotherm 25L/75%--> 6 L HFNC  Off precedex     Objective:   BP (!) 151/92   Pulse 80   Temp 98 °F (36.7 °C) (Oral)   Resp 20   Ht 1.78 m (5' 10.08\")   Wt 67 kg (147 lb 11.3 oz)   SpO2 94%   BMI 21.15 kg/m²     Intake/Output Summary (Last 24 hours) at 2/11/2025 0713  Last data filed at 2/11/2025 0402  Gross per 24 hour   Intake --   Output 2000 ml   Net -2000 ml       Physical Exam:      General:  middle aged male   . Appears to be not in any distress  Mucous Membranes:  Pink , anicteric  Neck: No JVD, no carotid bruit, no thyromegaly  Chest: improving leatha air entry with resolved crackles,    Cardiovascular:  RRR S1S2 heard, no murmurs or gallops  Abdomen:  Soft, undistended, non tender, no organomegaly, BS present  Extremities: No edema or cyanosis. Distal pulses well felt  Neurological :  on vapotherm      Medications:   Scheduled Medications:    amoxicillin-clavulanate  1 tablet Oral 2 times per day    atorvastatin  40 mg Oral Nightly    buprenorphine-naloxone  1 tablet SubLINGual BID    apixaban  5 mg Oral BID    insulin lispro  0-4 Units SubCUTAneous Q4H    sodium chloride flush  5-40 mL IntraVENous 2 times per day    amiodarone  400 mg Oral Daily    famotidine (PEPCID) injection  20 mg IntraVENous BID    ipratropium 0.5 mg-albuterol 2.5 mg  1 Dose Inhalation Q6H     I   dexmedeTOMIDine HCl in NaCl 0.5 mcg/kg/hr (02/10/25 0629)    dextrose      sodium chloride 5 mL/hr at 02/10/25 0629     glucose, dextrose bolus **OR** dextrose bolus, glucagon  estimated RVSP is 32mmHg.    Interatrial Septum: No obvious color flow across atrial septum on current study. Previous positive right to left bubble study on NILE 7/8/24.    Right Atrium: Right atrium is mildly dilated.    Image quality is adequate. Procedure performed with the patient in a sitting position.    No significant changes compared to ECHO study July 2024.       Bronchoscopy     No endobronchial lesion was identified. Mucosa appeared normal. There were a couple areas of adherent bilious appearing secretions on the tracheal wall which was removed with saline lavage and suction.  There is a small amount of scattered yellow secretions on the left side.  There was some yellow secretions in the right lower lobe.  Therapeutic aspiration completed     Cultures  Blood- NGTD    BAL NRF      Assessment & Plan:      # Acute hypoxic and hypercapnic respiratory failure secondary to multifocal pneumonia- cannot rule out aspirational given imaging findings   - intubated in ER  on 2/5, and now  on mechanical ventilation  -Critical care following  -Patient remains on fentanyl, propofol, Versed, Precedex for sedation and paralyzed and proned for severe hypoxia   -Patient remains critically ill  -Monitor for development/worsening ARDS and consider diuretics as needed especially in the setting of patient's known HFrEF  - continue zyvox , cefepime and flagyl  - BP improving, wbc better  -extubated 2/9  Now on Vapotherm--> 6 L HFNC  On precedex --> off precedex now      # Septic shock secondary to multifocal pneumonia  - leatha pna on imaging and wbc upto 47>70 K >10  -Blood cultures remain neg  -given empiric linezolid and cefepime as well as Flagyl  -Stress dose steroids with Solu-Cortef given and now off   -was hypotensive  and was on  Levophed and vasopressin   - BP improved and off pressors. Stop steroids,   - - BAL with NRF - downgraded abx to augmentin     #Severe acidemia: Metabolic and respiratory acidosis:  -was On

## 2025-02-11 NOTE — PLAN OF CARE
Problem: Discharge Planning  Goal: Discharge to home or other facility with appropriate resources  Outcome: Progressing  Flowsheets (Taken 2/11/2025 0430 by Dionne Guzmán RN)  Discharge to home or other facility with appropriate resources: Identify barriers to discharge with patient and caregiver     Problem: Safety - Adult  Goal: Free from fall injury  Outcome: Progressing     Problem: Pain  Goal: Verbalizes/displays adequate comfort level or baseline comfort level  Outcome: Progressing  Flowsheets (Taken 2/11/2025 0402 by Dionne Guzmán RN)  Verbalizes/displays adequate comfort level or baseline comfort level: Encourage patient to monitor pain and request assistance     Problem: Skin/Tissue Integrity  Goal: Skin integrity remains intact  Description: 1.  Monitor for areas of redness and/or skin breakdown  2.  Assess vascular access sites hourly  3.  Every 4-6 hours minimum:  Change oxygen saturation probe site  4.  Every 4-6 hours:  If on nasal continuous positive airway pressure, respiratory therapy assess nares and determine need for appliance change or resting period  Outcome: Progressing     Problem: Respiratory - Adult  Goal: Achieves optimal ventilation and oxygenation  Outcome: Progressing  Flowsheets (Taken 2/11/2025 0430 by Dionne Guzmán RN)  Achieves optimal ventilation and oxygenation: Assess for changes in respiratory status     Problem: Cardiovascular - Adult  Goal: Maintains optimal cardiac output and hemodynamic stability  Outcome: Progressing  Flowsheets  Taken 2/11/2025 0430 by Dionne Guzmán RN  Maintains optimal cardiac output and hemodynamic stability: Monitor blood pressure and heart rate  Taken 2/11/2025 0000 by Dionne Guzmán RN  Maintains optimal cardiac output and hemodynamic stability: Monitor blood pressure and heart rate  Goal: Absence of cardiac dysrhythmias or at baseline  Outcome: Progressing  Flowsheets  Taken 2/11/2025 0430 by Dionne Guzmán RN  Absence of cardiac  caregivers  3. Reduce environmental stimuli, as able  4. Instruct patient/family in relaxation techniques, as appropriate  5. Assess for spiritual pain/suffering and initiate Spiritual Care, Psychosocial Clinical Specialist consults as needed  Outcome: Progressing

## 2025-02-11 NOTE — PROGRESS NOTES
Pt yelling and out and saying his chest is hurting. EKG and troponin's ordered per MD. Pt desat to 69% with a good pleth. Pt is now on 12L hi flow

## 2025-02-11 NOTE — PROGRESS NOTES
Patient complaining of his eye sight not being right again. Rechecked BS again even though the 4 a.m. bs was 82, it is now 69. PRN dextrose bolus started at this time. Will recheck 15 minutes after bolus ends.

## 2025-02-11 NOTE — PROGRESS NOTES
Blood pressure (!) 141/86, pulse 79, temperature 98.1 °F (36.7 °C), temperature source Oral, resp. rate 17, height 1.78 m (5' 10.08\"), weight 67.2 kg (148 lb 2.4 oz), SpO2 95%.    Patient is alert oriented x 4. Denies pain at this time. O2 continues on 8L via HFNC.   O2 saturation continues above 90%.   Shift assessment complete. See doc flow. Nightly medications given see MAR. Call light and bedside table within easy reach.     Electronically signed by Dionne Guzmán RN on 2/10/2025 at 8:43 PM

## 2025-02-12 ENCOUNTER — ANESTHESIA (OUTPATIENT)
Dept: ENDOSCOPY | Age: 60
End: 2025-02-12
Payer: MEDICAID

## 2025-02-12 ENCOUNTER — ANESTHESIA EVENT (OUTPATIENT)
Dept: ENDOSCOPY | Age: 60
End: 2025-02-12
Payer: MEDICAID

## 2025-02-12 PROBLEM — T17.998A MUCUS PLUG IN RESPIRATORY TRACT: Status: ACTIVE | Noted: 2025-02-12

## 2025-02-12 LAB
ANION GAP SERPL CALCULATED.3IONS-SCNC: 9 MMOL/L (ref 3–16)
APPEARANCE BRONCH: CLEAR
BUN SERPL-MCNC: 21 MG/DL (ref 7–20)
CALCIUM SERPL-MCNC: 8.1 MG/DL (ref 8.3–10.6)
CHLORIDE SERPL-SCNC: 102 MMOL/L (ref 99–110)
CLOT SPEC QL: ABNORMAL
CO2 SERPL-SCNC: 32 MMOL/L (ref 21–32)
COLOR BRONCH: ABNORMAL
CREAT SERPL-MCNC: 0.9 MG/DL (ref 0.9–1.3)
DEPRECATED RDW RBC AUTO: 13.6 % (ref 12.4–15.4)
GFR SERPLBLD CREATININE-BSD FMLA CKD-EPI: >90 ML/MIN/{1.73_M2}
GLUCOSE BLD-MCNC: 102 MG/DL (ref 70–99)
GLUCOSE BLD-MCNC: 102 MG/DL (ref 70–99)
GLUCOSE BLD-MCNC: 104 MG/DL (ref 70–99)
GLUCOSE BLD-MCNC: 108 MG/DL (ref 70–99)
GLUCOSE BLD-MCNC: 96 MG/DL (ref 70–99)
GLUCOSE SERPL-MCNC: 123 MG/DL (ref 70–99)
HCT VFR BLD AUTO: 35.4 % (ref 40.5–52.5)
HGB BLD-MCNC: 11.6 G/DL (ref 13.5–17.5)
LYMPHOCYTES NFR BRONCH MANUAL: 14 % (ref 5–10)
LYMPHOCYTES NFR BRONCH MANUAL: 20 % (ref 5–10)
LYMPHOCYTES NFR BRONCH MANUAL: 48 % (ref 5–10)
LYMPHOCYTES NFR BRONCH MANUAL: 56 % (ref 5–10)
MCH RBC QN AUTO: 30 PG (ref 26–34)
MCHC RBC AUTO-ENTMCNC: 32.9 G/DL (ref 31–36)
MCV RBC AUTO: 91.3 FL (ref 80–100)
MONOCYTES NFR BRONCH MANUAL: 38 %
MONOCYTES NFR BRONCH MANUAL: 42 %
MONOCYTES NFR BRONCH MANUAL: 44 %
MONOCYTES NFR BRONCH MANUAL: 68 %
NEUTROPHILS NFR BRONCH MANUAL: 12 % (ref 5–10)
NEUTROPHILS NFR BRONCH MANUAL: 2 % (ref 5–10)
NEUTROPHILS NFR BRONCH MANUAL: 48 % (ref 5–10)
NEUTROPHILS NFR BRONCH MANUAL: 8 % (ref 5–10)
NUC CELL # BRONCH MANUAL: 210 /CUMM
NUC CELL # BRONCH MANUAL: 466 /CUMM
NUC CELL # BRONCH MANUAL: 611 /CUMM
NUC CELL # BRONCH MANUAL: 643 /CUMM
PERFORMED ON: ABNORMAL
PERFORMED ON: NORMAL
PLATELET # BLD AUTO: 300 K/UL (ref 135–450)
PMV BLD AUTO: 7.7 FL (ref 5–10.5)
POTASSIUM SERPL-SCNC: 4 MMOL/L (ref 3.5–5.1)
RBC # BLD AUTO: 3.87 M/UL (ref 4.2–5.9)
RBC # FLD MANUAL: 7500 /CUMM
RBC # FLD MANUAL: 9900 /CUMM
RBC # FLD MANUAL: ABNORMAL /CUMM
RBC # FLD MANUAL: ABNORMAL /CUMM
SODIUM SERPL-SCNC: 143 MMOL/L (ref 136–145)
TOTAL CELLS COUNTED BRONCH: 100
WBC # BLD AUTO: 12.2 K/UL (ref 4–11)

## 2025-02-12 PROCEDURE — 31645 BRNCHSC W/THER ASPIR 1ST: CPT | Performed by: INTERNAL MEDICINE

## 2025-02-12 PROCEDURE — 2500000003 HC RX 250 WO HCPCS: Performed by: INTERNAL MEDICINE

## 2025-02-12 PROCEDURE — 99232 SBSQ HOSP IP/OBS MODERATE 35: CPT | Performed by: INTERNAL MEDICINE

## 2025-02-12 PROCEDURE — 88112 CYTOPATH CELL ENHANCE TECH: CPT

## 2025-02-12 PROCEDURE — 85027 COMPLETE CBC AUTOMATED: CPT

## 2025-02-12 PROCEDURE — 87015 SPECIMEN INFECT AGNT CONCNTJ: CPT

## 2025-02-12 PROCEDURE — 6370000000 HC RX 637 (ALT 250 FOR IP): Performed by: INTERNAL MEDICINE

## 2025-02-12 PROCEDURE — 89051 BODY FLUID CELL COUNT: CPT

## 2025-02-12 PROCEDURE — 2709999900 HC NON-CHARGEABLE SUPPLY: Performed by: INTERNAL MEDICINE

## 2025-02-12 PROCEDURE — 6360000002 HC RX W HCPCS: Performed by: INTERNAL MEDICINE

## 2025-02-12 PROCEDURE — 94669 MECHANICAL CHEST WALL OSCILL: CPT

## 2025-02-12 PROCEDURE — 2500000003 HC RX 250 WO HCPCS: Performed by: NURSE ANESTHETIST, CERTIFIED REGISTERED

## 2025-02-12 PROCEDURE — 87206 SMEAR FLUORESCENT/ACID STAI: CPT

## 2025-02-12 PROCEDURE — 2580000003 HC RX 258: Performed by: NURSE ANESTHETIST, CERTIFIED REGISTERED

## 2025-02-12 PROCEDURE — 87102 FUNGUS ISOLATION CULTURE: CPT

## 2025-02-12 PROCEDURE — 2700000000 HC OXYGEN THERAPY PER DAY

## 2025-02-12 PROCEDURE — 94640 AIRWAY INHALATION TREATMENT: CPT

## 2025-02-12 PROCEDURE — 88312 SPECIAL STAINS GROUP 1: CPT

## 2025-02-12 PROCEDURE — 87116 MYCOBACTERIA CULTURE: CPT

## 2025-02-12 PROCEDURE — 87106 FUNGI IDENTIFICATION YEAST: CPT

## 2025-02-12 PROCEDURE — 87205 SMEAR GRAM STAIN: CPT

## 2025-02-12 PROCEDURE — 2720000010 HC SURG SUPPLY STERILE: Performed by: INTERNAL MEDICINE

## 2025-02-12 PROCEDURE — 87070 CULTURE OTHR SPECIMN AEROBIC: CPT

## 2025-02-12 PROCEDURE — 2000000000 HC ICU R&B

## 2025-02-12 PROCEDURE — 3700000001 HC ADD 15 MINUTES (ANESTHESIA): Performed by: INTERNAL MEDICINE

## 2025-02-12 PROCEDURE — 3609010800 HC BRONCHOSCOPY ALVEOLAR LAVAGE: Performed by: INTERNAL MEDICINE

## 2025-02-12 PROCEDURE — 99233 SBSQ HOSP IP/OBS HIGH 50: CPT | Performed by: INTERNAL MEDICINE

## 2025-02-12 PROCEDURE — 88305 TISSUE EXAM BY PATHOLOGIST: CPT

## 2025-02-12 PROCEDURE — 2500000003 HC RX 250 WO HCPCS

## 2025-02-12 PROCEDURE — 36415 COLL VENOUS BLD VENIPUNCTURE: CPT

## 2025-02-12 PROCEDURE — 6360000002 HC RX W HCPCS: Performed by: NURSE ANESTHETIST, CERTIFIED REGISTERED

## 2025-02-12 PROCEDURE — 3700000000 HC ANESTHESIA ATTENDED CARE: Performed by: INTERNAL MEDICINE

## 2025-02-12 PROCEDURE — 6370000000 HC RX 637 (ALT 250 FOR IP)

## 2025-02-12 PROCEDURE — 31624 DX BRONCHOSCOPE/LAVAGE: CPT | Performed by: INTERNAL MEDICINE

## 2025-02-12 PROCEDURE — 80048 BASIC METABOLIC PNL TOTAL CA: CPT

## 2025-02-12 PROCEDURE — 94761 N-INVAS EAR/PLS OXIMETRY MLT: CPT

## 2025-02-12 PROCEDURE — 2580000003 HC RX 258: Performed by: INTERNAL MEDICINE

## 2025-02-12 PROCEDURE — 0B9H8ZX DRAINAGE OF LUNG LINGULA, VIA NATURAL OR ARTIFICIAL OPENING ENDOSCOPIC, DIAGNOSTIC: ICD-10-PCS | Performed by: INTERNAL MEDICINE

## 2025-02-12 RX ORDER — SODIUM CHLORIDE 0.9 % (FLUSH) 0.9 %
5-40 SYRINGE (ML) INJECTION EVERY 12 HOURS SCHEDULED
Status: CANCELLED | OUTPATIENT
Start: 2025-02-12

## 2025-02-12 RX ORDER — LABETALOL HYDROCHLORIDE 5 MG/ML
5 INJECTION, SOLUTION INTRAVENOUS EVERY 10 MIN PRN
Status: CANCELLED | OUTPATIENT
Start: 2025-02-12

## 2025-02-12 RX ORDER — OXYCODONE HYDROCHLORIDE 5 MG/1
5 TABLET ORAL PRN
Status: CANCELLED | OUTPATIENT
Start: 2025-02-12 | End: 2025-02-12

## 2025-02-12 RX ORDER — NALOXONE HYDROCHLORIDE 0.4 MG/ML
INJECTION, SOLUTION INTRAMUSCULAR; INTRAVENOUS; SUBCUTANEOUS PRN
Status: CANCELLED | OUTPATIENT
Start: 2025-02-12

## 2025-02-12 RX ORDER — LIDOCAINE HYDROCHLORIDE 20 MG/ML
INJECTION, SOLUTION INFILTRATION; PERINEURAL
Status: DISCONTINUED | OUTPATIENT
Start: 2025-02-12 | End: 2025-02-12 | Stop reason: SDUPTHER

## 2025-02-12 RX ORDER — DIPHENHYDRAMINE HYDROCHLORIDE 50 MG/ML
12.5 INJECTION INTRAMUSCULAR; INTRAVENOUS
Status: CANCELLED | OUTPATIENT
Start: 2025-02-12 | End: 2025-02-13

## 2025-02-12 RX ORDER — SODIUM CHLORIDE 9 MG/ML
INJECTION, SOLUTION INTRAVENOUS PRN
Status: CANCELLED | OUTPATIENT
Start: 2025-02-12

## 2025-02-12 RX ORDER — PROPOFOL 10 MG/ML
INJECTION, EMULSION INTRAVENOUS
Status: DISCONTINUED | OUTPATIENT
Start: 2025-02-12 | End: 2025-02-12 | Stop reason: SDUPTHER

## 2025-02-12 RX ORDER — MEPERIDINE HYDROCHLORIDE 25 MG/ML
12.5 INJECTION INTRAMUSCULAR; INTRAVENOUS; SUBCUTANEOUS EVERY 5 MIN PRN
Status: CANCELLED | OUTPATIENT
Start: 2025-02-12

## 2025-02-12 RX ORDER — OXYCODONE HYDROCHLORIDE 5 MG/1
10 TABLET ORAL PRN
Status: CANCELLED | OUTPATIENT
Start: 2025-02-12 | End: 2025-02-12

## 2025-02-12 RX ORDER — SODIUM CHLORIDE 0.9 % (FLUSH) 0.9 %
5-40 SYRINGE (ML) INJECTION PRN
Status: CANCELLED | OUTPATIENT
Start: 2025-02-12

## 2025-02-12 RX ORDER — SODIUM CHLORIDE, SODIUM LACTATE, POTASSIUM CHLORIDE, CALCIUM CHLORIDE 600; 310; 30; 20 MG/100ML; MG/100ML; MG/100ML; MG/100ML
INJECTION, SOLUTION INTRAVENOUS
Status: DISCONTINUED | OUTPATIENT
Start: 2025-02-12 | End: 2025-02-12 | Stop reason: SDUPTHER

## 2025-02-12 RX ORDER — ONDANSETRON 2 MG/ML
4 INJECTION INTRAMUSCULAR; INTRAVENOUS
Status: CANCELLED | OUTPATIENT
Start: 2025-02-12

## 2025-02-12 RX ADMIN — SACUBITRIL AND VALSARTAN 1 TABLET: 24; 26 TABLET, FILM COATED ORAL at 07:58

## 2025-02-12 RX ADMIN — SODIUM CHLORIDE, PRESERVATIVE FREE 10 ML: 5 INJECTION INTRAVENOUS at 07:57

## 2025-02-12 RX ADMIN — FAMOTIDINE 20 MG: 10 INJECTION INTRAVENOUS at 19:59

## 2025-02-12 RX ADMIN — PROPOFOL 100 MG: 10 INJECTION, EMULSION INTRAVENOUS at 13:25

## 2025-02-12 RX ADMIN — PROPOFOL 50 MG: 10 INJECTION, EMULSION INTRAVENOUS at 13:30

## 2025-02-12 RX ADMIN — IPRATROPIUM BROMIDE AND ALBUTEROL SULFATE 1 DOSE: .5; 2.5 SOLUTION RESPIRATORY (INHALATION) at 07:52

## 2025-02-12 RX ADMIN — DEXTROSE AND SODIUM CHLORIDE: 5; .9 INJECTION, SOLUTION INTRAVENOUS at 08:59

## 2025-02-12 RX ADMIN — BUPRENORPHINE HYDROCHLORIDE AND NALOXONE HYDROCHLORIDE DIHYDRATE 1 TABLET: 8; 2 TABLET SUBLINGUAL at 19:59

## 2025-02-12 RX ADMIN — SPIRONOLACTONE 25 MG: 25 TABLET ORAL at 07:58

## 2025-02-12 RX ADMIN — IPRATROPIUM BROMIDE AND ALBUTEROL SULFATE 1 DOSE: .5; 2.5 SOLUTION RESPIRATORY (INHALATION) at 00:29

## 2025-02-12 RX ADMIN — ACETAMINOPHEN 650 MG: 325 TABLET ORAL at 23:10

## 2025-02-12 RX ADMIN — APIXABAN 2.5 MG: 5 TABLET, FILM COATED ORAL at 07:57

## 2025-02-12 RX ADMIN — BUPRENORPHINE HYDROCHLORIDE AND NALOXONE HYDROCHLORIDE DIHYDRATE 1 TABLET: 8; 2 TABLET SUBLINGUAL at 07:58

## 2025-02-12 RX ADMIN — AMIODARONE HYDROCHLORIDE 100 MG: 200 TABLET ORAL at 07:57

## 2025-02-12 RX ADMIN — Medication 30 MG: at 13:25

## 2025-02-12 RX ADMIN — PHENYLEPHRINE HYDROCHLORIDE 100 MCG: 10 INJECTION INTRAVENOUS at 13:25

## 2025-02-12 RX ADMIN — LIDOCAINE HYDROCHLORIDE 60 MG: 20 INJECTION, SOLUTION INFILTRATION; PERINEURAL at 13:25

## 2025-02-12 RX ADMIN — SACUBITRIL AND VALSARTAN 1 TABLET: 24; 26 TABLET, FILM COATED ORAL at 19:59

## 2025-02-12 RX ADMIN — IPRATROPIUM BROMIDE AND ALBUTEROL SULFATE 1 DOSE: .5; 2.5 SOLUTION RESPIRATORY (INHALATION) at 17:48

## 2025-02-12 RX ADMIN — FAMOTIDINE 20 MG: 10 INJECTION INTRAVENOUS at 07:57

## 2025-02-12 RX ADMIN — SODIUM CHLORIDE, PRESERVATIVE FREE 10 ML: 5 INJECTION INTRAVENOUS at 19:57

## 2025-02-12 RX ADMIN — FUROSEMIDE 20 MG: 10 INJECTION, SOLUTION INTRAMUSCULAR; INTRAVENOUS at 07:57

## 2025-02-12 RX ADMIN — METOPROLOL SUCCINATE 25 MG: 25 TABLET, EXTENDED RELEASE ORAL at 07:58

## 2025-02-12 RX ADMIN — ATORVASTATIN CALCIUM 40 MG: 40 TABLET, FILM COATED ORAL at 19:59

## 2025-02-12 RX ADMIN — PROPOFOL 20 MG: 10 INJECTION, EMULSION INTRAVENOUS at 13:36

## 2025-02-12 RX ADMIN — SODIUM CHLORIDE, POTASSIUM CHLORIDE, SODIUM LACTATE AND CALCIUM CHLORIDE: 600; 310; 30; 20 INJECTION, SOLUTION INTRAVENOUS at 13:25

## 2025-02-12 ASSESSMENT — PAIN SCALES - WONG BAKER: WONGBAKER_NUMERICALRESPONSE: NO HURT

## 2025-02-12 ASSESSMENT — PAIN DESCRIPTION - DESCRIPTORS: DESCRIPTORS: ACHING

## 2025-02-12 ASSESSMENT — PAIN DESCRIPTION - ORIENTATION: ORIENTATION: RIGHT

## 2025-02-12 ASSESSMENT — PAIN DESCRIPTION - LOCATION: LOCATION: ABDOMEN

## 2025-02-12 ASSESSMENT — PAIN SCALES - GENERAL
PAINLEVEL_OUTOF10: 7
PAINLEVEL_OUTOF10: 0

## 2025-02-12 NOTE — PROGRESS NOTES
Pt now on vapotherm. 35L 85%. The patient keeps taking the cannula out of his nose. Pt is just yelling about us not feeding him. Pt is aware that he is requiring more oxygen than he was just this morning.

## 2025-02-12 NOTE — PROGRESS NOTES
Pt back from endo at this time. Pt is requiring 15L hi nesha and a non-rebreather at this time. Respiratory aware.

## 2025-02-12 NOTE — PLAN OF CARE
Problem: Discharge Planning  Goal: Discharge to home or other facility with appropriate resources  2/12/2025 0804 by Julianne Drummond RN  Outcome: Progressing  Flowsheets (Taken 2/11/2025 1949 by Dionne Guzmán RN)  Discharge to home or other facility with appropriate resources: Identify barriers to discharge with patient and caregiver  2/11/2025 1935 by Dionne Guzmán RN  Outcome: Progressing     Problem: Safety - Adult  Goal: Free from fall injury  2/12/2025 0804 by Julianne Drummond RN  Outcome: Progressing  2/11/2025 1935 by Dionne Guzmán RN  Outcome: Progressing     Problem: Pain  Goal: Verbalizes/displays adequate comfort level or baseline comfort level  2/12/2025 0804 by Julianne Drummond RN  Outcome: Progressing  2/11/2025 1935 by Dionne Guzmán RN  Outcome: Progressing     Problem: Skin/Tissue Integrity  Goal: Skin integrity remains intact  Description: 1.  Monitor for areas of redness and/or skin breakdown  2.  Assess vascular access sites hourly  3.  Every 4-6 hours minimum:  Change oxygen saturation probe site  4.  Every 4-6 hours:  If on nasal continuous positive airway pressure, respiratory therapy assess nares and determine need for appliance change or resting period  2/12/2025 0804 by Julianne Drummond RN  Outcome: Progressing  2/11/2025 1935 by Dionne Guzmán RN  Outcome: Progressing  Flowsheets (Taken 2/11/2025 1935)  Skin Integrity Remains Intact:   Monitor for areas of redness and/or skin breakdown   Assess vascular access sites hourly     Problem: Respiratory - Adult  Goal: Achieves optimal ventilation and oxygenation  2/12/2025 0804 by Julianne Drummond RN  Outcome: Progressing  Flowsheets (Taken 2/12/2025 0400 by Dionne Guzmán RN)  Achieves optimal ventilation and oxygenation: Assess for changes in respiratory status  2/11/2025 1935 by Dionne Guzmán RN  Outcome: Progressing     Problem: Cardiovascular - Adult  Goal: Maintains optimal cardiac output and hemodynamic  stability  2/12/2025 0804 by Julianne Drummond RN  Outcome: Progressing  Flowsheets  Taken 2/12/2025 0400 by Dionne Guzmán RN  Maintains optimal cardiac output and hemodynamic stability: Monitor blood pressure and heart rate  Taken 2/11/2025 2351 by Dionne Guzmán RN  Maintains optimal cardiac output and hemodynamic stability: Monitor blood pressure and heart rate  Taken 2/11/2025 1949 by Dionne Guzmán RN  Maintains optimal cardiac output and hemodynamic stability: Monitor blood pressure and heart rate  2/11/2025 1935 by Dionne Guzmán RN  Outcome: Progressing  Goal: Absence of cardiac dysrhythmias or at baseline  2/12/2025 0804 by Julianne Drummond RN  Outcome: Progressing  Flowsheets  Taken 2/12/2025 0400 by Dionne Guzmán RN  Absence of cardiac dysrhythmias or at baseline: Monitor cardiac rate and rhythm  Taken 2/11/2025 2351 by Dionne Guzmán RN  Absence of cardiac dysrhythmias or at baseline: Monitor cardiac rate and rhythm  Taken 2/11/2025 1949 by Dionne Guzmán RN  Absence of cardiac dysrhythmias or at baseline: Monitor cardiac rate and rhythm  2/11/2025 1935 by Dionne Guzmán RN  Outcome: Progressing     Problem: Skin/Tissue Integrity - Adult  Goal: Skin integrity remains intact  Description: 1.  Monitor for areas of redness and/or skin breakdown  2.  Assess vascular access sites hourly  3.  Every 4-6 hours minimum:  Change oxygen saturation probe site  4.  Every 4-6 hours:  If on nasal continuous positive airway pressure, respiratory therapy assess nares and determine need for appliance change or resting period  2/12/2025 0804 by Julianne Drummond RN  Outcome: Progressing  2/11/2025 1935 by Dionne Guzmán RN  Outcome: Progressing  Flowsheets (Taken 2/11/2025 1935)  Skin Integrity Remains Intact:   Monitor for areas of redness and/or skin breakdown   Assess vascular access sites hourly  Goal: Incisions, wounds, or drain sites healing without S/S of infection  2/12/2025 0804 by Julianne Drummond

## 2025-02-12 NOTE — PROGRESS NOTES
Pulmonary & Critical Care Medicine ICU Progress Note    CC: PNA    Events of Last 24 hours:   Vapotherm---> HFNC 8-->15 L   Precedex off    Continues with hemoptysis mixed with frothy secretions  QTc 486         Invasive Lines: Right femoral cvc- removed 2/10    MV:  2/5/25-2/9        Intake/Output Summary (Last 24 hours) at 2/12/2025 0914  Last data filed at 2/12/2025 0647  Gross per 24 hour   Intake 1252.18 ml   Output 1850 ml   Net -597.82 ml       Vent Mode: AC/VC Resp Rate (Set): 22 bpm/Vt (Set, mL): 450 mL/ /FiO2 : 75 %  Recent Labs     02/09/25  1051   PHART 7.499*   PEA8BQK 37.5   PO2ART 65.6*       IV:   dextrose 5 % and 0.9 % NaCl 50 mL/hr at 02/12/25 0647    dextrose      sodium chloride 5 mL/hr at 02/10/25 0629       Vitals:  BP (!) 164/85   Pulse 70   Temp 97.9 °F (36.6 °C) (Oral)   Resp 26   Ht 1.78 m (5' 10.08\")   Wt 67 kg (147 lb 11.3 oz)   SpO2 90%   BMI 21.15 kg/m²   on 15L  Constitutional:  No acute distress. .   HEENT: no scleral icterus  Neck: No tracheal deviation present.    Cardiovascular: Normal heart sounds.   Pulmonary/Chest: Few wheezes. + rhonchi. Few rales. No decreased breath sounds.  No accessory muscle usage or stridor.   Abdominal: Soft.   Musculoskeletal: No cyanosis. No clubbing.  Skin: Skin is warm and dry.        Scheduled Meds:   amiodarone  100 mg Oral Daily    apixaban  2.5 mg Oral BID    furosemide  20 mg IntraVENous Daily    metoprolol succinate  25 mg Oral Daily    sacubitril-valsartan  1 tablet Oral BID    spironolactone  25 mg Oral Daily    atorvastatin  40 mg Oral Nightly    buprenorphine-naloxone  1 tablet SubLINGual BID    insulin lispro  0-4 Units SubCUTAneous Q4H    sodium chloride flush  5-40 mL IntraVENous 2 times per day    famotidine (PEPCID) injection  20 mg IntraVENous BID    ipratropium 0.5 mg-albuterol 2.5 mg  1 Dose Inhalation Q6H     PRN Meds:  glucose, dextrose bolus **OR** dextrose bolus, glucagon (rDNA), dextrose, sodium chloride flush, sodium  including complications (collapse lung, pneumomediastinum, bleed, infection, mechanical ventilation, hospitalization, cardiopulmonary arrest and death). We also discussed the risks of sedation/anesthesia. It is my assessment that the benefit of the invasive procedure outweighs the risk. The patient/family understands and wishes to proceed.  Home medications were addressed   Speech pathology evaluation - [facility] today   Watch hemoptysis   Lasix and aldactone daily   Resumed Amiodarone   Blood sugar control ISS, with goal 150-180  GI prophylaxis: Pepcid  DVT prophylaxis: Eliquis   MRSA prophylaxis: Bactroban

## 2025-02-12 NOTE — PROGRESS NOTES
[facility] Daily Progress Note      Admit Date:  2/5/2025    Subjective:  Mr. Soto is seen for chest pain fluttering and CHF    Today no c/o chest pain. Difficult to maintain oxygenation as he keeps taking it off.  Intermittent hemoptysis.  Chest pain in left upper quadrant of abdomen associated with fluttering in chest.        History of Present Illness:  Dayton Soto is a 59 y.o. patient who presented to [facility] 2/5/2025 with acute respiratory distress and was intubated.  He had admitted to smoking methamphetamines a few hours prior to coming to hospital.         Dayton Soto is a 59 y.o. patient with past medical history for substance abuse/methamphetamine abuse, cardiomyopathy with moderate LV dysfunction, PAF who presented to the with acute respiratory distress.  Patient was reportedly admitted to smoking methamphetamines a few hours before coming to the hospital. emergently had to be intubated.  Patient difficult to oxygenate and remain severely hypoxemic for several hours.  Patient also with septic shock on Levophed and vasopressin.  Severe acidosis with a pH around 6.9 which is slowly improving to 7.2 today.  Patient troponin also elevated and he was started on heparin drip.  Chest x-ray shows extensive bilateral infiltrates.  He remains intubated, sedated, proned and on paralytics.  He remains critically ill.     ROS:  12 point ROS negative in all areas as listed below except as in [place]  Constitutional, EENT,GI, , Musculoskeletal, skin, neurological, hematological, endocrine, Psychiatric    Past Medical History:   Diagnosis Date    Back pain, chronic     Chronic back pain     MRSA (methicillin resistant staph aureus) culture positive 02/26/2020    RIGHT FOREARM     History reviewed. No pertinent surgical history.    Objective:   BP (!) 129/110   Pulse 72   Temp 98.4 °F (36.9 °C) (Temporal)   Resp 22   Ht 1.78 m (5' 10.08\")   Wt 67 kg (147 lb 11.3 oz)   SpO2 94%   BMI 21.15 kg/m²    Indeterminate diastolic function due to atrial fibrillation.    Right Ventricle: Right ventricle is mildly dilated. Moderately reduced systolic function.    Aortic Valve: Trileaflet valve. Mild sclerosis of the aortic valve cusps. Mild to moderate regurgitation.    Mitral Valve: Findings consistent with myxomatous degeneration. Moderately thickened leaflets. Mild regurgitation. No stenosis noted.    Tricuspid Valve: Thickened leaflets. Mild to moderate regurgitation. Normal RVSP. Est RA pressure is 8 mmHg. The estimated RVSP is 32mmHg.    Interatrial Septum: No obvious color flow across atrial septum on current study. Previous positive right to left bubble study on NILE 7/8/24.    Right Atrium: Right atrium is mildly dilated.    Image quality is adequate. Procedure performed with the patient in a sitting position.    No significant changes compared to ECHO study July 2024.        Assessment  Cardiomyopathy biventricular with reduced systolic function.         Will reinstitute guide line directed medical therapy  2. Parox afib with history of PSVT he is on anticoagulant currently sinus   3. Hemoptysis  stopped on lowering Apixaban  dose  4. Substance abuse.  5.  Chestpain actually pointing to left upper quadrant  troponin  26   no ischemia on EKG medical therapy for now  6 Prolonged QTC on lower dose of amiodarone   Bronchoscopy today  Swallow eval today

## 2025-02-12 NOTE — PROGRESS NOTES
RT Inhaler-Nebulizer Bronchodilator Protocol Note    There is a bronchodilator order in the chart from a provider indicating to follow the RT Bronchodilator Protocol and there is an “Initiate RT Inhaler-Nebulizer Bronchodilator Protocol” order as well (see protocol at bottom of note).    CXR Findings:  XR CHEST PORTABLE    Result Date: 2/11/2025  Worsening multifocal airspace disease, likely pneumonia.  Pulmonary edema could have a similar appearance. Small bilateral pleural effusions.       The findings from the last RT Protocol Assessment were as follows:   History Pulmonary Disease: Chronic pulmonary disease  Respiratory Pattern: Dyspnea on exertion or RR 21-25 bpm  Breath Sounds: Slightly diminished and/or crackles  Cough: Strong, spontaneous, non-productive  Indication for Bronchodilator Therapy: Decreased or absent breath sounds  Bronchodilator Assessment Score: 6    Aerosolized bronchodilator medication orders have been revised according to the RT Inhaler-Nebulizer Bronchodilator Protocol below.    Respiratory Therapist to perform RT Therapy Protocol Assessment initially then follow the protocol.  Repeat RT Therapy Protocol Assessment PRN for score 0-3 or on second treatment, BID, and PRN for scores above 3.    No Indications - adjust the frequency to every 6 hours PRN wheezing or bronchospasm, if no treatments needed after 48 hours then discontinue using Per Protocol order mode.     If indication present, adjust the RT bronchodilator orders based on the Bronchodilator Assessment Score as indicated below.  Use Inhaler orders unless patient has one or more of the following: on home nebulizer, not able to hold breath for 10 seconds, is not alert and oriented, cannot activate and use MDI correctly, or respiratory rate 25 breaths per minute or more, then use the equivalent nebulizer order(s) with same Frequency and PRN reasons based on the score.  If a patient is on this medication at home then do not decrease  Frequency below that used at home.    0-3 - enter or revise RT bronchodilator order(s) to equivalent RT Bronchodilator order with Frequency of every 4 hours PRN for wheezing or increased work of breathing using Per Protocol order mode.        4-6 - enter or revise RT Bronchodilator order(s) to two equivalent RT bronchodilator orders with one order with BID Frequency and one order with Frequency of every 4 hours PRN wheezing or increased work of breathing using Per Protocol order mode.        7-10 - enter or revise RT Bronchodilator order(s) to two equivalent RT bronchodilator orders with one order with TID Frequency and one order with Frequency of every 4 hours PRN wheezing or increased work of breathing using Per Protocol order mode.       11-13 - enter or revise RT Bronchodilator order(s) to one equivalent RT bronchodilator order with QID Frequency and an Albuterol order with Frequency of every 4 hours PRN wheezing or increased work of breathing using Per Protocol order mode.      Greater than 13 - enter or revise RT Bronchodilator order(s) to one equivalent RT bronchodilator order with every 4 hours Frequency and an Albuterol order with Frequency of every 2 hours PRN wheezing or increased work of breathing using Per Protocol order mode.     PATIENT WILL BENEFIT FROM TREATMENTS.     Electronically signed by Alexa Camarillo RCP on 2/11/2025 at 7:05 PM

## 2025-02-12 NOTE — PROGRESS NOTES
Shift assessment complete- see flowsheets.  Pt is A&Ox4.  VSS  On 15L hi flow. MBS planned for today. Pt taking pills fine. No signs of aspiration. Oxygen needs are high, however warranting the MBS.   PIV x2 WDL. Flushed at this time.  Plan of care and goals reviewed. Call light within reach.  Bed in lowest position with wheels locked. No needs expressed at this time. Will continue to monitor.

## 2025-02-12 NOTE — ANESTHESIA PRE PROCEDURE
Department of Anesthesiology  Preprocedure Note       Name:  Dayton Soto   Age:  59 y.o.  :  1965                                          MRN:  [id number]         Date:  2025      Surgeon: Surgeon(s):  Amish Richard MD    Procedure: Procedure(s):  BRONCH NF W/ANES. POM MASK    Medications prior to admission:   Prior to Admission medications    Medication Sig Start Date End Date Taking? Authorizing Provider   amiodarone (CORDARONE) 200 MG tablet 2 bid - 5 days, 2 qd- 1 week, 1 qd thereafter 25   Brittany Shin MD   empagliflozin (JARDIANCE) 10 MG tablet Take 1 tablet by mouth daily 25   Brittany Shin MD   apixaban (ELIQUIS) 5 MG TABS tablet Take 1 tablet by mouth 2 times daily 25   Brittany Shin MD   metoprolol succinate (TOPROL XL) 25 MG extended release tablet Take 0.5 tablets by mouth daily 25   Brittany Shin MD   sacubitril-valsartan (ENTRESTO) 24-26 MG per tablet Take 0.5 tablets by mouth 2 times daily 25   Brittany Shin MD   predniSONE (DELTASONE) 20 MG tablet 2 qd- 2 days, 1 1/2 qd- 3 days, 1 qd- 3 days, 1/2 qd- 3 days 25   Brittany Shin MD   pantoprazole (PROTONIX) 40 MG tablet Take 1 tablet by mouth 2 times daily (before meals) 25   Brittany Shin MD   aspirin 81 MG EC tablet Take 1 tablet by mouth daily (Always take with food) 7/10/24   Andrade Acosta MD   spironolactone (ALDACTONE) 25 MG tablet Take 1 tablet by mouth daily Hold for SBP<95 mmHg 7/10/24   Andrade Acosta MD   furosemide (LASIX) 20 MG tablet Take 1 tablet by mouth daily 7/10/24   Andrade Acosta MD   Buprenorphine HCl-Naloxone HCl (SUBOXONE SL) Place 20 mg under the tongue daily Pt takes (2) 8 mg tablets and (2) 2 mg tablets daily in the morning    Provider, MD Dirk       Current medications:    Current Facility-Administered Medications   Medication Dose Route Frequency Provider

## 2025-02-12 NOTE — PROCEDURES
PROCEDURE: Surveillance, diagnostic, therapeutic bronchoscopy with BAL     The risks and benefits as well as alternatives to the procedure have been discussed with the patient and or family.  The patient and or next of kin understands and agrees to proceed.     DESCRIPTION OF PROCEDURE: A time out was taken.   Type of sedation used: Per anesthesia    The scope was passed with ease via the LMA.  A complete airway inspection was performed.  Mild to moderate amount of bloody thin secretions throughout.  Somewhat frothy.  No endobronchial lesions or foreign body were identified.  Moderate mucous plugs, largest occluding left mainstem/left lower lobe bronchus.  Saline injection and hypertonic saline injection followed by therapeutic aspiration was performed.  Washings were obtained throughout the airways.  Sequential lingula BAL x 4 with increasingly bloody return,       The patient tolerated the procedure well. Estimated blood loss was less than 5 ml.   Recovery will be per endoscopy protocol.    FOLLOW UP:  Cultures and cytology in  three to five days.

## 2025-02-12 NOTE — PROGRESS NOTES
HEART FAILURE CARE PLAN:    Comorbidities Reviewed: Yes   Patient has a past medical history of Back pain, chronic, Chronic back pain, and MRSA (methicillin resistant staph aureus) culture positive.     Weights Reviewed: Yes   Admission weight: 64.3 kg (141 lb 12.8 oz)   Wt Readings from Last 3 Encounters:   02/11/25 67 kg (147 lb 11.3 oz)   02/01/25 62.3 kg (137 lb 4.8 oz)   07/09/24 56.3 kg (124 lb 1.6 oz)     Intake & Output Reviewed: Yes     Intake/Output Summary (Last 24 hours) at 2/11/2025 1936  Last data filed at 2/11/2025 1513  Gross per 24 hour   Intake --   Output 2400 ml   Net -2400 ml       ECHOCARDIOGRAM Reviewed: Yes   Patient's Ejection Fraction (EF) is less than or equal to 40%. Discuss HFrEF Guideline Directed Medical Therapy (GDMT) with Cardiologist or Hospitalist:          Medications Reviewed: Yes   SCHEDULED HOSPITAL MEDICATIONS:   [START ON 2/12/2025] amiodarone  100 mg Oral Daily    apixaban  2.5 mg Oral BID    [START ON 2/12/2025] furosemide  20 mg IntraVENous Daily    metoprolol succinate  25 mg Oral Daily    sacubitril-valsartan  1 tablet Oral BID    spironolactone  25 mg Oral Daily    amoxicillin-clavulanate  1 tablet Oral 2 times per day    atorvastatin  40 mg Oral Nightly    buprenorphine-naloxone  1 tablet SubLINGual BID    insulin lispro  0-4 Units SubCUTAneous Q4H    sodium chloride flush  5-40 mL IntraVENous 2 times per day    famotidine (PEPCID) injection  20 mg IntraVENous BID    ipratropium 0.5 mg-albuterol 2.5 mg  1 Dose Inhalation Q6H     HOME MEDICATIONS:  Prior to Admission medications    Medication Sig Start Date End Date Taking? Authorizing Provider   amiodarone (CORDARONE) 200 MG tablet 2 bid - 5 days, 2 qd- 1 week, 1 qd thereafter 1/31/25   Brittany Shin MD   empagliflozin (JARDIANCE) 10 MG tablet Take 1 tablet by mouth daily 1/31/25   Brittany Shin MD   apixaban (ELIQUIS) 5 MG TABS tablet Take 1 tablet by mouth 2 times daily 1/31/25

## 2025-02-12 NOTE — PROGRESS NOTES
Pt laying on edge of bed with 1-2\" of himself hanging off the bed & refusing to be repositioned & refusing to have the side rail up. Writer discussed the risk of falls & pt adamant \"I won't fall I haven't fallen even at my sickest\"  Pt alert & oriented x4. Bed alarm is on. Primary RN Julianne made aware of conversation.

## 2025-02-12 NOTE — H&P
Fiberoptic bronchoscopy history:     Nursing History and Physical reviewed and agreed upon:  For additional findings, please see my notes in EPIC.      Patient has No Known Allergies.    Past Medical History:   Diagnosis Date    Back pain, chronic     Chronic back pain     Hypertension     MI (myocardial infarction) (HCC)     MRSA (methicillin resistant staph aureus) culture positive 02/26/2020    RIGHT FOREARM       History reviewed. No pertinent surgical history.    No Known Allergies    Current Facility-Administered Medications   Medication Dose Route Frequency Provider Last Rate Last Admin    dextrose 5 % and 0.9 % sodium chloride infusion   IntraVENous Continuous Amish Richard MD 50 mL/hr at 02/12/25 0859 New Bag at 02/12/25 0859    amiodarone (CORDARONE) tablet 100 mg  100 mg Oral Daily Vinay Mayen MD   100 mg at 02/12/25 0757    apixaban (ELIQUIS) tablet 2.5 mg  2.5 mg Oral BID Vinay Mayen MD   2.5 mg at 02/12/25 0757    furosemide (LASIX) injection 20 mg  20 mg IntraVENous Daily Vinay Mayen MD   20 mg at 02/12/25 0757    metoprolol succinate (TOPROL XL) extended release tablet 25 mg  25 mg Oral Daily Vinay Mayen MD   25 mg at 02/12/25 0758    sacubitril-valsartan (ENTRESTO) 24-26 MG per tablet 1 tablet  1 tablet Oral BID Vinay Mayen MD   1 tablet at 02/12/25 0758    spironolactone (ALDACTONE) tablet 25 mg  25 mg Oral Daily Vinay Mayen MD   25 mg at 02/12/25 0758    atorvastatin (LIPITOR) tablet 40 mg  40 mg Oral Nightly Willie Navarrete MD   40 mg at 02/11/25 1953    buprenorphine-naloxone (SUBOXONE) 8-2 MG SL tablet 1 tablet  1 tablet SubLINGual BID Willie Navarrete MD   1 tablet at 02/12/25 0758    insulin lispro (HUMALOG,ADMELOG) injection vial 0-4 Units  0-4 Units SubCUTAneous Q4H Julio Cesar Lei MD   1 Units at 02/08/25 2058    glucose chewable tablet 16 g  4 tablet Oral PRN Julio Cesar Lei MD        dextrose bolus 10% 125 mL  125 mL IntraVENous PRN Major,  MD Julio Cesar   Stopped at 02/11/25 0613    Or    dextrose bolus 10% 250 mL  250 mL IntraVENous PRN Julio Cesar Lei MD        glucagon injection 1 mg  1 mg SubCUTAneous PRN Julio Cesar Lei MD        dextrose 10 % infusion   IntraVENous Continuous PRN Julio Cesar Lei MD        sodium chloride flush 0.9 % injection 5-40 mL  5-40 mL IntraVENous 2 times per day Adriana Nicole PA   10 mL at 02/12/25 0757    sodium chloride flush 0.9 % injection 10 mL  10 mL IntraVENous PRN Adriana Nicole PA        0.9 % sodium chloride infusion   IntraVENous PRN Adriana Nicole PA 5 mL/hr at 02/10/25 0629 Rate Verify at 02/10/25 0629    potassium chloride (KLOR-CON M) extended release tablet 40 mEq  40 mEq Oral PRN Adriana Nicole PA        Or    potassium bicarb-citric acid (EFFER-K) effervescent tablet 40 mEq  40 mEq Oral PRN Adriana Nicole PA        Or    potassium chloride 10 mEq/100 mL IVPB (Peripheral Line)  10 mEq IntraVENous PRN Adriana Nicole PA        magnesium sulfate 1000 mg in dextrose 5% 100 mL IVPB  1,000 mg IntraVENous PRN Adriana Nicole PA        ondansetron (ZOFRAN-ODT) disintegrating tablet 4 mg  4 mg Oral Q8H PRN Adriana Nicole PA        Or    ondansetron (ZOFRAN) injection 4 mg  4 mg IntraVENous Q6H PRN Adriana Nicole PA   4 mg at 02/09/25 1305    polyethylene glycol (GLYCOLAX) packet 17 g  17 g Oral Daily PRN Adriana Nicole PA        acetaminophen (TYLENOL) tablet 650 mg  650 mg Oral Q6H PRN Adriana Nicole PA   650 mg at 02/11/25 1205    Or    acetaminophen (TYLENOL) suppository 650 mg  650 mg Rectal Q6H PRN dAriana Nicole PA   650 mg at 02/05/25 1820    famotidine (PEPCID) 20 mg in sodium chloride (PF) 0.9 % 10 mL injection  20 mg IntraVENous BID Julio Cesar Lei MD   20 mg at 02/12/25 0757    ipratropium 0.5 mg-albuterol 2.5 mg (DUONEB) nebulizer solution 1 Dose  1 Dose Inhalation Q6H Willie Navarrete MD   1 Dose at 02/12/25 0752       Medication list was reviewed and updated as

## 2025-02-12 NOTE — PROGRESS NOTES
4 Eyes Skin Assessment     NAME:  Dayton Soto  YOB: 1965  MEDICAL RECORD NUMBER:  [id number]    The patient is being assessed for  Other Low Alex scale of 16    I agree that at least one RN has performed a thorough Head to Toe Skin Assessment on the patient. ALL assessment sites listed below have been assessed.      Areas assessed by both nurses:    Head, Face, Ears, Shoulders, Back, Chest, Arms, Elbows, Hands, Sacrum. Buttock, Coccyx, Ischium, Legs. Feet and Heels, and Under Medical Devices           Does the Patient have a Wound? No noted wound(s)       Alex Prevention initiated by RN: Yes  Wound Care Orders initiated by RN: No    Pressure Injury (Stage 3,4, Unstageable, DTI, NWPT, and Complex wounds) if present, place Wound referral order by RN under : No    New Ostomies, if present place, Ostomy referral order under : No   Patient is not able to demonstrate the ability to move from a reclining position to an upright position within the recliner due to Weakness.    Nurse 1 eSignature: Electronically signed by Dionne Guzmán RN on 2/11/25 at 7:37 PM EST    **SHARE this note so that the co-signing nurse can place an eSignature**    Nurse 2 eSignature: {Esignature:[id number]}

## 2025-02-12 NOTE — ANESTHESIA POSTPROCEDURE EVALUATION
Department of Anesthesiology  Postprocedure Note    Patient: Dayton Soto  MRN: [id number]  YOB: 1965  Date of evaluation: 2/12/2025    Procedure Summary       Date: 02/12/25 Room / Location: [place] [id number] / [facility]    Anesthesia Start: 1307 Anesthesia Stop: 1401    Procedure: BRONCHOSCOPY ALVEOLAR LAVAGE Diagnosis:       Pneumonia due to infectious organism, unspecified laterality, unspecified part of lung      (Pneumonia due to infectious organism, unspecified laterality, unspecified part of lung [J18.9])    Surgeons: Amish Richard MD Responsible Provider: Cait Wallace MD    Anesthesia Type: general ASA Status: 4 - Emergent            Anesthesia Type: No value filed.    Dulce Phase I: Dulce Score: 10    Dulce Phase II:      Anesthesia Post Evaluation    Patient location during evaluation: ICU  Airway patency: patent  Respiratory status: acceptable  Comments: Postoperative Anesthesia Note    Name:    Dayton Soto  MRN:      [id number]    Patient Vitals in the past 12 hrs:  02/12/25 1254, BP:(!) 185/90, Pulse:78, Resp:29  02/12/25 1201, BP:(!) 185/90, Pulse:81, Resp:14, SpO2:94 %  02/12/25 1100, BP:(!) 129/110, Temp:98.4 °F (36.9 °C), Temp src:Temporal, Pulse:72, Resp:22, SpO2:94 %  02/12/25 1000, BP:(!) 175/92, Pulse:73, Resp:21, SpO2:(!) 85 %  02/12/25 0900, BP:(!) 161/98, Pulse:77, Resp:22, SpO2:(!) 85 %  02/12/25 0800, BP:(!) 166/84, Pulse:74, Resp:25, SpO2:(!) 88 %  02/12/25 0754, Pulse:75, Resp:22, SpO2:(!) 89 %  02/12/25 0751, Temp:98.6 °F (37 °C), Temp src:Temporal  02/12/25 0700, BP:(!) 164/85, Pulse:70, Resp:26  02/12/25 0600, BP:(!) 169/95, Pulse:76, Resp:27, SpO2:90 %  02/12/25 0500, BP:(!) 160/87, Pulse:74, Resp:18  02/12/25 0400, BP:(!) 141/83, Pulse:70, Resp:16  02/12/25 0300, BP:(!) 155/94, Pulse:85, Resp:21     LABS:    CBC  Lab Results       Component                Value               Date/Time                  WBC                      12.2 (H)

## 2025-02-12 NOTE — PROGRESS NOTES
Blood pressure (!) 146/84, pulse 72, temperature 98 °F (36.7 °C), temperature source Oral, resp. rate 20, height 1.78 m (5' 10.08\"), weight 67 kg (147 lb 11.3 oz), SpO2 90%.    Patient continues on 13L O2 via HFNC. Patient continues to have a productive cough with blood tinged sputum.   Lung sounds diminished.     Shift assessment complete. See doc flow. Nightly medications given see MAR. Patient with no complaints at this time. Call light and bedside table within easy reach.     Electronically signed by Dionne Guzmán RN on 2/11/2025 at 7:52 PM

## 2025-02-12 NOTE — PLAN OF CARE
Problem: Safety - Adult  Goal: Free from fall injury  2/11/2025 1935 by Dionne Guzmán RN  Outcome: Progressing  2/11/2025 1056 by Julianne Drummond RN  Outcome: Progressing     Problem: Pain  Goal: Verbalizes/displays adequate comfort level or baseline comfort level  2/11/2025 1935 by Dionne Guzmán RN  Outcome: Progressing  2/11/2025 1056 by Julianne Drummond RN  Outcome: Progressing  Flowsheets (Taken 2/11/2025 0402 by Dionne Guzmán RN)  Verbalizes/displays adequate comfort level or baseline comfort level: Encourage patient to monitor pain and request assistance     Problem: Skin/Tissue Integrity  Goal: Skin integrity remains intact  Description: 1.  Monitor for areas of redness and/or skin breakdown  2.  Assess vascular access sites hourly  3.  Every 4-6 hours minimum:  Change oxygen saturation probe site  4.  Every 4-6 hours:  If on nasal continuous positive airway pressure, respiratory therapy assess nares and determine need for appliance change or resting period  2/11/2025 1935 by Dionne Guzmán RN  Outcome: Progressing  Flowsheets (Taken 2/11/2025 1935)  Skin Integrity Remains Intact:   Monitor for areas of redness and/or skin breakdown   Assess vascular access sites hourly  2/11/2025 1056 by Julianne Drummond RN  Outcome: Progressing     Problem: Respiratory - Adult  Goal: Achieves optimal ventilation and oxygenation  2/11/2025 1935 by Dionne Guzmán RN  Outcome: Progressing  2/11/2025 1056 by Julianne Drummond RN  Outcome: Progressing  Flowsheets (Taken 2/11/2025 0430 by Dionne Guzmán RN)  Achieves optimal ventilation and oxygenation: Assess for changes in respiratory status     Problem: Cardiovascular - Adult  Goal: Maintains optimal cardiac output and hemodynamic stability  2/11/2025 1935 by Dionne Guzmán RN  Outcome: Progressing  2/11/2025 1056 by Julianne Drummond RN  Outcome: Progressing  Flowsheets  Taken 2/11/2025 0430 by Dionne Guzmán RN  Maintains optimal cardiac output and hemodynamic

## 2025-02-12 NOTE — PROGRESS NOTES
IM Progress Note    Admit Date:  2/5/2025  7    Interval history:  59 y.o. male who presented to [facility] with septic shock.  PMHx significant for polysubstance use-methamphetamine, A-fib, valvular disease, on Eliquis on amiodarone, MRSA, cardiomyopathy      Intubated, paralyzed for severe hypoxic resp failure  Hypotensive shock on levo, vaso   Started heparin gtt  Remains in prone ventilation       Subjective:     Mr. Soto seen sitting up in bed  Now requiring upto 15 L of O2  Trace hemoptysis     Objective:   BP (!) 164/85   Pulse 70   Temp 97.9 °F (36.6 °C) (Oral)   Resp 26   Ht 1.78 m (5' 10.08\")   Wt 67 kg (147 lb 11.3 oz)   SpO2 90%   BMI 21.15 kg/m²     Intake/Output Summary (Last 24 hours) at 2/12/2025 0737  Last data filed at 2/12/2025 0647  Gross per 24 hour   Intake 1252.18 ml   Output 1850 ml   Net -597.82 ml       Physical Exam:      General:  middle aged male   . Appears to be not in any distress  Mucous Membranes:  Pink , anicteric  Neck: No JVD, no carotid bruit, no thyromegaly  Chest: improving leatha air entry with resolved crackles,    Cardiovascular:  RRR S1S2 heard, no murmurs or gallops  Abdomen:  Soft, undistended, non tender, no organomegaly, BS present  Extremities: No edema or cyanosis. Distal pulses well felt  Neurological :  on vapotherm      Medications:   Scheduled Medications:    amiodarone  100 mg Oral Daily    apixaban  2.5 mg Oral BID    furosemide  20 mg IntraVENous Daily    metoprolol succinate  25 mg Oral Daily    sacubitril-valsartan  1 tablet Oral BID    spironolactone  25 mg Oral Daily    atorvastatin  40 mg Oral Nightly    buprenorphine-naloxone  1 tablet SubLINGual BID    insulin lispro  0-4 Units SubCUTAneous Q4H    sodium chloride flush  5-40 mL IntraVENous 2 times per day    famotidine (PEPCID) injection  20 mg IntraVENous BID    ipratropium 0.5 mg-albuterol 2.5 mg  1 Dose Inhalation Q6H     I   dextrose 5 % and 0.9 % NaCl 50 mL/hr at 02/12/25 0647    dextrose  myxomatous degeneration. Moderately thickened leaflets. Mild regurgitation. No stenosis noted.    Tricuspid Valve: Thickened leaflets. Mild to moderate regurgitation. Normal RVSP. Est RA pressure is 8 mmHg. The estimated RVSP is 32mmHg.    Interatrial Septum: No obvious color flow across atrial septum on current study. Previous positive right to left bubble study on NILE 7/8/24.    Right Atrium: Right atrium is mildly dilated.    Image quality is adequate. Procedure performed with the patient in a sitting position.    No significant changes compared to ECHO study July 2024.       Bronchoscopy     No endobronchial lesion was identified. Mucosa appeared normal. There were a couple areas of adherent bilious appearing secretions on the tracheal wall which was removed with saline lavage and suction.  There is a small amount of scattered yellow secretions on the left side.  There was some yellow secretions in the right lower lobe.  Therapeutic aspiration completed     Cultures  Blood- NGTD    BAL NRF      Assessment & Plan:      # Acute hypoxic and hypercapnic respiratory failure secondary to multifocal pneumonia- cannot rule out aspirational given imaging findings   - intubated in ER  on 2/5, and now  on mechanical ventilation  -Critical care following  -Patient remains on fentanyl, propofol, Versed, Precedex for sedation and paralyzed and proned for severe hypoxia   -Patient remains critically ill  -Monitor for development/worsening ARDS and consider diuretics as needed especially in the setting of patient's known HFrEF  - continue zyvox , cefepime and flagyl  - BP improving, wbc better  -extubated 2/9  Now on Vapotherm--> 6 L HFNC--> 15 L HF   On precedex --> off precedex now   Iv lasix 20 mg daily      # Septic shock secondary to multifocal pneumonia  - leatha pna on imaging and wbc upto 47>70 K >10  -Blood cultures remain neg  -given empiric linezolid and cefepime as well as Flagyl  -Stress dose steroids with Solu-Cortef

## 2025-02-13 PROBLEM — R04.89 PULMONARY HEMORRHAGE: Status: ACTIVE | Noted: 2025-02-13

## 2025-02-13 LAB
ANION GAP SERPL CALCULATED.3IONS-SCNC: 8 MMOL/L (ref 3–16)
BUN SERPL-MCNC: 19 MG/DL (ref 7–20)
CALCIUM SERPL-MCNC: 7.6 MG/DL (ref 8.3–10.6)
CHLORIDE SERPL-SCNC: 105 MMOL/L (ref 99–110)
CO2 SERPL-SCNC: 29 MMOL/L (ref 21–32)
CREAT SERPL-MCNC: 0.8 MG/DL (ref 0.9–1.3)
DEPRECATED RDW RBC AUTO: 13.6 % (ref 12.4–15.4)
GFR SERPLBLD CREATININE-BSD FMLA CKD-EPI: >90 ML/MIN/{1.73_M2}
GLUCOSE BLD-MCNC: 147 MG/DL (ref 70–99)
GLUCOSE BLD-MCNC: 147 MG/DL (ref 70–99)
GLUCOSE BLD-MCNC: 225 MG/DL (ref 70–99)
GLUCOSE BLD-MCNC: 76 MG/DL (ref 70–99)
GLUCOSE SERPL-MCNC: 95 MG/DL (ref 70–99)
HCT VFR BLD AUTO: 31.1 % (ref 40.5–52.5)
HGB BLD-MCNC: 10.7 G/DL (ref 13.5–17.5)
MCH RBC QN AUTO: 30.8 PG (ref 26–34)
MCHC RBC AUTO-ENTMCNC: 34.2 G/DL (ref 31–36)
MCV RBC AUTO: 90.1 FL (ref 80–100)
PERFORMED ON: ABNORMAL
PERFORMED ON: NORMAL
PLATELET # BLD AUTO: 270 K/UL (ref 135–450)
PMV BLD AUTO: 7.9 FL (ref 5–10.5)
POTASSIUM SERPL-SCNC: 3.7 MMOL/L (ref 3.5–5.1)
RBC # BLD AUTO: 3.46 M/UL (ref 4.2–5.9)
SODIUM SERPL-SCNC: 142 MMOL/L (ref 136–145)
WBC # BLD AUTO: 10.3 K/UL (ref 4–11)

## 2025-02-13 PROCEDURE — 92526 ORAL FUNCTION THERAPY: CPT

## 2025-02-13 PROCEDURE — 2000000000 HC ICU R&B

## 2025-02-13 PROCEDURE — 5A09357 ASSISTANCE WITH RESPIRATORY VENTILATION, LESS THAN 24 CONSECUTIVE HOURS, CONTINUOUS POSITIVE AIRWAY PRESSURE: ICD-10-PCS | Performed by: INTERNAL MEDICINE

## 2025-02-13 PROCEDURE — 2500000003 HC RX 250 WO HCPCS: Performed by: INTERNAL MEDICINE

## 2025-02-13 PROCEDURE — 6360000002 HC RX W HCPCS: Performed by: INTERNAL MEDICINE

## 2025-02-13 PROCEDURE — 99232 SBSQ HOSP IP/OBS MODERATE 35: CPT | Performed by: INTERNAL MEDICINE

## 2025-02-13 PROCEDURE — 85027 COMPLETE CBC AUTOMATED: CPT

## 2025-02-13 PROCEDURE — 6370000000 HC RX 637 (ALT 250 FOR IP): Performed by: INTERNAL MEDICINE

## 2025-02-13 PROCEDURE — 94761 N-INVAS EAR/PLS OXIMETRY MLT: CPT

## 2025-02-13 PROCEDURE — 99291 CRITICAL CARE FIRST HOUR: CPT | Performed by: INTERNAL MEDICINE

## 2025-02-13 PROCEDURE — 99233 SBSQ HOSP IP/OBS HIGH 50: CPT | Performed by: INTERNAL MEDICINE

## 2025-02-13 PROCEDURE — 2700000000 HC OXYGEN THERAPY PER DAY

## 2025-02-13 PROCEDURE — 2500000003 HC RX 250 WO HCPCS

## 2025-02-13 PROCEDURE — 2580000003 HC RX 258: Performed by: INTERNAL MEDICINE

## 2025-02-13 PROCEDURE — 80048 BASIC METABOLIC PNL TOTAL CA: CPT

## 2025-02-13 PROCEDURE — 36415 COLL VENOUS BLD VENIPUNCTURE: CPT

## 2025-02-13 PROCEDURE — 94640 AIRWAY INHALATION TREATMENT: CPT

## 2025-02-13 PROCEDURE — 94660 CPAP INITIATION&MGMT: CPT

## 2025-02-13 PROCEDURE — 94669 MECHANICAL CHEST WALL OSCILL: CPT

## 2025-02-13 RX ORDER — IPRATROPIUM BROMIDE AND ALBUTEROL SULFATE 2.5; .5 MG/3ML; MG/3ML
1 SOLUTION RESPIRATORY (INHALATION)
Status: DISCONTINUED | OUTPATIENT
Start: 2025-02-13 | End: 2025-02-14

## 2025-02-13 RX ORDER — FUROSEMIDE 10 MG/ML
20 INJECTION INTRAMUSCULAR; INTRAVENOUS 2 TIMES DAILY
Status: DISCONTINUED | OUTPATIENT
Start: 2025-02-13 | End: 2025-02-17

## 2025-02-13 RX ORDER — DIAZEPAM 10 MG/2ML
5 INJECTION, SOLUTION INTRAMUSCULAR; INTRAVENOUS ONCE
Status: DISCONTINUED | OUTPATIENT
Start: 2025-02-13 | End: 2025-02-14

## 2025-02-13 RX ORDER — DIPHENHYDRAMINE HYDROCHLORIDE 50 MG/ML
25 INJECTION INTRAMUSCULAR; INTRAVENOUS ONCE
Status: DISCONTINUED | OUTPATIENT
Start: 2025-02-13 | End: 2025-02-15

## 2025-02-13 RX ORDER — HALOPERIDOL 5 MG/ML
5 INJECTION INTRAMUSCULAR ONCE
Status: DISCONTINUED | OUTPATIENT
Start: 2025-02-13 | End: 2025-02-15

## 2025-02-13 RX ORDER — IPRATROPIUM BROMIDE AND ALBUTEROL SULFATE 2.5; .5 MG/3ML; MG/3ML
1 SOLUTION RESPIRATORY (INHALATION)
Status: DISCONTINUED | OUTPATIENT
Start: 2025-02-13 | End: 2025-02-13

## 2025-02-13 RX ADMIN — FAMOTIDINE 20 MG: 10 INJECTION INTRAVENOUS at 08:40

## 2025-02-13 RX ADMIN — BUPRENORPHINE HYDROCHLORIDE AND NALOXONE HYDROCHLORIDE DIHYDRATE 1 TABLET: 8; 2 TABLET SUBLINGUAL at 20:25

## 2025-02-13 RX ADMIN — WATER 40 MG: 1 INJECTION INTRAMUSCULAR; INTRAVENOUS; SUBCUTANEOUS at 13:00

## 2025-02-13 RX ADMIN — DEXTROSE AND SODIUM CHLORIDE: 5; .9 INJECTION, SOLUTION INTRAVENOUS at 10:28

## 2025-02-13 RX ADMIN — IPRATROPIUM BROMIDE AND ALBUTEROL SULFATE 1 DOSE: 2.5; .5 SOLUTION RESPIRATORY (INHALATION) at 18:55

## 2025-02-13 RX ADMIN — METOPROLOL SUCCINATE 25 MG: 25 TABLET, EXTENDED RELEASE ORAL at 08:37

## 2025-02-13 RX ADMIN — PIPERACILLIN AND TAZOBACTAM 3375 MG: 3; .375 INJECTION, POWDER, LYOPHILIZED, FOR SOLUTION INTRAVENOUS at 13:04

## 2025-02-13 RX ADMIN — BUPRENORPHINE HYDROCHLORIDE AND NALOXONE HYDROCHLORIDE DIHYDRATE 1 TABLET: 8; 2 TABLET SUBLINGUAL at 08:38

## 2025-02-13 RX ADMIN — IPRATROPIUM BROMIDE AND ALBUTEROL SULFATE 1 DOSE: .5; 2.5 SOLUTION RESPIRATORY (INHALATION) at 00:13

## 2025-02-13 RX ADMIN — FUROSEMIDE 20 MG: 10 INJECTION, SOLUTION INTRAMUSCULAR; INTRAVENOUS at 08:40

## 2025-02-13 RX ADMIN — IPRATROPIUM BROMIDE AND ALBUTEROL SULFATE 1 DOSE: .5; 2.5 SOLUTION RESPIRATORY (INHALATION) at 07:01

## 2025-02-13 RX ADMIN — IPRATROPIUM BROMIDE AND ALBUTEROL SULFATE 1 DOSE: 2.5; .5 SOLUTION RESPIRATORY (INHALATION) at 14:04

## 2025-02-13 RX ADMIN — SODIUM CHLORIDE, PRESERVATIVE FREE 10 ML: 5 INJECTION INTRAVENOUS at 08:45

## 2025-02-13 RX ADMIN — IPRATROPIUM BROMIDE AND ALBUTEROL SULFATE 1 DOSE: 2.5; .5 SOLUTION RESPIRATORY (INHALATION) at 22:43

## 2025-02-13 RX ADMIN — SPIRONOLACTONE 25 MG: 25 TABLET ORAL at 08:37

## 2025-02-13 RX ADMIN — SODIUM CHLORIDE, PRESERVATIVE FREE 10 ML: 5 INJECTION INTRAVENOUS at 20:31

## 2025-02-13 RX ADMIN — PIPERACILLIN AND TAZOBACTAM 3375 MG: 3; .375 INJECTION, POWDER, LYOPHILIZED, FOR SOLUTION INTRAVENOUS at 20:29

## 2025-02-13 RX ADMIN — SACUBITRIL AND VALSARTAN 1 TABLET: 24; 26 TABLET, FILM COATED ORAL at 20:25

## 2025-02-13 RX ADMIN — INSULIN LISPRO 2 UNITS: 100 INJECTION, SOLUTION INTRAVENOUS; SUBCUTANEOUS at 20:37

## 2025-02-13 RX ADMIN — FAMOTIDINE 20 MG: 10 INJECTION INTRAVENOUS at 20:26

## 2025-02-13 RX ADMIN — ATORVASTATIN CALCIUM 40 MG: 40 TABLET, FILM COATED ORAL at 20:26

## 2025-02-13 RX ADMIN — FUROSEMIDE 20 MG: 10 INJECTION, SOLUTION INTRAMUSCULAR; INTRAVENOUS at 18:07

## 2025-02-13 RX ADMIN — IPRATROPIUM BROMIDE AND ALBUTEROL SULFATE 1 DOSE: .5; 2.5 SOLUTION RESPIRATORY (INHALATION) at 11:23

## 2025-02-13 RX ADMIN — SACUBITRIL AND VALSARTAN 1 TABLET: 24; 26 TABLET, FILM COATED ORAL at 08:37

## 2025-02-13 RX ADMIN — AMIODARONE HYDROCHLORIDE 100 MG: 200 TABLET ORAL at 08:35

## 2025-02-13 NOTE — PLAN OF CARE
Problem: Respiratory - Adult  Goal: Achieves optimal ventilation and oxygenation  Outcome: Progressing  Flowsheets  Taken 2/13/2025 1229 by Chi Fletcher RN  Achieves optimal ventilation and oxygenation:   Assess for changes in respiratory status   Assess for changes in mentation and behavior   Oxygen supplementation based on oxygen saturation or arterial blood gases   Position to facilitate oxygenation and minimize respiratory effort  Taken 2/13/2025 0302 by Dionne Guzmán RN  Achieves optimal ventilation and oxygenation: Assess for changes in respiratory status  Note: Patient is currently on Vapotherm 90% and 30L. He is oriented to person, place and time. Educated the patient on vapotherm. He verbalized understanding.      Problem: Cardiovascular - Adult  Goal: Maintains optimal cardiac output and hemodynamic stability  Recent Flowsheet Documentation  Taken 2/13/2025 0302 by Dionne Guzmán RN  Maintains optimal cardiac output and hemodynamic stability: Monitor blood pressure and heart rate  Goal: Absence of cardiac dysrhythmias or at baseline  Recent Flowsheet Documentation  Taken 2/13/2025 0302 by Dionne Guzmán RN  Absence of cardiac dysrhythmias or at baseline: Monitor cardiac rate and rhythm

## 2025-02-13 NOTE — PLAN OF CARE
Problem: Safety - Adult  Goal: Free from fall injury  2/12/2025 1941 by Dionen Guzmán RN  Outcome: Progressing  2/12/2025 0804 by Julianne Drummond RN  Outcome: Progressing     Problem: Pain  Goal: Verbalizes/displays adequate comfort level or baseline comfort level  2/12/2025 1941 by Dionne Guzmán RN  Outcome: Progressing  2/12/2025 0804 by Julianne Drummond RN  Outcome: Progressing     Problem: Skin/Tissue Integrity  Goal: Skin integrity remains intact  Description: 1.  Monitor for areas of redness and/or skin breakdown  2.  Assess vascular access sites hourly  3.  Every 4-6 hours minimum:  Change oxygen saturation probe site  4.  Every 4-6 hours:  If on nasal continuous positive airway pressure, respiratory therapy assess nares and determine need for appliance change or resting period  2/12/2025 1941 by Dionne Guzmán RN  Outcome: Progressing  Flowsheets (Taken 2/12/2025 1940)  Skin Integrity Remains Intact:   Monitor for areas of redness and/or skin breakdown   Assess vascular access sites hourly  2/12/2025 0804 by Julianne Drummond RN  Outcome: Progressing     Problem: Respiratory - Adult  Goal: Achieves optimal ventilation and oxygenation  2/12/2025 1941 by Dionne Guzmán RN  Outcome: Progressing     Problem: Skin/Tissue Integrity - Adult  Goal: Skin integrity remains intact  Description: 1.  Monitor for areas of redness and/or skin breakdown  2.  Assess vascular access sites hourly  3.  Every 4-6 hours minimum:  Change oxygen saturation probe site  4.  Every 4-6 hours:  If on nasal continuous positive airway pressure, respiratory therapy assess nares and determine need for appliance change or resting period  2/12/2025 1941 by Dionne Guzmán RN  Outcome: Progressing  Flowsheets (Taken 2/12/2025 1940)  Skin Integrity Remains Intact:   Monitor for areas of redness and/or skin breakdown   Assess vascular access sites hourly  2/12/2025 0804 by Julianne Drummond RN  Outcome: Progressing

## 2025-02-13 NOTE — PROGRESS NOTES
02/13/25 0302   Oxygen Therapy/Pulse Ox   O2 Therapy Oxygen humidified   O2 Device Heated high flow cannula   O2 Flow Rate (L/min) 30 L/min   FiO2  90 %   Pulse 77   Respirations 20   SpO2 96 %   Skin Assessment Clean, dry, & intact

## 2025-02-13 NOTE — PROGRESS NOTES
Blood pressure (!) 149/90, pulse 89, temperature 98.6 °F (37 °C), temperature source Oral, resp. rate 27, height 1.78 m (5' 10.08\"), weight 67 kg (147 lb 11.3 oz), SpO2 94%.    Patient is currently on VAPOTHERM at 30 LPM and 90% FiO2.   Patient is tolerating Vapo-Therm well at this time.     Shift assessment complete. See doc flow. Nightly medications given see MAR. Patient with no complaints at this time. Call light and bedside table within easy reach.     Electronically signed by Dionne Guzmán RN on 2/12/2025 at 8:05 PM

## 2025-02-13 NOTE — PROGRESS NOTES
02/13/25 0139   NIV Type   NIV Started/Stopped (S)  Off   Assessment   Pulse 74   Respirations 19   SpO2 90 %

## 2025-02-13 NOTE — PROGRESS NOTES
Patient called writer into room. States he wanted to go through his belongings in the closet. After going through everything writer put clothes back into the closet. Patient then started yelling saying he is sick of not eating, he is starving, he can't get any better without eating and he's going to prove it. Pt then through the oxygen off of him, stood up, waked to the closet to get his belongings and said he was leaving no matter what anyone said here and he was having a taxi pick him up. Writer helped balance patient while standing. [facility] called. Staff assisted patient back into bed, Vapotherm put back on with saturations now in the 70's, and talked him into staying until in the morning when he can talk to the doctors about eating today.   Patient now apologizing for his actions, but is hoping to eat today.

## 2025-02-13 NOTE — PROGRESS NOTES
RT Inhaler-Nebulizer Bronchodilator Protocol Note    There is a bronchodilator order in the chart from a provider indicating to follow the RT Bronchodilator Protocol and there is an “Initiate RT Inhaler-Nebulizer Bronchodilator Protocol” order as well (see protocol at bottom of note).    CXR Findings:  No results found.    The findings from the last RT Protocol Assessment were as follows:   History Pulmonary Disease: Chronic pulmonary disease  Respiratory Pattern: Dyspnea on exertion or RR 21-25 bpm  Breath Sounds: Slightly diminished and/or crackles  Cough: Strong, spontaneous, non-productive  Indication for Bronchodilator Therapy: Decreased or absent breath sounds  Bronchodilator Assessment Score: 6    Aerosolized bronchodilator medication orders have been revised according to the RT Inhaler-Nebulizer Bronchodilator Protocol below.    Respiratory Therapist to perform RT Therapy Protocol Assessment initially then follow the protocol.  Repeat RT Therapy Protocol Assessment PRN for score 0-3 or on second treatment, BID, and PRN for scores above 3.    No Indications - adjust the frequency to every 6 hours PRN wheezing or bronchospasm, if no treatments needed after 48 hours then discontinue using Per Protocol order mode.     If indication present, adjust the RT bronchodilator orders based on the Bronchodilator Assessment Score as indicated below.  Use Inhaler orders unless patient has one or more of the following: on home nebulizer, not able to hold breath for 10 seconds, is not alert and oriented, cannot activate and use MDI correctly, or respiratory rate 25 breaths per minute or more, then use the equivalent nebulizer order(s) with same Frequency and PRN reasons based on the score.  If a patient is on this medication at home then do not decrease Frequency below that used at home.    0-3 - enter or revise RT bronchodilator order(s) to equivalent RT Bronchodilator order with Frequency of every 4 hours PRN for wheezing  or increased work of breathing using Per Protocol order mode.        4-6 - enter or revise RT Bronchodilator order(s) to two equivalent RT bronchodilator orders with one order with BID Frequency and one order with Frequency of every 4 hours PRN wheezing or increased work of breathing using Per Protocol order mode.        7-10 - enter or revise RT Bronchodilator order(s) to two equivalent RT bronchodilator orders with one order with TID Frequency and one order with Frequency of every 4 hours PRN wheezing or increased work of breathing using Per Protocol order mode.       11-13 - enter or revise RT Bronchodilator order(s) to one equivalent RT bronchodilator order with QID Frequency and an Albuterol order with Frequency of every 4 hours PRN wheezing or increased work of breathing using Per Protocol order mode.      Greater than 13 - enter or revise RT Bronchodilator order(s) to one equivalent RT bronchodilator order with every 4 hours Frequency and an Albuterol order with Frequency of every 2 hours PRN wheezing or increased work of breathing using Per Protocol order mode.     PATIENT WILL BENEFIT FROM TREATMENTS.     Electronically signed by Alexa Camarillo RCP on 2/13/2025 at 6:59 PM

## 2025-02-13 NOTE — PROGRESS NOTES
Speech Language Pathology  Swallowing Disorders and Dysphagia    Dysphagia Treatment/Follow-Up Note  Facility/Department: [facility] ICU    Recommendations: SLP recommends to advance to IDDSI 6 Soft and Bite Sized Solids; IDDSI 0 Thin Liquids, single sips - NO straws ; Meds whole in puree  Risk Management: upright for all intake, stay upright for at least 30 mins after intake, small bites/sips, close supervision, oral care 2-3x/day to reduce adverse affects in the event of aspiration, increase physical mobility as able, slow rate of intake, STRICT aspiration precautions, and hold PO and contact SLP if s/s of aspiration or worsening respiratory status develop.     NAME:Dayton Soto  : 1965 (59 y.o.)   MRN: [id number]  ROOM: [address] [place] [address]  ADMISSION DATE: 2025  PATIENT DIAGNOSIS(ES): Acute respiratory failure [J96.00]  Methamphetamine use (HCC) [F15.10]  Elevated troponin [R79.89]  Acute respiratory failure with hypoxia [J96.01]  Multifocal pneumonia [J18.9]  No Known Allergies    DATE ONSET: 2025    Pain: The patient does not complain of pain       Current Diet: ADULT DIET; Dysphagia - Soft and Bite Sized; 4 carb choices (60 gm/meal); No Drinking Straws      Dysphagia Treatment and Impressions:  Subjective: Pt seen in room at bedside with RN (Lisset/Christoph) permission  Noteworthy events reported/Chart Review: Pt insisting he wants to eat. Had bronch yesterday and now on vapotherm 30LPM, FiO2: 70  Patient tolerance to current diet and treatment:PT adamantly stating that he wants to eat. O2 requirements too high for transport to radiology for MBS this ay, and pt anxious and agitated and not a candidate for FEES at this time. [facility] called overnight.      Respiratory Status: Pt with SPO2% of 83-92 on 30 LPM HFNC with RR of 19-33  Oral Care Status:    Edentulous  Oral Care Completed?   [x] Yes   [] No  Completed by pt after set up by SLP via pasted toothbrush    Liquid PO Trials:   Ice chips: Oral swallow  Recommended compensatory strategies, Aspiration precautions, POC, Evidenced based practice for current recommendations and treatment, techniques to improve respiratory-swallow coordination, and Importance of oral care to reduce adverse affects in the event of aspiration. Pt would benefit from ongoing education and RN aware of recommendations  Assessment: Pt currently NPO . Poor carryover of recommended compensatory strategies.     Recommendations: SLP recommends to advance to IDDSI 6 Soft and Bite Sized Solids; IDDSI 0 Thin Liquids - NO straws , single sips ; Meds whole in puree  Risk Management: upright for all intake, stay upright for at least 30 mins after intake, small bites/sips, close supervision, oral care 2-3x/day to reduce adverse affects in the event of aspiration, increase physical mobility as able, slow rate of intake, STRICT aspiration precautions, and hold PO and contact SLP if s/s of aspiration or worsening respiratory status develop.     Dysphagia Goals:     Short Term Goals:  Timeframe for Short Term Goals: (5 days 02/15/2025)  Goal 1: The patient will tolerate repeat BSE when able for ongoing assessment 02/13 GOAL MET  Goal 2: The patient will tolerate recommended diet with no clinical s/s of aspiration 5/5 2/13/2025 : Goal addressed, see above. Ongoing, progressing.  Goal 3: The patient will recall/perform recommended compensatory strategies given min cues 2/13/2025 : Goal addressed, see above. Ongoing, progressing.    Long Term Goals:   Timeframe for Long Term Goals: (7 days 02/17/2025)  Goal 1: The patient will tolerate least restrictive diet with no clinical s/s of aspiration or worsening respiratory/pulmonary status 2/13/2025 : Goal addressed, see above. Ongoing, progressing.                  Speech/Language/Cog Goals: N/A                        Plan:    Continued Dysphagia treatment with goals per plan of care.    Discharge Recommendations: SLP at discharge is pending clinical

## 2025-02-13 NOTE — PROGRESS NOTES
IM Progress Note    Admit Date:  2/5/2025  8    Interval history:  59 y.o. male who presented to [facility] with septic shock.  PMHx significant for polysubstance use-methamphetamine, A-fib, valvular disease, on Eliquis on amiodarone, MRSA, cardiomyopathy      Intubated       Subjective:     Mr. Soto underwent bronchoscopy yesterday.  His oxygen requirement has been high ever since.  Currently on Vapotherm.  Requesting to eat.    Objective:   BP (!) 146/87   Pulse 78   Temp 98.6 °F (37 °C) (Oral)   Resp 19   Ht 1.78 m (5' 10.08\")   Wt 67 kg (147 lb 11.3 oz)   SpO2 99%   BMI 21.15 kg/m²     Intake/Output Summary (Last 24 hours) at 2/13/2025 0732  Last data filed at 2/13/2025 0600  Gross per 24 hour   Intake 336.07 ml   Output 1525 ml   Net -1188.93 ml       Physical Exam:      General:  middle aged male   . Appears to be not in any distress  Mucous Membranes:  Pink , anicteric  Neck: No JVD, no carotid bruit, no thyromegaly  Chest: improving leatha air entry with resolved crackles,    Cardiovascular:  RRR S1S2 heard, no murmurs or gallops  Abdomen:  Soft, undistended, non tender, no organomegaly, BS present  Extremities: No edema or cyanosis. Distal pulses well felt  Neurological :  on vapotherm      Medications:   Scheduled Medications:    haloperidol lactate  5 mg IntraMUSCular Once    diphenhydrAMINE  25 mg IntraVENous Once    diazePAM  5 mg IntraVENous Once    amiodarone  100 mg Oral Daily    furosemide  20 mg IntraVENous Daily    metoprolol succinate  25 mg Oral Daily    sacubitril-valsartan  1 tablet Oral BID    spironolactone  25 mg Oral Daily    atorvastatin  40 mg Oral Nightly    buprenorphine-naloxone  1 tablet SubLINGual BID    insulin lispro  0-4 Units SubCUTAneous Q4H    sodium chloride flush  5-40 mL IntraVENous 2 times per day    famotidine (PEPCID) injection  20 mg IntraVENous BID    ipratropium 0.5 mg-albuterol 2.5 mg  1 Dose Inhalation Q6H     I   dextrose 5 % and 0.9 % NaCl Stopped

## 2025-02-13 NOTE — PROGRESS NOTES
Pulmonary & Critical Care Medicine ICU Progress Note    CC: PNA    Events of Last 24 hours:   BiPAP for 30 min overnight   Vapotherm:   Flow 30 LPM   FiO2 70% FiO2   Precedex off    D5 NS 50 cc/hr       Invasive Lines: Right femoral cvc- removed 2/10    MV:  2/5/25-2/9        Intake/Output Summary (Last 24 hours) at 2/13/2025 0611  Last data filed at 2/13/2025 0600  Gross per 24 hour   Intake 1588.25 ml   Output 2025 ml   Net -436.75 ml       Vent Mode: AC/VC Resp Rate (Set): 22 bpm/Vt (Set, mL): 450 mL/ /FiO2 : 90 %  No results for input(s): \"PHART\", \"[id number]\", \"PO2ART\" in the last 72 hours.      IV:   dextrose 5 % and 0.9 % NaCl Stopped (02/12/25 1304)    dextrose      sodium chloride 5 mL/hr at 02/10/25 0629       Vitals:  BP (!) 160/94   Pulse 80   Temp 98.6 °F (37 °C) (Oral)   Resp 29   Ht 1.78 m (5' 10.08\")   Wt 67 kg (147 lb 11.3 oz)   SpO2 100%   BMI 21.15 kg/m²   on Vapotherm   Constitutional:  No acute distress. .   HEENT: no scleral icterus  Neck: No tracheal deviation present.    Cardiovascular: Normal heart sounds.   Pulmonary/Chest: Few wheezes. + rhonchi. Few rales. No decreased breath sounds.  No accessory muscle usage or stridor.   Abdominal: Soft.   Musculoskeletal: No cyanosis. No clubbing.  Skin: Skin is warm and dry.        Scheduled Meds:   haloperidol lactate  5 mg IntraMUSCular Once    diphenhydrAMINE  25 mg IntraVENous Once    diazePAM  5 mg IntraVENous Once    amiodarone  100 mg Oral Daily    furosemide  20 mg IntraVENous Daily    metoprolol succinate  25 mg Oral Daily    sacubitril-valsartan  1 tablet Oral BID    spironolactone  25 mg Oral Daily    atorvastatin  40 mg Oral Nightly    buprenorphine-naloxone  1 tablet SubLINGual BID    insulin lispro  0-4 Units SubCUTAneous Q4H    sodium chloride flush  5-40 mL IntraVENous 2 times per day    famotidine (PEPCID) injection  20 mg IntraVENous BID    ipratropium 0.5 mg-albuterol 2.5 mg  1 Dose Inhalation Q6H     PRN Meds:  glucose,  ventilator was adjusted by me at the bedside for unstable, life threatening respiratory failure. Wean oxygen as tolerated.  Precedex is ok for BiPAP tolerance   Inhaled bronchodilators  Trial of IV Solumedrol   Bilevel non-invasive positive pressure ventilation per my orders. The ventilator was adjusted by me at the bedside for unstable, life threatening respiratory failure. Wean oxygen as tolerated.  Zosyn IV   Completed Augmentin D#2, completed Levaquin D#3, completed Zyvox, cefepime and Flagyl for 3 days.   Follow up Cx   Home medications were addressed   Speech pathology evaluation - MBS today   Holding Eliquis and monitor hemoptysis   Lasix and aldactone daily   Resumed Amiodarone   D5NS-plan for po intake today   Blood sugar control ISS, with goal 150-180  GI prophylaxis: Pepcid  DVT prophylaxis: holding Eliquis   MRSA prophylaxis: Bactroban      This patient is critically ill. I spent 32 minutes directly engaged in the delivery of critical care to this patient, which was directed towards the patient's critical illness as listed above. This included direct patient contact, management of life support systems review of data (i.e.: imaging and lab), discussion with team members, and this time excludes time spent on procedures.

## 2025-02-13 NOTE — PROGRESS NOTES
The patients sister Keira called in for an update. The patient verbalized that the nursing staff could update Keira. Updated provided. Patient has been threatening to leave if he is not able to get a diet ordered. He is currently on 100% FiO2 and 30 L vapotherm. He continued to insist on food. Keira verbalized understanding and asked to have the nursing staff ask Mr. Soto to  call her back. Spoke with Mr. Soto. He verbalized he will call her back today. Electronically signed by Chi Fletcher RN on 2/13/2025 at 12:01 PM

## 2025-02-13 NOTE — PROGRESS NOTES
HEART FAILURE CARE PLAN:    Comorbidities Reviewed: Yes   Patient has a past medical history of Back pain, chronic, Chronic back pain, Hypertension, MI (myocardial infarction) (HCC), and MRSA (methicillin resistant staph aureus) culture positive.     Weights Reviewed: Yes   Admission weight: 64.3 kg (141 lb 12.8 oz)   Wt Readings from Last 3 Encounters:   02/11/25 67 kg (147 lb 11.3 oz)   02/01/25 62.3 kg (137 lb 4.8 oz)   07/09/24 56.3 kg (124 lb 1.6 oz)     Intake & Output Reviewed: Yes     Intake/Output Summary (Last 24 hours) at 2/12/2025 1942  Last data filed at 2/12/2025 0915  Gross per 24 hour   Intake 1252.18 ml   Output 1650 ml   Net -397.82 ml       ECHOCARDIOGRAM Reviewed: Yes   Patient's Ejection Fraction (EF) is less than or equal to 40%. Discuss HFrEF Guideline Directed Medical Therapy (GDMT) with Cardiologist or Hospitalist:          Medications Reviewed: Yes   SCHEDULED HOSPITAL MEDICATIONS:   amiodarone  100 mg Oral Daily    furosemide  20 mg IntraVENous Daily    metoprolol succinate  25 mg Oral Daily    sacubitril-valsartan  1 tablet Oral BID    spironolactone  25 mg Oral Daily    atorvastatin  40 mg Oral Nightly    buprenorphine-naloxone  1 tablet SubLINGual BID    insulin lispro  0-4 Units SubCUTAneous Q4H    sodium chloride flush  5-40 mL IntraVENous 2 times per day    famotidine (PEPCID) injection  20 mg IntraVENous BID    ipratropium 0.5 mg-albuterol 2.5 mg  1 Dose Inhalation Q6H     HOME MEDICATIONS:  Prior to Admission medications    Medication Sig Start Date End Date Taking? Authorizing Provider   amiodarone (CORDARONE) 200 MG tablet 2 bid - 5 days, 2 qd- 1 week, 1 qd thereafter 1/31/25   Brittany Shin MD   empagliflozin (JARDIANCE) 10 MG tablet Take 1 tablet by mouth daily 1/31/25   Brittany Shin MD   apixaban (ELIQUIS) 5 MG TABS tablet Take 1 tablet by mouth 2 times daily 1/31/25   Brittany Shin MD   metoprolol succinate (TOPROL XL) 25 MG extended  release tablet Take 0.5 tablets by mouth daily 2/1/25   Brittany Shin MD   sacubitril-valsartan (ENTRESTO) 24-26 MG per tablet Take 0.5 tablets by mouth 2 times daily 1/31/25   Brittany Shin MD   predniSONE (DELTASONE) 20 MG tablet 2 qd- 2 days, 1 1/2 qd- 3 days, 1 qd- 3 days, 1/2 qd- 3 days 2/1/25   Brittany Shin MD   pantoprazole (PROTONIX) 40 MG tablet Take 1 tablet by mouth 2 times daily (before meals) 1/31/25   Brittany Shin MD   aspirin 81 MG EC tablet Take 1 tablet by mouth daily (Always take with food) 7/10/24   Andrade Aocsta MD   spironolactone (ALDACTONE) 25 MG tablet Take 1 tablet by mouth daily Hold for SBP<95 mmHg 7/10/24   Andrade Acosta MD   furosemide (LASIX) 20 MG tablet Take 1 tablet by mouth daily 7/10/24   Andrade Acosta MD   Buprenorphine HCl-Naloxone HCl (SUBOXONE SL) Place 20 mg under the tongue daily Pt takes (2) 8 mg tablets and (2) 2 mg tablets daily in the morning    Provider, MD Dirk      Diet Reviewed: Yes   Diet NPO    Goal of Care Reviewed: Yes   Patient and/or Family's stated Goal of Care this Admission: Reduce shortness of breath, increase activity tolerance, better understand heart failure and disease management, be more comfortable, and reduce lower extremity edema prior to discharge.     Electronically signed by Dionne Guzmán RN on 2/12/2025 at 7:42 PM

## 2025-02-13 NOTE — PROGRESS NOTES
[facility] Daily Progress Note      Admit Date:  2/5/2025    Subjective:  Mr. Soto is seen for chest pain fluttering and CHF    Today no c/o chest pain. Difficult to maintain oxygenation as he keeps taking it off. He is on 100% Fio2  Intermittent hemoptysis Apixaban on Hold since yesterday and no hemoptysis today.  Mucous plugs on Bronchoscopy yesterday.   No Chest pain.        History of Present Illness:  Dayton Soto is a 59 y.o. patient who presented to [facility] 2/5/2025 with acute respiratory distress and was intubated.  He had admitted to smoking methamphetamines a few hours prior to coming to hospital.         Dayton Soto is a 59 y.o. patient with past medical history for substance abuse/methamphetamine abuse, cardiomyopathy with moderate LV dysfunction, PAF who presented to the with acute respiratory distress.  Patient was reportedly admitted to smoking methamphetamines a few hours before coming to the hospital. emergently had to be intubated.  Patient difficult to oxygenate and remain severely hypoxemic for several hours.  Patient also with septic shock on Levophed and vasopressin.  Severe acidosis with a pH around 6.9 which is slowly improving to 7.2 today.  Patient troponin also elevated and he was started on heparin drip.  Chest x-ray shows extensive bilateral infiltrates.  He remains intubated, sedated, proned and on paralytics.  He remains critically ill.     ROS:  12 point ROS negative in all areas as listed below except as in [place]  Constitutional, EENT,GI, , Musculoskeletal, skin, neurological, hematological, endocrine, Psychiatric    Past Medical History:   Diagnosis Date    Back pain, chronic     Chronic back pain     Hypertension     MI (myocardial infarction) (HCC)     MRSA (methicillin resistant staph aureus) culture positive 02/26/2020    RIGHT FOREARM     Past Surgical History:   Procedure Laterality Date    BRONCHOSCOPY N/A 2/12/2025    BRONCHOSCOPY ALVEOLAR LAVAGE performed      glucose, dextrose bolus **OR** dextrose bolus, glucagon (rDNA), dextrose, sodium chloride flush, sodium chloride, potassium chloride **OR** potassium alternative oral replacement **OR** potassium chloride, magnesium sulfate, ondansetron **OR** ondansetron, polyethylene glycol, acetaminophen **OR** acetaminophen    Lab Data:  CBC:   Recent Labs     02/11/25  0520 02/12/25  1039 02/13/25  0514   WBC 10.5 12.2* 10.3   HGB 11.0* 11.6* 10.7*   HCT 32.9* 35.4* 31.1*   MCV 91.3 91.3 90.1    300 270     BMP:   Recent Labs     02/11/25  0520 02/12/25  1039 02/13/25  0514    143 142   K 3.7 4.0 3.7    102 105   CO2 30 32 29   BUN 30* 21* 19   CREATININE 1.2 0.9 0.8*     LIVER PROFILE: No results for input(s): \"AST\", \"ALT\", \"LIPASE\", \"AMYLASE\", \"BILIDIR\", \"BILITOT\", \"ALKPHOS\" in the last 72 hours.    Invalid input(s): \"ALB\"  PT/INR: No results for input(s): \"PROTIME\", \"INR\" in the last 72 hours.  APTT: No results for input(s): \"APTT\" in the last 72 hours.  BNP:  No results for input(s): \"BNP\" in the last 72 hours.  IMAGING: xiomy currently 26 today   2/9/2025 was 46     EKG:  I have reviewed EKG with the following interpretation:  Impression:    EKG 2/11/25  Normal sinus rhythmLeft ventricular hypertrophy with repolarization abnormalityProlonged QT      CXR 2/10/25  IMPRESSION:  Worsening multifocal airspace disease, likely pneumonia.  Pulmonary edema  could have a similar appearance.     Small bilateral pleural effusions.     EKG 2/9/25  Normal sinus rhythmPossible Left atrial enlargementIncomplete right bundle branch blockNonspecific ST and T wave abnormality 77 bpm     EKG 2/5/25  Sinus tachycardiaLeft axis deviationLeft bundle branch blockMinimal voltage criteria for LVH, may be normal variantT wave abnormality, consider lateral ischemia 103 bpm     CXR 2/7/25  1. Dense pneumonic infiltrates in the right lower lung field and in the left  mid and lower lung field, with or without atelectatic  Bronchoscopy today  Swallow eval today

## 2025-02-13 NOTE — PROGRESS NOTES
02/13/25 0107   NIV Type   NIV Started/Stopped (S)  On   Equipment Type V60   Mode Bilevel   Mask Type Full face mask   Mask Size Medium   Assessment   Pulse 83   Respirations 21   /89   SpO2 96 %   Level of Consciousness 0   Comfort Level Good   Using Accessory Muscles No   Mask Compliance Good   Skin Assessment Clean, dry, & intact   Skin Protection for O2 Device Yes   Settings/Measurements   IPAP 12 cmH20   CPAP/EPAP 6 cmH2O   Vt (Measured) 720 mL   Rate Ordered 18   FiO2  70 %   Minute Volume (L/min) 15.4 Liters   Mask Leak (lpm) 3 lpm   Patient's Home Machine No   Alarm Settings   Alarms On Y

## 2025-02-13 NOTE — PROGRESS NOTES
Taylor higginbotham called on this patient. RN in the room called out for help. High o2 requirement, had bronch today with thick mucus plugs on vapotherm. Angry about nothing to eat. Was to have swallow eval yesterday, appears plan is now for today? Says he will leave if he doesn't eat. Able to be talked down with staff but ultimately ok to wait till am to see what happens. May be of benefit to have him first on the list for the ordered barium swallow.     Electronically signed by Shandra Kumar DO on 2/13/2025 at 4:40 AM

## 2025-02-13 NOTE — PROGRESS NOTES
02/12/25 9983   Oxygen Therapy/Pulse Ox   O2 Therapy Oxygen humidified   O2 Device Heated high flow cannula   O2 Flow Rate (L/min) 30 L/min   FiO2  90 %   Pulse 72   Respirations 21   SpO2 92 %

## 2025-02-14 ENCOUNTER — APPOINTMENT (OUTPATIENT)
Dept: GENERAL RADIOLOGY | Age: 60
DRG: 720 | End: 2025-02-14
Payer: MEDICAID

## 2025-02-14 LAB
ANION GAP SERPL CALCULATED.3IONS-SCNC: 11 MMOL/L (ref 3–16)
BACTERIA SPEC RESP CULT: NORMAL
BACTERIA SPEC RESP CULT: NORMAL
BASOPHILS # BLD: 0 K/UL (ref 0–0.2)
BASOPHILS NFR BLD: 0.3 %
BUN SERPL-MCNC: 22 MG/DL (ref 7–20)
CALCIUM SERPL-MCNC: 7.9 MG/DL (ref 8.3–10.6)
CHLORIDE SERPL-SCNC: 98 MMOL/L (ref 99–110)
CO2 SERPL-SCNC: 31 MMOL/L (ref 21–32)
CREAT SERPL-MCNC: 1 MG/DL (ref 0.9–1.3)
DEPRECATED RDW RBC AUTO: 13.5 % (ref 12.4–15.4)
EOSINOPHIL # BLD: 0 K/UL (ref 0–0.6)
EOSINOPHIL NFR BLD: 0 %
GFR SERPLBLD CREATININE-BSD FMLA CKD-EPI: 86 ML/MIN/{1.73_M2}
GLUCOSE BLD-MCNC: 122 MG/DL (ref 70–99)
GLUCOSE BLD-MCNC: 134 MG/DL (ref 70–99)
GLUCOSE BLD-MCNC: 151 MG/DL (ref 70–99)
GLUCOSE BLD-MCNC: 188 MG/DL (ref 70–99)
GLUCOSE BLD-MCNC: 211 MG/DL (ref 70–99)
GLUCOSE SERPL-MCNC: 179 MG/DL (ref 70–99)
GRAM STN SPEC: NORMAL
GRAM STN SPEC: NORMAL
HCT VFR BLD AUTO: 34.8 % (ref 40.5–52.5)
HGB BLD-MCNC: 11.5 G/DL (ref 13.5–17.5)
LYMPHOCYTES # BLD: 0.4 K/UL (ref 1–5.1)
LYMPHOCYTES NFR BLD: 3.7 %
MCH RBC QN AUTO: 29.7 PG (ref 26–34)
MCHC RBC AUTO-ENTMCNC: 33.1 G/DL (ref 31–36)
MCV RBC AUTO: 89.9 FL (ref 80–100)
MONOCYTES # BLD: 0.8 K/UL (ref 0–1.3)
MONOCYTES NFR BLD: 6.9 %
NEUTROPHILS # BLD: 9.9 K/UL (ref 1.7–7.7)
NEUTROPHILS NFR BLD: 89.1 %
PERFORMED ON: ABNORMAL
PLATELET # BLD AUTO: 290 K/UL (ref 135–450)
PMV BLD AUTO: 7.8 FL (ref 5–10.5)
POTASSIUM SERPL-SCNC: 4.2 MMOL/L (ref 3.5–5.1)
RBC # BLD AUTO: 3.88 M/UL (ref 4.2–5.9)
SODIUM SERPL-SCNC: 140 MMOL/L (ref 136–145)
WBC # BLD AUTO: 11.1 K/UL (ref 4–11)

## 2025-02-14 PROCEDURE — 2500000003 HC RX 250 WO HCPCS: Performed by: INTERNAL MEDICINE

## 2025-02-14 PROCEDURE — 71045 X-RAY EXAM CHEST 1 VIEW: CPT

## 2025-02-14 PROCEDURE — 6360000002 HC RX W HCPCS: Performed by: INTERNAL MEDICINE

## 2025-02-14 PROCEDURE — 2580000003 HC RX 258: Performed by: INTERNAL MEDICINE

## 2025-02-14 PROCEDURE — 94660 CPAP INITIATION&MGMT: CPT

## 2025-02-14 PROCEDURE — 85025 COMPLETE CBC W/AUTO DIFF WBC: CPT

## 2025-02-14 PROCEDURE — 2000000000 HC ICU R&B

## 2025-02-14 PROCEDURE — 6360000002 HC RX W HCPCS: Performed by: PEDIATRICS

## 2025-02-14 PROCEDURE — 6370000000 HC RX 637 (ALT 250 FOR IP): Performed by: INTERNAL MEDICINE

## 2025-02-14 PROCEDURE — 99233 SBSQ HOSP IP/OBS HIGH 50: CPT | Performed by: INTERNAL MEDICINE

## 2025-02-14 PROCEDURE — 80048 BASIC METABOLIC PNL TOTAL CA: CPT

## 2025-02-14 PROCEDURE — 2700000000 HC OXYGEN THERAPY PER DAY

## 2025-02-14 PROCEDURE — 2500000003 HC RX 250 WO HCPCS

## 2025-02-14 PROCEDURE — 99232 SBSQ HOSP IP/OBS MODERATE 35: CPT | Performed by: INTERNAL MEDICINE

## 2025-02-14 PROCEDURE — 94761 N-INVAS EAR/PLS OXIMETRY MLT: CPT

## 2025-02-14 PROCEDURE — 94669 MECHANICAL CHEST WALL OSCILL: CPT

## 2025-02-14 PROCEDURE — 99291 CRITICAL CARE FIRST HOUR: CPT | Performed by: INTERNAL MEDICINE

## 2025-02-14 PROCEDURE — 36415 COLL VENOUS BLD VENIPUNCTURE: CPT

## 2025-02-14 PROCEDURE — 94640 AIRWAY INHALATION TREATMENT: CPT

## 2025-02-14 PROCEDURE — 92526 ORAL FUNCTION THERAPY: CPT

## 2025-02-14 PROCEDURE — 6370000000 HC RX 637 (ALT 250 FOR IP): Performed by: PEDIATRICS

## 2025-02-14 RX ORDER — DIAZEPAM 10 MG/2ML
5 INJECTION, SOLUTION INTRAMUSCULAR; INTRAVENOUS ONCE
Status: COMPLETED | OUTPATIENT
Start: 2025-02-14 | End: 2025-02-14

## 2025-02-14 RX ORDER — FUROSEMIDE 10 MG/ML
40 INJECTION INTRAMUSCULAR; INTRAVENOUS ONCE
Status: COMPLETED | OUTPATIENT
Start: 2025-02-14 | End: 2025-02-14

## 2025-02-14 RX ORDER — DEXMEDETOMIDINE HYDROCHLORIDE 4 UG/ML
.1-2 INJECTION, SOLUTION INTRAVENOUS CONTINUOUS
Status: DISCONTINUED | OUTPATIENT
Start: 2025-02-14 | End: 2025-02-24

## 2025-02-14 RX ORDER — IPRATROPIUM BROMIDE AND ALBUTEROL SULFATE 2.5; .5 MG/3ML; MG/3ML
1 SOLUTION RESPIRATORY (INHALATION)
Status: DISCONTINUED | OUTPATIENT
Start: 2025-02-14 | End: 2025-02-14

## 2025-02-14 RX ORDER — PROCHLORPERAZINE EDISYLATE 5 MG/ML
10 INJECTION INTRAMUSCULAR; INTRAVENOUS EVERY 6 HOURS PRN
Status: ACTIVE | OUTPATIENT
Start: 2025-02-14

## 2025-02-14 RX ORDER — DIAZEPAM 10 MG/2ML
5 INJECTION, SOLUTION INTRAMUSCULAR; INTRAVENOUS EVERY 4 HOURS PRN
Status: DISCONTINUED | OUTPATIENT
Start: 2025-02-14 | End: 2025-02-25

## 2025-02-14 RX ORDER — IPRATROPIUM BROMIDE AND ALBUTEROL SULFATE 2.5; .5 MG/3ML; MG/3ML
1 SOLUTION RESPIRATORY (INHALATION)
Status: DISCONTINUED | OUTPATIENT
Start: 2025-02-15 | End: 2025-02-16

## 2025-02-14 RX ORDER — FUROSEMIDE 10 MG/ML
20 INJECTION INTRAMUSCULAR; INTRAVENOUS ONCE
Status: DISCONTINUED | OUTPATIENT
Start: 2025-02-14 | End: 2025-02-14

## 2025-02-14 RX ORDER — PROCHLORPERAZINE MALEATE 10 MG
10 TABLET ORAL EVERY 8 HOURS PRN
Status: ACTIVE | OUTPATIENT
Start: 2025-02-14

## 2025-02-14 RX ORDER — IPRATROPIUM BROMIDE AND ALBUTEROL SULFATE 2.5; .5 MG/3ML; MG/3ML
1 SOLUTION RESPIRATORY (INHALATION) EVERY 4 HOURS PRN
Status: ACTIVE | OUTPATIENT
Start: 2025-02-14

## 2025-02-14 RX ADMIN — IPRATROPIUM BROMIDE AND ALBUTEROL SULFATE 1 DOSE: 2.5; .5 SOLUTION RESPIRATORY (INHALATION) at 03:08

## 2025-02-14 RX ADMIN — INSULIN LISPRO 1 UNITS: 100 INJECTION, SOLUTION INTRAVENOUS; SUBCUTANEOUS at 20:02

## 2025-02-14 RX ADMIN — SACUBITRIL AND VALSARTAN 1 TABLET: 24; 26 TABLET, FILM COATED ORAL at 08:54

## 2025-02-14 RX ADMIN — FUROSEMIDE 20 MG: 10 INJECTION, SOLUTION INTRAMUSCULAR; INTRAVENOUS at 19:05

## 2025-02-14 RX ADMIN — METOPROLOL SUCCINATE 25 MG: 25 TABLET, EXTENDED RELEASE ORAL at 08:54

## 2025-02-14 RX ADMIN — PIPERACILLIN AND TAZOBACTAM 3375 MG: 3; .375 INJECTION, POWDER, LYOPHILIZED, FOR SOLUTION INTRAVENOUS at 05:10

## 2025-02-14 RX ADMIN — IPRATROPIUM BROMIDE AND ALBUTEROL SULFATE 1 DOSE: 2.5; .5 SOLUTION RESPIRATORY (INHALATION) at 07:14

## 2025-02-14 RX ADMIN — IPRATROPIUM BROMIDE AND ALBUTEROL SULFATE 1 DOSE: 2.5; .5 SOLUTION RESPIRATORY (INHALATION) at 14:43

## 2025-02-14 RX ADMIN — WATER 40 MG: 1 INJECTION INTRAMUSCULAR; INTRAVENOUS; SUBCUTANEOUS at 13:52

## 2025-02-14 RX ADMIN — FAMOTIDINE 20 MG: 10 INJECTION INTRAVENOUS at 08:54

## 2025-02-14 RX ADMIN — DEXMEDETOMIDINE HYDROCHLORIDE 0.8 MCG/KG/HR: 4 INJECTION, SOLUTION INTRAVENOUS at 19:51

## 2025-02-14 RX ADMIN — SODIUM CHLORIDE, PRESERVATIVE FREE 10 ML: 5 INJECTION INTRAVENOUS at 20:30

## 2025-02-14 RX ADMIN — BUPRENORPHINE HYDROCHLORIDE AND NALOXONE HYDROCHLORIDE DIHYDRATE 1 TABLET: 8; 2 TABLET SUBLINGUAL at 20:30

## 2025-02-14 RX ADMIN — FAMOTIDINE 20 MG: 10 INJECTION INTRAVENOUS at 20:08

## 2025-02-14 RX ADMIN — BUPRENORPHINE HYDROCHLORIDE AND NALOXONE HYDROCHLORIDE DIHYDRATE 1 TABLET: 8; 2 TABLET SUBLINGUAL at 08:54

## 2025-02-14 RX ADMIN — FUROSEMIDE 40 MG: 10 INJECTION, SOLUTION INTRAMUSCULAR; INTRAVENOUS at 00:59

## 2025-02-14 RX ADMIN — SACUBITRIL AND VALSARTAN 1 TABLET: 24; 26 TABLET, FILM COATED ORAL at 20:30

## 2025-02-14 RX ADMIN — DIAZEPAM 5 MG: 5 INJECTION, SOLUTION INTRAMUSCULAR; INTRAVENOUS at 15:54

## 2025-02-14 RX ADMIN — POTASSIUM BICARBONATE 20 MEQ: 782 TABLET, EFFERVESCENT ORAL at 08:54

## 2025-02-14 RX ADMIN — IPRATROPIUM BROMIDE AND ALBUTEROL SULFATE 1 DOSE: 2.5; .5 SOLUTION RESPIRATORY (INHALATION) at 12:03

## 2025-02-14 RX ADMIN — FUROSEMIDE 20 MG: 10 INJECTION, SOLUTION INTRAMUSCULAR; INTRAVENOUS at 08:54

## 2025-02-14 RX ADMIN — PIPERACILLIN AND TAZOBACTAM 3375 MG: 3; .375 INJECTION, POWDER, LYOPHILIZED, FOR SOLUTION INTRAVENOUS at 20:07

## 2025-02-14 RX ADMIN — SODIUM CHLORIDE, PRESERVATIVE FREE 10 ML: 5 INJECTION INTRAVENOUS at 08:55

## 2025-02-14 RX ADMIN — PIPERACILLIN AND TAZOBACTAM 3375 MG: 3; .375 INJECTION, POWDER, LYOPHILIZED, FOR SOLUTION INTRAVENOUS at 13:01

## 2025-02-14 RX ADMIN — AMIODARONE HYDROCHLORIDE 100 MG: 200 TABLET ORAL at 08:54

## 2025-02-14 RX ADMIN — DEXMEDETOMIDINE HYDROCHLORIDE 0.4 MCG/KG/HR: 4 INJECTION, SOLUTION INTRAVENOUS at 14:42

## 2025-02-14 RX ADMIN — ATORVASTATIN CALCIUM 40 MG: 40 TABLET, FILM COATED ORAL at 20:30

## 2025-02-14 RX ADMIN — SPIRONOLACTONE 25 MG: 25 TABLET ORAL at 08:54

## 2025-02-14 RX ADMIN — IPRATROPIUM BROMIDE AND ALBUTEROL SULFATE 1 DOSE: 2.5; .5 SOLUTION RESPIRATORY (INHALATION) at 19:25

## 2025-02-14 RX ADMIN — WATER 40 MG: 1 INJECTION INTRAMUSCULAR; INTRAVENOUS; SUBCUTANEOUS at 00:07

## 2025-02-14 ASSESSMENT — PAIN SCALES - GENERAL: PAINLEVEL_OUTOF10: 0

## 2025-02-14 NOTE — FLOWSHEET NOTE
02/14/25 1727   Oxygen Therapy   SpO2 (!) 52 %     D: The virtual sitter alarm rang out again. The patient pulled his vapotherm off again and is 52%. He is refusing to put his oxygen back on at this time. He screamed, \"leave me alone.\" To the nursing staff attempting to educate him on his oxygen needs then attempted to hit and kick the staff. Bilateral wrist restraints applied at this time. Electronically signed by Chi Fletcher RN on 2/14/2025 at 5:33 PM

## 2025-02-14 NOTE — PROGRESS NOTES
Patient has pulled his oxygen off multiple times. Each time his oxygen saturation drops to 60-70%. Spoke with Dr. Richard Orders to start Precedex obtained. Electronically signed by Chi Fletcher RN on 2/14/2025 at 2:08 PM

## 2025-02-14 NOTE — SIGNIFICANT EVENT
Hypoxia worsened overnight   IOs report 4031 ml net positive since admission   Weight 63.5 on 1/27 to 67 kg on 2/14/25       He has a hx of aspiration and was eating earlier today - despite instruction that it would be better if he didn't, raising concern for a possible aspiration.     He reports he does not want to go back on a breathing machine and that \"I'm not going to die over this.\"     He reports he does not want to be put on a ventilator unless he directly tells the medical staff, but if he is unable to tell the staff he wants to be on a ventilator that he does NOT want to be put on a ventilator.     Presently satting 87% on 30 LPM high flow.     Attempted Resume BIPAP  if as tolerated (presently tried it for about 30 seconds and then took it off)   Furosemide 40 mg IV x 1   HOLD IVF   Schedule erendira Rosado MD

## 2025-02-14 NOTE — PROGRESS NOTES
Comprehensive Nutrition Assessment    Type and Reason for Visit:  Reassess    Nutrition Recommendations/Plan:   Continue ADULT DIET; Dysphagia - Soft and Bite-Sized diet order + No Drinking Straws.   Start Ensure Plus with meals.   Monitor appetite, meal intake, and acceptance/intake of ONS.   Please obtain an updated weight for this patient - last weight was obtained on 2/11/25.   Monitor nutrition-related labs, bowel function, and weight trends.      Malnutrition Assessment:  Malnutrition Status:  Severe malnutrition (02/14/25 1235)    Context:  Acute Illness     Findings of the 6 clinical characteristics of malnutrition:  Energy Intake:  50% or less of estimated energy requirements for 5 or more days  Weight Loss:  No weight loss     Body Fat Loss:  Moderate body fat loss Orbital, Buccal region   Muscle Mass Loss:  Moderate muscle mass loss Temples (temporalis), Clavicles (pectoralis & deltoids), Hand (interosseous)  Fluid Accumulation:  No fluid accumulation     Strength:  Not Performed    Nutrition Assessment:    patient remains unchanged from a nutritional standpoint since last RD assessment; patient remains at risk for further compromise d/t difficulty consuming po nutrition with respiratory status, difficulty swallowing, malnutrition, and altered nutrition-related labs; will continue ADULT DIET; Dysphagia - Soft and Bite-Sized diet order + No Drinking Straws and will add Ensure Plus with meals    Nutrition Related Findings:    patient is A & O x 4; + BM on 2/14/25; patient wants to consume po nutrition but his respiratory status is not good at this time so RN is monitoring patient closely; he consumed 26-50% x 2 meals on 2/13/25; on low-dose SSI; patient consumed % of breakfast this am Wound Type: Deep Tissue Injury (DTI on R buttocks)       Current Nutrition Intake & Therapies:    Average Meal Intake: 26-50%, %  Average Supplements Intake: None Ordered  ADULT DIET; Dysphagia - Soft and Bite  Identified  Food/Nutrient Intake Outcomes: Food and Nutrient Intake, Supplement Intake  Physical Signs/Symptoms Outcomes: Biochemical Data, Chewing or Swallowing, Hemodynamic Status, Meal Time Behavior, Nutrition Focused Physical Findings, Skin, Weight    Discharge Planning:    Continue current diet     Ameena Whiting RD, LD  Contact: [phone]

## 2025-02-14 NOTE — FLOWSHEET NOTE
02/14/25 1322   Vitals   Pulse 91   Respirations 29   Oxygen Therapy   SpO2 (!) 66 %   FiO2  90 %   O2 Flow Rate (L/min) 30 L/min     D: Virtual sitter sounded the emergency alarm and the patient was found standing at the end of his bed without his Vapotherm on. Vapotherm replaced. Patient encouraged to go back to bed. He refused for several minutes and stated that he is leaving. Attempted to persuade the patient to stay. He verbalized that he can find one of these air compressors to breath at home as he gestures to the vapotherm. Explained that the vapotherm is not an air compressor and that it is probably not available for sale to the general public. After several minutes he agreed to go back to bed. Patient requested pants that were provided. Bed change completed. Patient is continuing to state that he is going home later today. Explained that he is on more oxygen than can be provided outside of a hospital setting at this time and that going home would at this time could lead to death. The patient verbalized that he would be \"fine and that I am not going to die.\" After several minutes he agreed to stay in the hospital. Stayed at bedside until the paitents Spo2 increased to 90% on vapothem 90%/30L.  Electronically signed by Chi Fletcher RN on 2/14/2025 at 1:28 PM

## 2025-02-14 NOTE — PROGRESS NOTES
Speech Language Pathology  Swallowing Disorders and Dysphagia    Dysphagia Treatment/Follow-Up Note  [facility] [facility]    Recommendations: Pt choosing to eat at this time despite risks associated with possible aspiration.  Risk Management: upright for all intake, stay upright for at least 30 mins after intake, small bites/sips, oral care q4 hrs to reduce adverse affects in the event of aspiration, increase physical mobility as able, slow rate of intake, STRICT aspiration precautions, and hold PO and contact SLP if s/s of aspiration or worsening respiratory status develop.      NAME:Dayton Soto  : 1965 (59 y.o.)   MRN: [id number]  ROOM: [address] [place] [address]  ADMISSION DATE: 2025  PATIENT DIAGNOSIS(ES): Acute respiratory failure [J96.00]  Methamphetamine use (HCC) [F15.10]  Elevated troponin [R79.89]  Acute respiratory failure with hypoxia [J96.01]  Multifocal pneumonia [J18.9]  No Known Allergies    DATE ONSET: 2025    Pain: The patient does not complain of pain       Current Diet: ADULT DIET; Dysphagia - Soft and Bite Sized; No Drinking Straws      Dysphagia Treatment and Impressions:  Subjective: Pt seen in room at bedside with RN (Christoph) permission  Noteworthy events reported/Chart Review: Pt stated he wanted to be DNI. Pt continues to wish to eat. RN reports no overt cough when pt is eating.  Patient tolerance to current diet and treatment:Pt destats with minimal exertion. Pt on Vapotherm at 30L, 100% FiO2      Respiratory Status: Pt with SPO2% of 94 on 30 L HFNC with RR of 20  Oral Care Status:    Edentulous  Oral Care Completed?   [x] Yes   [] No  Completed by pt after set up by SLP via pasted toothbrush    Liquid PO Trials:   No po presentations completed this session. Pt picks up coffee cup x2 during session to take sips but does not end up drinking.  Solid PO Trials  No po presentations completed this session  Focus of session was education re: concern for aspiration, potential  negative sequelae associated with aspiration, pt's options for additional assessment, ways to decrease adverse outcomes associated with aspiration, and pt goals. SLP attempts to educate pt re: acuity of lung status (FiO2:100%) and how that number indicates poor prognosis if aspiration occurs. Pt is aware that he has PNA but denies possibility that it could be related to his eating and drinking. SLP attempts to explain physiology of swallow, but pt is fixated on \"hole in his windpipe\" that RN reiterates is not the issue right now. SLP proposes suggestion to be NPO to allow lungs to potentially recover resulting in increased pt stability and decreased supplemental O2 demands to permit FEES or MBS. Pt initially states he would be okay with FEES when stable and SLP able to complete, then he states, \"What good would that do?; I don't want to do that.\" SLP states that pt could aspirate, choke, or die if he continues to eat.\" RN present and restates staff concerns simply and bluntly to gauge pt understanding. After multiple attempts to clarify extent of pt understanding, pt teaches back risks of continuing to eat or drink \"I know I could die.\" Pt choosing to continue to eat and drink. Pt has no further questions and states he \"is done talking about it.\"  Pt completes oral care at end of session. SLP reiterates importance of oral care in decreasing risks associated with adverse outcomes associated with aspiration    Education: SLP edu pt re: Role of SLP, Rationale for dysphagia tx, Recommended compensatory strategies, Aspiration precautions, POC, Evidenced based practice for current recommendations and treatment, Rationale for MBS, Rationale for FEES, and Importance of oral care to reduce adverse affects in the event of aspiration. Pt would benefit from ongoing education and RN aware of recommendations  Assessment: Pt grossly tolerating current diet without overt clinical s/s of aspiration per RN. Poor carryover of  recommended compensatory strategies. Unable to exclude possibility of silent aspiration. RR increased with minimal exertion. Pt oxygen demands preclude transport for MBS. Pt refuses FEES, then okays FEES, then refuses FEES. Pt has voiced his high levels of anxiety making him not want to complete FEES in prior sessions. ST to continue to follow.    Recommendations: Pt choosing to eat at this time despite risks associated with possible aspiration.  Risk Management: upright for all intake, stay upright for at least 30 mins after intake, small bites/sips, oral care q4 hrs to reduce adverse affects in the event of aspiration, increase physical mobility as able, slow rate of intake, STRICT aspiration precautions, and hold PO and contact SLP if s/s of aspiration or worsening respiratory status develop.     Dysphagia Goals:     Short Term Goals:  Timeframe for Short Term Goals: (5 days 02/15/2025)  Goal 1: The patient will tolerate repeat BSE when able for ongoing assessment 2/14/2025 : Goal not addressed, see above. Ongoing, not progressing. Recommend instrumental swallow assessment to assess oropharyngeal swallow function  Goal 2: The patient will tolerate recommended diet with no clinical s/s of aspiration 5/5 2/14/2025 : Goal not addressed, see above. Ongoing,not  progressing.  Goal 3: The patient will recall/perform recommended compensatory strategies given min cues 2/14/2025 : Goal addressed, see above. Ongoing, progressing.  New Goal 4: Pt will participate in instrumental swallow assessment  2/14/2025 : Goal addressed, see above. Ongoing, not progressing.  Long Term Goals:   Timeframe for Long Term Goals: (7 days 02/17/2025)  Goal 1: The patient will tolerate least restrictive diet with no clinical s/s of aspiration or worsening respiratory/pulmonary status. 2/14/2025 : Goal addressed, see above. Ongoing, not progressing.                Speech/Language/Cog Goals: N/A                        Plan:    Continued

## 2025-02-14 NOTE — PLAN OF CARE
Problem: Safety - Adult  Goal: Free from fall injury  Outcome: Progressing     Problem: Pain  Goal: Verbalizes/displays adequate comfort level or baseline comfort level  Outcome: Progressing     Problem: Skin/Tissue Integrity  Goal: Skin integrity remains intact  Description: 1.  Monitor for areas of redness and/or skin breakdown  2.  Assess vascular access sites hourly  3.  Every 4-6 hours minimum:  Change oxygen saturation probe site  4.  Every 4-6 hours:  If on nasal continuous positive airway pressure, respiratory therapy assess nares and determine need for appliance change or resting period  Outcome: Progressing  Flowsheets  Taken 2/13/2025 2107  Skin Integrity Remains Intact:   Monitor for areas of redness and/or skin breakdown   Assess vascular access sites hourly   Every 4-6 hours minimum: Change oxygen saturation probe site  Taken 2/13/2025 2040  Skin Integrity Remains Intact: Monitor for areas of redness and/or skin breakdown     Problem: Respiratory - Adult  Goal: Achieves optimal ventilation and oxygenation  2/13/2025 2108 by Dionne Guzmán, RN  Outcome: Progressing  Flowsheets (Taken 2/13/2025 2040)  Achieves optimal ventilation and oxygenation: Assess for changes in respiratory status  2/13/2025 1229 by Chi Fletcher RN  Outcome: Progressing  Flowsheets  Taken 2/13/2025 1229 by Chi Fletchre RN  Achieves optimal ventilation and oxygenation:   Assess for changes in respiratory status   Assess for changes in mentation and behavior   Oxygen supplementation based on oxygen saturation or arterial blood gases   Position to facilitate oxygenation and minimize respiratory effort  Taken 2/13/2025 0302 by Dionne Guzmán, RN  Achieves optimal ventilation and oxygenation: Assess for changes in respiratory status  Note: Patient is currently on Vapotherm 90% and 30L. He is oriented to person, place and time. Educated the patient on vapotherm. He verbalized understanding.      Problem:

## 2025-02-14 NOTE — PROGRESS NOTES
Pulmonary & Critical Care Medicine ICU Progress Note    CC: PNA    Events of Last 24 hours:   BiPAP - declined   Vapotherm:   Flow 30 LPM   FiO2 100% FiO2   Precedex off    D5 NS 50 cc/hr   Agitation received haldol overnight     Invasive Lines: Right femoral cvc- removed 2/10    MV:  2/5/25-2/9        Intake/Output Summary (Last 24 hours) at 2/14/2025 0733  Last data filed at 2/14/2025 0513  Gross per 24 hour   Intake 1123.97 ml   Output 2976 ml   Net -1852.03 ml       Vent Mode: AC/VC Resp Rate (Set): 22 bpm/Vt (Set, mL): 450 mL/ /FiO2 : 100 %  No results for input(s): \"PHART\", \"KEL7AMY\", \"PO2ART\" in the last 72 hours.      IV:   dextrose      sodium chloride 5 mL/hr at 02/10/25 0629       Vitals:  BP (!) 158/107   Pulse 96   Temp 97.3 °F (36.3 °C) (Temporal)   Resp 27   Ht 1.78 m (5' 10.08\")   Wt 67 kg (147 lb 11.3 oz)   SpO2 91%   BMI 21.15 kg/m²   on Vapotherm   Constitutional:  No acute distress. .   HEENT: no scleral icterus  Neck: No tracheal deviation present.    Cardiovascular: Normal heart sounds.   Pulmonary/Chest: Few wheezes. + rhonchi. Few rales. No decreased breath sounds.  No accessory muscle usage or stridor.   Abdominal: Soft.   Musculoskeletal: No cyanosis. No clubbing.  Skin: Skin is warm and dry.        Scheduled Meds:   ipratropium 0.5 mg-albuterol 2.5 mg  1 Dose Inhalation Q4H WA RT    haloperidol lactate  5 mg IntraMUSCular Once    diphenhydrAMINE  25 mg IntraVENous Once    diazePAM  5 mg IntraVENous Once    piperacillin-tazobactam  3,375 mg IntraVENous Q8H    methylPREDNISolone  40 mg IntraVENous Q12H    furosemide  20 mg IntraVENous BID    amiodarone  100 mg Oral Daily    metoprolol succinate  25 mg Oral Daily    sacubitril-valsartan  1 tablet Oral BID    spironolactone  25 mg Oral Daily    atorvastatin  40 mg Oral Nightly    buprenorphine-naloxone  1 tablet SubLINGual BID    insulin lispro  0-4 Units SubCUTAneous Q4H    sodium chloride flush  5-40 mL IntraVENous 2 times per day  tolerance   Inhaled bronchodilators  Solumedrol 40 IV Q12 hrs   Bilevel non-invasive positive pressure ventilation per my orders. The ventilator was adjusted by me at the bedside for unstable, life threatening respiratory failure. Wean oxygen as tolerated.  Zosyn IV D#2. Completed Augmentin D#2, completed Levaquin D#3, completed Zyvox, cefepime and Flagyl for 3 days.   Follow up Cx   D/C IVF   Lasix 20 IV Q12 hrs   Home medications were addressed   Holding Eliquis and monitor hemoptysis   Resumed Amiodarone   Blood sugar control ISS, with goal 150-180  GI prophylaxis: Pepcid  DVT prophylaxis: holding Eliquis   MRSA prophylaxis: Bactroban  Limited code, confirmed with patient         This patient is critically ill. I spent 31 minutes directly engaged in the delivery of critical care to this patient, which was directed towards the patient's critical illness as listed above. This included direct patient contact, management of life support systems review of data (i.e.: imaging and lab), discussion with team members, and this time excludes time spent on procedures.

## 2025-02-14 NOTE — PROGRESS NOTES
Keira called back to the ICU and stated that she was not the patients POA (Keira listed in EPIC as the POA) and asked the hospital to update the patient's daughter Vilma Soto at [phone] with the change in the patients code status and get her input. Called and spoke with Vilma Soto and she agreed with the change in the patients code status to limited code. No intubation, CPR or defibrillation. Yes to IV resuscitative medications. Vilma asked to have the chart reflect that she is the patients next of kin as the patient does not have any POA paperwork. Invited Vilma to come to the hospital to discuss the patients care tomorrow. She will try to find a ride here. Electronically signed by Chi Fletcher RN on 2/14/2025 at 6:52 PM

## 2025-02-14 NOTE — PROGRESS NOTES
Patient feels like he had a reaction to the solu-medrol injection. Says it felt the same way it did when he did Meth and he does not want that injection again. Patient continued to rip off oxygen o2 saturations went to 60% and the patient just continued yelling out he wants food. Oxygen replaced several times. Informed him I can not give him anything to eat until his saturations are above 90%. Patient addiment that he does not want put back on a ventilator. MD msg'd about patient.

## 2025-02-14 NOTE — PROGRESS NOTES
Spoke with the patient and asked if the hospital could update his next of kin with his change in his code status. He verbalized that he has already spoken to his family about this last week. Explained that he was intubated last week and asked if he spoke to his family today. He stated that he did not speak with them. Asked to update his family again and the patient said, \"NO not right now.\" Electronically signed by Chi Fletcher RN on 2/14/2025 at 10:58 AM
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
## 2025-02-14 NOTE — PROGRESS NOTES
IM Progress Note    Admit Date:  2/5/2025  9    Interval history:  59 y.o. male who presented to [facility] with septic shock.  PMHx significant for polysubstance use-methamphetamine, A-fib, valvular disease, on Eliquis on amiodarone, MRSA, cardiomyopathy      Intubated       Subjective:     Mr. Soto continues to be on Vapotherm.  100%/30L    Very agitated last night  Much calmer this morning       Objective:   BP (!) 158/107   Pulse 96   Temp 97.3 °F (36.3 °C) (Temporal)   Resp 27   Ht 1.78 m (5' 10.08\")   Wt 67 kg (147 lb 11.3 oz)   SpO2 91%   BMI 21.15 kg/m²     Intake/Output Summary (Last 24 hours) at 2/14/2025 0740  Last data filed at 2/14/2025 0513  Gross per 24 hour   Intake 1123.97 ml   Output 2976 ml   Net -1852.03 ml       Physical Exam:      General:  middle aged male   . Appears to be not in any distress  Mucous Membranes:  Pink , anicteric  Neck: No JVD, no carotid bruit, no thyromegaly  Chest: improving leatha air entry with resolved crackles,    Cardiovascular:  RRR S1S2 heard, no murmurs or gallops  Abdomen:  Soft, undistended, non tender, no organomegaly, BS present  Extremities: No edema or cyanosis. Distal pulses well felt  Neurological :  on vapotherm      Medications:   Scheduled Medications:    ipratropium 0.5 mg-albuterol 2.5 mg  1 Dose Inhalation Q4H WA RT    potassium bicarb-citric acid  20 mEq Oral Daily    haloperidol lactate  5 mg IntraMUSCular Once    diphenhydrAMINE  25 mg IntraVENous Once    diazePAM  5 mg IntraVENous Once    piperacillin-tazobactam  3,375 mg IntraVENous Q8H    methylPREDNISolone  40 mg IntraVENous Q12H    furosemide  20 mg IntraVENous BID    amiodarone  100 mg Oral Daily    metoprolol succinate  25 mg Oral Daily    sacubitril-valsartan  1 tablet Oral BID    spironolactone  25 mg Oral Daily    atorvastatin  40 mg Oral Nightly    buprenorphine-naloxone  1 tablet SubLINGual BID    insulin lispro  0-4 Units SubCUTAneous Q4H    sodium chloride flush  5-40 mL  small left pleural effusion.   3. Stable multifocal airspace opacities throughout both lungs, likely a   combination of asymmetric edema and multifocal pneumonia.         XR CHEST PORTABLE   Final Result   Scattered infiltrates most pronounced in the mid and lower lungs.      Support tubes as described above.         XR CHEST 1 VIEW   Final Result   1. Patchy consolidative changes of the bilateral lungs are mildly improved   compared to the prior exam.   2. Lines and tubes as above.         XR CHEST PORTABLE   Final Result   1. Endotracheal tube 5.4 cm above the riley.   2. Nasogastric tube advances to conklin the stomach.   3. Extensive bilateral infiltrates worse in the mid lower lungs overall   slightly worse compared to the previous evaluation.         CT CHEST PULMONARY EMBOLISM W CONTRAST   Final Result   1. Worsening diffuse bilateral airspace disease favoring aspiration pneumonia   rather than pulmonary edema.   2. Interstitial edema.   3. Trace left pleural effusion.   4. Mediastinal adenopathy, likely reactive.         XR CHEST PORTABLE   Final Result   1. ET tube in place, approximately 1.5 cm above the riley. It should be   withdrawn by 4 cm.   2. Extensive airspace disease in the lower lung of fields, unchanged from   prior examination.         XR CHEST PORTABLE   Final Result   1. Dense pneumonic infiltrates in the right lower lung field and in the left   mid and lower lung field, with or without atelectatic changes.  These   findings are mostly new as compared to previous portable chest x-ray.   2. Minimal bilateral pleural effusions or pleural thickening at the CP angles.   3. Minimal pulmonary vascular prominence.         FL MODIFIED BARIUM SWALLOW W VIDEO    (Results Pending)     Urine drug screen - amphetamine, benzo      ECHO 1/25      Left Ventricle: Moderately reduced left ventricular systolic function with a visually estimated EF of 30 - 35%. Left ventricle size is normal. Moderate global

## 2025-02-14 NOTE — PROGRESS NOTES
HEART FAILURE CARE PLAN:    Comorbidities Reviewed: Yes   Patient has a past medical history of Back pain, chronic, Chronic back pain, Hypertension, MI (myocardial infarction) (HCC), and MRSA (methicillin resistant staph aureus) culture positive.     Weights Reviewed: Yes   Admission weight: 64.3 kg (141 lb 12.8 oz)   Wt Readings from Last 3 Encounters:   02/11/25 67 kg (147 lb 11.3 oz)   02/01/25 62.3 kg (137 lb 4.8 oz)   07/09/24 56.3 kg (124 lb 1.6 oz)     Intake & Output Reviewed: Yes     Intake/Output Summary (Last 24 hours) at 2/13/2025 2109  Last data filed at 2/13/2025 1608  Gross per 24 hour   Intake 936.07 ml   Output 2675 ml   Net -1738.93 ml       ECHOCARDIOGRAM Reviewed: Yes   Patient's Ejection Fraction (EF) is less than or equal to 40%. Discuss HFrEF Guideline Directed Medical Therapy (GDMT) with Cardiologist or Hospitalist:          Medications Reviewed: Yes   SCHEDULED HOSPITAL MEDICATIONS:   haloperidol lactate  5 mg IntraMUSCular Once    diphenhydrAMINE  25 mg IntraVENous Once    diazePAM  5 mg IntraVENous Once    piperacillin-tazobactam  3,375 mg IntraVENous Q8H    methylPREDNISolone  40 mg IntraVENous Q12H    furosemide  20 mg IntraVENous BID    ipratropium 0.5 mg-albuterol 2.5 mg  1 Dose Inhalation Q4H RT    amiodarone  100 mg Oral Daily    metoprolol succinate  25 mg Oral Daily    sacubitril-valsartan  1 tablet Oral BID    spironolactone  25 mg Oral Daily    atorvastatin  40 mg Oral Nightly    buprenorphine-naloxone  1 tablet SubLINGual BID    insulin lispro  0-4 Units SubCUTAneous Q4H    sodium chloride flush  5-40 mL IntraVENous 2 times per day    famotidine (PEPCID) injection  20 mg IntraVENous BID     HOME MEDICATIONS:  Prior to Admission medications    Medication Sig Start Date End Date Taking? Authorizing Provider   amiodarone (CORDARONE) 200 MG tablet 2 bid - 5 days, 2 qd- 1 week, 1 qd thereafter 1/31/25   Brittany Shin MD   empagliflozin (JARDIANCE) 10 MG tablet Take 1

## 2025-02-14 NOTE — PROGRESS NOTES
D: Patient is lying in bed and restrained. He was able to reach up and grab his Vapotherm tubing. He desaturated to 71% when Dr. Richard walked. Dr. Richard spoke with Keira about the patients code status. New orders received. Electronically signed by Chi Fletcher RN on 2/14/2025 at 5:51 PM

## 2025-02-14 NOTE — PROGRESS NOTES
Blood pressure (!) 153/78, pulse 74, temperature 97.1 °F (36.2 °C), temperature source Temporal, resp. rate 20, height 1.78 m (5' 10.08\"), weight 67 kg (147 lb 11.3 oz), SpO2 90%.    Patient is currently on VAPOTHERM at 30 LPM and 80% FiO2.   Patient is tolerating Vapo-Therm okay at this time.     Shift assessment complete. See doc flow. Nightly medications given see MAR. Patient with no complaints at this time. Call light and bedside table within easy reach.     Electronically signed by Dionne Guzmán RN on 2/13/2025 at 9:07 PM

## 2025-02-14 NOTE — PROGRESS NOTES
D/W sister, updated on status of the patient. She agrees with patient's wishes no intubation chest completion or shock.

## 2025-02-14 NOTE — DISCHARGE INSTRUCTIONS
Heart Failure Resources:  Heart Failure Interactive Workbook:  Go to [email or web address].Brigade/publication/?b=426236 for a Free Heart Failure Interactive Workbook provided by The American [employer] Association. This interactive workbook will provide information on Healthier Living with Heart Failure. Please copy and paste link into search bar. Use your mouse to scroll through the pages.    HF [place] yovany:   Heart Failure Free smart phone yovany available for iPhone and Android download. Use your phone to track sodium intake, fluid intake, symptoms, and weight.     Low Sodium Diet / Recipes:  Go to www.[employer]ot website for “renal” diet which is Low Sodium! [employer] is a dialysis company, but this website offers free seasonal cookbooks. Each quarter, they will release 25-30 new recipes with a breakdown of calories, sodium, and glucose. You can also go to wwwi[employer]/recipes website for free recipes.     Discharge Instruction Video:  Scan the QR code below with your camera and click the canva.com link to open the video and watch educational information on Heart Failure and Medications from one of our nurses.   [email or web address]    Home Exercise Program:   Identification of Green/Yellow/Red zones:  You should be able to identify when you feel good (green zone), if you have 1-2 symptoms of HF (yellow zone), or if you are in need of medical attention (red zone).  In your CHF education folder you were provided a “stop light tool” to outline this information.     We want to you to rate your exertion levels:    Our therapy team has discussed means of identification with you such as the \"Mann scale.\"  The Mann rating scale ranges from 6 to 20, where 6 means \"no exertion at all\" and 20 means \"maximal exertion.\" The goal is to use this to gauge how much effort it is taking for you to do your normal daily tasks.   You should be able to recognize when too much exertion is  being expended.    Elements of Energy Conservation:   Prioritize/Plan: Decide what needs to be done today, and what can wait for a later date, write to do lists, plan ahead to avoid extra trips, and gather supplies and equipment needed before starting an activity.   Position: Avoid tiring and awkward posture that may impair breathing.  Pace: Slow and steady pace, never rushing!  Pursed lip breathing.  Pursed Lip Breathing: \"Smell the roses, blow out the candles\".

## 2025-02-14 NOTE — PROGRESS NOTES
Dr. Shin spoke with the patient and he verbalized that he would not want to be put back on a breathing machine even if it meant that he would die. Patient still wants CPR, IV resuscitative medication  and defibrillation at this time. He is alert to person, place, time and situation. Limited code ordered. No intubation. See Orders. Electronically signed by Chi Fletcher RN on 2/14/2025 at 8:37 AM

## 2025-02-15 PROBLEM — J18.9 MULTIFOCAL PNEUMONIA: Status: ACTIVE | Noted: 2025-02-10

## 2025-02-15 LAB
ANION GAP SERPL CALCULATED.3IONS-SCNC: 10 MMOL/L (ref 3–16)
BASOPHILS # BLD: 0 K/UL (ref 0–0.2)
BASOPHILS NFR BLD: 0 %
BUN SERPL-MCNC: 26 MG/DL (ref 7–20)
CALCIUM SERPL-MCNC: 7.5 MG/DL (ref 8.3–10.6)
CHLORIDE SERPL-SCNC: 102 MMOL/L (ref 99–110)
CO2 SERPL-SCNC: 29 MMOL/L (ref 21–32)
CREAT SERPL-MCNC: 1.1 MG/DL (ref 0.9–1.3)
DEPRECATED RDW RBC AUTO: 13.5 % (ref 12.4–15.4)
EOSINOPHIL # BLD: 0 K/UL (ref 0–0.6)
EOSINOPHIL NFR BLD: 0.1 %
GFR SERPLBLD CREATININE-BSD FMLA CKD-EPI: 77 ML/MIN/{1.73_M2}
GLUCOSE BLD-MCNC: 125 MG/DL (ref 70–99)
GLUCOSE BLD-MCNC: 147 MG/DL (ref 70–99)
GLUCOSE BLD-MCNC: 192 MG/DL (ref 70–99)
GLUCOSE BLD-MCNC: 262 MG/DL (ref 70–99)
GLUCOSE BLD-MCNC: 337 MG/DL (ref 70–99)
GLUCOSE SERPL-MCNC: 142 MG/DL (ref 70–99)
HCT VFR BLD AUTO: 30.4 % (ref 40.5–52.5)
HGB BLD-MCNC: 10 G/DL (ref 13.5–17.5)
LYMPHOCYTES # BLD: 0.5 K/UL (ref 1–5.1)
LYMPHOCYTES NFR BLD: 4.3 %
MCH RBC QN AUTO: 29.7 PG (ref 26–34)
MCHC RBC AUTO-ENTMCNC: 33.1 G/DL (ref 31–36)
MCV RBC AUTO: 89.7 FL (ref 80–100)
MONOCYTES # BLD: 0.6 K/UL (ref 0–1.3)
MONOCYTES NFR BLD: 5.2 %
NEUTROPHILS # BLD: 9.8 K/UL (ref 1.7–7.7)
NEUTROPHILS NFR BLD: 90.4 %
PERFORMED ON: ABNORMAL
PLATELET # BLD AUTO: 244 K/UL (ref 135–450)
PMV BLD AUTO: 8.5 FL (ref 5–10.5)
POTASSIUM SERPL-SCNC: 4.3 MMOL/L (ref 3.5–5.1)
RBC # BLD AUTO: 3.39 M/UL (ref 4.2–5.9)
SODIUM SERPL-SCNC: 141 MMOL/L (ref 136–145)
WBC # BLD AUTO: 10.8 K/UL (ref 4–11)

## 2025-02-15 PROCEDURE — 80048 BASIC METABOLIC PNL TOTAL CA: CPT

## 2025-02-15 PROCEDURE — 6370000000 HC RX 637 (ALT 250 FOR IP): Performed by: INTERNAL MEDICINE

## 2025-02-15 PROCEDURE — 94640 AIRWAY INHALATION TREATMENT: CPT

## 2025-02-15 PROCEDURE — 94669 MECHANICAL CHEST WALL OSCILL: CPT

## 2025-02-15 PROCEDURE — 6360000002 HC RX W HCPCS: Performed by: INTERNAL MEDICINE

## 2025-02-15 PROCEDURE — 2500000003 HC RX 250 WO HCPCS: Performed by: INTERNAL MEDICINE

## 2025-02-15 PROCEDURE — 2580000003 HC RX 258: Performed by: INTERNAL MEDICINE

## 2025-02-15 PROCEDURE — 2000000000 HC ICU R&B

## 2025-02-15 PROCEDURE — 99232 SBSQ HOSP IP/OBS MODERATE 35: CPT | Performed by: INTERNAL MEDICINE

## 2025-02-15 PROCEDURE — 99291 CRITICAL CARE FIRST HOUR: CPT | Performed by: INTERNAL MEDICINE

## 2025-02-15 PROCEDURE — 2700000000 HC OXYGEN THERAPY PER DAY

## 2025-02-15 PROCEDURE — 36415 COLL VENOUS BLD VENIPUNCTURE: CPT

## 2025-02-15 PROCEDURE — 2500000003 HC RX 250 WO HCPCS

## 2025-02-15 PROCEDURE — 85025 COMPLETE CBC W/AUTO DIFF WBC: CPT

## 2025-02-15 PROCEDURE — 94761 N-INVAS EAR/PLS OXIMETRY MLT: CPT

## 2025-02-15 RX ORDER — METOPROLOL SUCCINATE 25 MG/1
12.5 TABLET, EXTENDED RELEASE ORAL DAILY
Status: DISPENSED | OUTPATIENT
Start: 2025-02-16

## 2025-02-15 RX ADMIN — POTASSIUM BICARBONATE 20 MEQ: 782 TABLET, EFFERVESCENT ORAL at 08:25

## 2025-02-15 RX ADMIN — INSULIN LISPRO 1 UNITS: 100 INJECTION, SOLUTION INTRAVENOUS; SUBCUTANEOUS at 16:12

## 2025-02-15 RX ADMIN — FAMOTIDINE 20 MG: 10 INJECTION INTRAVENOUS at 19:35

## 2025-02-15 RX ADMIN — IPRATROPIUM BROMIDE AND ALBUTEROL SULFATE 1 DOSE: 2.5; .5 SOLUTION RESPIRATORY (INHALATION) at 10:52

## 2025-02-15 RX ADMIN — AMIODARONE HYDROCHLORIDE 100 MG: 200 TABLET ORAL at 09:46

## 2025-02-15 RX ADMIN — PIPERACILLIN AND TAZOBACTAM 3375 MG: 3; .375 INJECTION, POWDER, LYOPHILIZED, FOR SOLUTION INTRAVENOUS at 19:41

## 2025-02-15 RX ADMIN — WATER 40 MG: 1 INJECTION INTRAMUSCULAR; INTRAVENOUS; SUBCUTANEOUS at 12:49

## 2025-02-15 RX ADMIN — SPIRONOLACTONE 25 MG: 25 TABLET ORAL at 09:46

## 2025-02-15 RX ADMIN — BUPRENORPHINE HYDROCHLORIDE AND NALOXONE HYDROCHLORIDE DIHYDRATE 1 TABLET: 8; 2 TABLET SUBLINGUAL at 08:25

## 2025-02-15 RX ADMIN — SODIUM CHLORIDE, PRESERVATIVE FREE 10 ML: 5 INJECTION INTRAVENOUS at 19:47

## 2025-02-15 RX ADMIN — IPRATROPIUM BROMIDE AND ALBUTEROL SULFATE 1 DOSE: 2.5; .5 SOLUTION RESPIRATORY (INHALATION) at 19:12

## 2025-02-15 RX ADMIN — SACUBITRIL AND VALSARTAN 1 TABLET: 24; 26 TABLET, FILM COATED ORAL at 19:30

## 2025-02-15 RX ADMIN — IPRATROPIUM BROMIDE AND ALBUTEROL SULFATE 1 DOSE: 2.5; .5 SOLUTION RESPIRATORY (INHALATION) at 07:06

## 2025-02-15 RX ADMIN — SACUBITRIL AND VALSARTAN 1 TABLET: 24; 26 TABLET, FILM COATED ORAL at 09:46

## 2025-02-15 RX ADMIN — WATER 40 MG: 1 INJECTION INTRAMUSCULAR; INTRAVENOUS; SUBCUTANEOUS at 00:27

## 2025-02-15 RX ADMIN — FAMOTIDINE 20 MG: 10 INJECTION INTRAVENOUS at 08:25

## 2025-02-15 RX ADMIN — ATORVASTATIN CALCIUM 40 MG: 40 TABLET, FILM COATED ORAL at 19:30

## 2025-02-15 RX ADMIN — FUROSEMIDE 20 MG: 10 INJECTION, SOLUTION INTRAMUSCULAR; INTRAVENOUS at 09:49

## 2025-02-15 RX ADMIN — IPRATROPIUM BROMIDE AND ALBUTEROL SULFATE 1 DOSE: 2.5; .5 SOLUTION RESPIRATORY (INHALATION) at 15:12

## 2025-02-15 RX ADMIN — BUPRENORPHINE HYDROCHLORIDE AND NALOXONE HYDROCHLORIDE DIHYDRATE 1 TABLET: 8; 2 TABLET SUBLINGUAL at 19:38

## 2025-02-15 RX ADMIN — SODIUM CHLORIDE, PRESERVATIVE FREE 10 ML: 5 INJECTION INTRAVENOUS at 08:25

## 2025-02-15 RX ADMIN — FUROSEMIDE 20 MG: 10 INJECTION, SOLUTION INTRAMUSCULAR; INTRAVENOUS at 16:13

## 2025-02-15 RX ADMIN — PIPERACILLIN AND TAZOBACTAM 3375 MG: 3; .375 INJECTION, POWDER, LYOPHILIZED, FOR SOLUTION INTRAVENOUS at 03:48

## 2025-02-15 RX ADMIN — PIPERACILLIN AND TAZOBACTAM 3375 MG: 3; .375 INJECTION, POWDER, LYOPHILIZED, FOR SOLUTION INTRAVENOUS at 12:48

## 2025-02-15 RX ADMIN — INSULIN LISPRO 4 UNITS: 100 INJECTION, SOLUTION INTRAVENOUS; SUBCUTANEOUS at 09:37

## 2025-02-15 RX ADMIN — INSULIN LISPRO 2 UNITS: 100 INJECTION, SOLUTION INTRAVENOUS; SUBCUTANEOUS at 21:15

## 2025-02-15 NOTE — PROGRESS NOTES
Progress Note    Admit Date:  2/5/2025     59 y.o. male who presented to [facility] with septic shock.  PMHx significant for polysubstance use-methamphetamine, A-fib, valvular disease, on Eliquis on amiodarone, MRSA, cardiomyopathy       Initially admitted on vent support with pneumonia, septic shock,  -s/p extubation but remains on vapotherm for severe persistent hypoxia     Agitation yesterday needing code violet, started on precedex gtt     Subjective:  Mr. Stoo seen calm and cooperative on vapotherm, 80 % fio2   Feels ok today and wishes to go home ASAP  Off precedex, understands he is needing high o2     Objective:   Patient Vitals for the past 4 hrs:   BP Temp Pulse Resp SpO2   02/15/25 1100 101/70 98.1 °F (36.7 °C) 62 25 90 %   02/15/25 1059 101/70 -- 66 19 95 %   02/15/25 1052 -- -- -- -- 90 %   02/15/25 1000 (!) 89/50 -- 74 22 91 %   02/15/25 0949 112/70 -- -- -- --   02/15/25 0915 (!) 86/52 -- 72 -- --   02/15/25 0900 (!) 86/52 -- 74 19 90 %   02/15/25 0823 (!) 98/46 -- 70 22 91 %          Intake/Output Summary (Last 24 hours) at 2/15/2025 1220  Last data filed at 2/15/2025 0400  Gross per 24 hour   Intake 890.65 ml   Output 1600 ml   Net -709.35 ml       Physical Exam:      General:  middle aged male, up in bed on vapotherm   Remains off  precedex gtt  Calm and cooperative today  Awake, alert and oriented. Appears to be not in any distress  Mucous Membranes:  Pink , anicteric  Neck: No JVD, no carotid bruit, no thyromegaly  Chest:  improving leatha air entry and improving crackles in bases  Cardiovascular:  RRR S1S2 heard, no murmurs or gallops  Abdomen:  Soft, undistended, non tender, no organomegaly, BS present  Extremities: No edema or cyanosis. Distal pulses well felt  Neurological : grossly normal  Cooperative but restless and eager to go home       Medications:  [START ON 2/16/2025] metoprolol succinate, 12.5 mg, Daily  potassium bicarb-citric acid, 20 mEq, Daily  ipratropium 0.5 mg-albuterol 2.5  Stain Result 3+ WBC's present  2+ Epithelial Cells present  No organisms seen      Narrative:      ORDER#: [id number]                          ORDERED BY: KEENA JOHNSON  SOURCE: Bronchial Washing                  COLLECTED:  02/12/25 13:36  ANTIBIOTICS AT [facility]AR[facility].:                      RECEIVED :  02/12/25 20:28    Culture with Smear, Acid Fast Bacillius [[id number]] Collected: 02/12/25 1336    Order Status: No result Specimen: Bronchial Washing Updated: 02/13/25 1626     AFB Smear No AFB observed by Fluorescent stain    Narrative:      ORDER#: [id number]                          ORDERED BY: KEENA JOHNSON  SOURCE: Bronchial Washing                  COLLECTED:  02/12/25 13:36  ANTIBIOTICS AT [facility].:                      RECEIVED :  02/13/25 03:12    Culture, Fungus [[id number]] Collected: 02/12/25 1336    Order Status: Completed Specimen: Bronchial Washing Updated: 02/14/25 1514     Fungus (Mycology) Culture Yeast Isolated, culture in progress     Fungus Stain No Fungal elements seen    Narrative:      ORDER#: [id number]                          ORDERED BY: KEENA JOHNSON  SOURCE: Bronchial Washing                  COLLECTED:  02/12/25 13:36  ANTIBIOTICS AT [facility].:                      RECEIVED :  02/13/25 02:00    Culture, Respiratory (with Gram Stain) [[id number]] Collected: 02/12/25 1332    Order Status: Completed Specimen: BAL Quantitative Count Updated: 02/14/25 1035     CULTURE, RESPIRATORY No growth 36 to 48 hours     Gram Stain Result Cytospin performed,no quantitation  WBC's present  Epithelial Cells present      Narrative:      ORDER#: [id number]                          ORDERED BY: KEENA JOHNSON  SOURCE: BAL Quantitative Count lingula     COLLECTED:  02/12/25 13:32  ANTIBIOTICS AT [facility].:                      RECEIVED :  02/12/25 20:35    Culture with Smear, Acid Fast Bacillius [[id number]] Collected: 02/12/25 1332    Order Status: No result Specimen: BAL Quantitative Count Updated: 02/13/25 1626     AFB Smear No  Updated: 02/08/25 1242     CULTURE, RESPIRATORY Normal respiratory rickey     Gram Stain Result 2+ WBC's (Polymorphonuclear)  No Epithelial Cells seen  1+ Gram positive cocci  Budding yeast      Narrative:      ORDER#: [id number]                          ORDERED BY: PAULINE CERDA  SOURCE: Tracheal Aspirate                  COLLECTED:  02/06/25 07:15  ANTIBIOTICS AT [facility].:                      RECEIVED :  02/06/25 07:17    Blood Culture 1 [[id number]]     Order Status: Canceled Specimen: Blood     Blood Culture 2 [[id number]]     Order Status: Canceled Specimen: Blood     MRSA DNA Probe, Nasal [[id number]] Collected: 02/05/25 1854    Order Status: Completed Specimen: Nares Updated: 02/07/25 0109     MRSA SCREEN RT-PCR --     Negative  MRSA DNA not detected.  Normal Range: Not detected      Narrative:      ORDER#: [id number]                          ORDERED BY: RANDA BAEZ  SOURCE: Nares                              COLLECTED:  02/05/25 18:54  ANTIBIOTICS AT [facility].:                      RECEIVED :  02/05/25 19:03    Culture, Urine [[id number]] Collected: 02/05/25 1854    Order Status: Completed Specimen: Urine, clean catch Updated: 02/06/25 1928     Urine Culture, Routine No growth at 18 to 36 hours    Narrative:      ORDER#: [id number]                          ORDERED BY: RANDA BAEZ  SOURCE: Urine Clean Catch                  COLLECTED:  02/05/25 18:54  ANTIBIOTICS AT [facility].:                      RECEIVED :  02/05/25 19:26    Pneumonia Panel, Molecular [[id number]]     Order Status: Canceled Specimen: Sputum Aspirated     COVID-19, Rapid [[id number]] Collected: 02/05/25 1353    Order Status: Completed Specimen: Nasopharyngeal Swab Updated: 02/05/25 1430     SARS-CoV-2, NAAT Not Detected    Rapid influenza A/B antigens [[id number]] Collected: 02/05/25 1353    Order Status: Completed Specimen: Nasopharyngeal Updated: 02/05/25 1431     Rapid Influenza A Ag Negative     Rapid Influenza B Ag Negative    Blood

## 2025-02-15 NOTE — PROGRESS NOTES
[facility] received a weight alert showing a 15% decrease in a 24-hour period. [facility] asked RN to reverify weight. RN called back confirming lower weight was correct.     Last Weight Metrics:      2/15/2025     5:29 AM 2/14/2025    11:55 AM 2/11/2025     4:02 AM 2/10/2025    12:12 PM 2/10/2025    12:10 PM 2/10/2025     4:00 AM 2/9/2025     3:00 AM   Weight Loss Metrics   Height  5' 10.079\"  5' 10.079\" 5' 10.079\"     Weight - Scale 130 lbs 5 oz  147 lbs 11 oz   148 lbs 2 oz 148 lbs 9 oz   BMI (Calculated) 18.7 kg/m2  21.2 kg/m2   23.2 kg/m2 23.3 kg/m2     Lety Parnell, CoreyD, [facility], 2/15/2025 6:44 AM

## 2025-02-15 NOTE — PLAN OF CARE
Problem: Safety - Adult  Goal: Free from fall injury  Outcome: Progressing  Flowsheets (Taken 2/14/2025 7917)  Free From Fall Injury: Based on caregiver fall risk screen, instruct family/caregiver to ask for assistance with transferring infant if caregiver noted to have fall risk factors     Problem: Pain  Goal: Verbalizes/displays adequate comfort level or baseline comfort level  Outcome: Progressing  Flowsheets (Taken 2/14/2025 2000)  Verbalizes/displays adequate comfort level or baseline comfort level: Encourage patient to monitor pain and request assistance     Problem: Respiratory - Adult  Goal: Achieves optimal ventilation and oxygenation  Outcome: Progressing  Flowsheets (Taken 2/14/2025 2000)  Achieves optimal ventilation and oxygenation: Assess for changes in respiratory status     Problem: Cardiovascular - Adult  Goal: Maintains optimal cardiac output and hemodynamic stability  Recent Flowsheet Documentation  Taken 2/14/2025 2000 by Bladimir Rose RN  Maintains optimal cardiac output and hemodynamic stability: Monitor blood pressure and heart rate  Goal: Absence of cardiac dysrhythmias or at baseline  Recent Flowsheet Documentation  Taken 2/14/2025 2000 by Bladimir Rose RN  Absence of cardiac dysrhythmias or at baseline: Monitor cardiac rate and rhythm     Problem: Metabolic/Fluid and Electrolytes - Adult  Goal: Electrolytes maintained within normal limits  Outcome: Progressing  Flowsheets (Taken 2/14/2025 2000)  Electrolytes maintained within normal limits: Monitor labs and assess patient for signs and symptoms of electrolyte imbalances  Goal: Hemodynamic stability and optimal renal function maintained  Recent Flowsheet Documentation  Taken 2/14/2025 2000 by Bladimir Rose RN  Hemodynamic stability and optimal renal function maintained: Monitor labs and assess for signs and symptoms of volume excess or deficit  Goal: Glucose maintained within prescribed range  Recent

## 2025-02-15 NOTE — PROGRESS NOTES
02/14/25 1900   RT Protocol   History Pulmonary Disease 2   Respiratory pattern 2   Breath sounds 2   Cough 0   Indications for Bronchodilator Therapy Decreased or absent breath sounds   Bronchodilator Assessment Score 6     RT Inhaler-Nebulizer Bronchodilator Protocol Note    There is a bronchodilator order in the chart from a provider indicating to follow the RT Bronchodilator Protocol and there is an “Initiate RT Inhaler-Nebulizer Bronchodilator Protocol” order as well (see protocol at bottom of note).    CXR Findings:  XR CHEST PORTABLE    Result Date: 2/14/2025  Similar-appearing multifocal airspace opacities. Small left pleural effusion.       The findings from the last RT Protocol Assessment were as follows:   History Pulmonary Disease: Chronic pulmonary disease  Respiratory Pattern: Dyspnea on exertion or RR 21-25 bpm  Breath Sounds: Slightly diminished and/or crackles  Cough: Strong, spontaneous, non-productive  Indication for Bronchodilator Therapy: Decreased or absent breath sounds  Bronchodilator Assessment Score: 6    Aerosolized bronchodilator medication orders have been revised according to the RT Inhaler-Nebulizer Bronchodilator Protocol below.    Respiratory Therapist to perform RT Therapy Protocol Assessment initially then follow the protocol.  Repeat RT Therapy Protocol Assessment PRN for score 0-3 or on second treatment, BID, and PRN for scores above 3.    No Indications - adjust the frequency to every 6 hours PRN wheezing or bronchospasm, if no treatments needed after 48 hours then discontinue using Per Protocol order mode.     If indication present, adjust the RT bronchodilator orders based on the Bronchodilator Assessment Score as indicated below.  Use Inhaler orders unless patient has one or more of the following: on home nebulizer, not able to hold breath for 10 seconds, is not alert and oriented, cannot activate and use MDI correctly, or respiratory rate 25 breaths per minute or more,

## 2025-02-15 NOTE — PROGRESS NOTES
4 Eyes Skin Assessment     NAME:  Dayton Soto  YOB: 1965  MEDICAL RECORD NUMBER:  [id number]    The patient is being assessed for  Shift Handoff    I agree that at least one RN has performed a thorough Head to Toe Skin Assessment on the patient. ALL assessment sites listed below have been assessed.      Areas assessed by both nurses:    Head, Face, Ears, Shoulders, Back, Chest, Arms, Elbows, Hands, Sacrum. Buttock, Coccyx, Ischium, Legs. Feet and Heels, and Under Medical Devices                   Does the Patient have a Wound? No noted wound(s)       Alex Prevention initiated by RN: Yes  Wound Care Orders initiated by RN: No    Pressure Injury (Stage 3,4, Unstageable, DTI, NWPT, and Complex wounds) if present, place Wound referral order by RN under : No    New Ostomies, if present place, Ostomy referral order under : No     Nurse 1 eSignature: Electronically signed by Bladimir Rose RN on 2/15/25 at 7:02 AM EST    **SHARE this note so that the co-signing nurse can place an eSignature**    Nurse 2 eSignature: {Esignature:[id number]}    Patient is able to demonstrate the ability to move from a reclining position to an upright position within the recliner.

## 2025-02-15 NOTE — PROGRESS NOTES
[facility] Daily Progress Note      Admit Date:  2/5/2025    Subjective:  Mr. Soto is seen for chest pain fluttering and CHF h/p Parox afib    Today no c/o chest pain. He is on 90% Fio2  Intermittent hemoptysis Apixaban on Hold and no hemoptysis today. He is not on any pressors now.  Mucous plugs on Bronchoscopy  He is tolerating .   No Chest pain.        History of Present Illness:  Dayton Soto is a 59 y.o. patient who [place] [facility] [place] with acute respiratory distress and was intubated.  He had admitted to smoking methamphetamines a few hours prior to coming to hospital.         Dayton Soto is a 59 y.o. patient with past medical history for substance abuse/methamphetamine abuse, cardiomyopathy with moderate LV dysfunction, PAF who presented to the with acute respiratory distress.  Patient was reportedly admitted to smoking methamphetamines a few hours before coming to the hospital. emergently had to be intubated.  Patient difficult to oxygenate and remain severely hypoxemic for several hours.  Patient also with septic shock on Levophed and vasopressin.  Severe acidosis with a pH around 6.9 which is slowly improving to 7.2 today.  Patient troponin also elevated and he was started on heparin drip.  Chest x-ray shows extensive bilateral infiltrates.  He remains intubated, sedated, proned and on paralytics.  He remains critically ill.     ROS:  12 point ROS negative in all areas as listed below except as in [place]  Constitutional, EENT,GI, , Musculoskeletal, skin, neurological, hematological, endocrine, Psychiatric    Past Medical History:   Diagnosis Date    Back pain, chronic     Chronic back pain     Hypertension     MI (myocardial infarction) (HCC)     MRSA (methicillin resistant staph aureus) culture positive 02/26/2020    RIGHT FOREARM     Past Surgical History:   Procedure Laterality Date    BRONCHOSCOPY N/A 2/12/2025    BRONCHOSCOPY ALVEOLAR LAVAGE performed by Amish Richard MD at

## 2025-02-15 NOTE — PROGRESS NOTES
Patient is calm, alert and oriented, off restrains. On precedex 0.2mcg/kg/hr. On vapotherm 30L 100%. O2 sat at 92-95%. Patient is following commands. On purewick. Gave him snacks (pudding+madelyn). No bleeding. On and off productive cough, thick phlegm. Urine is hazy, total 1400ml/12hrs. Call light given. With SCD on bilateral legs. No fall.

## 2025-02-15 NOTE — PROGRESS NOTES
Seen patient with Nurse Christoph at bedside. Awake coherent, combative, pulls his O2 cannula. He said he wants to go home. On limited code. With bilateral soft wrist restraints. T - 97.1, Sugar - 211. HR 56bpm Sinus bradycardia,, . O2 saturation 97% on Heated Maggy flow nasal cannula at 30L, 80% fio2. On malewick - drained 400ml urine output - tea colored. On precedex 1.2 mcg/kg/min. Patients has an aggressive behavior towards health care provider. On tele sitter. Needs attended.  SpO2 will drop drastically if off oxygen - can reach up to 60%.

## 2025-02-15 NOTE — PROGRESS NOTES
Pulmonary & Critical Care Medicine ICU Progress Note    CC: PNA    Events of Last 24 hours:   BiPAP - declined   Vapotherm Flow 30 LPM FiO2 100% FiO2   Precedex 0.2-->0 mcg/kg/hr   Vallum overnight x 1   More coherent today     Invasive Lines: Right femoral cvc- removed 2/10    MV:  2/5/25-2/9        Intake/Output Summary (Last 24 hours) at 2/15/2025 0738  Last data filed at 2/15/2025 0400  Gross per 24 hour   Intake 650.65 ml   Output 1400 ml   Net -749.35 ml       Vent Mode: AC/VC Resp Rate (Set): 22 bpm/Vt (Set, mL): 450 mL/ /FiO2 : 100 %  No results for input(s): \"PHART\", \"MIG9MCT\", \"PO2ART\" in the last 72 hours.      IV:   dexmedeTOMIDine HCl in NaCl 0.2 mcg/kg/hr (02/15/25 0400)    dextrose      sodium chloride 5 mL/hr at 02/10/25 0629       Vitals:  BP (!) 85/33   Pulse 62   Temp 98 °F (36.7 °C)   Resp 30   Ht 1.78 m (5' 10.08\")   Wt 59.1 kg (130 lb 4.7 oz)   SpO2 (!) 84%   BMI 18.65 kg/m²   on Vapotherm   Constitutional:  No acute distress. .   HEENT: no scleral icterus  Neck: No tracheal deviation present.    Cardiovascular: Normal heart sounds.   Pulmonary/Chest: Few wheezes. Few rhonchi. Few rales. No decreased breath sounds.  No accessory muscle usage or stridor.   Abdominal: Soft.   Musculoskeletal: No cyanosis. No clubbing.  Skin: Skin is warm and dry.        Scheduled Meds:   potassium bicarb-citric acid  20 mEq Oral Daily    ipratropium 0.5 mg-albuterol 2.5 mg  1 Dose Inhalation 4x Daily RT    haloperidol lactate  5 mg IntraMUSCular Once    diphenhydrAMINE  25 mg IntraVENous Once    piperacillin-tazobactam  3,375 mg IntraVENous Q8H    methylPREDNISolone  40 mg IntraVENous Q12H    furosemide  20 mg IntraVENous BID    amiodarone  100 mg Oral Daily    metoprolol succinate  25 mg Oral Daily    sacubitril-valsartan  1 tablet Oral BID    spironolactone  25 mg Oral Daily    atorvastatin  40 mg Oral Nightly    buprenorphine-naloxone  1 tablet SubLINGual BID    insulin lispro  0-4 Units SubCUTAneous  Q4H    sodium chloride flush  5-40 mL IntraVENous 2 times per day    famotidine (PEPCID) injection  20 mg IntraVENous BID     PRN Meds:  ipratropium 0.5 mg-albuterol 2.5 mg, prochlorperazine **OR** prochlorperazine, diazePAM, glucose, dextrose bolus **OR** dextrose bolus, glucagon (rDNA), dextrose, sodium chloride flush, sodium chloride, potassium chloride **OR** potassium alternative oral replacement **OR** potassium chloride, magnesium sulfate, polyethylene glycol, acetaminophen **OR** acetaminophen    Results:  CBC:   Recent Labs     02/13/25  0514 02/14/25  0813 02/15/25  0407   WBC 10.3 11.1* 10.8   HGB 10.7* 11.5* 10.0*   HCT 31.1* 34.8* 30.4*   MCV 90.1 89.9 89.7    290 244     BMP:   Recent Labs     02/13/25  0514 02/14/25  0813 02/15/25  0406    140 141   K 3.7 4.2 4.3    98* 102   CO2 29 31 29   BUN 19 22* 26*   CREATININE 0.8* 1.0 1.1     LIVER PROFILE:   No results for input(s): \"AST\", \"ALT\", \"LIPASE\", \"AMYLASE\", \"BILIDIR\", \"BILITOT\", \"ALKPHOS\" in the last 72 hours.    Invalid input(s): \"ALB\"      Cultures:  2/5/25 blood cx NGTD  2/6/25 Resp cx: ngtd  2/5/25 urine cx negative  2/12 BAL NGTD       Films:  Chest x-ray 2/14 imaging reviewed by me and showed  Worsening multifocal airspace disease, likely pneumonia.    Pulmonary edema   Small bilateral pleural effusions.       CTPA 2/5  1. Worsening diffuse bilateral airspace disease favoring aspiration pneumonia  rather than pulmonary edema.  2. Interstitial edema.  3. Trace left pleural effusion.  4. Mediastinal adenopathy, likely reactive.         ASSESSMENT:  Severe acute hypoxic respiratory failure required MV/Prone  Aspiration pneumonia - mucus plugs on bronch 2/12  Recurrent hemoptysis- pulmonary hemorrhage on BAL 2/12. Resolved   Methamphetamine use  Agitation   Prolonged QTc   Septic shock  Cardiomyopathy of unknown etiology present on admission and EF 30 to 35% with global hypokinesis  History of Afib on Eliquis and Amiodarone

## 2025-02-16 PROBLEM — J69.0 ASPIRATION PNEUMONIA (HCC): Status: ACTIVE | Noted: 2025-02-16

## 2025-02-16 LAB
ANION GAP SERPL CALCULATED.3IONS-SCNC: 10 MMOL/L (ref 3–16)
BASOPHILS # BLD: 0 K/UL (ref 0–0.2)
BASOPHILS NFR BLD: 0.1 %
BUN SERPL-MCNC: 33 MG/DL (ref 7–20)
CALCIUM SERPL-MCNC: 7.4 MG/DL (ref 8.3–10.6)
CHLORIDE SERPL-SCNC: 101 MMOL/L (ref 99–110)
CO2 SERPL-SCNC: 30 MMOL/L (ref 21–32)
CREAT SERPL-MCNC: 1 MG/DL (ref 0.9–1.3)
DEPRECATED RDW RBC AUTO: 13.4 % (ref 12.4–15.4)
EOSINOPHIL # BLD: 0 K/UL (ref 0–0.6)
EOSINOPHIL NFR BLD: 0 %
GFR SERPLBLD CREATININE-BSD FMLA CKD-EPI: 86 ML/MIN/{1.73_M2}
GLUCOSE BLD-MCNC: 123 MG/DL (ref 70–99)
GLUCOSE BLD-MCNC: 161 MG/DL (ref 70–99)
GLUCOSE BLD-MCNC: 162 MG/DL (ref 70–99)
GLUCOSE BLD-MCNC: 225 MG/DL (ref 70–99)
GLUCOSE BLD-MCNC: 240 MG/DL (ref 70–99)
GLUCOSE BLD-MCNC: 259 MG/DL (ref 70–99)
GLUCOSE SERPL-MCNC: 240 MG/DL (ref 70–99)
HCT VFR BLD AUTO: 29.4 % (ref 40.5–52.5)
HGB BLD-MCNC: 9.8 G/DL (ref 13.5–17.5)
LYMPHOCYTES # BLD: 0.3 K/UL (ref 1–5.1)
LYMPHOCYTES NFR BLD: 2.5 %
MCH RBC QN AUTO: 29.6 PG (ref 26–34)
MCHC RBC AUTO-ENTMCNC: 33.2 G/DL (ref 31–36)
MCV RBC AUTO: 89.1 FL (ref 80–100)
MONOCYTES # BLD: 0.7 K/UL (ref 0–1.3)
MONOCYTES NFR BLD: 4.9 %
NEUTROPHILS # BLD: 12.7 K/UL (ref 1.7–7.7)
NEUTROPHILS NFR BLD: 92.5 %
PERFORMED ON: ABNORMAL
PLATELET # BLD AUTO: 286 K/UL (ref 135–450)
PMV BLD AUTO: 8.8 FL (ref 5–10.5)
POTASSIUM SERPL-SCNC: 3.9 MMOL/L (ref 3.5–5.1)
RBC # BLD AUTO: 3.3 M/UL (ref 4.2–5.9)
SODIUM SERPL-SCNC: 141 MMOL/L (ref 136–145)
WBC # BLD AUTO: 13.7 K/UL (ref 4–11)

## 2025-02-16 PROCEDURE — 6360000002 HC RX W HCPCS: Performed by: INTERNAL MEDICINE

## 2025-02-16 PROCEDURE — 6370000000 HC RX 637 (ALT 250 FOR IP): Performed by: INTERNAL MEDICINE

## 2025-02-16 PROCEDURE — 6370000000 HC RX 637 (ALT 250 FOR IP)

## 2025-02-16 PROCEDURE — 94669 MECHANICAL CHEST WALL OSCILL: CPT

## 2025-02-16 PROCEDURE — 99291 CRITICAL CARE FIRST HOUR: CPT | Performed by: INTERNAL MEDICINE

## 2025-02-16 PROCEDURE — 94761 N-INVAS EAR/PLS OXIMETRY MLT: CPT

## 2025-02-16 PROCEDURE — 2500000003 HC RX 250 WO HCPCS: Performed by: INTERNAL MEDICINE

## 2025-02-16 PROCEDURE — 94640 AIRWAY INHALATION TREATMENT: CPT

## 2025-02-16 PROCEDURE — 99232 SBSQ HOSP IP/OBS MODERATE 35: CPT | Performed by: INTERNAL MEDICINE

## 2025-02-16 PROCEDURE — 80048 BASIC METABOLIC PNL TOTAL CA: CPT

## 2025-02-16 PROCEDURE — 36415 COLL VENOUS BLD VENIPUNCTURE: CPT

## 2025-02-16 PROCEDURE — 2700000000 HC OXYGEN THERAPY PER DAY

## 2025-02-16 PROCEDURE — 2000000000 HC ICU R&B

## 2025-02-16 PROCEDURE — 2500000003 HC RX 250 WO HCPCS

## 2025-02-16 PROCEDURE — 85025 COMPLETE CBC W/AUTO DIFF WBC: CPT

## 2025-02-16 PROCEDURE — 2580000003 HC RX 258: Performed by: INTERNAL MEDICINE

## 2025-02-16 RX ORDER — IPRATROPIUM BROMIDE AND ALBUTEROL SULFATE 2.5; .5 MG/3ML; MG/3ML
1 SOLUTION RESPIRATORY (INHALATION)
Status: ACTIVE | OUTPATIENT
Start: 2025-02-16

## 2025-02-16 RX ORDER — INSULIN GLARGINE 100 [IU]/ML
10 INJECTION, SOLUTION SUBCUTANEOUS ONCE
Status: COMPLETED | OUTPATIENT
Start: 2025-02-16 | End: 2025-02-16

## 2025-02-16 RX ADMIN — SACUBITRIL AND VALSARTAN 1 TABLET: 24; 26 TABLET, FILM COATED ORAL at 20:07

## 2025-02-16 RX ADMIN — BUPRENORPHINE HYDROCHLORIDE AND NALOXONE HYDROCHLORIDE DIHYDRATE 1 TABLET: 8; 2 TABLET SUBLINGUAL at 08:38

## 2025-02-16 RX ADMIN — FAMOTIDINE 20 MG: 10 INJECTION INTRAVENOUS at 08:38

## 2025-02-16 RX ADMIN — WATER 40 MG: 1 INJECTION INTRAMUSCULAR; INTRAVENOUS; SUBCUTANEOUS at 00:07

## 2025-02-16 RX ADMIN — AMIODARONE HYDROCHLORIDE 100 MG: 200 TABLET ORAL at 08:38

## 2025-02-16 RX ADMIN — INSULIN GLARGINE 10 UNITS: 100 INJECTION, SOLUTION SUBCUTANEOUS at 08:40

## 2025-02-16 RX ADMIN — PIPERACILLIN AND TAZOBACTAM 3375 MG: 3; .375 INJECTION, POWDER, LYOPHILIZED, FOR SOLUTION INTRAVENOUS at 04:42

## 2025-02-16 RX ADMIN — IPRATROPIUM BROMIDE AND ALBUTEROL SULFATE 1 DOSE: .5; 2.5 SOLUTION RESPIRATORY (INHALATION) at 19:08

## 2025-02-16 RX ADMIN — METOPROLOL SUCCINATE 12.5 MG: 25 TABLET, EXTENDED RELEASE ORAL at 08:38

## 2025-02-16 RX ADMIN — FUROSEMIDE 20 MG: 10 INJECTION, SOLUTION INTRAMUSCULAR; INTRAVENOUS at 16:08

## 2025-02-16 RX ADMIN — FAMOTIDINE 20 MG: 10 INJECTION INTRAVENOUS at 20:07

## 2025-02-16 RX ADMIN — POTASSIUM BICARBONATE 20 MEQ: 782 TABLET, EFFERVESCENT ORAL at 08:37

## 2025-02-16 RX ADMIN — ACETAMINOPHEN 650 MG: 325 TABLET ORAL at 02:14

## 2025-02-16 RX ADMIN — DIAZEPAM 5 MG: 5 INJECTION, SOLUTION INTRAMUSCULAR; INTRAVENOUS at 03:00

## 2025-02-16 RX ADMIN — SPIRONOLACTONE 25 MG: 25 TABLET ORAL at 08:38

## 2025-02-16 RX ADMIN — PIPERACILLIN AND TAZOBACTAM 3375 MG: 3; .375 INJECTION, POWDER, LYOPHILIZED, FOR SOLUTION INTRAVENOUS at 20:06

## 2025-02-16 RX ADMIN — SODIUM CHLORIDE, PRESERVATIVE FREE 10 ML: 5 INJECTION INTRAVENOUS at 08:39

## 2025-02-16 RX ADMIN — PIPERACILLIN AND TAZOBACTAM 3375 MG: 3; .375 INJECTION, POWDER, LYOPHILIZED, FOR SOLUTION INTRAVENOUS at 13:11

## 2025-02-16 RX ADMIN — INSULIN LISPRO 1 UNITS: 100 INJECTION, SOLUTION INTRAVENOUS; SUBCUTANEOUS at 08:40

## 2025-02-16 RX ADMIN — IPRATROPIUM BROMIDE AND ALBUTEROL SULFATE 1 DOSE: 2.5; .5 SOLUTION RESPIRATORY (INHALATION) at 07:02

## 2025-02-16 RX ADMIN — ATORVASTATIN CALCIUM 40 MG: 40 TABLET, FILM COATED ORAL at 20:08

## 2025-02-16 RX ADMIN — BUPRENORPHINE HYDROCHLORIDE AND NALOXONE HYDROCHLORIDE DIHYDRATE 1 TABLET: 8; 2 TABLET SUBLINGUAL at 20:08

## 2025-02-16 RX ADMIN — FUROSEMIDE 20 MG: 10 INJECTION, SOLUTION INTRAMUSCULAR; INTRAVENOUS at 08:38

## 2025-02-16 RX ADMIN — SODIUM CHLORIDE, PRESERVATIVE FREE 10 ML: 5 INJECTION INTRAVENOUS at 20:08

## 2025-02-16 RX ADMIN — SACUBITRIL AND VALSARTAN 1 TABLET: 24; 26 TABLET, FILM COATED ORAL at 08:38

## 2025-02-16 RX ADMIN — WATER 40 MG: 1 INJECTION INTRAMUSCULAR; INTRAVENOUS; SUBCUTANEOUS at 13:05

## 2025-02-16 ASSESSMENT — PAIN DESCRIPTION - DESCRIPTORS: DESCRIPTORS: DISCOMFORT

## 2025-02-16 ASSESSMENT — PAIN SCALES - GENERAL
PAINLEVEL_OUTOF10: 0
PAINLEVEL_OUTOF10: 0
PAINLEVEL_OUTOF10: 2

## 2025-02-16 ASSESSMENT — PAIN - FUNCTIONAL ASSESSMENT: PAIN_FUNCTIONAL_ASSESSMENT: ACTIVITIES ARE NOT PREVENTED

## 2025-02-16 ASSESSMENT — PAIN DESCRIPTION - ORIENTATION: ORIENTATION: ANTERIOR

## 2025-02-16 ASSESSMENT — PAIN SCALES - WONG BAKER: WONGBAKER_NUMERICALRESPONSE: NO HURT

## 2025-02-16 ASSESSMENT — PAIN DESCRIPTION - LOCATION: LOCATION: CHEST

## 2025-02-16 NOTE — PLAN OF CARE
Problem: Pain  Goal: Verbalizes/displays adequate comfort level or baseline comfort level  Outcome: Progressing  Flowsheets (Taken 2/16/2025 1753)  Verbalizes/displays adequate comfort level or baseline comfort level: Encourage patient to monitor pain and request assistance  Note: PAIN 0/10.      Problem: Cardiovascular - Adult  Goal: Maintains optimal cardiac output and hemodynamic stability  Outcome: Progressing     Problem: Infection - Adult  Goal: Absence of infection at discharge  Outcome: Progressing     Problem: Respiratory - Adult  Goal: Achieves optimal ventilation and oxygenation  Flowsheets (Taken 2/16/2025 1750)  Achieves optimal ventilation and oxygenation:   Assess for changes in respiratory status   Assess for changes in mentation and behavior   Position to facilitate oxygenation and minimize respiratory effort  Note: Up in chair. Vapotherm 30 L and 100%. Electronically signed by Chi Fletcher RN on 2/16/2025 at 5:54 PM

## 2025-02-16 NOTE — PROGRESS NOTES
02/15/25 1900   RT Protocol   History Pulmonary Disease 2   Respiratory pattern 2   Breath sounds 2   Cough 0   Indications for Bronchodilator Therapy Decreased or absent breath sounds   Bronchodilator Assessment Score 6     RT Inhaler-Nebulizer Bronchodilator Protocol Note    There is a bronchodilator order in the chart from a provider indicating to follow the RT Bronchodilator Protocol and there is an “Initiate RT Inhaler-Nebulizer Bronchodilator Protocol” order as well (see protocol at bottom of note).    CXR Findings:  XR CHEST PORTABLE    Result Date: 2/14/2025  Similar-appearing multifocal airspace opacities. Small left pleural effusion.       The findings from the last RT Protocol Assessment were as follows:   History Pulmonary Disease: Chronic pulmonary disease  Respiratory Pattern: Dyspnea on exertion or RR 21-25 bpm  Breath Sounds: Slightly diminished and/or crackles  Cough: Strong, spontaneous, non-productive  Indication for Bronchodilator Therapy: Decreased or absent breath sounds  Bronchodilator Assessment Score: 6    Aerosolized bronchodilator medication orders have been revised according to the RT Inhaler-Nebulizer Bronchodilator Protocol below.    Respiratory Therapist to perform RT Therapy Protocol Assessment initially then follow the protocol.  Repeat RT Therapy Protocol Assessment PRN for score 0-3 or on second treatment, BID, and PRN for scores above 3.    No Indications - adjust the frequency to every 6 hours PRN wheezing or bronchospasm, if no treatments needed after 48 hours then discontinue using Per Protocol order mode.     If indication present, adjust the RT bronchodilator orders based on the Bronchodilator Assessment Score as indicated below.  Use Inhaler orders unless patient has one or more of the following: on home nebulizer, not able to hold breath for 10 seconds, is not alert and oriented, cannot activate and use MDI correctly, or respiratory rate 25 breaths per minute or more,

## 2025-02-16 NOTE — PROGRESS NOTES
[facility] Daily Progress Note      Admit Date:  2/5/2025    Subjective:  Mr. Soto is seen for chest pain fluttering and CHF h/p Parox afib  He is feeling good, sitting in chair now on 70% gradually being weaned down. He is eating pretty much normal diet.    Intermittent hemoptysis Apixaban on Hold and no hemoptysis today. He is not on any pressors now.  Mucous plugs on Bronchoscopy    No Chest pain.        History of Present Illness:  Dayton Soto is a 59 y.o. patient who [place] [facility] [place] with acute respiratory distress and was intubated.  He had admitted to smoking methamphetamines a few hours prior to coming to hospital.         Dayton Soto is a 59 y.o. patient with past medical history for substance abuse/methamphetamine abuse, cardiomyopathy with moderate LV dysfunction, PAF who presented to the with acute respiratory distress.  Patient was reportedly admitted to smoking methamphetamines a few hours before coming to the hospital. emergently had to be intubated.  Patient difficult to oxygenate and remain severely hypoxemic for several hours.  Patient also with septic shock on Levophed and vasopressin.  Severe acidosis with a pH around 6.9 which is slowly improving to 7.2 today.  Patient troponin also elevated and he was started on heparin drip.  Chest x-ray shows extensive bilateral infiltrates.  He remains intubated, sedated, proned and on paralytics.  He remains critically ill.     ROS:  12 point ROS negative in all areas as listed below except as in [place]  Constitutional, EENT,GI, , Musculoskeletal, skin, neurological, hematological, endocrine, Psychiatric    Past Medical History:   Diagnosis Date    Back pain, chronic     Chronic back pain     Hypertension     MI (myocardial infarction) (HCC)     MRSA (methicillin resistant staph aureus) culture positive 02/26/2020    RIGHT FOREARM     Past Surgical History:   Procedure Laterality Date    BRONCHOSCOPY N/A 2/12/2025

## 2025-02-16 NOTE — PLAN OF CARE
Problem: Safety - Adult  Goal: Free from fall injury  Outcome: Progressing  Flowsheets (Taken 2/14/2025 2258)  Free From Fall Injury: Based on caregiver fall risk screen, instruct family/caregiver to ask for assistance with transferring infant if caregiver noted to have fall risk factors     Problem: Pain  Goal: Verbalizes/displays adequate comfort level or baseline comfort level  2/15/2025 2239 by Bladimir Rose, RN  Outcome: Progressing  Flowsheets (Taken 2/15/2025 1550 by Chi Fletcher, RN)  Verbalizes/displays adequate comfort level or baseline comfort level: Encourage patient to monitor pain and request assistance  2/15/2025 1553 by Chi Fletcher RN  Outcome: Progressing  Flowsheets (Taken 2/15/2025 1550)  Verbalizes/displays adequate comfort level or baseline comfort level: Encourage patient to monitor pain and request assistance  Note: Pain 0/10.      Problem: Respiratory - Adult  Goal: Achieves optimal ventilation and oxygenation  2/15/2025 2239 by Bladimir Rose RN  Outcome: Progressing  Flowsheets (Taken 2/15/2025 2000)  Achieves optimal ventilation and oxygenation: Assess for changes in respiratory status  2/15/2025 1553 by Chi Fletcher RN  Outcome: Progressing  Flowsheets (Taken 2/15/2025 1550)  Achieves optimal ventilation and oxygenation:   Assess for changes in respiratory status   Assess for changes in mentation and behavior   Position to facilitate oxygenation and minimize respiratory effort  Note: Vapotherm down to 30L and 80%. Electronically signed by Chi Fletcher RN on 2/15/2025 at 3:53 PM       Problem: Cardiovascular - Adult  Goal: Maintains optimal cardiac output and hemodynamic stability  Outcome: Progressing  Flowsheets (Taken 2/15/2025 2000)  Maintains optimal cardiac output and hemodynamic stability: Monitor blood pressure and heart rate  Goal: Absence of cardiac dysrhythmias or at baseline  Recent Flowsheet Documentation  Taken

## 2025-02-16 NOTE — PROGRESS NOTES
Progress Note    Admit Date:  2/5/2025     59 y.o. male who presented to [facility] with septic shock.  PMHx significant for polysubstance use-methamphetamine, A-fib, valvular disease, on Eliquis on amiodarone, MRSA, cardiomyopathy       Initially admitted on vent support with pneumonia, septic shock,  -s/p extubation but remains on vapotherm for severe persistent hypoxia     Agitation yesterday needing code violet, started on precedex gtt and now off     Improving hypoxia slowly now on 70 %fio2    Subjective:  Mr. Soto seen calm and cooperative on vapotherm, 70 % fio2   Up in chair now and cooperative  Off precedex  Feels ok today    Objective:   Patient Vitals for the past 4 hrs:   BP Temp Temp src Pulse Resp SpO2 Weight   02/16/25 0702 -- -- -- 58 14 99 % --   02/16/25 0600 112/72 -- -- 60 15 (!) 84 % --   02/16/25 0548 -- -- -- 61 16 (!) 87 % --   02/16/25 0507 -- -- -- -- -- -- 57.4 kg (126 lb 8.7 oz)   02/16/25 0502 -- -- -- 60 15 97 % --   02/16/25 0501 -- -- -- 61 16 97 % --   02/16/25 0500 130/82 -- -- 61 14 97 % --   02/16/25 0459 -- -- -- 61 16 98 % --   02/16/25 0458 -- -- -- 63 17 97 % --   02/16/25 0457 -- -- -- 63 16 97 % --   02/16/25 0456 -- -- -- 61 18 97 % --   02/16/25 0455 -- -- -- 61 15 97 % --   02/16/25 0454 -- -- -- 59 14 97 % --   02/16/25 0453 -- -- -- 61 16 97 % --   02/16/25 0452 -- -- -- 62 16 98 % --   02/16/25 0451 -- -- -- 61 16 97 % --   02/16/25 0450 -- -- -- 60 15 97 % --   02/16/25 0449 -- -- -- 65 20 97 % --   02/16/25 0447 -- -- -- 64 20 97 % --   02/16/25 0446 -- -- -- 61 15 98 % --   02/16/25 0445 -- -- -- 59 14 98 % --   02/16/25 0444 -- -- -- 57 15 96 % --   02/16/25 0443 -- -- -- 66 17 94 % --   02/16/25 0442 -- -- -- 66 15 97 % --   02/16/25 0441 -- -- -- 57 17 96 % --   02/16/25 0440 -- -- -- 60 15 96 % --   02/16/25 0439 -- -- -- 58 16 98 % --   02/16/25 0438 -- -- -- 60 15 98 % --   02/16/25 0437 -- -- -- 61 17 98 % --   02/16/25 0436 -- -- -- 63 18 96 % --

## 2025-02-16 NOTE — PROGRESS NOTES
Pulmonary & Critical Care Medicine ICU Progress Note    CC: PNA    Events of Last 24 hours:   BiPAP - declined   Vapotherm Flow 30 LPM FiO2 100--->70% FiO2   Precedex off   Vallum overnight x 1   More coherent today   Up in a chair   No hemoptysis       Invasive Lines: Right femoral cvc- removed 2/10    MV:  2/5/25-2/9        Intake/Output Summary (Last 24 hours) at 2/16/2025 0744  Last data filed at 2/16/2025 0507  Gross per 24 hour   Intake 1203.33 ml   Output 1325 ml   Net -121.67 ml       Vent Mode: AC/VC Resp Rate (Set): 22 bpm/Vt (Set, mL): 450 mL/ /FiO2 : 70 %  No results for input(s): \"PHART\", \"VRF2PEL\", \"PO2ART\" in the last 72 hours.      IV:   dexmedeTOMIDine HCl in NaCl Stopped (02/15/25 0826)    dextrose      sodium chloride 5 mL/hr at 02/10/25 0629       Vitals:  /72   Pulse 58   Temp 97.1 °F (36.2 °C) (Temporal)   Resp 14   Ht 1.78 m (5' 10.08\")   Wt 57.4 kg (126 lb 8.7 oz)   SpO2 99%   BMI 18.12 kg/m²   on Vapotherm   Constitutional:  No acute distress. .   HEENT: no scleral icterus  Neck: No tracheal deviation present.    Cardiovascular: Normal heart sounds.   Pulmonary/Chest: Few wheezes. Few rhonchi. Few rales. No decreased breath sounds.  No accessory muscle usage or stridor.   Abdominal: Soft.   Musculoskeletal: No cyanosis. No clubbing.  Skin: Skin is warm and dry.        Scheduled Meds:   ipratropium 0.5 mg-albuterol 2.5 mg  1 Dose Inhalation BID RT    metoprolol succinate  12.5 mg Oral Daily    potassium bicarb-citric acid  20 mEq Oral Daily    piperacillin-tazobactam  3,375 mg IntraVENous Q8H    methylPREDNISolone  40 mg IntraVENous Q12H    furosemide  20 mg IntraVENous BID    amiodarone  100 mg Oral Daily    sacubitril-valsartan  1 tablet Oral BID    spironolactone  25 mg Oral Daily    atorvastatin  40 mg Oral Nightly    buprenorphine-naloxone  1 tablet SubLINGual BID    insulin lispro  0-4 Units SubCUTAneous Q4H    sodium chloride flush  5-40 mL IntraVENous 2 times per day

## 2025-02-16 NOTE — PROGRESS NOTES
Patient on bed awake, alert and oriented with ongoing nebulization treatment.  Denies pain. HR 75bpm - SR. O2 saturation of 87% On vapotherm 30L at 80%. Lungs sound are diminished. Active bowel sound, no bowel motion as claimed x2. Good muscle strength lower and upper. On SCDs bilateral legs. With socks on. Side rails up x2. Call light given. Able to use urinal. Turned on bed alarm.

## 2025-02-17 ENCOUNTER — APPOINTMENT (OUTPATIENT)
Dept: GENERAL RADIOLOGY | Age: 60
DRG: 720 | End: 2025-02-17
Payer: MEDICAID

## 2025-02-17 PROBLEM — I48.0 PAROXYSMAL ATRIAL FIBRILLATION (HCC): Status: ACTIVE | Noted: 2025-02-17

## 2025-02-17 LAB
ANION GAP SERPL CALCULATED.3IONS-SCNC: 9 MMOL/L (ref 3–16)
ANISOCYTOSIS BLD QL SMEAR: ABNORMAL
BASE EXCESS BLDA CALC-SCNC: 6.2 MMOL/L (ref -3–3)
BASOPHILS # BLD: 0 K/UL (ref 0–0.2)
BASOPHILS NFR BLD: 0 %
BUN SERPL-MCNC: 39 MG/DL (ref 7–20)
CALCIUM SERPL-MCNC: 7.9 MG/DL (ref 8.3–10.6)
CHLORIDE SERPL-SCNC: 101 MMOL/L (ref 99–110)
CO2 BLDA-SCNC: 32 MMOL/L
CO2 SERPL-SCNC: 30 MMOL/L (ref 21–32)
COHGB MFR BLDA: 0.3 % (ref 0–1.5)
CREAT SERPL-MCNC: 0.9 MG/DL (ref 0.9–1.3)
DEPRECATED RDW RBC AUTO: 13.4 % (ref 12.4–15.4)
EOSINOPHIL # BLD: 0 K/UL (ref 0–0.6)
EOSINOPHIL NFR BLD: 0 %
GFR SERPLBLD CREATININE-BSD FMLA CKD-EPI: >90 ML/MIN/{1.73_M2}
GLUCOSE BLD-MCNC: 115 MG/DL (ref 70–99)
GLUCOSE BLD-MCNC: 132 MG/DL (ref 70–99)
GLUCOSE BLD-MCNC: 173 MG/DL (ref 70–99)
GLUCOSE BLD-MCNC: 194 MG/DL (ref 70–99)
GLUCOSE BLD-MCNC: 198 MG/DL (ref 70–99)
GLUCOSE SERPL-MCNC: 122 MG/DL (ref 70–99)
HCO3 BLDA-SCNC: 30.7 MMOL/L (ref 21–29)
HCT VFR BLD AUTO: 33.3 % (ref 40.5–52.5)
HGB BLD-MCNC: 11 G/DL (ref 13.5–17.5)
HGB BLDA-MCNC: 11.7 G/DL (ref 13.5–17.5)
LYMPHOCYTES # BLD: 0.6 K/UL (ref 1–5.1)
LYMPHOCYTES NFR BLD: 4 %
MCH RBC QN AUTO: 29.4 PG (ref 26–34)
MCHC RBC AUTO-ENTMCNC: 32.9 G/DL (ref 31–36)
MCV RBC AUTO: 89.2 FL (ref 80–100)
METHGB MFR BLDA: 0.1 %
MONOCYTES # BLD: 1 K/UL (ref 0–1.3)
MONOCYTES NFR BLD: 6 %
MYELOCYTES NFR BLD MANUAL: 3 %
NEUTROPHILS # BLD: 14.3 K/UL (ref 1.7–7.7)
NEUTROPHILS NFR BLD: 87 %
NT-PROBNP SERPL-MCNC: 1996 PG/ML (ref 0–124)
O2 THERAPY: ABNORMAL
PCO2 BLDA: 43.8 MMHG (ref 35–45)
PERFORMED ON: ABNORMAL
PH BLDA: 7.46 [PH] (ref 7.35–7.45)
PLATELET # BLD AUTO: 290 K/UL (ref 135–450)
PLATELET BLD QL SMEAR: ADEQUATE
PMV BLD AUTO: 9 FL (ref 5–10.5)
PO2 BLDA: 46.9 MMHG (ref 75–108)
POIKILOCYTOSIS BLD QL SMEAR: ABNORMAL
POLYCHROMASIA BLD QL SMEAR: ABNORMAL
POTASSIUM SERPL-SCNC: 4.3 MMOL/L (ref 3.5–5.1)
PROCALCITONIN SERPL IA-MCNC: 0.29 NG/ML (ref 0–0.15)
PROMYELOCYTES NFR BLD MANUAL: 0 %
RBC # BLD AUTO: 3.74 M/UL (ref 4.2–5.9)
SAO2 % BLDA: 85 %
SLIDE REVIEW: ABNORMAL
SODIUM SERPL-SCNC: 140 MMOL/L (ref 136–145)
WBC # BLD AUTO: 15.9 K/UL (ref 4–11)

## 2025-02-17 PROCEDURE — 36415 COLL VENOUS BLD VENIPUNCTURE: CPT

## 2025-02-17 PROCEDURE — 84145 PROCALCITONIN (PCT): CPT

## 2025-02-17 PROCEDURE — 80048 BASIC METABOLIC PNL TOTAL CA: CPT

## 2025-02-17 PROCEDURE — 6370000000 HC RX 637 (ALT 250 FOR IP): Performed by: INTERNAL MEDICINE

## 2025-02-17 PROCEDURE — 85025 COMPLETE CBC W/AUTO DIFF WBC: CPT

## 2025-02-17 PROCEDURE — 99291 CRITICAL CARE FIRST HOUR: CPT | Performed by: INTERNAL MEDICINE

## 2025-02-17 PROCEDURE — 99233 SBSQ HOSP IP/OBS HIGH 50: CPT | Performed by: INTERNAL MEDICINE

## 2025-02-17 PROCEDURE — 2000000000 HC ICU R&B

## 2025-02-17 PROCEDURE — 71045 X-RAY EXAM CHEST 1 VIEW: CPT

## 2025-02-17 PROCEDURE — 94640 AIRWAY INHALATION TREATMENT: CPT

## 2025-02-17 PROCEDURE — 94669 MECHANICAL CHEST WALL OSCILL: CPT

## 2025-02-17 PROCEDURE — 2580000003 HC RX 258: Performed by: INTERNAL MEDICINE

## 2025-02-17 PROCEDURE — 6360000002 HC RX W HCPCS: Performed by: INTERNAL MEDICINE

## 2025-02-17 PROCEDURE — 2700000000 HC OXYGEN THERAPY PER DAY

## 2025-02-17 PROCEDURE — 82803 BLOOD GASES ANY COMBINATION: CPT

## 2025-02-17 PROCEDURE — 94761 N-INVAS EAR/PLS OXIMETRY MLT: CPT

## 2025-02-17 PROCEDURE — 2500000003 HC RX 250 WO HCPCS: Performed by: INTERNAL MEDICINE

## 2025-02-17 PROCEDURE — 83880 ASSAY OF NATRIURETIC PEPTIDE: CPT

## 2025-02-17 PROCEDURE — 92526 ORAL FUNCTION THERAPY: CPT

## 2025-02-17 PROCEDURE — 2500000003 HC RX 250 WO HCPCS

## 2025-02-17 RX ORDER — FUROSEMIDE 10 MG/ML
40 INJECTION INTRAMUSCULAR; INTRAVENOUS 2 TIMES DAILY
Status: DISCONTINUED | OUTPATIENT
Start: 2025-02-17 | End: 2025-02-24

## 2025-02-17 RX ORDER — FUROSEMIDE 10 MG/ML
20 INJECTION INTRAMUSCULAR; INTRAVENOUS ONCE
Status: COMPLETED | OUTPATIENT
Start: 2025-02-17 | End: 2025-02-17

## 2025-02-17 RX ORDER — ENOXAPARIN SODIUM 100 MG/ML
30 INJECTION SUBCUTANEOUS 2 TIMES DAILY
Status: DISCONTINUED | OUTPATIENT
Start: 2025-02-17 | End: 2025-02-18

## 2025-02-17 RX ADMIN — ENOXAPARIN SODIUM 30 MG: 100 INJECTION SUBCUTANEOUS at 10:21

## 2025-02-17 RX ADMIN — DIAZEPAM 5 MG: 5 INJECTION, SOLUTION INTRAMUSCULAR; INTRAVENOUS at 10:16

## 2025-02-17 RX ADMIN — SPIRONOLACTONE 25 MG: 25 TABLET ORAL at 08:16

## 2025-02-17 RX ADMIN — BUPRENORPHINE HYDROCHLORIDE AND NALOXONE HYDROCHLORIDE DIHYDRATE 1 TABLET: 8; 2 TABLET SUBLINGUAL at 22:17

## 2025-02-17 RX ADMIN — INSULIN LISPRO 1 UNITS: 100 INJECTION, SOLUTION INTRAVENOUS; SUBCUTANEOUS at 22:20

## 2025-02-17 RX ADMIN — SODIUM CHLORIDE, PRESERVATIVE FREE 10 ML: 5 INJECTION INTRAVENOUS at 08:21

## 2025-02-17 RX ADMIN — FUROSEMIDE 20 MG: 10 INJECTION, SOLUTION INTRAMUSCULAR; INTRAVENOUS at 08:19

## 2025-02-17 RX ADMIN — IPRATROPIUM BROMIDE AND ALBUTEROL SULFATE 1 DOSE: .5; 2.5 SOLUTION RESPIRATORY (INHALATION) at 18:55

## 2025-02-17 RX ADMIN — BUPRENORPHINE HYDROCHLORIDE AND NALOXONE HYDROCHLORIDE DIHYDRATE 1 TABLET: 8; 2 TABLET SUBLINGUAL at 08:17

## 2025-02-17 RX ADMIN — SACUBITRIL AND VALSARTAN 1 TABLET: 24; 26 TABLET, FILM COATED ORAL at 22:17

## 2025-02-17 RX ADMIN — FUROSEMIDE 20 MG: 10 INJECTION, SOLUTION INTRAMUSCULAR; INTRAVENOUS at 10:16

## 2025-02-17 RX ADMIN — FAMOTIDINE 20 MG: 10 INJECTION INTRAVENOUS at 08:18

## 2025-02-17 RX ADMIN — FUROSEMIDE 40 MG: 10 INJECTION, SOLUTION INTRAMUSCULAR; INTRAVENOUS at 17:29

## 2025-02-17 RX ADMIN — ENOXAPARIN SODIUM 30 MG: 100 INJECTION SUBCUTANEOUS at 22:17

## 2025-02-17 RX ADMIN — ATORVASTATIN CALCIUM 40 MG: 40 TABLET, FILM COATED ORAL at 22:17

## 2025-02-17 RX ADMIN — AMIODARONE HYDROCHLORIDE 100 MG: 200 TABLET ORAL at 13:26

## 2025-02-17 RX ADMIN — WATER 40 MG: 1 INJECTION INTRAMUSCULAR; INTRAVENOUS; SUBCUTANEOUS at 08:19

## 2025-02-17 RX ADMIN — PIPERACILLIN AND TAZOBACTAM 3375 MG: 3; .375 INJECTION, POWDER, LYOPHILIZED, FOR SOLUTION INTRAVENOUS at 22:25

## 2025-02-17 RX ADMIN — SACUBITRIL AND VALSARTAN 1 TABLET: 24; 26 TABLET, FILM COATED ORAL at 08:16

## 2025-02-17 RX ADMIN — PIPERACILLIN AND TAZOBACTAM 3375 MG: 3; .375 INJECTION, POWDER, LYOPHILIZED, FOR SOLUTION INTRAVENOUS at 06:21

## 2025-02-17 RX ADMIN — IPRATROPIUM BROMIDE AND ALBUTEROL SULFATE 1 DOSE: .5; 2.5 SOLUTION RESPIRATORY (INHALATION) at 08:39

## 2025-02-17 RX ADMIN — SODIUM CHLORIDE, PRESERVATIVE FREE 10 ML: 5 INJECTION INTRAVENOUS at 22:20

## 2025-02-17 RX ADMIN — FAMOTIDINE 20 MG: 10 INJECTION INTRAVENOUS at 22:17

## 2025-02-17 RX ADMIN — POTASSIUM BICARBONATE 20 MEQ: 782 TABLET, EFFERVESCENT ORAL at 08:17

## 2025-02-17 RX ADMIN — PIPERACILLIN AND TAZOBACTAM 3375 MG: 3; .375 INJECTION, POWDER, LYOPHILIZED, FOR SOLUTION INTRAVENOUS at 13:15

## 2025-02-17 NOTE — PROGRESS NOTES
Progress Note    Admit Date:  2/5/2025     59 y.o. male who presented to [facility] with septic shock.  PMHx significant for polysubstance use-methamphetamine, A-fib, valvular disease, on Eliquis on amiodarone, MRSA, cardiomyopathy       Initially admitted on vent support with pneumonia, septic shock,  -s/p extubation but remains on vapotherm for severe persistent hypoxia     2/14 Agitation yesterday needing code violet, started on precedex gtt and now off         2/17- Worsening  hypoxia again , today on 100 % FIo2 and 30 L vapotherm     Subjective:  Mr. Soto seen calm and cooperative on vapotherm  Up in chair now   Off precedex  Feels ok today but remains hypoxic  No significant cough or pedal edema     Objective:   Patient Vitals for the past 4 hrs:   BP Temp Temp src Pulse Resp SpO2 Weight   02/17/25 0630 -- -- -- -- -- (!) 82 % --   02/17/25 0600 118/66 -- -- 53 15 97 % 54.3 kg (119 lb 11.4 oz)   02/17/25 0500 (!) 84/67 -- -- 53 16 95 % --   02/17/25 0400 104/64 97.6 °F (36.4 °C) Oral 56 16 96 % --          Intake/Output Summary (Last 24 hours) at 2/17/2025 0714  Last data filed at 2/17/2025 0047  Gross per 24 hour   Intake 1192.4 ml   Output 2125 ml   Net -932.6 ml       Physical Exam:      General:  middle aged male, up in chair on vapotherm   Calm and cooperative today  Awake, alert and oriented. Appears to be not in any distress  Mucous Membranes:  Pink , anicteric  Neck: No JVD, no carotid bruit, no thyromegaly  Chest:  improving leatha air entry and improving crackles in bases  Cardiovascular:  RRR S1S2 heard, no murmurs or gallops  Abdomen:  Soft, undistended, non tender, no organomegaly, BS present  Extremities: No edema or cyanosis. Distal pulses well felt  Neurological : grossly normal  Cooperative , nonfocal    Medications:  ipratropium 0.5 mg-albuterol 2.5 mg, 1 Dose, BID RT  methylPREDNISolone, 40 mg, Daily  metoprolol succinate, 12.5 mg, Daily  potassium bicarb-citric acid, 20 mEq,  present  No organisms seen      Narrative:      ORDER#: [id number]                          ORDERED BY: KEENA JOHNSON  SOURCE: Bronchial Washing                  COLLECTED:  02/12/25 13:36  ANTIBIOTICS AT [facility]AR[facility].:                      RECEIVED :  02/12/25 20:28    Culture with Smear, Acid Fast Bacillius [[id number]] Collected: 02/12/25 1336    Order Status: No result Specimen: Bronchial Washing Updated: 02/13/25 1626     AFB Smear No AFB observed by Fluorescent stain    Narrative:      ORDER#: [id number]                          ORDERED BY: KEENA JOHNSON  SOURCE: Bronchial Washing                  COLLECTED:  02/12/25 13:36  ANTIBIOTICS AT [facility]AR[facility].:                      RECEIVED :  02/13/25 03:12    Culture, Fungus [[id number]] Collected: 02/12/25 1336    Order Status: Completed Specimen: Bronchial Washing Updated: 02/14/25 1514     Fungus (Mycology) Culture Yeast Isolated, culture in progress     Fungus Stain No Fungal elements seen    Narrative:      ORDER#: [id number]                          ORDERED BY: KEENA JOHNSON  SOURCE: Bronchial Washing                  COLLECTED:  02/12/25 13:36  ANTIBIOTICS AT [facility]AR[facility].:                      RECEIVED :  02/13/25 02:00    Culture, Respiratory (with Gram Stain) [[id number]] Collected: 02/12/25 1332    Order Status: Completed Specimen: BAL Quantitative Count Updated: 02/14/25 1035     CULTURE, RESPIRATORY No growth 36 to 48 hours     Gram Stain Result Cytospin performed,no quantitation  WBC's present  Epithelial Cells present      Narrative:      ORDER#: [id number]                          ORDERED BY: KEENA JOHNSON  SOURCE: BAL Quantitative Count lingula     COLLECTED:  02/12/25 13:32  ANTIBIOTICS AT [facility]AR[facility].:                      RECEIVED :  02/12/25 20:35    Culture with Smear, Acid Fast Bacillius [[id number]] Collected: 02/12/25 1332    Order Status: No result Specimen: BAL Quantitative Count Updated: 02/13/25 1626     AFB Smear No AFB observed by Fluorescent stain    Narrative:

## 2025-02-17 NOTE — PROGRESS NOTES
02/17/25 0847   RT Protocol   History Pulmonary Disease 2   Respiratory pattern 0   Breath sounds 2   Cough 1   Bronchodilator Assessment Score 5

## 2025-02-17 NOTE — PROGRESS NOTES
D: Patient's oxygen saturation on 100% FiO2 and 30 L Vapotherm is 82%. Dr. Navarrete at bedside and orders were placed for a x-ray and abg to confirm the patients oxygen saturation. ABG obtained per policy and procedure. Results provided to Dr. Navarrete. Orders to hold oral food until seen by pulmonology. RT at bedside to try biPap. Electronically signed by Chi Fletcher RN on 2/17/2025 at 9:14 AM

## 2025-02-17 NOTE — PROGRESS NOTES
02/16/25 1900   RT Protocol   History Pulmonary Disease 2   Respiratory pattern 2   Breath sounds 2   Cough 0   Indications for Bronchodilator Therapy Decreased or absent breath sounds   Bronchodilator Assessment Score 6     RT Inhaler-Nebulizer Bronchodilator Protocol Note    There is a bronchodilator order in the chart from a provider indicating to follow the RT Bronchodilator Protocol and there is an “Initiate RT Inhaler-Nebulizer Bronchodilator Protocol” order as well (see protocol at bottom of note).    CXR Findings:  No results found.    The findings from the last RT Protocol Assessment were as follows:   History Pulmonary Disease: Chronic pulmonary disease  Respiratory Pattern: Dyspnea on exertion or RR 21-25 bpm  Breath Sounds: Slightly diminished and/or crackles  Cough: Strong, spontaneous, non-productive  Indication for Bronchodilator Therapy: Decreased or absent breath sounds  Bronchodilator Assessment Score: 6    Aerosolized bronchodilator medication orders have been revised according to the RT Inhaler-Nebulizer Bronchodilator Protocol below.    Respiratory Therapist to perform RT Therapy Protocol Assessment initially then follow the protocol.  Repeat RT Therapy Protocol Assessment PRN for score 0-3 or on second treatment, BID, and PRN for scores above 3.    No Indications - adjust the frequency to every 6 hours PRN wheezing or bronchospasm, if no treatments needed after 48 hours then discontinue using Per Protocol order mode.     If indication present, adjust the RT bronchodilator orders based on the Bronchodilator Assessment Score as indicated below.  Use Inhaler orders unless patient has one or more of the following: on home nebulizer, not able to hold breath for 10 seconds, is not alert and oriented, cannot activate and use MDI correctly, or respiratory rate 25 breaths per minute or more, then use the equivalent nebulizer order(s) with same Frequency and PRN reasons based on the score.  If a  patient is on this medication at home then do not decrease Frequency below that used at home.    0-3 - enter or revise RT bronchodilator order(s) to equivalent RT Bronchodilator order with Frequency of every 4 hours PRN for wheezing or increased work of breathing using Per Protocol order mode.        4-6 - enter or revise RT Bronchodilator order(s) to two equivalent RT bronchodilator orders with one order with BID Frequency and one order with Frequency of every 4 hours PRN wheezing or increased work of breathing using Per Protocol order mode.        7-10 - enter or revise RT Bronchodilator order(s) to two equivalent RT bronchodilator orders with one order with TID Frequency and one order with Frequency of every 4 hours PRN wheezing or increased work of breathing using Per Protocol order mode.       11-13 - enter or revise RT Bronchodilator order(s) to one equivalent RT bronchodilator order with QID Frequency and an Albuterol order with Frequency of every 4 hours PRN wheezing or increased work of breathing using Per Protocol order mode.      Greater than 13 - enter or revise RT Bronchodilator order(s) to one equivalent RT bronchodilator order with every 4 hours Frequency and an Albuterol order with Frequency of every 2 hours PRN wheezing or increased work of breathing using Per Protocol order mode.       Electronically signed by Jefferson Quigley RCP on 2/16/2025 at 7:11 PM

## 2025-02-17 NOTE — PROGRESS NOTES
02/17/25 0859   NIV Type   NIV Started/Stopped On   Equipment Type V60   Mode Bilevel   Mask Type Full face mask   Mask Size Medium   Assessment   Pulse 68   Respirations 30   SpO2 94 %   Level of Consciousness 0   Comfort Level Good   Using Accessory Muscles No   Mask Compliance Good   Breath Sounds   Right Lower Lobe Fine crackles   Left Lower Lobe Fine crackles   Settings/Measurements   IPAP 12 cmH20   CPAP/EPAP 8 cmH2O   Vt (Measured) 540 mL   Rate Ordered 18   FiO2  90 %   I Time/ I Time % 0.8 s   Minute Volume (L/min) 26.4 Liters   Mask Leak (lpm) 5 lpm   Patient's Home Machine No   Alarm Settings   Alarms On Y   High Pressure (cmH2O) 35 cmH2O   Apnea (secs) 20 secs   RR High (bpm) 40 br/min

## 2025-02-17 NOTE — CARE COORDINATION
Spoke with Christoph RN. Patient still on vapotherm and bipap. Unable to wean off.    Per Christoph okay to check with [facility] to see if patient qualifies.     Spoke with Marissa/Jenny. Marissa will review patient.     Received call from Marissa. Patient does meet criteria.    Spoke with Dr. Navarrete who advised he put a Palliative Care consult and the patient just changed himself to a limited code. CM to speak to patient.    CM met with patient at bedside to discuss [facility] and/or Palliative Care. The patient is not interested in Palliative Care/hospice at this time.     The patient asked twice if he can just see if the bipap works first. CM advised yes he can do that and CM will discuss with Christoph RN.    Per Dr. Navarrete and Christoph RN the patient is non-compliant with wearing the bipap. Dr. aNvarrete feels the patient will get better - it is just going to take some time.     Christoph RN will discuss the things the patient needs to do to - see if the bipap will help the patient get better.

## 2025-02-17 NOTE — PROGRESS NOTES
Speech Language Pathology  Attempt Note     Name: Dayton Soto  : 1965  Medical Diagnosis: Acute respiratory failure [J96.00]  Methamphetamine use (HCC) [F15.10]  Elevated troponin [R79.89]  Acute respiratory failure with hypoxia [J96.01]  Multifocal pneumonia [J18.9]      SLP attempted to see pt for dysphagia follow-up. O2 requirements remain too high for transport for MBS (Vapotherm 30L, FiO2: 100%) with pt worsening this morning and now requiring BiPap. Pt continues to choose to eat despite suspected aspiration risk.  SLP to continue to follow as pt status and willingness to participate allows. Await outcome of palliative care consult. RN aware. No charges.    Thank you,  Daisha Lord M.A. SLP  Speech-Language Pathologist  [place] Lic #[id number]  [phone]

## 2025-02-17 NOTE — PROGRESS NOTES
[facility]                            Critical care Progress Note :      CC:follow up on pneumonia     Events of Last 24 hours:   Was on vent   Extubated last sundy   Cannot wean vapotherm  Now use BiPAP   Vapotherm Flow 30 LPM FiO2 100--->70% FiO2   Up in a chair   No hemoptysis   Some agitation 30/100 this am       Invasive Lines: Right femoral cvc- removed 2/10    MV:  2/5/25-2/9        Intake/Output Summary (Last 24 hours) at 2/17/2025 0748  Last data filed at 2/17/2025 0621  Gross per 24 hour   Intake 1192.4 ml   Output 2500 ml   Net -1307.6 ml       Vent Mode: AC/VC Resp Rate (Set): 22 bpm/Vt (Set, mL): 450 mL/ /FiO2 : (S) 100 %  No results for input(s): \"PHART\", \"FJP0RDL\", \"PO2ART\" in the last 72 hours.      IV:   dexmedeTOMIDine HCl in NaCl Stopped (02/15/25 0826)    dextrose      sodium chloride 5 mL/hr at 02/10/25 0629       Vitals:  /66   Pulse 53   Temp 97.6 °F (36.4 °C) (Oral)   Resp 15   Ht 1.78 m (5' 10.08\")   Wt 54.3 kg (119 lb 11.4 oz)   SpO2 (!) 82%   BMI 17.14 kg/m²   on Vapotherm   Constitutional:  No acute distress. .   HEENT: no scleral icterus  Neck: No tracheal deviation present.    Cardiovascular: Normal heart sounds.   Pulmonary/Chest: Few wheezes. Few rhonchi. Few rales. No decreased breath sounds.  No accessory muscle usage or stridor.   Abdominal: Soft.   Musculoskeletal: No cyanosis. No clubbing.  Skin: Skin is warm and dry.        Scheduled Meds:   ipratropium 0.5 mg-albuterol 2.5 mg  1 Dose Inhalation BID RT    methylPREDNISolone  40 mg IntraVENous Daily    metoprolol succinate  12.5 mg Oral Daily    potassium bicarb-citric acid  20 mEq Oral Daily    piperacillin-tazobactam  3,375 mg IntraVENous Q8H    furosemide  20 mg IntraVENous BID    amiodarone  100 mg Oral Daily    sacubitril-valsartan  1 tablet Oral BID    spironolactone  25 mg Oral Daily    atorvastatin  40 mg Oral Nightly    buprenorphine-naloxone  1 tablet  Suboxone      PLAN:    Worsening SOB and CXR ,etiology could be fluid overload  EBC going up check BNP  Check procal   HFNC/Vapotherm for life-threatening acute hypoxemic respiratory failure and titrate to maintain SaO2 >92%  BiPAP PRN   Precedex as needed  Inhaled bronchodilators  Solumedrol 40 IV Q12 hrs ---> daily   Zosyn IV D#4. Completed Augmentin D#2, completed Levaquin D#3, completed Zyvox, cefepime and Flagyl for 3 days.   Follow up Cx   Increase lasix   Lopressor 12.5 BID   Holding Eliquis and monitor hemoptysis   Resumed Amiodarone - Monitor QTc  Blood sugar control ISS, with goal 150-180  GI prophylaxis: Pepcid  DVT prophylaxis: holding Eliquis for now given pulmonary hemorrhage   MRSA prophylaxis: Bactroban  Limited code upon discussion with patient/NOK daughter and family yesterday.      Care reviewed with nursing staff, medical and surgical specialty care, primary care and the patient's family as available.     Chart review/lab review/X-ray viewing/documentation and had long Conversation with patient/family re: prognosis, care options and any end of life issues:      Critical care time spent reviewing labs/films, examining patient, collaborating with other physicians more than 35  Minutes  excluding procedures ..    Ana Pacheco M.D.   2/17/2025  9:29 AM

## 2025-02-17 NOTE — PLAN OF CARE
Problem: Respiratory - Adult  Goal: Achieves optimal ventilation and oxygenation  2/16/2025 2222 by Hayley Lovell RN  Outcome: Not Progressing  2/16/2025 1753 by Chi Fletcher RN  Flowsheets (Taken 2/16/2025 1753)  Achieves optimal ventilation and oxygenation:   Assess for changes in respiratory status   Assess for changes in mentation and behavior   Position to facilitate oxygenation and minimize respiratory effort  Note: Up in chair. Vapotherm 30 L and 100%. Electronically signed by Chi Fletcher RN on 2/16/2025 at 5:54 PM       Problem: Discharge Planning  Goal: Discharge to home or other facility with appropriate resources  Outcome: Progressing  Flowsheets (Taken 2/15/2025 2000 by Bladimir Rose, RN)  Discharge to home or other facility with appropriate resources: Identify barriers to discharge with patient and caregiver     Problem: Pain  Goal: Verbalizes/displays adequate comfort level or baseline comfort level  2/16/2025 2222 by Hayley Lovell RN  Outcome: Progressing  2/16/2025 1753 by Chi Fletcher RN  Outcome: Progressing  Flowsheets (Taken 2/16/2025 1753)  Verbalizes/displays adequate comfort level or baseline comfort level: Encourage patient to monitor pain and request assistance  Note: PAIN 0/10.      Problem: Cardiovascular - Adult  Goal: Maintains optimal cardiac output and hemodynamic stability  2/16/2025 2222 by Hayley Lovell RN  Outcome: Progressing  2/16/2025 1753 by Chi Fletcher RN  Outcome: Progressing     Problem: Infection - Adult  Goal: Absence of infection at discharge  2/16/2025 2222 by Hayley Lovell RN  Outcome: Progressing  2/16/2025 1753 by Chi Fletcher RN  Outcome: Progressing     Problem: Anxiety  Goal: Will report anxiety at manageable levels  Description: INTERVENTIONS:  1. Administer medication as ordered  2. Teach and rehearse alternative coping skills  3. Provide emotional support with 1:1 interaction with  staff  Outcome: Progressing  Flowsheets (Taken 2/16/2025 1956)  Will report anxiety at manageable levels: Administer medication as ordered

## 2025-02-17 NOTE — PROGRESS NOTES
CARDIOLOGY PROGRESS NOTE        Patient Name: Dayton Soto  Date of admission: 2/5/2025  8:47 AM  Admission Dx: Acute respiratory failure [J96.00]  Methamphetamine use (HCC) [F15.10]  Elevated troponin [R79.89]  Acute respiratory failure with hypoxia [J96.01]  Multifocal pneumonia [J18.9]  Requesting Physician: Fran Palacio MD  Primary Care physician: Not, On File (Inactive)    Reason for Consultation/Chief Complaint: Acute hypoxic respiratory failure, new onset A-fib    History of Present Illness:     Dayton Soto is a 59 y.o. patient with a prior medical history notable for substance abuse/methamphetamines, cardiomyopathy with moderate LV dsyfunction noted at time of evaluation for IE 7/2024, atrial fibrillation on AC/amiodarone, who presented to the hospital with complaints of shortness of breath and cough.    Patient is well known to me as I evaluated during admission in January with DC 2/1.   Admitted 2/5 with shortness of breath. Noted smoked methamphetamine about 4-5 hrs prior to arrival. Worsened from pulmonary standpoint requiring intubation. Extensive bilat infiltrates by imaging. Rx for septic shock 2/2 multifocal PNA. Elevated troponin. Presentation very similar to previous.     Dr. Mayen consulted 2/11 for chest pain following our initial assessment 2/6. GDMT re-implemented. Amiodarone dose lowered owing to QT prolongation. Noted intermittent hemoptysis on eliquis. Chest pain felt not reflective of ACS.  Prob BNP 33 k > 2k.  Recommendation made to transition Lasix to PO.     Patient is reevaluated today.  He remains on her percent FiO2 at 30 L/min Vapotherm.  O2 by ABG was only 85% on this FiO2 today.  He does not appear to be in acute distress with regard to his breathing.  Complains of no chest pain.  No palpitations.  Maintaining sinus rhythm by telemetry.  Heart rate was low-49 bpm this morning and beta-blocker/amiodarone were held.  Primary team plans to continue IV Lasix for today  and reassess tomorrow.  BUN is climbing but the patient is on concomitant steroids.  He has no edema.    Past Medical History:   has a past medical history of Back pain, chronic, Chronic back pain, Hypertension, MI (myocardial infarction) (HCC), and MRSA (methicillin resistant staph aureus) culture positive.    Surgical History:   has a past surgical history that includes bronchoscopy (N/A, 2/12/2025).     Social History:   reports that he has quit smoking. His smoking use included cigarettes. He has never used smokeless tobacco. He reports current drug use. Drug: Methamphetamines (Crystal Meth). He reports that he does not drink alcohol.     Family History:  family history is not on file.      Home Medications:  Were reviewed and are listed in nursing record and/or below  Prior to Admission medications    Medication Sig Start Date End Date Taking? Authorizing Provider   amiodarone (CORDARONE) 200 MG tablet 2 bid - 5 days, 2 qd- 1 week, 1 qd thereafter 1/31/25   Brittany Shin MD   empagliflozin (JARDIANCE) 10 MG tablet Take 1 tablet by mouth daily 1/31/25   Brittany Shin MD   apixaban (ELIQUIS) 5 MG TABS tablet Take 1 tablet by mouth 2 times daily 1/31/25   Brittany Shin MD   metoprolol succinate (TOPROL XL) 25 MG extended release tablet Take 0.5 tablets by mouth daily 2/1/25   Brittany Shin MD   sacubitril-valsartan (ENTRESTO) 24-26 MG per tablet Take 0.5 tablets by mouth 2 times daily 1/31/25   Brittany Shin MD   predniSONE (DELTASONE) 20 MG tablet 2 qd- 2 days, 1 1/2 qd- 3 days, 1 qd- 3 days, 1/2 qd- 3 days 2/1/25   Brittany Shin MD   pantoprazole (PROTONIX) 40 MG tablet Take 1 tablet by mouth 2 times daily (before meals) 1/31/25   Brittany Shin MD   aspirin 81 MG EC tablet Take 1 tablet by mouth daily (Always take with food) 7/10/24   Andrade Acosta MD   spironolactone (ALDACTONE) 25 MG tablet Take 1 tablet by

## 2025-02-17 NOTE — PROGRESS NOTES
Shift assessment completed (see flowsheet), patient is A&O x4 and cooperative. Denies pain at this time. VSS on monitor. Bed in low locked position with 3/4 rails in place, call light and side table within reach, urinal in reach. Patient has no requests at this time. Lights dimmed for night time.

## 2025-02-17 NOTE — PROGRESS NOTES
Speech Language Pathology  Swallowing Disorders and Dysphagia    Dysphagia Treatment/Follow-Up Note  Facility/Department: [facility]    Pt choosing to eat at this time despite risks associated with possible aspiration.  Recommend continue to monitor respiratory status with ongoing assessment for ability to complete instrumental swallow study which would yield results which could be applicable across time/sessions.  Risk Management: upright for all intake, stay upright for at least 30 mins after intake, small bites/sips, oral care q4 hrs to reduce adverse affects in the event of aspiration, increase physical mobility as able, slow rate of intake, STRICT aspiration precautions, and hold PO and contact SLP if s/s of aspiration or worsening respiratory status develop.      NAME:Dayton Soto  : 1965 (59 y.o.)   MRN: [id number]  ROOM: [address] [place] [address]  ADMISSION DATE: 2025  PATIENT DIAGNOSIS(ES): Acute respiratory failure [J96.00]  Methamphetamine use (HCC) [F15.10]  Elevated troponin [R79.89]  Acute respiratory failure with hypoxia [J96.01]  Multifocal pneumonia [J18.9]  No Known Allergies    DATE ONSET: 2025    Pain: The patient does not complain of pain       Current Diet: ADULT DIET; Dysphagia - Soft and Bite Sized; No Drinking Straws  ADULT ORAL NUTRITION SUPPLEMENT; Breakfast, Lunch, Dinner; Standard High Calorie/High Protein Oral Supplement      Dysphagia Treatment and Impressions:  Subjective: Pt seen in room at bedside with RN (Christoph) permission  Noteworthy events reported/Chart Review: Pt off BiPap after requiring increased O2 support this am.  Palliative care consulted but pt is not interested in palliative care or hospice.  Patient tolerance to current diet and treatment: Pt continues to choose to eat despite unknown risk of aspiration. Oxygen needs continue to fluctuate daily and within the day.      Respiratory Status: Pt with SPO2% of 97 on 30 LPM Vapotherm with RR of 16  Oral Care Status:

## 2025-02-18 ENCOUNTER — APPOINTMENT (OUTPATIENT)
Dept: GENERAL RADIOLOGY | Age: 60
DRG: 720 | End: 2025-02-18
Payer: MEDICAID

## 2025-02-18 LAB
ANION GAP SERPL CALCULATED.3IONS-SCNC: 7 MMOL/L (ref 3–16)
ANISOCYTOSIS BLD QL SMEAR: ABNORMAL
BASOPHILS # BLD: 0 K/UL (ref 0–0.2)
BASOPHILS NFR BLD: 0 %
BUN SERPL-MCNC: 45 MG/DL (ref 7–20)
CALCIUM SERPL-MCNC: 7.7 MG/DL (ref 8.3–10.6)
CHLORIDE SERPL-SCNC: 102 MMOL/L (ref 99–110)
CO2 SERPL-SCNC: 33 MMOL/L (ref 21–32)
CREAT SERPL-MCNC: 1.3 MG/DL (ref 0.9–1.3)
DEPRECATED RDW RBC AUTO: 13.3 % (ref 12.4–15.4)
EOSINOPHIL # BLD: 0.1 K/UL (ref 0–0.6)
EOSINOPHIL NFR BLD: 1 %
GFR SERPLBLD CREATININE-BSD FMLA CKD-EPI: 63 ML/MIN/{1.73_M2}
GLUCOSE BLD-MCNC: 103 MG/DL (ref 70–99)
GLUCOSE BLD-MCNC: 108 MG/DL (ref 70–99)
GLUCOSE BLD-MCNC: 115 MG/DL (ref 70–99)
GLUCOSE BLD-MCNC: 159 MG/DL (ref 70–99)
GLUCOSE BLD-MCNC: 163 MG/DL (ref 70–99)
GLUCOSE BLD-MCNC: 174 MG/DL (ref 70–99)
GLUCOSE SERPL-MCNC: 98 MG/DL (ref 70–99)
HCT VFR BLD AUTO: 29.9 % (ref 40.5–52.5)
HGB BLD-MCNC: 10 G/DL (ref 13.5–17.5)
LYMPHOCYTES # BLD: 1.7 K/UL (ref 1–5.1)
LYMPHOCYTES NFR BLD: 12 %
MCH RBC QN AUTO: 29.6 PG (ref 26–34)
MCHC RBC AUTO-ENTMCNC: 33.5 G/DL (ref 31–36)
MCV RBC AUTO: 88.3 FL (ref 80–100)
MONOCYTES # BLD: 0.6 K/UL (ref 0–1.3)
MONOCYTES NFR BLD: 4 %
NEUTROPHILS # BLD: 12 K/UL (ref 1.7–7.7)
NEUTROPHILS NFR BLD: 83 %
PERFORMED ON: ABNORMAL
PLATELET # BLD AUTO: 302 K/UL (ref 135–450)
PLATELET BLD QL SMEAR: ADEQUATE
PMV BLD AUTO: 9 FL (ref 5–10.5)
POIKILOCYTOSIS BLD QL SMEAR: ABNORMAL
POLYCHROMASIA BLD QL SMEAR: ABNORMAL
POTASSIUM SERPL-SCNC: 4 MMOL/L (ref 3.5–5.1)
RBC # BLD AUTO: 3.39 M/UL (ref 4.2–5.9)
SLIDE REVIEW: ABNORMAL
SODIUM SERPL-SCNC: 142 MMOL/L (ref 136–145)
WBC # BLD AUTO: 14.5 K/UL (ref 4–11)

## 2025-02-18 PROCEDURE — 6370000000 HC RX 637 (ALT 250 FOR IP): Performed by: INTERNAL MEDICINE

## 2025-02-18 PROCEDURE — 2500000003 HC RX 250 WO HCPCS

## 2025-02-18 PROCEDURE — 99291 CRITICAL CARE FIRST HOUR: CPT | Performed by: INTERNAL MEDICINE

## 2025-02-18 PROCEDURE — 6360000002 HC RX W HCPCS: Performed by: INTERNAL MEDICINE

## 2025-02-18 PROCEDURE — 99232 SBSQ HOSP IP/OBS MODERATE 35: CPT | Performed by: INTERNAL MEDICINE

## 2025-02-18 PROCEDURE — 36415 COLL VENOUS BLD VENIPUNCTURE: CPT

## 2025-02-18 PROCEDURE — 94761 N-INVAS EAR/PLS OXIMETRY MLT: CPT

## 2025-02-18 PROCEDURE — 94669 MECHANICAL CHEST WALL OSCILL: CPT

## 2025-02-18 PROCEDURE — 2000000000 HC ICU R&B

## 2025-02-18 PROCEDURE — 80048 BASIC METABOLIC PNL TOTAL CA: CPT

## 2025-02-18 PROCEDURE — 71045 X-RAY EXAM CHEST 1 VIEW: CPT

## 2025-02-18 PROCEDURE — 2500000003 HC RX 250 WO HCPCS: Performed by: INTERNAL MEDICINE

## 2025-02-18 PROCEDURE — 99233 SBSQ HOSP IP/OBS HIGH 50: CPT | Performed by: INTERNAL MEDICINE

## 2025-02-18 PROCEDURE — 94660 CPAP INITIATION&MGMT: CPT

## 2025-02-18 PROCEDURE — 85025 COMPLETE CBC W/AUTO DIFF WBC: CPT

## 2025-02-18 PROCEDURE — 2700000000 HC OXYGEN THERAPY PER DAY

## 2025-02-18 PROCEDURE — 2580000003 HC RX 258: Performed by: INTERNAL MEDICINE

## 2025-02-18 PROCEDURE — 94640 AIRWAY INHALATION TREATMENT: CPT

## 2025-02-18 RX ORDER — ENOXAPARIN SODIUM 100 MG/ML
1 INJECTION SUBCUTANEOUS 2 TIMES DAILY
Status: DISCONTINUED | OUTPATIENT
Start: 2025-02-18 | End: 2025-02-22

## 2025-02-18 RX ADMIN — SACUBITRIL AND VALSARTAN 1 TABLET: 24; 26 TABLET, FILM COATED ORAL at 08:20

## 2025-02-18 RX ADMIN — FAMOTIDINE 20 MG: 10 INJECTION INTRAVENOUS at 20:09

## 2025-02-18 RX ADMIN — FAMOTIDINE 20 MG: 10 INJECTION INTRAVENOUS at 08:20

## 2025-02-18 RX ADMIN — POTASSIUM BICARBONATE 20 MEQ: 782 TABLET, EFFERVESCENT ORAL at 08:21

## 2025-02-18 RX ADMIN — SODIUM CHLORIDE, PRESERVATIVE FREE 10 ML: 5 INJECTION INTRAVENOUS at 20:10

## 2025-02-18 RX ADMIN — IPRATROPIUM BROMIDE AND ALBUTEROL SULFATE 1 DOSE: .5; 2.5 SOLUTION RESPIRATORY (INHALATION) at 18:54

## 2025-02-18 RX ADMIN — ENOXAPARIN SODIUM 50 MG: 100 INJECTION SUBCUTANEOUS at 20:09

## 2025-02-18 RX ADMIN — PIPERACILLIN AND TAZOBACTAM 3375 MG: 3; .375 INJECTION, POWDER, LYOPHILIZED, FOR SOLUTION INTRAVENOUS at 17:11

## 2025-02-18 RX ADMIN — ENOXAPARIN SODIUM 30 MG: 100 INJECTION SUBCUTANEOUS at 08:28

## 2025-02-18 RX ADMIN — AMIODARONE HYDROCHLORIDE 100 MG: 200 TABLET ORAL at 08:20

## 2025-02-18 RX ADMIN — WATER 40 MG: 1 INJECTION INTRAMUSCULAR; INTRAVENOUS; SUBCUTANEOUS at 08:21

## 2025-02-18 RX ADMIN — SODIUM CHLORIDE, PRESERVATIVE FREE 10 ML: 5 INJECTION INTRAVENOUS at 08:28

## 2025-02-18 RX ADMIN — PIPERACILLIN AND TAZOBACTAM 3375 MG: 3; .375 INJECTION, POWDER, LYOPHILIZED, FOR SOLUTION INTRAVENOUS at 10:27

## 2025-02-18 RX ADMIN — BUPRENORPHINE HYDROCHLORIDE AND NALOXONE HYDROCHLORIDE DIHYDRATE 1 TABLET: 8; 2 TABLET SUBLINGUAL at 08:20

## 2025-02-18 RX ADMIN — IPRATROPIUM BROMIDE AND ALBUTEROL SULFATE 1 DOSE: .5; 2.5 SOLUTION RESPIRATORY (INHALATION) at 08:35

## 2025-02-18 RX ADMIN — BUPRENORPHINE HYDROCHLORIDE AND NALOXONE HYDROCHLORIDE DIHYDRATE 1 TABLET: 8; 2 TABLET SUBLINGUAL at 20:09

## 2025-02-18 ASSESSMENT — PAIN SCALES - GENERAL: PAINLEVEL_OUTOF10: 0

## 2025-02-18 NOTE — PROGRESS NOTES
Progress Note    Admit Date:  2/5/2025     59 y.o. male who presented to [facility] with septic shock.  PMHx significant for polysubstance use-methamphetamine, A-fib, valvular disease, on Eliquis on amiodarone, MRSA, cardiomyopathy       Initially admitted on vent support with pneumonia, septic shock,  -s/p extubation but remains on vapotherm for severe persistent hypoxia     2/14 Agitation yesterday needing code violet, started on precedex gtt and now off         2/17- Worsening  hypoxia again , today on 100 % FIo2 and 30 L vapotherm     2/18 used bipap last night, now on 100 % fio2 and 40 L vapotherm  Did not respond much to lasix     Subjective:  Mr. Soto seen calm and cooperative on vapotherm  Wishes to be full code now   Feels ok today but remains hypoxic  No significant cough or pedal edema     Objective:   Patient Vitals for the past 4 hrs:   BP Pulse Resp SpO2   02/18/25 0600 108/67 60 15 100 %   02/18/25 0500 102/68 56 16 92 %   02/18/25 0400 (!) 76/38 59 15 95 %          Intake/Output Summary (Last 24 hours) at 2/18/2025 0707  Last data filed at 2/18/2025 0222  Gross per 24 hour   Intake 1012.4 ml   Output 1875 ml   Net -862.6 ml       Physical Exam:      General:  middle aged male, up in bed on vapotherm   Calm and cooperative today  Awake, alert and oriented. Appears to be not in any distress  Mucous Membranes:  Pink , anicteric  Neck: No JVD, no carotid bruit, no thyromegaly  Chest:  improving leatha air entry and improving crackles in bases  Cardiovascular:  RRR S1S2 heard, no murmurs or gallops  Abdomen:  Soft, undistended, non tender, no organomegaly, BS present  Extremities: No edema or cyanosis. Distal pulses well felt  Neurological : grossly normal  Cooperative , nonfocal    Medications:  furosemide, 40 mg, BID  enoxaparin, 30 mg, BID  ipratropium 0.5 mg-albuterol 2.5 mg, 1 Dose, BID RT  methylPREDNISolone, 40 mg, Daily  [Held by provider] metoprolol succinate, 12.5 mg, Daily  potassium  flagyl  - BP improved , , wbc better - off paralysis  -extubated 2/9 but remains  Now on 100 % Vapotherm  with  worsening hypoxemia noted, lasix added with no benefit  - bronch cultures with NRF, continued  on zosyn  - pulmonary managing  -pt initially refused re intubation but now aggreable   - palliative care consulted           # Septic shock secondary to multifocal pneumonia  - leatha pna on imaging and wbc upto 47>70 K >10  -Blood cultures remain neg  -given empiric linezolid and cefepime as well as Flagyl  -Stress dose steroids with Solu-Cortef given and now off   -was hypotensive  and was on  Levophed and vasopressin   - BP improved and off pressors.   - - BAL with NRF   -  Now on Zosyn as above        #Severe acidemia: Metabolic and respiratory acidosis:  -was On bicarb  drip and resolved      # ARF - sec to sepitc shock and hypotension  - hyperkalemic and acidotic , on bicarb fluids -given lokelma improved  - UOP maintained    - resolved      #Atrial fibrillation: On Eliquis  #NSTEMI, likely type II secondary to septic shock  #History of HFrEF, cardiomyopathy  - Elevated troponin was on -Heparin gtt>eliquis   -On amiodarone for Afibb - cardiology consulted  Entresto . Aldactone added for low EF , Continue toprol XL  -resumed  statins  - iv lasix 20 bid given as needed  hold for elevated creatinine   - can consider jardiance as outpt  --> eliquis was held for hemoptysis intermittently  but now tolerating     #Polysubstance use, methamphetamine use  On Suboxone      DVT Prophylaxis: lovenox  Diet: ADULT DIET; Dysphagia - Soft and Bite Sized; No Drinking Straws  ADULT ORAL NUTRITION SUPPLEMENT; Breakfast, Lunch, Dinner; Standard High Calorie/High Protein Oral Supplement  Code Status: Full Code    Changed to full code per pt austyn Navarrete MD, 2/18/2025 7:07 AM

## 2025-02-18 NOTE — PROGRESS NOTES
[facility]                            Critical care Progress Note :      CC:follow up on pneumonia     Events of Last 24 hours:   Worsening CXR/condition in general   Diffuse GGO bilateral more right   Extubated last sundy   Cannot wean vapo-thermor  BiPAP and alternate using it   Vapotherm Flow 30 LPM FiO2 100--->70% FiO2   Up in a chair   No hemoptysis   Some agitation 30/100 this am       Invasive Lines: Right femoral cvc- removed 2/10    MV:  2/5/25-2/9        Intake/Output Summary (Last 24 hours) at 2/17/2025 0748  Last data filed at 2/17/2025 0621  Gross per 24 hour   Intake 1192.4 ml   Output 2500 ml   Net -1307.6 ml       Vent Mode: AC/VC Resp Rate (Set): 22 bpm/Vt (Set, mL): 450 mL/ /FiO2 : (S) 100 %  No results for input(s): \"PHART\", \"RKC9XZS\", \"PO2ART\" in the last 72 hours.      IV:   dexmedeTOMIDine HCl in NaCl Stopped (02/15/25 0826)    dextrose      sodium chloride 5 mL/hr at 02/10/25 0629       Vitals:  /66   Pulse 53   Temp 97.6 °F (36.4 °C) (Oral)   Resp 15   Ht 1.78 m (5' 10.08\")   Wt 54.3 kg (119 lb 11.4 oz)   SpO2 (!) 82%   BMI 17.14 kg/m²   on Vapotherm   Constitutional:  No acute distress. .   HEENT: no scleral icterus  Neck: No tracheal deviation present.    Cardiovascular: Normal heart sounds.   Pulmonary/Chest: Few wheezes. Few rhonchi. Few rales. No decreased breath sounds.  No accessory muscle usage or stridor.   Abdominal: Soft.   Musculoskeletal: No cyanosis. No clubbing.  Skin: Skin is warm and dry.        Scheduled Meds:   ipratropium 0.5 mg-albuterol 2.5 mg  1 Dose Inhalation BID RT    methylPREDNISolone  40 mg IntraVENous Daily    metoprolol succinate  12.5 mg Oral Daily    potassium bicarb-citric acid  20 mEq Oral Daily    piperacillin-tazobactam  3,375 mg IntraVENous Q8H    furosemide  20 mg IntraVENous BID    amiodarone  100 mg Oral Daily    sacubitril-valsartan  1 tablet Oral BID    spironolactone  25 mg Oral    HFNC/Vapotherm for life-threatening acute hypoxemic respiratory failure and titrate to maintain SaO2 >92%  BiPAP PRN   Precedex as needed  Inhaled bronchodilators  Solumedrol 40 IV Q12 hrs ---> daily ,no sighs of DAH   Zosyn IV D#4. Completed Augmentin D#2, completed Levaquin D#3, completed Zyvox, cefepime and Flagyl for 3 days.   Follow up Cx   Increase lasix   Lopressor 12.5 BID   Holding Eliquis and monitor hemoptysis   CXR worse  Resumed Amiodarone - Monitor QTc  Blood sugar control ISS, with goal 150-180  GI prophylaxis: Pepcid  DVT prophylaxis: holding Eliquis for now given pulmonary hemorrhage   MRSA prophylaxis: Bactroban  Agree on intubation ,so if not better next 24 h will do brocnh /intubation    Care reviewed with nursing staff, medical and surgical specialty care, primary care and the patient's family as available.     Chart review/lab review/X-ray viewing/documentation and had long Conversation with patient/family re: prognosis, care options and any end of life issues:      Critical care time spent reviewing labs/films, examining patient, collaborating with other physicians more than 35  Minutes  excluding procedures ..    Ana Pacheco M.D.   2/18/2025  8:53 AM

## 2025-02-18 NOTE — PROGRESS NOTES

## 2025-02-18 NOTE — PROGRESS NOTES
02/18/25 0238   NIV Type   NIV Started/Stopped (S)  Off   Assessment   Pulse 56   Respirations 16   SpO2 100 %

## 2025-02-18 NOTE — PROGRESS NOTES
Patient had ACP documents that were found in his belongings bag. These were scanned into the chart by [employer]. Keira marked as the POA in EPIC. Electronically signed by Chi Fletcher RN on 2/18/2025 at 11:17 AM

## 2025-02-18 NOTE — PLAN OF CARE
Problem: Cardiovascular - Adult  Goal: Maintains optimal cardiac output and hemodynamic stability  Outcome: Progressing  Flowsheets (Taken 2/18/2025 1552)  Maintains optimal cardiac output and hemodynamic stability: Monitor blood pressure and heart rate  Goal: Absence of cardiac dysrhythmias or at baseline  Outcome: Progressing  Note: Sinus Rhythm.      Problem: Infection - Adult  Goal: Absence of infection at discharge  Outcome: Progressing  Flowsheets (Taken 2/15/2025 2000 by Bladimir Rose RN)  Absence of infection at discharge: Assess and monitor for signs and symptoms of infection     Problem: Metabolic/Fluid and Electrolytes - Adult  Goal: Electrolytes maintained within normal limits  Outcome: Progressing  Flowsheets (Taken 2/18/2025 1552)  Electrolytes maintained within normal limits: Monitor labs and assess patient for signs and symptoms of electrolyte imbalances     Problem: Nutrition Deficit:  Goal: Optimize nutritional status  Outcome: Progressing  Flowsheets (Taken 2/14/2025 1155 by Ameena Whiting, RD, LD)  Nutrient intake appropriate for improving, restoring, or maintaining nutritional needs:   Assess nutritional status and recommend course of action   Monitor oral intake, labs, and treatment plans   Recommend appropriate diets, oral nutritional supplements, and vitamin/mineral supplements  Note: NPO

## 2025-02-18 NOTE — PROGRESS NOTES
02/17/25 2345   NIV Type   NIV Started/Stopped (S)  On   Equipment Type V60   Mode Bilevel   Mask Type Full face mask   Mask Size Medium   Assessment   Pulse 63   Respirations 26   SpO2 95 %   Level of Consciousness 0   Comfort Level Good   Using Accessory Muscles No   Mask Compliance Good   Skin Assessment Clean, dry, & intact   Skin Protection for O2 Device Yes   Settings/Measurements   IPAP 12 cmH20   CPAP/EPAP 8 cmH2O   Vt (Measured) 533 mL   Rate Ordered 18   FiO2  80 %   Minute Volume (L/min) 13.7 Liters   Mask Leak (lpm) 21 lpm   Patient's Home Machine No   Alarm Settings   Alarms On Y

## 2025-02-18 NOTE — PROGRESS NOTES
Dr. Pacheco at bedside. The patient's work of breathing is increasing. Patient is stating that he wants to do everything that we have to continue living at this time. He verbalized to Dr. Pacheco that he would want to be intubated at this time if he needs it. Code status updated to FULL code. Electronically signed by Chi Fletcher RN on 2/18/2025 at 11:13 AM

## 2025-02-18 NOTE — PROGRESS NOTES
Speech Language Pathology  Attempt Note     Name: Dayton Soto  : 1965  Medical Diagnosis: Acute respiratory failure [J96.00]  Methamphetamine use (HCC) [F15.10]  Elevated troponin [R79.89]  Acute respiratory failure with hypoxia [J96.01]  Multifocal pneumonia [J18.9]      SLP attempted to see pt for dysphagia tx. Treatment unable to be completed as pt requiring BiPap.  Events of last 24 hours noted, including increased flow rate compared to yesterday when pt used vapotherm. SLP to re-attempt as pt's condition and schedule allows. RN aware. No charges.    Thank you,  Daisha Lord M.A. SLP  Speech-Language Pathologist  [place] Lic #[id number]  [phone]

## 2025-02-18 NOTE — CONSULTS
Palliative Care Chart Review  and Check in Note:     NAME:  Dayton Soto  Admit Date: 2/5/2025  Hospital Day:  Hospital Day: 14   Current Code status: Limited    Palliative care is continuing to following Mr. Soto for symptom management,  and goals of care discussion as needed. Patient's chart reviewed today 2/18/25.        The following are the currently established goals/code status, and Symptom management.     Goals of care: KAYLA Huynh met with the pt at bedside to discuss goc and pt not interested in palliative or hospice care at this time.    Code status: Limited, medication only    Discharge plan: to be decided. See KAYLA notes for dc planning.      Mendy Silver RN  02/18/25  9:07 AM

## 2025-02-18 NOTE — PROGRESS NOTES
CARDIOLOGY PROGRESS NOTE        Patient Name: Dayton Soto  Date of admission: 2/5/2025  8:47 AM  Admission Dx: Acute respiratory failure [J96.00]  Methamphetamine use (HCC) [F15.10]  Elevated troponin [R79.89]  Acute respiratory failure with hypoxia [J96.01]  Multifocal pneumonia [J18.9]  Requesting Physician: Fran Palacio MD  Primary Care physician: Not, On File (Inactive)    Reason for Consultation/Chief Complaint: Acute hypoxic respiratory failure, new onset A-fib    History of Present Illness:     Dayton Soto is a 59 y.o. patient with a prior medical history notable for substance abuse/methamphetamines, cardiomyopathy with moderate LV dsyfunction noted at time of evaluation for IE 7/2024, atrial fibrillation on AC/amiodarone, who presented to the hospital with complaints of shortness of breath and cough.    Patient is well known to me as I evaluated during admission in January with DC 2/1.   Admitted 2/5 with shortness of breath. Noted smoked methamphetamine about 4-5 hrs prior to arrival. Worsened from pulmonary standpoint requiring intubation. Extensive bilat infiltrates by imaging. Rx for septic shock 2/2 multifocal PNA. Elevated troponin. Presentation very similar to previous.     Dr. Mayen consulted 2/11 for chest pain following our initial assessment 2/6. GDMT re-implemented. Amiodarone dose lowered owing to QT prolongation. Noted intermittent hemoptysis on eliquis. Chest pain felt not reflective of ACS.  Prob BNP 33 k > 2k.  Recommendation made to transition Lasix to PO.     2/17-remained on 100 percent FiO2 at 30 L/min Vapotherm.  O2 by ABG was only 85% on  this FiO2.  Maintaining sinus rhythm on amiodarone/beta-blocker.  Both were held in the morning due to heart rate in the high 40s early a.m. hours.  Amiodarone was restarted.  Metoprolol held.  Primary team continuing IV Lasix.    Reassessed today.  Bicarb/BUN increasing and creatinine has bumped 1.3 from 0.9.  Further Lasix has been  color, texture, turgor are normal with no rashes or ulceration.    Pysch: Euthymic mood, inappropriate affect   Neurologic: Oriented to person, place and time.          Labs:   CBC:   Lab Results   Component Value Date/Time    WBC 14.5 02/18/2025 04:23 AM    RBC 3.39 02/18/2025 04:23 AM    HGB 10.0 02/18/2025 04:23 AM    HCT 29.9 02/18/2025 04:23 AM    MCV 88.3 02/18/2025 04:23 AM    RDW 13.3 02/18/2025 04:23 AM     02/18/2025 04:23 AM     CMP:  Lab Results   Component Value Date/Time     02/18/2025 04:23 AM    K 4.0 02/18/2025 04:23 AM    K 4.3 02/10/2025 04:35 AM     02/18/2025 04:23 AM    CO2 33 02/18/2025 04:23 AM    BUN 45 02/18/2025 04:23 AM    CREATININE 1.3 02/18/2025 04:23 AM    GFRAA >60 06/01/2021 01:20 PM    AGRATIO 1.2 02/05/2025 09:36 AM    LABGLOM 63 02/18/2025 04:23 AM    LABGLOM >60 12/15/2022 03:15 PM    GLUCOSE 98 02/18/2025 04:23 AM    CALCIUM 7.7 02/18/2025 04:23 AM    BILITOT 0.4 02/06/2025 04:54 AM    ALKPHOS 128 02/06/2025 04:54 AM    AST 66 02/06/2025 04:54 AM    ALT 74 02/06/2025 04:54 AM     PT/INR:  No results found for: \"PTINR\"  HgBA1c:  Lab Results   Component Value Date    LABA1C 5.2 07/03/2024     Lab Results   Component Value Date    CKTOTAL 278 07/02/2024    TROPONINI <0.01 12/15/2022       Lab Results   Component Value Date    CHOL 125 07/03/2024     Lab Results   Component Value Date    TRIG 74 07/03/2024     Lab Results   Component Value Date    HDL 71 (H) 07/03/2024     No components found for: \"LDLCHOLESTEROL\", \"LDLCALC\"  Lab Results   Component Value Date    VLDL 15 07/03/2024     No results found for: \"CHOLHDLRATIO\"     Cardiac Data:     EKG: personally reviewed with my interpretation as above.     Telemetry personally reviewed: reviewed as above.     Echo: 1/29/25     Left Ventricle: Moderately reduced left ventricular systolic function with a visually estimated EF of 30 - 35%. Left ventricle size is normal. Moderate global hypokinesis present.  thickening. Septal flattening in diastole consistent with right ventricular volume overload. Moderate global hypokinesis present and more marked in the anterior and septal walls Global longitudinal strain is reduced with a value of -11.4%. Grade I diastolic dysfunction with normal LAP.    Right Ventricle: Reduced systolic function. RV EF 3D is 31%. Fractional area change (FAC) is 26%. Global longitudinal strain is -7.5%. RV Peak S' is 7 cm/s.    Aortic Valve: Mild to moderate regurgitation with a centrally directed jet.    Tricuspid Valve: Severe regurgitation. Reversed hepatic vein systolic flow. Mildly elevated RVSP, consistent with mild pulmonary hypertension. The estimated RVSP is 40 mmHg.    Right Atrium: Right atrium is severely dilated.    IVC/SVC: IVC diameter is greater than 21 mm and decreases less than 50% during inspiration; therefore the estimated right atrial pressure is elevated (~15 mmHg).    Image quality is adequate. Procedure performed with the patient in a supine position.      Impression and Plan:      Atrial fibrillation with RVR  Prior Hx SVT   -sinus rhythm on amiodarone low dose 100 mg daily now.  I would continue amiodarone daily and monitor bradycardia.  Schedule beta-blocker with hold parameters for heart rate less than 60 bpm  -[id number] 2 (HF, MI hx). Intermittent hemoptysis on eliquis. H/H stable. Recommendation is restart once resolved. Currently remains on ppx dosing lovenox.  Start Eliquis once assured hemoptysis resolved.    Hx of NSTEMI   -lipitor 40 nightly, BB with hold parameters, no aspirin due to hemoptysis and plans to start DOAC    Cardiomyopathy with moderate biventricular dysfunction  HFrEF - acute on chronic    -ischemic versus non-ischemic with substance abuse hx.   -Prior NSTEMI though type 2 mechanism in setting substance abuse 7/2024. Ischemic evaluatkion had been planned as OP but patient lost to follow up. Concerned that he has not likely to be compliant with

## 2025-02-18 NOTE — CARE COORDINATION
Spoke to Christoph POTTER regarding POA. Nothing scanned into chart.  POA paperwork completed last week by .    Spoke to [employer] and  picked up POA paperwork from the patient's paper chart and took to registration to be scanned into chart. Original placed back in the patient's paper chart.     Spoke with Keira/sister of patient. Keira understands that she is the POA for healthcare.

## 2025-02-18 NOTE — PROGRESS NOTES
RT Inhaler-Nebulizer Bronchodilator Protocol Note    There is a bronchodilator order in the chart from a provider indicating to follow the RT Bronchodilator Protocol and there is an “Initiate RT Inhaler-Nebulizer Bronchodilator Protocol” order as well (see protocol at bottom of note).    CXR Findings:  XR CHEST PORTABLE    Result Date: 2/18/2025  Redemonstration of diffuse bilateral airspace disease, with slightly improved aeration along the right mid upper lung zones.     XR CHEST PORTABLE    Result Date: 2/17/2025  Worsening pattern of dense central airspace opacification in the lungs.       The findings from the last RT Protocol Assessment were as follows:   History Pulmonary Disease: Chronic pulmonary disease  Respiratory Pattern: Regular pattern and RR 12-20 bpm  Breath Sounds: Slightly diminished and/or crackles  Cough: Strong, spontaneous, non-productive  Indication for Bronchodilator Therapy: Decreased or absent breath sounds  Bronchodilator Assessment Score: 4    Aerosolized bronchodilator medication orders have been revised according to the RT Inhaler-Nebulizer Bronchodilator Protocol below.    Respiratory Therapist to perform RT Therapy Protocol Assessment initially then follow the protocol.  Repeat RT Therapy Protocol Assessment PRN for score 0-3 or on second treatment, BID, and PRN for scores above 3.    No Indications - adjust the frequency to every 6 hours PRN wheezing or bronchospasm, if no treatments needed after 48 hours then discontinue using Per Protocol order mode.     If indication present, adjust the RT bronchodilator orders based on the Bronchodilator Assessment Score as indicated below.  Use Inhaler orders unless patient has one or more of the following: on home nebulizer, not able to hold breath for 10 seconds, is not alert and oriented, cannot activate and use MDI correctly, or respiratory rate 25 breaths per minute or more, then use the equivalent nebulizer order(s) with same Frequency  and PRN reasons based on the score.  If a patient is on this medication at home then do not decrease Frequency below that used at home.    0-3 - enter or revise RT bronchodilator order(s) to equivalent RT Bronchodilator order with Frequency of every 4 hours PRN for wheezing or increased work of breathing using Per Protocol order mode.        4-6 - enter or revise RT Bronchodilator order(s) to two equivalent RT bronchodilator orders with one order with BID Frequency and one order with Frequency of every 4 hours PRN wheezing or increased work of breathing using Per Protocol order mode.        7-10 - enter or revise RT Bronchodilator order(s) to two equivalent RT bronchodilator orders with one order with TID Frequency and one order with Frequency of every 4 hours PRN wheezing or increased work of breathing using Per Protocol order mode.       11-13 - enter or revise RT Bronchodilator order(s) to one equivalent RT bronchodilator order with QID Frequency and an Albuterol order with Frequency of every 4 hours PRN wheezing or increased work of breathing using Per Protocol order mode.      Greater than 13 - enter or revise RT Bronchodilator order(s) to one equivalent RT bronchodilator order with every 4 hours Frequency and an Albuterol order with Frequency of every 2 hours PRN wheezing or increased work of breathing using Per Protocol order mode.           Electronically signed by Alexa Camarillo RCP on 2/18/2025 at 6:59 PM

## 2025-02-19 LAB
ANION GAP SERPL CALCULATED.3IONS-SCNC: 6 MMOL/L (ref 3–16)
ANISOCYTOSIS BLD QL SMEAR: ABNORMAL
BASOPHILS # BLD: 0 K/UL (ref 0–0.2)
BASOPHILS NFR BLD: 0 %
BUN SERPL-MCNC: 31 MG/DL (ref 7–20)
CALCIUM SERPL-MCNC: 8 MG/DL (ref 8.3–10.6)
CHLORIDE SERPL-SCNC: 106 MMOL/L (ref 99–110)
CO2 SERPL-SCNC: 32 MMOL/L (ref 21–32)
CREAT SERPL-MCNC: 0.8 MG/DL (ref 0.9–1.3)
DEPRECATED RDW RBC AUTO: 13.6 % (ref 12.4–15.4)
EOSINOPHIL # BLD: 0.1 K/UL (ref 0–0.6)
EOSINOPHIL NFR BLD: 1 %
GFR SERPLBLD CREATININE-BSD FMLA CKD-EPI: >90 ML/MIN/{1.73_M2}
GLUCOSE BLD-MCNC: 146 MG/DL (ref 70–99)
GLUCOSE BLD-MCNC: 165 MG/DL (ref 70–99)
GLUCOSE BLD-MCNC: 233 MG/DL (ref 70–99)
GLUCOSE BLD-MCNC: 276 MG/DL (ref 70–99)
GLUCOSE BLD-MCNC: 76 MG/DL (ref 70–99)
GLUCOSE BLD-MCNC: 87 MG/DL (ref 70–99)
GLUCOSE BLD-MCNC: 93 MG/DL (ref 70–99)
GLUCOSE SERPL-MCNC: 92 MG/DL (ref 70–99)
HCT VFR BLD AUTO: 30.9 % (ref 40.5–52.5)
HGB BLD-MCNC: 10.3 G/DL (ref 13.5–17.5)
LYMPHOCYTES # BLD: 1 K/UL (ref 1–5.1)
LYMPHOCYTES NFR BLD: 8 %
MCH RBC QN AUTO: 29.4 PG (ref 26–34)
MCHC RBC AUTO-ENTMCNC: 33.3 G/DL (ref 31–36)
MCV RBC AUTO: 88.3 FL (ref 80–100)
MONOCYTES # BLD: 0.9 K/UL (ref 0–1.3)
MONOCYTES NFR BLD: 7 %
MYELOCYTES NFR BLD MANUAL: 1 %
NEUTROPHILS # BLD: 10.8 K/UL (ref 1.7–7.7)
NEUTROPHILS NFR BLD: 82 %
NEUTS BAND NFR BLD MANUAL: 1 % (ref 0–7)
NT-PROBNP SERPL-MCNC: 622 PG/ML (ref 0–124)
PERFORMED ON: ABNORMAL
PERFORMED ON: NORMAL
PLATELET # BLD AUTO: 323 K/UL (ref 135–450)
PLATELET BLD QL SMEAR: ADEQUATE
PMV BLD AUTO: 8.5 FL (ref 5–10.5)
POIKILOCYTOSIS BLD QL SMEAR: ABNORMAL
POLYCHROMASIA BLD QL SMEAR: ABNORMAL
POTASSIUM SERPL-SCNC: 4 MMOL/L (ref 3.5–5.1)
RBC # BLD AUTO: 3.5 M/UL (ref 4.2–5.9)
SLIDE REVIEW: ABNORMAL
SODIUM SERPL-SCNC: 144 MMOL/L (ref 136–145)
TOXIC GRANULES BLD QL SMEAR: PRESENT
WBC # BLD AUTO: 12.9 K/UL (ref 4–11)

## 2025-02-19 PROCEDURE — 2500000003 HC RX 250 WO HCPCS

## 2025-02-19 PROCEDURE — 6370000000 HC RX 637 (ALT 250 FOR IP): Performed by: INTERNAL MEDICINE

## 2025-02-19 PROCEDURE — 92526 ORAL FUNCTION THERAPY: CPT

## 2025-02-19 PROCEDURE — 6360000002 HC RX W HCPCS: Performed by: INTERNAL MEDICINE

## 2025-02-19 PROCEDURE — 2580000003 HC RX 258: Performed by: INTERNAL MEDICINE

## 2025-02-19 PROCEDURE — 80048 BASIC METABOLIC PNL TOTAL CA: CPT

## 2025-02-19 PROCEDURE — 94640 AIRWAY INHALATION TREATMENT: CPT

## 2025-02-19 PROCEDURE — 94660 CPAP INITIATION&MGMT: CPT

## 2025-02-19 PROCEDURE — 94761 N-INVAS EAR/PLS OXIMETRY MLT: CPT

## 2025-02-19 PROCEDURE — 99291 CRITICAL CARE FIRST HOUR: CPT | Performed by: INTERNAL MEDICINE

## 2025-02-19 PROCEDURE — 2500000003 HC RX 250 WO HCPCS: Performed by: INTERNAL MEDICINE

## 2025-02-19 PROCEDURE — 36415 COLL VENOUS BLD VENIPUNCTURE: CPT

## 2025-02-19 PROCEDURE — 85025 COMPLETE CBC W/AUTO DIFF WBC: CPT

## 2025-02-19 PROCEDURE — 94669 MECHANICAL CHEST WALL OSCILL: CPT

## 2025-02-19 PROCEDURE — 99232 SBSQ HOSP IP/OBS MODERATE 35: CPT | Performed by: INTERNAL MEDICINE

## 2025-02-19 PROCEDURE — 2000000000 HC ICU R&B

## 2025-02-19 PROCEDURE — 2700000000 HC OXYGEN THERAPY PER DAY

## 2025-02-19 PROCEDURE — 83880 ASSAY OF NATRIURETIC PEPTIDE: CPT

## 2025-02-19 RX ORDER — FUROSEMIDE 10 MG/ML
20 INJECTION INTRAMUSCULAR; INTRAVENOUS ONCE
Status: COMPLETED | OUTPATIENT
Start: 2025-02-19 | End: 2025-02-19

## 2025-02-19 RX ORDER — FUROSEMIDE 10 MG/ML
20 INJECTION INTRAMUSCULAR; INTRAVENOUS DAILY
Status: DISCONTINUED | OUTPATIENT
Start: 2025-02-20 | End: 2025-02-20

## 2025-02-19 RX ADMIN — AMIODARONE HYDROCHLORIDE 100 MG: 200 TABLET ORAL at 07:55

## 2025-02-19 RX ADMIN — FUROSEMIDE 20 MG: 10 INJECTION, SOLUTION INTRAMUSCULAR; INTRAVENOUS at 10:35

## 2025-02-19 RX ADMIN — BUPRENORPHINE HYDROCHLORIDE AND NALOXONE HYDROCHLORIDE DIHYDRATE 1 TABLET: 8; 2 TABLET SUBLINGUAL at 20:27

## 2025-02-19 RX ADMIN — WATER 40 MG: 1 INJECTION INTRAMUSCULAR; INTRAVENOUS; SUBCUTANEOUS at 20:27

## 2025-02-19 RX ADMIN — INSULIN LISPRO 2 UNITS: 100 INJECTION, SOLUTION INTRAVENOUS; SUBCUTANEOUS at 12:23

## 2025-02-19 RX ADMIN — PIPERACILLIN AND TAZOBACTAM 3375 MG: 3; .375 INJECTION, POWDER, LYOPHILIZED, FOR SOLUTION INTRAVENOUS at 18:26

## 2025-02-19 RX ADMIN — SACUBITRIL AND VALSARTAN 1 TABLET: 24; 26 TABLET, FILM COATED ORAL at 20:28

## 2025-02-19 RX ADMIN — IPRATROPIUM BROMIDE AND ALBUTEROL SULFATE 1 DOSE: .5; 2.5 SOLUTION RESPIRATORY (INHALATION) at 18:49

## 2025-02-19 RX ADMIN — WATER 40 MG: 1 INJECTION INTRAMUSCULAR; INTRAVENOUS; SUBCUTANEOUS at 08:02

## 2025-02-19 RX ADMIN — BUPRENORPHINE HYDROCHLORIDE AND NALOXONE HYDROCHLORIDE DIHYDRATE 1 TABLET: 8; 2 TABLET SUBLINGUAL at 08:02

## 2025-02-19 RX ADMIN — SODIUM CHLORIDE, PRESERVATIVE FREE 10 ML: 5 INJECTION INTRAVENOUS at 20:30

## 2025-02-19 RX ADMIN — ATORVASTATIN CALCIUM 40 MG: 40 TABLET, FILM COATED ORAL at 20:27

## 2025-02-19 RX ADMIN — SACUBITRIL AND VALSARTAN 1 TABLET: 24; 26 TABLET, FILM COATED ORAL at 08:02

## 2025-02-19 RX ADMIN — IPRATROPIUM BROMIDE AND ALBUTEROL SULFATE 1 DOSE: .5; 2.5 SOLUTION RESPIRATORY (INHALATION) at 07:22

## 2025-02-19 RX ADMIN — FAMOTIDINE 20 MG: 10 INJECTION INTRAVENOUS at 20:26

## 2025-02-19 RX ADMIN — PIPERACILLIN AND TAZOBACTAM 3375 MG: 3; .375 INJECTION, POWDER, LYOPHILIZED, FOR SOLUTION INTRAVENOUS at 02:36

## 2025-02-19 RX ADMIN — FAMOTIDINE 20 MG: 10 INJECTION INTRAVENOUS at 08:03

## 2025-02-19 RX ADMIN — PIPERACILLIN AND TAZOBACTAM 3375 MG: 3; .375 INJECTION, POWDER, LYOPHILIZED, FOR SOLUTION INTRAVENOUS at 10:37

## 2025-02-19 RX ADMIN — ENOXAPARIN SODIUM 50 MG: 100 INJECTION SUBCUTANEOUS at 08:03

## 2025-02-19 RX ADMIN — INSULIN LISPRO 1 UNITS: 100 INJECTION, SOLUTION INTRAVENOUS; SUBCUTANEOUS at 20:25

## 2025-02-19 RX ADMIN — ENOXAPARIN SODIUM 50 MG: 100 INJECTION SUBCUTANEOUS at 20:27

## 2025-02-19 RX ADMIN — SODIUM CHLORIDE, PRESERVATIVE FREE 10 ML: 5 INJECTION INTRAVENOUS at 08:04

## 2025-02-19 ASSESSMENT — PAIN SCALES - GENERAL: PAINLEVEL_OUTOF10: 0

## 2025-02-19 NOTE — PROGRESS NOTES
Shift assessment was completed (see flow sheet). Pt is alert and oriented x4..   Pt currently on Vapotherm at 35L / 75% FiO2 .    SpO2 at %, Respirations are even/ equal, with diminished lung sounds.     Infusing:  N/A    IV access- Peripherals and WDL.      Pt uses urinal. Urine is yellow/ Clear.      Scheduled medications to follow   Call light and bedside table within reach. Bed locked and in lowest position with bed alarm on.     Patient is able to demonstrate the ability to move from a reclining position to an upright position within the recliner.    Telesitter remains at bedside for patient safety d/t patient being impulsive.

## 2025-02-19 NOTE — PROGRESS NOTES
Reassessment completed (see Flowsheet). All ICU lines remain intact, ICU monitoring continued-     Pt is sleeping/ drowsy.  Pt remains on Vapotherm 35L 65%.   Lung sounds diminished  Pt's blood pressures hypotensive.    Vitals:    02/19/25 1629   BP: (!) 91/49   Pulse:    Resp:    Temp:    SpO2:      Perfect serve to Dr Navarrete:   \"Pt has been hypotensive after one time dose of IV lasix. current and best BP 91/49. Pt is not symptomatic, and Fio2 at 65% now. 35L\".   To inform of hypotension.     4:35 PM  Per Dr Navarrete- monitor, and hold PM BP meds if any.

## 2025-02-19 NOTE — PROGRESS NOTES
02/18/25 2234   NIV Type   NIV Started/Stopped (S)  On   Equipment Type V60   Mode Bilevel   Mask Type Full face mask   Mask Size Medium   Assessment   Pulse 63   Respirations 23   /76   SpO2 91 %   Level of Consciousness 0   Comfort Level Good   Using Accessory Muscles No   Mask Compliance Good   Skin Assessment Clean, dry, & intact   Skin Protection for O2 Device Yes   Settings/Measurements   IPAP 12 cmH20   CPAP/EPAP 8 cmH2O   Vt (Measured) 542 mL   Rate Ordered 18   FiO2  75 %   Minute Volume (L/min) 12.7 Liters   Mask Leak (lpm) 12 lpm   Patient's Home Machine No   Alarm Settings   Alarms On Y

## 2025-02-19 NOTE — PLAN OF CARE
Problem: Safety - Adult  Goal: Free from fall injury  Outcome: Progressing     Problem: Pain  Goal: Verbalizes/displays adequate comfort level or baseline comfort level  Outcome: Progressing     Problem: Skin/Tissue Integrity  Goal: Skin integrity remains intact  Description: 1.  Monitor for areas of redness and/or skin breakdown  2.  Assess vascular access sites hourly  3.  Every 4-6 hours minimum:  Change oxygen saturation probe site  4.  Every 4-6 hours:  If on nasal continuous positive airway pressure, respiratory therapy assess nares and determine need for appliance change or resting period  Outcome: Progressing  Flowsheets (Taken 2/18/2025 2309)  Skin Integrity Remains Intact: Monitor for areas of redness and/or skin breakdown     Problem: Cardiovascular - Adult  Goal: Maintains optimal cardiac output and hemodynamic stability  2/18/2025 2334 by Hayley Lovell RN  Outcome: Progressing  2/18/2025 1552 by Chi Fletcher RN  Outcome: Progressing  Flowsheets (Taken 2/18/2025 1552)  Maintains optimal cardiac output and hemodynamic stability: Monitor blood pressure and heart rate  Goal: Absence of cardiac dysrhythmias or at baseline  2/18/2025 2334 by Hayley Lovell RN  Outcome: Progressing  2/18/2025 1552 by Chi Fletcher RN  Outcome: Progressing  Note: Sinus Rhythm.      Problem: Skin/Tissue Integrity - Adult  Goal: Skin integrity remains intact  Description: 1.  Monitor for areas of redness and/or skin breakdown  2.  Assess vascular access sites hourly  3.  Every 4-6 hours minimum:  Change oxygen saturation probe site  4.  Every 4-6 hours:  If on nasal continuous positive airway pressure, respiratory therapy assess nares and determine need for appliance change or resting period  Outcome: Progressing  Flowsheets (Taken 2/18/2025 2309)  Skin Integrity Remains Intact: Monitor for areas of redness and/or skin breakdown     Problem: Infection - Adult  Goal: Absence of infection at  discharge  2/18/2025 1552 by Chi Fletcher, RN  Outcome: Progressing  Flowsheets (Taken 2/15/2025 2000 by Bladimir Rose, RN)  Absence of infection at discharge: Assess and monitor for signs and symptoms of infection     Problem: Metabolic/Fluid and Electrolytes - Adult  Goal: Electrolytes maintained within normal limits  2/18/2025 1552 by Chi Fletcher, RN  Outcome: Progressing  Flowsheets (Taken 2/18/2025 1552)  Electrolytes maintained within normal limits: Monitor labs and assess patient for signs and symptoms of electrolyte imbalances     Problem: Nutrition Deficit:  Goal: Optimize nutritional status  2/18/2025 1552 by Chi Fletcher, RN  Outcome: Progressing  Flowsheets (Taken 2/14/2025 1155 by Ameena Whiting, RD, LD)  Nutrient intake appropriate for improving, restoring, or maintaining nutritional needs:   Assess nutritional status and recommend course of action   Monitor oral intake, labs, and treatment plans   Recommend appropriate diets, oral nutritional supplements, and vitamin/mineral supplements  Note: NPO

## 2025-02-19 NOTE — PROGRESS NOTES
Dr. Navarrete at the bedside to examine patient.   -Can eat  - No [facility] at this time, as they will not take patients on Vapotherm    See progress note for details.

## 2025-02-19 NOTE — PROGRESS NOTES
Initial assessment complete (see flowsheet), Pt A&O x4, VSS on monitor and tolerating Vapotherm. Denies pain at this time. Bed in low locked position with rails 3/4 in place. Call light and bedside table within reach.

## 2025-02-19 NOTE — PROGRESS NOTES
HEART FAILURE CARE PLAN:    Comorbidities Reviewed: Yes   Patient has a past medical history of Back pain, chronic, Chronic back pain, Hypertension, MI (myocardial infarction) (HCC), and MRSA (methicillin resistant staph aureus) culture positive.     Weights Reviewed: Yes   Admission weight: 64.3 kg (141 lb 12.8 oz)   Wt Readings from Last 3 Encounters:   02/17/25 54.3 kg (119 lb 11.4 oz)   02/01/25 62.3 kg (137 lb 4.8 oz)   07/09/24 56.3 kg (124 lb 1.6 oz)     Intake & Output Reviewed: Yes     Intake/Output Summary (Last 24 hours) at 2/19/2025 0405  Last data filed at 2/19/2025 0000  Gross per 24 hour   Intake 259.95 ml   Output 2000 ml   Net -1740.05 ml       ECHOCARDIOGRAM Reviewed: Yes   Patient's Ejection Fraction (EF) is less than or equal to 40%  Medications Reviewed: Yes   SCHEDULED HOSPITAL MEDICATIONS:   enoxaparin  1 mg/kg SubCUTAneous BID    [Held by provider] furosemide  40 mg IntraVENous BID    ipratropium 0.5 mg-albuterol 2.5 mg  1 Dose Inhalation BID RT    methylPREDNISolone  40 mg IntraVENous Daily    [Held by provider] metoprolol succinate  12.5 mg Oral Daily    potassium bicarb-citric acid  20 mEq Oral Daily    piperacillin-tazobactam  3,375 mg IntraVENous Q8H    amiodarone  100 mg Oral Daily    sacubitril-valsartan  1 tablet Oral BID    [Held by provider] spironolactone  25 mg Oral Daily    atorvastatin  40 mg Oral Nightly    buprenorphine-naloxone  1 tablet SubLINGual BID    insulin lispro  0-4 Units SubCUTAneous Q4H    sodium chloride flush  5-40 mL IntraVENous 2 times per day    famotidine (PEPCID) injection  20 mg IntraVENous BID     HOME MEDICATIONS:  Prior to Admission medications    Medication Sig Start Date End Date Taking? Authorizing Provider   amiodarone (CORDARONE) 200 MG tablet 2 bid - 5 days, 2 qd- 1 week, 1 qd thereafter 1/31/25   Brittany Shin MD   empagliflozin (JARDIANCE) 10 MG tablet Take 1 tablet by mouth daily 1/31/25   Brittany Shin MD   apixaban

## 2025-02-19 NOTE — PROGRESS NOTES
HEART FAILURE CARE PLAN:    Comorbidities Reviewed: Yes   Patient has a past medical history of Back pain, chronic, Chronic back pain, Hypertension, MI (myocardial infarction) (HCC), and MRSA (methicillin resistant staph aureus) culture positive.     Weights Reviewed: Yes   Admission weight: 64.3 kg (141 lb 12.8 oz)   Wt Readings from Last 3 Encounters:   02/17/25 54.3 kg (119 lb 11.4 oz)   02/01/25 62.3 kg (137 lb 4.8 oz)   07/09/24 56.3 kg (124 lb 1.6 oz)     Intake & Output Reviewed: Yes     Intake/Output Summary (Last 24 hours) at 2/19/2025 1241  Last data filed at 2/19/2025 1146  Gross per 24 hour   Intake 540.36 ml   Output 1100 ml   Net -559.64 ml       ECHOCARDIOGRAM Reviewed: Yes   Patient's Ejection Fraction (EF) is less than or equal to 40%. Discuss HFrEF Guideline Directed Medical Therapy (GDMT) with Cardiologist or Hospitalist:          Medications Reviewed: Yes   SCHEDULED HOSPITAL MEDICATIONS:   methylPREDNISolone  40 mg IntraVENous Q12H    [START ON 2/20/2025] furosemide  20 mg IntraVENous Daily    enoxaparin  1 mg/kg SubCUTAneous BID    [Held by provider] furosemide  40 mg IntraVENous BID    ipratropium 0.5 mg-albuterol 2.5 mg  1 Dose Inhalation BID RT    [Held by provider] metoprolol succinate  12.5 mg Oral Daily    potassium bicarb-citric acid  20 mEq Oral Daily    piperacillin-tazobactam  3,375 mg IntraVENous Q8H    amiodarone  100 mg Oral Daily    sacubitril-valsartan  1 tablet Oral BID    [Held by provider] spironolactone  25 mg Oral Daily    atorvastatin  40 mg Oral Nightly    buprenorphine-naloxone  1 tablet SubLINGual BID    insulin lispro  0-4 Units SubCUTAneous Q4H    sodium chloride flush  5-40 mL IntraVENous 2 times per day    famotidine (PEPCID) injection  20 mg IntraVENous BID     HOME MEDICATIONS:  Prior to Admission medications    Medication Sig Start Date End Date Taking? Authorizing Provider   amiodarone (CORDARONE) 200 MG tablet 2 bid - 5 days, 2 qd- 1 week, 1 qd thereafter  1/31/25   Brittany Shin MD   empagliflozin (JARDIANCE) 10 MG tablet Take 1 tablet by mouth daily 1/31/25   Brittany Shin MD   apixaban (ELIQUIS) 5 MG TABS tablet Take 1 tablet by mouth 2 times daily 1/31/25   Brittany Shin MD   metoprolol succinate (TOPROL XL) 25 MG extended release tablet Take 0.5 tablets by mouth daily 2/1/25   Brittany Shin MD   sacubitril-valsartan (ENTRESTO) 24-26 MG per tablet Take 0.5 tablets by mouth 2 times daily 1/31/25   Brittany Shin MD   predniSONE (DELTASONE) 20 MG tablet 2 qd- 2 days, 1 1/2 qd- 3 days, 1 qd- 3 days, 1/2 qd- 3 days 2/1/25   Brittany Shin MD   pantoprazole (PROTONIX) 40 MG tablet Take 1 tablet by mouth 2 times daily (before meals) 1/31/25   Brittany Shin MD   aspirin 81 MG EC tablet Take 1 tablet by mouth daily (Always take with food) 7/10/24   Andrade Acosta MD   spironolactone (ALDACTONE) 25 MG tablet Take 1 tablet by mouth daily Hold for SBP<95 mmHg 7/10/24   Andrade Acosta MD   furosemide (LASIX) 20 MG tablet Take 1 tablet by mouth daily 7/10/24   Andrade Acosta MD   Buprenorphine HCl-Naloxone HCl (SUBOXONE SL) Place 20 mg under the tongue daily Pt takes (2) 8 mg tablets and (2) 2 mg tablets daily in the morning    Provider, MD Dirk      Diet Reviewed: Yes   ADULT DIET; Regular; Low Sodium (2 gm)  ADULT ORAL NUTRITION SUPPLEMENT; Breakfast, Lunch, Dinner; Standard High Calorie/High Protein Oral Supplement    Goal of Care Reviewed: Yes   Patient and/or Family's stated Goal of Care this Admission: Reduce shortness of breath, increase activity tolerance, better understand heart failure and disease management, be more comfortable, and reduce lower extremity edema prior to discharge.     Electronically signed by Gabriel Burton RN on 2/19/2025 at 12:41 PM

## 2025-02-19 NOTE — PROGRESS NOTES
RT Inhaler-Nebulizer Bronchodilator Protocol Note    There is a bronchodilator order in the chart from a provider indicating to follow the RT Bronchodilator Protocol and there is an “Initiate RT Inhaler-Nebulizer Bronchodilator Protocol” order as well (see protocol at bottom of note).    CXR Findings:  XR CHEST PORTABLE    Result Date: 2/18/2025  Redemonstration of diffuse bilateral airspace disease, with slightly improved aeration along the right mid upper lung zones.     XR CHEST PORTABLE    Result Date: 2/17/2025  Worsening pattern of dense central airspace opacification in the lungs.       The findings from the last RT Protocol Assessment were as follows:   History Pulmonary Disease: (P) Chronic pulmonary disease  Respiratory Pattern: (P) Mild dyspnea at rest, irregular pattern, or RR 21-25 bpm  Breath Sounds: (P) Slightly diminished and/or crackles  Cough: (P) Strong, productive  Indication for Bronchodilator Therapy: (P) Decreased or absent breath sounds  Bronchodilator Assessment Score: (P) 9    Aerosolized bronchodilator medication orders have been revised according to the RT Inhaler-Nebulizer Bronchodilator Protocol below.    Respiratory Therapist to perform RT Therapy Protocol Assessment initially then follow the protocol.  Repeat RT Therapy Protocol Assessment PRN for score 0-3 or on second treatment, BID, and PRN for scores above 3.    No Indications - adjust the frequency to every 6 hours PRN wheezing or bronchospasm, if no treatments needed after 48 hours then discontinue using Per Protocol order mode.     If indication present, adjust the RT bronchodilator orders based on the Bronchodilator Assessment Score as indicated below.  Use Inhaler orders unless patient has one or more of the following: on home nebulizer, not able to hold breath for 10 seconds, is not alert and oriented, cannot activate and use MDI correctly, or respiratory rate 25 breaths per minute or more, then use the equivalent nebulizer  order(s) with same Frequency and PRN reasons based on the score.  If a patient is on this medication at home then do not decrease Frequency below that used at home.    0-3 - enter or revise RT bronchodilator order(s) to equivalent RT Bronchodilator order with Frequency of every 4 hours PRN for wheezing or increased work of breathing using Per Protocol order mode.        4-6 - enter or revise RT Bronchodilator order(s) to two equivalent RT bronchodilator orders with one order with BID Frequency and one order with Frequency of every 4 hours PRN wheezing or increased work of breathing using Per Protocol order mode.        7-10 - enter or revise RT Bronchodilator order(s) to two equivalent RT bronchodilator orders with one order with TID Frequency and one order with Frequency of every 4 hours PRN wheezing or increased work of breathing using Per Protocol order mode.       11-13 - enter or revise RT Bronchodilator order(s) to one equivalent RT bronchodilator order with QID Frequency and an Albuterol order with Frequency of every 4 hours PRN wheezing or increased work of breathing using Per Protocol order mode.      Greater than 13 - enter or revise RT Bronchodilator order(s) to one equivalent RT bronchodilator order with every 4 hours Frequency and an Albuterol order with Frequency of every 2 hours PRN wheezing or increased work of breathing using Per Protocol order mode.     Maintain qid    Electronically signed by Casey Hoffman RCP on 2/19/2025 at 7:26 AM

## 2025-02-19 NOTE — PROGRESS NOTES
Patient provided a COPD Educational Folder that includes the following materials:     []  [employer] Booklet: Managing your COPD  [x]  ALA: Getting the Most Out of Medication Delivery Devices  []  ALA: My COPD Action Plan  [x]  [employer] [facility]: [facility]   [x]  Smoking Cessation Classes  [x]  Outpatient Spiritual Care Services  []  Magnet: Signs of COPD    PATIENT/CAREGIVER TEACHING:   Level of patient/caregiver understanding able to:   [x] Verbalize understanding   [] Demonstrate understanding       [] Teach back        [x] Needs reinforcement     []  Other:     Electronically signed by Gabriel Burton RN on 2/19/2025 at 12:41 PM

## 2025-02-19 NOTE — PROGRESS NOTES
02/19/25 0312   NIV Type   Equipment Type V60   Mode Bilevel   Mask Type Full face mask   Mask Size Medium   Assessment   Pulse 64   Respirations 22   SpO2 98 %   Settings/Measurements   IPAP 12 cmH20   CPAP/EPAP 8 cmH2O   Vt (Measured) 537 mL   Rate Ordered 18   FiO2  75 %   Minute Volume (L/min) 12.6 Liters   Mask Leak (lpm) 11 lpm   Patient's Home Machine No   Alarm Settings   Alarms On Y

## 2025-02-19 NOTE — PROGRESS NOTES
[facility]                            Critical care Progress Note :      CC:follow up on pneumonia     Events of Last 24 hours:   Worsening CXR/condition in general   Diffuse GGO bilateral more right   Extubated last sundy   Cannot wean vapo-thermor  BiPAP and alternate using it   Vapotherm Flow 30 LPM FiO2 100--->70% FiO2   No hemoptysis   I&Os balanced        Invasive Lines: Right femoral cvc- removed 2/10  Has PIVs            Intake/Output Summary (Last 24 hours) at 2/17/2025 0748  Last data filed at 2/17/2025 0621  Gross per 24 hour   Intake 1192.4 ml   Output 2500 ml   Net -1307.6 ml       Vent Mode: AC/VC Resp Rate (Set): 22 bpm/Vt (Set, mL): 450 mL/ /FiO2 : (S) 100 %  No results for input(s): \"PHART\", \"[id number]\", \"PO2ART\" in the last 72 hours.      IV:   dexmedeTOMIDine HCl in NaCl Stopped (02/15/25 0826)    dextrose      sodium chloride 5 mL/hr at 02/10/25 0629       Vitals:  /66   Pulse 53   Temp 97.6 °F (36.4 °C) (Oral)   Resp 15   Ht 1.78 m (5' 10.08\")   Wt 54.3 kg (119 lb 11.4 oz)   SpO2 (!) 82%   BMI 17.14 kg/m²   on Vapotherm   Constitutional:  No acute distress. .   HEENT: no scleral icterus  Neck: No tracheal deviation present.    Cardiovascular: Normal heart sounds.   Pulmonary/Chest: Few wheezes. Few rhonchi. Few rales. No decreased breath sounds.  No accessory muscle usage or stridor.   Abdominal: Soft.   Musculoskeletal: No cyanosis. No clubbing.  Skin: Skin is warm and dry.        Scheduled Meds:   ipratropium 0.5 mg-albuterol 2.5 mg  1 Dose Inhalation BID RT    methylPREDNISolone  40 mg IntraVENous Daily    metoprolol succinate  12.5 mg Oral Daily    potassium bicarb-citric acid  20 mEq Oral Daily    piperacillin-tazobactam  3,375 mg IntraVENous Q8H    furosemide  20 mg IntraVENous BID    amiodarone  100 mg Oral Daily    sacubitril-valsartan  1 tablet Oral BID    spironolactone  25 mg Oral Daily    atorvastatin  40 mg Oral

## 2025-02-19 NOTE — PROGRESS NOTES
Speech Language Pathology  Swallowing Disorders and Dysphagia    Dysphagia Treatment/Follow-Up Note  Facility/Department: [facility]    Recommendations: Pt choosing to eat at this time despite risks associated with possible aspiration.  Risk Management: upright for all intake, stay upright for at least 30 mins after intake, small bites/sips, oral care q4 hrs to reduce adverse affects in the event of aspiration, increase physical mobility as able, slow rate of intake, STRICT aspiration precautions, and hold PO and contact SLP if s/s of aspiration or worsening respiratory status develop.     NAME:Dayton Soto  : 1965 (59 y.o.)   MRN: [id number]  ROOM: [address] [place] [address]  ADMISSION DATE: 2025  PATIENT DIAGNOSIS(ES): Acute respiratory failure (HCC) [J96.00]  Methamphetamine use (HCC) [F15.10]  Elevated troponin [R79.89]  Acute respiratory failure with hypoxia (HCC) [J96.01]  Multifocal pneumonia [J18.9]  No Known Allergies    DATE ONSET: 2025    Pain: The patient does not complain of pain       Current Diet: ADULT DIET; Regular; Low Sodium (2 gm)      Dysphagia Treatment and Impressions:  Subjective: Pt seen in room at bedside with RN (Gabriel) permission  Noteworthy events reported/Chart Review: Pt using BiPAP at night and now on vapotherm 35 LPM, FiO2:75%. RN reports pt amenable to cues to slow rate of intake when his sats drop  Patient tolerance to current diet and treatment: Pt senses flow rate increasing risk of blowing food and drink into airway. Pt moves HFNC out of nose when he swallows to reduce risk of airway invasion  Oxygen needs continue to preclude transport to radiology and continue to fluctuate within the day making any potential instrumental swallow assessment results, if they could be obtained, likely not applicable from meal to meal because situational variables related to oxygen delivery continue to vary.  Pt states he continues to cough without po intake.   SLP raises HOB as pt eating  compensatory strategies given min cues 2/19/2025 : Goal addressed, see above. Ongoing, progressing.  Goal 4: Pt will participate in instrumental swallow assessment  2/19/2025 : Goal addressed, see above. Ongoing, not progressing.  Long Term Goals:   Timeframe for Long Term Goals: (7 days 02/26/2025) goals extended  Goal 1: The patient will tolerate least restrictive diet with no clinical s/s of aspiration or worsening respiratory/pulmonary status. 2/19/2025 : Goal addressed, see above. Ongoing, not progressing.            Speech/Language/Cog Goals: N/A                     Plan:    Continued Dysphagia treatment with goals per plan of care.    Discharge Recommendations: SLP at discharge is pending clinical progression    If pt discharges from hospital prior to Speech/Swallowing discharge, this note serves as tx and discharge summary.     Total Treatment Time / Charges     Time in Time out Total Time / units   Cognitive Tx         Speech Tx      Dysphagia Tx 1150 1220 30 min/1 unit     Signature:  Daisha Lord M.A. SLP  Speech-Language Pathologist  [place] Lic #[id number]  [phone]

## 2025-02-19 NOTE — PROGRESS NOTES
Attempted to get patient to chair this morning while on Vapotherm (after wearing BiPAP all night) 30L 75% to perform bath and be in upright position for breathing, patient expressed feeling like \"having a panic attack\" after quick stand and pivot - returned to bed. Once back in bed RN stayed with patient until feeling better and calmed down. Patient expressing wanting to be able to eat and wanting to have SLP do test, expressing he feels congested and has productive cough. While also have concerns he is not going to get better and asking \"am I going to die?\"

## 2025-02-19 NOTE — CARE COORDINATION
Met with patient and Marissa/Jenny at bedside. Discussed [facility] with patient.  Patient is in agreement with starting precert for [facility].    Patient would prefer to remain in the hospital but stated \"it is what it is and he won't go to a nursing home\".      Marissa will start precert tomorrow after checking on patient's Fi02. Would like to be at 65% if possible. Patient's insurance requires patient to be stable and does not offer a Peer to Peer.     Gabriel POTTER aware.

## 2025-02-19 NOTE — PROGRESS NOTES
RT Inhaler-Nebulizer Bronchodilator Protocol Note    There is a bronchodilator order in the chart from a provider indicating to follow the RT Bronchodilator Protocol and there is an “Initiate RT Inhaler-Nebulizer Bronchodilator Protocol” order as well (see protocol at bottom of note).    CXR Findings:  XR CHEST PORTABLE    Result Date: 2/18/2025  Redemonstration of diffuse bilateral airspace disease, with slightly improved aeration along the right mid upper lung zones.       The findings from the last RT Protocol Assessment were as follows:   History Pulmonary Disease: Chronic pulmonary disease  Respiratory Pattern: Dyspnea on exertion or RR 21-25 bpm  Breath Sounds: Slightly diminished and/or crackles  Cough: Strong, spontaneous, non-productive  Indication for Bronchodilator Therapy: Decreased or absent breath sounds  Bronchodilator Assessment Score: 6    Aerosolized bronchodilator medication orders have been revised according to the RT Inhaler-Nebulizer Bronchodilator Protocol below.    Respiratory Therapist to perform RT Therapy Protocol Assessment initially then follow the protocol.  Repeat RT Therapy Protocol Assessment PRN for score 0-3 or on second treatment, BID, and PRN for scores above 3.    No Indications - adjust the frequency to every 6 hours PRN wheezing or bronchospasm, if no treatments needed after 48 hours then discontinue using Per Protocol order mode.     If indication present, adjust the RT bronchodilator orders based on the Bronchodilator Assessment Score as indicated below.  Use Inhaler orders unless patient has one or more of the following: on home nebulizer, not able to hold breath for 10 seconds, is not alert and oriented, cannot activate and use MDI correctly, or respiratory rate 25 breaths per minute or more, then use the equivalent nebulizer order(s) with same Frequency and PRN reasons based on the score.  If a patient is on this medication at home then do not decrease Frequency below  that used at home.    0-3 - enter or revise RT bronchodilator order(s) to equivalent RT Bronchodilator order with Frequency of every 4 hours PRN for wheezing or increased work of breathing using Per Protocol order mode.        4-6 - enter or revise RT Bronchodilator order(s) to two equivalent RT bronchodilator orders with one order with BID Frequency and one order with Frequency of every 4 hours PRN wheezing or increased work of breathing using Per Protocol order mode.        7-10 - enter or revise RT Bronchodilator order(s) to two equivalent RT bronchodilator orders with one order with TID Frequency and one order with Frequency of every 4 hours PRN wheezing or increased work of breathing using Per Protocol order mode.       11-13 - enter or revise RT Bronchodilator order(s) to one equivalent RT bronchodilator order with QID Frequency and an Albuterol order with Frequency of every 4 hours PRN wheezing or increased work of breathing using Per Protocol order mode.      Greater than 13 - enter or revise RT Bronchodilator order(s) to one equivalent RT bronchodilator order with every 4 hours Frequency and an Albuterol order with Frequency of every 2 hours PRN wheezing or increased work of breathing using Per Protocol order mode.         Electronically signed by Alexa Camarillo RCP on 2/19/2025 at 6:52 PM

## 2025-02-19 NOTE — PROGRESS NOTES
Care rounds completed with Dr. Pacheco and multidisciplinary team. Reviewed labs, meds, VS, assessment, & plan of care for today.  -informed of holding of PO potassium. K-4 today.  -add back lasix 20mg  -increase solumedrol BID   - believe this is an inhalation injury.  See dictated note and new orders for details.

## 2025-02-19 NOTE — PLAN OF CARE
Problem: Discharge Planning  Goal: Discharge to home or other facility with appropriate resources  Outcome: Progressing     Problem: Safety - Adult  Goal: Free from fall injury  Outcome: Progressing     Problem: Pain  Goal: Verbalizes/displays adequate comfort level or baseline comfort level  Outcome: Progressing     Problem: Skin/Tissue Integrity  Goal: Skin integrity remains intact  Description: 1.  Monitor for areas of redness and/or skin breakdown  2.  Assess vascular access sites hourly  3.  Every 4-6 hours minimum:  Change oxygen saturation probe site  4.  Every 4-6 hours:  If on nasal continuous positive airway pressure, respiratory therapy assess nares and determine need for appliance change or resting period  Outcome: Progressing     Problem: Respiratory - Adult  Goal: Achieves optimal ventilation and oxygenation  Outcome: Progressing     Problem: Cardiovascular - Adult  Goal: Maintains optimal cardiac output and hemodynamic stability  Outcome: Progressing  Goal: Absence of cardiac dysrhythmias or at baseline  Outcome: Progressing     Problem: Skin/Tissue Integrity - Adult  Goal: Skin integrity remains intact  Description: 1.  Monitor for areas of redness and/or skin breakdown  2.  Assess vascular access sites hourly  3.  Every 4-6 hours minimum:  Change oxygen saturation probe site  4.  Every 4-6 hours:  If on nasal continuous positive airway pressure, respiratory therapy assess nares and determine need for appliance change or resting period  Outcome: Progressing  Goal: Oral mucous membranes remain intact  Outcome: Progressing     Problem: Infection - Adult  Goal: Absence of infection at discharge  Outcome: Progressing     Problem: Metabolic/Fluid and Electrolytes - Adult  Goal: Hemodynamic stability and optimal renal function maintained  Outcome: Progressing

## 2025-02-19 NOTE — PROGRESS NOTES
Progress Note    Admit Date:  2/5/2025     59 y.o. male who presented to [facility] with septic shock.  PMHx significant for polysubstance use-methamphetamine, A-fib, valvular disease, on Eliquis on amiodarone, MRSA, cardiomyopathy       Initially admitted on vent support with pneumonia, septic shock,  -s/p extubation but remains on vapotherm for severe persistent hypoxia     2/14 Agitation yesterday needing code violet, started on precedex gtt and now off         2/17- Worsening  hypoxia again , today on 100 % FIo2 and 30 L vapotherm     2/19 used bipap last night, now on 75 % fio2 and 35 L vapotherm      Subjective:  Mr. Soto seen calm and cooperative on vapotherm  Denies any new issues     No significant cough or pedal edema     Objective:   Patient Vitals for the past 4 hrs:   BP Temp Temp src Pulse Resp SpO2   02/19/25 0400 (!) 102/49 97.7 °F (36.5 °C) Temporal 68 21 100 %   02/19/25 0312 -- -- -- 64 22 98 %          Intake/Output Summary (Last 24 hours) at 2/19/2025 0708  Last data filed at 2/19/2025 0000  Gross per 24 hour   Intake 259.95 ml   Output 1600 ml   Net -1340.05 ml       Physical Exam:      General:  middle aged male, up in bed on vapotherm   Calm and cooperative today  Awake, alert and oriented. Appears to be not in any distress  Mucous Membranes:  Pink , anicteric  Neck: No JVD, no carotid bruit, no thyromegaly  Chest:  improving leatha air entry and improving crackles in bases  Cardiovascular:  RRR S1S2 heard, no murmurs or gallops  Abdomen:  Soft, undistended, non tender, no organomegaly, BS present  Extremities: No edema or cyanosis. Distal pulses well felt  Neurological : grossly normal  Cooperative , nonfocal    Medications:  enoxaparin, 1 mg/kg, BID  [Held by provider] furosemide, 40 mg, BID  ipratropium 0.5 mg-albuterol 2.5 mg, 1 Dose, BID RT  methylPREDNISolone, 40 mg, Daily  [Held by provider] metoprolol succinate, 12.5 mg, Daily  potassium bicarb-citric acid, 20 mEq,

## 2025-02-19 NOTE — PROGRESS NOTES
Reassessment completed (see Flowsheet). All ICU lines remain intact, ICU monitoring continued-     Pt is A/O, following commands- pleasant SLP to bedside.   Pt remains on Vapotherm 35L 75%.   Lung sounds diminished  Pt's blood pressures WDL.

## 2025-02-20 ENCOUNTER — APPOINTMENT (OUTPATIENT)
Dept: GENERAL RADIOLOGY | Age: 60
DRG: 720 | End: 2025-02-20
Payer: MEDICAID

## 2025-02-20 LAB
ANION GAP SERPL CALCULATED.3IONS-SCNC: 8 MMOL/L (ref 3–16)
ANISOCYTOSIS BLD QL SMEAR: ABNORMAL
BASOPHILS # BLD: 0 K/UL (ref 0–0.2)
BASOPHILS NFR BLD: 0 %
BUN SERPL-MCNC: 36 MG/DL (ref 7–20)
CALCIUM SERPL-MCNC: 7.8 MG/DL (ref 8.3–10.6)
CHLORIDE SERPL-SCNC: 101 MMOL/L (ref 99–110)
CO2 SERPL-SCNC: 31 MMOL/L (ref 21–32)
CREAT SERPL-MCNC: 0.9 MG/DL (ref 0.9–1.3)
DEPRECATED RDW RBC AUTO: 13.6 % (ref 12.4–15.4)
EOSINOPHIL # BLD: 0 K/UL (ref 0–0.6)
EOSINOPHIL NFR BLD: 0 %
FUNGUS SPEC CULT: ABNORMAL
GFR SERPLBLD CREATININE-BSD FMLA CKD-EPI: >90 ML/MIN/{1.73_M2}
GLUCOSE BLD-MCNC: 171 MG/DL (ref 70–99)
GLUCOSE BLD-MCNC: 187 MG/DL (ref 70–99)
GLUCOSE BLD-MCNC: 194 MG/DL (ref 70–99)
GLUCOSE BLD-MCNC: 260 MG/DL (ref 70–99)
GLUCOSE SERPL-MCNC: 195 MG/DL (ref 70–99)
HCT VFR BLD AUTO: 31.3 % (ref 40.5–52.5)
HGB BLD-MCNC: 10.4 G/DL (ref 13.5–17.5)
LOEFFLER MB STN SPEC: ABNORMAL
LYMPHOCYTES # BLD: 0.4 K/UL (ref 1–5.1)
LYMPHOCYTES NFR BLD: 4 %
MCH RBC QN AUTO: 29.4 PG (ref 26–34)
MCHC RBC AUTO-ENTMCNC: 33.2 G/DL (ref 31–36)
MCV RBC AUTO: 88.7 FL (ref 80–100)
MONOCYTES # BLD: 0.3 K/UL (ref 0–1.3)
MONOCYTES NFR BLD: 3 %
MYELOCYTES NFR BLD MANUAL: 2 %
NEUTROPHILS # BLD: 9.3 K/UL (ref 1.7–7.7)
NEUTROPHILS NFR BLD: 91 %
ORGANISM: ABNORMAL
PERFORMED ON: ABNORMAL
PLATELET # BLD AUTO: 331 K/UL (ref 135–450)
PLATELET BLD QL SMEAR: ADEQUATE
PMV BLD AUTO: 8.9 FL (ref 5–10.5)
POIKILOCYTOSIS BLD QL SMEAR: ABNORMAL
POLYCHROMASIA BLD QL SMEAR: ABNORMAL
POTASSIUM SERPL-SCNC: 4.6 MMOL/L (ref 3.5–5.1)
RBC # BLD AUTO: 3.53 M/UL (ref 4.2–5.9)
SLIDE REVIEW: ABNORMAL
SODIUM SERPL-SCNC: 140 MMOL/L (ref 136–145)
WBC # BLD AUTO: 10 K/UL (ref 4–11)

## 2025-02-20 PROCEDURE — 6370000000 HC RX 637 (ALT 250 FOR IP): Performed by: INTERNAL MEDICINE

## 2025-02-20 PROCEDURE — 2000000000 HC ICU R&B

## 2025-02-20 PROCEDURE — 2500000003 HC RX 250 WO HCPCS: Performed by: INTERNAL MEDICINE

## 2025-02-20 PROCEDURE — 2500000003 HC RX 250 WO HCPCS

## 2025-02-20 PROCEDURE — 94669 MECHANICAL CHEST WALL OSCILL: CPT

## 2025-02-20 PROCEDURE — 94640 AIRWAY INHALATION TREATMENT: CPT

## 2025-02-20 PROCEDURE — 80048 BASIC METABOLIC PNL TOTAL CA: CPT

## 2025-02-20 PROCEDURE — 2700000000 HC OXYGEN THERAPY PER DAY

## 2025-02-20 PROCEDURE — 6360000002 HC RX W HCPCS: Performed by: INTERNAL MEDICINE

## 2025-02-20 PROCEDURE — 94761 N-INVAS EAR/PLS OXIMETRY MLT: CPT

## 2025-02-20 PROCEDURE — 94003 VENT MGMT INPAT SUBQ DAY: CPT

## 2025-02-20 PROCEDURE — 2580000003 HC RX 258: Performed by: INTERNAL MEDICINE

## 2025-02-20 PROCEDURE — 36415 COLL VENOUS BLD VENIPUNCTURE: CPT

## 2025-02-20 PROCEDURE — 99291 CRITICAL CARE FIRST HOUR: CPT | Performed by: INTERNAL MEDICINE

## 2025-02-20 PROCEDURE — 71045 X-RAY EXAM CHEST 1 VIEW: CPT

## 2025-02-20 PROCEDURE — 94660 CPAP INITIATION&MGMT: CPT

## 2025-02-20 PROCEDURE — 85025 COMPLETE CBC W/AUTO DIFF WBC: CPT

## 2025-02-20 PROCEDURE — 99232 SBSQ HOSP IP/OBS MODERATE 35: CPT | Performed by: INTERNAL MEDICINE

## 2025-02-20 RX ORDER — FUROSEMIDE 10 MG/ML
20 INJECTION INTRAMUSCULAR; INTRAVENOUS ONCE
Status: COMPLETED | OUTPATIENT
Start: 2025-02-20 | End: 2025-02-20

## 2025-02-20 RX ORDER — INSULIN LISPRO 100 [IU]/ML
0-8 INJECTION, SOLUTION INTRAVENOUS; SUBCUTANEOUS EVERY 4 HOURS
Status: DISPENSED | OUTPATIENT
Start: 2025-02-20

## 2025-02-20 RX ADMIN — AMIODARONE HYDROCHLORIDE 100 MG: 200 TABLET ORAL at 08:04

## 2025-02-20 RX ADMIN — SODIUM CHLORIDE, PRESERVATIVE FREE 10 ML: 5 INJECTION INTRAVENOUS at 08:13

## 2025-02-20 RX ADMIN — ENOXAPARIN SODIUM 50 MG: 100 INJECTION SUBCUTANEOUS at 20:03

## 2025-02-20 RX ADMIN — INSULIN LISPRO 1 UNITS: 100 INJECTION, SOLUTION INTRAVENOUS; SUBCUTANEOUS at 08:04

## 2025-02-20 RX ADMIN — BUPRENORPHINE HYDROCHLORIDE AND NALOXONE HYDROCHLORIDE DIHYDRATE 1 TABLET: 8; 2 TABLET SUBLINGUAL at 08:05

## 2025-02-20 RX ADMIN — IPRATROPIUM BROMIDE AND ALBUTEROL SULFATE 1 DOSE: .5; 2.5 SOLUTION RESPIRATORY (INHALATION) at 07:22

## 2025-02-20 RX ADMIN — SACUBITRIL AND VALSARTAN 1 TABLET: 24; 26 TABLET, FILM COATED ORAL at 08:05

## 2025-02-20 RX ADMIN — SODIUM CHLORIDE, PRESERVATIVE FREE 10 ML: 5 INJECTION INTRAVENOUS at 20:04

## 2025-02-20 RX ADMIN — FUROSEMIDE 20 MG: 10 INJECTION, SOLUTION INTRAMUSCULAR; INTRAVENOUS at 12:19

## 2025-02-20 RX ADMIN — ATORVASTATIN CALCIUM 40 MG: 40 TABLET, FILM COATED ORAL at 20:00

## 2025-02-20 RX ADMIN — INSULIN LISPRO 4 UNITS: 100 INJECTION, SOLUTION INTRAVENOUS; SUBCUTANEOUS at 16:35

## 2025-02-20 RX ADMIN — BUPRENORPHINE HYDROCHLORIDE AND NALOXONE HYDROCHLORIDE DIHYDRATE 1 TABLET: 8; 2 TABLET SUBLINGUAL at 20:00

## 2025-02-20 RX ADMIN — WATER 40 MG: 1 INJECTION INTRAMUSCULAR; INTRAVENOUS; SUBCUTANEOUS at 08:05

## 2025-02-20 RX ADMIN — ENOXAPARIN SODIUM 50 MG: 100 INJECTION SUBCUTANEOUS at 08:05

## 2025-02-20 RX ADMIN — FAMOTIDINE 20 MG: 10 INJECTION INTRAVENOUS at 08:05

## 2025-02-20 RX ADMIN — INSULIN LISPRO 1 UNITS: 100 INJECTION, SOLUTION INTRAVENOUS; SUBCUTANEOUS at 05:25

## 2025-02-20 RX ADMIN — WATER 40 MG: 1 INJECTION INTRAMUSCULAR; INTRAVENOUS; SUBCUTANEOUS at 20:02

## 2025-02-20 RX ADMIN — IPRATROPIUM BROMIDE AND ALBUTEROL SULFATE 1 DOSE: .5; 2.5 SOLUTION RESPIRATORY (INHALATION) at 19:25

## 2025-02-20 RX ADMIN — FAMOTIDINE 20 MG: 10 INJECTION INTRAVENOUS at 20:01

## 2025-02-20 RX ADMIN — IPRATROPIUM BROMIDE AND ALBUTEROL SULFATE 1 DOSE: .5; 2.5 SOLUTION RESPIRATORY (INHALATION) at 15:00

## 2025-02-20 RX ADMIN — DIAZEPAM 5 MG: 5 INJECTION, SOLUTION INTRAMUSCULAR; INTRAVENOUS at 09:05

## 2025-02-20 RX ADMIN — PIPERACILLIN AND TAZOBACTAM 3375 MG: 3; .375 INJECTION, POWDER, LYOPHILIZED, FOR SOLUTION INTRAVENOUS at 04:15

## 2025-02-20 ASSESSMENT — PAIN SCALES - GENERAL
PAINLEVEL_OUTOF10: 0

## 2025-02-20 NOTE — PROGRESS NOTES
02/20/25 0334   NIV Type   NIV Started/Stopped (S)  Off   Assessment   Pulse 65   Respirations 20   SpO2 (!) 88 %

## 2025-02-20 NOTE — PLAN OF CARE
Problem: Discharge Planning  Goal: Discharge to home or other facility with appropriate resources  Outcome: Progressing     Problem: Safety - Adult  Goal: Free from fall injury  2/20/2025 0837 by Gabriel Burton RN  Outcome: Progressing  2/19/2025 2241 by Laina Mishra RN  Outcome: Progressing  Flowsheets (Taken 2/19/2025 2203)  Free From Fall Injury: Based on caregiver fall risk screen, instruct family/caregiver to ask for assistance with transferring infant if caregiver noted to have fall risk factors     Problem: Pain  Goal: Verbalizes/displays adequate comfort level or baseline comfort level  2/20/2025 0837 by Gabriel Burton RN  Outcome: Progressing  2/19/2025 2241 by Laina Mishra RN  Outcome: Progressing     Problem: Skin/Tissue Integrity  Goal: Skin integrity remains intact  Description: 1.  Monitor for areas of redness and/or skin breakdown  2.  Assess vascular access sites hourly  3.  Every 4-6 hours minimum:  Change oxygen saturation probe site  4.  Every 4-6 hours:  If on nasal continuous positive airway pressure, respiratory therapy assess nares and determine need for appliance change or resting period  2/20/2025 0837 by Gabriel Burton RN  Outcome: Progressing  2/19/2025 2241 by Laina Mishra RN  Outcome: Progressing  Flowsheets (Taken 2/19/2025 2203)  Skin Integrity Remains Intact:   Monitor for areas of redness and/or skin breakdown   Assess vascular access sites hourly     Problem: Respiratory - Adult  Goal: Achieves optimal ventilation and oxygenation  2/20/2025 0837 by Gabriel Burton RN  Outcome: Progressing  2/19/2025 2241 by Laina Mishra RN  Outcome: Progressing  Flowsheets (Taken 2/19/2025 2000)  Achieves optimal ventilation and oxygenation:   Oxygen supplementation based on oxygen saturation or arterial blood gases   Position to facilitate oxygenation and minimize respiratory effort   Assess for changes in mentation and behavior   Assess for changes in respiratory

## 2025-02-20 NOTE — PROGRESS NOTES
Care rounds completed with Dr. Silva and multidisciplinary team. Reviewed labs, meds, VS, assessment, & plan of care for today.  -Ok to give Lasix once- order to be updated.   - Stopped Potassium Order. Held the next 2 days.      See dictated note and new orders for details.

## 2025-02-20 NOTE — PROGRESS NOTES
02/20/25 0254   NIV Type   Equipment Type V60   Mode Bilevel   Mask Type Full face mask   Mask Size Medium   Assessment   Pulse 62   Respirations 21   SpO2 95 %   Settings/Measurements   IPAP 12 cmH20   CPAP/EPAP 8 cmH2O   Vt (Measured) 540 mL   Rate Ordered 18   FiO2  70 %   Minute Volume (L/min) 11.3 Liters   Mask Leak (lpm) 16 lpm   Patient's Home Machine No   Alarm Settings   Alarms On Y

## 2025-02-20 NOTE — PROGRESS NOTES
02/19/25 7402   NIV Type   NIV Started/Stopped (S)  On   Equipment Type V60   Mode Bilevel   Mask Type Full face mask   Mask Size Medium   Assessment   Pulse 75   Respirations 21   SpO2 96 %   Level of Consciousness 0   Comfort Level Good   Using Accessory Muscles No   Mask Compliance Good   Skin Assessment Clean, dry, & intact   Skin Protection for O2 Device Yes   Settings/Measurements   IPAP 12 cmH20   CPAP/EPAP 8 cmH2O   Vt (Measured) 554 mL   Rate Ordered 18   FiO2  70 %   Minute Volume (L/min) 11.5 Liters   Mask Leak (lpm) 16 lpm   Patient's Home Machine No   Alarm Settings   Alarms On Y

## 2025-02-20 NOTE — PROGRESS NOTES
Shift assessment was completed (see flow sheet). Pt is alert and oriented x4..   Pt currently on Vapotherm at 30L / 60 FiO2 .    SpO2 at 90-92%, Respirations are even/shallow, with diminished/ Rhonchi lung sounds.     Infusing:  Abx    IV access- Peripherals and WDL.      Pt uses urinal appropriately.      Scheduled medications to follow   Call light and bedside table within reach. Bed locked and in lowest position with bed alarm on.   Telesitter at bedside.       Patient is able to demonstrate the ability to move from a reclining position to an upright position within the recliner.    9:00 AM    VApotherm decreased 25L 60%.

## 2025-02-20 NOTE — PROGRESS NOTES
Patient remains alert and oriented x4. Slightly anxious. Stopped the BIPAP around 0334hr as per patient's request. On vapotherm 35 LPM  80%. Able to use urinal. Able to swallow pills without signs of aspirating and choking.    No infusions.    IV access:  PIV right AC  PIV right posterior forearm.    Bed in lowest position call light within reach.  Patient denies any discomfort or pain at this time.

## 2025-02-20 NOTE — PROGRESS NOTES
HEART FAILURE CARE PLAN:    Comorbidities Reviewed: Yes   Patient has a past medical history of Back pain, chronic, Chronic back pain, Hypertension, MI (myocardial infarction) (HCC), and MRSA (methicillin resistant staph aureus) culture positive.     Weights Reviewed: Yes   Admission weight: 64.3 kg (141 lb 12.8 oz)   Wt Readings from Last 3 Encounters:   02/17/25 54.3 kg (119 lb 11.4 oz)   02/01/25 62.3 kg (137 lb 4.8 oz)   07/09/24 56.3 kg (124 lb 1.6 oz)     Intake & Output Reviewed: Yes     Intake/Output Summary (Last 24 hours) at 2/19/2025 2206  Last data filed at 2/19/2025 2000  Gross per 24 hour   Intake 781.15 ml   Output 1550 ml   Net -768.85 ml       ECHOCARDIOGRAM Reviewed: Yes   Patient's Ejection Fraction (EF) is less than or equal to 40%. Discuss HFrEF Guideline Directed Medical Therapy (GDMT) with Cardiologist or Hospitalist:          Medications Reviewed: Yes   SCHEDULED HOSPITAL MEDICATIONS:   methylPREDNISolone  40 mg IntraVENous Q12H    [START ON 2/20/2025] furosemide  20 mg IntraVENous Daily    enoxaparin  1 mg/kg SubCUTAneous BID    [Held by provider] furosemide  40 mg IntraVENous BID    ipratropium 0.5 mg-albuterol 2.5 mg  1 Dose Inhalation BID RT    [Held by provider] metoprolol succinate  12.5 mg Oral Daily    potassium bicarb-citric acid  20 mEq Oral Daily    piperacillin-tazobactam  3,375 mg IntraVENous Q8H    amiodarone  100 mg Oral Daily    sacubitril-valsartan  1 tablet Oral BID    [Held by provider] spironolactone  25 mg Oral Daily    atorvastatin  40 mg Oral Nightly    buprenorphine-naloxone  1 tablet SubLINGual BID    insulin lispro  0-4 Units SubCUTAneous Q4H    sodium chloride flush  5-40 mL IntraVENous 2 times per day    famotidine (PEPCID) injection  20 mg IntraVENous BID     HOME MEDICATIONS:  Prior to Admission medications    Medication Sig Start Date End Date Taking? Authorizing Provider   amiodarone (CORDARONE) 200 MG tablet 2 bid - 5 days, 2 qd- 1 week, 1 qd thereafter

## 2025-02-20 NOTE — PROGRESS NOTES
Reassessment completed (see Flowsheet).  No changes to note.  Pt SpO2 decreases with exertion- pt denies discomfort, and states \"I will let you know if I'm concerned about my O2 dropping, I've caused it to drop into the 80s\".    Pt is currently on vapotherm 25L 60% at this time.

## 2025-02-20 NOTE — PROGRESS NOTES
Dr. Navarrete at the bedside to examine patient.   -wean Vapotherm as able.  -Goal to send to [facility].  -Informed of holding of Potassium.   See progress note for details.

## 2025-02-20 NOTE — PROGRESS NOTES
[facility]                            Critical care Progress Note :      CC:follow up on pneumonia     Events of Last 24 hours:   Worsening CXR/condition in general   Diffuse GGO bilateral more right   Extubated last sundy   Cannot wean vapo-thermor  BiPAP and alternate using it   Vapotherm Flow 30 LPM FiO2 100--->70% FiO2   No hemoptysis   I&Os balanced        Invasive Lines: Right femoral cvc- removed 2/10  Has PIVs            Intake/Output Summary (Last 24 hours) at 2/17/2025 0748  Last data filed at 2/17/2025 0621  Gross per 24 hour   Intake 1192.4 ml   Output 2500 ml   Net -1307.6 ml       Vent Mode: AC/VC Resp Rate (Set): 22 bpm/Vt (Set, mL): 450 mL/ /FiO2 : (S) 100 %  No results for input(s): \"PHART\", \"NES7CPR\", \"PO2ART\" in the last 72 hours.      IV:   dexmedeTOMIDine HCl in NaCl Stopped (02/15/25 0826)    dextrose      sodium chloride 5 mL/hr at 02/10/25 0629       Vitals:  /66   Pulse 53   Temp 97.6 °F (36.4 °C) (Oral)   Resp 15   Ht 1.78 m (5' 10.08\")   Wt 54.3 kg (119 lb 11.4 oz)   SpO2 (!) 82%   BMI 17.14 kg/m²   on Vapotherm   Constitutional:  No acute distress. .   HEENT: no scleral icterus  Neck: No tracheal deviation present.    Cardiovascular: Normal heart sounds.   Pulmonary/Chest: Few wheezes. Few rhonchi. Few rales. No decreased breath sounds.  No accessory muscle usage or stridor.   Abdominal: Soft.   Musculoskeletal: No cyanosis. No clubbing.  Skin: Skin is warm and dry.        Scheduled Meds:   ipratropium 0.5 mg-albuterol 2.5 mg  1 Dose Inhalation BID RT    methylPREDNISolone  40 mg IntraVENous Daily    metoprolol succinate  12.5 mg Oral Daily    potassium bicarb-citric acid  20 mEq Oral Daily    piperacillin-tazobactam  3,375 mg IntraVENous Q8H    furosemide  20 mg IntraVENous BID    amiodarone  100 mg Oral Daily    sacubitril-valsartan  1 tablet Oral BID    spironolactone  25 mg Oral Daily    atorvastatin  40 mg Oral  Nightly    buprenorphine-naloxone  1 tablet SubLINGual BID    insulin lispro  0-4 Units SubCUTAneous Q4H    sodium chloride flush  5-40 mL IntraVENous 2 times per day    famotidine (PEPCID) injection  20 mg IntraVENous BID         Results:  CBC:   Recent Labs     02/15/25  0407 02/16/25  0418 02/17/25  0420   WBC 10.8 13.7* 15.9*   HGB 10.0* 9.8* 11.0*   HCT 30.4* 29.4* 33.3*   MCV 89.7 89.1 89.2    286 290     BMP:   Recent Labs     02/15/25  0406 02/16/25  0418 02/17/25  0420    141 140   K 4.3 3.9 4.3    101 101   CO2 29 30 30   BUN 26* 33* 39*   CREATININE 1.1 1.0 0.9         Cultures:  2/5/25 blood cx NGTD  2/6/25 Resp cx: ngtd  2/5/25 urine cx negative  2/12 BAL NGTD       Films:  Chest x-ray 2/14 imaging reviewed by me and showed  Worsening multifocal airspace disease, likely pneumonia.    Pulmonary edema   Small bilateral pleural effusions.       CTPA 2/5  1. Worsening diffuse bilateral airspace disease favoring aspiration pneumonia  rather than pulmonary edema.  2. Interstitial edema.  3. Trace left pleural effusion.  4. Mediastinal adenopathy, likely reactive.           ASSESSMENT:    Severe acute hypoxic respiratory failure required MV/Prone  Aspiration pneumonia - mucus plugs on bronch 2/12  Probable COPD with AE   Recurrent hemoptysis- pulmonary hemorrhage on BAL 2/12- none now. Probably pulmonary edema in the setting of AC  Agitation   Prolonged QTc   Septic shock  Cardiomyopathy of unknown etiology present on admission and EF 30 to 35% with global hypokinesis  History of Afib on Eliquis and Amiodarone   30 pack year smoking   Methamphetamine use on Suboxone      PLAN:  I am really not sure what etiology  ,wether inhalation   If  fluid vs inflammatory or infections   Worsening SOB and CXR ,etiology could be fluid overload vs inhalation   Restart Lasix 20 mg IV dialy   Increase solumedrol 40 q12  He is on amiodarone   BNP   Increase Lasix as tolerted  HFNC/Vapotherm for

## 2025-02-20 NOTE — PLAN OF CARE
Problem: Safety - Adult  Goal: Free from fall injury  2/19/2025 2241 by Laina Mishra RN  Outcome: Progressing  Flowsheets (Taken 2/19/2025 2203)  Free From Fall Injury: Based on caregiver fall risk screen, instruct family/caregiver to ask for assistance with transferring infant if caregiver noted to have fall risk factors     Problem: Respiratory - Adult  Goal: Achieves optimal ventilation and oxygenation  2/19/2025 2241 by Laina Mishra RN  Outcome: Progressing  Flowsheets (Taken 2/19/2025 2000)  Achieves optimal ventilation and oxygenation:   Oxygen supplementation based on oxygen saturation or arterial blood gases   Position to facilitate oxygenation and minimize respiratory effort   Assess for changes in mentation and behavior   Assess for changes in respiratory status     Problem: Cardiovascular - Adult  Goal: Maintains optimal cardiac output and hemodynamic stability  2/19/2025 2241 by Laina Mishra RN  Outcome: Progressing     Problem: Cardiovascular - Adult  Goal: Absence of cardiac dysrhythmias or at baseline  2/19/2025 2241 by Laina Mishra RN  Outcome: Progressing     Problem: Metabolic/Fluid and Electrolytes - Adult  Goal: Electrolytes maintained within normal limits  Outcome: Progressing     Problem: Metabolic/Fluid and Electrolytes - Adult  Goal: Hemodynamic stability and optimal renal function maintained  2/19/2025 2241 by Laina Mishra RN  Outcome: Progressing     Problem: Metabolic/Fluid and Electrolytes - Adult  Goal: Glucose maintained within prescribed range  Outcome: Progressing     Problem: Anxiety  Goal: Will report anxiety at manageable levels  Description: INTERVENTIONS:  1. Administer medication as ordered  2. Teach and rehearse alternative coping skills  3. Provide emotional support with 1:1 interaction with staff  Outcome: Progressing     Problem: Chronic Conditions and Co-morbidities  Goal: Patient's chronic conditions and co-morbidity symptoms are  monitored and maintained or improved  Outcome: Progressing

## 2025-02-20 NOTE — PROGRESS NOTES
4 Eyes Skin Assessment     NAME:  Dayton Soto  YOB: 1965  MEDICAL RECORD NUMBER:  [id number]    The patient is being assessed for  Other shift    I agree that at least one RN has performed a thorough Head to Toe Skin Assessment on the patient. ALL assessment sites listed below have been assessed.      Areas assessed by both nurses:    Head, Face, Ears, Shoulders, Back, Chest, Arms, Elbows, Hands, Sacrum. Buttock, Coccyx, Ischium, Legs. Feet and Heels, and Under Medical Devices         Does the Patient have a Wound? No noted wound(s)       Alex Prevention initiated by RN: Yes  Wound Care Orders initiated by RN: No    Pressure Injury (Stage 3,4, Unstageable, DTI, NWPT, and Complex wounds) if present, place Wound referral order by RN under : No    New Ostomies, if present place, Ostomy referral order under : No     Nurse 1 eSignature: Electronically signed by Laina Mishra RN on 2/19/25 at 10:10 PM EST                **SHARE this note so that the co-signing nurse can place an eSignature**    Nurse 2 eSignature: Electronically signed by Bladimir Rose RN on 2/20/25 at 5:53 AM EST

## 2025-02-20 NOTE — PROGRESS NOTES
02/20/25 0700   RT Protocol   History Pulmonary Disease 2   Respiratory pattern 2   Breath sounds 4   Cough 0   Indications for Bronchodilator Therapy Wheezing associated with pulm disorder   Bronchodilator Assessment Score 8

## 2025-02-20 NOTE — PROGRESS NOTES
Progress Note    Admit Date:  2/5/2025     59 y.o. male who presented to [facility] with septic shock.  PMHx significant for polysubstance use-methamphetamine, A-fib, valvular disease, on Eliquis on amiodarone, MRSA, cardiomyopathy       Initially admitted on vent support with pneumonia, septic shock,  -s/p extubation but remains on vapotherm for severe persistent hypoxia     2/14 Agitation yesterday needing code violet, started on precedex gtt and now off         2/17- Worsening  hypoxia again , today on 100 % FIo2 and 30 L vapotherm     2/20  used bipap last night, now on 60 % fio2 and 25 L vapotherm  Slowly improving hypoxia     Subjective:  Mr. Soto seen calm and cooperative on vapotherm  Denies any new issues   Understands need for rehab  No significant cough or pedal edema     Objective:   Patient Vitals for the past 4 hrs:   BP Temp Temp src Pulse Resp SpO2 Weight   02/20/25 0600 (!) 105/51 -- -- 67 22 99 % --   02/20/25 0501 100/63 -- -- 62 15 99 % --   02/20/25 0500 100/63 -- -- 63 15 99 % --   02/20/25 0418 -- -- -- 66 20 91 % 53.2 kg (117 lb 4.6 oz)   02/20/25 0402 -- -- -- -- -- 99 % --   02/20/25 0400 110/71 98.1 °F (36.7 °C) Oral 63 18 99 % --   02/20/25 0334 -- -- -- 65 20 (!) 88 % --   02/20/25 0321 -- -- -- 62 17 91 % --          Intake/Output Summary (Last 24 hours) at 2/20/2025 0707  Last data filed at 2/20/2025 0453  Gross per 24 hour   Intake 710.28 ml   Output 1925 ml   Net -1214.72 ml       Physical Exam:      General:  middle aged male, up in bed on vapotherm   Calm and cooperative today  Awake, alert and oriented. Appears to be not in any distress  Mucous Membranes:  Pink , anicteric  Neck: No JVD, no carotid bruit, no thyromegaly  Chest:  improving leatha air entry and improving crackles in bases  Cardiovascular:  RRR S1S2 heard, no murmurs or gallops  Abdomen:  Soft, undistended, non tender, no organomegaly, BS present  Extremities: No edema or cyanosis. Distal pulses well  edema and multifocal pneumonia.         XR CHEST PORTABLE   Final Result   Scattered infiltrates most pronounced in the mid and lower lungs.      Support tubes as described above.         XR CHEST 1 VIEW   Final Result   1. Patchy consolidative changes of the bilateral lungs are mildly improved   compared to the prior exam.   2. Lines and tubes as above.         XR CHEST PORTABLE   Final Result   1. Endotracheal tube 5.4 cm above the riley.   2. Nasogastric tube advances to conklin the stomach.   3. Extensive bilateral infiltrates worse in the mid lower lungs overall   slightly worse compared to the previous evaluation.         CT CHEST PULMONARY EMBOLISM W CONTRAST   Final Result   1. Worsening diffuse bilateral airspace disease favoring aspiration pneumonia   rather than pulmonary edema.   2. Interstitial edema.   3. Trace left pleural effusion.   4. Mediastinal adenopathy, likely reactive.         XR CHEST PORTABLE   Final Result   1. ET tube in place, approximately 1.5 cm above the riley. It should be   withdrawn by 4 cm.   2. Extensive airspace disease in the lower lung of fields, unchanged from   prior examination.         XR CHEST PORTABLE   Final Result   1. Dense pneumonic infiltrates in the right lower lung field and in the left   mid and lower lung field, with or without atelectatic changes.  These   findings are mostly new as compared to previous portable chest x-ray.   2. Minimal bilateral pleural effusions or pleural thickening at the CP angles.   3. Minimal pulmonary vascular prominence.               Assessment/Plan:    # Acute hypoxic and hypercapnic respiratory failure secondary to multifocal pneumonia-   cannot rule out aspirational given imaging findings   - intubated in ER  on 2/5, and was on mechanical ventilation    -Patient remained severely hypoxic needing  paralysis  and proned ventilation initially and eventually improved   -given zyvox , cefepime and flagyl  - BP improved , , wbc better -

## 2025-02-20 NOTE — PROGRESS NOTES
Shift handoff report given to Laina POTTER at bedside.   Pt is Awake and alert  IV handoff completed.   Call light within reach, bed in lowest position, bed alarm on. End of shift checks completed.    Pt has been free of falls for duration of shift. .

## 2025-02-20 NOTE — CARE COORDINATION
KAYLA spoke with Marissa/Jenny to advise the patient is currently at 25 and 60%.    Marissa will start precert now.    Julianne POTTER aware.

## 2025-02-20 NOTE — PROGRESS NOTES
Shift assessment completed (see flowsheet).  Patient is alert and oriented X4. On Vapotherm :35 LPM, 55%.    IV access:  PIV right AC  PIV right forearm    No infusions.      Bed in lowest position, call light within reach. Patient denies discomfort or pain at this time.

## 2025-02-21 LAB
ANION GAP SERPL CALCULATED.3IONS-SCNC: 7 MMOL/L (ref 3–16)
ANISOCYTOSIS BLD QL SMEAR: ABNORMAL
BASOPHILS # BLD: 0 K/UL (ref 0–0.2)
BASOPHILS NFR BLD: 0 %
BUN SERPL-MCNC: 28 MG/DL (ref 7–20)
CALCIUM SERPL-MCNC: 8 MG/DL (ref 8.3–10.6)
CHLORIDE SERPL-SCNC: 99 MMOL/L (ref 99–110)
CO2 SERPL-SCNC: 32 MMOL/L (ref 21–32)
CREAT SERPL-MCNC: 0.8 MG/DL (ref 0.9–1.3)
DEPRECATED RDW RBC AUTO: 13.2 % (ref 12.4–15.4)
EOSINOPHIL # BLD: 0 K/UL (ref 0–0.6)
EOSINOPHIL NFR BLD: 0 %
GFR SERPLBLD CREATININE-BSD FMLA CKD-EPI: >90 ML/MIN/{1.73_M2}
GLUCOSE BLD-MCNC: 137 MG/DL (ref 70–99)
GLUCOSE BLD-MCNC: 165 MG/DL (ref 70–99)
GLUCOSE BLD-MCNC: 177 MG/DL (ref 70–99)
GLUCOSE BLD-MCNC: 197 MG/DL (ref 70–99)
GLUCOSE BLD-MCNC: 198 MG/DL (ref 70–99)
GLUCOSE BLD-MCNC: 207 MG/DL (ref 70–99)
GLUCOSE SERPL-MCNC: 151 MG/DL (ref 70–99)
HCT VFR BLD AUTO: 31.8 % (ref 40.5–52.5)
HGB BLD-MCNC: 10.7 G/DL (ref 13.5–17.5)
LYMPHOCYTES # BLD: 0.5 K/UL (ref 1–5.1)
LYMPHOCYTES NFR BLD: 5 %
MCH RBC QN AUTO: 29.9 PG (ref 26–34)
MCHC RBC AUTO-ENTMCNC: 33.5 G/DL (ref 31–36)
MCV RBC AUTO: 89.1 FL (ref 80–100)
MONOCYTES # BLD: 0.1 K/UL (ref 0–1.3)
MONOCYTES NFR BLD: 1 %
NEUTROPHILS # BLD: 9.5 K/UL (ref 1.7–7.7)
NEUTROPHILS NFR BLD: 93 %
NEUTS BAND NFR BLD MANUAL: 1 % (ref 0–7)
PERFORMED ON: ABNORMAL
PLATELET # BLD AUTO: 337 K/UL (ref 135–450)
PLATELET BLD QL SMEAR: ADEQUATE
PMV BLD AUTO: 8.9 FL (ref 5–10.5)
POIKILOCYTOSIS BLD QL SMEAR: ABNORMAL
POTASSIUM SERPL-SCNC: 4.8 MMOL/L (ref 3.5–5.1)
RBC # BLD AUTO: 3.57 M/UL (ref 4.2–5.9)
SLIDE REVIEW: ABNORMAL
SODIUM SERPL-SCNC: 138 MMOL/L (ref 136–145)
WBC # BLD AUTO: 10.1 K/UL (ref 4–11)

## 2025-02-21 PROCEDURE — 36415 COLL VENOUS BLD VENIPUNCTURE: CPT

## 2025-02-21 PROCEDURE — 6370000000 HC RX 637 (ALT 250 FOR IP): Performed by: INTERNAL MEDICINE

## 2025-02-21 PROCEDURE — 6360000002 HC RX W HCPCS: Performed by: INTERNAL MEDICINE

## 2025-02-21 PROCEDURE — 99291 CRITICAL CARE FIRST HOUR: CPT | Performed by: INTERNAL MEDICINE

## 2025-02-21 PROCEDURE — 94660 CPAP INITIATION&MGMT: CPT

## 2025-02-21 PROCEDURE — 94640 AIRWAY INHALATION TREATMENT: CPT

## 2025-02-21 PROCEDURE — 99232 SBSQ HOSP IP/OBS MODERATE 35: CPT | Performed by: INTERNAL MEDICINE

## 2025-02-21 PROCEDURE — 2500000003 HC RX 250 WO HCPCS: Performed by: INTERNAL MEDICINE

## 2025-02-21 PROCEDURE — 97163 PT EVAL HIGH COMPLEX 45 MIN: CPT

## 2025-02-21 PROCEDURE — 97530 THERAPEUTIC ACTIVITIES: CPT

## 2025-02-21 PROCEDURE — 80048 BASIC METABOLIC PNL TOTAL CA: CPT

## 2025-02-21 PROCEDURE — 2580000003 HC RX 258: Performed by: INTERNAL MEDICINE

## 2025-02-21 PROCEDURE — 2500000003 HC RX 250 WO HCPCS

## 2025-02-21 PROCEDURE — 94669 MECHANICAL CHEST WALL OSCILL: CPT

## 2025-02-21 PROCEDURE — 2700000000 HC OXYGEN THERAPY PER DAY

## 2025-02-21 PROCEDURE — 97166 OT EVAL MOD COMPLEX 45 MIN: CPT

## 2025-02-21 PROCEDURE — 2000000000 HC ICU R&B

## 2025-02-21 PROCEDURE — 85025 COMPLETE CBC W/AUTO DIFF WBC: CPT

## 2025-02-21 PROCEDURE — 94761 N-INVAS EAR/PLS OXIMETRY MLT: CPT

## 2025-02-21 RX ORDER — FAMOTIDINE 20 MG/1
20 TABLET, FILM COATED ORAL 2 TIMES DAILY
Status: DISPENSED | OUTPATIENT
Start: 2025-02-21

## 2025-02-21 RX ADMIN — INSULIN LISPRO 2 UNITS: 100 INJECTION, SOLUTION INTRAVENOUS; SUBCUTANEOUS at 00:53

## 2025-02-21 RX ADMIN — SODIUM CHLORIDE, PRESERVATIVE FREE 10 ML: 5 INJECTION INTRAVENOUS at 08:34

## 2025-02-21 RX ADMIN — WATER 40 MG: 1 INJECTION INTRAMUSCULAR; INTRAVENOUS; SUBCUTANEOUS at 19:46

## 2025-02-21 RX ADMIN — ATORVASTATIN CALCIUM 40 MG: 40 TABLET, FILM COATED ORAL at 19:46

## 2025-02-21 RX ADMIN — SODIUM CHLORIDE, PRESERVATIVE FREE 10 ML: 5 INJECTION INTRAVENOUS at 19:47

## 2025-02-21 RX ADMIN — WATER 40 MG: 1 INJECTION INTRAMUSCULAR; INTRAVENOUS; SUBCUTANEOUS at 08:33

## 2025-02-21 RX ADMIN — BUPRENORPHINE HYDROCHLORIDE AND NALOXONE HYDROCHLORIDE DIHYDRATE 1 TABLET: 8; 2 TABLET SUBLINGUAL at 08:34

## 2025-02-21 RX ADMIN — ENOXAPARIN SODIUM 50 MG: 100 INJECTION SUBCUTANEOUS at 19:46

## 2025-02-21 RX ADMIN — FAMOTIDINE 20 MG: 10 INJECTION INTRAVENOUS at 08:33

## 2025-02-21 RX ADMIN — AMIODARONE HYDROCHLORIDE 100 MG: 200 TABLET ORAL at 08:33

## 2025-02-21 RX ADMIN — INSULIN LISPRO 2 UNITS: 100 INJECTION, SOLUTION INTRAVENOUS; SUBCUTANEOUS at 17:21

## 2025-02-21 RX ADMIN — SACUBITRIL AND VALSARTAN 1 TABLET: 24; 26 TABLET, FILM COATED ORAL at 19:46

## 2025-02-21 RX ADMIN — ENOXAPARIN SODIUM 50 MG: 100 INJECTION SUBCUTANEOUS at 08:33

## 2025-02-21 RX ADMIN — FAMOTIDINE 20 MG: 20 TABLET, FILM COATED ORAL at 19:46

## 2025-02-21 RX ADMIN — IPRATROPIUM BROMIDE AND ALBUTEROL SULFATE 1 DOSE: .5; 2.5 SOLUTION RESPIRATORY (INHALATION) at 14:42

## 2025-02-21 RX ADMIN — SACUBITRIL AND VALSARTAN 1 TABLET: 24; 26 TABLET, FILM COATED ORAL at 08:33

## 2025-02-21 RX ADMIN — INSULIN LISPRO 2 UNITS: 100 INJECTION, SOLUTION INTRAVENOUS; SUBCUTANEOUS at 19:47

## 2025-02-21 RX ADMIN — BUPRENORPHINE HYDROCHLORIDE AND NALOXONE HYDROCHLORIDE DIHYDRATE 1 TABLET: 8; 2 TABLET SUBLINGUAL at 19:46

## 2025-02-21 RX ADMIN — DIAZEPAM 5 MG: 5 INJECTION, SOLUTION INTRAMUSCULAR; INTRAVENOUS at 23:33

## 2025-02-21 RX ADMIN — IPRATROPIUM BROMIDE AND ALBUTEROL SULFATE 1 DOSE: .5; 2.5 SOLUTION RESPIRATORY (INHALATION) at 19:42

## 2025-02-21 RX ADMIN — IPRATROPIUM BROMIDE AND ALBUTEROL SULFATE 1 DOSE: .5; 2.5 SOLUTION RESPIRATORY (INHALATION) at 06:50

## 2025-02-21 ASSESSMENT — PAIN SCALES - GENERAL: PAINLEVEL_OUTOF10: 0

## 2025-02-21 NOTE — PROGRESS NOTES
[facility]                            Critical care Progress Note :      CC:follow up on pneumonia     Events of Last 24 hours:   Worsening CXR/condition in general   Diffuse GGO bilateral more right   Had bipap for 2 h   Extubated last sundy   Vapotherm Flow 25 L/55 % FiO2 but increased later 30 L/65   No hemoptysis   I&Os balanced  Neg 2.6 L  CXR some improvement     Invasive Lines: Right femoral cvc- removed 2/10  Has PIVs            Intake/Output Summary (Last 24 hours) at 2/17/2025 0748  Last data filed at 2/17/2025 0621  Gross per 24 hour   Intake 1192.4 ml   Output 2500 ml   Net -1307.6 ml       Vent Mode: AC/VC Resp Rate (Set): 22 bpm/Vt (Set, mL): 450 mL/ /FiO2 : (S) 100 %  No results for input(s): \"PHART\", \"ZVW7CEP\", \"PO2ART\" in the last 72 hours.      IV:   dexmedeTOMIDine HCl in NaCl Stopped (02/15/25 0826)    dextrose      sodium chloride 5 mL/hr at 02/10/25 0629       Vitals:  /66   Pulse 53   Temp 97.6 °F (36.4 °C) (Oral)   Resp 15   Ht 1.78 m (5' 10.08\")   Wt 54.3 kg (119 lb 11.4 oz)   SpO2 (!) 82%   BMI 17.14 kg/m²   on Vapotherm   Constitutional:  No acute distress. .   HEENT: no scleral icterus  Neck: No tracheal deviation present.    Cardiovascular: Normal heart sounds.   Pulmonary/Chest: Few wheezes. Few rhonchi. Few rales. No decreased breath sounds.  No accessory muscle usage or stridor.   Abdominal: Soft.   Musculoskeletal: No cyanosis. No clubbing.  Skin: Skin is warm and dry.        Scheduled Meds:   ipratropium 0.5 mg-albuterol 2.5 mg  1 Dose Inhalation BID RT    methylPREDNISolone  40 mg IntraVENous Daily    metoprolol succinate  12.5 mg Oral Daily    potassium bicarb-citric acid  20 mEq Oral Daily    piperacillin-tazobactam  3,375 mg IntraVENous Q8H    furosemide  20 mg IntraVENous BID    amiodarone  100 mg Oral Daily    sacubitril-valsartan  1 tablet Oral BID    spironolactone  25 mg Oral Daily    atorvastatin  40 mg

## 2025-02-21 NOTE — PROGRESS NOTES
02/20/25 2243   NIV Type   NIV Started/Stopped On   Equipment Type V60   Mode Bilevel   Mask Type Full face mask   Mask Size Medium   Assessment   Pulse 76   Respirations 29   SpO2 96 %   Skin Protection for O2 Device Yes   Location Nose   Settings/Measurements   PIP Observed 12 cm H20   IPAP 12 cmH20   CPAP/EPAP 8 cmH2O   Vt (Measured) 574 mL   Rate Ordered 18   Insp Rise Time (%) 3 %   FiO2  60 %   I Time/ I Time % 1 s   Minute Volume (L/min) 15.6 Liters   Mask Leak (lpm) 12 lpm   Patient's Home Machine No   Alarm Settings   Alarms On Y   Low Pressure (cmH2O) 5 cmH2O   High Pressure (cmH2O) 30 cmH2O   Delay Alarm 20 sec(s)   RR Low (bpm) 8   RR High (bpm) 45 br/min

## 2025-02-21 NOTE — PLAN OF CARE
Problem: Safety - Adult  Goal: Free from fall injury  2/20/2025 2105 by Laina Mishra RN  Outcome: Progressing  Flowsheets (Taken 2/20/2025 2104)  Free From Fall Injury:   Based on caregiver fall risk screen, instruct family/caregiver to ask for assistance with transferring infant if caregiver noted to have fall risk factors   Instruct family/caregiver on patient safety     Problem: Skin/Tissue Integrity  Goal: Skin integrity remains intact  Description: 1.  Monitor for areas of redness and/or skin breakdown  2.  Assess vascular access sites hourly  3.  Every 4-6 hours minimum:  Change oxygen saturation probe site  4.  Every 4-6 hours:  If on nasal continuous positive airway pressure, respiratory therapy assess nares and determine need for appliance change or resting period  2/20/2025 2105 by Laina Mishra RN  Outcome: Progressing  Flowsheets (Taken 2/20/2025 2104)  Skin Integrity Remains Intact:   Monitor for areas of redness and/or skin breakdown   Assess vascular access sites hourly     Problem: Respiratory - Adult  Goal: Achieves optimal ventilation and oxygenation  2/20/2025 2105 by Laina Mishra RN  Outcome: Progressing     Problem: Cardiovascular - Adult  Goal: Maintains optimal cardiac output and hemodynamic stability  2/20/2025 2105 by Laina Mishra RN  Outcome: Progressing     Problem: Metabolic/Fluid and Electrolytes - Adult  Goal: Electrolytes maintained within normal limits  2/20/2025 2105 by Laina Mishra RN  Outcome: Progressing     Problem: Metabolic/Fluid and Electrolytes - Adult  Goal: Hemodynamic stability and optimal renal function maintained  Outcome: Progressing     Problem: Anxiety  Goal: Will report anxiety at manageable levels  Description: INTERVENTIONS:  1. Administer medication as ordered  2. Teach and rehearse alternative coping skills  3. Provide emotional support with 1:1 interaction with staff  Outcome: Progressing  Flowsheets (Taken 2/20/2025

## 2025-02-21 NOTE — PROGRESS NOTES
Patient remains alert and oriented X4, verbally abusive most of the times. On Vapotherm 25 LPM, 50%.  IV accesses remain intact.  No infusions.    Patient denies any discomfort or pain at this time. Bed in lowest position. Call light within reach.

## 2025-02-21 NOTE — PROGRESS NOTES
Comprehensive Nutrition Assessment    Type and Reason for Visit:  Reassess    Nutrition Recommendations/Plan:   Continue ADULT DIET; Regular; Low Sodium diet order.   Continue Ensure Plus with meals.   Monitor appetite, meal intake, and acceptance/intake of ONS.   Monitor nutrition-related labs, bowel function, and weight trends.      Malnutrition Assessment:  Malnutrition Status:  Severe malnutrition (02/21/25 1126)    Context:  Acute Illness     Findings of the 6 clinical characteristics of malnutrition:  Energy Intake:  50% or less of estimated energy requirements for 5 or more days  Weight Loss:  Unable to assess     Body Fat Loss:  Moderate body fat loss Orbital, Buccal region   Muscle Mass Loss:  Moderate muscle mass loss Temples (temporalis), Clavicles (pectoralis & deltoids), Hand (interosseous)  Fluid Accumulation:  No fluid accumulation     Strength:  Not Performed    Nutrition Assessment:    patient is improved from a nutritional standpoint AEB he is consuming % of meals at this time; he remains at risk for further compromise d/t malnutrition, altered nutrition-related labs, and impaired respiratory function; will continue ADULT DIET; Regular; Low Sodium diet order and Ensure Plus with meals    Nutrition Related Findings:    patient is A & O x 4; + BM on 2/18/25; patient consumed % x 3 meals on 2/20/25; on med-dose SSI; pre-cert for [employer] is pending at this time Wound Type: Deep Tissue Injury (DTI on R buttocks)       Current Nutrition Intake & Therapies:    Average Meal Intake: %  Average Supplements Intake: %  ADULT DIET; Regular; Low Sodium (2 gm)  ADULT ORAL NUTRITION SUPPLEMENT; Breakfast, Lunch, Dinner; Standard High Calorie/High Protein Oral Supplement    Anthropometric Measures:  Height: 178 cm (5' 10.08\")  Ideal Body Weight (IBW): 166 lbs (75 kg)    Admission Body Weight: 62.3 kg (137 lb 5.6 oz) (obtained on 2/1/25; actual weight)  Current Body Weight: 52.8 kg (116 lb

## 2025-02-21 NOTE — PROGRESS NOTES
Shift assessment completed (see flowsheet).  Patient is alert and oriented X4. On Vapotherm 25 LPM, , 60%.    IV Access:  PIV right AC  PIV posterior right forearm      No infusions.    Voiding using the urinal. Bed in lowest position. Call light within reach. Patient denies any discomfort or pain at this time.

## 2025-02-21 NOTE — PROGRESS NOTES
Inpatient Occupational Therapy Evaluation & Treatment    Unit: ICU  Date:  2/21/2025  Patient Name:    Dayton Soto  Admitting diagnosis:  Acute respiratory failure (HCC) [J96.00]  Methamphetamine use (HCC) [F15.10]  Elevated troponin [R79.89]  Acute respiratory failure with hypoxia (HCC) [J96.01]  Multifocal pneumonia [J18.9]  Admit Date:  2/5/2025  Precautions/Restrictions/WB Status/ Lines/ Wounds/ Oxygen: Fall risk, Bed/chair alarm, Lines (IV and Supplemental O2 (Vapotherm 30LPM, 65% O2, 36 deg C )), Telemetry, Continuous pulse oximetry, and Isolation Precautions: Contact, Telesitter    Pt seen for cotreatment this date due to patient safety, patient endurance, complexity of condition, and limited functional status information    Treatment Time:  337-377  Treatment Number:  1  Timed Code Treatment Minutes: 27 minutes  Total Treatment Minutes:  37  minutes    Patient Goals for Therapy: \"none stated\"          Discharge Recommendations:  LTACH per medical team  DME needs for discharge: Defer to facility       Therapy recommendations for staff:   Assist of 1 for repositioning in bed    History of Present Illness: Per Dr. Palacio H&P 2/5/25:  \"59 y.o. male who presented to [facility] with septic shock.  PMHx significant for polysubstance use-methamphetamine, A-fib, valvular disease, on Eliquis on amiodarone, MRSA, cardiomyopathy  .       Patient was really diagnosed with pneumonia and was on antibiotics.  On arrival to the ED, noted to be short of breath and that he smoked methamphetamine about 4 to 5 hours prior to admission with worsening shortness of breath.     In the ED, initial heart rate 104, respiratory rate 36, blood pressure 156/105.  BMP was actually largely unremarkable.  Lactic acid was 3.8 on arrival, troponin was 186 initially 1.313 and then back on the 265.  CBC showing white blood cell count of 47.  INR 1.7.  Influenza, COVID-negative.  Initial VBG showing 7.44/32/27.  Initial chest x-ray showed  Bathing, Bed mobility, Dressing, Safety Awareness, and Transfers. Pt significantly limited by O2 desaturations with all movement attempts this date.  Pt able to transition from supine to sit at EOB, however, O2 unable to recover to greater than 90 without return to supine.  Pt repeatedly stating he would like to walk and continued instruction in O2 saturations and safety impact on mobility will be required.  Pt functioning below baseline and will likely benefit from skilled occupational therapy services to maximize safety and independence.     Recommend LTACH per medical team.    Goal(s) :   To be met in 3 Visits:  Bed to toilet/BSC:       Min A     To be met in 5 Visits:  Supine to/from Sit in preparation for ADL task:   Supervision  Toileting        SBA  Grooming       Independent  Upper Body Dressing:      Independent  Lower Body Dressing:      SBA  Pt to demonstrate UE therapeutic exs x 15 reps with minimal cues    Rehabilitation Potential: Fair  Strengths for achieving goals include: PLOF and Pt cooperative   Barriers to achieving goals include:  Complexity of condition and Weakness    Plan:  To be seen 3-5 x/ week while in acute care setting for therapeutic exercises/activities, bed mobility training, functional transfer training, family/patient education, ADL/IADL retraining, and balance training.    Electronically signed by Yeimi Villarreal OT on 2/21/2025 at 11:35 AM      If patient discharges from this facility prior to next visit, this note will serve as the Discharge Summary

## 2025-02-21 NOTE — PROGRESS NOTES
02/20/25 1925   RT Protocol   History Pulmonary Disease 1   Respiratory pattern 0   Breath sounds 2   Cough 0   Indications for Bronchodilator Therapy Decreased or absent breath sounds   Bronchodilator Assessment Score 3     RT Inhaler-Nebulizer Bronchodilator Protocol Note    There is a bronchodilator order in the chart from a provider indicating to follow the RT Bronchodilator Protocol and there is an “Initiate RT Inhaler-Nebulizer Bronchodilator Protocol” order as well (see protocol at bottom of note).    CXR Findings:  XR CHEST PORTABLE    Result Date: 2/20/2025  Persistent diffuse bilateral airspace disease with slightly improved aeration of the upper lung zones compared to prior exam.       The findings from the last RT Protocol Assessment were as follows:   History Pulmonary Disease: Smoker 15 pack years or more  Respiratory Pattern: Regular pattern and RR 12-20 bpm  Breath Sounds: Slightly diminished and/or crackles  Cough: Strong, spontaneous, non-productive  Indication for Bronchodilator Therapy: Decreased or absent breath sounds  Bronchodilator Assessment Score: 3    Aerosolized bronchodilator medication orders have been revised according to the RT Inhaler-Nebulizer Bronchodilator Protocol below.    Respiratory Therapist to perform RT Therapy Protocol Assessment initially then follow the protocol.  Repeat RT Therapy Protocol Assessment PRN for score 0-3 or on second treatment, BID, and PRN for scores above 3.    No Indications - adjust the frequency to every 6 hours PRN wheezing or bronchospasm, if no treatments needed after 48 hours then discontinue using Per Protocol order mode.     If indication present, adjust the RT bronchodilator orders based on the Bronchodilator Assessment Score as indicated below.  Use Inhaler orders unless patient has one or more of the following: on home nebulizer, not able to hold breath for 10 seconds, is not alert and oriented, cannot activate and use MDI correctly, or

## 2025-02-21 NOTE — PROGRESS NOTES
Progress Note    Admit Date:  2/5/2025     59 y.o. male who presented to [facility] with septic shock.  PMHx significant for polysubstance use-methamphetamine, A-fib, valvular disease, on Eliquis on amiodarone, MRSA, cardiomyopathy       Initially admitted on vent support with pneumonia, septic shock,  -s/p extubation but remains on vapotherm for severe persistent hypoxia     2/14 Agitation yesterday needing code violet, started on precedex gtt and now off         2/17- Worsening  hypoxia again , today on 100 % FIo2 and 30 L vapotherm     2/21  used bipap last night, now on 50 % fio2 and 25 L vapotherm  Slowly improving hypoxia     Subjective:  Mr. Soto seen calm and cooperative on vapotherm  Used bipap last night   Denies any new issues   Understands need for rehab  No significant cough or pedal edema     Objective:   Patient Vitals for the past 4 hrs:   BP Temp Temp src Pulse Resp SpO2 Weight   02/21/25 0700 115/67 -- -- 55 16 91 % --   02/21/25 0600 (!) 102/57 -- -- 53 15 93 % --   02/21/25 0500 103/63 -- -- 58 19 92 % 52.8 kg (116 lb 6.5 oz)   02/21/25 0400 105/79 98 °F (36.7 °C) Oral 54 15 97 % --          Intake/Output Summary (Last 24 hours) at 2/21/2025 0728  Last data filed at 2/21/2025 0429  Gross per 24 hour   Intake 826.86 ml   Output 2325 ml   Net -1498.14 ml       Physical Exam:      General:  middle aged male, up in bed on vapotherm   Calm and cooperative today  Awake, alert and oriented. Appears to be not in any distress  Mucous Membranes:  Pink , anicteric  Neck: No JVD, no carotid bruit, no thyromegaly  Chest:  improving leatha air entry and improving crackles in bases  Cardiovascular:  RRR S1S2 heard, no murmurs or gallops  Abdomen:  Soft, undistended, non tender, no organomegaly, BS present  Extremities: No edema or cyanosis. Distal pulses well felt  Neurological : grossly normal  Cooperative , nonfocal    Medications:  insulin lispro, 0-8 Units, Q4H  methylPREDNISolone, 40 mg,  CULTURE, RESPIRATORY Normal respiratory rickey     Gram Stain Result 3+ WBC's present  2+ Epithelial Cells present  No organisms seen      Narrative:      ORDER#: [id number]                          ORDERED BY: KEENA JOHNSON  SOURCE: Bronchial Washing                  COLLECTED:  02/12/25 13:36  ANTIBIOTICS AT [facility].:                      RECEIVED :  02/12/25 20:28    Culture with Smear, Acid Fast Bacillius [[id number]] Collected: 02/12/25 1336    Order Status: No result Specimen: Bronchial Washing Updated: 02/13/25 1626     AFB Smear No AFB observed by Fluorescent stain    Narrative:      ORDER#: [id number]                          ORDERED BY: KEENA JOHNSON  SOURCE: Bronchial Washing                  COLLECTED:  02/12/25 13:36  ANTIBIOTICS AT [facility].:                      RECEIVED :  02/13/25 03:12    Culture, Fungus [[id number]]  (Abnormal)  (Susceptibility) Collected: 02/12/25 1336    Order Status: Completed Specimen: Bronchial Washing Updated: 02/18/25 0652     Fungus Stain No Fungal elements seen     Organism Candida lusitaniae     Fungus (Mycology) Culture Rare growth    Narrative:      ORDER#: [id number]                          ORDERED BY: KEENA JOHNSON  SOURCE: Bronchial Washing                  COLLECTED:  02/12/25 13:36  ANTIBIOTICS AT [facility].:                      RECEIVED :  02/13/25 02:00    Susceptibility        Maureen lusitaniae      BACTERIAL SUSCEPTIBILITY PANEL BY ELIANA      Caspofungin acetate 0.25 mcg/mL Sensitive      Fluconazole 32 mcg/mL Resistant                           Culture, Respiratory (with Gram Stain) [[id number]] Collected: 02/12/25 1332    Order Status: Completed Specimen: BAL Quantitative Count Updated: 02/14/25 1035     CULTURE, RESPIRATORY No growth 36 to 48 hours     Gram Stain Result Cytospin performed,no quantitation  WBC's present  Epithelial Cells present      Narrative:      ORDER#: [id number]                          ORDERED BY: KEENA JOHNSON  SOURCE: BAL Quantitative Count  pneumonia.  Pulmonary edema   could have a similar appearance.      Small bilateral pleural effusions.         XR CHEST PORTABLE   Final Result   1. Stable support tubes, as described.   2. Stable cardiomegaly, with a persistent small left pleural effusion.   3. Stable multifocal airspace opacities throughout both lungs, likely a   combination of asymmetric edema and multifocal pneumonia.         XR CHEST PORTABLE   Final Result   Scattered infiltrates most pronounced in the mid and lower lungs.      Support tubes as described above.         XR CHEST 1 VIEW   Final Result   1. Patchy consolidative changes of the bilateral lungs are mildly improved   compared to the prior exam.   2. Lines and tubes as above.         XR CHEST PORTABLE   Final Result   1. Endotracheal tube 5.4 cm above the riley.   2. Nasogastric tube advances to conklin the stomach.   3. Extensive bilateral infiltrates worse in the mid lower lungs overall   slightly worse compared to the previous evaluation.         CT CHEST PULMONARY EMBOLISM W CONTRAST   Final Result   1. Worsening diffuse bilateral airspace disease favoring aspiration pneumonia   rather than pulmonary edema.   2. Interstitial edema.   3. Trace left pleural effusion.   4. Mediastinal adenopathy, likely reactive.         XR CHEST PORTABLE   Final Result   1. ET tube in place, approximately 1.5 cm above the riley. It should be   withdrawn by 4 cm.   2. Extensive airspace disease in the lower lung of fields, unchanged from   prior examination.         XR CHEST PORTABLE   Final Result   1. Dense pneumonic infiltrates in the right lower lung field and in the left   mid and lower lung field, with or without atelectatic changes.  These   findings are mostly new as compared to previous portable chest x-ray.   2. Minimal bilateral pleural effusions or pleural thickening at the CP angles.   3. Minimal pulmonary vascular prominence.               Assessment/Plan:    # Acute hypoxic and

## 2025-02-21 NOTE — PROGRESS NOTES
02/21/25 1300   RT Protocol   History Pulmonary Disease 2   Respiratory pattern 2   Breath sounds 2   Cough 0   Indications for Bronchodilator Therapy Decreased or absent breath sounds   Bronchodilator Assessment Score 6

## 2025-02-21 NOTE — PROGRESS NOTES
HEART FAILURE CARE PLAN:    Comorbidities Reviewed: Yes   Patient has a past medical history of Back pain, chronic, Chronic back pain, Hypertension, MI (myocardial infarction) (HCC), and MRSA (methicillin resistant staph aureus) culture positive.     Weights Reviewed: Yes   Admission weight: 64.3 kg (141 lb 12.8 oz)   Wt Readings from Last 3 Encounters:   02/20/25 53.2 kg (117 lb 4.6 oz)   02/01/25 62.3 kg (137 lb 4.8 oz)   07/09/24 56.3 kg (124 lb 1.6 oz)     Intake & Output Reviewed: Yes     Intake/Output Summary (Last 24 hours) at 2/20/2025 2112  Last data filed at 2/20/2025 2000  Gross per 24 hour   Intake 566.86 ml   Output 1950 ml   Net -1383.14 ml       ECHOCARDIOGRAM Reviewed: Yes   Patient's Ejection Fraction (EF) is less than or equal to 40%. Discuss HFrEF Guideline Directed Medical Therapy (GDMT) with Cardiologist or Hospitalist:          Medications Reviewed: Yes   SCHEDULED HOSPITAL MEDICATIONS:   insulin lispro  0-8 Units SubCUTAneous Q4H    methylPREDNISolone  40 mg IntraVENous Q12H    enoxaparin  1 mg/kg SubCUTAneous BID    [Held by provider] furosemide  40 mg IntraVENous BID    ipratropium 0.5 mg-albuterol 2.5 mg  1 Dose Inhalation BID RT    [Held by provider] metoprolol succinate  12.5 mg Oral Daily    amiodarone  100 mg Oral Daily    sacubitril-valsartan  1 tablet Oral BID    [Held by provider] spironolactone  25 mg Oral Daily    atorvastatin  40 mg Oral Nightly    buprenorphine-naloxone  1 tablet SubLINGual BID    sodium chloride flush  5-40 mL IntraVENous 2 times per day    famotidine (PEPCID) injection  20 mg IntraVENous BID     HOME MEDICATIONS:  Prior to Admission medications    Medication Sig Start Date End Date Taking? Authorizing Provider   amiodarone (CORDARONE) 200 MG tablet 2 bid - 5 days, 2 qd- 1 week, 1 qd thereafter 1/31/25   Brittany Shin MD   empagliflozin (JARDIANCE) 10 MG tablet Take 1 tablet by mouth daily 1/31/25   Brittany Shin MD   apixaban  (ELIQUIS) 5 MG TABS tablet Take 1 tablet by mouth 2 times daily 1/31/25   Brittany Shin MD   metoprolol succinate (TOPROL XL) 25 MG extended release tablet Take 0.5 tablets by mouth daily 2/1/25   Brittany Shin MD   sacubitril-valsartan (ENTRESTO) 24-26 MG per tablet Take 0.5 tablets by mouth 2 times daily 1/31/25   Brittany Shin MD   predniSONE (DELTASONE) 20 MG tablet 2 qd- 2 days, 1 1/2 qd- 3 days, 1 qd- 3 days, 1/2 qd- 3 days 2/1/25   Brittany Shin MD   pantoprazole (PROTONIX) 40 MG tablet Take 1 tablet by mouth 2 times daily (before meals) 1/31/25   Brittany Shin MD   aspirin 81 MG EC tablet Take 1 tablet by mouth daily (Always take with food) 7/10/24   Andrade Acosta MD   spironolactone (ALDACTONE) 25 MG tablet Take 1 tablet by mouth daily Hold for SBP<95 mmHg 7/10/24   Andrade Acosta MD   furosemide (LASIX) 20 MG tablet Take 1 tablet by mouth daily 7/10/24   Andrade Acosta MD   Buprenorphine HCl-Naloxone HCl (SUBOXONE SL) Place 20 mg under the tongue daily Pt takes (2) 8 mg tablets and (2) 2 mg tablets daily in the morning    Provider, MD Dirk      Diet Reviewed: Yes   ADULT DIET; Regular; Low Sodium (2 gm)  ADULT ORAL NUTRITION SUPPLEMENT; Breakfast, Lunch, Dinner; Standard High Calorie/High Protein Oral Supplement    Goal of Care Reviewed: Yes   Patient and/or Family's stated Goal of Care this Admission: Reduce shortness of breath, increase activity tolerance, better understand heart failure and disease management, be more comfortable, and reduce lower extremity edema prior to discharge.     Electronically signed by Laina Mishra RN on 2/20/2025 at 9:12 PM    32

## 2025-02-21 NOTE — PROGRESS NOTES
Inpatient Physical Therapy Evaluation & Treatment    Unit: ICU  Date:  2/21/2025  Patient Name:    Dayton Soto  Admitting diagnosis:  Acute respiratory failure (HCC) [J96.00]  Methamphetamine use (HCC) [F15.10]  Elevated troponin [R79.89]  Acute respiratory failure with hypoxia (HCC) [J96.01]  Multifocal pneumonia [J18.9]  Admit Date:  2/5/2025  Precautions/Restrictions/WB Status/ Lines/ Wounds/ Oxygen: Fall risk, Bed/chair alarm, Lines (IV and Supplemental O2 (Vapotherm 30LPM, 65% O2, 36 deg C )), Telemetry, Continuous pulse oximetry, and Isolation Precautions: Contact, Telesitter       Pt seen for cotreatment this date due to patient safety, acute illness/injury, and limited functional status information    Treatment Time:  [id number]  Treatment Number:  1   Timed Code Treatment Minutes: 27 minutes  Total Treatment Minutes:  37  minutes    Patient Stated Goals for Therapy: \" To get up. \"          Discharge Recommendations:  LTACH per medical team  DME needs for discharge: Defer to facility       Therapy recommendation for EMS Transport: requires transport by cot due to pt with poor sitting balance/tolerance    Therapy recommendations for staff:   Assist of 1 for repositioning in bed    History of Present Illness:   Per Dr. Palacio H&P 2/5/25:  \"59 y.o. male who presented to [facility] with septic shock.  PMHx significant for polysubstance use-methamphetamine, A-fib, valvular disease, on Eliquis on amiodarone, MRSA, cardiomyopathy  .       Patient was really diagnosed with pneumonia and was on antibiotics.  On arrival to the ED, noted to be short of breath and that he smoked methamphetamine about 4 to 5 hours prior to admission with worsening shortness of breath.     In the ED, initial heart rate 104, respiratory rate 36, blood pressure 156/105.  BMP was actually largely unremarkable.  Lactic acid was 3.8 on arrival, troponin was 186 initially 1.313 and then back on the 265.  CBC showing white blood cell count  recommendations, functional transfer/mobility safety, transfer techniques, pursed lip breathing, energy conservation, pacing activity, calling for assist with mobility, and oxygen tubing line management.    Assessment  Pt seen today for physical therapy Evaluation & Treatment. Pt demonstrated decreased Activity tolerance, Balance, Safety, and Strength as well as decreased independence with Ambulation, Bed Mobility , and Transfers.   Pt is performing well below baseline function. Pt with significant O2 desat in sitting position at EOB. Not safe to progress to transfers or standing at this time until pt does not desat from EOB sitting. Pt would benefit from continued PT to address the noted deficits in order to safely return to PLOF abilities.  LTACH per medical team.    Goals :   To be met in 3 visits:  1). Independent with LE Ex x 10 reps  2). Sit to/from stand: Min A   3). Bed to chair: Min A     To be met in 6 visits:  1).  Supine to/from sit: Independent  2).  Sit to/from stand: Independent  3).  Bed to chair: Independent  4).  Gait: Ambulate 50 ft.  with CGA and use of LRAD (least restrictive assistive device)  5).  Tolerate B LE exercises 3 sets of 10-15 reps    Rehabilitation Potential: Fair to good  Strengths for achieving goals include:   Pt motivated, PLOF, and Pt cooperative   Barriers to achieving goals include:    Complexity of condition, Chronicity of condition, and Weakness    Plan    To be seen 3-5 x/ week while in acute care setting for therapeutic exercises/activities, bed mobility training, functional transfer training, family/patient education, balance training, gait training, and stair training.    Signature: Dayton Almonte PT     If patient discharges from this facility prior to next visit, this note will serve as the Discharge Summary.    CHF Education  N/A

## 2025-02-21 NOTE — PROGRESS NOTES
4 Eyes Skin Assessment     NAME:  Dayton Soto  YOB: 1965  MEDICAL RECORD NUMBER:  [id number]    The patient is being assessed for  Other shift    I agree that at least one RN has performed a thorough Head to Toe Skin Assessment on the patient. ALL assessment sites listed below have been assessed.      Areas assessed by both nurses:    Head, Face, Ears, Shoulders, Back, Chest, Arms, Elbows, Hands, Sacrum. Buttock, Coccyx, Ischium, Legs. Feet and Heels, and Under Medical Devices         Does the Patient have a Wound? No noted wound(s)       Alex Prevention initiated by RN: Yes  Wound Care Orders initiated by RN: No    Pressure Injury (Stage 3,4, Unstageable, DTI, NWPT, and Complex wounds) if present, place Wound referral order by RN under : No    New Ostomies, if present place, Ostomy referral order under : No     Nurse 1 eSignature: Electronically signed by Laina Mishra RN on 2/20/25 at 9:13 PM EST                **SHARE this note so that the co-signing nurse can place an eSignature**    Nurse 2 eSignature: Electronically signed by Shruthi Sam RN on 2/21/25 at 7:43 AM EST

## 2025-02-22 ENCOUNTER — APPOINTMENT (OUTPATIENT)
Dept: GENERAL RADIOLOGY | Age: 60
DRG: 720 | End: 2025-02-22
Payer: MEDICAID

## 2025-02-22 LAB
ANION GAP SERPL CALCULATED.3IONS-SCNC: 6 MMOL/L (ref 3–16)
ANISOCYTOSIS BLD QL SMEAR: ABNORMAL
BASOPHILS # BLD: 0 K/UL (ref 0–0.2)
BASOPHILS NFR BLD: 0 %
BUN SERPL-MCNC: 27 MG/DL (ref 7–20)
CALCIUM SERPL-MCNC: 8.1 MG/DL (ref 8.3–10.6)
CHLORIDE SERPL-SCNC: 97 MMOL/L (ref 99–110)
CO2 SERPL-SCNC: 30 MMOL/L (ref 21–32)
CREAT SERPL-MCNC: 0.7 MG/DL (ref 0.9–1.3)
DEPRECATED RDW RBC AUTO: 13.6 % (ref 12.4–15.4)
EOSINOPHIL # BLD: 0 K/UL (ref 0–0.6)
EOSINOPHIL NFR BLD: 0 %
GFR SERPLBLD CREATININE-BSD FMLA CKD-EPI: >90 ML/MIN/{1.73_M2}
GLUCOSE BLD-MCNC: 125 MG/DL (ref 70–99)
GLUCOSE BLD-MCNC: 135 MG/DL (ref 70–99)
GLUCOSE BLD-MCNC: 139 MG/DL (ref 70–99)
GLUCOSE BLD-MCNC: 158 MG/DL (ref 70–99)
GLUCOSE BLD-MCNC: 163 MG/DL (ref 70–99)
GLUCOSE BLD-MCNC: 164 MG/DL (ref 70–99)
GLUCOSE BLD-MCNC: 175 MG/DL (ref 70–99)
GLUCOSE SERPL-MCNC: 146 MG/DL (ref 70–99)
HCT VFR BLD AUTO: 29.7 % (ref 40.5–52.5)
HGB BLD-MCNC: 10.1 G/DL (ref 13.5–17.5)
LYMPHOCYTES # BLD: 0.2 K/UL (ref 1–5.1)
LYMPHOCYTES NFR BLD: 2 %
MCH RBC QN AUTO: 29.8 PG (ref 26–34)
MCHC RBC AUTO-ENTMCNC: 33.8 G/DL (ref 31–36)
MCV RBC AUTO: 88.1 FL (ref 80–100)
MONOCYTES # BLD: 0.4 K/UL (ref 0–1.3)
MONOCYTES NFR BLD: 4 %
NEUTROPHILS # BLD: 8.4 K/UL (ref 1.7–7.7)
NEUTROPHILS NFR BLD: 94 %
PERFORMED ON: ABNORMAL
PLATELET # BLD AUTO: 345 K/UL (ref 135–450)
PLATELET BLD QL SMEAR: ADEQUATE
PMV BLD AUTO: 8.3 FL (ref 5–10.5)
POIKILOCYTOSIS BLD QL SMEAR: ABNORMAL
POTASSIUM SERPL-SCNC: 4.7 MMOL/L (ref 3.5–5.1)
RBC # BLD AUTO: 3.37 M/UL (ref 4.2–5.9)
SLIDE REVIEW: ABNORMAL
SODIUM SERPL-SCNC: 133 MMOL/L (ref 136–145)
WBC # BLD AUTO: 8.9 K/UL (ref 4–11)

## 2025-02-22 PROCEDURE — 6370000000 HC RX 637 (ALT 250 FOR IP): Performed by: INTERNAL MEDICINE

## 2025-02-22 PROCEDURE — 80048 BASIC METABOLIC PNL TOTAL CA: CPT

## 2025-02-22 PROCEDURE — 6360000002 HC RX W HCPCS: Performed by: INTERNAL MEDICINE

## 2025-02-22 PROCEDURE — 99233 SBSQ HOSP IP/OBS HIGH 50: CPT | Performed by: INTERNAL MEDICINE

## 2025-02-22 PROCEDURE — 94761 N-INVAS EAR/PLS OXIMETRY MLT: CPT

## 2025-02-22 PROCEDURE — 2500000003 HC RX 250 WO HCPCS

## 2025-02-22 PROCEDURE — 94660 CPAP INITIATION&MGMT: CPT

## 2025-02-22 PROCEDURE — 2700000000 HC OXYGEN THERAPY PER DAY

## 2025-02-22 PROCEDURE — 85025 COMPLETE CBC W/AUTO DIFF WBC: CPT

## 2025-02-22 PROCEDURE — 94640 AIRWAY INHALATION TREATMENT: CPT

## 2025-02-22 PROCEDURE — 36415 COLL VENOUS BLD VENIPUNCTURE: CPT

## 2025-02-22 PROCEDURE — 2500000003 HC RX 250 WO HCPCS: Performed by: INTERNAL MEDICINE

## 2025-02-22 PROCEDURE — 2000000000 HC ICU R&B

## 2025-02-22 PROCEDURE — 94669 MECHANICAL CHEST WALL OSCILL: CPT

## 2025-02-22 PROCEDURE — 71045 X-RAY EXAM CHEST 1 VIEW: CPT

## 2025-02-22 PROCEDURE — 99291 CRITICAL CARE FIRST HOUR: CPT | Performed by: INTERNAL MEDICINE

## 2025-02-22 RX ADMIN — SODIUM CHLORIDE, PRESERVATIVE FREE 10 ML: 5 INJECTION INTRAVENOUS at 08:18

## 2025-02-22 RX ADMIN — IPRATROPIUM BROMIDE AND ALBUTEROL SULFATE 1 DOSE: .5; 2.5 SOLUTION RESPIRATORY (INHALATION) at 19:25

## 2025-02-22 RX ADMIN — FAMOTIDINE 20 MG: 20 TABLET, FILM COATED ORAL at 20:41

## 2025-02-22 RX ADMIN — FAMOTIDINE 20 MG: 20 TABLET, FILM COATED ORAL at 08:04

## 2025-02-22 RX ADMIN — WATER 40 MG: 1 INJECTION INTRAMUSCULAR; INTRAVENOUS; SUBCUTANEOUS at 08:05

## 2025-02-22 RX ADMIN — ATORVASTATIN CALCIUM 40 MG: 40 TABLET, FILM COATED ORAL at 20:41

## 2025-02-22 RX ADMIN — SODIUM CHLORIDE, PRESERVATIVE FREE 10 ML: 5 INJECTION INTRAVENOUS at 20:42

## 2025-02-22 RX ADMIN — IPRATROPIUM BROMIDE AND ALBUTEROL SULFATE 1 DOSE: .5; 2.5 SOLUTION RESPIRATORY (INHALATION) at 07:11

## 2025-02-22 RX ADMIN — DIAZEPAM 5 MG: 5 INJECTION, SOLUTION INTRAMUSCULAR; INTRAVENOUS at 16:52

## 2025-02-22 RX ADMIN — ENOXAPARIN SODIUM 50 MG: 100 INJECTION SUBCUTANEOUS at 08:04

## 2025-02-22 RX ADMIN — APIXABAN 5 MG: 5 TABLET, FILM COATED ORAL at 20:41

## 2025-02-22 RX ADMIN — AMIODARONE HYDROCHLORIDE 100 MG: 200 TABLET ORAL at 08:04

## 2025-02-22 RX ADMIN — BUPRENORPHINE HYDROCHLORIDE AND NALOXONE HYDROCHLORIDE DIHYDRATE 1 TABLET: 8; 2 TABLET SUBLINGUAL at 20:41

## 2025-02-22 RX ADMIN — BUPRENORPHINE HYDROCHLORIDE AND NALOXONE HYDROCHLORIDE DIHYDRATE 1 TABLET: 8; 2 TABLET SUBLINGUAL at 08:16

## 2025-02-22 RX ADMIN — WATER 40 MG: 1 INJECTION INTRAMUSCULAR; INTRAVENOUS; SUBCUTANEOUS at 20:41

## 2025-02-22 RX ADMIN — DIAZEPAM 5 MG: 5 INJECTION, SOLUTION INTRAMUSCULAR; INTRAVENOUS at 23:00

## 2025-02-22 NOTE — PROGRESS NOTES
02/22/25 0306   NIV Type   Equipment Type v60   Mode Bilevel   Mask Type Full face mask   Mask Size Medium   Assessment   Pulse 58   Respirations 16   SpO2 99 %   Settings/Measurements   IPAP 12 cmH20   CPAP/EPAP 8 cmH2O   Vt (Measured) 586 mL   Rate Ordered 18   FiO2  50 %   Minute Volume (L/min) 13.4 Liters   Mask Leak (lpm) 28 lpm   Patient's Home Machine No   Patient Observation   Patient Observations spo2 100% on 50% bipap

## 2025-02-22 NOTE — PROGRESS NOTES
02/21/25 2319   NIV Type   Equipment Type v60   Mode Bilevel   Mask Type Full face mask   Mask Size Medium   Assessment   Pulse 58   Respirations 16   SpO2 96 %   Settings/Measurements   IPAP 12 cmH20   CPAP/EPAP 8 cmH2O   Vt (Measured) 655 mL   Rate Ordered 18   FiO2  50 %   Minute Volume (L/min) 13.6 Liters   Mask Leak (lpm) 6 lpm   Patient's Home Machine No   Patient Observation   Patient Observations spo2 96% on 50% bipap

## 2025-02-22 NOTE — FLOWSHEET NOTE
02/21/25 2000   Vitals   Temp 98.7 °F (37.1 °C)   Temp Source Oral   Pulse 66   Heart Rate Source Monitor   Respirations 20   /62   MAP (Calculated) 78   MAP (mmHg) 77   BP Method Automatic   Patient Position Turns self   Cardiac Rhythm Sinus rhythm   Pain Assessment   Pain Assessment None - Denies Pain   Oxygen Therapy   SpO2 92 %     Shift assessment completed, see flowsheets. Scheduled meds given see MAR.

## 2025-02-22 NOTE — PROGRESS NOTES
Progress Note    Admit Date:  2/5/2025     59 y.o. male who presented to [facility] with septic shock.  PMHx significant for polysubstance use-methamphetamine, A-fib, valvular disease, on Eliquis on amiodarone, MRSA, cardiomyopathy       Initially admitted on vent support with pneumonia, septic shock,  -s/p extubation but remains on vapotherm for severe persistent hypoxia     2/14 Agitation yesterday needing code violet, started on precedex gtt and now off         2/17- Worsening  hypoxia again , today on 100 % FIo2 and 30 L vapotherm     2/21  used bipap last night, now on 50 % fio2 and 25 L vapotherm  Slowly improving hypoxia     2/22: he is on Vapo therm. Plans to wean to [place] oxygen.  BP soft.    Subjective:  Mr. Soto is feeling better. BP was low this am and Entresto was held.    Objective:   Patient Vitals for the past 4 hrs:   BP Temp Temp src Pulse Resp SpO2   02/22/25 1000 (!) 115/59 -- -- 61 22 91 %   02/22/25 0900 (!) 91/55 -- -- 65 18 92 %   02/22/25 0800 100/62 -- -- 75 (!) 33 95 %   02/22/25 0748 96/61 -- -- 76 25 90 %   02/22/25 0700 (!) 93/51 98.6 °F (37 °C) Temporal 58 16 92 %          Intake/Output Summary (Last 24 hours) at 2/22/2025 1056  Last data filed at 2/22/2025 1000  Gross per 24 hour   Intake 480 ml   Output 1670 ml   Net -1190 ml       Physical Exam:      General:  middle aged male, up in bed on vapotherm   Calm and cooperative today  Awake, alert and oriented. Appears to be not in any distress  Mucous Membranes:  Pink , anicteric  Neck: No JVD, no carotid bruit, no thyromegaly  Chest:  improving leatha air entry and improving crackles in bases  Cardiovascular:  RRR S1S2 heard, no murmurs or gallops  Abdomen:  Soft, undistended, non tender, no organomegaly, BS present  Extremities: No edema or cyanosis. Distal pulses well felt  Neurological : grossly normal  Cooperative , nonfocal    Medications:  famotidine, 20 mg, BID  insulin lispro, 0-8 Units, Q4H  methylPREDNISolone, 40 mg,  respiratory failure secondary to multifocal pneumonia-   cannot rule out aspirational given imaging findings   - intubated in ER  on 2/5, and was on mechanical ventilation  -Patient remained severely hypoxic needing  paralysis  and proned ventilation initially and eventually improved   -given zyvox , cefepime and flagyl  - BP improved , , wbc better - off paralysis  -extubated 2/9 but remains was on 100 % Vapotherm  with  worsening hypoxemia noted, lasix added with no benefit  - restarted steroids   - bronch cultures with NRF, continued  on zosyn  - pulmonary managing  -pt initially refused re intubation but now aggreable   - palliative care consulted  - slowly improving hypoxia with time , now on 25 L and 55 %   -currently on 30L and 55%.  -repeat CXR this morning noted.     # Septic shock secondary to multifocal pneumonia  - leatha pna on imaging and wbc upto 47>70 K >10  -Blood cultures remain neg  -given empiric linezolid and cefepime as well as Flagyl  -Stress dose steroids with Solu-Cortef given and now off   -was hypotensive  and was on  Levophed and vasopressin   - BP improved and off pressors.   - - BAL with NRF   -  Now on Zosyn as above  - can stop      #Severe acidemia: Metabolic and respiratory acidosis:  -was On bicarb  drip and resolved      # ARF - sec to sepitc shock and hypotension  - hyperkalemic and acidotic , on bicarb fluids -given lokelma improved  - UOP maintained    - resolved      #Atrial fibrillation: On Eliquis  #NSTEMI, likely type II secondary to septic shock  #History of HFrEF, cardiomyopathy  - Elevated troponin was on -Heparin gtt>eliquis   - restarted amiodarone, Toprol, entrestro, holding aldacone  - given- iv lasix 20 given as tolerated   --> eliquis was held for hemoptysis intermittently  but now on lovenox    Type II diabetes mellitus.  -SSI.  -POC Glucose, carb control diet.     #Polysubstance use, methamphetamine use  On Suboxone    #GERD.  -started Pepcid.      DVT Prophylaxis:

## 2025-02-22 NOTE — PROGRESS NOTES
RT Inhaler-Nebulizer Bronchodilator Protocol Note    There is a bronchodilator order in the chart from a provider indicating to follow the RT Bronchodilator Protocol and there is an “Initiate RT Inhaler-Nebulizer Bronchodilator Protocol” order as well (see protocol at bottom of note).    CXR Findings:  XR CHEST PORTABLE    Result Date: 2/20/2025  Persistent diffuse bilateral airspace disease with slightly improved aeration of the upper lung zones compared to prior exam.       The findings from the last RT Protocol Assessment were as follows:   History Pulmonary Disease: (P) Chronic pulmonary disease  Respiratory Pattern: (P) Dyspnea on exertion or RR 21-25 bpm  Breath Sounds: (P) Slightly diminished and/or crackles  Cough: (P) Strong, spontaneous, non-productive  Indication for Bronchodilator Therapy: (P) Decreased or absent breath sounds  Bronchodilator Assessment Score: (P) 6    Aerosolized bronchodilator medication orders have been revised according to the RT Inhaler-Nebulizer Bronchodilator Protocol below.    Respiratory Therapist to perform RT Therapy Protocol Assessment initially then follow the protocol.  Repeat RT Therapy Protocol Assessment PRN for score 0-3 or on second treatment, BID, and PRN for scores above 3.    No Indications - adjust the frequency to every 6 hours PRN wheezing or bronchospasm, if no treatments needed after 48 hours then discontinue using Per Protocol order mode.     If indication present, adjust the RT bronchodilator orders based on the Bronchodilator Assessment Score as indicated below.  Use Inhaler orders unless patient has one or more of the following: on home nebulizer, not able to hold breath for 10 seconds, is not alert and oriented, cannot activate and use MDI correctly, or respiratory rate 25 breaths per minute or more, then use the equivalent nebulizer order(s) with same Frequency and PRN reasons based on the score.  If a patient is on this medication at home then do not  decrease Frequency below that used at home.    0-3 - enter or revise RT bronchodilator order(s) to equivalent RT Bronchodilator order with Frequency of every 4 hours PRN for wheezing or increased work of breathing using Per Protocol order mode.        4-6 - enter or revise RT Bronchodilator order(s) to two equivalent RT bronchodilator orders with one order with BID Frequency and one order with Frequency of every 4 hours PRN wheezing or increased work of breathing using Per Protocol order mode.        7-10 - enter or revise RT Bronchodilator order(s) to two equivalent RT bronchodilator orders with one order with TID Frequency and one order with Frequency of every 4 hours PRN wheezing or increased work of breathing using Per Protocol order mode.       11-13 - enter or revise RT Bronchodilator order(s) to one equivalent RT bronchodilator order with QID Frequency and an Albuterol order with Frequency of every 4 hours PRN wheezing or increased work of breathing using Per Protocol order mode.      Greater than 13 - enter or revise RT Bronchodilator order(s) to one equivalent RT bronchodilator order with every 4 hours Frequency and an Albuterol order with Frequency of every 2 hours PRN wheezing or increased work of breathing using Per Protocol order mode.         Electronically signed by Casey Hoffman RCP on 2/22/2025 at 7:15 AM

## 2025-02-22 NOTE — PLAN OF CARE
Problem: Safety - Adult  Goal: Free from fall injury  Outcome: Progressing     Problem: Pain  Goal: Verbalizes/displays adequate comfort level or baseline comfort level  Outcome: Progressing     Problem: Respiratory - Adult  Goal: Achieves optimal ventilation and oxygenation  Outcome: Progressing     Problem: Nutrition Deficit:  Goal: Optimize nutritional status  Recent Flowsheet Documentation  Taken 2/21/2025 1122 by Ameena Whiting RD, [place]  Nutrient intake appropriate for improving, restoring, or maintaining nutritional needs:   Assess nutritional status and recommend course of action   Monitor oral intake, labs, and treatment plans   Recommend appropriate diets, oral nutritional supplements, and vitamin/mineral supplements

## 2025-02-22 NOTE — PROGRESS NOTES
[facility]                            Critical care Progress Note :      CC:follow up on pneumonia     Events of Last 24 hours:   BP soft   Worsening CXR/condition in general   Diffuse GGO bilateral more right   Had bipap fall night 12/8  Extubated last sundy   Vapotherm Flow 25 L/55 % FiO2 but increased later 30 L/65   No hemoptysis   I&Os balanced  Neg 4 L  CXR some improvement     Invasive Lines: Right femoral cvc- removed 2/10  Has PIVs            Intake/Output Summary (Last 24 hours) at 2/17/2025 0748  Last data filed at 2/17/2025 0621  Gross per 24 hour   Intake 1192.4 ml   Output 2500 ml   Net -1307.6 ml       Vent Mode: AC/VC Resp Rate (Set): 22 bpm/Vt (Set, mL): 450 mL/ /FiO2 : (S) 100 %  No results for input(s): \"PHART\", \"NNX4OZZ\", \"PO2ART\" in the last 72 hours.      IV:   dexmedeTOMIDine HCl in NaCl Stopped (02/15/25 0826)    dextrose      sodium chloride 5 mL/hr at 02/10/25 0629       Vitals:  /66   Pulse 53   Temp 97.6 °F (36.4 °C) (Oral)   Resp 15   Ht 1.78 m (5' 10.08\")   Wt 54.3 kg (119 lb 11.4 oz)   SpO2 (!) 82%   BMI 17.14 kg/m²   on Vapotherm   Constitutional:  No acute distress. .   HEENT: no scleral icterus  Neck: No tracheal deviation present.    Cardiovascular: Normal heart sounds.   Pulmonary/Chest: Few wheezes. Few rhonchi. Few rales. No decreased breath sounds.  No accessory muscle usage or stridor.   Abdominal: Soft.   Musculoskeletal: No cyanosis. No clubbing.  Skin: Skin is warm and dry.        Scheduled Meds:   ipratropium 0.5 mg-albuterol 2.5 mg  1 Dose Inhalation BID RT    methylPREDNISolone  40 mg IntraVENous Daily    metoprolol succinate  12.5 mg Oral Daily    potassium bicarb-citric acid  20 mEq Oral Daily    piperacillin-tazobactam  3,375 mg IntraVENous Q8H    furosemide  20 mg IntraVENous BID    amiodarone  100 mg Oral Daily    sacubitril-valsartan  1 tablet Oral BID    spironolactone  25 mg Oral Daily     atorvastatin  40 mg Oral Nightly    buprenorphine-naloxone  1 tablet SubLINGual BID    insulin lispro  0-4 Units SubCUTAneous Q4H    sodium chloride flush  5-40 mL IntraVENous 2 times per day    famotidine (PEPCID) injection  20 mg IntraVENous BID         Results:  CBC:   Recent Labs     02/15/25  0407 02/16/25  0418 02/17/25  0420   WBC 10.8 13.7* 15.9*   HGB 10.0* 9.8* 11.0*   HCT 30.4* 29.4* 33.3*   MCV 89.7 89.1 89.2    286 290     BMP:   Recent Labs     02/15/25  0406 02/16/25  0418 02/17/25  0420    141 140   K 4.3 3.9 4.3    101 101   CO2 29 30 30   BUN 26* 33* 39*   CREATININE 1.1 1.0 0.9         Cultures:  2/5/25 blood cx NGTD  2/6/25 Resp cx: ngtd  2/5/25 urine cx negative  2/12 BAL NGTD       Films:  Chest x-ray 2/14 imaging reviewed by me and showed  Worsening multifocal airspace disease, likely pneumonia.    Pulmonary edema   Small bilateral pleural effusions.       CTPA 2/5  1. Worsening diffuse bilateral airspace disease favoring aspiration pneumonia  rather than pulmonary edema.  2. Interstitial edema.  3. Trace left pleural effusion.  4. Mediastinal adenopathy, likely reactive.           ASSESSMENT:    Severe acute hypoxic respiratory failure required MV/Prone  Aspiration pneumonia - mucus plugs on bronch 2/12 with ARDS   Probable COPD with AE   Recurrent hemoptysis- pulmonary hemorrhage on BAL 2/12- none now. Probably pulmonary edema in the setting of AC  Agitation   Prolonged QTc   Septic shock  Cardiomyopathy of unknown etiology present on admission and EF 30 to 35% with global hypokinesis  History of Afib on Eliquis and Amiodarone   30 pack year smoking   Methamphetamine use on Suboxone      PLAN:  I am really not sure what etiology  ,wether inhalation injury vs fluid overload   CXR today   Most likely inhalation injury as no sign of fluid overload  Change to high flow O2    If  fluid vs inflammatory or infections   Hold lasix    Keep  solumedrol 40 q12  He is on amiodarone

## 2025-02-22 NOTE — PROGRESS NOTES
02/21/25 1900   RT Protocol   History Pulmonary Disease 2   Respiratory pattern 2   Breath sounds 2   Cough 0   Indications for Bronchodilator Therapy Decreased or absent breath sounds   Bronchodilator Assessment Score 6     RT Inhaler-Nebulizer Bronchodilator Protocol Note    There is a bronchodilator order in the chart from a provider indicating to follow the RT Bronchodilator Protocol and there is an “Initiate RT Inhaler-Nebulizer Bronchodilator Protocol” order as well (see protocol at bottom of note).    CXR Findings:  XR CHEST PORTABLE    Result Date: 2/20/2025  Persistent diffuse bilateral airspace disease with slightly improved aeration of the upper lung zones compared to prior exam.       The findings from the last RT Protocol Assessment were as follows:   History Pulmonary Disease: Chronic pulmonary disease  Respiratory Pattern: Dyspnea on exertion or RR 21-25 bpm  Breath Sounds: Slightly diminished and/or crackles  Cough: Strong, spontaneous, non-productive  Indication for Bronchodilator Therapy: Decreased or absent breath sounds  Bronchodilator Assessment Score: 6    Aerosolized bronchodilator medication orders have been revised according to the RT Inhaler-Nebulizer Bronchodilator Protocol below.    Respiratory Therapist to perform RT Therapy Protocol Assessment initially then follow the protocol.  Repeat RT Therapy Protocol Assessment PRN for score 0-3 or on second treatment, BID, and PRN for scores above 3.    No Indications - adjust the frequency to every 6 hours PRN wheezing or bronchospasm, if no treatments needed after 48 hours then discontinue using Per Protocol order mode.     If indication present, adjust the RT bronchodilator orders based on the Bronchodilator Assessment Score as indicated below.  Use Inhaler orders unless patient has one or more of the following: on home nebulizer, not able to hold breath for 10 seconds, is not alert and oriented, cannot activate and use MDI correctly, or  respiratory rate 25 breaths per minute or more, then use the equivalent nebulizer order(s) with same Frequency and PRN reasons based on the score.  If a patient is on this medication at home then do not decrease Frequency below that used at home.    0-3 - enter or revise RT bronchodilator order(s) to equivalent RT Bronchodilator order with Frequency of every 4 hours PRN for wheezing or increased work of breathing using Per Protocol order mode.        4-6 - enter or revise RT Bronchodilator order(s) to two equivalent RT bronchodilator orders with one order with BID Frequency and one order with Frequency of every 4 hours PRN wheezing or increased work of breathing using Per Protocol order mode.        7-10 - enter or revise RT Bronchodilator order(s) to two equivalent RT bronchodilator orders with one order with TID Frequency and one order with Frequency of every 4 hours PRN wheezing or increased work of breathing using Per Protocol order mode.       11-13 - enter or revise RT Bronchodilator order(s) to one equivalent RT bronchodilator order with QID Frequency and an Albuterol order with Frequency of every 4 hours PRN wheezing or increased work of breathing using Per Protocol order mode.      Greater than 13 - enter or revise RT Bronchodilator order(s) to one equivalent RT bronchodilator order with every 4 hours Frequency and an Albuterol order with Frequency of every 2 hours PRN wheezing or increased work of breathing using Per Protocol order mode.       Electronically signed by Jefferson Quigley RCP on 2/21/2025 at 7:46 PM

## 2025-02-23 LAB
ANION GAP SERPL CALCULATED.3IONS-SCNC: 7 MMOL/L (ref 3–16)
ANISOCYTOSIS BLD QL SMEAR: ABNORMAL
BASOPHILS # BLD: 0 K/UL (ref 0–0.2)
BASOPHILS NFR BLD: 0 %
BUN SERPL-MCNC: 30 MG/DL (ref 7–20)
CALCIUM SERPL-MCNC: 8.1 MG/DL (ref 8.3–10.6)
CHLORIDE SERPL-SCNC: 98 MMOL/L (ref 99–110)
CO2 SERPL-SCNC: 30 MMOL/L (ref 21–32)
CREAT SERPL-MCNC: 0.9 MG/DL (ref 0.9–1.3)
DEPRECATED RDW RBC AUTO: 13.7 % (ref 12.4–15.4)
EOSINOPHIL # BLD: 0 K/UL (ref 0–0.6)
EOSINOPHIL NFR BLD: 0 %
GFR SERPLBLD CREATININE-BSD FMLA CKD-EPI: >90 ML/MIN/{1.73_M2}
GLUCOSE BLD-MCNC: 110 MG/DL (ref 70–99)
GLUCOSE BLD-MCNC: 201 MG/DL (ref 70–99)
GLUCOSE BLD-MCNC: 211 MG/DL (ref 70–99)
GLUCOSE BLD-MCNC: 219 MG/DL (ref 70–99)
GLUCOSE BLD-MCNC: 232 MG/DL (ref 70–99)
GLUCOSE SERPL-MCNC: 147 MG/DL (ref 70–99)
HCT VFR BLD AUTO: 30.1 % (ref 40.5–52.5)
HGB BLD-MCNC: 10.3 G/DL (ref 13.5–17.5)
LYMPHOCYTES # BLD: 0.5 K/UL (ref 1–5.1)
LYMPHOCYTES NFR BLD: 5 %
MCH RBC QN AUTO: 29.9 PG (ref 26–34)
MCHC RBC AUTO-ENTMCNC: 34.1 G/DL (ref 31–36)
MCV RBC AUTO: 87.7 FL (ref 80–100)
METAMYELOCYTES NFR BLD MANUAL: 1 %
MONOCYTES # BLD: 0.6 K/UL (ref 0–1.3)
MONOCYTES NFR BLD: 6 %
MYELOCYTES NFR BLD MANUAL: 3 %
NEUTROPHILS # BLD: 8.3 K/UL (ref 1.7–7.7)
NEUTROPHILS NFR BLD: 83 %
NEUTS BAND NFR BLD MANUAL: 1 % (ref 0–7)
PERFORMED ON: ABNORMAL
PLATELET # BLD AUTO: 313 K/UL (ref 135–450)
PLATELET BLD QL SMEAR: ADEQUATE
PMV BLD AUTO: 8.5 FL (ref 5–10.5)
POIKILOCYTOSIS BLD QL SMEAR: ABNORMAL
POTASSIUM SERPL-SCNC: 4.9 MMOL/L (ref 3.5–5.1)
PROMYELOCYTES NFR BLD MANUAL: 1 %
RBC # BLD AUTO: 3.43 M/UL (ref 4.2–5.9)
SLIDE REVIEW: ABNORMAL
SODIUM SERPL-SCNC: 135 MMOL/L (ref 136–145)
WBC # BLD AUTO: 9.3 K/UL (ref 4–11)

## 2025-02-23 PROCEDURE — 6360000002 HC RX W HCPCS: Performed by: INTERNAL MEDICINE

## 2025-02-23 PROCEDURE — 94640 AIRWAY INHALATION TREATMENT: CPT

## 2025-02-23 PROCEDURE — 6370000000 HC RX 637 (ALT 250 FOR IP): Performed by: INTERNAL MEDICINE

## 2025-02-23 PROCEDURE — 80048 BASIC METABOLIC PNL TOTAL CA: CPT

## 2025-02-23 PROCEDURE — 2500000003 HC RX 250 WO HCPCS: Performed by: INTERNAL MEDICINE

## 2025-02-23 PROCEDURE — 85025 COMPLETE CBC W/AUTO DIFF WBC: CPT

## 2025-02-23 PROCEDURE — 2700000000 HC OXYGEN THERAPY PER DAY

## 2025-02-23 PROCEDURE — 2500000003 HC RX 250 WO HCPCS

## 2025-02-23 PROCEDURE — 99233 SBSQ HOSP IP/OBS HIGH 50: CPT | Performed by: INTERNAL MEDICINE

## 2025-02-23 PROCEDURE — 2000000000 HC ICU R&B

## 2025-02-23 PROCEDURE — 94761 N-INVAS EAR/PLS OXIMETRY MLT: CPT

## 2025-02-23 PROCEDURE — 94669 MECHANICAL CHEST WALL OSCILL: CPT

## 2025-02-23 PROCEDURE — 36415 COLL VENOUS BLD VENIPUNCTURE: CPT

## 2025-02-23 PROCEDURE — 6370000000 HC RX 637 (ALT 250 FOR IP)

## 2025-02-23 PROCEDURE — 94660 CPAP INITIATION&MGMT: CPT

## 2025-02-23 RX ADMIN — IPRATROPIUM BROMIDE AND ALBUTEROL SULFATE 1 DOSE: .5; 2.5 SOLUTION RESPIRATORY (INHALATION) at 07:13

## 2025-02-23 RX ADMIN — FAMOTIDINE 20 MG: 20 TABLET, FILM COATED ORAL at 20:02

## 2025-02-23 RX ADMIN — WATER 40 MG: 1 INJECTION INTRAMUSCULAR; INTRAVENOUS; SUBCUTANEOUS at 09:07

## 2025-02-23 RX ADMIN — SODIUM CHLORIDE, PRESERVATIVE FREE 10 ML: 5 INJECTION INTRAVENOUS at 20:03

## 2025-02-23 RX ADMIN — BUPRENORPHINE HYDROCHLORIDE AND NALOXONE HYDROCHLORIDE DIHYDRATE 1 TABLET: 8; 2 TABLET SUBLINGUAL at 09:06

## 2025-02-23 RX ADMIN — APIXABAN 5 MG: 5 TABLET, FILM COATED ORAL at 09:06

## 2025-02-23 RX ADMIN — WATER 40 MG: 1 INJECTION INTRAMUSCULAR; INTRAVENOUS; SUBCUTANEOUS at 20:02

## 2025-02-23 RX ADMIN — AMIODARONE HYDROCHLORIDE 100 MG: 200 TABLET ORAL at 09:06

## 2025-02-23 RX ADMIN — FAMOTIDINE 20 MG: 20 TABLET, FILM COATED ORAL at 09:07

## 2025-02-23 RX ADMIN — DIAZEPAM 5 MG: 5 INJECTION, SOLUTION INTRAMUSCULAR; INTRAVENOUS at 22:33

## 2025-02-23 RX ADMIN — BUPRENORPHINE HYDROCHLORIDE AND NALOXONE HYDROCHLORIDE DIHYDRATE 1 TABLET: 8; 2 TABLET SUBLINGUAL at 20:02

## 2025-02-23 RX ADMIN — IPRATROPIUM BROMIDE AND ALBUTEROL SULFATE 1 DOSE: .5; 2.5 SOLUTION RESPIRATORY (INHALATION) at 19:09

## 2025-02-23 RX ADMIN — APIXABAN 5 MG: 5 TABLET, FILM COATED ORAL at 20:03

## 2025-02-23 RX ADMIN — ACETAMINOPHEN 650 MG: 325 TABLET ORAL at 22:33

## 2025-02-23 RX ADMIN — INSULIN LISPRO 2 UNITS: 100 INJECTION, SOLUTION INTRAVENOUS; SUBCUTANEOUS at 19:55

## 2025-02-23 RX ADMIN — ATORVASTATIN CALCIUM 40 MG: 40 TABLET, FILM COATED ORAL at 20:02

## 2025-02-23 RX ADMIN — ACETAMINOPHEN 650 MG: 325 TABLET ORAL at 01:47

## 2025-02-23 RX ADMIN — INSULIN LISPRO 2 UNITS: 100 INJECTION, SOLUTION INTRAVENOUS; SUBCUTANEOUS at 04:15

## 2025-02-23 ASSESSMENT — PAIN SCALES - GENERAL
PAINLEVEL_OUTOF10: 3
PAINLEVEL_OUTOF10: 7

## 2025-02-23 ASSESSMENT — PAIN DESCRIPTION - LOCATION: LOCATION: HEAD

## 2025-02-23 ASSESSMENT — PAIN DESCRIPTION - DESCRIPTORS: DESCRIPTORS: ACHING

## 2025-02-23 NOTE — PLAN OF CARE
Problem: Safety - Adult  Goal: Free from fall injury  Outcome: Progressing     Problem: Pain  Goal: Verbalizes/displays adequate comfort level or baseline comfort level  Outcome: Progressing     Problem: Skin/Tissue Integrity  Goal: Skin integrity remains intact  Description: 1.  Monitor for areas of redness and/or skin breakdown  2.  Assess vascular access sites hourly  3.  Every 4-6 hours minimum:  Change oxygen saturation probe site  4.  Every 4-6 hours:  If on nasal continuous positive airway pressure, respiratory therapy assess nares and determine need for appliance change or resting period  Outcome: Progressing     Problem: Respiratory - Adult  Goal: Achieves optimal ventilation and oxygenation  Outcome: Progressing     Problem: Skin/Tissue Integrity - Adult  Goal: Skin integrity remains intact  Description: 1.  Monitor for areas of redness and/or skin breakdown  2.  Assess vascular access sites hourly  3.  Every 4-6 hours minimum:  Change oxygen saturation probe site  4.  Every 4-6 hours:  If on nasal continuous positive airway pressure, respiratory therapy assess nares and determine need for appliance change or resting period  Outcome: Progressing     Problem: Hematologic - Adult  Goal: Maintains hematologic stability  Outcome: Progressing     Problem: Nutrition Deficit:  Goal: Optimize nutritional status  Outcome: Progressing     Problem: Musculoskeletal - Adult  Goal: Return mobility to safest level of function  Outcome: Progressing

## 2025-02-23 NOTE — PROGRESS NOTES
Pt had spilled coffee in bed. Pt able to sit up and scoot over the top of new linens.   Pt states that he is anxious to get out of the hospital and wants to get up and walk around. Pt states that he is keeping watch on his oxygen and knows that is must be better in order to walk around room. Pt sat remains above 92% when at rest on 40%, 20L.    Pt given bed bath with wipes.   Pt refusing to get up to chair this morning and states that it \"does not help.\"  Pt awake in bed. Pt states no needs at this time. Pt call light in reach. Pt knows to notify RN if there are any needs. All lines and monitoring devices in place. Telesitter in room.

## 2025-02-23 NOTE — PROGRESS NOTES
[facility]                            Critical care Progress Note :      CC:follow up on pneumonia     Events of Last 24 hours:   Doing better  No chest pain   Worsening CXR/condition in general   Diffuse GGO bilateral more right   Had bipap fall night 12/8  Extubated last 2/16  Vapotherm Flow 20L/40 % FiO2 was 30 L and 100 before   No hemoptysis   I&Os balanced  Neg 4 L  CXR some improvement     Invasive Lines: Right femoral cvc- removed 2/10  Has PIVs            Intake/Output Summary (Last 24 hours) at 2/17/2025 0748  Last data filed at 2/17/2025 0621  Gross per 24 hour   Intake 1192.4 ml   Output 2500 ml   Net -1307.6 ml       Vent Mode: AC/VC Resp Rate (Set): 22 bpm/Vt (Set, mL): 450 mL/ /FiO2 : (S) 100 %  No results for input(s): \"PHART\", \"MFY9YHM\", \"PO2ART\" in the last 72 hours.      IV:   dexmedeTOMIDine HCl in NaCl Stopped (02/15/25 0826)    dextrose      sodium chloride 5 mL/hr at 02/10/25 0629       Vitals:  /66   Pulse 53   Temp 97.6 °F (36.4 °C) (Oral)   Resp 15   Ht 1.78 m (5' 10.08\")   Wt 54.3 kg (119 lb 11.4 oz)   SpO2 (!) 82%   BMI 17.14 kg/m²   on Vapotherm   Constitutional:  No acute distress. .   HEENT: no scleral icterus  Neck: No tracheal deviation present.    Cardiovascular: Normal heart sounds.   Pulmonary/Chest: Few wheezes. Few rhonchi. Few rales. No decreased breath sounds.  No accessory muscle usage or stridor.   Abdominal: Soft.   Musculoskeletal: No cyanosis. No clubbing.  Skin: Skin is warm and dry.        Scheduled Meds:   ipratropium 0.5 mg-albuterol 2.5 mg  1 Dose Inhalation BID RT    methylPREDNISolone  40 mg IntraVENous Daily    metoprolol succinate  12.5 mg Oral Daily    potassium bicarb-citric acid  20 mEq Oral Daily    piperacillin-tazobactam  3,375 mg IntraVENous Q8H    furosemide  20 mg IntraVENous BID    amiodarone  100 mg Oral Daily    sacubitril-valsartan  1 tablet Oral BID    spironolactone  25 mg Oral  Daily    atorvastatin  40 mg Oral Nightly    buprenorphine-naloxone  1 tablet SubLINGual BID    insulin lispro  0-4 Units SubCUTAneous Q4H    sodium chloride flush  5-40 mL IntraVENous 2 times per day    famotidine (PEPCID) injection  20 mg IntraVENous BID         Results:  CBC:   Recent Labs     02/15/25  0407 02/16/25  0418 02/17/25  0420   WBC 10.8 13.7* 15.9*   HGB 10.0* 9.8* 11.0*   HCT 30.4* 29.4* 33.3*   MCV 89.7 89.1 89.2    286 290     BMP:   Recent Labs     02/15/25  0406 02/16/25  0418 02/17/25  0420    141 140   K 4.3 3.9 4.3    101 101   CO2 29 30 30   BUN 26* 33* 39*   CREATININE 1.1 1.0 0.9         Cultures:  2/5/25 blood cx NGTD  2/6/25 Resp cx: ngtd  2/5/25 urine cx negative  2/12 BAL NGTD       Films:  Chest x-ray 2/14 imaging reviewed by me and showed  Worsening multifocal airspace disease, likely pneumonia.    Pulmonary edema   Small bilateral pleural effusions.       CTPA 2/5  1. Worsening diffuse bilateral airspace disease favoring aspiration pneumonia  rather than pulmonary edema.  2. Interstitial edema.  3. Trace left pleural effusion.  4. Mediastinal adenopathy, likely reactive.           ASSESSMENT:    Severe acute hypoxic respiratory failure required MV/Prone  Aspiration pneumonia - mucus plugs on bronch 2/12 with ARDS   Probable COPD with AE   Recurrent hemoptysis- pulmonary hemorrhage on BAL 2/12- none now. Probably pulmonary edema in the setting of AC  Agitation   Prolonged QTc   Septic shock  Cardiomyopathy of unknown etiology present on admission and EF 30 to 35% with global hypokinesis  History of Afib on Eliquis and Amiodarone   30 pack year smoking   Methamphetamine use on Suboxone      PLAN:  Improving oxygenation and seems decrease o2 requirement   CXR 2/22 with significant improvement   Most likely inhalation injury as no sign of fluid overload  Wean o2 as tolerated  Hold lasix    Keep  solumedrol 40 q12  He is on amiodarone   BNP down   Lasix prn ,he is

## 2025-02-23 NOTE — PROGRESS NOTES
02/22/25 1900   RT Protocol   History Pulmonary Disease 2   Respiratory pattern 2   Breath sounds 2   Cough 0   Indications for Bronchodilator Therapy Decreased or absent breath sounds   Bronchodilator Assessment Score 6     RT Inhaler-Nebulizer Bronchodilator Protocol Note    There is a bronchodilator order in the chart from a provider indicating to follow the RT Bronchodilator Protocol and there is an “Initiate RT Inhaler-Nebulizer Bronchodilator Protocol” order as well (see protocol at bottom of note).    CXR Findings:  XR CHEST PORTABLE    Result Date: 2/22/2025  Persistent bilateral pulmonary infiltrates. No appreciable change in the aeration of the lungs when compared to the prior exam.       The findings from the last RT Protocol Assessment were as follows:   History Pulmonary Disease: Chronic pulmonary disease  Respiratory Pattern: Dyspnea on exertion or RR 21-25 bpm  Breath Sounds: Slightly diminished and/or crackles  Cough: Strong, spontaneous, non-productive  Indication for Bronchodilator Therapy: Decreased or absent breath sounds  Bronchodilator Assessment Score: 6    Aerosolized bronchodilator medication orders have been revised according to the RT Inhaler-Nebulizer Bronchodilator Protocol below.    Respiratory Therapist to perform RT Therapy Protocol Assessment initially then follow the protocol.  Repeat RT Therapy Protocol Assessment PRN for score 0-3 or on second treatment, BID, and PRN for scores above 3.    No Indications - adjust the frequency to every 6 hours PRN wheezing or bronchospasm, if no treatments needed after 48 hours then discontinue using Per Protocol order mode.     If indication present, adjust the RT bronchodilator orders based on the Bronchodilator Assessment Score as indicated below.  Use Inhaler orders unless patient has one or more of the following: on home nebulizer, not able to hold breath for 10 seconds, is not alert and oriented, cannot activate and use MDI correctly, or

## 2025-02-23 NOTE — PROGRESS NOTES
Progress Note    Admit Date:  2/5/2025     59 y.o. male who presented to [facility] with septic shock.  PMHx significant for polysubstance use-methamphetamine, A-fib, valvular disease, on Eliquis on amiodarone, MRSA, cardiomyopathy       Initially admitted on vent support with pneumonia, septic shock,  -s/p extubation but remains on vapotherm for severe persistent hypoxia     2/14 Agitation yesterday needing code violet, started on precedex gtt and now off         2/17- Worsening  hypoxia again , today on 100 % FIo2 and 30 L vapotherm     2/21  used bipap last night, now on 50 % fio2 and 25 L vapotherm  Slowly improving hypoxia     2/22: he is on Vapo therm. Plans to wean to NC oxygen.  BP soft, Entresto was held.    2/23  Improvement on CXR yesterday.  Hypoxia continues to improve 20L and 40% today.    Objective:   Patient Vitals for the past 4 hrs:   BP Temp Temp src Pulse Resp SpO2   02/23/25 0900 (!) 107/55 -- -- 68 17 94 %   02/23/25 0800 124/70 -- -- 76 16 (!) 85 %   02/23/25 0700 109/63 97.8 °F (36.6 °C) Temporal 56 13 98 %   02/23/25 0600 110/63 -- -- 56 13 99 %          Intake/Output Summary (Last 24 hours) at 2/23/2025 0956  Last data filed at 2/23/2025 0629  Gross per 24 hour   Intake 480 ml   Output 1725 ml   Net -1245 ml       Physical Exam:    General:  middle aged male, up in bed on vapotherm   Calm and cooperative today  Awake, alert and oriented. Appears to be not in any distress  Mucous Membranes:  Pink , anicteric  Neck: No JVD, no carotid bruit, no thyromegaly  Chest:  improving leatha air entry and improving crackles in bases  Cardiovascular:  RRR S1S2 heard, no murmurs or gallops  Abdomen:  Soft, undistended, non tender, no organomegaly, BS present  Extremities: No edema or cyanosis. Distal pulses well felt  Neurological : grossly normal  Cooperative , nonfocal    Medications:  apixaban, 5 mg, BID  famotidine, 20 mg, BID  insulin lispro, 0-8 Units, Q4H  methylPREDNISolone, 40 mg, Q12H  [Held  by provider] furosemide, 40 mg, BID  ipratropium 0.5 mg-albuterol 2.5 mg, 1 Dose, BID RT  [Held by provider] metoprolol succinate, 12.5 mg, Daily  amiodarone, 100 mg, Daily  sacubitril-valsartan, 1 tablet, BID  [Held by provider] spironolactone, 25 mg, Daily  atorvastatin, 40 mg, Nightly  buprenorphine-naloxone, 1 tablet, BID  sodium chloride flush, 5-40 mL, 2 times per day      PRN Medications:  ipratropium 0.5 mg-albuterol 2.5 mg, 1 Dose, Q4H PRN  prochlorperazine, 10 mg, Q8H PRN   Or  prochlorperazine, 10 mg, Q6H PRN  diazePAM, 5 mg, Q4H PRN  glucose, 4 tablet, PRN  dextrose bolus, 125 mL, PRN   Or  dextrose bolus, 250 mL, PRN  glucagon (rDNA), 1 mg, PRN  dextrose, , Continuous PRN  sodium chloride flush, 10 mL, PRN  sodium chloride, , PRN  potassium chloride, 40 mEq, PRN   Or  potassium alternative oral replacement, 40 mEq, PRN   Or  potassium chloride, 10 mEq, PRN  magnesium sulfate, 1,000 mg, PRN  polyethylene glycol, 17 g, Daily PRN  acetaminophen, 650 mg, Q6H PRN   Or  acetaminophen, 650 mg, Q6H PRN          Data:  CBC:   Recent Labs     02/21/25  0424 02/22/25  0540 02/23/25  0527   WBC 10.1 8.9 9.3   HGB 10.7* 10.1* 10.3*   HCT 31.8* 29.7* 30.1*   MCV 89.1 88.1 87.7    345 313     BMP:   Recent Labs     02/21/25  0424 02/22/25  0540 02/23/25  0527    133* 135*   K 4.8 4.7 4.9   CL 99 97* 98*   CO2 32 30 30   BUN 28* 27* 30*   CREATININE 0.8* 0.7* 0.9     LIVER PROFILE: No results for input(s): \"AST\", \"ALT\", \"LIPASE\", \"AMYLASE\", \"BILIDIR\", \"BILITOT\", \"ALKPHOS\" in the last 72 hours.    Invalid input(s): \"ALB\"  PT/INR: No results for input(s): \"PROTIME\", \"INR\" in the last 72 hours.    CULTURES  Results       Procedure Component Value Units Date/Time    Culture, Respiratory (with Gram Stain) [[id number]] Collected: 02/12/25 1336    Order Status: Completed Specimen: Bronchial Washing Updated: 02/14/25 1036     CULTURE, RESPIRATORY Normal respiratory rickey     Gram Stain Result 3+ WBC's present  2+  Epithelial Cells present  No organisms seen      Narrative:      ORDER#: [id number]                          ORDERED BY: KEENA JOHNSON  SOURCE: Bronchial Washing                  COLLECTED:  02/12/25 13:36  ANTIBIOTICS AT [facility].:                      RECEIVED :  02/12/25 20:28    Culture with Smear, Acid Fast Bacillius [[id number]] Collected: 02/12/25 1336    Order Status: No result Specimen: Bronchial Washing Updated: 02/13/25 1626     AFB Smear No AFB observed by Fluorescent stain    Narrative:      ORDER#: [id number]                          ORDERED BY: KEENA JOHNSON  SOURCE: Bronchial Washing                  COLLECTED:  02/12/25 13:36  ANTIBIOTICS AT [facility].:                      RECEIVED :  02/13/25 03:12    Culture, Fungus [[id number]]  (Abnormal)  (Susceptibility) Collected: 02/12/25 1336    Order Status: Completed Specimen: Bronchial Washing Updated: 02/18/25 0652     Fungus Stain No Fungal elements seen     Organism Candida lusitaniae     Fungus (Mycology) Culture Rare growth    Narrative:      ORDER#: [id number]                          ORDERED BY: KEENA JOHNSON  SOURCE: Bronchial Washing                  COLLECTED:  02/12/25 13:36  ANTIBIOTICS AT [facility].:                      RECEIVED :  02/13/25 02:00    Susceptibility        Maureen lusitaniae      BACTERIAL SUSCEPTIBILITY PANEL BY ELIANA      Caspofungin acetate 0.25 mcg/mL Sensitive      Fluconazole 32 mcg/mL Resistant                           Culture, Respiratory (with Gram Stain) [[id number]] Collected: 02/12/25 1332    Order Status: Completed Specimen: BAL Quantitative Count Updated: 02/14/25 1035     CULTURE, RESPIRATORY No growth 36 to 48 hours     Gram Stain Result Cytospin performed,no quantitation  WBC's present  Epithelial Cells present      Narrative:      ORDER#: [id number]                          ORDERED BY: KEENA JOHNSON  SOURCE: BAL Quantitative Count lingula     COLLECTED:  02/12/25 13:32  ANTIBIOTICS AT [facility].:                      RECEIVED

## 2025-02-23 NOTE — PROGRESS NOTES
02/22/25 2253   NIV Type   Equipment Type v60   Mode Bilevel   Mask Type Full face mask   Mask Size Medium   Assessment   Pulse 68   Respirations 22   SpO2 94 %   Settings/Measurements   IPAP 12 cmH20   CPAP/EPAP 8 cmH2O   Vt (Measured) 548 mL   Rate Ordered 18   FiO2  50 %   Minute Volume (L/min) 16.7 Liters   Mask Leak (lpm) 11 lpm   Patient's Home Machine No   Patient Observation   Patient Observations spo2 93% on 50% bipap

## 2025-02-23 NOTE — PROGRESS NOTES
Pt states he is turning self and refuses turn by staff. States that he is \"not going to be in the room much longer here\" so he \"isn't worried about bedsores\". Attempted education on bed need for turns, pt refused.

## 2025-02-23 NOTE — PROGRESS NOTES
Pt refusing BiPAP at this time. Switched back to vapotherm. Spo2 dropping to 80%, RT at bedside and O2 requirement needing to be increased to 30L 55%.    PRN Tylenol given for headache

## 2025-02-23 NOTE — PROGRESS NOTES
RT Inhaler-Nebulizer Bronchodilator Protocol Note    There is a bronchodilator order in the chart from a provider indicating to follow the RT Bronchodilator Protocol and there is an “Initiate RT Inhaler-Nebulizer Bronchodilator Protocol” order as well (see protocol at bottom of note).    CXR Findings:  XR CHEST PORTABLE    Result Date: 2/22/2025  Persistent bilateral pulmonary infiltrates. No appreciable change in the aeration of the lungs when compared to the prior exam.       The findings from the last RT Protocol Assessment were as follows:   History Pulmonary Disease: (P) Chronic pulmonary disease  Respiratory Pattern: (P) Dyspnea on exertion or RR 21-25 bpm  Breath Sounds: (P) Slightly diminished and/or crackles  Cough: (P) Strong, spontaneous, non-productive  Indication for Bronchodilator Therapy: (P) Decreased or absent breath sounds  Bronchodilator Assessment Score: (P) 6    Aerosolized bronchodilator medication orders have been revised according to the RT Inhaler-Nebulizer Bronchodilator Protocol below.    Respiratory Therapist to perform RT Therapy Protocol Assessment initially then follow the protocol.  Repeat RT Therapy Protocol Assessment PRN for score 0-3 or on second treatment, BID, and PRN for scores above 3.    No Indications - adjust the frequency to every 6 hours PRN wheezing or bronchospasm, if no treatments needed after 48 hours then discontinue using Per Protocol order mode.     If indication present, adjust the RT bronchodilator orders based on the Bronchodilator Assessment Score as indicated below.  Use Inhaler orders unless patient has one or more of the following: on home nebulizer, not able to hold breath for 10 seconds, is not alert and oriented, cannot activate and use MDI correctly, or respiratory rate 25 breaths per minute or more, then use the equivalent nebulizer order(s) with same Frequency and PRN reasons based on the score.  If a patient is on this medication at home then do not

## 2025-02-23 NOTE — FLOWSHEET NOTE
02/22/25 2000   Vitals   Temp 97.8 °F (36.6 °C)   Temp Source Temporal   Pulse 67   Heart Rate Source Monitor   Respirations 17   /61   MAP (Calculated) 79   MAP (mmHg) 77   BP Method Automatic   Cardiac Rhythm Sinus rhythm   Pain Assessment   Pain Assessment None - Denies Pain   Oxygen Therapy   SpO2 91 %   O2 Device Heated high flow cannula   FiO2  50 %   O2 Flow Rate (L/min) 20 L/min   Ventilator Associated Pneumonia Bundle   Anti-Embolism Intervention Medication     Shift assessment completed, see Flowsheets. Pt resting in bed. Scheduled med given see MAR.

## 2025-02-24 PROBLEM — J44.1 COPD EXACERBATION (HCC): Status: ACTIVE | Noted: 2025-02-24

## 2025-02-24 LAB
ANION GAP SERPL CALCULATED.3IONS-SCNC: 7 MMOL/L (ref 3–16)
ANISOCYTOSIS BLD QL SMEAR: ABNORMAL
BASOPHILS # BLD: 0 K/UL (ref 0–0.2)
BASOPHILS NFR BLD: 0 %
BUN SERPL-MCNC: 30 MG/DL (ref 7–20)
CALCIUM SERPL-MCNC: 8 MG/DL (ref 8.3–10.6)
CHLORIDE SERPL-SCNC: 95 MMOL/L (ref 99–110)
CO2 SERPL-SCNC: 29 MMOL/L (ref 21–32)
CREAT SERPL-MCNC: 0.7 MG/DL (ref 0.9–1.3)
DEPRECATED RDW RBC AUTO: 13.5 % (ref 12.4–15.4)
EOSINOPHIL # BLD: 0 K/UL (ref 0–0.6)
EOSINOPHIL NFR BLD: 0 %
GFR SERPLBLD CREATININE-BSD FMLA CKD-EPI: >90 ML/MIN/{1.73_M2}
GLUCOSE BLD-MCNC: 145 MG/DL (ref 70–99)
GLUCOSE BLD-MCNC: 159 MG/DL (ref 70–99)
GLUCOSE BLD-MCNC: 161 MG/DL (ref 70–99)
GLUCOSE BLD-MCNC: 172 MG/DL (ref 70–99)
GLUCOSE BLD-MCNC: 221 MG/DL (ref 70–99)
GLUCOSE BLD-MCNC: 247 MG/DL (ref 70–99)
GLUCOSE BLD-MCNC: 290 MG/DL (ref 70–99)
GLUCOSE SERPL-MCNC: 158 MG/DL (ref 70–99)
HCT VFR BLD AUTO: 28.6 % (ref 40.5–52.5)
HGB BLD-MCNC: 9.9 G/DL (ref 13.5–17.5)
LYMPHOCYTES # BLD: 0.9 K/UL (ref 1–5.1)
LYMPHOCYTES NFR BLD: 9 %
MCH RBC QN AUTO: 30.2 PG (ref 26–34)
MCHC RBC AUTO-ENTMCNC: 34.5 G/DL (ref 31–36)
MCV RBC AUTO: 87.5 FL (ref 80–100)
MONOCYTES # BLD: 0.5 K/UL (ref 0–1.3)
MONOCYTES NFR BLD: 5 %
MYELOCYTES NFR BLD MANUAL: 1 %
NEUTROPHILS # BLD: 8.4 K/UL (ref 1.7–7.7)
NEUTROPHILS NFR BLD: 83 %
NEUTS BAND NFR BLD MANUAL: 2 % (ref 0–7)
PERFORMED ON: ABNORMAL
PLATELET # BLD AUTO: 305 K/UL (ref 135–450)
PLATELET BLD QL SMEAR: ADEQUATE
PMV BLD AUTO: 8.5 FL (ref 5–10.5)
POIKILOCYTOSIS BLD QL SMEAR: ABNORMAL
POLYCHROMASIA BLD QL SMEAR: ABNORMAL
POTASSIUM SERPL-SCNC: 4.5 MMOL/L (ref 3.5–5.1)
RBC # BLD AUTO: 3.27 M/UL (ref 4.2–5.9)
SLIDE REVIEW: ABNORMAL
SODIUM SERPL-SCNC: 131 MMOL/L (ref 136–145)
WBC # BLD AUTO: 9.8 K/UL (ref 4–11)

## 2025-02-24 PROCEDURE — 6360000002 HC RX W HCPCS: Performed by: INTERNAL MEDICINE

## 2025-02-24 PROCEDURE — 6370000000 HC RX 637 (ALT 250 FOR IP): Performed by: INTERNAL MEDICINE

## 2025-02-24 PROCEDURE — 2500000003 HC RX 250 WO HCPCS

## 2025-02-24 PROCEDURE — 94669 MECHANICAL CHEST WALL OSCILL: CPT

## 2025-02-24 PROCEDURE — 85025 COMPLETE CBC W/AUTO DIFF WBC: CPT

## 2025-02-24 PROCEDURE — 99232 SBSQ HOSP IP/OBS MODERATE 35: CPT | Performed by: INTERNAL MEDICINE

## 2025-02-24 PROCEDURE — 6370000000 HC RX 637 (ALT 250 FOR IP)

## 2025-02-24 PROCEDURE — 80048 BASIC METABOLIC PNL TOTAL CA: CPT

## 2025-02-24 PROCEDURE — 2700000000 HC OXYGEN THERAPY PER DAY

## 2025-02-24 PROCEDURE — 36415 COLL VENOUS BLD VENIPUNCTURE: CPT

## 2025-02-24 PROCEDURE — 94761 N-INVAS EAR/PLS OXIMETRY MLT: CPT

## 2025-02-24 PROCEDURE — 94660 CPAP INITIATION&MGMT: CPT

## 2025-02-24 PROCEDURE — 2500000003 HC RX 250 WO HCPCS: Performed by: INTERNAL MEDICINE

## 2025-02-24 PROCEDURE — 94640 AIRWAY INHALATION TREATMENT: CPT

## 2025-02-24 PROCEDURE — 2060000000 HC ICU INTERMEDIATE R&B

## 2025-02-24 PROCEDURE — 99233 SBSQ HOSP IP/OBS HIGH 50: CPT | Performed by: INTERNAL MEDICINE

## 2025-02-24 PROCEDURE — 97530 THERAPEUTIC ACTIVITIES: CPT

## 2025-02-24 RX ADMIN — DIAZEPAM 5 MG: 5 INJECTION, SOLUTION INTRAMUSCULAR; INTRAVENOUS at 22:26

## 2025-02-24 RX ADMIN — AMIODARONE HYDROCHLORIDE 100 MG: 200 TABLET ORAL at 08:11

## 2025-02-24 RX ADMIN — IPRATROPIUM BROMIDE AND ALBUTEROL SULFATE 1 DOSE: .5; 2.5 SOLUTION RESPIRATORY (INHALATION) at 13:37

## 2025-02-24 RX ADMIN — INSULIN LISPRO 2 UNITS: 100 INJECTION, SOLUTION INTRAVENOUS; SUBCUTANEOUS at 16:22

## 2025-02-24 RX ADMIN — Medication: at 11:21

## 2025-02-24 RX ADMIN — ATORVASTATIN CALCIUM 40 MG: 40 TABLET, FILM COATED ORAL at 20:01

## 2025-02-24 RX ADMIN — SODIUM CHLORIDE, PRESERVATIVE FREE 10 ML: 5 INJECTION INTRAVENOUS at 08:13

## 2025-02-24 RX ADMIN — ACETAMINOPHEN 650 MG: 325 TABLET ORAL at 11:57

## 2025-02-24 RX ADMIN — INSULIN LISPRO 2 UNITS: 100 INJECTION, SOLUTION INTRAVENOUS; SUBCUTANEOUS at 11:57

## 2025-02-24 RX ADMIN — IPRATROPIUM BROMIDE AND ALBUTEROL SULFATE 1 DOSE: .5; 2.5 SOLUTION RESPIRATORY (INHALATION) at 19:12

## 2025-02-24 RX ADMIN — SACUBITRIL AND VALSARTAN 1 TABLET: 24; 26 TABLET, FILM COATED ORAL at 08:11

## 2025-02-24 RX ADMIN — APIXABAN 5 MG: 5 TABLET, FILM COATED ORAL at 20:00

## 2025-02-24 RX ADMIN — SODIUM CHLORIDE, PRESERVATIVE FREE 10 ML: 5 INJECTION INTRAVENOUS at 20:02

## 2025-02-24 RX ADMIN — APIXABAN 5 MG: 5 TABLET, FILM COATED ORAL at 08:12

## 2025-02-24 RX ADMIN — INSULIN LISPRO 4 UNITS: 100 INJECTION, SOLUTION INTRAVENOUS; SUBCUTANEOUS at 20:02

## 2025-02-24 RX ADMIN — BUPRENORPHINE HYDROCHLORIDE AND NALOXONE HYDROCHLORIDE DIHYDRATE 1 TABLET: 8; 2 TABLET SUBLINGUAL at 20:02

## 2025-02-24 RX ADMIN — FAMOTIDINE 20 MG: 20 TABLET, FILM COATED ORAL at 20:01

## 2025-02-24 RX ADMIN — BUPRENORPHINE HYDROCHLORIDE AND NALOXONE HYDROCHLORIDE DIHYDRATE 1 TABLET: 8; 2 TABLET SUBLINGUAL at 08:11

## 2025-02-24 RX ADMIN — FAMOTIDINE 20 MG: 20 TABLET, FILM COATED ORAL at 08:12

## 2025-02-24 RX ADMIN — WATER 40 MG: 1 INJECTION INTRAMUSCULAR; INTRAVENOUS; SUBCUTANEOUS at 08:12

## 2025-02-24 RX ADMIN — IPRATROPIUM BROMIDE AND ALBUTEROL SULFATE 1 DOSE: .5; 2.5 SOLUTION RESPIRATORY (INHALATION) at 07:08

## 2025-02-24 ASSESSMENT — PAIN DESCRIPTION - LOCATION: LOCATION: ABDOMEN;CHEST

## 2025-02-24 ASSESSMENT — PAIN - FUNCTIONAL ASSESSMENT: PAIN_FUNCTIONAL_ASSESSMENT: ACTIVITIES ARE NOT PREVENTED

## 2025-02-24 ASSESSMENT — PAIN DESCRIPTION - DESCRIPTORS: DESCRIPTORS: ACHING;SORE

## 2025-02-24 ASSESSMENT — PAIN SCALES - GENERAL: PAINLEVEL_OUTOF10: 5

## 2025-02-24 ASSESSMENT — PAIN DESCRIPTION - ORIENTATION: ORIENTATION: MID

## 2025-02-24 NOTE — PROGRESS NOTES
HEART FAILURE CARE PLAN:    Comorbidities Reviewed: Yes   Patient has a past medical history of Back pain, chronic, Chronic back pain, Hypertension, MI (myocardial infarction) (HCC), and MRSA (methicillin resistant staph aureus) culture positive.     Weights Reviewed: Yes   Admission weight: 64.3 kg (141 lb 12.8 oz)   Wt Readings from Last 3 Encounters:   02/21/25 52.8 kg (116 lb 6.5 oz)   02/01/25 62.3 kg (137 lb 4.8 oz)   07/09/24 56.3 kg (124 lb 1.6 oz)     Intake & Output Reviewed: Yes     Intake/Output Summary (Last 24 hours) at 2/23/2025 2357  Last data filed at 2/23/2025 2300  Gross per 24 hour   Intake --   Output 1050 ml   Net -1050 ml       ECHOCARDIOGRAM Reviewed: Yes   Patient's Ejection Fraction (EF) is  less than or equal to 40%.     Medications Reviewed: Yes   SCHEDULED HOSPITAL MEDICATIONS:   apixaban  5 mg Oral BID    famotidine  20 mg Oral BID    insulin lispro  0-8 Units SubCUTAneous Q4H    methylPREDNISolone  40 mg IntraVENous Q12H    [Held by provider] furosemide  40 mg IntraVENous BID    ipratropium 0.5 mg-albuterol 2.5 mg  1 Dose Inhalation BID RT    [Held by provider] metoprolol succinate  12.5 mg Oral Daily    amiodarone  100 mg Oral Daily    sacubitril-valsartan  1 tablet Oral BID    [Held by provider] spironolactone  25 mg Oral Daily    atorvastatin  40 mg Oral Nightly    buprenorphine-naloxone  1 tablet SubLINGual BID    sodium chloride flush  5-40 mL IntraVENous 2 times per day     HOME MEDICATIONS:  Prior to Admission medications    Medication Sig Start Date End Date Taking? Authorizing Provider   amiodarone (CORDARONE) 200 MG tablet 2 bid - 5 days, 2 qd- 1 week, 1 qd thereafter 1/31/25   Brittany Shin MD   empagliflozin (JARDIANCE) 10 MG tablet Take 1 tablet by mouth daily 1/31/25   Brittany Shin MD   apixaban (ELIQUIS) 5 MG TABS tablet Take 1 tablet by mouth 2 times daily 1/31/25   Brittany Shin MD   metoprolol succinate (TOPROL XL) 25 MG

## 2025-02-24 NOTE — PROGRESS NOTES
02/23/25 2300   NIV Type   NIV Started/Stopped (S)  On   Equipment Type v60   Mode Bilevel   Mask Type Full face mask   Mask Size Medium   Assessment   Pulse 63   Respirations 23   SpO2 98 %   Settings/Measurements   IPAP 12 cmH20   CPAP/EPAP 6 cmH2O   Vt (Measured) 510 mL   Rate Ordered 18   FiO2  40 %   I Time/ I Time % 1 s   Minute Volume (L/min) 12.5 Liters   Mask Leak (lpm) 1 lpm   Patient's Home Machine No   Alarm Settings   Alarms On Y   Low Pressure (cmH2O) 5 cmH2O   High Pressure (cmH2O) 30 cmH2O   Delay Alarm 20 sec(s)   RR Low (bpm) 18   RR High (bpm) 40 br/min

## 2025-02-24 NOTE — PROGRESS NOTES
Shift assessment complete (see flowsheet), patient requesting snack (madelyn crackers and pudding). After scheduled glucose check and Insulin dose was provided requested item. Patient SP02 on monitor does dip into mid 80's while eating. Patient adamant pulse ox is not working properly. RN what is observed to determine proper read as well as helped coached patient with breathing exercise and stayed with patient until reading back to > or equal to 90%. Patient demonstrated understanding and was able to calmly work on breathing technique to achieve goal. Patient with no other needs at this time.

## 2025-02-24 NOTE — CARE COORDINATION
Spoke with Marissa. [employer] went into appeal. Insurance does not offer P2P.    [employer] will submit appeal by Saturday morning.

## 2025-02-24 NOTE — PROGRESS NOTES
Care rounds completed with Dr. Richard and multidisciplinary team. Reviewed labs, meds, VS, assessment, & plan of care for today.  -change Vapotherm to HFNC,  -solumedrol changed to Daily.   -QTc 440      See dictated note and new orders for details.

## 2025-02-24 NOTE — PROGRESS NOTES
Reassessment completed (see Flowsheet). Pt Denies current needs.   BP WDL.   Vitals:    02/24/25 1500   BP: (!) 108/59   Pulse: 61   Resp: 17   Temp:    SpO2: 97%

## 2025-02-24 NOTE — PROGRESS NOTES
Shift assessment was completed (see flow sheet). Pt is A/O, denies any pain or other needs at this time.   Respirations are even, unlabored/shallow, with diminished sounds. On 20L 35% O2 via Vapotherm.  Scheduled medications to follow- whole with water.   Infusing:  N/A  Call light within reach. Bed in lowest position. Bed alarm on.     Patient is not able to demonstrated the ability to move from a reclining position to an upright position within the recliner. however patient is alert, oriented and able to provide informed consent

## 2025-02-24 NOTE — PROGRESS NOTES
Reassessment completed (see Flowsheet). All ICU lines remain intact, ICU monitoring continued-     Infusing:  N/A    Pt is now on HF NC 6L. SpO2 90-93%. Pt desats quickly with exertion- Pt worked with OT.      Lung sounds Diminished  Pt's blood pressures WDL.

## 2025-02-24 NOTE — CARE COORDINATION
Met with patient at bedside. CM explained to the patient that his insurance denied [employer]. The patient seemed disappointed but did state he will not go to a nursing home.     The patient is in agreement with working with OT/PT again tomorrow and understands that to go home he will need to be able to walk. The patient said he needs oxygen at home. CM advised CM will set the oxygen up for the patient.     Patient is currently on 5L of oxygen.     CM will meet with patient again tomorrow.    Pt would like a ref got GYN

## 2025-02-24 NOTE — PROGRESS NOTES
Pt taken off of vapotherm to 6L HFNC.       02/24/25 0925   Oxygen Therapy/Pulse Ox   O2 Therapy Oxygen humidified   O2 Device High flow nasal cannula   O2 Flow Rate (L/min) 6 L/min   Pulse 57   Respirations 15   SpO2 93 %   Skin Assessment Clean, dry, & intact

## 2025-02-24 NOTE — PROGRESS NOTES
Pulmonary & Critical Care Medicine ICU Progress Note    CC: PNA    Events of Last 24 hours:   BiPAP 12/8 50% - 4 hrs overnight   Vapotherm Flow 20 LPM FiO2 35  Precedex off   No hemoptysis   - 16L     Invasive Lines: PIVs - Right femoral cvc- removed 2/10    MV:  2/5/25-2/9        Intake/Output Summary (Last 24 hours) at 2/24/2025 0726  Last data filed at 2/24/2025 0529  Gross per 24 hour   Intake --   Output 575 ml   Net -575 ml       Vent Mode: AC/VC Resp Rate (Set): 22 bpm/Vt (Set, mL): 450 mL/ /FiO2 : 40 %  No results for input(s): \"PHART\", \"GSH8PNJ\", \"PO2ART\" in the last 72 hours.      IV:   dexmedeTOMIDine HCl in NaCl Stopped (02/15/25 0826)    dextrose      sodium chloride 5 mL/hr at 02/10/25 0629       Vitals:  /65   Pulse 57   Temp 97 °F (36.1 °C) (Axillary)   Resp 18   Ht 1.78 m (5' 10.08\")   Wt 52.8 kg (116 lb 6.5 oz)   SpO2 99%   BMI 16.66 kg/m²   on Vapotherm   Constitutional:  No acute distress. .   HEENT: no scleral icterus  Neck: No tracheal deviation present.    Cardiovascular: Normal heart sounds.   Pulmonary/Chest: Few wheezes. Few rhonchi. +  rales. No decreased breath sounds.  No accessory muscle usage or stridor.   Abdominal: Soft.   Musculoskeletal: No cyanosis. No clubbing.  Skin: Skin is warm and dry.        Scheduled Meds:   apixaban  5 mg Oral BID    famotidine  20 mg Oral BID    insulin lispro  0-8 Units SubCUTAneous Q4H    methylPREDNISolone  40 mg IntraVENous Q12H    [Held by provider] furosemide  40 mg IntraVENous BID    ipratropium 0.5 mg-albuterol 2.5 mg  1 Dose Inhalation BID RT    [Held by provider] metoprolol succinate  12.5 mg Oral Daily    amiodarone  100 mg Oral Daily    sacubitril-valsartan  1 tablet Oral BID    [Held by provider] spironolactone  25 mg Oral Daily    atorvastatin  40 mg Oral Nightly    buprenorphine-naloxone  1 tablet SubLINGual BID    sodium chloride flush  5-40 mL IntraVENous 2 times per day     PRN Meds:  ipratropium 0.5 mg-albuterol 2.5 mg,  prochlorperazine **OR** prochlorperazine, diazePAM, glucose, dextrose bolus **OR** dextrose bolus, glucagon (rDNA), dextrose, sodium chloride flush, sodium chloride, potassium chloride **OR** potassium alternative oral replacement **OR** potassium chloride, magnesium sulfate, polyethylene glycol, acetaminophen **OR** acetaminophen    Results:  CBC:   Recent Labs     02/22/25  0540 02/23/25  0527 02/24/25  0512   WBC 8.9 9.3 9.8   HGB 10.1* 10.3* 9.9*   HCT 29.7* 30.1* 28.6*   MCV 88.1 87.7 87.5    313 305     BMP:   Recent Labs     02/22/25  0540 02/23/25  0527 02/24/25  0512   * 135* 131*   K 4.7 4.9 4.5   CL 97* 98* 95*   CO2 30 30 29   BUN 27* 30* 30*   CREATININE 0.7* 0.9 0.7*     LIVER PROFILE:   No results for input(s): \"AST\", \"ALT\", \"LIPASE\", \"AMYLASE\", \"BILIDIR\", \"BILITOT\", \"ALKPHOS\" in the last 72 hours.    Invalid input(s): \"ALB\"      Cultures:  2/5/25 blood cx NGTD  2/6/25 Resp cx: ngtd  2/5/25 urine cx negative  2/12 BAL NGTD       Films:  Chest x-ray 2/22 imaging reviewed by me and showed  Persistent bilateral pulmonary infiltrates    CTPA 2/5  1. Worsening diffuse bilateral airspace disease favoring aspiration pneumonia  rather than pulmonary edema.  2. Interstitial edema.  3. Trace left pleural effusion.  4. Mediastinal adenopathy, likely reactive.         ASSESSMENT:  Severe acute hypoxic respiratory failure required MV/Prone  Aspiration pneumonia - mucus plugs on bronch 2/12  Probable COPD with AE   Recurrent hemoptysis- pulmonary hemorrhage on BAL 2/12- none now. Probably pulmonary edema in the setting of AC  Agitation   Prolonged QTc - better   Cardiomyopathy of unknown etiology present on admission and EF 30 to 35% with global hypokinesis  History of Afib on Eliquis and Amiodarone   30 pack year smoking   Methamphetamine use on Suboxone      PLAN:  HFNC/Vapotherm for life-threatening acute hypoxemic respiratory failure and titrate to maintain SaO2 >92%  BiPAP QHS and PRN during

## 2025-02-24 NOTE — PROGRESS NOTES
RT Inhaler-Nebulizer Bronchodilator Protocol Note    There is a bronchodilator order in the chart from a provider indicating to follow the RT Bronchodilator Protocol and there is an “Initiate RT Inhaler-Nebulizer Bronchodilator Protocol” order as well (see protocol at bottom of note).    CXR Findings:  XR CHEST PORTABLE    Result Date: 2/22/2025  Persistent bilateral pulmonary infiltrates. No appreciable change in the aeration of the lungs when compared to the prior exam.       The findings from the last RT Protocol Assessment were as follows:   History Pulmonary Disease: Chronic pulmonary disease  Respiratory Pattern: Dyspnea on exertion or RR 21-25 bpm  Breath Sounds: Slightly diminished and/or crackles  Cough: Strong, spontaneous, non-productive  Indication for Bronchodilator Therapy: Decreased or absent breath sounds  Bronchodilator Assessment Score: 6    Aerosolized bronchodilator medication orders have been revised according to the RT Inhaler-Nebulizer Bronchodilator Protocol below.    Respiratory Therapist to perform RT Therapy Protocol Assessment initially then follow the protocol.  Repeat RT Therapy Protocol Assessment PRN for score 0-3 or on second treatment, BID, and PRN for scores above 3.    No Indications - adjust the frequency to every 6 hours PRN wheezing or bronchospasm, if no treatments needed after 48 hours then discontinue using Per Protocol order mode.     If indication present, adjust the RT bronchodilator orders based on the Bronchodilator Assessment Score as indicated below.  Use Inhaler orders unless patient has one or more of the following: on home nebulizer, not able to hold breath for 10 seconds, is not alert and oriented, cannot activate and use MDI correctly, or respiratory rate 25 breaths per minute or more, then use the equivalent nebulizer order(s) with same Frequency and PRN reasons based on the score.  If a patient is on this medication at home then do not decrease Frequency below

## 2025-02-24 NOTE — CARE COORDINATION
Spoke with Marissa/Jenny. Nichole went to P2P.  Need 2 times on each day for 2/25 and 2/26 that Medicaid can contact the provider for a P2P.    Spoke with Dr. Villegas to request P2P.  Dr. Villegas referring to Dr. Richard.     spoke to Dr. Richard regarding P2P.   Dr. Richard will discuss with Dr. Villegas.

## 2025-02-24 NOTE — PROGRESS NOTES
Progress Note    Admit Date:  2/5/2025     59 y.o. male who presented to [facility] with septic shock.  PMHx significant for polysubstance use-methamphetamine, A-fib, valvular disease, on Eliquis on amiodarone, MRSA, cardiomyopathy       Initially admitted on vent support with pneumonia, septic shock,  -s/p extubation but remains on vapotherm for severe persistent hypoxia     2/14 Agitation yesterday needing code violet, started on precedex gtt and now off         2/17- Worsening  hypoxia again , today on 100 % FIo2 and 30 L vapotherm     2/21  used bipap last night, now on 50 % fio2 and 25 L vapotherm  Slowly improving hypoxia     2/22: he is on Vapo therm. Plans to wean to NC oxygen.  BP soft, Entresto was held.    2/23  Improvement on CXR yesterday.  Hypoxia continues to improve 20L and 40% today.    2/24- doing better. I weaned him to FiO2 of 35% on vapo therm. Used Bipap last night. Overall improving.     Objective:   Patient Vitals for the past 4 hrs:   BP Temp Temp src Pulse Resp SpO2   02/24/25 0800 117/63 -- -- 66 20 91 %   02/24/25 0709 -- -- -- 57 18 99 %   02/24/25 0700 112/67 96.9 °F (36.1 °C) Temporal 54 14 98 %   02/24/25 0600 118/65 -- -- 56 15 96 %   02/24/25 0530 -- 97 °F (36.1 °C) Axillary -- -- --   02/24/25 0507 -- -- -- 53 13 95 %   02/24/25 0500 112/68 -- -- 53 12 93 %          Intake/Output Summary (Last 24 hours) at 2/24/2025 0826  Last data filed at 2/24/2025 0529  Gross per 24 hour   Intake --   Output 575 ml   Net -575 ml       Physical Exam:    General:  middle aged male, up in bed on vapotherm   Calm and cooperative today  Awake, alert and oriented. Appears to be not in any distress  Mucous Membranes:  Pink , anicteric  Neck: No JVD, no carotid bruit, no thyromegaly  Chest:  improving leatha air entry and improving crackles in bases  Cardiovascular:  RRR S1S2 heard, no murmurs or gallops  Abdomen:  Soft, undistended, non tender, no organomegaly, BS present  Extremities: No edema or  with or without atelectatic changes.  These   findings are mostly new as compared to previous portable chest x-ray.   2. Minimal bilateral pleural effusions or pleural thickening at the CP angles.   3. Minimal pulmonary vascular prominence.               Assessment/Plan:    # Acute hypoxic and hypercapnic respiratory failure secondary to multifocal pneumonia-   cannot rule out aspirational given imaging findings   - intubated in ER  on 2/5, and was on mechanical ventilation  -Patient remained severely hypoxic needing  paralysis  and proned ventilation initially and eventually improved   -given zyvox , cefepime and flagyl  - BP improved , , wbc better - off paralysis  -extubated 2/9 but remains was on 100 % Vapotherm  with  worsening hypoxemia noted, lasix added with no benefit  - restarted steroids   - bronch cultures with NRF, continued  on zosyn  - pulmonary managing  -pt initially refused re intubation but then agreed.   - palliative care consulted  - slowly improving hypoxia with time , now on 20 L and FiO2 of 35 %   -repeat CXR 2/22 with significant improvement.      # Septic shock secondary to multifocal pneumonia  - leatha pna on imaging and wbc upto 47>70 K >10  -Blood cultures remain neg  -given empiric linezolid and cefepime as well as Flagyl  -Stress dose steroids with Solu-Cortef given and now off   -was hypotensive  and was on  Levophed and vasopressin   - BP improved and off pressors.   - - BAL with NRF   - Then on Zosyn as above  - stopped 2/20.     #Severe acidemia: Metabolic and respiratory acidosis:  -was On bicarb  drip and resolved      # ARF - sec to sepitc shock and hypotension  - hyperkalemic and acidotic , on bicarb fluids -given lokelma improved  - UOP maintained    - resolved      #Atrial fibrillation: On Eliquis  #NSTEMI, likely type II secondary to septic shock  #History of HFrEF, cardiomyopathy  - Elevated troponin was on -Heparin gtt>eliquis   - restarted amiodarone, Toprol, entrestro,  holding aldacone  - given- iv lasix 20 given as tolerated   --> eliquis was held for hemoptysis intermittently  but now on lovenox  -Eliquis restarted.    Type II diabetes mellitus.  -SSI.  -POC Glucose, carb control diet.     #Polysubstance use, methamphetamine use  On Suboxone    #GERD.  -started Pepcid.      DVT Prophylaxis: Eliquis  Diet: ADULT DIET; Regular; Low Sodium (2 gm)  ADULT ORAL NUTRITION SUPPLEMENT; Breakfast, Lunch, Dinner; Standard High Calorie/High Protein Oral Supplement  Code Status: Full Code    Changed to full code per pt wishes      Expect prolonged hypoxia, [facility]. Discharge planning.     STEFANIE QURESHI MD 2/24/2025 8:26 AM

## 2025-02-24 NOTE — PROGRESS NOTES
02/23/25 1900   RT Protocol   History Pulmonary Disease 2   Respiratory pattern 2   Breath sounds 2   Cough 0   Indications for Bronchodilator Therapy Decreased or absent breath sounds   Bronchodilator Assessment Score 6     RT Inhaler-Nebulizer Bronchodilator Protocol Note    There is a bronchodilator order in the chart from a provider indicating to follow the RT Bronchodilator Protocol and there is an “Initiate RT Inhaler-Nebulizer Bronchodilator Protocol” order as well (see protocol at bottom of note).    CXR Findings:  XR CHEST PORTABLE    Result Date: 2/22/2025  Persistent bilateral pulmonary infiltrates. No appreciable change in the aeration of the lungs when compared to the prior exam.       The findings from the last RT Protocol Assessment were as follows:   History Pulmonary Disease: Chronic pulmonary disease  Respiratory Pattern: Dyspnea on exertion or RR 21-25 bpm  Breath Sounds: Slightly diminished and/or crackles  Cough: Strong, spontaneous, non-productive  Indication for Bronchodilator Therapy: Decreased or absent breath sounds  Bronchodilator Assessment Score: 6    Aerosolized bronchodilator medication orders have been revised according to the RT Inhaler-Nebulizer Bronchodilator Protocol below.    Respiratory Therapist to perform RT Therapy Protocol Assessment initially then follow the protocol.  Repeat RT Therapy Protocol Assessment PRN for score 0-3 or on second treatment, BID, and PRN for scores above 3.    No Indications - adjust the frequency to every 6 hours PRN wheezing or bronchospasm, if no treatments needed after 48 hours then discontinue using Per Protocol order mode.     If indication present, adjust the RT bronchodilator orders based on the Bronchodilator Assessment Score as indicated below.  Use Inhaler orders unless patient has one or more of the following: on home nebulizer, not able to hold breath for 10 seconds, is not alert and oriented, cannot activate and use MDI correctly, or  respiratory rate 25 breaths per minute or more, then use the equivalent nebulizer order(s) with same Frequency and PRN reasons based on the score.  If a patient is on this medication at home then do not decrease Frequency below that used at home.    0-3 - enter or revise RT bronchodilator order(s) to equivalent RT Bronchodilator order with Frequency of every 4 hours PRN for wheezing or increased work of breathing using Per Protocol order mode.        4-6 - enter or revise RT Bronchodilator order(s) to two equivalent RT bronchodilator orders with one order with BID Frequency and one order with Frequency of every 4 hours PRN wheezing or increased work of breathing using Per Protocol order mode.        7-10 - enter or revise RT Bronchodilator order(s) to two equivalent RT bronchodilator orders with one order with TID Frequency and one order with Frequency of every 4 hours PRN wheezing or increased work of breathing using Per Protocol order mode.       11-13 - enter or revise RT Bronchodilator order(s) to one equivalent RT bronchodilator order with QID Frequency and an Albuterol order with Frequency of every 4 hours PRN wheezing or increased work of breathing using Per Protocol order mode.      Greater than 13 - enter or revise RT Bronchodilator order(s) to one equivalent RT bronchodilator order with every 4 hours Frequency and an Albuterol order with Frequency of every 2 hours PRN wheezing or increased work of breathing using Per Protocol order mode.     Electronically signed by Jefferson Quigley RCP on 2/23/2025 at 7:12 PM

## 2025-02-24 NOTE — PROGRESS NOTES
Chap visited Pt while rounding.   Pt is unhappy being in hospital, recovering from meth overdose.   Expressed desire to leave hospital and move on with his life.   Has a sister who may visit him.   Chap prayed for Pt and gave him a Bible at his request.     Ra Guthrie

## 2025-02-24 NOTE — PLAN OF CARE
Problem: Musculoskeletal - Adult  Goal: Return ADL status to a safe level of function  2/23/2025 2348 by Hayley Lovell RN  Outcome: Progressing  2/23/2025 2336 by Hayley Lovell RN  Outcome: Progressing     Problem: Musculoskeletal - Adult  Goal: Return mobility to safest level of function  2/23/2025 2348 by Hayley Lovell RN  Outcome: Progressing  2/23/2025 2336 by Hayley Lovell RN  Outcome: Progressing     Problem: Coping  Goal: Pt/Family able to verbalize concerns and demonstrate effective coping strategies  Description: INTERVENTIONS:  1. Assist patient/family to identify coping skills, available support systems and cultural and spiritual values  2. Provide emotional support, including active listening and acknowledgement of concerns of patient and caregivers  3. Reduce environmental stimuli, as able  4. Instruct patient/family in relaxation techniques, as appropriate  5. Assess for spiritual pain/suffering and initiate Spiritual Care, Psychosocial Clinical Specialist consults as needed  2/23/2025 2348 by Hayley Lovell RN  Outcome: Progressing  2/23/2025 2336 by Hayley Lovell RN  Outcome: Progressing     Problem: Respiratory - Adult  Goal: Achieves optimal ventilation and oxygenation  2/23/2025 2348 by Hayley Lovell RN  Outcome: Progressing  2/23/2025 2336 by Hayley Lovell RN  Outcome: Progressing     Problem: Safety - Adult  Goal: Free from fall injury  2/23/2025 2348 by Hayley Lovell RN  Outcome: Progressing  2/23/2025 2336 by Hayley Lovell RN  Outcome: Progressing     Problem: Discharge Planning  Goal: Discharge to home or other facility with appropriate resources  2/23/2025 2348 by Hayley Lovell RN  Outcome: Progressing  2/23/2025 2336 by Hayley Lovell RN  Outcome: Not Progressing

## 2025-02-24 NOTE — PLAN OF CARE
Problem: Discharge Planning  Goal: Discharge to home or other facility with appropriate resources  2/24/2025 1203 by Gabriel Burton RN  Outcome: Progressing  2/23/2025 2348 by Hayley Lovell RN  Outcome: Progressing  2/23/2025 2336 by Hayley Lovell RN  Outcome: Not Progressing     Problem: Safety - Adult  Goal: Free from fall injury  2/24/2025 1203 by Gabriel Burton RN  Outcome: Progressing  2/23/2025 2348 by Hayley Lovell RN  Outcome: Progressing  2/23/2025 2336 by Hayley Lovell RN  Outcome: Progressing     Problem: Pain  Goal: Verbalizes/displays adequate comfort level or baseline comfort level  2/23/2025 2348 by Hayley Lovell RN  Outcome: Progressing  2/23/2025 2336 by Hayley Lovell RN  Outcome: Progressing     Problem: Skin/Tissue Integrity  Goal: Skin integrity remains intact  Description: 1.  Monitor for areas of redness and/or skin breakdown  2.  Assess vascular access sites hourly  3.  Every 4-6 hours minimum:  Change oxygen saturation probe site  4.  Every 4-6 hours:  If on nasal continuous positive airway pressure, respiratory therapy assess nares and determine need for appliance change or resting period  2/24/2025 1203 by Gabriel Burton RN  Outcome: Progressing  2/23/2025 2348 by Hayley Lovell RN  Outcome: Progressing  2/23/2025 2336 by Hayley Lovell RN  Outcome: Progressing  Flowsheets (Taken 2/23/2025 2334)  Skin Integrity Remains Intact: Monitor for areas of redness and/or skin breakdown     Problem: Respiratory - Adult  Goal: Achieves optimal ventilation and oxygenation  2/24/2025 1203 by Gabriel Burton RN  Outcome: Progressing  Flowsheets (Taken 2/19/2025 2000 by Laina Mishra, RN)  Achieves optimal ventilation and oxygenation:   Oxygen supplementation based on oxygen saturation or arterial blood gases   Position to facilitate oxygenation and minimize respiratory effort   Assess for changes in mentation and behavior   Assess for changes in respiratory    Problem: Chronic Conditions and Co-morbidities  Goal: Patient's chronic conditions and co-morbidity symptoms are monitored and maintained or improved  2/23/2025 2348 by Hayley Lovell RN  Outcome: Progressing  2/23/2025 2336 by Hayley Lovell RN  Outcome: Progressing     Problem: Neurosensory - Adult  Goal: Achieves stable or improved neurological status  2/24/2025 1203 by Gabriel Burton RN  Outcome: Progressing  2/23/2025 2348 by Hayley Lovell RN  Outcome: Progressing  2/23/2025 2336 by Hayley Lovell RN  Outcome: Progressing  Goal: Achieves maximal functionality and self care  2/23/2025 2348 by Hayley Lovell RN  Outcome: Progressing  2/23/2025 2336 by Hayley Lovell RN  Outcome: Progressing     Problem: Musculoskeletal - Adult  Goal: Return mobility to safest level of function  2/23/2025 2348 by Hayley Lovell RN  Outcome: Progressing  2/23/2025 2336 by Hayley Lovell RN  Outcome: Progressing  Goal: Return ADL status to a safe level of function  2/23/2025 2348 by Hayley Lovell RN  Outcome: Progressing  2/23/2025 2336 by Hayley Lovell RN  Outcome: Progressing     Problem: Discharge Planning  Goal: Discharge to home or other facility with appropriate resources  2/24/2025 1203 by Gabriel Burton RN  Outcome: Progressing  2/23/2025 2348 by Hayley Lovell RN  Outcome: Progressing  2/23/2025 2336 by Hayley Lovell RN  Outcome: Not Progressing

## 2025-02-24 NOTE — PROGRESS NOTES
Inpatient Occupational Therapy Treatment    Unit: ICU  Date:  2/24/2025  Patient Name:    Dayton Soto  Admitting diagnosis:  Acute respiratory failure (HCC) [J96.00]  Methamphetamine use (HCC) [F15.10]  Elevated troponin [R79.89]  Acute respiratory failure with hypoxia (HCC) [J96.01]  Multifocal pneumonia [J18.9]  Admit Date:  2/5/2025  Precautions/Restrictions/WB Status/ Lines/ Wounds/ Oxygen: Fall risk, Bed/chair alarm, Lines (IV and Supplemental O2 (5L)), Telemetry, Continuous pulse oximetry, Telesitter, and Isolation Precautions: Contact    Treatment Time:  7382-8185  Treatment Number:  2  Timed Code Treatment Minutes: 39 minutes  Total Treatment Minutes:  39  minutes    Patient Goals for Therapy: \"go home with my sister\"          Discharge Recommendations: SNF or LTACH per medical team  DME needs for discharge: Defer to facility       Therapy recommendations for staff:   Assist of 1 for stand-pivot transfers with rolling walker (RW) and gait belt to/from BSC  to/from chair    History of Present Illness: Per Dr. Palacio H&P 2/5/25:  \"59 y.o. male who presented to [facility] with septic shock.  PMHx significant for polysubstance use-methamphetamine, A-fib, valvular disease, on Eliquis on amiodarone, MRSA, cardiomyopathy  .       Patient was really diagnosed with pneumonia and was on antibiotics.  On arrival to the ED, noted to be short of breath and that he smoked methamphetamine about 4 to 5 hours prior to admission with worsening shortness of breath.     In the ED, initial heart rate 104, respiratory rate 36, blood pressure 156/105.  BMP was actually largely unremarkable.  Lactic acid was 3.8 on arrival, troponin was 186 initially 1.313 and then back on the 265.  CBC showing white blood cell count of 47.  INR 1.7.  Influenza, COVID-negative.  Initial VBG showing 7.44/32/27.  Initial chest x-ray showed dense pneumonic infiltrates in the right lower lung field in the left mid and lower lung field.  CTPA  showed worsening diffuse bilateral airspace disease favoring aspiration pneumonia other rather than pulmonary edema.     Throughout ED stay, patient continued to decline and started breathing faster, becoming hypoxic, more tachycardic and febrile.  It appears he had a rapid decline and was subsequently intubated at 10:33 AM.\"     Extubated 2/9/25  Bronch 2/12/25     Acute hypoxic and hypercapnic respiratory failure  Septic shock  Polysubstance use    Preadmission Environment:   Pt. Lives                                              with family (sister)  Home environment:                            mobile home/trailer  Steps to enter first floor:                     2 steps to enter; B handrails  Steps to second floor/basement:        N/A  Laundry:                                              1st floor  Bathroom:                                           tub/shower unit, walk in shower, grab bars in shower, and standard height toilet  Pt sleeps in a:                                     Recliner due to back pain  Equipment owned:                              none     Preadmission Status:  Pt. Able to drive:                                 No; walk or RFS  Pt. Fully independent with ADLs:       Yes  Pt. Required assistance for:               Independent PTA  Pt. independent for functional transfers and utilized No Device for mobility in home and No Device out in community  History of falls:                                    No  Home Health Services:                       None    AM-PAC Score: AM-PAC Inpatient Daily Activity Raw Score: 16     Subjective:  Patient  in bed with HOB maximally elevated  with no family present.   Pt agreeable to this OT session.     Cognition:    A&O x4   Able to follow 2 step commands    Pain:   Yes  Location: chest-\"fluid in there\"  Rating: mild /10  Pain Medicine Status: Pain med requested and RN notified    Activity Tolerance:   Pt completed therapy session with  setting.  Pt limited by lightheadedness/SOB when seated EOB and in standing this date. Making gradual progress toward goals. Pt functioning below baseline and will likely benefit from continued skilled occupational therapy services to maximize safety and independence.     Recommending SNF upon discharge as patient functioning below baseline, demonstrates good rehab potential and unable to return home due to inability to negotiate stairs to enter home/bedroom/bathroom and burden of care beyond caregiver ability. May need LTACH, defer to medical team.    Goal(s) : ongoing 2/24/25  To be met in 3 Visits:  Bed to toilet/BSC:                                                                   Min A      To be met in 5 Visits:  Supine to/from Sit in preparation for ADL task:                      Supervision  Toileting                                                                                  SBA  Grooming                                                                                Independent  Upper Body Dressing:                                                            Independent  Lower Body Dressing:                                                            SBA  Pt to demonstrate UE therapeutic exs x 15 reps with minimal cues     Rehabilitation Potential: Fair  Strengths for achieving goals include: PLOF and Pt cooperative   Barriers to achieving goals include:  Complexity of condition and Weakness     Plan:  To be seen 3-5 x/ week while in acute care setting for therapeutic exercises/activities, bed mobility training, functional transfer training, family/patient education, ADL/IADL retraining, and balance training.    Electronically signed by Suzanna Newman OT on 2/24/2025 at 11:35 AM      If patient discharges from this facility prior to next visit, this note will serve as the Discharge Summary

## 2025-02-24 NOTE — PROGRESS NOTES
02/24/25 0306   NIV Type   Equipment Type v60   Mode Bilevel   Mask Type Full face mask   Mask Size Medium   Settings/Measurements   IPAP 12 cmH20   CPAP/EPAP 8 cmH2O   Vt (Measured) 512 mL   Rate Ordered 18   FiO2  50 %   Minute Volume (L/min) 11.2 Liters   Mask Leak (lpm) 22 lpm   Patient Observation   Patient Observations spo2 100% on 50% bipap

## 2025-02-25 PROBLEM — E87.5 HYPERKALEMIA: Status: ACTIVE | Noted: 2025-02-25

## 2025-02-25 LAB
ACID FAST STN SPEC QL: NORMAL
ACID FAST STN SPEC QL: NORMAL
ANION GAP SERPL CALCULATED.3IONS-SCNC: 9 MMOL/L (ref 3–16)
ANISOCYTOSIS BLD QL SMEAR: ABNORMAL
BASOPHILS # BLD: 0 K/UL (ref 0–0.2)
BASOPHILS NFR BLD: 0 %
BUN SERPL-MCNC: 30 MG/DL (ref 7–20)
CALCIUM SERPL-MCNC: 8.2 MG/DL (ref 8.3–10.6)
CHLORIDE SERPL-SCNC: 97 MMOL/L (ref 99–110)
CO2 SERPL-SCNC: 28 MMOL/L (ref 21–32)
CREAT SERPL-MCNC: 0.8 MG/DL (ref 0.9–1.3)
DEPRECATED RDW RBC AUTO: 13.8 % (ref 12.4–15.4)
EOSINOPHIL # BLD: 0 K/UL (ref 0–0.6)
EOSINOPHIL NFR BLD: 0 %
GFR SERPLBLD CREATININE-BSD FMLA CKD-EPI: >90 ML/MIN/{1.73_M2}
GLUCOSE BLD-MCNC: 110 MG/DL (ref 70–99)
GLUCOSE BLD-MCNC: 131 MG/DL (ref 70–99)
GLUCOSE BLD-MCNC: 143 MG/DL (ref 70–99)
GLUCOSE BLD-MCNC: 148 MG/DL (ref 70–99)
GLUCOSE BLD-MCNC: 170 MG/DL (ref 70–99)
GLUCOSE BLD-MCNC: 92 MG/DL (ref 70–99)
GLUCOSE SERPL-MCNC: 118 MG/DL (ref 70–99)
HCT VFR BLD AUTO: 38.1 % (ref 40.5–52.5)
HGB BLD-MCNC: 12.3 G/DL (ref 13.5–17.5)
LYMPHOCYTES # BLD: 2.6 K/UL (ref 1–5.1)
LYMPHOCYTES NFR BLD: 17 %
MCH RBC QN AUTO: 29.3 PG (ref 26–34)
MCHC RBC AUTO-ENTMCNC: 32.2 G/DL (ref 31–36)
MCV RBC AUTO: 90.9 FL (ref 80–100)
METAMYELOCYTES NFR BLD MANUAL: 2 %
MONOCYTES # BLD: 0.6 K/UL (ref 0–1.3)
MONOCYTES NFR BLD: 4 %
MYCOBACTERIUM SPEC CULT: NORMAL
MYCOBACTERIUM SPEC CULT: NORMAL
MYELOCYTES NFR BLD MANUAL: 7 %
NEUTROPHILS # BLD: 12.2 K/UL (ref 1.7–7.7)
NEUTROPHILS NFR BLD: 63 %
NEUTS BAND NFR BLD MANUAL: 7 % (ref 0–7)
NEUTS VAC BLD QL SMEAR: PRESENT
PERFORMED ON: ABNORMAL
PERFORMED ON: NORMAL
PLATELET # BLD AUTO: 296 K/UL (ref 135–450)
PLATELET BLD QL SMEAR: ADEQUATE
PMV BLD AUTO: 8.7 FL (ref 5–10.5)
POIKILOCYTOSIS BLD QL SMEAR: ABNORMAL
POTASSIUM SERPL-SCNC: 5.3 MMOL/L (ref 3.5–5.1)
RBC # BLD AUTO: 4.19 M/UL (ref 4.2–5.9)
SLIDE REVIEW: ABNORMAL
SODIUM SERPL-SCNC: 134 MMOL/L (ref 136–145)
WBC # BLD AUTO: 15.5 K/UL (ref 4–11)

## 2025-02-25 PROCEDURE — 6370000000 HC RX 637 (ALT 250 FOR IP): Performed by: INTERNAL MEDICINE

## 2025-02-25 PROCEDURE — 80048 BASIC METABOLIC PNL TOTAL CA: CPT

## 2025-02-25 PROCEDURE — 6360000002 HC RX W HCPCS: Performed by: INTERNAL MEDICINE

## 2025-02-25 PROCEDURE — 36415 COLL VENOUS BLD VENIPUNCTURE: CPT

## 2025-02-25 PROCEDURE — 2500000003 HC RX 250 WO HCPCS: Performed by: INTERNAL MEDICINE

## 2025-02-25 PROCEDURE — 2700000000 HC OXYGEN THERAPY PER DAY

## 2025-02-25 PROCEDURE — 94640 AIRWAY INHALATION TREATMENT: CPT

## 2025-02-25 PROCEDURE — 94669 MECHANICAL CHEST WALL OSCILL: CPT

## 2025-02-25 PROCEDURE — 85025 COMPLETE CBC W/AUTO DIFF WBC: CPT

## 2025-02-25 PROCEDURE — 99233 SBSQ HOSP IP/OBS HIGH 50: CPT | Performed by: INTERNAL MEDICINE

## 2025-02-25 PROCEDURE — 94761 N-INVAS EAR/PLS OXIMETRY MLT: CPT

## 2025-02-25 PROCEDURE — 94660 CPAP INITIATION&MGMT: CPT

## 2025-02-25 PROCEDURE — 2060000000 HC ICU INTERMEDIATE R&B

## 2025-02-25 RX ORDER — DIAZEPAM 5 MG/1
5 TABLET ORAL EVERY 4 HOURS PRN
Status: DISPENSED | OUTPATIENT
Start: 2025-02-25

## 2025-02-25 RX ORDER — FUROSEMIDE 10 MG/ML
20 INJECTION INTRAMUSCULAR; INTRAVENOUS ONCE
Status: COMPLETED | OUTPATIENT
Start: 2025-02-25 | End: 2025-02-25

## 2025-02-25 RX ADMIN — SODIUM CHLORIDE, PRESERVATIVE FREE 10 ML: 5 INJECTION INTRAVENOUS at 20:09

## 2025-02-25 RX ADMIN — APIXABAN 5 MG: 5 TABLET, FILM COATED ORAL at 09:20

## 2025-02-25 RX ADMIN — FAMOTIDINE 20 MG: 20 TABLET, FILM COATED ORAL at 20:07

## 2025-02-25 RX ADMIN — SACUBITRIL AND VALSARTAN 1 TABLET: 24; 26 TABLET, FILM COATED ORAL at 09:20

## 2025-02-25 RX ADMIN — SODIUM ZIRCONIUM CYCLOSILICATE 10 G: 10 POWDER, FOR SUSPENSION ORAL at 13:30

## 2025-02-25 RX ADMIN — FAMOTIDINE 20 MG: 20 TABLET, FILM COATED ORAL at 09:20

## 2025-02-25 RX ADMIN — DIAZEPAM 5 MG: 5 TABLET ORAL at 22:02

## 2025-02-25 RX ADMIN — DIAZEPAM 5 MG: 5 TABLET ORAL at 13:29

## 2025-02-25 RX ADMIN — SPIRONOLACTONE 25 MG: 25 TABLET ORAL at 09:20

## 2025-02-25 RX ADMIN — SODIUM CHLORIDE, PRESERVATIVE FREE 10 ML: 5 INJECTION INTRAVENOUS at 09:31

## 2025-02-25 RX ADMIN — SODIUM ZIRCONIUM CYCLOSILICATE 10 G: 10 POWDER, FOR SUSPENSION ORAL at 20:08

## 2025-02-25 RX ADMIN — IPRATROPIUM BROMIDE AND ALBUTEROL SULFATE 1 DOSE: .5; 2.5 SOLUTION RESPIRATORY (INHALATION) at 19:16

## 2025-02-25 RX ADMIN — WATER 40 MG: 1 INJECTION INTRAMUSCULAR; INTRAVENOUS; SUBCUTANEOUS at 09:20

## 2025-02-25 RX ADMIN — BUPRENORPHINE HYDROCHLORIDE AND NALOXONE HYDROCHLORIDE DIHYDRATE 1 TABLET: 8; 2 TABLET SUBLINGUAL at 09:20

## 2025-02-25 RX ADMIN — FUROSEMIDE 20 MG: 10 INJECTION, SOLUTION INTRAMUSCULAR; INTRAVENOUS at 09:20

## 2025-02-25 RX ADMIN — AMIODARONE HYDROCHLORIDE 100 MG: 200 TABLET ORAL at 09:19

## 2025-02-25 RX ADMIN — SACUBITRIL AND VALSARTAN 1 TABLET: 24; 26 TABLET, FILM COATED ORAL at 20:07

## 2025-02-25 RX ADMIN — IPRATROPIUM BROMIDE AND ALBUTEROL SULFATE 1 DOSE: .5; 2.5 SOLUTION RESPIRATORY (INHALATION) at 07:10

## 2025-02-25 RX ADMIN — APIXABAN 5 MG: 5 TABLET, FILM COATED ORAL at 20:07

## 2025-02-25 RX ADMIN — BUPRENORPHINE HYDROCHLORIDE AND NALOXONE HYDROCHLORIDE DIHYDRATE 1 TABLET: 8; 2 TABLET SUBLINGUAL at 20:07

## 2025-02-25 RX ADMIN — ATORVASTATIN CALCIUM 40 MG: 40 TABLET, FILM COATED ORAL at 20:07

## 2025-02-25 NOTE — PROGRESS NOTES
Patient called out saying \"I can't breathe\", RN to bedside observed SP02 82% on 5L HFNC. Encouraged patient to take slow/deep breaths (was taking short quick sniffs), gradually increased 02 until achieved SPO2 of 90% or better. Patient did not wear Bipap on long overnight and did not want to wear at this moment.

## 2025-02-25 NOTE — PROGRESS NOTES
Pulmonary & Critical Care Medicine ICU Progress Note    CC: PNA    Events of Last 24 hours:   BiPAP 12/8 50% - 2 hrs overnight   HFNC 10 L   Precedex off   No hemoptysis       Invasive Lines: PIVs - Right femoral cvc- removed 2/10    MV:  2/5/25-2/9        Intake/Output Summary (Last 24 hours) at 2/25/2025 0729  Last data filed at 2/25/2025 0436  Gross per 24 hour   Intake 240 ml   Output 1550 ml   Net -1310 ml       Vent Mode: AC/VC Resp Rate (Set): 22 bpm/Vt (Set, mL): 450 mL/ /FiO2 : 50 %  No results for input(s): \"PHART\", \"GXM4BNX\", \"PO2ART\" in the last 72 hours.      IV:   dextrose      sodium chloride 5 mL/hr at 02/10/25 0629       Vitals:  /68   Pulse 56   Temp 97.8 °F (36.6 °C) (Oral)   Resp 15   Ht 1.78 m (5' 10.08\")   Wt 52.8 kg (116 lb 6.5 oz)   SpO2 (!) 87%   BMI 16.66 kg/m²   on HFNC  Constitutional:  No acute distress. .   HEENT: no scleral icterus  Neck: No tracheal deviation present.    Cardiovascular: Normal heart sounds.   Pulmonary/Chest: Few wheezes. Few rhonchi. +  rales. No decreased breath sounds.  No accessory muscle usage or stridor.   Abdominal: Soft.   Musculoskeletal: No cyanosis. No clubbing.  Skin: Skin is warm and dry.        Scheduled Meds:   furosemide  20 mg IntraVENous Once    methylPREDNISolone  40 mg IntraVENous Daily    apixaban  5 mg Oral BID    famotidine  20 mg Oral BID    insulin lispro  0-8 Units SubCUTAneous Q4H    ipratropium 0.5 mg-albuterol 2.5 mg  1 Dose Inhalation BID RT    [Held by provider] metoprolol succinate  12.5 mg Oral Daily    amiodarone  100 mg Oral Daily    sacubitril-valsartan  1 tablet Oral BID    spironolactone  25 mg Oral Daily    atorvastatin  40 mg Oral Nightly    buprenorphine-naloxone  1 tablet SubLINGual BID    sodium chloride flush  5-40 mL IntraVENous 2 times per day     PRN Meds:  ipratropium 0.5 mg-albuterol 2.5 mg, prochlorperazine **OR** prochlorperazine, diazePAM, glucose, dextrose bolus **OR** dextrose bolus, glucagon    Follow up Cx   Hold Spironolactone   Lokelma packet 10 g TID   Lasix 40 mg IV x 1   Toprol XL on hold   Resumed Amiodarone - Monitor QTc 428  PT/OT   Blood sugar control ISS, with goal 150-180  GI prophylaxis: Pepcid  DVT prophylaxis: Eliquis resumed   MRSA prophylaxis: Bactroban  Full code

## 2025-02-25 NOTE — PROGRESS NOTES
RT Inhaler-Nebulizer Bronchodilator Protocol Note    There is a bronchodilator order in the chart from a provider indicating to follow the RT Bronchodilator Protocol and there is an “Initiate RT Inhaler-Nebulizer Bronchodilator Protocol” order as well (see protocol at bottom of note).    CXR Findings:  No results found.    The findings from the last RT Protocol Assessment were as follows:   History Pulmonary Disease: (P) Chronic pulmonary disease  Respiratory Pattern: (P) Dyspnea on exertion or RR 21-25 bpm  Breath Sounds: (P) Slightly diminished and/or crackles  Cough: (P) Strong, spontaneous, non-productive  Indication for Bronchodilator Therapy: (P) Decreased or absent breath sounds  Bronchodilator Assessment Score: (P) 6    Aerosolized bronchodilator medication orders have been revised according to the RT Inhaler-Nebulizer Bronchodilator Protocol below.    Respiratory Therapist to perform RT Therapy Protocol Assessment initially then follow the protocol.  Repeat RT Therapy Protocol Assessment PRN for score 0-3 or on second treatment, BID, and PRN for scores above 3.    No Indications - adjust the frequency to every 6 hours PRN wheezing or bronchospasm, if no treatments needed after 48 hours then discontinue using Per Protocol order mode.     If indication present, adjust the RT bronchodilator orders based on the Bronchodilator Assessment Score as indicated below.  Use Inhaler orders unless patient has one or more of the following: on home nebulizer, not able to hold breath for 10 seconds, is not alert and oriented, cannot activate and use MDI correctly, or respiratory rate 25 breaths per minute or more, then use the equivalent nebulizer order(s) with same Frequency and PRN reasons based on the score.  If a patient is on this medication at home then do not decrease Frequency below that used at home.    0-3 - enter or revise RT bronchodilator order(s) to equivalent RT Bronchodilator order with Frequency of every 4  hours PRN for wheezing or increased work of breathing using Per Protocol order mode.        4-6 - enter or revise RT Bronchodilator order(s) to two equivalent RT bronchodilator orders with one order with BID Frequency and one order with Frequency of every 4 hours PRN wheezing or increased work of breathing using Per Protocol order mode.        7-10 - enter or revise RT Bronchodilator order(s) to two equivalent RT bronchodilator orders with one order with TID Frequency and one order with Frequency of every 4 hours PRN wheezing or increased work of breathing using Per Protocol order mode.       11-13 - enter or revise RT Bronchodilator order(s) to one equivalent RT bronchodilator order with QID Frequency and an Albuterol order with Frequency of every 4 hours PRN wheezing or increased work of breathing using Per Protocol order mode.      Greater than 13 - enter or revise RT Bronchodilator order(s) to one equivalent RT bronchodilator order with every 4 hours Frequency and an Albuterol order with Frequency of every 2 hours PRN wheezing or increased work of breathing using Per Protocol order mode.       Electronically signed by Ernestina Cast RCP on 2/24/2025 at 7:16 PM

## 2025-02-25 NOTE — PROGRESS NOTES
Shift assessment completed (see flowsheet), VSS, patient appears comfortable on HFNC and verbalizes comfort. FSBS reviewed and ordered SSI dose given with scheduled meds. Patient provided snack (pudding, crackers and diet pepsi) per request. Discussed goal of patient being able to sleep this evening. Bed in low locked position, with 3/4 rails in place, call light, bedside table and urinal within reach. No other needs at this time.    BP (!) 114/59   Pulse 69   Temp 97.8 °F (36.6 °C) (Oral)   Resp 18   Ht 1.78 m (5' 10.08\")   Wt 52.8 kg (116 lb 6.5 oz)   SpO2 96%   BMI 16.66 kg/m²

## 2025-02-25 NOTE — PLAN OF CARE
Problem: Discharge Planning  Goal: Discharge to home or other facility with appropriate resources  2/25/2025 0429 by Hayley Lovell RN  Outcome: Progressing     Problem: Safety - Adult  Goal: Free from fall injury  2/25/2025 1228 by Chi Fletcher RN  Outcome: Progressing  2/25/2025 0429 by Hayley Lovell RN  Outcome: Progressing     Problem: Pain  Goal: Verbalizes/displays adequate comfort level or baseline comfort level  2/25/2025 1228 by Chi Fletcher RN  Outcome: Progressing  Flowsheets (Taken 2/16/2025 1753)  Verbalizes/displays adequate comfort level or baseline comfort level: Encourage patient to monitor pain and request assistance  Note: PAIN 0/10.   2/25/2025 0429 by Hayley Lovell RN  Outcome: Progressing     Problem: Skin/Tissue Integrity  Goal: Skin integrity remains intact  Description: 1.  Monitor for areas of redness and/or skin breakdown  2.  Assess vascular access sites hourly  3.  Every 4-6 hours minimum:  Change oxygen saturation probe site  4.  Every 4-6 hours:  If on nasal continuous positive airway pressure, respiratory therapy assess nares and determine need for appliance change or resting period  2/25/2025 1228 by Chi Fletcher RN  Outcome: Progressing  Flowsheets (Taken 2/25/2025 1228)  Skin Integrity Remains Intact: Monitor for areas of redness and/or skin breakdown  2/25/2025 0429 by Hayley Lovell RN  Outcome: Progressing  Flowsheets (Taken 2/25/2025 0429)  Skin Integrity Remains Intact: Monitor for areas of redness and/or skin breakdown     Problem: Respiratory - Adult  Goal: Achieves optimal ventilation and oxygenation  2/25/2025 1228 by Chi Fletcher RN  Note: Patient is on 10 L HFNC.   2/25/2025 0429 by Hayley Lovell RN  Outcome: Progressing     Problem: Cardiovascular - Adult  Goal: Maintains optimal cardiac output and hemodynamic stability  2/25/2025 1228 by Chi Fletcher RN  Outcome: Progressing  Flowsheets (Taken  2/24/2025 1203 by Gabriel Burton RN)  Maintains optimal cardiac output and hemodynamic stability: Monitor blood pressure and heart rate  2/25/2025 0429 by Hayley Lovell RN  Outcome: Progressing  Goal: Absence of cardiac dysrhythmias or at baseline  2/25/2025 0429 by Hayley Lovell RN  Outcome: Progressing     Problem: Skin/Tissue Integrity - Adult  Goal: Skin integrity remains intact  Description: 1.  Monitor for areas of redness and/or skin breakdown  2.  Assess vascular access sites hourly  3.  Every 4-6 hours minimum:  Change oxygen saturation probe site  4.  Every 4-6 hours:  If on nasal continuous positive airway pressure, respiratory therapy assess nares and determine need for appliance change or resting period  2/25/2025 1228 by Chi Fletcher RN  Outcome: Progressing  Flowsheets (Taken 2/25/2025 1228)  Skin Integrity Remains Intact: Monitor for areas of redness and/or skin breakdown  2/25/2025 0429 by Hayley Lovell RN  Outcome: Progressing  Flowsheets (Taken 2/25/2025 0429)  Skin Integrity Remains Intact: Monitor for areas of redness and/or skin breakdown  Goal: Incisions, wounds, or drain sites healing without S/S of infection  2/25/2025 0429 by Hayley Lovell RN  Outcome: Progressing  Flowsheets (Taken 2/25/2025 0429)  Incisions, Wounds, or Drain Sites Healing Without Sign and Symptoms of Infection: ADMISSION and DAILY: Assess and document risk factors for pressure ulcer development  Goal: Oral mucous membranes remain intact  2/25/2025 0429 by Hayley Lovell RN  Outcome: Progressing     Problem: Infection - Adult  Goal: Absence of infection at discharge  2/25/2025 0429 by Hayley Lovell RN  Outcome: Progressing  Goal: Absence of infection during hospitalization  2/25/2025 0429 by Hayley Lovell RN  Outcome: Progressing  Goal: Absence of fever/infection during anticipated neutropenic period  2/25/2025 0429 by Hayley Lovell RN  Outcome: Progressing     Problem:

## 2025-02-25 NOTE — CARE COORDINATION
Met with patient to discuss inpatient therapy options.    The patient states if he has to go to a nursing home he will. KAYLA advised that Dr. Villegas is advising that he does need to go to a nursing home.    Choices:    [facility] making referral.   Spoke with Merle/[facility]. [facility] does not take [facility].    2.   [facility] - [facility] for [facility] making referral for patient.  [facility] advised [facility] cannot take the patient's insurance.     3.   [facility] -  Referral to [facility]. Per Ye someone at [facility] knows the patient and feels he may be more comfortable at [facility]. KAYLA advised KAYLA will let the patient know. Ye will review the chart for both [facility] and [facility].     Received call from Ye/[facility]. [facility] is unable to accept the patient due to the patient's drug use 4 hours before being admitted. The patient will need to be drug free for 30 days.    Spoke with Flavio alejandro [place]. Unable to accept a patient that has used drugs for 1 year.    Spoke with Hugo/Tamika alejandro [facility] to make referral. Hugo will look at the patient and if they can accept will do a bedside visit to discuss the rules of the facility concerning drug use with the patient.

## 2025-02-25 NOTE — PROGRESS NOTES
Inpatient Physical Therapy Treatment Attempt    Pt chart reviewed.  RN contacted and okayed for therapy session.  Upon arrival, pt sleeping in high fowlers in bed.  When roused, pt declining therapy services stating he needs to sleep/rest at this time.  Extensive education/discussion was provided regarding importance of mobility and participation in therapy services as pt is planning to pursue more rehab upon discharge.  This therapist provided multiple options for today's therapy session including beginning with supine exercises to help warm up/wake up, edge of bed activities, or transferring to bedside chair, or ambulating around the bed (as pt mentioned briefly his need/desire to walk).  Pt ultimately still declining as he states he needs to sleep right now.  Will re-attempt PT treatment as pt status and therapist schedule allows.        No charge.     Sola Estrada, PT, DPT   #[phone]

## 2025-02-25 NOTE — PROGRESS NOTES
HEART FAILURE CARE PLAN:    Comorbidities Reviewed: Yes   Patient has a past medical history of Back pain, chronic, Chronic back pain, Hypertension, MI (myocardial infarction) (HCC), and MRSA (methicillin resistant staph aureus) culture positive.     Weights Reviewed: Yes   Admission weight: 64.3 kg (141 lb 12.8 oz)   Wt Readings from Last 3 Encounters:   02/21/25 52.8 kg (116 lb 6.5 oz)   02/01/25 62.3 kg (137 lb 4.8 oz)   07/09/24 56.3 kg (124 lb 1.6 oz)     Intake & Output Reviewed: Yes     Intake/Output Summary (Last 24 hours) at 2/25/2025 0516  Last data filed at 2/25/2025 0436  Gross per 24 hour   Intake 360 ml   Output 1900 ml   Net -1540 ml       ECHOCARDIOGRAM Reviewed: Yes   Patient's Ejection Fraction (EF) is  less than or equal to 40%.   Medications Reviewed: Yes   SCHEDULED HOSPITAL MEDICATIONS:   methylPREDNISolone  40 mg IntraVENous Daily    apixaban  5 mg Oral BID    famotidine  20 mg Oral BID    insulin lispro  0-8 Units SubCUTAneous Q4H    ipratropium 0.5 mg-albuterol 2.5 mg  1 Dose Inhalation BID RT    [Held by provider] metoprolol succinate  12.5 mg Oral Daily    amiodarone  100 mg Oral Daily    sacubitril-valsartan  1 tablet Oral BID    [Held by provider] spironolactone  25 mg Oral Daily    atorvastatin  40 mg Oral Nightly    buprenorphine-naloxone  1 tablet SubLINGual BID    sodium chloride flush  5-40 mL IntraVENous 2 times per day     HOME MEDICATIONS:  Prior to Admission medications    Medication Sig Start Date End Date Taking? Authorizing Provider   amiodarone (CORDARONE) 200 MG tablet 2 bid - 5 days, 2 qd- 1 week, 1 qd thereafter 1/31/25   Brittany Shin MD   empagliflozin (JARDIANCE) 10 MG tablet Take 1 tablet by mouth daily 1/31/25   Brittany Shin MD   apixaban (ELIQUIS) 5 MG TABS tablet Take 1 tablet by mouth 2 times daily 1/31/25   Brittany Shin MD   metoprolol succinate (TOPROL XL) 25 MG extended release tablet Take 0.5 tablets by mouth daily  2/1/25   Brittany Shin MD   sacubitril-valsartan (ENTRESTO) 24-26 MG per tablet Take 0.5 tablets by mouth 2 times daily 1/31/25   Brittany Shin MD   predniSONE (DELTASONE) 20 MG tablet 2 qd- 2 days, 1 1/2 qd- 3 days, 1 qd- 3 days, 1/2 qd- 3 days 2/1/25   Brittany Shin MD   pantoprazole (PROTONIX) 40 MG tablet Take 1 tablet by mouth 2 times daily (before meals) 1/31/25   Brittany Shin MD   aspirin 81 MG EC tablet Take 1 tablet by mouth daily (Always take with food) 7/10/24   Andrade Acosta MD   spironolactone (ALDACTONE) 25 MG tablet Take 1 tablet by mouth daily Hold for SBP<95 mmHg 7/10/24   Andrade Acosta MD   furosemide (LASIX) 20 MG tablet Take 1 tablet by mouth daily 7/10/24   Andrade Acosta MD   Buprenorphine HCl-Naloxone HCl (SUBOXONE SL) Place 20 mg under the tongue daily Pt takes (2) 8 mg tablets and (2) 2 mg tablets daily in the morning    Provider, MD Dirk      Diet Reviewed: Yes   ADULT DIET; Regular; Low Sodium (2 gm)  ADULT ORAL NUTRITION SUPPLEMENT; Breakfast, Lunch, Dinner; Standard High Calorie/High Protein Oral Supplement    Goal of Care Reviewed: Yes   Patient and/or Family's stated Goal of Care this Admission: Reduce shortness of breath, increase activity tolerance, better understand heart failure and disease management, and be more comfortable prior to discharge.     Electronically signed by Hayley Lovell RN on 2/25/2025 at 5:16 AM

## 2025-02-25 NOTE — PROGRESS NOTES
02/24/25 2227   NIV Type   NIV Started/Stopped On   Equipment Type v60   Mode Bilevel   Mask Type Full face mask   Mask Size Medium   Assessment   Pulse 69   Respirations 25   SpO2 93 %   Comfort Level Good   Using Accessory Muscles No   Mask Compliance Good   Skin Assessment Clean, dry, & intact   Skin Protection for O2 Device Yes   Orientation Middle   Location Nose   Intervention(s) Skin Barrier   Breath Sounds   Respiratory Pattern Regular   Right Upper Lobe Diminished   Right Middle Lobe Diminished   Right Lower Lobe Diminished   Left Upper Lobe Diminished   Left Lower Lobe Diminished   Settings/Measurements   PIP Observed 14 cm H20   IPAP 12 cmH20   CPAP/EPAP 8 cmH2O   Vt (Measured) 765 mL   Rate Ordered 18   Insp Rise Time (%) 3 %   FiO2  50 %   I Time/ I Time % 1 s   Minute Volume (L/min) 21.9 Liters   Mask Leak (lpm) 9 lpm   Patient's Home Machine No   Alarm Settings   Alarms On Y   Low Pressure (cmH2O) 5 cmH2O   High Pressure (cmH2O) 30 cmH2O   Apnea (secs) 20 secs   RR Low (bpm) 12   RR High (bpm) 40 br/min

## 2025-02-25 NOTE — PROGRESS NOTES
Progress Note    Admit Date:  2/5/2025     59 y.o. male who presented to [facility] with septic shock.  PMHx significant for polysubstance use-methamphetamine, A-fib, valvular disease, on Eliquis on amiodarone, MRSA, cardiomyopathy       Initially admitted on vent support with pneumonia, septic shock,  -s/p extubation but remains on vapotherm for severe persistent hypoxia     2/14 Agitation yesterday needing code violet, started on precedex gtt and now off         2/17- Worsening  hypoxia again , today on 100 % FIo2 and 30 L vapotherm     2/21  used bipap last night, now on 50 % fio2 and 25 L vapotherm  Slowly improving hypoxia     2/22: he is on Vapo therm. Plans to wean to NC oxygen.  BP soft, Entresto was held.    2/23  Improvement on CXR yesterday.  Hypoxia continues to improve 20L and 40% today.    2/24- doing better. I weaned him to FiO2 of 35% on vapo therm. Used Bipap last night. Overall improving.     2/25- doing better. Off Vapo therm. On 7 L of oxygen.     Objective:   Patient Vitals for the past 4 hrs:   BP Temp Temp src Pulse Resp SpO2   02/25/25 1200 116/70 97.2 °F (36.2 °C) Temporal 73 24 94 %   02/25/25 1100 (!) 108/49 -- -- 65 16 99 %   02/25/25 1000 127/70 -- -- 60 17 100 %   02/25/25 0950 -- -- -- -- 24 --   02/25/25 0919 (!) 130/57 -- -- 66 -- --   02/25/25 0900 (!) 130/57 -- -- 73 24 90 %          Intake/Output Summary (Last 24 hours) at 2/25/2025 1244  Last data filed at 2/25/2025 1200  Gross per 24 hour   Intake 480 ml   Output 1675 ml   Net -1195 ml       Physical Exam:    General:  middle aged male, up in bed  Calm and cooperative today  Awake, alert and oriented. Appears to be not in any distress  Mucous Membranes:  Pink , anicteric  Neck: No JVD, no carotid bruit, no thyromegaly  Chest: improving leatha air entry. He has crackles both lungs  Cardiovascular:  RRR S1S2 heard, no murmurs or gallops  Abdomen:  Soft, undistended, non tender, no organomegaly, BS present  Extremities: No edema  examination.         XR CHEST PORTABLE   Final Result   1. Dense pneumonic infiltrates in the right lower lung field and in the left   mid and lower lung field, with or without atelectatic changes.  These   findings are mostly new as compared to previous portable chest x-ray.   2. Minimal bilateral pleural effusions or pleural thickening at the CP angles.   3. Minimal pulmonary vascular prominence.               Assessment/Plan:    # Acute hypoxic and hypercapnic respiratory failure secondary to multifocal pneumonia-   cannot rule out aspirational given imaging findings   - intubated in ER  on 2/5, and was on mechanical ventilation  -Patient remained severely hypoxic needing  paralysis  and proned ventilation initially and eventually improved   -given zyvox , cefepime and flagyl  - BP improved , , wbc better - off paralysis  -extubated 2/9 but remains was on 100 % Vapotherm  with  worsening hypoxemia noted, lasix added with no benefit  - restarted steroids   - bronch cultures with NRF, continued  on zosyn  - pulmonary managing  -pt initially refused re intubation but then agreed.   - palliative care consulted  - he is off vapotherm and is on  nasal cannula.  He is agreeable to going to a SNF       # Septic shock secondary to multifocal pneumonia  - leatha pna on imaging and wbc upto 47>70 K >10  -Blood cultures remain neg  -given empiric linezolid and cefepime as well as Flagyl  -Stress dose steroids with Solu-Cortef given and now off   -was hypotensive  and was on  Levophed and vasopressin   - BP improved and off pressors.   - - BAL with NRF   - Then on Zosyn as above  - stopped 2/20.     #Severe acidemia: Metabolic and respiratory acidosis:  -was On bicarb  drip and resolved      # ARF - sec to sepitc shock and hypotension  - hyperkalemic and acidotic , on bicarb fluids -given lokelma improved  - UOP maintained    - resolved      #Atrial fibrillation: On Eliquis  #NSTEMI, likely type II secondary to septic

## 2025-02-25 NOTE — PLAN OF CARE
Problem: Discharge Planning  Goal: Discharge to home or other facility with appropriate resources  Outcome: Progressing     Problem: Safety - Adult  Goal: Free from fall injury  Outcome: Progressing     Problem: Pain  Goal: Verbalizes/displays adequate comfort level or baseline comfort level  Outcome: Progressing     Problem: Skin/Tissue Integrity  Goal: Skin integrity remains intact  Description: 1.  Monitor for areas of redness and/or skin breakdown  2.  Assess vascular access sites hourly  3.  Every 4-6 hours minimum:  Change oxygen saturation probe site  4.  Every 4-6 hours:  If on nasal continuous positive airway pressure, respiratory therapy assess nares and determine need for appliance change or resting period  Outcome: Progressing  Flowsheets (Taken 2/25/2025 0429)  Skin Integrity Remains Intact: Monitor for areas of redness and/or skin breakdown     Problem: Respiratory - Adult  Goal: Achieves optimal ventilation and oxygenation  Outcome: Progressing     Problem: Cardiovascular - Adult  Goal: Maintains optimal cardiac output and hemodynamic stability  Outcome: Progressing  Goal: Absence of cardiac dysrhythmias or at baseline  Outcome: Progressing     Problem: Skin/Tissue Integrity - Adult  Goal: Skin integrity remains intact  Description: 1.  Monitor for areas of redness and/or skin breakdown  2.  Assess vascular access sites hourly  3.  Every 4-6 hours minimum:  Change oxygen saturation probe site  4.  Every 4-6 hours:  If on nasal continuous positive airway pressure, respiratory therapy assess nares and determine need for appliance change or resting period  Outcome: Progressing  Flowsheets (Taken 2/25/2025 0429)  Skin Integrity Remains Intact: Monitor for areas of redness and/or skin breakdown  Goal: Incisions, wounds, or drain sites healing without S/S of infection  Outcome: Progressing  Flowsheets (Taken 2/25/2025 0429)  Incisions, Wounds, or Drain Sites Healing Without Sign and Symptoms of Infection:  skills, available support systems and cultural and spiritual values  2. Provide emotional support, including active listening and acknowledgement of concerns of patient and caregivers  3. Reduce environmental stimuli, as able  4. Instruct patient/family in relaxation techniques, as appropriate  5. Assess for spiritual pain/suffering and initiate Spiritual Care, Psychosocial Clinical Specialist consults as needed  Outcome: Progressing     Problem: Chronic Conditions and Co-morbidities  Goal: Patient's chronic conditions and co-morbidity symptoms are monitored and maintained or improved  Outcome: Progressing     Problem: Neurosensory - Adult  Goal: Achieves stable or improved neurological status  Outcome: Progressing  Goal: Achieves maximal functionality and self care  Outcome: Progressing     Problem: Musculoskeletal - Adult  Goal: Return mobility to safest level of function  Outcome: Progressing  Goal: Return ADL status to a safe level of function  Outcome: Progressing

## 2025-02-25 NOTE — PROGRESS NOTES
RN responding to call light and to do reassessment - patient observed holding BiPAP mask to his face with the top head strap off. Patient requesting to take mask off. Patient says he is having trouble sleeping despite PRN Valium given prior to mask placement. RN discussed giving patient a break and will see if patient is willing to try again later. Patient requesting refreshment at this time. Sugar free pudding and crackers provided per request. No other needs at this time.   Medical Necessity Information: It is in your best interest to select a reason for this procedure from the list below. All of these items fulfill various CMS LCD requirements except the new and changing color options. Medical Necessity Clause: This procedure was medically necessary because the lesion that was treated was: Lab: [id number] Lab Facility: 0 Detail Level: Detailed Was A Bandage Applied: Yes Size Of Lesion In Cm (Required): 1 Depth Of Shave: dermis Biopsy Method: 15 blade Anesthesia Type: 2% lidocaine without epinephrine Hemostasis: Electrocautery Wound Care: Petrolatum Render Path Notes In Note?: No Consent: Verbal consent was obtained from the patient. The risks and benefits to therapy were discussed in detail. Specifically, the risks of infection, scarring, bleeding, and recurrence. Post-Care Instructions: I reviewed with the patient in detail post-care instructions. Patient is to keep the biopsy site dry overnight, and then apply bacitracin twice daily until healed. Patient may apply hydrogen peroxide soaks to remove any crusting. Notification Instructions: Patient will be notified of pathology results. However, patient instructed to call the office if not contacted within 2 weeks. Billing Type: Third-Party Bill

## 2025-02-25 NOTE — PROGRESS NOTES
Shift handoff report given to Hayley POTTER at bedside.   Pt is Awake and alert  IV handoff completed.  Call light within reach, bed in lowest position, bed alarm on. End of shift checks completed.    Pt has been free of falls for duration of shift. .

## 2025-02-26 LAB
ANION GAP SERPL CALCULATED.3IONS-SCNC: 9 MMOL/L (ref 3–16)
ANISOCYTOSIS BLD QL SMEAR: ABNORMAL
BASOPHILS # BLD: 0 K/UL (ref 0–0.2)
BASOPHILS NFR BLD: 0 %
BUN SERPL-MCNC: 35 MG/DL (ref 7–20)
CALCIUM SERPL-MCNC: 8.1 MG/DL (ref 8.3–10.6)
CHLORIDE SERPL-SCNC: 99 MMOL/L (ref 99–110)
CO2 SERPL-SCNC: 29 MMOL/L (ref 21–32)
CREAT SERPL-MCNC: 0.7 MG/DL (ref 0.9–1.3)
DEPRECATED RDW RBC AUTO: 13.8 % (ref 12.4–15.4)
EOSINOPHIL # BLD: 0 K/UL (ref 0–0.6)
EOSINOPHIL NFR BLD: 0 %
GFR SERPLBLD CREATININE-BSD FMLA CKD-EPI: >90 ML/MIN/{1.73_M2}
GLUCOSE BLD-MCNC: 118 MG/DL (ref 70–99)
GLUCOSE BLD-MCNC: 182 MG/DL (ref 70–99)
GLUCOSE BLD-MCNC: 194 MG/DL (ref 70–99)
GLUCOSE BLD-MCNC: 79 MG/DL (ref 70–99)
GLUCOSE SERPL-MCNC: 90 MG/DL (ref 70–99)
HCT VFR BLD AUTO: 35.8 % (ref 40.5–52.5)
HGB BLD-MCNC: 11.9 G/DL (ref 13.5–17.5)
LYMPHOCYTES # BLD: 1.3 K/UL (ref 1–5.1)
LYMPHOCYTES NFR BLD: 8 %
MCH RBC QN AUTO: 30.1 PG (ref 26–34)
MCHC RBC AUTO-ENTMCNC: 33.1 G/DL (ref 31–36)
MCV RBC AUTO: 90.8 FL (ref 80–100)
METAMYELOCYTES NFR BLD MANUAL: 2 %
MONOCYTES # BLD: 1 K/UL (ref 0–1.3)
MONOCYTES NFR BLD: 6 %
MYELOCYTES NFR BLD MANUAL: 6 %
NEUTROPHILS # BLD: 14.3 K/UL (ref 1.7–7.7)
NEUTROPHILS NFR BLD: 74 %
NEUTS BAND NFR BLD MANUAL: 4 % (ref 0–7)
PERFORMED ON: ABNORMAL
PERFORMED ON: NORMAL
PLATELET # BLD AUTO: 325 K/UL (ref 135–450)
PLATELET BLD QL SMEAR: ADEQUATE
PMV BLD AUTO: 8.7 FL (ref 5–10.5)
POIKILOCYTOSIS BLD QL SMEAR: ABNORMAL
POTASSIUM SERPL-SCNC: 4.8 MMOL/L (ref 3.5–5.1)
RBC # BLD AUTO: 3.95 M/UL (ref 4.2–5.9)
SLIDE REVIEW: ABNORMAL
SODIUM SERPL-SCNC: 137 MMOL/L (ref 136–145)
WBC # BLD AUTO: 16.6 K/UL (ref 4–11)

## 2025-02-26 PROCEDURE — 6370000000 HC RX 637 (ALT 250 FOR IP): Performed by: INTERNAL MEDICINE

## 2025-02-26 PROCEDURE — 97110 THERAPEUTIC EXERCISES: CPT

## 2025-02-26 PROCEDURE — 6360000002 HC RX W HCPCS: Performed by: INTERNAL MEDICINE

## 2025-02-26 PROCEDURE — 80048 BASIC METABOLIC PNL TOTAL CA: CPT

## 2025-02-26 PROCEDURE — 94669 MECHANICAL CHEST WALL OSCILL: CPT

## 2025-02-26 PROCEDURE — 99233 SBSQ HOSP IP/OBS HIGH 50: CPT | Performed by: INTERNAL MEDICINE

## 2025-02-26 PROCEDURE — 99232 SBSQ HOSP IP/OBS MODERATE 35: CPT | Performed by: INTERNAL MEDICINE

## 2025-02-26 PROCEDURE — 2700000000 HC OXYGEN THERAPY PER DAY

## 2025-02-26 PROCEDURE — 2060000000 HC ICU INTERMEDIATE R&B

## 2025-02-26 PROCEDURE — 94640 AIRWAY INHALATION TREATMENT: CPT

## 2025-02-26 PROCEDURE — 97535 SELF CARE MNGMENT TRAINING: CPT

## 2025-02-26 PROCEDURE — 94660 CPAP INITIATION&MGMT: CPT

## 2025-02-26 PROCEDURE — 36415 COLL VENOUS BLD VENIPUNCTURE: CPT

## 2025-02-26 PROCEDURE — 85025 COMPLETE CBC W/AUTO DIFF WBC: CPT

## 2025-02-26 PROCEDURE — 76937 US GUIDE VASCULAR ACCESS: CPT

## 2025-02-26 PROCEDURE — 2500000003 HC RX 250 WO HCPCS: Performed by: INTERNAL MEDICINE

## 2025-02-26 PROCEDURE — 97530 THERAPEUTIC ACTIVITIES: CPT

## 2025-02-26 PROCEDURE — 94761 N-INVAS EAR/PLS OXIMETRY MLT: CPT

## 2025-02-26 RX ORDER — PREDNISONE 20 MG/1
40 TABLET ORAL DAILY
Status: DISCONTINUED | OUTPATIENT
Start: 2025-02-27 | End: 2025-02-26

## 2025-02-26 RX ORDER — FUROSEMIDE 10 MG/ML
40 INJECTION INTRAMUSCULAR; INTRAVENOUS ONCE
Status: COMPLETED | OUTPATIENT
Start: 2025-02-26 | End: 2025-02-26

## 2025-02-26 RX ADMIN — FAMOTIDINE 20 MG: 20 TABLET, FILM COATED ORAL at 08:56

## 2025-02-26 RX ADMIN — WATER 40 MG: 1 INJECTION INTRAMUSCULAR; INTRAVENOUS; SUBCUTANEOUS at 08:57

## 2025-02-26 RX ADMIN — IPRATROPIUM BROMIDE AND ALBUTEROL SULFATE 1 DOSE: .5; 2.5 SOLUTION RESPIRATORY (INHALATION) at 18:36

## 2025-02-26 RX ADMIN — SODIUM CHLORIDE, PRESERVATIVE FREE 10 ML: 5 INJECTION INTRAVENOUS at 20:32

## 2025-02-26 RX ADMIN — IPRATROPIUM BROMIDE AND ALBUTEROL SULFATE 1 DOSE: .5; 2.5 SOLUTION RESPIRATORY (INHALATION) at 07:17

## 2025-02-26 RX ADMIN — ATORVASTATIN CALCIUM 40 MG: 40 TABLET, FILM COATED ORAL at 20:32

## 2025-02-26 RX ADMIN — FUROSEMIDE 40 MG: 10 INJECTION, SOLUTION INTRAMUSCULAR; INTRAVENOUS at 08:56

## 2025-02-26 RX ADMIN — AMIODARONE HYDROCHLORIDE 100 MG: 200 TABLET ORAL at 08:56

## 2025-02-26 RX ADMIN — APIXABAN 5 MG: 5 TABLET, FILM COATED ORAL at 08:56

## 2025-02-26 RX ADMIN — BUPRENORPHINE HYDROCHLORIDE AND NALOXONE HYDROCHLORIDE DIHYDRATE 1 TABLET: 8; 2 TABLET SUBLINGUAL at 20:32

## 2025-02-26 RX ADMIN — FAMOTIDINE 20 MG: 20 TABLET, FILM COATED ORAL at 20:32

## 2025-02-26 RX ADMIN — INSULIN LISPRO 2 UNITS: 100 INJECTION, SOLUTION INTRAVENOUS; SUBCUTANEOUS at 20:32

## 2025-02-26 RX ADMIN — SACUBITRIL AND VALSARTAN 1 TABLET: 24; 26 TABLET, FILM COATED ORAL at 20:32

## 2025-02-26 RX ADMIN — SODIUM ZIRCONIUM CYCLOSILICATE 10 G: 10 POWDER, FOR SUSPENSION ORAL at 08:57

## 2025-02-26 RX ADMIN — DIAZEPAM 5 MG: 5 TABLET ORAL at 22:36

## 2025-02-26 RX ADMIN — SODIUM CHLORIDE, PRESERVATIVE FREE 10 ML: 5 INJECTION INTRAVENOUS at 08:57

## 2025-02-26 RX ADMIN — DIAZEPAM 5 MG: 5 TABLET ORAL at 13:43

## 2025-02-26 RX ADMIN — INSULIN LISPRO 2 UNITS: 100 INJECTION, SOLUTION INTRAVENOUS; SUBCUTANEOUS at 17:04

## 2025-02-26 RX ADMIN — SACUBITRIL AND VALSARTAN 1 TABLET: 24; 26 TABLET, FILM COATED ORAL at 08:56

## 2025-02-26 RX ADMIN — BUPRENORPHINE HYDROCHLORIDE AND NALOXONE HYDROCHLORIDE DIHYDRATE 1 TABLET: 8; 2 TABLET SUBLINGUAL at 08:56

## 2025-02-26 RX ADMIN — APIXABAN 5 MG: 5 TABLET, FILM COATED ORAL at 20:32

## 2025-02-26 NOTE — PLAN OF CARE
Problem: Safety - Adult  Goal: Free from fall injury  2/25/2025 2228 by Halyey Lovell RN  Outcome: Progressing  2/25/2025 1228 by Chi Fletcher, RN  Outcome: Progressing     Problem: Skin/Tissue Integrity  Goal: Skin integrity remains intact  Description: 1.  Monitor for areas of redness and/or skin breakdown  2.  Assess vascular access sites hourly  3.  Every 4-6 hours minimum:  Change oxygen saturation probe site  4.  Every 4-6 hours:  If on nasal continuous positive airway pressure, respiratory therapy assess nares and determine need for appliance change or resting period  2/25/2025 2228 by Hayley Lovell, RN  Outcome: Progressing  Flowsheets (Taken 2/25/2025 2227)  Skin Integrity Remains Intact: Monitor for areas of redness and/or skin breakdown  2/25/2025 1228 by Chi Fletcher, RN  Outcome: Progressing  Flowsheets (Taken 2/25/2025 1228)  Skin Integrity Remains Intact: Monitor for areas of redness and/or skin breakdown     Problem: Respiratory - Adult  Goal: Achieves optimal ventilation and oxygenation  2/25/2025 2228 by Hayley Lovell, RN  Outcome: Progressing  2/25/2025 1228 by Chi Fletcher, RN  Note: Patient is on 10 L HFNC.      Problem: Anxiety  Goal: Will report anxiety at manageable levels  Description: INTERVENTIONS:  1. Administer medication as ordered  2. Teach and rehearse alternative coping skills  3. Provide emotional support with 1:1 interaction with staff  Outcome: Progressing

## 2025-02-26 NOTE — PROGRESS NOTES
02/25/25 4081   NIV Type   NIV Started/Stopped On   Equipment Type v60   Mode Bilevel   Mask Type Full face mask   Mask Size Medium   Assessment   Pulse 67   Respirations 19   SpO2 100 %   Comfort Level Good   Using Accessory Muscles No   Mask Compliance Good   Skin Assessment Clean, dry, & intact   Skin Protection for O2 Device Yes   Orientation Middle   Location Nose   Intervention(s) Skin Barrier   Breath Sounds   Respiratory Pattern Regular   Right Upper Lobe Diminished   Right Middle Lobe Diminished   Right Lower Lobe Diminished   Left Upper Lobe Diminished   Left Lower Lobe Diminished   Settings/Measurements   PIP Observed 12 cm H20   IPAP 12 cmH20   CPAP/EPAP 8 cmH2O   Vt (Measured) 735 mL   Rate Ordered 18   Insp Rise Time (%) 3 %   FiO2  50 %   I Time/ I Time % 1 s   Minute Volume (L/min) 18.7 Liters   Mask Leak (lpm) 12 lpm   Patient's Home Machine No   Alarm Settings   Alarms On Y   Low Pressure (cmH2O) 5 cmH2O   High Pressure (cmH2O) 30 cmH2O   Apnea (secs) 20 secs   RR Low (bpm) 12   RR High (bpm) 40 br/min

## 2025-02-26 NOTE — PROGRESS NOTES
RT Inhaler-Nebulizer Bronchodilator Protocol Note    There is a bronchodilator order in the chart from a provider indicating to follow the RT Bronchodilator Protocol and there is an “Initiate RT Inhaler-Nebulizer Bronchodilator Protocol” order as well (see protocol at bottom of note).    CXR Findings:  No results found.    The findings from the last RT Protocol Assessment were as follows:   History Pulmonary Disease: (P) Chronic pulmonary disease  Respiratory Pattern: (P) Dyspnea on exertion or RR 21-25 bpm  Breath Sounds: (P) Slightly diminished and/or crackles  Cough: (P) Strong, spontaneous, non-productive  Indication for Bronchodilator Therapy: (P) Decreased or absent breath sounds  Bronchodilator Assessment Score: (P) 6    Aerosolized bronchodilator medication orders have been revised according to the RT Inhaler-Nebulizer Bronchodilator Protocol below.    Respiratory Therapist to perform RT Therapy Protocol Assessment initially then follow the protocol.  Repeat RT Therapy Protocol Assessment PRN for score 0-3 or on second treatment, BID, and PRN for scores above 3.    No Indications - adjust the frequency to every 6 hours PRN wheezing or bronchospasm, if no treatments needed after 48 hours then discontinue using Per Protocol order mode.     If indication present, adjust the RT bronchodilator orders based on the Bronchodilator Assessment Score as indicated below.  Use Inhaler orders unless patient has one or more of the following: on home nebulizer, not able to hold breath for 10 seconds, is not alert and oriented, cannot activate and use MDI correctly, or respiratory rate 25 breaths per minute or more, then use the equivalent nebulizer order(s) with same Frequency and PRN reasons based on the score.  If a patient is on this medication at home then do not decrease Frequency below that used at home.    0-3 - enter or revise RT bronchodilator order(s) to equivalent RT Bronchodilator order with Frequency of every 4  hours PRN for wheezing or increased work of breathing using Per Protocol order mode.        4-6 - enter or revise RT Bronchodilator order(s) to two equivalent RT bronchodilator orders with one order with BID Frequency and one order with Frequency of every 4 hours PRN wheezing or increased work of breathing using Per Protocol order mode.        7-10 - enter or revise RT Bronchodilator order(s) to two equivalent RT bronchodilator orders with one order with TID Frequency and one order with Frequency of every 4 hours PRN wheezing or increased work of breathing using Per Protocol order mode.       11-13 - enter or revise RT Bronchodilator order(s) to one equivalent RT bronchodilator order with QID Frequency and an Albuterol order with Frequency of every 4 hours PRN wheezing or increased work of breathing using Per Protocol order mode.      Greater than 13 - enter or revise RT Bronchodilator order(s) to one equivalent RT bronchodilator order with every 4 hours Frequency and an Albuterol order with Frequency of every 2 hours PRN wheezing or increased work of breathing using Per Protocol order mode.     Electronically signed by Ernestina Cast RCP on 2/25/2025 at 7:21 PM

## 2025-02-26 NOTE — PROGRESS NOTES
[facility] Vascular Access  Ultrasound Guided Peripheral Insertion Procedure Note.     Dayton Soto   Admitted- 2/5/2025  8:47 AM  Admission diagnosis- Acute respiratory failure (HCC) [J96.00]  Methamphetamine use (HCC) [F15.10]  Elevated troponin [R79.89]  Acute respiratory failure with hypoxia (HCC) [J96.01]  Multifocal pneumonia [J18.9]      Attending Physician- Willie Navarrete MD  Ordering Physician- Dr. Amish Richard  Indication for Insertion: Limited Access    USG PIV placed to left forearm using sterile technique. Brisk blood return noted, line flushes with ease. Patient tolerated well. Bed returned to lowest position, call light within reach. See US images below.        Lisset Carnes RN  Vascular Access Team

## 2025-02-26 NOTE — PROGRESS NOTES
Pulmonary & Critical Care Medicine ICU Progress Note    CC: PNA    Events of Last 24 hours:   BiPAP 12/8 50% - 1.5 hrs overnight   HFNC 10---> 8  L   Precedex off   No hemoptysis       Invasive Lines: PIVs - Right femoral cvc- removed 2/10    MV:  2/5/25-2/9        Intake/Output Summary (Last 24 hours) at 2/26/2025 0720  Last data filed at 2/26/2025 0600  Gross per 24 hour   Intake 840 ml   Output 2075 ml   Net -1235 ml       Vent Mode: AC/VC Resp Rate (Set): 22 bpm/Vt (Set, mL): 450 mL/ /FiO2 : 50 %  No results for input(s): \"PHART\", \"YMH6SWD\", \"PO2ART\" in the last 72 hours.      IV:   dextrose      sodium chloride 5 mL/hr at 02/10/25 0629       Vitals:  /62   Pulse 67   Temp 97.9 °F (36.6 °C) (Oral)   Resp 19   Ht 1.78 m (5' 10.08\")   Wt 52.3 kg (115 lb 4.8 oz)   SpO2 98%   BMI 16.51 kg/m²   on HFNC  Constitutional:  No acute distress. .   HEENT: no scleral icterus  Neck: No tracheal deviation present.    Cardiovascular: Normal heart sounds.   Pulmonary/Chest: No wheezes. Few rhonchi. +  rales. No decreased breath sounds.  No accessory muscle usage or stridor.   Abdominal: Soft.   Musculoskeletal: No cyanosis. No clubbing.  Skin: Skin is warm and dry.        Scheduled Meds:   furosemide  40 mg IntraVENous Once    sodium zirconium cyclosilicate  10 g Oral TID    methylPREDNISolone  40 mg IntraVENous Daily    apixaban  5 mg Oral BID    famotidine  20 mg Oral BID    insulin lispro  0-8 Units SubCUTAneous Q4H    ipratropium 0.5 mg-albuterol 2.5 mg  1 Dose Inhalation BID RT    [Held by provider] metoprolol succinate  12.5 mg Oral Daily    amiodarone  100 mg Oral Daily    sacubitril-valsartan  1 tablet Oral BID    [Held by provider] spironolactone  25 mg Oral Daily    atorvastatin  40 mg Oral Nightly    buprenorphine-naloxone  1 tablet SubLINGual BID    sodium chloride flush  5-40 mL IntraVENous 2 times per day     PRN Meds:  diazePAM, ipratropium 0.5 mg-albuterol 2.5 mg, prochlorperazine **OR**  day   Inhaled bronchodilators  Solumedrol 40 IV daily ---> Prednisone 30 mg po daily   Completed Zosyn IV D#7, Augmentin D#2, completed Levaquin D#3, completed Zyvox, cefepime and Flagyl for 3 days.   Follow up Cx   Hold Spironolactone   Lasix 40 mg IV x 1   Toprol XL on hold   Resumed Amiodarone - Monitor QTc 428  PT/OT   Blood sugar control ISS, with goal 150-180  GI prophylaxis: Pepcid  DVT prophylaxis: Eliquis resumed   MRSA prophylaxis: Bactroban  Full code   PCU

## 2025-02-26 NOTE — PROGRESS NOTES
Inpatient Occupational Therapy Treatment    Unit: ICU  Date:  2/26/2025  Patient Name:    Dayton Soto  Admitting diagnosis:  Acute respiratory failure (HCC) [J96.00]  Methamphetamine use (HCC) [F15.10]  Elevated troponin [R79.89]  Acute respiratory failure with hypoxia (HCC) [J96.01]  Multifocal pneumonia [J18.9]  Admit Date:  2/5/2025  Precautions/Restrictions/WB Status/ Lines/ Wounds/ Oxygen: Fall risk, Bed/chair alarm, Lines (IV and Supplemental O2 (5L)), Telemetry, Continuous pulse oximetry, Telesitter, and Isolation Precautions: Contact    Treatment Time:  6987-8131  Treatment Number:  3  Timed Code Treatment Minutes: 40 minutes  Total Treatment Minutes:  40 minutes    Patient Goals for Therapy: \"go home with my sister\"          Discharge Recommendations: SNF or LTACH per medical team  DME needs for discharge: Defer to facility       Therapy recommendations for staff:   Assist of 1 for stand-pivot transfers with rolling walker (RW) and gait belt to/from BSC  to/from chair    History of Present Illness: Per Dr. Palacio H&P 2/5/25:  \"59 y.o. male who presented to [facility] with septic shock.  PMHx significant for polysubstance use-methamphetamine, A-fib, valvular disease, on Eliquis on amiodarone, MRSA, cardiomyopathy  .       Patient was really diagnosed with pneumonia and was on antibiotics.  On arrival to the ED, noted to be short of breath and that he smoked methamphetamine about 4 to 5 hours prior to admission with worsening shortness of breath.     In the ED, initial heart rate 104, respiratory rate 36, blood pressure 156/105.  BMP was actually largely unremarkable.  Lactic acid was 3.8 on arrival, troponin was 186 initially 1.313 and then back on the 265.  CBC showing white blood cell count of 47.  INR 1.7.  Influenza, COVID-negative.  Initial VBG showing 7.44/32/27.  Initial chest x-ray showed dense pneumonic infiltrates in the right lower lung field in the left mid and lower lung field.  CTPA  2/24/25  To be met in 3 Visits:  Bed to toilet/BSC:                                                                   Min A      To be met in 5 Visits:  Supine to/from Sit in preparation for ADL task:                      Supervision  Toileting                                                                                  SBA  Grooming                                                                                Independent  Upper Body Dressing:                                                            Independent  Lower Body Dressing:                                                            SBA  Pt to demonstrate UE therapeutic exs x 15 reps with minimal cues     Rehabilitation Potential: Fair  Strengths for achieving goals include: PLOF and Pt cooperative   Barriers to achieving goals include:  Complexity of condition and Weakness     Plan:  To be seen 3-5 x/ week while in acute care setting for therapeutic exercises/activities, bed mobility training, functional transfer training, family/patient education, ADL/IADL retraining, and balance training.    Electronically signed by Lexus Kaufman OT on 2/26/2025 at 1:40 PM      If patient discharges from this facility prior to next visit, this note will serve as the Discharge Summary

## 2025-02-26 NOTE — PROGRESS NOTES
Shift assessment completed at this time (see flowsheet), VSS on monitor with HFNC in place. Patient voicing frustration of wanting to get out of the hospital as well as not be able to walk/move without getting dyspneic. Reassured patient that he is making progress in getting better, it will take time and therapy to help build his strength back up. Patient admits to intermittent panic episodes where he feels he is SOB -  RN discussed possible techniques for patient to try to help work through those moments (focus on taking a slow deep breath rather than hyperventilating), patient verbalizes understanding and has been observed trying. Scheduled meds provided with requested snacks. Bedside table and call light within reach, bed rails 3/4 in place. Plan in place for patient to receive PRN prior to BiPAP to help facilitate comfort/sleep. Patient is agreeable. No other needs at this time.

## 2025-02-26 NOTE — PROGRESS NOTES
HEART FAILURE CARE PLAN:    Comorbidities Reviewed: Yes   Patient has a past medical history of Back pain, chronic, Chronic back pain, Hypertension, MI (myocardial infarction) (HCC), and MRSA (methicillin resistant staph aureus) culture positive.     Weights Reviewed: Yes   Admission weight: 64.3 kg (141 lb 12.8 oz)   Wt Readings from Last 3 Encounters:   02/21/25 52.8 kg (116 lb 6.5 oz)   02/01/25 62.3 kg (137 lb 4.8 oz)   07/09/24 56.3 kg (124 lb 1.6 oz)     Intake & Output Reviewed: Yes     Intake/Output Summary (Last 24 hours) at 2/25/2025 2229  Last data filed at 2/25/2025 1600  Gross per 24 hour   Intake 840 ml   Output 1825 ml   Net -985 ml       ECHOCARDIOGRAM Reviewed: Yes   Patient's Ejection Fraction (EF) is  less than or equal to 40%.    Medications Reviewed: Yes   SCHEDULED HOSPITAL MEDICATIONS:   sodium zirconium cyclosilicate  10 g Oral TID    methylPREDNISolone  40 mg IntraVENous Daily    apixaban  5 mg Oral BID    famotidine  20 mg Oral BID    insulin lispro  0-8 Units SubCUTAneous Q4H    ipratropium 0.5 mg-albuterol 2.5 mg  1 Dose Inhalation BID RT    [Held by provider] metoprolol succinate  12.5 mg Oral Daily    amiodarone  100 mg Oral Daily    sacubitril-valsartan  1 tablet Oral BID    [Held by provider] spironolactone  25 mg Oral Daily    atorvastatin  40 mg Oral Nightly    buprenorphine-naloxone  1 tablet SubLINGual BID    sodium chloride flush  5-40 mL IntraVENous 2 times per day     HOME MEDICATIONS:  Prior to Admission medications    Medication Sig Start Date End Date Taking? Authorizing Provider   amiodarone (CORDARONE) 200 MG tablet 2 bid - 5 days, 2 qd- 1 week, 1 qd thereafter 1/31/25   Brittany Shin MD   empagliflozin (JARDIANCE) 10 MG tablet Take 1 tablet by mouth daily 1/31/25   Brittany Shin MD   apixaban (ELIQUIS) 5 MG TABS tablet Take 1 tablet by mouth 2 times daily 1/31/25   Brittany Shin MD   metoprolol succinate (TOPROL XL) 25 MG extended

## 2025-02-26 NOTE — PROGRESS NOTES
Virtual sitter discontinued at this time and removed from the patients room. Electronically signed by Chi Fletcher RN on 2/26/2025 at 1:59 PM

## 2025-02-26 NOTE — PROGRESS NOTES
Progress Note    Admit Date:  2/5/2025     59 y.o. male who presented to [facility] with septic shock.  PMHx significant for polysubstance use-methamphetamine, A-fib, valvular disease, on Eliquis on amiodarone, MRSA, cardiomyopathy       Initially admitted on vent support with pneumonia, septic shock,  -s/p extubation but remains on vapotherm for severe persistent hypoxia     2/14 Agitation yesterday needing code violet, started on precedex gtt and now off         2/17- Worsening  hypoxia again , today on 100 % FIo2 and 30 L vapotherm     2/21  used bipap last night, now on 50 % fio2 and 25 L vapotherm  Slowly improving hypoxia     2/22: he is on Vapo therm. Plans to wean to NC oxygen.  BP soft, Entresto was held.    2/23  Improvement on CXR yesterday.  Hypoxia continues to improve 20L and 40% today.    2/24- doing better. I weaned him to FiO2 of 35% on vapo therm. Used Bipap last night. Overall improving.     2/25- doing better. Off Vapo therm. On 7 L of oxygen.     2/26- still on 7 L of oxygen. Feeling better. No fever or chills.     Objective:   Patient Vitals for the past 4 hrs:   BP Temp Temp src Pulse Resp SpO2   02/26/25 1200 -- -- -- 70 24 --   02/26/25 1100 98/61 97.7 °F (36.5 °C) Temporal 66 20 98 %   02/26/25 1000 (!) 100/58 -- -- 65 19 96 %   02/26/25 0900 (!) 108/55 -- -- 67 17 100 %          Intake/Output Summary (Last 24 hours) at 2/26/2025 1242  Last data filed at 2/26/2025 1156  Gross per 24 hour   Intake 720 ml   Output 3300 ml   Net -2580 ml       Physical Exam:    General:  middle aged male, up in bed  Calm and cooperative today  Awake, alert and oriented. Appears to be not in any distress  Mucous Membranes:  Pink , anicteric  Neck: No JVD, no carotid bruit, no thyromegaly  Chest: improving leatha air entry. He has crackles both lungs  Cardiovascular: RRR S1S2 heard, no murmurs or gallops  Abdomen:  Soft, undistended, non tender, no organomegaly, BS present  Extremities: No edema or cyanosis.  Elevated troponin was on -Heparin gtt>eliquis   - restarted amiodarone, Toprol, entrestro, holding aldacone  - given- iv lasix 20 given as tolerated   --> eliquis was held for hemoptysis intermittently  but now on lovenox  -Eliquis restarted.    Type II diabetes mellitus.  -SSI.  -POC Glucose, carb control diet.     #Polysubstance use, methamphetamine use  On Suboxone    #GERD.  -started Pepcid.      DVT Prophylaxis: Eliquis  Diet: ADULT DIET; Regular; Low Sodium (2 gm)  ADULT ORAL NUTRITION SUPPLEMENT; Breakfast, Lunch, Dinner; Standard High Calorie/High Protein Oral Supplement  Code Status: Full Code    Changed to full code per pt wishes      Expect prolonged hypoxia, plan for select rehab. Discharge planning.     STEFANIE QURESHI MD 2/26/2025 12:42 PM

## 2025-02-26 NOTE — CARE COORDINATION
Spoke with Hugo/Pricilla. Hugo will be at the hospital this morning to meet with the patient for a face to face interview.    Met with the patient to discuss Pricilla. The patient is in agreement to speaking with Hugo.

## 2025-02-26 NOTE — PROGRESS NOTES
RT Inhaler-Nebulizer Bronchodilator Protocol Note    There is a bronchodilator order in the chart from a provider indicating to follow the RT Bronchodilator Protocol and there is an “Initiate RT Inhaler-Nebulizer Bronchodilator Protocol” order as well (see protocol at bottom of note).    CXR Findings:  No results found.    The findings from the last RT Protocol Assessment were as follows:   History Pulmonary Disease: (P) Chronic pulmonary disease  Respiratory Pattern: (P) Dyspnea on exertion or RR 21-25 bpm  Breath Sounds: (P) Slightly diminished and/or crackles  Cough: (P) Strong, productive  Indication for Bronchodilator Therapy: (P) Decreased or absent breath sounds  Bronchodilator Assessment Score: (P) 7    Aerosolized bronchodilator medication orders have been revised according to the RT Inhaler-Nebulizer Bronchodilator Protocol below.    Respiratory Therapist to perform RT Therapy Protocol Assessment initially then follow the protocol.  Repeat RT Therapy Protocol Assessment PRN for score 0-3 or on second treatment, BID, and PRN for scores above 3.    No Indications - adjust the frequency to every 6 hours PRN wheezing or bronchospasm, if no treatments needed after 48 hours then discontinue using Per Protocol order mode.     If indication present, adjust the RT bronchodilator orders based on the Bronchodilator Assessment Score as indicated below.  Use Inhaler orders unless patient has one or more of the following: on home nebulizer, not able to hold breath for 10 seconds, is not alert and oriented, cannot activate and use MDI correctly, or respiratory rate 25 breaths per minute or more, then use the equivalent nebulizer order(s) with same Frequency and PRN reasons based on the score.  If a patient is on this medication at home then do not decrease Frequency below that used at home.    0-3 - enter or revise RT bronchodilator order(s) to equivalent RT Bronchodilator order with Frequency of every 4 hours PRN for

## 2025-02-27 LAB
ANION GAP SERPL CALCULATED.3IONS-SCNC: 8 MMOL/L (ref 3–16)
ANISOCYTOSIS BLD QL SMEAR: ABNORMAL
BASOPHILS # BLD: 0 K/UL (ref 0–0.2)
BASOPHILS NFR BLD: 0 %
BUN SERPL-MCNC: 30 MG/DL (ref 7–20)
CALCIUM SERPL-MCNC: 8.1 MG/DL (ref 8.3–10.6)
CHLORIDE SERPL-SCNC: 97 MMOL/L (ref 99–110)
CO2 SERPL-SCNC: 29 MMOL/L (ref 21–32)
CREAT SERPL-MCNC: 0.6 MG/DL (ref 0.9–1.3)
DEPRECATED RDW RBC AUTO: 13.8 % (ref 12.4–15.4)
EOSINOPHIL # BLD: 0 K/UL (ref 0–0.6)
EOSINOPHIL NFR BLD: 0 %
FUNGUS SPEC CULT: ABNORMAL
FUNGUS SPEC CULT: ABNORMAL
GFR SERPLBLD CREATININE-BSD FMLA CKD-EPI: >90 ML/MIN/{1.73_M2}
GLUCOSE BLD-MCNC: 125 MG/DL (ref 70–99)
GLUCOSE BLD-MCNC: 146 MG/DL (ref 70–99)
GLUCOSE BLD-MCNC: 146 MG/DL (ref 70–99)
GLUCOSE BLD-MCNC: 148 MG/DL (ref 70–99)
GLUCOSE BLD-MCNC: 152 MG/DL (ref 70–99)
GLUCOSE BLD-MCNC: 182 MG/DL (ref 70–99)
GLUCOSE BLD-MCNC: 222 MG/DL (ref 70–99)
GLUCOSE BLD-MCNC: 52 MG/DL (ref 70–99)
GLUCOSE SERPL-MCNC: 124 MG/DL (ref 70–99)
HCT VFR BLD AUTO: 31.2 % (ref 40.5–52.5)
HGB BLD-MCNC: 10.4 G/DL (ref 13.5–17.5)
LOEFFLER MB STN SPEC: ABNORMAL
LYMPHOCYTES # BLD: 1.9 K/UL (ref 1–5.1)
LYMPHOCYTES NFR BLD: 12 %
MCH RBC QN AUTO: 29.7 PG (ref 26–34)
MCHC RBC AUTO-ENTMCNC: 33.4 G/DL (ref 31–36)
MCV RBC AUTO: 88.8 FL (ref 80–100)
MONOCYTES # BLD: 0.5 K/UL (ref 0–1.3)
MONOCYTES NFR BLD: 3 %
NEUTROPHILS # BLD: 13.3 K/UL (ref 1.7–7.7)
NEUTROPHILS NFR BLD: 83 %
NEUTS BAND NFR BLD MANUAL: 2 % (ref 0–7)
ORGANISM: ABNORMAL
ORGANISM: ABNORMAL
PERFORMED ON: ABNORMAL
PLATELET # BLD AUTO: 309 K/UL (ref 135–450)
PLATELET BLD QL SMEAR: ADEQUATE
PMV BLD AUTO: 8.3 FL (ref 5–10.5)
POIKILOCYTOSIS BLD QL SMEAR: ABNORMAL
POTASSIUM SERPL-SCNC: 4.3 MMOL/L (ref 3.5–5.1)
RBC # BLD AUTO: 3.52 M/UL (ref 4.2–5.9)
SLIDE REVIEW: ABNORMAL
SODIUM SERPL-SCNC: 134 MMOL/L (ref 136–145)
WBC # BLD AUTO: 15.7 K/UL (ref 4–11)

## 2025-02-27 PROCEDURE — 6370000000 HC RX 637 (ALT 250 FOR IP): Performed by: INTERNAL MEDICINE

## 2025-02-27 PROCEDURE — 94761 N-INVAS EAR/PLS OXIMETRY MLT: CPT

## 2025-02-27 PROCEDURE — 2700000000 HC OXYGEN THERAPY PER DAY

## 2025-02-27 PROCEDURE — 99232 SBSQ HOSP IP/OBS MODERATE 35: CPT | Performed by: INTERNAL MEDICINE

## 2025-02-27 PROCEDURE — 6360000002 HC RX W HCPCS: Performed by: INTERNAL MEDICINE

## 2025-02-27 PROCEDURE — 85025 COMPLETE CBC W/AUTO DIFF WBC: CPT

## 2025-02-27 PROCEDURE — 99233 SBSQ HOSP IP/OBS HIGH 50: CPT | Performed by: INTERNAL MEDICINE

## 2025-02-27 PROCEDURE — 94660 CPAP INITIATION&MGMT: CPT

## 2025-02-27 PROCEDURE — 2060000000 HC ICU INTERMEDIATE R&B

## 2025-02-27 PROCEDURE — 80048 BASIC METABOLIC PNL TOTAL CA: CPT

## 2025-02-27 PROCEDURE — 94669 MECHANICAL CHEST WALL OSCILL: CPT

## 2025-02-27 PROCEDURE — 94640 AIRWAY INHALATION TREATMENT: CPT

## 2025-02-27 PROCEDURE — 36415 COLL VENOUS BLD VENIPUNCTURE: CPT

## 2025-02-27 PROCEDURE — 2500000003 HC RX 250 WO HCPCS: Performed by: INTERNAL MEDICINE

## 2025-02-27 RX ORDER — PREDNISONE 20 MG/1
20 TABLET ORAL DAILY
Status: DISPENSED | OUTPATIENT
Start: 2025-02-28

## 2025-02-27 RX ORDER — METOPROLOL SUCCINATE 25 MG/1
12.5 TABLET, EXTENDED RELEASE ORAL ONCE
Status: COMPLETED | OUTPATIENT
Start: 2025-02-27 | End: 2025-02-27

## 2025-02-27 RX ORDER — FUROSEMIDE 10 MG/ML
40 INJECTION INTRAMUSCULAR; INTRAVENOUS ONCE
Status: COMPLETED | OUTPATIENT
Start: 2025-02-27 | End: 2025-02-27

## 2025-02-27 RX ADMIN — INSULIN LISPRO 2 UNITS: 100 INJECTION, SOLUTION INTRAVENOUS; SUBCUTANEOUS at 20:54

## 2025-02-27 RX ADMIN — SACUBITRIL AND VALSARTAN 1 TABLET: 24; 26 TABLET, FILM COATED ORAL at 09:21

## 2025-02-27 RX ADMIN — IPRATROPIUM BROMIDE AND ALBUTEROL SULFATE 1 DOSE: .5; 2.5 SOLUTION RESPIRATORY (INHALATION) at 06:30

## 2025-02-27 RX ADMIN — FAMOTIDINE 20 MG: 20 TABLET, FILM COATED ORAL at 20:53

## 2025-02-27 RX ADMIN — SODIUM CHLORIDE, PRESERVATIVE FREE 10 ML: 5 INJECTION INTRAVENOUS at 20:55

## 2025-02-27 RX ADMIN — METOPROLOL SUCCINATE 12.5 MG: 25 TABLET, EXTENDED RELEASE ORAL at 12:04

## 2025-02-27 RX ADMIN — FAMOTIDINE 20 MG: 20 TABLET, FILM COATED ORAL at 09:21

## 2025-02-27 RX ADMIN — PREDNISONE 30 MG: 20 TABLET ORAL at 09:21

## 2025-02-27 RX ADMIN — BUPRENORPHINE HYDROCHLORIDE AND NALOXONE HYDROCHLORIDE DIHYDRATE 1 TABLET: 8; 2 TABLET SUBLINGUAL at 20:55

## 2025-02-27 RX ADMIN — FUROSEMIDE 40 MG: 10 INJECTION, SOLUTION INTRAMUSCULAR; INTRAVENOUS at 12:05

## 2025-02-27 RX ADMIN — AMIODARONE HYDROCHLORIDE 100 MG: 200 TABLET ORAL at 09:21

## 2025-02-27 RX ADMIN — SACUBITRIL AND VALSARTAN 1 TABLET: 24; 26 TABLET, FILM COATED ORAL at 21:54

## 2025-02-27 RX ADMIN — IPRATROPIUM BROMIDE AND ALBUTEROL SULFATE 1 DOSE: .5; 2.5 SOLUTION RESPIRATORY (INHALATION) at 19:43

## 2025-02-27 RX ADMIN — DIAZEPAM 5 MG: 5 TABLET ORAL at 23:45

## 2025-02-27 RX ADMIN — ATORVASTATIN CALCIUM 40 MG: 40 TABLET, FILM COATED ORAL at 20:53

## 2025-02-27 RX ADMIN — BUPRENORPHINE HYDROCHLORIDE AND NALOXONE HYDROCHLORIDE DIHYDRATE 1 TABLET: 8; 2 TABLET SUBLINGUAL at 09:21

## 2025-02-27 RX ADMIN — APIXABAN 5 MG: 5 TABLET, FILM COATED ORAL at 20:53

## 2025-02-27 RX ADMIN — APIXABAN 5 MG: 5 TABLET, FILM COATED ORAL at 09:21

## 2025-02-27 RX ADMIN — INSULIN LISPRO 2 UNITS: 100 INJECTION, SOLUTION INTRAVENOUS; SUBCUTANEOUS at 18:03

## 2025-02-27 NOTE — CARE COORDINATION
Spoke with Hugo/Tamika to ask if patient qualifies for [facility] precert waiver.  Hugo will check with his team about the precert.    Spoke with Hugo and they have contacted Samir to see if the patient falls under the waiver because the patient is under a special contract that Tamika has with [employer]? Waiting on call back from Hugo.     Telesitter off for 24 hrs at 2:00 pm today.

## 2025-02-27 NOTE — PROGRESS NOTES
02/27/25 1040   Encounter Summary   Encounter Overview/Reason Spiritual/Emotional Needs   Service Provided For Patient   Referral/Consult From Rounding   Last Encounter  02/27/25  (Patient says he is getting better. Prayed with Domingo. ALAN)   Complexity of Encounter Low   Begin Time 1035   End Time  1045   Total Time Calculated 10 min   Spiritual/Emotional needs   Type Spiritual Support   Assessment/Intervention/Outcome   Assessment Calm   Intervention Active listening;Prayer (assurance of)/Venus   Outcome Connection/Belonging;Expressed Gratitude

## 2025-02-27 NOTE — PROGRESS NOTES
02/26/25 2494   NIV Type   NIV Started/Stopped (S)  On   Equipment Type V60   Mode Bilevel   Mask Type Full face mask   Mask Size Medium   Assessment   Pulse 76   Respirations 21   /62   SpO2 96 %   Level of Consciousness 0   Comfort Level Good   Using Accessory Muscles No   Mask Compliance Good   Skin Assessment Clean, dry, & intact   Skin Protection for O2 Device Yes   Settings/Measurements   IPAP 12 cmH20   CPAP/EPAP 8 cmH2O   Vt (Measured) 566 mL   Rate Ordered 18   FiO2  50 %   Minute Volume (L/min) 12 Liters   Mask Leak (lpm) 10 lpm   Patient's Home Machine No   Alarm Settings   Alarms On Y

## 2025-02-27 NOTE — PROGRESS NOTES
Progress Note    Admit Date:  2/5/2025     59 y.o. male who presented to [facility] with septic shock.  PMHx significant for polysubstance use-methamphetamine, A-fib, valvular disease, on Eliquis on amiodarone, MRSA, cardiomyopathy       Initially admitted on vent support with pneumonia, septic shock,  -s/p extubation but remains on vapotherm for severe persistent hypoxia     2/14 Agitation yesterday needing code violet, started on precedex gtt and now off         2/17- Worsening  hypoxia again , today on 100 % FIo2 and 30 L vapotherm     2/21  used bipap last night, now on 50 % fio2 and 25 L vapotherm  Slowly improving hypoxia     2/22: he is on Vapo therm. Plans to wean to NC oxygen.  BP soft, Entresto was held.    2/23  Improvement on CXR yesterday.  Hypoxia continues to improve 20L and 40% today.    2/24- doing better. I weaned him to FiO2 of 35% on vapo therm. Used Bipap last night. Overall improving.     2/25- doing better. Off Vapo therm. On 7 L of oxygen.     2/26- still on 7 L of oxygen. Feeling better. No fever or chills.     2/27- on 5 L. Doing better. Negative fluid balance of 17 L.    Objective:   Patient Vitals for the past 4 hrs:   BP Temp Temp src Pulse Resp SpO2   02/27/25 0800 -- 97.8 °F (36.6 °C) Oral 66 13 95 %   02/27/25 0630 -- -- -- 70 20 --   02/27/25 0600 125/69 -- -- 64 16 96 %   02/27/25 0500 113/63 -- -- 68 14 96 %          Intake/Output Summary (Last 24 hours) at 2/27/2025 0857  Last data filed at 2/27/2025 0801  Gross per 24 hour   Intake 240 ml   Output 3830 ml   Net -3590 ml       Physical Exam:    General:  middle aged male, up in bed  Calm and cooperative today  Awake, alert and oriented. Appears to be not in any distress  Mucous Membranes:  Pink , anicteric  Neck: No JVD, no carotid bruit, no thyromegaly  Chest: improving leatha air entry. Minimal crackles both lungs  Cardiovascular: RRR S1S2 heard, no murmurs or gallops  Abdomen:  Soft, undistended, non tender, no organomegaly,  aspiration pneumonia   rather than pulmonary edema.   2. Interstitial edema.   3. Trace left pleural effusion.   4. Mediastinal adenopathy, likely reactive.         XR CHEST PORTABLE   Final Result   1. ET tube in place, approximately 1.5 cm above the riley. It should be   withdrawn by 4 cm.   2. Extensive airspace disease in the lower lung of fields, unchanged from   prior examination.         XR CHEST PORTABLE   Final Result   1. Dense pneumonic infiltrates in the right lower lung field and in the left   mid and lower lung field, with or without atelectatic changes.  These   findings are mostly new as compared to previous portable chest x-ray.   2. Minimal bilateral pleural effusions or pleural thickening at the CP angles.   3. Minimal pulmonary vascular prominence.               Assessment/Plan:    # Acute hypoxic and hypercapnic respiratory failure secondary to multifocal pneumonia-   cannot rule out aspirational given imaging findings   - intubated in ER  on 2/5, and was on mechanical ventilation  -Patient remained severely hypoxic needing  paralysis  and proned ventilation initially and eventually improved   -given zyvox , cefepime and flagyl  - BP improved , , wbc better - off paralysis  -extubated 2/9 but remains was on 100 % Vapotherm  with  worsening hypoxemia noted, lasix added with no benefit  - restarted steroids   - bronch cultures with NRF, continued  on zosyn  - pulmonary managing  -pt initially refused re intubation but then agreed.   - palliative care consulted  - he is off vapotherm and is on nasal cannula.  He is agreeable to going to a SNF. He is on 5 L. Pre cert pending.       # Septic shock secondary to multifocal pneumonia  - leatha pna on imaging and wbc upto 47>70 K >10  -Blood cultures remain neg  -given empiric linezolid and cefepime as well as Flagyl  -Stress dose steroids with Solu-Cortef given and now off   -was hypotensive  and was on  Levophed and vasopressin   - BP improved and off  pressors.   - - BAL with NRF   - Then on Zosyn as above  - stopped 2/20.     #Severe acidemia: Metabolic and respiratory acidosis:  -was On bicarb  drip and resolved      # ARF - sec to sepitc shock and hypotension  - hyperkalemic and acidotic , on bicarb fluids -given lokelma improved  - UOP maintained    - resolved      #Atrial fibrillation: On Eliquis  #NSTEMI, likely type II secondary to septic shock  #History of HFrEF, cardiomyopathy  - Elevated troponin was on -Heparin gtt>eliquis   - restarted amiodarone, Toprol, entrestro, holding aldacone  - given- iv lasix 20 given as tolerated   --> eliquis was held for hemoptysis intermittently  but now on lovenox  -Eliquis restarted.    Type II diabetes mellitus.  -SSI.  -POC Glucose, carb control diet.     #Polysubstance use, methamphetamine use  On Suboxone    #GERD.  -started Pepcid.      DVT Prophylaxis: Eliquis  Diet: ADULT DIET; Regular; Low Sodium (2 gm)  ADULT ORAL NUTRITION SUPPLEMENT; Breakfast, Lunch, Dinner; Standard High Calorie/High Protein Oral Supplement  Code Status: Full Code    Changed to full code per pt wishes      Expect prolonged hypoxia, plan for select rehab. Discharge planning. Pre cert pending.    STEFANIE QURESHI MD 2/27/2025 8:57 AM

## 2025-02-27 NOTE — PROGRESS NOTES
RT Inhaler-Nebulizer Bronchodilator Protocol Note    There is a bronchodilator order in the chart from a provider indicating to follow the RT Bronchodilator Protocol and there is an “Initiate RT Inhaler-Nebulizer Bronchodilator Protocol” order as well (see protocol at bottom of note).    CXR Findings:  No results found.    The findings from the last RT Protocol Assessment were as follows:   History Pulmonary Disease: Chronic pulmonary disease  Respiratory Pattern: Dyspnea on exertion or RR 21-25 bpm  Breath Sounds: Slightly diminished and/or crackles  Cough: Strong, spontaneous, non-productive  Indication for Bronchodilator Therapy: Decreased or absent breath sounds  Bronchodilator Assessment Score: 6    Aerosolized bronchodilator medication orders have been revised according to the RT Inhaler-Nebulizer Bronchodilator Protocol below.    Respiratory Therapist to perform RT Therapy Protocol Assessment initially then follow the protocol.  Repeat RT Therapy Protocol Assessment PRN for score 0-3 or on second treatment, BID, and PRN for scores above 3.    No Indications - adjust the frequency to every 6 hours PRN wheezing or bronchospasm, if no treatments needed after 48 hours then discontinue using Per Protocol order mode.     If indication present, adjust the RT bronchodilator orders based on the Bronchodilator Assessment Score as indicated below.  Use Inhaler orders unless patient has one or more of the following: on home nebulizer, not able to hold breath for 10 seconds, is not alert and oriented, cannot activate and use MDI correctly, or respiratory rate 25 breaths per minute or more, then use the equivalent nebulizer order(s) with same Frequency and PRN reasons based on the score.  If a patient is on this medication at home then do not decrease Frequency below that used at home.    0-3 - enter or revise RT bronchodilator order(s) to equivalent RT Bronchodilator order with Frequency of every 4 hours PRN for wheezing  or increased work of breathing using Per Protocol order mode.        4-6 - enter or revise RT Bronchodilator order(s) to two equivalent RT bronchodilator orders with one order with BID Frequency and one order with Frequency of every 4 hours PRN wheezing or increased work of breathing using Per Protocol order mode.        7-10 - enter or revise RT Bronchodilator order(s) to two equivalent RT bronchodilator orders with one order with TID Frequency and one order with Frequency of every 4 hours PRN wheezing or increased work of breathing using Per Protocol order mode.       11-13 - enter or revise RT Bronchodilator order(s) to one equivalent RT bronchodilator order with QID Frequency and an Albuterol order with Frequency of every 4 hours PRN wheezing or increased work of breathing using Per Protocol order mode.      Greater than 13 - enter or revise RT Bronchodilator order(s) to one equivalent RT bronchodilator order with every 4 hours Frequency and an Albuterol order with Frequency of every 2 hours PRN wheezing or increased work of breathing using Per Protocol order mode.         Electronically signed by Alexa Camarillo RCP on 2/26/2025 at 10:20 PM

## 2025-02-27 NOTE — PROGRESS NOTES
[facility]   COPD PROGRAM      NAME:  Dayton Soto  AGE: 59 y.o.   GENDER: male  : 1965  TODAY'S DATE:  2025    Subjective:     VISIT TYPE: Education    ADMIT DATE: 2025    PAST MEDICAL HISTORY:      Diagnosis Date    Back pain, chronic     Chronic back pain     Hypertension     MI (myocardial infarction) (HCC)     MRSA (methicillin resistant staph aureus) culture positive 2020    RIGHT FOREARM     HOME MEDICATIONS:  Prior to Admission medications    Medication Sig Start Date End Date Taking? Authorizing Provider   amiodarone (CORDARONE) 200 MG tablet 2 bid - 5 days, 2 qd- 1 week, 1 qd thereafter 25   Brittany Shin MD   empagliflozin (JARDIANCE) 10 MG tablet Take 1 tablet by mouth daily 25   Brittany Shin MD   apixaban (ELIQUIS) 5 MG TABS tablet Take 1 tablet by mouth 2 times daily 25   Brittany Shin MD   metoprolol succinate (TOPROL XL) 25 MG extended release tablet Take 0.5 tablets by mouth daily 25   Brittany Shin MD   sacubitril-valsartan (ENTRESTO) 24-26 MG per tablet Take 0.5 tablets by mouth 2 times daily 25   Brittany Shin MD   predniSONE (DELTASONE) 20 MG tablet 2 qd- 2 days, 1 1/2 qd- 3 days, 1 qd- 3 days, 1/2 qd- 3 days 25   Brittany Shin MD   pantoprazole (PROTONIX) 40 MG tablet Take 1 tablet by mouth 2 times daily (before meals) 25   Brittany Shin MD   aspirin 81 MG EC tablet Take 1 tablet by mouth daily (Always take with food) 7/10/24   Andrade Acosta MD   spironolactone (ALDACTONE) 25 MG tablet Take 1 tablet by mouth daily Hold for SBP<95 mmHg 7/10/24   Andrade Acosta MD   furosemide (LASIX) 20 MG tablet Take 1 tablet by mouth daily 7/10/24   Andrade Acosta MD   Buprenorphine HCl-Naloxone HCl (SUBOXONE SL) Place 20 mg under the tongue daily Pt takes (2) 8 mg tablets and (2) 2 mg tablets daily in the morning

## 2025-02-27 NOTE — PLAN OF CARE
Problem: Safety - Adult  Goal: Free from fall injury  2/26/2025 2214 by Nenita Page, RN  Outcome: Progressing     Problem: Pain  Goal: Verbalizes/displays adequate comfort level or baseline comfort level  2/27/2025 1132 by Chi Fletcher RN  Outcome: Progressing  Flowsheets (Taken 2/16/2025 1753)  Verbalizes/displays adequate comfort level or baseline comfort level: Encourage patient to monitor pain and request assistance  Note: PAIN 0/10.   2/26/2025 2214 by Nenita Page, RN  Outcome: Progressing     Problem: Skin/Tissue Integrity  Goal: Skin integrity remains intact  Description: 1.  Monitor for areas of redness and/or skin breakdown  2.  Assess vascular access sites hourly  3.  Every 4-6 hours minimum:  Change oxygen saturation probe site  4.  Every 4-6 hours:  If on nasal continuous positive airway pressure, respiratory therapy assess nares and determine need for appliance change or resting period  2/26/2025 2214 by Nenita Page, RN  Outcome: Progressing     Problem: Respiratory - Adult  Goal: Achieves optimal ventilation and oxygenation  2/26/2025 2214 by Nenita Page, RN  Outcome: Progressing     Problem: Cardiovascular - Adult  Goal: Maintains optimal cardiac output and hemodynamic stability  Outcome: Progressing  Flowsheets (Taken 2/27/2025 1132)  Maintains optimal cardiac output and hemodynamic stability:   Monitor blood pressure and heart rate   Assess for signs of decreased cardiac output     Problem: Skin/Tissue Integrity - Adult  Goal: Skin integrity remains intact  Description: 1.  Monitor for areas of redness and/or skin breakdown  2.  Assess vascular access sites hourly  3.  Every 4-6 hours minimum:  Change oxygen saturation probe site  4.  Every 4-6 hours:  If on nasal continuous positive airway pressure, respiratory therapy assess nares and determine need for appliance change or resting period  2/26/2025 2214 by Nenita Page, RN  Outcome: Progressing      Problem: Metabolic/Fluid and Electrolytes - Adult  Goal: Electrolytes maintained within normal limits  Outcome: Progressing  Flowsheets (Taken 2/27/2025 1132)  Electrolytes maintained within normal limits: Monitor labs and assess patient for signs and symptoms of electrolyte imbalances

## 2025-02-27 NOTE — FLOWSHEET NOTE
02/26/25 2000   Vitals   Temp 97.9 °F (36.6 °C)   Temp Source Oral   Pulse 70   Heart Rate Source Monitor   Respirations 18   /63   MAP (Calculated) 79   MAP (mmHg) 77   Cardiac Rhythm Sinus rhythm   Pain Assessment   Pain Assessment None - Denies Pain   Oxygen Therapy   SpO2 99 %   Ventilator Associated Pneumonia Bundle   Anti-Embolism Intervention Medication  (eliquis)     Shift assessment completed, see flowsheets. Scheduled meds given see MAR.

## 2025-02-27 NOTE — PROGRESS NOTES
Physical Therapy  Attempted PT treatment with pt this afternoon. Pt refused participation. Pt educated on importance of activity and exercise to improve his mobility however pt continued to refuse. Will re-attempt at a later time as schedule and pt's status allows.    Dayton Almonte PT, DPT [id number]

## 2025-02-27 NOTE — PROGRESS NOTES
Speech Language Pathology  Attempt Note     Name: Dayton Soto  : 1965  Medical Diagnosis: Acute respiratory failure (HCC) [J96.00]  Methamphetamine use (HCC) [F15.10]  Elevated troponin [R79.89]  Acute respiratory failure with hypoxia (HCC) [J96.01]  Multifocal pneumonia [J18.9]      SLP attempted to see pt for follow-up. Pt continues to state he chooses to eat and drink for QOL. Pt reports he is not having any trouble swallowing. O2 needs continue to decrease with pt now on 5L HFNC and pt transferred to PCU. Pt is now within parameters for MBS, however suspect any information gained would not change pt's goals. SLP to sign off at this time. Please re-consult if pt goals change or if new concerns arise.RN aware. No charges.    Thank you,    Daisha Lord M.A. SLP  Speech-Language Pathologist  [place] Lic #[id number]  [phone]

## 2025-02-27 NOTE — PROGRESS NOTES
02/27/25 0253   NIV Type   NIV Started/Stopped (S)  Off   Assessment   Pulse 64   Respirations 14   SpO2 97 %

## 2025-02-27 NOTE — PROGRESS NOTES
Pulmonary & Critical Care Medicine ICU Progress Note    CC: PNA    Events of Last 24 hours:   BiPAP 12/8 50% - 3hrs overnight   HFNC 10---> 8--->5  L   No hemoptysis       Invasive Lines: PIVs - Right femoral cvc- removed 2/10    MV:  2/5/25-2/9        Intake/Output Summary (Last 24 hours) at 2/27/2025 0715  Last data filed at 2/27/2025 0554  Gross per 24 hour   Intake 240 ml   Output 3500 ml   Net -3260 ml       Vent Mode: AC/VC Resp Rate (Set): 22 bpm/Vt (Set, mL): 450 mL/ /FiO2 : 50 %  No results for input(s): \"PHART\", \"WTC4UVW\", \"PO2ART\" in the last 72 hours.      IV:   dextrose      sodium chloride 5 mL/hr at 02/10/25 0629       Vitals:  /69   Pulse 70   Temp 97.4 °F (36.3 °C) (Temporal)   Resp 20   Ht 1.78 m (5' 10.08\")   Wt 52.3 kg (115 lb 4.8 oz)   SpO2 96%   BMI 16.51 kg/m²   on HFNC  Constitutional:  No acute distress. .   HEENT: no scleral icterus  Neck: No tracheal deviation present.    Cardiovascular: Normal heart sounds.   Pulmonary/Chest: No wheezes. Few rhonchi. Few  rales. No decreased breath sounds.  No accessory muscle usage or stridor.   Abdominal: Soft.   Musculoskeletal: No cyanosis. No clubbing.  Skin: Skin is warm and dry.        Scheduled Meds:   predniSONE  30 mg Oral Daily    apixaban  5 mg Oral BID    famotidine  20 mg Oral BID    insulin lispro  0-8 Units SubCUTAneous Q4H    ipratropium 0.5 mg-albuterol 2.5 mg  1 Dose Inhalation BID RT    [Held by provider] metoprolol succinate  12.5 mg Oral Daily    amiodarone  100 mg Oral Daily    sacubitril-valsartan  1 tablet Oral BID    [Held by provider] spironolactone  25 mg Oral Daily    atorvastatin  40 mg Oral Nightly    buprenorphine-naloxone  1 tablet SubLINGual BID    sodium chloride flush  5-40 mL IntraVENous 2 times per day     PRN Meds:  diazePAM, ipratropium 0.5 mg-albuterol 2.5 mg, prochlorperazine **OR** prochlorperazine, glucose, dextrose bolus **OR** dextrose bolus, glucagon (rDNA), dextrose, sodium chloride flush,  completed Levaquin D#3, completed Zyvox, cefepime and Flagyl for 3 days.   Follow up Cx   Hold Spironolactone   Lasix 40 mg IV x 1   Resume Toprol XL   Resumed Amiodarone - Monitor QTc 428  PT/OT   Blood sugar control ISS, with goal 150-180  GI prophylaxis: Pepcid  DVT prophylaxis: Eliquis   MRSA prophylaxis: Bactroban  Full code   PCU

## 2025-02-27 NOTE — CONSULTS
[facility]   HEART FAILURE PROGRAM      NAME:  Dayton Soto  AGE: 59 y.o.   GENDER: male  : 1965  TODAY'S DATE:  2025    Subjective:     VISIT TYPE: Education    ADMIT DATE: 2025    PAST MEDICAL HISTORY:      Diagnosis Date    Back pain, chronic     Chronic back pain     Hypertension     MI (myocardial infarction) (HCC)     MRSA (methicillin resistant staph aureus) culture positive 2020    RIGHT FOREARM     HOME MEDICATIONS:  Prior to Admission medications    Medication Sig Start Date End Date Taking? Authorizing Provider   amiodarone (CORDARONE) 200 MG tablet 2 bid - 5 days, 2 qd- 1 week, 1 qd thereafter 25   Brittany Shin MD   empagliflozin (JARDIANCE) 10 MG tablet Take 1 tablet by mouth daily 25   Brittany Shin MD   apixaban (ELIQUIS) 5 MG TABS tablet Take 1 tablet by mouth 2 times daily 25   Brittany Shin MD   metoprolol succinate (TOPROL XL) 25 MG extended release tablet Take 0.5 tablets by mouth daily 25   Brittany Shin MD   sacubitril-valsartan (ENTRESTO) 24-26 MG per tablet Take 0.5 tablets by mouth 2 times daily 25   Brittany Shin MD   predniSONE (DELTASONE) 20 MG tablet 2 qd- 2 days, 1 1/2 qd- 3 days, 1 qd- 3 days, 1/2 qd- 3 days 25   Brittany Shin MD   pantoprazole (PROTONIX) 40 MG tablet Take 1 tablet by mouth 2 times daily (before meals) 25   Brittany Shin MD   aspirin 81 MG EC tablet Take 1 tablet by mouth daily (Always take with food) 7/10/24   Andrade Acosta MD   spironolactone (ALDACTONE) 25 MG tablet Take 1 tablet by mouth daily Hold for SBP<95 mmHg 7/10/24   Andrade Acosta MD   furosemide (LASIX) 20 MG tablet Take 1 tablet by mouth daily 7/10/24   Andrade Acosta MD   Buprenorphine HCl-Naloxone HCl (SUBOXONE SL) Place 20 mg under the tongue daily Pt takes (2) 8 mg tablets and (2) 2 mg tablets daily in the morning

## 2025-02-27 NOTE — PLAN OF CARE
Problem: Safety - Adult  Goal: Free from fall injury  Outcome: Progressing     Problem: Pain  Goal: Verbalizes/displays adequate comfort level or baseline comfort level  Outcome: Progressing     Problem: Skin/Tissue Integrity  Goal: Skin integrity remains intact  Description: 1.  Monitor for areas of redness and/or skin breakdown  2.  Assess vascular access sites hourly  3.  Every 4-6 hours minimum:  Change oxygen saturation probe site  4.  Every 4-6 hours:  If on nasal continuous positive airway pressure, respiratory therapy assess nares and determine need for appliance change or resting period  Outcome: Progressing     Problem: Respiratory - Adult  Goal: Achieves optimal ventilation and oxygenation  Outcome: Progressing     Problem: Skin/Tissue Integrity - Adult  Goal: Skin integrity remains intact  Description: 1.  Monitor for areas of redness and/or skin breakdown  2.  Assess vascular access sites hourly  3.  Every 4-6 hours minimum:  Change oxygen saturation probe site  4.  Every 4-6 hours:  If on nasal continuous positive airway pressure, respiratory therapy assess nares and determine need for appliance change or resting period  Outcome: Progressing

## 2025-02-28 LAB
ANION GAP SERPL CALCULATED.3IONS-SCNC: 7 MMOL/L (ref 3–16)
ANISOCYTOSIS BLD QL SMEAR: ABNORMAL
BASOPHILS # BLD: 0 K/UL (ref 0–0.2)
BASOPHILS NFR BLD: 0 %
BUN SERPL-MCNC: 34 MG/DL (ref 7–20)
CALCIUM SERPL-MCNC: 8 MG/DL (ref 8.3–10.6)
CHLORIDE SERPL-SCNC: 99 MMOL/L (ref 99–110)
CO2 SERPL-SCNC: 32 MMOL/L (ref 21–32)
CREAT SERPL-MCNC: 0.9 MG/DL (ref 0.9–1.3)
DEPRECATED RDW RBC AUTO: 13.7 % (ref 12.4–15.4)
DEPRECATED RDW RBC AUTO: 14.1 % (ref 12.4–15.4)
EOSINOPHIL # BLD: 0 K/UL (ref 0–0.6)
EOSINOPHIL NFR BLD: 0 %
GFR SERPLBLD CREATININE-BSD FMLA CKD-EPI: >90 ML/MIN/{1.73_M2}
GLUCOSE BLD-MCNC: 130 MG/DL (ref 70–99)
GLUCOSE BLD-MCNC: 147 MG/DL (ref 70–99)
GLUCOSE BLD-MCNC: 192 MG/DL (ref 70–99)
GLUCOSE BLD-MCNC: 194 MG/DL (ref 70–99)
GLUCOSE BLD-MCNC: 258 MG/DL (ref 70–99)
GLUCOSE SERPL-MCNC: 132 MG/DL (ref 70–99)
HCT VFR BLD AUTO: 34.9 % (ref 40.5–52.5)
HCT VFR BLD AUTO: 62.1 % (ref 40.5–52.5)
HGB BLD-MCNC: 11.7 G/DL (ref 13.5–17.5)
HGB BLD-MCNC: 20.8 G/DL (ref 13.5–17.5)
HYPOCHROMIA BLD QL SMEAR: ABNORMAL
LYMPHOCYTES # BLD: 0.4 K/UL (ref 1–5.1)
LYMPHOCYTES NFR BLD: 7 %
MCH RBC QN AUTO: 29.7 PG (ref 26–34)
MCH RBC QN AUTO: 29.8 PG (ref 26–34)
MCHC RBC AUTO-ENTMCNC: 33.5 G/DL (ref 31–36)
MCHC RBC AUTO-ENTMCNC: 33.5 G/DL (ref 31–36)
MCV RBC AUTO: 88.6 FL (ref 80–100)
MCV RBC AUTO: 88.9 FL (ref 80–100)
MONOCYTES # BLD: 0.1 K/UL (ref 0–1.3)
MONOCYTES NFR BLD: 1 %
NEUTROPHILS # BLD: 5.4 K/UL (ref 1.7–7.7)
NEUTROPHILS NFR BLD: 91 %
NEUTS BAND NFR BLD MANUAL: 1 % (ref 0–7)
PERFORMED ON: ABNORMAL
PLATELET # BLD AUTO: 349 K/UL (ref 135–450)
PLATELET # BLD AUTO: ABNORMAL K/UL (ref 135–450)
PLATELET BLD QL SMEAR: ABNORMAL
PMV BLD AUTO: 8.6 FL (ref 5–10.5)
PMV BLD AUTO: ABNORMAL FL (ref 5–10.5)
POIKILOCYTOSIS BLD QL SMEAR: ABNORMAL
POLYCHROMASIA BLD QL SMEAR: ABNORMAL
POTASSIUM SERPL-SCNC: 4.4 MMOL/L (ref 3.5–5.1)
RBC # BLD AUTO: 3.93 M/UL (ref 4.2–5.9)
RBC # BLD AUTO: 7.01 M/UL (ref 4.2–5.9)
SLIDE REVIEW: ABNORMAL
SODIUM SERPL-SCNC: 138 MMOL/L (ref 136–145)
WBC # BLD AUTO: 19.2 K/UL (ref 4–11)
WBC # BLD AUTO: 5.9 K/UL (ref 4–11)

## 2025-02-28 PROCEDURE — 6370000000 HC RX 637 (ALT 250 FOR IP): Performed by: INTERNAL MEDICINE

## 2025-02-28 PROCEDURE — 94640 AIRWAY INHALATION TREATMENT: CPT

## 2025-02-28 PROCEDURE — 2060000000 HC ICU INTERMEDIATE R&B

## 2025-02-28 PROCEDURE — 97530 THERAPEUTIC ACTIVITIES: CPT

## 2025-02-28 PROCEDURE — 2500000003 HC RX 250 WO HCPCS: Performed by: INTERNAL MEDICINE

## 2025-02-28 PROCEDURE — 94761 N-INVAS EAR/PLS OXIMETRY MLT: CPT

## 2025-02-28 PROCEDURE — 99233 SBSQ HOSP IP/OBS HIGH 50: CPT | Performed by: INTERNAL MEDICINE

## 2025-02-28 PROCEDURE — 94660 CPAP INITIATION&MGMT: CPT

## 2025-02-28 PROCEDURE — 2700000000 HC OXYGEN THERAPY PER DAY

## 2025-02-28 PROCEDURE — 85027 COMPLETE CBC AUTOMATED: CPT

## 2025-02-28 PROCEDURE — 6360000002 HC RX W HCPCS: Performed by: INTERNAL MEDICINE

## 2025-02-28 PROCEDURE — 36415 COLL VENOUS BLD VENIPUNCTURE: CPT

## 2025-02-28 PROCEDURE — 94669 MECHANICAL CHEST WALL OSCILL: CPT

## 2025-02-28 PROCEDURE — 99232 SBSQ HOSP IP/OBS MODERATE 35: CPT | Performed by: INTERNAL MEDICINE

## 2025-02-28 PROCEDURE — 85025 COMPLETE CBC W/AUTO DIFF WBC: CPT

## 2025-02-28 PROCEDURE — 80048 BASIC METABOLIC PNL TOTAL CA: CPT

## 2025-02-28 RX ORDER — BENZONATATE 100 MG/1
100 CAPSULE ORAL 3 TIMES DAILY PRN
Status: DISPENSED | OUTPATIENT
Start: 2025-02-28

## 2025-02-28 RX ADMIN — BUPRENORPHINE HYDROCHLORIDE AND NALOXONE HYDROCHLORIDE DIHYDRATE 1 TABLET: 8; 2 TABLET SUBLINGUAL at 20:39

## 2025-02-28 RX ADMIN — BENZONATATE 100 MG: 100 CAPSULE ORAL at 12:25

## 2025-02-28 RX ADMIN — METOPROLOL SUCCINATE 12.5 MG: 25 TABLET, EXTENDED RELEASE ORAL at 12:14

## 2025-02-28 RX ADMIN — INSULIN LISPRO 4 UNITS: 100 INJECTION, SOLUTION INTRAVENOUS; SUBCUTANEOUS at 20:40

## 2025-02-28 RX ADMIN — ACETAMINOPHEN 650 MG: 325 TABLET ORAL at 18:34

## 2025-02-28 RX ADMIN — AMIODARONE HYDROCHLORIDE 100 MG: 200 TABLET ORAL at 09:12

## 2025-02-28 RX ADMIN — BUPRENORPHINE HYDROCHLORIDE AND NALOXONE HYDROCHLORIDE DIHYDRATE 1 TABLET: 8; 2 TABLET SUBLINGUAL at 09:13

## 2025-02-28 RX ADMIN — ATORVASTATIN CALCIUM 40 MG: 40 TABLET, FILM COATED ORAL at 20:39

## 2025-02-28 RX ADMIN — SACUBITRIL AND VALSARTAN 1 TABLET: 24; 26 TABLET, FILM COATED ORAL at 09:12

## 2025-02-28 RX ADMIN — SACUBITRIL AND VALSARTAN 1 TABLET: 24; 26 TABLET, FILM COATED ORAL at 20:41

## 2025-02-28 RX ADMIN — FAMOTIDINE 20 MG: 20 TABLET, FILM COATED ORAL at 09:12

## 2025-02-28 RX ADMIN — SODIUM CHLORIDE, PRESERVATIVE FREE 10 ML: 5 INJECTION INTRAVENOUS at 20:39

## 2025-02-28 RX ADMIN — DIAZEPAM 5 MG: 5 TABLET ORAL at 15:55

## 2025-02-28 RX ADMIN — IPRATROPIUM BROMIDE AND ALBUTEROL SULFATE 1 DOSE: .5; 2.5 SOLUTION RESPIRATORY (INHALATION) at 07:28

## 2025-02-28 RX ADMIN — APIXABAN 5 MG: 5 TABLET, FILM COATED ORAL at 20:39

## 2025-02-28 RX ADMIN — DIAZEPAM 5 MG: 5 TABLET ORAL at 09:12

## 2025-02-28 RX ADMIN — IPRATROPIUM BROMIDE AND ALBUTEROL SULFATE 1 DOSE: .5; 2.5 SOLUTION RESPIRATORY (INHALATION) at 19:24

## 2025-02-28 RX ADMIN — APIXABAN 5 MG: 5 TABLET, FILM COATED ORAL at 09:12

## 2025-02-28 RX ADMIN — FAMOTIDINE 20 MG: 20 TABLET, FILM COATED ORAL at 20:39

## 2025-02-28 RX ADMIN — PREDNISONE 20 MG: 20 TABLET ORAL at 09:13

## 2025-02-28 RX ADMIN — DIAZEPAM 5 MG: 5 TABLET ORAL at 22:46

## 2025-02-28 RX ADMIN — POLYETHYLENE GLYCOL (3350) 17 G: 17 POWDER, FOR SOLUTION ORAL at 10:45

## 2025-02-28 ASSESSMENT — PAIN DESCRIPTION - ORIENTATION: ORIENTATION: RIGHT;LEFT;MID;LOWER

## 2025-02-28 ASSESSMENT — PAIN SCALES - GENERAL
PAINLEVEL_OUTOF10: 0
PAINLEVEL_OUTOF10: 8

## 2025-02-28 ASSESSMENT — PAIN DESCRIPTION - LOCATION: LOCATION: BACK

## 2025-02-28 NOTE — DISCHARGE INSTR - COC
Continuity of Care Form    Patient Name: Dayton Soto   :  1965  MRN:  [id number]    Admit date:  2025  Discharge date:  ***    Code Status Order: Full Code   Advance Directives:   Advance Care Flowsheet Documentation             Admitting Physician:  Fran Palacio MD  PCP: Not, On File (Inactive)    Discharging Nurse: ***  Discharging Hospital Unit/Room#: /0305-01  Discharging Unit Phone Number: ***    Emergency Contact:   Extended Emergency Contact Information  Primary Emergency Contact: Keira Gomez  Home Phone: [phone]  Mobile Phone: [phone]  Relation: Brother/Sister   needed? No  Secondary Emergency Contact: Randy Esparza  Mobile Phone: [phone]  Relation: Child    Past Surgical History:  Past Surgical History:   Procedure Laterality Date    BRONCHOSCOPY N/A 2025    BRONCHOSCOPY ALVEOLAR LAVAGE performed by Amish Richard MD at [facility] ENDOSCOPY       Immunization History:   Immunization History   Administered Date(s) Administered    TDaP, ADACEL (age 10y-64y), BOOSTRIX (age 10y+), IM, 0.5mL 2020    Td, unspecified formulation 2014       Active Problems:  Patient Active Problem List   Diagnosis Code    NSTEMI (non-ST elevated myocardial infarction) ([facility]) I21.4    PSVT (paroxysmal supraventricular tachycardia) I47.10    LUIS FELIPE (acute kidney injury) N17.9    Left-sided chest pain R07.9    Abnormal echocardiogram R93.1    Acute HFrEF (heart failure with reduced ejection fraction) ([facility]) I50.21    Cardiomyopathy ([facility]) I42.9    Prolonged QT interval R94.31    Severe tricuspid regurgitation I07.1    Nonrheumatic aortic valve insufficiency I35.1    Transaminitis R74.01    Polysubstance abuse ([facility]) F19.10    Nonrheumatic tricuspid valve regurgitation I36.1    History of substance abuse ([facility]) F19.11    Septic shock ([facility]) A41.9, R65.21    HCAP (healthcare-associated pneumonia) J18.9    Chronic obstructive pulmonary disease ([facility]) J44.9    History of cardiac

## 2025-02-28 NOTE — PROGRESS NOTES
Patient called out and requested to remove BIPAP. Encouraged patient to use for a little longer but patient declined. Hooked patient to high flow at 5 lpm. Respiratory informed.

## 2025-02-28 NOTE — PROGRESS NOTES
infiltrates in the right lower lung field in the left mid and lower lung field.  CTPA showed worsening diffuse bilateral airspace disease favoring aspiration pneumonia other rather than pulmonary edema.     Throughout ED stay, patient continued to decline and started breathing faster, becoming hypoxic, more tachycardic and febrile.  It appears he had a rapid decline and was subsequently intubated at 10:33 AM.\"     Extubated 2/9/25  Bronch 2/12/25     Acute hypoxic and hypercapnic respiratory failure  Septic shock  Polysubstance use    Preadmission Environment:   Pt. Lives                                              with family (sister)  Home environment:                            mobile home/trailer  Steps to enter first floor:                     2 steps to enter; B handrails  Steps to second floor/basement:        N/A  Laundry:                                              1st floor  Bathroom:                                           tub/shower unit, walk in shower, grab bars in shower, and standard height toilet  Pt sleeps in a:                                     Recliner due to back pain  Equipment owned:                              none     Preadmission Status:  Pt. Able to drive:                                 No; walk or RFS  Pt. Fully independent with ADLs:       Yes  Pt. Required assistance for:               Independent PTA  Pt. independent for functional transfers and utilized No Device for mobility in home and No Device out in community  History of falls:                                    No  Home Health Services:                       None    AM-PAC Score: AM-PAC Inpatient Daily Activity Raw Score: 16     Subjective:  Patient lying reclined in bed with no family present.   Pt agreeable to this OT session. Patient anxious on this date.    Cognition:    A&O orientation not directly assessed.    Able to follow 1 step commands    Pain:   No    Activity Tolerance:   Pt completed therapy session with  mobility training, functional transfer training, family/patient education, ADL/IADL retraining, and balance training.    Electronically signed by Gregg Mosley OT      If patient discharges from this facility prior to next visit, this note will serve as the Discharge Summary

## 2025-02-28 NOTE — PROGRESS NOTES
Patient working with therapy when he became very anxious stating he couldn't breathe. Writer called in to assess patient. Very anxious and irritable - asking for something for anxiety.     Valium 5mg given at 1555.

## 2025-02-28 NOTE — PROGRESS NOTES
02/27/25 1900   RT Protocol   History Pulmonary Disease 2   Respiratory pattern 2   Breath sounds 2   Cough 0   Indications for Bronchodilator Therapy Decreased or absent breath sounds   Bronchodilator Assessment Score 6     RT Inhaler-Nebulizer Bronchodilator Protocol Note    There is a bronchodilator order in the chart from a provider indicating to follow the RT Bronchodilator Protocol and there is an “Initiate RT Inhaler-Nebulizer Bronchodilator Protocol” order as well (see protocol at bottom of note).    CXR Findings:  No results found.    The findings from the last RT Protocol Assessment were as follows:   History Pulmonary Disease: Chronic pulmonary disease  Respiratory Pattern: Dyspnea on exertion or RR 21-25 bpm  Breath Sounds: Slightly diminished and/or crackles  Cough: Strong, spontaneous, non-productive  Indication for Bronchodilator Therapy: Decreased or absent breath sounds  Bronchodilator Assessment Score: 6    Aerosolized bronchodilator medication orders have been revised according to the RT Inhaler-Nebulizer Bronchodilator Protocol below.    Respiratory Therapist to perform RT Therapy Protocol Assessment initially then follow the protocol.  Repeat RT Therapy Protocol Assessment PRN for score 0-3 or on second treatment, BID, and PRN for scores above 3.    No Indications - adjust the frequency to every 6 hours PRN wheezing or bronchospasm, if no treatments needed after 48 hours then discontinue using Per Protocol order mode.     If indication present, adjust the RT bronchodilator orders based on the Bronchodilator Assessment Score as indicated below.  Use Inhaler orders unless patient has one or more of the following: on home nebulizer, not able to hold breath for 10 seconds, is not alert and oriented, cannot activate and use MDI correctly, or respiratory rate 25 breaths per minute or more, then use the equivalent nebulizer order(s) with same Frequency and PRN reasons based on the score.  If a  patient is on this medication at home then do not decrease Frequency below that used at home.    0-3 - enter or revise RT bronchodilator order(s) to equivalent RT Bronchodilator order with Frequency of every 4 hours PRN for wheezing or increased work of breathing using Per Protocol order mode.        4-6 - enter or revise RT Bronchodilator order(s) to two equivalent RT bronchodilator orders with one order with BID Frequency and one order with Frequency of every 4 hours PRN wheezing or increased work of breathing using Per Protocol order mode.        7-10 - enter or revise RT Bronchodilator order(s) to two equivalent RT bronchodilator orders with one order with TID Frequency and one order with Frequency of every 4 hours PRN wheezing or increased work of breathing using Per Protocol order mode.       11-13 - enter or revise RT Bronchodilator order(s) to one equivalent RT bronchodilator order with QID Frequency and an Albuterol order with Frequency of every 4 hours PRN wheezing or increased work of breathing using Per Protocol order mode.      Greater than 13 - enter or revise RT Bronchodilator order(s) to one equivalent RT bronchodilator order with every 4 hours Frequency and an Albuterol order with Frequency of every 2 hours PRN wheezing or increased work of breathing using Per Protocol order mode.     Electronically signed by Jefferson Quigley RCP on 2/27/2025 at 7:48 PM

## 2025-02-28 NOTE — FLOWSHEET NOTE
02/28/25 0604   Treatment Team Notification   Reason for Communication Critical results   Type of Critical Result Laboratory   Critical Lab Information 20.8   Person Result Received From Lab   Critical Lab Result Type Other (comment)  (Hemoglobin)   Name of Team Member Notified Cassius Espinal   Treatment Team Role Resident   Method of Communication Page   Response No new orders   Notification Time 0604

## 2025-02-28 NOTE — PROGRESS NOTES
Nutrition Assessment     Type and Reason for Visit: Reassess    Nutrition Recommendations/Plan:   Continue Reg, 2gr NA  Continue Enusre Plus TID      Malnutrition Assessment:  Malnutrition Status: Severe malnutrition    Nutrition Assessment:      patient continues to  improve from a nutritional standpoint AEB he is consuming % of meals at this time and noted weight gain; he remains at risk for further compromise d/t malnutrition, altered nutrition-related labs, and impaired respiratory function; will continue ADULT DIET; Regular; Low Sodium diet order and Ensure Plus with meals    Estimated Daily Nutrient Needs:  Energy (kcal):  1882 - 2016 kcals based on 28-30 kcals/kg/CBW Weight Used for Energy Requirements: Current     Protein (g):  81 - 101 g protein based on 1.2-1.5 g/kg/CBW Weight Used for Protein Requirements: Current        Fluid (ml/day):  1882 - 2016 ml Method Used for Fluid Requirements: 1 ml/kcal    Nutrition Related Findings:   pt is A & O x4; reports he is eating very well and drinking ateh ensure Plus; low h/h Wound Type: Deep Tissue Injury (DTI on R buttocks)    Current Nutrition Therapies:    ADULT DIET; Regular; Low Sodium (2 gm)  ADULT ORAL NUTRITION SUPPLEMENT; Breakfast, Lunch, Dinner; Standard High Calorie/High Protein Oral Supplement    Anthropometric Measures:  Height: 178 cm (5' 10.08\")  Current Body Wt: 52.2 kg (115 lb)   BMI: 16.5        Nutrition Diagnosis:   Severe malnutrition, in context of acute illness or injury related to inadequate protein-energy intake, impaired respiratory function as evidenced by intake 0-25%, poor intake prior to admission, swallow study results, wounds, lab values, loss of subcutaneous fat, muscle loss    Nutrition Interventions:   Food and/or Nutrient Delivery: Continue Current Diet, Continue Oral Nutrition Supplement  Nutrition Education/Counseling: No recommendation at this time  Coordination of Nutrition Care: Continue to monitor while  inpatient  Plan of Care discussed with: ICU Team    Goals:  Goals: Meet at least 75% of estimated needs, by next RD assessment  Type of Goal: Continue current goal  Previous Goal Met: Goal(s) Achieved    Nutrition Monitoring and Evaluation:   Behavioral-Environmental Outcomes: None Identified  Food/Nutrient Intake Outcomes: Food and Nutrient Intake, Supplement Intake  Physical Signs/Symptoms Outcomes: Nutrition Focused Physical Findings, Biochemical Data, Weight, Skin    Discharge Planning:    No discharge needs at this time     Tg Barksdale, PAMELA, LD  Contact: [phone]

## 2025-02-28 NOTE — PROGRESS NOTES
Inpatient Physical Therapy Treatment    Unit: PCU  Date:  2/28/2025  Patient Name:    Dayton Soto  Admitting diagnosis:  Acute respiratory failure (HCC) [J96.00]  Methamphetamine use (HCC) [F15.10]  Elevated troponin [R79.89]  Acute respiratory failure with hypoxia (HCC) [J96.01]  Multifocal pneumonia [J18.9]  Admit Date:  2/5/2025  Precautions/Restrictions/WB Status/ Lines/ Wounds/ Oxygen: Fall risk, Bed/chair alarm, Lines (IV and Supplemental O2 (6L)), Telemetry, Continuous pulse oximetry, and Isolation Precautions: Contact      Pt seen for cotreatment this date due to patient safety, patient endurance, and acute illness/injury    Treatment Time:  1728-0269  Treatment Number:  2   Timed Code Treatment Minutes: 25 minutes  Total Treatment Minutes:  25  minutes    Patient Stated Goals for Therapy: no goals stated          Discharge Recommendations: SNF  DME needs for discharge: Defer to facility       Therapy recommendation for EMS Transport: requires transport by cot due to pt with poor sitting balance/tolerance    Therapy recommendations for staff:   Assist of 1 for transfers with use of rolling walker (RW) and gait belt to/from BSC  to/from chair    History of Present Illness:   Per Dr. Palacio H&P 2/5/25:  \"59 y.o. male who presented to [facility] with septic shock.  PMHx significant for polysubstance use-methamphetamine, A-fib, valvular disease, on Eliquis on amiodarone, MRSA, cardiomyopathy  .       Patient was really diagnosed with pneumonia and was on antibiotics.  On arrival to the ED, noted to be short of breath and that he smoked methamphetamine about 4 to 5 hours prior to admission with worsening shortness of breath.     In the ED, initial heart rate 104, respiratory rate 36, blood pressure 156/105.  BMP was actually largely unremarkable.  Lactic acid was 3.8 on arrival, troponin was 186 initially 1.313 and then back on the 265.  CBC showing white blood cell count of 47.  INR 1.7.  Influenza,  gait deferred due to O2 desat; pt ambulated 0 ft.     Stair Training deferred, pt unsafe/ not appropriate to complete stairs at this time    Therapeutic Exercises Initiated  deferred secondary to treatment focus on functional mobility    Positioning Needs   Pt in bed and alarm set  Call light provided and all needs within reach  RN aware of pt position/status    Other Activities  Refer to OT note.    Patient/Family Education   Pt educated on role of inpatient PT, POC, importance of continued activity, DC recommendations, functional transfer/mobility safety, transfer techniques, pursed lip breathing, calling for assist with mobility, and oxygen tubing line management.    Assessment  Pt performing below baseline function. Pt with O2 desat noted throughout therapy session with activity including sitting up at EOB, standing up from EOB; improvement in SpO2 with PLB and rest breaks. Pt have increased anxiety with sitting at EOB initially that improved when RN brought anxiety meds. Pt would benefit from continued PT to address the noted deficits in order to safely return to PLOF abilities.    Recommending SNF upon discharge as patient functioning below baseline, demonstrates good rehab potential and unable to return home due to limited or no family support, inability to negotiate stairs to enter home/bedroom/bathroom, burden of care beyond caregiver ability, home environment not conducive to patient recovery, and limited safety awareness.    Goals :   To be met in 3 visits:  1). Independent with LE Ex x 10 reps  2). Sit to/from stand: Min A - goal met 2/28/25  3). Bed to chair: Min A     To be met in 6 visits:  1).  Supine to/from sit: Independent  2).  Sit to/from stand: Independent  3).  Bed to chair: Independent  4).  Gait: Ambulate 50 ft.  with CGA and use of LRAD (least restrictive assistive device)  5).  Tolerate B LE exercises 3 sets of 10-15 reps    Rehabilitation Potential: Fair to good  Strengths for achieving

## 2025-02-28 NOTE — PROGRESS NOTES
Physical Therapy    Attempted PT follow-up. Pt in his bed upon my arrival. O2 NC out of his nose. SpO2 78%. He recovered after a few minutes.     He declined PT despite encouragement. Wants a chance to rest, he has not been getting much sleep. Will re-attempt as able.     Irene Geronimo, PT, DPT

## 2025-02-28 NOTE — PROGRESS NOTES
Pulmonary & Critical Care Medicine ICU Progress Note    CC: PNA    Events of Last 24 hours:   Appears comfortable  HFNC 10---> 8--->5--> 4.5 L   No hemoptysis       Invasive Lines: PIVs - Right femoral cvc- removed 2/10    MV:  2/5/25-2/9        Intake/Output Summary (Last 24 hours) at 2/28/2025 0755  Last data filed at 2/27/2025 2345  Gross per 24 hour   Intake 510 ml   Output 980 ml   Net -470 ml       Vent Mode: AC/VC Resp Rate (Set): 22 bpm/Vt (Set, mL): 450 mL/ /FiO2 : 50 %  No results for input(s): \"PHART\", \"MFQ7TWQ\", \"PO2ART\" in the last 72 hours.      IV:   dextrose      sodium chloride 5 mL/hr at 02/10/25 0629       Vitals:  /64   Pulse 61   Temp 98.1 °F (36.7 °C) (Oral)   Resp 20   Ht 1.78 m (5' 10.08\")   Wt 52.3 kg (115 lb 4.8 oz)   SpO2 90%   BMI 16.51 kg/m²   on 4.5 L  Constitutional:  No acute distress. .   HEENT: no scleral icterus  Neck: No tracheal deviation present.    Cardiovascular: Normal heart sounds.   Pulmonary/Chest: No wheezes. Few rhonchi. Few  rales. No decreased breath sounds.  No accessory muscle usage or stridor.   Abdominal: Soft.   Musculoskeletal: No cyanosis. No clubbing.  Skin: Skin is warm and dry.        Scheduled Meds:   predniSONE  20 mg Oral Daily    apixaban  5 mg Oral BID    famotidine  20 mg Oral BID    insulin lispro  0-8 Units SubCUTAneous Q4H    ipratropium 0.5 mg-albuterol 2.5 mg  1 Dose Inhalation BID RT    metoprolol succinate  12.5 mg Oral Daily    amiodarone  100 mg Oral Daily    sacubitril-valsartan  1 tablet Oral BID    [Held by provider] spironolactone  25 mg Oral Daily    atorvastatin  40 mg Oral Nightly    buprenorphine-naloxone  1 tablet SubLINGual BID    sodium chloride flush  5-40 mL IntraVENous 2 times per day     PRN Meds:  diazePAM, ipratropium 0.5 mg-albuterol 2.5 mg, prochlorperazine **OR** prochlorperazine, glucose, dextrose bolus **OR** dextrose bolus, glucagon (rDNA), dextrose, sodium chloride flush, sodium chloride, potassium  chloride **OR** potassium alternative oral replacement **OR** potassium chloride, magnesium sulfate, polyethylene glycol, acetaminophen **OR** acetaminophen    Results:  CBC:   Recent Labs     02/26/25 0438 02/27/25  0558 02/28/25  0423   WBC 16.6* 15.7* 5.9   HGB 11.9* 10.4* 20.8*   HCT 35.8* 31.2* 62.1*   MCV 90.8 88.8 88.6    309 see below     BMP:   Recent Labs     02/26/25 0438 02/27/25  0558 02/28/25  0423    134* 138   K 4.8 4.3 4.4   CL 99 97* 99   CO2 29 29 32   BUN 35* 30* 34*   CREATININE 0.7* 0.6* 0.9     LIVER PROFILE:   No results for input(s): \"AST\", \"ALT\", \"LIPASE\", \"AMYLASE\", \"BILIDIR\", \"BILITOT\", \"ALKPHOS\" in the last 72 hours.    Invalid input(s): \"ALB\"      Cultures:  2/5/25 blood cx NGTD  2/6/25 Resp cx: ngtd  2/5/25 urine cx negative  2/12 BAL NGTD       Films:  Chest x-ray 2/22   Persistent bilateral pulmonary infiltrates    CTPA 2/5  1. Worsening diffuse bilateral airspace disease favoring aspiration pneumonia  rather than pulmonary edema.  2. Interstitial edema.  3. Trace left pleural effusion.  4. Mediastinal adenopathy, likely reactive.         ASSESSMENT:  Severe acute hypoxic respiratory failure required MV/Prone  Aspiration pneumonia - mucus plugs on bronch 2/12  Probable COPD with AE   Recurrent hemoptysis- pulmonary hemorrhage on BAL 2/12- none now. Probably pulmonary edema in the setting of AC  Cardiomyopathy of unknown etiology present on admission and EF 30 to 35% with global hypokinesis  History of Afib on Eliquis and Amiodarone   30 pack year smoking   Methamphetamine use on Suboxone      PLAN:  Supplemental oxygen to maintain SaO2 >92%; wean as tolerated  BiPAP QHS and PRN during during the day   Inhaled bronchodilators  Prednisone  20 mg po daily   Completed Zosyn IV D#7, Augmentin D#2, completed Levaquin D#3, completed Zyvox, cefepime and Flagyl for 3 days.   DVT prophylaxis: Eliquis

## 2025-02-28 NOTE — PROGRESS NOTES
02/27/25 0337   NIV Type   NIV Started/Stopped (S)  On   Equipment Type V60   Mode Bilevel   Mask Type Full face mask   Mask Size Medium   Assessment   Pulse 65   Respirations 23   SpO2 96 %   Level of Consciousness 0  (Simultaneous filing. User may not have seen previous data.)   Comfort Level Good   Using Accessory Muscles No   Mask Compliance Good   Skin Assessment Clean, dry, & intact   Skin Protection for O2 Device No  (PATIENT REFUSED THE PACKING FOR THE BRIDGE OF HIS NOSE WHILE ON BIPAP.)   Settings/Measurements   IPAP 12 cmH20   CPAP/EPAP 8 cmH2O   Vt (Measured) 540 mL   Rate Ordered 18   FiO2  50 %   Minute Volume (L/min) 12.6 Liters   Mask Leak (lpm) 20 lpm   Patient's Home Machine No   Alarm Settings   Alarms On Y

## 2025-02-28 NOTE — PROGRESS NOTES
02/28/25 0733   RT Protocol   History Pulmonary Disease 2   Respiratory pattern 0   Breath sounds 2   Cough 0   Indications for Bronchodilator Therapy On home bronchodilators   Bronchodilator Assessment Score 4

## 2025-02-28 NOTE — PROGRESS NOTES
IM Progress Note    Admit Date:  2/5/2025     59 y.o. male who presented to [facility] with septic shock.  PMHx significant for polysubstance use-methamphetamine, A-fib, valvular disease, on Eliquis on amiodarone, MRSA, cardiomyopathy       Initially admitted on vent support with pneumonia, septic shock,  -s/p extubation but remains on vapotherm for severe persistent hypoxia     2/14 Agitation yesterday needing code violet, started on precedex gtt and now off         2/17- Worsening  hypoxia again , today on 100 % FIo2 and 30 L vapotherm     2/21  used bipap last night, now on 50 % fio2 and 25 L vapotherm  Slowly improving hypoxia     2/22: he is on Vapo therm. Plans to wean to NC oxygen.  BP soft, Entresto was held.    2/23  Improvement on CXR yesterday.  Hypoxia continues to improve 20L and 40% today.    2/24- doing better. I weaned him to FiO2 of 35% on vapo therm. Used Bipap last night. Overall improving.     2/25- doing better. Off Vapo therm. On 7 L of oxygen.     2/26- still on 7 L of oxygen. Feeling better. No fever or chills.     2/27- on 5 L. Doing better. Negative fluid balance of 17 L.     2/28- transferred to PCU now.   On o2 6L. Being seen by Pt, OT    Objective:   Patient Vitals for the past 4 hrs:   BP Temp Temp src Pulse Resp SpO2   02/28/25 1200 (!) 114/57 98.2 °F (36.8 °C) Oral 81 20 91 %          Intake/Output Summary (Last 24 hours) at 2/28/2025 1542  Last data filed at 2/28/2025 1256  Gross per 24 hour   Intake 950 ml   Output 650 ml   Net 300 ml       Physical Exam:    General:  middle aged male, up in bed  Calm and cooperative today.  Awake, alert and oriented. Appears to be not in any distress.   Weak   Mucous Membranes:  Pink , anicteric  Neck: No JVD, no carotid bruit, no thyromegaly  Chest: improving leatha air entry. Minimal crackles both lungs  Cardiovascular: RRR S1S2 heard, no murmurs or gallops  Abdomen:  Soft, undistended, non tender, no organomegaly, BS present  Extremities: No  disease favoring aspiration pneumonia   rather than pulmonary edema.   2. Interstitial edema.   3. Trace left pleural effusion.   4. Mediastinal adenopathy, likely reactive.         XR CHEST PORTABLE   Final Result   1. ET tube in place, approximately 1.5 cm above the riley. It should be   withdrawn by 4 cm.   2. Extensive airspace disease in the lower lung of fields, unchanged from   prior examination.         XR CHEST PORTABLE   Final Result   1. Dense pneumonic infiltrates in the right lower lung field and in the left   mid and lower lung field, with or without atelectatic changes.  These   findings are mostly new as compared to previous portable chest x-ray.   2. Minimal bilateral pleural effusions or pleural thickening at the CP angles.   3. Minimal pulmonary vascular prominence.               Assessment/Plan:    # Acute hypoxic and hypercapnic respiratory failure secondary to multifocal pneumonia-   cannot rule out aspirational given imaging findings   - intubated in ER  on 2/5, and was on mechanical ventilation  -Patient remained severely hypoxic needing  paralysis  and proned ventilation initially and eventually improved   -given zyvox , cefepime and flagyl  - BP improved , , wbc better - off paralysis  -extubated 2/9 but remains was on 100 % Vapotherm  with  worsening hypoxemia noted, lasix added with no benefit  - restarted steroids   - bronch cultures with NRF, continued  on zosyn  - pulmonary managing  -pt initially refused re intubation but then agreed.   - palliative care consulted  - he is off vapotherm and is on nasal cannula.  He is agreeable to going to a SNF. He is on 5-6 L. Pre cert pending.       # Septic shock secondary to multifocal pneumonia  - leatha pna on imaging and wbc upto 47>70 K >10  -Blood cultures remain neg  -given empiric linezolid and cefepime as well as Flagyl  -Stress dose steroids with Solu-Cortef given and now off   -was hypotensive  and was on  Levophed and vasopressin   - BP  improved and off pressors.   - - BAL with NRF   - Then on Zosyn as above  - stopped 2/20.     #Severe acidemia: Metabolic and respiratory acidosis:  -was On bicarb  drip and resolved      # ARF - sec to sepitc shock and hypotension  - hyperkalemic and acidotic , on bicarb fluids -given lokelma improved  - UOP maintained    - resolved      #Atrial fibrillation: On Eliquis  #NSTEMI, likely type II secondary to septic shock  #History of HFrEF, cardiomyopathy  - Elevated troponin was on -Heparin gtt>eliquis   - restarted amiodarone, Toprol, entrestro, holding aldacone  - given- iv lasix 20 given as tolerated   --> eliquis was held for hemoptysis intermittently  but now on lovenox  -Eliquis restarted.    Type II diabetes mellitus.  -SSI.  -POC Glucose, carb control diet.     #Polysubstance use, methamphetamine use  On Suboxone    #GERD.  -started Pepcid.      DVT Prophylaxis: Eliquis  Diet: ADULT DIET; Regular; Low Sodium (2 gm)  ADULT ORAL NUTRITION SUPPLEMENT; Breakfast, Lunch, Dinner; Standard High Calorie/High Protein Oral Supplement  Code Status: Full Code    Changed to full code per pt wishes  Expect prolonged hypoxia, plan for  rehab. Discharge planning. Pre cert pending.    Vivek Ayon MD 2/28/2025 3:42 PM

## 2025-02-28 NOTE — PLAN OF CARE
Problem: Safety - Adult  Goal: Free from fall injury  2/28/2025 1146 by Sarah Ugalde, RN  Outcome: Progressing

## 2025-02-28 NOTE — PROGRESS NOTES
Transferred care to TARIQ Smith. Face to face bedside report given, no need voiced at this time. Pt in bed with eyes closed.   No signs of distress noted.  Call light within reach.   Denies needs.

## 2025-02-28 NOTE — PLAN OF CARE
HEART FAILURE CARE PLAN:    Comorbidities Reviewed: Yes   Patient has a past medical history of Back pain, chronic, Chronic back pain, Hypertension, MI (myocardial infarction) (HCC), and MRSA (methicillin resistant staph aureus) culture positive.     Weights Reviewed: Yes   Admission weight: 64.3 kg (141 lb 12.8 oz)   Wt Readings from Last 3 Encounters:   02/26/25 52.3 kg (115 lb 4.8 oz)   02/01/25 62.3 kg (137 lb 4.8 oz)   07/09/24 56.3 kg (124 lb 1.6 oz)     Intake & Output Reviewed: Yes     Intake/Output Summary (Last 24 hours) at 2/28/2025 0256  Last data filed at 2/27/2025 2345  Gross per 24 hour   Intake 510 ml   Output 1530 ml   Net -1020 ml       ECHOCARDIOGRAM Reviewed: Yes   Patient's Ejection Fraction (EF) is less than or equal to 40%. Discuss HFrEF Guideline Directed Medical Therapy (GDMT) with Cardiologist or Hospitalist:          Medications Reviewed: Yes   SCHEDULED HOSPITAL MEDICATIONS:   predniSONE  20 mg Oral Daily    apixaban  5 mg Oral BID    famotidine  20 mg Oral BID    insulin lispro  0-8 Units SubCUTAneous Q4H    ipratropium 0.5 mg-albuterol 2.5 mg  1 Dose Inhalation BID RT    metoprolol succinate  12.5 mg Oral Daily    amiodarone  100 mg Oral Daily    sacubitril-valsartan  1 tablet Oral BID    [Held by provider] spironolactone  25 mg Oral Daily    atorvastatin  40 mg Oral Nightly    buprenorphine-naloxone  1 tablet SubLINGual BID    sodium chloride flush  5-40 mL IntraVENous 2 times per day     HOME MEDICATIONS:  Prior to Admission medications    Medication Sig Start Date End Date Taking? Authorizing Provider   amiodarone (CORDARONE) 200 MG tablet 2 bid - 5 days, 2 qd- 1 week, 1 qd thereafter 1/31/25   Brittany Shin MD   empagliflozin (JARDIANCE) 10 MG tablet Take 1 tablet by mouth daily 1/31/25   Brittany Shin MD   apixaban (ELIQUIS) 5 MG TABS tablet Take 1 tablet by mouth 2 times daily 1/31/25   Brittany Shin MD   metoprolol succinate (TOPROL XL)

## 2025-02-28 NOTE — PLAN OF CARE
optimal renal function maintained:   Monitor labs and assess for signs and symptoms of volume excess or deficit   Monitor intake, output and patient weight  Goal: Glucose maintained within prescribed range  Outcome: Progressing     Problem: Hematologic - Adult  Goal: Maintains hematologic stability  Outcome: Progressing  Flowsheets (Taken 2/27/2025 2048)  Maintains hematologic stability: Assess for signs and symptoms of bleeding or hemorrhage     Problem: ABCDS Injury Assessment  Goal: Absence of physical injury  Outcome: Progressing     Problem: Nutrition Deficit:  Goal: Optimize nutritional status  Outcome: Progressing     Problem: Anxiety  Goal: Will report anxiety at manageable levels  Description: INTERVENTIONS:  1. Administer medication as ordered  2. Teach and rehearse alternative coping skills  3. Provide emotional support with 1:1 interaction with staff  Outcome: Progressing     Problem: Coping  Goal: Pt/Family able to verbalize concerns and demonstrate effective coping strategies  Description: INTERVENTIONS:  1. Assist patient/family to identify coping skills, available support systems and cultural and spiritual values  2. Provide emotional support, including active listening and acknowledgement of concerns of patient and caregivers  3. Reduce environmental stimuli, as able  4. Instruct patient/family in relaxation techniques, as appropriate  5. Assess for spiritual pain/suffering and initiate Spiritual Care, Psychosocial Clinical Specialist consults as needed  Outcome: Progressing     Problem: Chronic Conditions and Co-morbidities  Goal: Patient's chronic conditions and co-morbidity symptoms are monitored and maintained or improved  Outcome: Progressing  Flowsheets (Taken 2/27/2025 2048)  Care Plan - Patient's Chronic Conditions and Co-Morbidity Symptoms are Monitored and Maintained or Improved: Monitor and assess patient's chronic conditions and comorbid symptoms for stability, deterioration, or  improvement     Problem: Neurosensory - Adult  Goal: Achieves stable or improved neurological status  Outcome: Progressing  Flowsheets (Taken 2/27/2025 2048)  Achieves stable or improved neurological status: Assess for and report changes in neurological status  Goal: Achieves maximal functionality and self care  Outcome: Progressing     Problem: Musculoskeletal - Adult  Goal: Return mobility to safest level of function  Outcome: Progressing  Flowsheets (Taken 2/27/2025 2048)  Return Mobility to Safest Level of Function: Assess patient stability and activity tolerance for standing, transferring and ambulating with or without assistive devices  Goal: Return ADL status to a safe level of function  Outcome: Progressing  Flowsheets (Taken 2/27/2025 2048)  Return ADL Status to a Safe Level of Function: Administer medication as ordered

## 2025-02-28 NOTE — FLOWSHEET NOTE
02/27/25 2044   Vital Signs   Temp 98 °F (36.7 °C)   Temp Source Oral   Pulse 66   Heart Rate Source Monitor   Respirations 20   BP (!) 104/54   MAP (Calculated) 71   BP Location Left upper arm   BP Method Automatic   Pain Assessment   Pain Assessment None - Denies Pain   Care Plan - Pain Goals   Verbalizes/displays adequate comfort level or baseline comfort level Encourage patient to monitor pain and request assistance   Opioid-Induced Sedation   POSS Score 1   RASS   Mathews Agitation Sedation Scale (RASS) 0   Oxygen Therapy   SpO2 93 %   O2 Device High flow nasal cannula   O2 Flow Rate (L/min) 5 L/min     Patient alert and oriented x4 resting in bed, watching tv. With telemetry and continuous pulse ox.  Assessment completed. Patient tolerated night meds well. Call light and bedside table within reach. Bed at lowest position, locked, side rails x2, bed alarm on. Pt denies needs at this time.

## 2025-02-28 NOTE — PROGRESS NOTES
Bedside report and transfer of care given to TARIQ Kaur. Pt currently resting in bed with the call light within reach. Pt denies any other care needs at this time. Pt stable at this time.

## 2025-02-28 NOTE — PROGRESS NOTES
Patient currently resting in bed. Sleeping on and off.   Denies any needs. No signs of distress.  Call light and bedside table within reach.

## 2025-02-28 NOTE — PROGRESS NOTES
Patient appearing very anxious. C/o trouble breathing, very restless and seems agitated today.   Valium given at 0912

## 2025-03-01 LAB
ANION GAP SERPL CALCULATED.3IONS-SCNC: 6 MMOL/L (ref 3–16)
ANISOCYTOSIS BLD QL SMEAR: ABNORMAL
BASOPHILS # BLD: 0 K/UL (ref 0–0.2)
BASOPHILS NFR BLD: 0 %
BUN SERPL-MCNC: 29 MG/DL (ref 7–20)
CALCIUM SERPL-MCNC: 7.8 MG/DL (ref 8.3–10.6)
CHLORIDE SERPL-SCNC: 99 MMOL/L (ref 99–110)
CO2 SERPL-SCNC: 32 MMOL/L (ref 21–32)
CREAT SERPL-MCNC: 0.7 MG/DL (ref 0.9–1.3)
DEPRECATED RDW RBC AUTO: 13.9 % (ref 12.4–15.4)
EOSINOPHIL # BLD: 0.2 K/UL (ref 0–0.6)
EOSINOPHIL NFR BLD: 1 %
GFR SERPLBLD CREATININE-BSD FMLA CKD-EPI: >90 ML/MIN/{1.73_M2}
GLUCOSE BLD-MCNC: 108 MG/DL (ref 70–99)
GLUCOSE BLD-MCNC: 109 MG/DL (ref 70–99)
GLUCOSE BLD-MCNC: 112 MG/DL (ref 70–99)
GLUCOSE BLD-MCNC: 116 MG/DL (ref 70–99)
GLUCOSE BLD-MCNC: 164 MG/DL (ref 70–99)
GLUCOSE BLD-MCNC: 195 MG/DL (ref 70–99)
GLUCOSE SERPL-MCNC: 135 MG/DL (ref 70–99)
HCT VFR BLD AUTO: 28.9 % (ref 40.5–52.5)
HGB BLD-MCNC: 9.6 G/DL (ref 13.5–17.5)
HYPOCHROMIA BLD QL SMEAR: ABNORMAL
LYMPHOCYTES # BLD: 2.1 K/UL (ref 1–5.1)
LYMPHOCYTES NFR BLD: 13 %
MCH RBC QN AUTO: 29.7 PG (ref 26–34)
MCHC RBC AUTO-ENTMCNC: 33.4 G/DL (ref 31–36)
MCV RBC AUTO: 89 FL (ref 80–100)
METAMYELOCYTES NFR BLD MANUAL: 4 %
MONOCYTES # BLD: 0.7 K/UL (ref 0–1.3)
MONOCYTES NFR BLD: 4 %
NEUTROPHILS # BLD: 13.4 K/UL (ref 1.7–7.7)
NEUTROPHILS NFR BLD: 73 %
NEUTS BAND NFR BLD MANUAL: 5 % (ref 0–7)
OVALOCYTES BLD QL SMEAR: ABNORMAL
PERFORMED ON: ABNORMAL
PLATELET # BLD AUTO: 278 K/UL (ref 135–450)
PLATELET BLD QL SMEAR: ABNORMAL
PMV BLD AUTO: 8.9 FL (ref 5–10.5)
POTASSIUM SERPL-SCNC: 4.1 MMOL/L (ref 3.5–5.1)
RBC # BLD AUTO: 3.25 M/UL (ref 4.2–5.9)
SLIDE REVIEW: ABNORMAL
SODIUM SERPL-SCNC: 137 MMOL/L (ref 136–145)
STOMATOCYTES BLD QL SMEAR: ABNORMAL
TOXIC GRANULES BLD QL SMEAR: PRESENT
WBC # BLD AUTO: 16.3 K/UL (ref 4–11)

## 2025-03-01 PROCEDURE — 2500000003 HC RX 250 WO HCPCS: Performed by: INTERNAL MEDICINE

## 2025-03-01 PROCEDURE — 99232 SBSQ HOSP IP/OBS MODERATE 35: CPT | Performed by: INTERNAL MEDICINE

## 2025-03-01 PROCEDURE — 6370000000 HC RX 637 (ALT 250 FOR IP): Performed by: INTERNAL MEDICINE

## 2025-03-01 PROCEDURE — 94660 CPAP INITIATION&MGMT: CPT

## 2025-03-01 PROCEDURE — 80048 BASIC METABOLIC PNL TOTAL CA: CPT

## 2025-03-01 PROCEDURE — 36415 COLL VENOUS BLD VENIPUNCTURE: CPT

## 2025-03-01 PROCEDURE — 2700000000 HC OXYGEN THERAPY PER DAY

## 2025-03-01 PROCEDURE — 2060000000 HC ICU INTERMEDIATE R&B

## 2025-03-01 PROCEDURE — 85025 COMPLETE CBC W/AUTO DIFF WBC: CPT

## 2025-03-01 PROCEDURE — 94640 AIRWAY INHALATION TREATMENT: CPT

## 2025-03-01 PROCEDURE — 6360000002 HC RX W HCPCS: Performed by: INTERNAL MEDICINE

## 2025-03-01 PROCEDURE — 94761 N-INVAS EAR/PLS OXIMETRY MLT: CPT

## 2025-03-01 PROCEDURE — 94669 MECHANICAL CHEST WALL OSCILL: CPT

## 2025-03-01 RX ADMIN — SACUBITRIL AND VALSARTAN 1 TABLET: 24; 26 TABLET, FILM COATED ORAL at 12:42

## 2025-03-01 RX ADMIN — FAMOTIDINE 20 MG: 20 TABLET, FILM COATED ORAL at 09:22

## 2025-03-01 RX ADMIN — APIXABAN 5 MG: 5 TABLET, FILM COATED ORAL at 09:21

## 2025-03-01 RX ADMIN — PREDNISONE 20 MG: 20 TABLET ORAL at 09:21

## 2025-03-01 RX ADMIN — SALINE NASAL SPRAY 1 SPRAY: 1.5 SOLUTION NASAL at 13:17

## 2025-03-01 RX ADMIN — INSULIN LISPRO 2 UNITS: 100 INJECTION, SOLUTION INTRAVENOUS; SUBCUTANEOUS at 16:24

## 2025-03-01 RX ADMIN — BUPRENORPHINE HYDROCHLORIDE AND NALOXONE HYDROCHLORIDE DIHYDRATE 1 TABLET: 8; 2 TABLET SUBLINGUAL at 09:21

## 2025-03-01 RX ADMIN — ATORVASTATIN CALCIUM 40 MG: 40 TABLET, FILM COATED ORAL at 21:17

## 2025-03-01 RX ADMIN — AMIODARONE HYDROCHLORIDE 100 MG: 200 TABLET ORAL at 09:21

## 2025-03-01 RX ADMIN — IPRATROPIUM BROMIDE AND ALBUTEROL SULFATE 1 DOSE: .5; 2.5 SOLUTION RESPIRATORY (INHALATION) at 19:26

## 2025-03-01 RX ADMIN — IPRATROPIUM BROMIDE AND ALBUTEROL SULFATE 1 DOSE: .5; 2.5 SOLUTION RESPIRATORY (INHALATION) at 07:53

## 2025-03-01 RX ADMIN — APIXABAN 5 MG: 5 TABLET, FILM COATED ORAL at 21:17

## 2025-03-01 RX ADMIN — SODIUM CHLORIDE, PRESERVATIVE FREE 10 ML: 5 INJECTION INTRAVENOUS at 09:22

## 2025-03-01 RX ADMIN — SALINE NASAL SPRAY 1 SPRAY: 1.5 SOLUTION NASAL at 21:44

## 2025-03-01 RX ADMIN — METOPROLOL SUCCINATE 12.5 MG: 25 TABLET, EXTENDED RELEASE ORAL at 12:42

## 2025-03-01 RX ADMIN — SODIUM CHLORIDE, PRESERVATIVE FREE 10 ML: 5 INJECTION INTRAVENOUS at 21:20

## 2025-03-01 RX ADMIN — IPRATROPIUM BROMIDE AND ALBUTEROL SULFATE 1 DOSE: .5; 2.5 SOLUTION RESPIRATORY (INHALATION) at 12:49

## 2025-03-01 RX ADMIN — DIAZEPAM 5 MG: 5 TABLET ORAL at 21:42

## 2025-03-01 RX ADMIN — SALINE NASAL SPRAY 1 SPRAY: 1.5 SOLUTION NASAL at 19:39

## 2025-03-01 RX ADMIN — BENZONATATE 100 MG: 100 CAPSULE ORAL at 22:02

## 2025-03-01 RX ADMIN — BUPRENORPHINE HYDROCHLORIDE AND NALOXONE HYDROCHLORIDE DIHYDRATE 1 TABLET: 8; 2 TABLET SUBLINGUAL at 21:17

## 2025-03-01 RX ADMIN — FAMOTIDINE 20 MG: 20 TABLET, FILM COATED ORAL at 21:17

## 2025-03-01 NOTE — PROGRESS NOTES
BP (!) 102/55   Pulse 68   Temp 97.6 °F (36.4 °C) (Oral)   Resp 22   Ht 1.78 m (5' 10.08\")   Wt 54.9 kg (121 lb)   SpO2 96%   BMI 17.32 kg/m²     Patient alert and oriented x4, resting in bed, watching tv. With telemetry. With O2 at 6 lpm via high flow with humidification.. Assessment completed. Respiration easy, even and unlabored. Patient tolerated night meds well. Call light and bedside table within reach. Bed at lowest position, locked, side rails x2, bed alarm on. Pt denies needs at this time.

## 2025-03-01 NOTE — PROGRESS NOTES
Pulmonary & Critical Care Medicine ICU Progress Note    CC: PNA    Events of Last 24 hours:   Appears comfortable  O2 trending down 10---> 8--->5 L   No hemoptysis       Invasive Lines: PIVs - Right femoral cvc- removed 2/10    MV:  2/5/25-2/9        Intake/Output Summary (Last 24 hours) at 3/1/2025 0750  Last data filed at 3/1/2025 0249  Gross per 24 hour   Intake 1124 ml   Output 450 ml   Net 674 ml       Vent Mode: AC/VC Resp Rate (Set): 22 bpm/Vt (Set, mL): 450 mL/ /FiO2 : 50 %  No results for input(s): \"PHART\", \"BQT5LRP\", \"PO2ART\" in the last 72 hours.      IV:   dextrose      sodium chloride 5 mL/hr at 02/10/25 0629       Vitals:  BP (!) 95/52   Pulse 58   Temp 97.7 °F (36.5 °C) (Oral)   Resp 20   Ht 1.78 m (5' 10.08\")   Wt 54.9 kg (121 lb)   SpO2 98%   BMI 17.32 kg/m²   on 4.5 L  Constitutional:  No acute distress. .   HEENT: no scleral icterus  Neck: No tracheal deviation present.    Cardiovascular: Normal heart sounds.   Pulmonary/Chest: No wheezes. Few rhonchi. Few  rales. No decreased breath sounds.  No accessory muscle usage or stridor.   Abdominal: Soft.   Musculoskeletal: No cyanosis. No clubbing.  Skin: Skin is warm and dry.        Scheduled Meds:   predniSONE  20 mg Oral Daily    apixaban  5 mg Oral BID    famotidine  20 mg Oral BID    insulin lispro  0-8 Units SubCUTAneous Q4H    ipratropium 0.5 mg-albuterol 2.5 mg  1 Dose Inhalation BID RT    metoprolol succinate  12.5 mg Oral Daily    amiodarone  100 mg Oral Daily    sacubitril-valsartan  1 tablet Oral BID    [Held by provider] spironolactone  25 mg Oral Daily    atorvastatin  40 mg Oral Nightly    buprenorphine-naloxone  1 tablet SubLINGual BID    sodium chloride flush  5-40 mL IntraVENous 2 times per day     PRN Meds:  benzonatate, diazePAM, ipratropium 0.5 mg-albuterol 2.5 mg, prochlorperazine **OR** prochlorperazine, glucose, dextrose bolus **OR** dextrose bolus, glucagon (rDNA), dextrose, sodium chloride flush, sodium chloride,  potassium chloride **OR** potassium alternative oral replacement **OR** potassium chloride, magnesium sulfate, polyethylene glycol, acetaminophen **OR** acetaminophen    Results:  CBC:   Recent Labs     02/28/25  0423 02/28/25  0816 03/01/25  0435   WBC 5.9 19.2* 16.3*   HGB 20.8* 11.7* 9.6*   HCT 62.1* 34.9* 28.9*   MCV 88.6 88.9 89.0   PLT see below 349 278     BMP:   Recent Labs     02/27/25  0558 02/28/25  0423 03/01/25  0435   * 138 137   K 4.3 4.4 4.1   CL 97* 99 99   CO2 29 32 32   BUN 30* 34* 29*   CREATININE 0.6* 0.9 0.7*     LIVER PROFILE:   No results for input(s): \"AST\", \"ALT\", \"LIPASE\", \"AMYLASE\", \"BILIDIR\", \"BILITOT\", \"ALKPHOS\" in the last 72 hours.    Invalid input(s): \"ALB\"      Cultures:  2/5/25 blood cx NGTD  2/6/25 Resp cx: ngtd  2/5/25 urine cx negative  2/12 BAL NGTD       Films:  Chest x-ray 2/22   Persistent bilateral pulmonary infiltrates    CTPA 2/5  1. Worsening diffuse bilateral airspace disease favoring aspiration pneumonia  rather than pulmonary edema.  2. Interstitial edema.  3. Trace left pleural effusion.  4. Mediastinal adenopathy, likely reactive.         ASSESSMENT:  Severe acute hypoxic respiratory failure required MV/Prone  Aspiration pneumonia - mucus plugs on bronch 2/12  Probable COPD with AE   Recurrent hemoptysis- pulmonary hemorrhage on BAL 2/12- none now. Probably pulmonary edema in the setting of AC  Cardiomyopathy of unknown etiology present on admission and EF 30 to 35% with global hypokinesis  History of Afib on Eliquis and Amiodarone   30 pack year smoking   Methamphetamine use on Suboxone      PLAN:  Supplemental oxygen to maintain SaO2 >92%; wean as tolerated  BiPAP QHS and PRN during during the day   Inhaled bronchodilators  Prednisone 20 mg po daily -quick taper at discharge  Completed Zosyn IV D#7, Augmentin D#2, completed Levaquin D#3, completed Zyvox, cefepime and Flagyl for 3 days.   Home Eliquis   DC plan is okay with pulmonary

## 2025-03-01 NOTE — PROGRESS NOTES
Transferred care to TARIQ Bonilla. Face to face bedside report given, no need voiced at this time. Pt in bed with eyes closed.    No signs of distress noted.  Call light within reach.   Denies needs.

## 2025-03-01 NOTE — PROGRESS NOTES
02/28/25 1900   RT Protocol   History Pulmonary Disease 2   Respiratory pattern 0   Breath sounds 2   Cough 0   Indications for Bronchodilator Therapy On home bronchodilators   Bronchodilator Assessment Score 4     RT Inhaler-Nebulizer Bronchodilator Protocol Note    There is a bronchodilator order in the chart from a provider indicating to follow the RT Bronchodilator Protocol and there is an “Initiate RT Inhaler-Nebulizer Bronchodilator Protocol” order as well (see protocol at bottom of note).    CXR Findings:  No results found.    The findings from the last RT Protocol Assessment were as follows:   History Pulmonary Disease: Chronic pulmonary disease  Respiratory Pattern: Regular pattern and RR 12-20 bpm  Breath Sounds: Slightly diminished and/or crackles  Cough: Strong, spontaneous, non-productive  Indication for Bronchodilator Therapy: On home bronchodilators  Bronchodilator Assessment Score: 4    Aerosolized bronchodilator medication orders have been revised according to the RT Inhaler-Nebulizer Bronchodilator Protocol below.    Respiratory Therapist to perform RT Therapy Protocol Assessment initially then follow the protocol.  Repeat RT Therapy Protocol Assessment PRN for score 0-3 or on second treatment, BID, and PRN for scores above 3.    No Indications - adjust the frequency to every 6 hours PRN wheezing or bronchospasm, if no treatments needed after 48 hours then discontinue using Per Protocol order mode.     If indication present, adjust the RT bronchodilator orders based on the Bronchodilator Assessment Score as indicated below.  Use Inhaler orders unless patient has one or more of the following: on home nebulizer, not able to hold breath for 10 seconds, is not alert and oriented, cannot activate and use MDI correctly, or respiratory rate 25 breaths per minute or more, then use the equivalent nebulizer order(s) with same Frequency and PRN reasons based on the score.  If a patient is on this  medication at home then do not decrease Frequency below that used at home.    0-3 - enter or revise RT bronchodilator order(s) to equivalent RT Bronchodilator order with Frequency of every 4 hours PRN for wheezing or increased work of breathing using Per Protocol order mode.        4-6 - enter or revise RT Bronchodilator order(s) to two equivalent RT bronchodilator orders with one order with BID Frequency and one order with Frequency of every 4 hours PRN wheezing or increased work of breathing using Per Protocol order mode.        7-10 - enter or revise RT Bronchodilator order(s) to two equivalent RT bronchodilator orders with one order with TID Frequency and one order with Frequency of every 4 hours PRN wheezing or increased work of breathing using Per Protocol order mode.       11-13 - enter or revise RT Bronchodilator order(s) to one equivalent RT bronchodilator order with QID Frequency and an Albuterol order with Frequency of every 4 hours PRN wheezing or increased work of breathing using Per Protocol order mode.      Greater than 13 - enter or revise RT Bronchodilator order(s) to one equivalent RT bronchodilator order with every 4 hours Frequency and an Albuterol order with Frequency of every 2 hours PRN wheezing or increased work of breathing using Per Protocol order mode.       Electronically signed by Jefferson Quigley RCP on 2/28/2025 at 7:28 PM

## 2025-03-01 NOTE — FLOWSHEET NOTE
02/28/25 2029   Vital Signs   Temp 97.6 °F (36.4 °C)   Temp Source Oral   Pulse 68   Heart Rate Source Monitor   Respirations 22   BP (!) 102/55   MAP (Calculated) 71   BP Location Right upper arm   BP Method Automatic   Patient Position Semi fowlers   Pain Assessment   Pain Assessment None - Denies Pain   Care Plan - Pain Goals   Verbalizes/displays adequate comfort level or baseline comfort level Encourage patient to monitor pain and request assistance   Opioid-Induced Sedation   POSS Score 1   RASS   Mathews Agitation Sedation Scale (RASS) 0   Oxygen Therapy   SpO2 96 %   O2 Device High flow nasal cannula   O2 Flow Rate (L/min) 6 L/min       Patient alert and oriented x4, resting in bed, watching tv. With telemetry. With O2 at 6 lpm via high flow with humidification.. Assessment completed. Respiration easy, even and unlabored. Patient tolerated night meds well. Call light and bedside table within reach. Bed at lowest position, locked, side rails x2, bed alarm on. Pt denies needs at this time.

## 2025-03-01 NOTE — PLAN OF CARE
Problem: Respiratory - Adult  Goal: Achieves optimal ventilation and oxygenation  3/1/2025 1141 by Irene Parker RN  Outcome: Progressing  Flowsheets (Taken 3/1/2025 1141)  Achieves optimal ventilation and oxygenation:   Assess for changes in respiratory status   Position to facilitate oxygenation and minimize respiratory effort   Assess for changes in mentation and behavior     Problem: Cardiovascular - Adult  Goal: Maintains optimal cardiac output and hemodynamic stability  3/1/2025 1141 by Irene Parker RN  Outcome: Progressing  Flowsheets (Taken 3/1/2025 1141)  Maintains optimal cardiac output and hemodynamic stability:   Monitor blood pressure and heart rate   Monitor urine output and notify Licensed Independent Practitioner for values outside of normal range   Assess for signs of decreased cardiac output     HEART FAILURE CARE PLAN:    Comorbidities Reviewed: Yes   Patient has a past medical history of Back pain, chronic, Chronic back pain, Hypertension, MI (myocardial infarction) (HCC), and MRSA (methicillin resistant staph aureus) culture positive.     Weights Reviewed: Yes   Admission weight: 64.3 kg (141 lb 12.8 oz)   Wt Readings from Last 3 Encounters:   02/28/25 54.9 kg (121 lb)   02/01/25 62.3 kg (137 lb 4.8 oz)   07/09/24 56.3 kg (124 lb 1.6 oz)     Intake & Output Reviewed: Yes     Intake/Output Summary (Last 24 hours) at 3/1/2025 1142  Last data filed at 3/1/2025 0929  Gross per 24 hour   Intake 1034 ml   Output 450 ml   Net 584 ml       ECHOCARDIOGRAM Reviewed: Yes   Patient's Ejection Fraction (EF) is 30-35%    Medications Reviewed: Yes   SCHEDULED HOSPITAL MEDICATIONS:   predniSONE  20 mg Oral Daily    apixaban  5 mg Oral BID    famotidine  20 mg Oral BID    insulin lispro  0-8 Units SubCUTAneous Q4H    ipratropium 0.5 mg-albuterol 2.5 mg  1 Dose Inhalation BID RT    metoprolol succinate  12.5 mg Oral Daily    amiodarone  100 mg Oral Daily    sacubitril-valsartan  1 tablet Oral BID     Family's stated Goal of Care this Admission: Reduce shortness of breath, increase activity tolerance, better understand heart failure and disease management, be more comfortable, and reduce lower extremity edema prior to discharge.     Electronically signed by Irene Parker RN on 3/1/2025 at 11:42 AM

## 2025-03-01 NOTE — PLAN OF CARE
Problem: Discharge Planning  Goal: Discharge to home or other facility with appropriate resources  Outcome: Progressing     Problem: Safety - Adult  Goal: Free from fall injury  3/1/2025 0047 by Lenard Calix RN  Outcome: Progressing     Problem: Pain  Goal: Verbalizes/displays adequate comfort level or baseline comfort level  Outcome: Progressing  Flowsheets (Taken 2/28/2025 2029)  Verbalizes/displays adequate comfort level or baseline comfort level: Encourage patient to monitor pain and request assistance     Problem: Skin/Tissue Integrity  Goal: Skin integrity remains intact  Description: 1.  Monitor for areas of redness and/or skin breakdown  2.  Assess vascular access sites hourly  3.  Every 4-6 hours minimum:  Change oxygen saturation probe site  4.  Every 4-6 hours:  If on nasal continuous positive airway pressure, respiratory therapy assess nares and determine need for appliance change or resting period  Outcome: Progressing  Flowsheets (Taken 2/28/2025 2034)  Skin Integrity Remains Intact: Monitor for areas of redness and/or skin breakdown     Problem: Respiratory - Adult  Goal: Achieves optimal ventilation and oxygenation  Outcome: Progressing  Flowsheets (Taken 2/28/2025 2034)  Achieves optimal ventilation and oxygenation:   Assess for changes in respiratory status   Assess for changes in mentation and behavior   Position to facilitate oxygenation and minimize respiratory effort   Oxygen supplementation based on oxygen saturation or arterial blood gases   Assess and instruct to report shortness of breath or any respiratory difficulty     Problem: Cardiovascular - Adult  Goal: Maintains optimal cardiac output and hemodynamic stability  Outcome: Progressing  Flowsheets (Taken 2/28/2025 2034)  Maintains optimal cardiac output and hemodynamic stability:   Monitor blood pressure and heart rate   Monitor urine output and notify Licensed Independent Practitioner for values outside of normal range  Goal:  Absence of cardiac dysrhythmias or at baseline  Outcome: Progressing     Problem: Skin/Tissue Integrity - Adult  Goal: Skin integrity remains intact  Description: 1.  Monitor for areas of redness and/or skin breakdown  2.  Assess vascular access sites hourly  3.  Every 4-6 hours minimum:  Change oxygen saturation probe site  4.  Every 4-6 hours:  If on nasal continuous positive airway pressure, respiratory therapy assess nares and determine need for appliance change or resting period  Outcome: Progressing  Flowsheets (Taken 2/28/2025 2034)  Skin Integrity Remains Intact: Monitor for areas of redness and/or skin breakdown  Goal: Incisions, wounds, or drain sites healing without S/S of infection  Outcome: Progressing  Goal: Oral mucous membranes remain intact  Outcome: Progressing     Problem: Infection - Adult  Goal: Absence of infection at discharge  Outcome: Progressing  Goal: Absence of infection during hospitalization  Outcome: Progressing  Goal: Absence of fever/infection during anticipated neutropenic period  Outcome: Progressing     Problem: Metabolic/Fluid and Electrolytes - Adult  Goal: Electrolytes maintained within normal limits  Outcome: Progressing  Goal: Hemodynamic stability and optimal renal function maintained  Outcome: Progressing  Goal: Glucose maintained within prescribed range  Outcome: Progressing     Problem: Hematologic - Adult  Goal: Maintains hematologic stability  Outcome: Progressing     Problem: ABCDS Injury Assessment  Goal: Absence of physical injury  Outcome: Progressing     Problem: Nutrition Deficit:  Goal: Optimize nutritional status  Outcome: Progressing  Flowsheets (Taken 2/28/2025 1740 by Tg Barksdale RD, LD)  Nutrient intake appropriate for improving, restoring, or maintaining nutritional needs:   Assess nutritional status and recommend course of action   Monitor oral intake, labs, and treatment plans   Recommend appropriate diets, oral nutritional supplements, and

## 2025-03-01 NOTE — FLOWSHEET NOTE
03/01/25 1051   Vitals   Temp 97.4 °F (36.3 °C)   Temp Source Oral   Pulse 66   Heart Rate Source Monitor   Respirations 20   BP (!) 93/57   MAP (Calculated) 69   BP Location Left upper arm   BP Upper/Lower Upper   BP Method Automatic   Patient Position High fowlers   Pain Assessment   Pain Assessment None - Denies Pain   Oxygen Therapy   SpO2 93 %   O2 Device High flow nasal cannula   O2 Flow Rate (L/min) 5 L/min       Patient rests in bed without complaint of shortness of breath or pain.  Taking PO well; denies nausea.

## 2025-03-01 NOTE — PROGRESS NOTES
02/28/25 2249   NIV Type   Equipment Type v60   Mode Bilevel   Mask Type Full face mask   Mask Size Medium   Settings/Measurements   IPAP 12 cmH20   CPAP/EPAP 8 cmH2O   Vt (Measured) 582 mL   Rate Ordered 18   FiO2  50 %   Minute Volume (L/min) 8.9 Liters   Mask Leak (lpm) 11 lpm   Patient's Home Machine No   Patient Observation   Patient Observations spo2 98% on 50% bipap

## 2025-03-01 NOTE — PROGRESS NOTES
IM Progress Note    Admit Date:  2/5/2025      Interval history   59 y.o. male who presented to [facility] with septic shock.  PMHx significant for polysubstance use-methamphetamine, A-fib, valvular disease, on Eliquis on amiodarone, MRSA, cardiomyopathy       Initially admitted on vent support with pneumonia, septic shock,  -s/p extubation but remained on vapotherm for severe persistent hypoxia   Hypoxemia has slowly improved.  Patient currently on 6 L high flow oxygen has been transferred out of ICU to  PCU.   -Was on Precedex drip in the ICU.     Subjective:  Patient seen.   Oxygen saturations remained stable on 6 to 7 L of oxygen.  Patient is working with therapy.    Pre-CERT pending for discharge to SNF    Objective:   Patient Vitals for the past 4 hrs:   BP Temp Temp src Pulse Resp SpO2   03/01/25 1535 (!) 99/58 97.8 °F (36.6 °C) Oral 66 18 97 %          Intake/Output Summary (Last 24 hours) at 3/1/2025 1652  Last data filed at 3/1/2025 1625  Gross per 24 hour   Intake 1036 ml   Output 1150 ml   Net -114 ml       Physical Exam:    General:  middle aged male, up in bed.  Awake alert and oriented and in no distress.  Mucous Membranes:  Pink , anicteric  Neck: No JVD, no carotid bruit, no thyromegaly  Chest: Diminished breath sounds bilaterally;  no wheezes rales or rhonchi.   Cardiovascular: RRR S1S2 heard, no murmurs or gallops  Abdomen:  Soft, undistended, non tender, no organomegaly, BS present  Extremities: No edema or cyanosis. Distal pulses well felt  Neurological : grossly normal,  Cooperative, nonfocal    Medications:  predniSONE, 20 mg, Daily  apixaban, 5 mg, BID  famotidine, 20 mg, BID  insulin lispro, 0-8 Units, Q4H  ipratropium 0.5 mg-albuterol 2.5 mg, 1 Dose, BID RT  metoprolol succinate, 12.5 mg, Daily  amiodarone, 100 mg, Daily  sacubitril-valsartan, 1 tablet, BID  [Held by provider] spironolactone, 25 mg, Daily  atorvastatin, 40 mg, Nightly  buprenorphine-naloxone, 1 tablet, BID  sodium  chloride flush, 5-40 mL, 2 times per day      PRN Medications:  sodium chloride, 1 spray, Q2H PRN  benzonatate, 100 mg, TID PRN  diazePAM, 5 mg, Q4H PRN  ipratropium 0.5 mg-albuterol 2.5 mg, 1 Dose, Q4H PRN  prochlorperazine, 10 mg, Q8H PRN   Or  prochlorperazine, 10 mg, Q6H PRN  glucose, 4 tablet, PRN  dextrose bolus, 125 mL, PRN   Or  dextrose bolus, 250 mL, PRN  glucagon (rDNA), 1 mg, PRN  dextrose, , Continuous PRN  sodium chloride flush, 10 mL, PRN  sodium chloride, , PRN  potassium chloride, 40 mEq, PRN   Or  potassium alternative oral replacement, 40 mEq, PRN   Or  potassium chloride, 10 mEq, PRN  magnesium sulfate, 1,000 mg, PRN  polyethylene glycol, 17 g, Daily PRN  acetaminophen, 650 mg, Q6H PRN   Or  acetaminophen, 650 mg, Q6H PRN          Data:  CBC:   Recent Labs     02/28/25  0423 02/28/25  0816 03/01/25  0435   WBC 5.9 19.2* 16.3*   HGB 20.8* 11.7* 9.6*   HCT 62.1* 34.9* 28.9*   MCV 88.6 88.9 89.0   PLT see below 349 278   2/28 early AM CBC - lab error-. Rechecked .   BMP:   Recent Labs     02/27/25  0558 02/28/25  0423 03/01/25  0435   * 138 137   K 4.3 4.4 4.1   CL 97* 99 99   CO2 29 32 32   BUN 30* 34* 29*   CREATININE 0.6* 0.9 0.7*     LIVER PROFILE: No results for input(s): \"AST\", \"ALT\", \"LIPASE\", \"AMYLASE\", \"BILIDIR\", \"BILITOT\", \"ALKPHOS\" in the last 72 hours.    Invalid input(s): \"ALB\"  PT/INR: No results for input(s): \"PROTIME\", \"INR\" in the last 72 hours.    CULTURES  Results       ** No results found for the last 336 hours. **               RADIOLOGY  XR CHEST PORTABLE   Final Result   Persistent bilateral pulmonary infiltrates. No appreciable change in the   aeration of the lungs when compared to the prior exam.         XR CHEST PORTABLE   Final Result   Persistent diffuse bilateral airspace disease with slightly improved aeration   of the upper lung zones compared to prior exam.         XR CHEST PORTABLE   Final Result   Redemonstration of diffuse bilateral airspace disease, with

## 2025-03-02 VITALS
WEIGHT: 121 LBS | BODY MASS INDEX: 17.32 KG/M2 | OXYGEN SATURATION: 93 % | HEIGHT: 70 IN | SYSTOLIC BLOOD PRESSURE: 109 MMHG | DIASTOLIC BLOOD PRESSURE: 71 MMHG | RESPIRATION RATE: 20 BRPM | HEART RATE: 68 BPM | TEMPERATURE: 98.2 F

## 2025-03-02 LAB
ANION GAP SERPL CALCULATED.3IONS-SCNC: 4 MMOL/L (ref 3–16)
ANISOCYTOSIS BLD QL SMEAR: ABNORMAL
BASOPHILS # BLD: 0 K/UL (ref 0–0.2)
BASOPHILS NFR BLD: 0 %
BUN SERPL-MCNC: 31 MG/DL (ref 7–20)
CALCIUM SERPL-MCNC: 7.9 MG/DL (ref 8.3–10.6)
CHLORIDE SERPL-SCNC: 98 MMOL/L (ref 99–110)
CO2 SERPL-SCNC: 35 MMOL/L (ref 21–32)
CREAT SERPL-MCNC: 0.7 MG/DL (ref 0.9–1.3)
DEPRECATED RDW RBC AUTO: 13.8 % (ref 12.4–15.4)
EOSINOPHIL # BLD: 0 K/UL (ref 0–0.6)
EOSINOPHIL NFR BLD: 0 %
GFR SERPLBLD CREATININE-BSD FMLA CKD-EPI: >90 ML/MIN/{1.73_M2}
GLUCOSE BLD-MCNC: 106 MG/DL (ref 70–99)
GLUCOSE BLD-MCNC: 124 MG/DL (ref 70–99)
GLUCOSE BLD-MCNC: 126 MG/DL (ref 70–99)
GLUCOSE BLD-MCNC: 143 MG/DL (ref 70–99)
GLUCOSE BLD-MCNC: 155 MG/DL (ref 70–99)
GLUCOSE BLD-MCNC: 157 MG/DL (ref 70–99)
GLUCOSE BLD-MCNC: 173 MG/DL (ref 70–99)
GLUCOSE SERPL-MCNC: 101 MG/DL (ref 70–99)
HCT VFR BLD AUTO: 29.1 % (ref 40.5–52.5)
HGB BLD-MCNC: 9.7 G/DL (ref 13.5–17.5)
HYPOCHROMIA BLD QL SMEAR: ABNORMAL
LYMPHOCYTES # BLD: 3.5 K/UL (ref 1–5.1)
LYMPHOCYTES NFR BLD: 16 %
MCH RBC QN AUTO: 29.3 PG (ref 26–34)
MCHC RBC AUTO-ENTMCNC: 33.3 G/DL (ref 31–36)
MCV RBC AUTO: 87.8 FL (ref 80–100)
MONOCYTES # BLD: 1.1 K/UL (ref 0–1.3)
MONOCYTES NFR BLD: 6 %
NEUTROPHILS # BLD: 13.9 K/UL (ref 1.7–7.7)
NEUTROPHILS NFR BLD: 74 %
NEUTS BAND NFR BLD MANUAL: 1 % (ref 0–7)
OVALOCYTES BLD QL SMEAR: ABNORMAL
PERFORMED ON: ABNORMAL
PLATELET # BLD AUTO: 289 K/UL (ref 135–450)
PLATELET BLD QL SMEAR: ADEQUATE
PMV BLD AUTO: 8.9 FL (ref 5–10.5)
POTASSIUM SERPL-SCNC: 4.7 MMOL/L (ref 3.5–5.1)
RBC # BLD AUTO: 3.31 M/UL (ref 4.2–5.9)
SLIDE REVIEW: ABNORMAL
SODIUM SERPL-SCNC: 137 MMOL/L (ref 136–145)
STOMATOCYTES BLD QL SMEAR: ABNORMAL
TOXIC GRANULES BLD QL SMEAR: PRESENT
VARIANT LYMPHS NFR BLD MANUAL: 3 % (ref 0–6)
WBC # BLD AUTO: 18.5 K/UL (ref 4–11)

## 2025-03-02 PROCEDURE — 6370000000 HC RX 637 (ALT 250 FOR IP): Performed by: INTERNAL MEDICINE

## 2025-03-02 PROCEDURE — 94660 CPAP INITIATION&MGMT: CPT

## 2025-03-02 PROCEDURE — 99233 SBSQ HOSP IP/OBS HIGH 50: CPT

## 2025-03-02 PROCEDURE — 94669 MECHANICAL CHEST WALL OSCILL: CPT

## 2025-03-02 PROCEDURE — 94761 N-INVAS EAR/PLS OXIMETRY MLT: CPT

## 2025-03-02 PROCEDURE — 36415 COLL VENOUS BLD VENIPUNCTURE: CPT

## 2025-03-02 PROCEDURE — 2500000003 HC RX 250 WO HCPCS: Performed by: INTERNAL MEDICINE

## 2025-03-02 PROCEDURE — 85025 COMPLETE CBC W/AUTO DIFF WBC: CPT

## 2025-03-02 PROCEDURE — 6360000002 HC RX W HCPCS: Performed by: INTERNAL MEDICINE

## 2025-03-02 PROCEDURE — 80048 BASIC METABOLIC PNL TOTAL CA: CPT

## 2025-03-02 PROCEDURE — 2700000000 HC OXYGEN THERAPY PER DAY

## 2025-03-02 PROCEDURE — 94640 AIRWAY INHALATION TREATMENT: CPT

## 2025-03-02 PROCEDURE — 99232 SBSQ HOSP IP/OBS MODERATE 35: CPT | Performed by: INTERNAL MEDICINE

## 2025-03-02 PROCEDURE — 2060000000 HC ICU INTERMEDIATE R&B

## 2025-03-02 RX ADMIN — FAMOTIDINE 20 MG: 20 TABLET, FILM COATED ORAL at 08:22

## 2025-03-02 RX ADMIN — FAMOTIDINE 20 MG: 20 TABLET, FILM COATED ORAL at 20:27

## 2025-03-02 RX ADMIN — IPRATROPIUM BROMIDE AND ALBUTEROL SULFATE 1 DOSE: .5; 2.5 SOLUTION RESPIRATORY (INHALATION) at 07:26

## 2025-03-02 RX ADMIN — SACUBITRIL AND VALSARTAN 1 TABLET: 24; 26 TABLET, FILM COATED ORAL at 20:27

## 2025-03-02 RX ADMIN — APIXABAN 5 MG: 5 TABLET, FILM COATED ORAL at 08:22

## 2025-03-02 RX ADMIN — SALINE NASAL SPRAY 1 SPRAY: 1.5 SOLUTION NASAL at 06:54

## 2025-03-02 RX ADMIN — APIXABAN 5 MG: 5 TABLET, FILM COATED ORAL at 20:27

## 2025-03-02 RX ADMIN — IPRATROPIUM BROMIDE AND ALBUTEROL SULFATE 1 DOSE: .5; 2.5 SOLUTION RESPIRATORY (INHALATION) at 20:14

## 2025-03-02 RX ADMIN — SACUBITRIL AND VALSARTAN 1 TABLET: 24; 26 TABLET, FILM COATED ORAL at 08:21

## 2025-03-02 RX ADMIN — BENZONATATE 100 MG: 100 CAPSULE ORAL at 12:58

## 2025-03-02 RX ADMIN — DIAZEPAM 5 MG: 5 TABLET ORAL at 13:43

## 2025-03-02 RX ADMIN — AMIODARONE HYDROCHLORIDE 100 MG: 200 TABLET ORAL at 08:22

## 2025-03-02 RX ADMIN — SALINE NASAL SPRAY 1 SPRAY: 1.5 SOLUTION NASAL at 02:27

## 2025-03-02 RX ADMIN — SODIUM CHLORIDE, PRESERVATIVE FREE 10 ML: 5 INJECTION INTRAVENOUS at 08:26

## 2025-03-02 RX ADMIN — BUPRENORPHINE HYDROCHLORIDE AND NALOXONE HYDROCHLORIDE DIHYDRATE 1 TABLET: 8; 2 TABLET SUBLINGUAL at 08:21

## 2025-03-02 RX ADMIN — ACETAMINOPHEN 650 MG: 325 TABLET ORAL at 08:58

## 2025-03-02 RX ADMIN — DIAZEPAM 5 MG: 5 TABLET ORAL at 23:32

## 2025-03-02 RX ADMIN — METOPROLOL SUCCINATE 12.5 MG: 25 TABLET, EXTENDED RELEASE ORAL at 08:26

## 2025-03-02 RX ADMIN — ATORVASTATIN CALCIUM 40 MG: 40 TABLET, FILM COATED ORAL at 20:27

## 2025-03-02 RX ADMIN — SODIUM CHLORIDE, PRESERVATIVE FREE 10 ML: 5 INJECTION INTRAVENOUS at 20:26

## 2025-03-02 RX ADMIN — PREDNISONE 20 MG: 20 TABLET ORAL at 08:21

## 2025-03-02 RX ADMIN — BUPRENORPHINE HYDROCHLORIDE AND NALOXONE HYDROCHLORIDE DIHYDRATE 1 TABLET: 8; 2 TABLET SUBLINGUAL at 20:27

## 2025-03-02 ASSESSMENT — PAIN DESCRIPTION - LOCATION: LOCATION: COCCYX

## 2025-03-02 ASSESSMENT — PAIN DESCRIPTION - ONSET: ONSET: ON-GOING

## 2025-03-02 ASSESSMENT — PAIN - FUNCTIONAL ASSESSMENT: PAIN_FUNCTIONAL_ASSESSMENT: ACTIVITIES ARE NOT PREVENTED

## 2025-03-02 ASSESSMENT — PAIN DESCRIPTION - DESCRIPTORS: DESCRIPTORS: ACHING

## 2025-03-02 ASSESSMENT — PAIN DESCRIPTION - ORIENTATION: ORIENTATION: MID

## 2025-03-02 ASSESSMENT — PAIN SCALES - GENERAL: PAINLEVEL_OUTOF10: 7

## 2025-03-02 ASSESSMENT — PAIN DESCRIPTION - FREQUENCY: FREQUENCY: CONTINUOUS

## 2025-03-02 NOTE — PROGRESS NOTES
03/01/25 2149   NIV Type   NIV Started/Stopped On   Equipment Type v60   Mode Bilevel   Mask Type Full face mask   Mask Size Medium   Assessment   Respirations 25   SpO2 100 %   Comfort Level Good   Using Accessory Muscles No   Mask Compliance Good   Skin Assessment Clean, dry, & intact   Skin Protection for O2 Device No  (Pt refused)   Breath Sounds   Respiratory Pattern Regular   Right Upper Lobe Diminished   Right Middle Lobe Diminished   Right Lower Lobe Diminished   Left Upper Lobe Diminished   Left Lower Lobe Diminished   Settings/Measurements   PIP Observed 17 cm H20   IPAP 12 cmH20   CPAP/EPAP 8 cmH2O   Vt (Measured) 863 mL   Rate Ordered 18   Insp Rise Time (%) 3 %   FiO2  50 %   I Time/ I Time % 1 s   Minute Volume (L/min) 24.6 Liters   Mask Leak (lpm) 23 lpm   Patient's Home Machine No   Alarm Settings   Alarms On Y   Low Pressure (cmH2O) 5 cmH2O   High Pressure (cmH2O) 30 cmH2O   Apnea (secs) 20 secs   RR Low (bpm) 12   RR High (bpm) 40 br/min

## 2025-03-02 NOTE — FLOWSHEET NOTE
03/02/25 1108   Vitals   Temp 97.7 °F (36.5 °C)   Temp Source Oral   Pulse 60   Respirations 20   BP (!) 81/51  (denies any dizziness or pain at this time; Dr. Ayon aware. No new orders. Will conitnue to monitor blood pressure.)   MAP (Calculated) 61   BP Location Left upper arm   BP Method Automatic   Patient Position Sitting   Cardiac Rhythm Sinus rhythm   Oxygen Therapy   SpO2 98 %   Pulse Oximeter Device Mode Continuous   Pulse Oximeter Device Location Finger   O2 Flow Rate (L/min) 5 L/min         85/48 right arm

## 2025-03-02 NOTE — PROGRESS NOTES
Patient with complaint of anxiety after taking tessalon.  He stated that he was having a panic attack and that he felt that he was having trouble breathing.  He requested and was given prn po valium for the complaint of anxiety.   Declines

## 2025-03-02 NOTE — PLAN OF CARE
Problem: Skin/Tissue Integrity  Goal: Skin integrity remains intact  Description: 1.  Monitor for areas of redness and/or skin breakdown  2.  Assess vascular access sites hourly  3.  Every 4-6 hours minimum:  Change oxygen saturation probe site  4.  Every 4-6 hours:  If on nasal continuous positive airway pressure, respiratory therapy assess nares and determine need for appliance change or resting period  Outcome: Progressing  Flowsheets  Taken 3/2/2025 0913 by Irene Parker RN  Skin Integrity Remains Intact: Monitor for areas of redness and/or skin breakdown  Problem: Respiratory - Adult  Goal: Achieves optimal ventilation and oxygenation  Outcome: Progressing  Flowsheets  Taken 3/2/2025 0913 by Irene Parker RN  Achieves optimal ventilation and oxygenation:   Assess for changes in respiratory status   Position to facilitate oxygenation and minimize respiratory effort   Assess for changes in mentation and behavior  Problem: Cardiovascular - Adult  Goal: Maintains optimal cardiac output and hemodynamic stability  Outcome: Progressing  Flowsheets  Taken 3/2/2025 0913 by Irene Parker RN  Maintains optimal cardiac output and hemodynamic stability:   Monitor blood pressure and heart rate   Monitor urine output and notify Licensed Independent Practitioner for values outside of normal range   Assess for signs of decreased cardiac output  HEART FAILURE CARE PLAN:    Comorbidities Reviewed: Yes   Patient has a past medical history of Back pain, chronic, Chronic back pain, Hypertension, MI (myocardial infarction) ([facility]), and MRSA (methicillin resistant staph aureus) culture positive.     Weights Reviewed: Yes   Admission weight: 64.3 kg (141 lb 12.8 oz)   Wt Readings from Last 3 Encounters:   02/28/25 54.9 kg (121 lb)   02/01/25 62.3 kg (137 lb 4.8 oz)   07/09/24 56.3 kg (124 lb 1.6 oz)     Intake & Output Reviewed: Yes     Intake/Output Summary (Last 24 hours) at 3/2/2025 0915  Last data filed at 3/2/2025  0100  Gross per 24 hour   Intake 1346 ml   Output 1500 ml   Net -154 ml       ECHOCARDIOGRAM Reviewed: Yes   Patient's Ejection Fraction (EF) is 30-35%       Medications Reviewed: Yes   SCHEDULED HOSPITAL MEDICATIONS:   predniSONE  20 mg Oral Daily    apixaban  5 mg Oral BID    famotidine  20 mg Oral BID    insulin lispro  0-8 Units SubCUTAneous Q4H    ipratropium 0.5 mg-albuterol 2.5 mg  1 Dose Inhalation BID RT    metoprolol succinate  12.5 mg Oral Daily    amiodarone  100 mg Oral Daily    sacubitril-valsartan  1 tablet Oral BID    [Held by provider] spironolactone  25 mg Oral Daily    atorvastatin  40 mg Oral Nightly    buprenorphine-naloxone  1 tablet SubLINGual BID    sodium chloride flush  5-40 mL IntraVENous 2 times per day     HOME MEDICATIONS:  Prior to Admission medications    Medication Sig Start Date End Date Taking? Authorizing Provider   amiodarone (CORDARONE) 200 MG tablet 2 bid - 5 days, 2 qd- 1 week, 1 qd thereafter 1/31/25   Brittany Shin MD   empagliflozin (JARDIANCE) 10 MG tablet Take 1 tablet by mouth daily 1/31/25   Brittany Shin MD   apixaban (ELIQUIS) 5 MG TABS tablet Take 1 tablet by mouth 2 times daily 1/31/25   Brittany Shin MD   metoprolol succinate (TOPROL XL) 25 MG extended release tablet Take 0.5 tablets by mouth daily 2/1/25   Brittany Shin MD   sacubitril-valsartan (ENTRESTO) 24-26 MG per tablet Take 0.5 tablets by mouth 2 times daily 1/31/25   Brittany Shin MD   predniSONE (DELTASONE) 20 MG tablet 2 qd- 2 days, 1 1/2 qd- 3 days, 1 qd- 3 days, 1/2 qd- 3 days 2/1/25   Brittany Shin MD   pantoprazole (PROTONIX) 40 MG tablet Take 1 tablet by mouth 2 times daily (before meals) 1/31/25   Brittany Shin MD   aspirin 81 MG EC tablet Take 1 tablet by mouth daily (Always take with food) 7/10/24   Andrade Acosta MD   spironolactone (ALDACTONE) 25 MG tablet Take 1 tablet by mouth daily Hold for

## 2025-03-02 NOTE — PROGRESS NOTES
Encouraged patient to wear BIPAP, patient declined and stated he will speak to MD tomorrow morning as he wishes to be discharge.

## 2025-03-02 NOTE — PROGRESS NOTES
Bedside report and transfer of care given to TARIQ Kaur. Pt currently resting in bed with the call light within reach. Pt denies any other care needs at this time.

## 2025-03-02 NOTE — PROGRESS NOTES
Transferred care to TARIQ Bonilla. Face to face bedside report given, no need voiced at this time.  Pt awake in bed.   No signs of distress noted.  Call light within reach.   Denies needs.

## 2025-03-02 NOTE — FLOWSHEET NOTE
03/01/25 2111   Vital Signs   Temp 97.6 °F (36.4 °C)   Temp Source Oral   Pulse 66   Heart Rate Source Monitor   Respirations 18   BP (!) 94/56   MAP (Calculated) 69   BP Location Right upper arm   BP Method Automatic   Patient Position Semi fowlers   Pain Assessment   Pain Assessment None - Denies Pain   Care Plan - Pain Goals   Verbalizes/displays adequate comfort level or baseline comfort level Encourage patient to monitor pain and request assistance   Opioid-Induced Sedation   POSS Score 1   RASS   Mathews Agitation Sedation Scale (RASS) 0   Oxygen Therapy   SpO2 95 %   O2 Device High flow nasal cannula   O2 Flow Rate (L/min) 4 L/min     BP (!) 94/56   Pulse 66   Temp 97.6 °F (36.4 °C) (Oral)   Resp 25   Ht 1.78 m (5' 10.08\")   Wt 54.9 kg (121 lb)   SpO2 100%   BMI 17.32 kg/m²     Patient alert and oriented x4 resting in bed, watching tv. With telemetry and O2 at 4 lpm via high flow with humidification, on continuous pulse ox. Assessment completed. Dr. De La Cruz notified of BP, Entresto held. Patient tolerated night meds well. Call light and bedside table within reach. Bed at lowest position, locked, side rails x2. Pt denies needs at this time.

## 2025-03-02 NOTE — PROGRESS NOTES
Patient removed his bipap and stated that he could not breath with it on.High flow 4lpm placed. Patient stated he will try Bipap after 30 mins. PRN tessalon given for cough.

## 2025-03-02 NOTE — PROGRESS NOTES
Pulmonary & Critical Care Medicine ICU Progress Note    CC: PNA    Events of Last 24 hours:   Pleasant and appears comfortable  O2 trending down 10---> 8--->5 L   No hemoptysis       Invasive Lines: PIVs - Right femoral cvc- removed 2/10    MV:  2/5/25-2/9        Intake/Output Summary (Last 24 hours) at 3/2/2025 0742  Last data filed at 3/2/2025 0100  Gross per 24 hour   Intake 1346 ml   Output 1500 ml   Net -154 ml       Vent Mode: AC/VC Resp Rate (Set): 22 bpm/Vt (Set, mL): 450 mL/ /FiO2 : 50 %  No results for input(s): \"PHART\", \"MAU1WHQ\", \"PO2ART\" in the last 72 hours.      IV:   dextrose      sodium chloride 5 mL/hr at 02/10/25 0629       Vitals:  BP (!) 104/58   Pulse 64   Temp 97.4 °F (36.3 °C) (Oral)   Resp 22   Ht 1.78 m (5' 10.08\")   Wt 54.9 kg (121 lb)   SpO2 94%   BMI 17.32 kg/m²   on 5 L  Constitutional:  No acute distress. .   HEENT: no scleral icterus  Neck: No tracheal deviation present.    Cardiovascular: Normal heart sounds.   Pulmonary/Chest: No wheezes. Few rhonchi. Few  rales. No decreased breath sounds.  No accessory muscle usage or stridor.   Abdominal: Soft.   Musculoskeletal: No cyanosis. No clubbing.  Skin: Skin is warm and dry.        Scheduled Meds:   predniSONE  20 mg Oral Daily    apixaban  5 mg Oral BID    famotidine  20 mg Oral BID    insulin lispro  0-8 Units SubCUTAneous Q4H    ipratropium 0.5 mg-albuterol 2.5 mg  1 Dose Inhalation BID RT    metoprolol succinate  12.5 mg Oral Daily    amiodarone  100 mg Oral Daily    sacubitril-valsartan  1 tablet Oral BID    [Held by provider] spironolactone  25 mg Oral Daily    atorvastatin  40 mg Oral Nightly    buprenorphine-naloxone  1 tablet SubLINGual BID    sodium chloride flush  5-40 mL IntraVENous 2 times per day     PRN Meds:  sodium chloride, benzonatate, diazePAM, ipratropium 0.5 mg-albuterol 2.5 mg, prochlorperazine **OR** prochlorperazine, glucose, dextrose bolus **OR** dextrose bolus, glucagon (rDNA), dextrose, sodium  with pulmonary

## 2025-03-03 LAB
ANION GAP SERPL CALCULATED.3IONS-SCNC: 14 MMOL/L (ref 3–16)
ANISOCYTOSIS BLD QL SMEAR: ABNORMAL
BASOPHILS # BLD: 0 K/UL (ref 0–0.2)
BASOPHILS NFR BLD: 0 %
BUN SERPL-MCNC: 31 MG/DL (ref 7–20)
CALCIUM SERPL-MCNC: 7.9 MG/DL (ref 8.3–10.6)
CHLORIDE SERPL-SCNC: 100 MMOL/L (ref 99–110)
CO2 SERPL-SCNC: 25 MMOL/L (ref 21–32)
CREAT SERPL-MCNC: 0.7 MG/DL (ref 0.9–1.3)
DEPRECATED RDW RBC AUTO: 14.2 % (ref 12.4–15.4)
EOSINOPHIL # BLD: 0 K/UL (ref 0–0.6)
EOSINOPHIL NFR BLD: 0 %
GFR SERPLBLD CREATININE-BSD FMLA CKD-EPI: >90 ML/MIN/{1.73_M2}
GLUCOSE BLD-MCNC: 119 MG/DL (ref 70–99)
GLUCOSE BLD-MCNC: 150 MG/DL (ref 70–99)
GLUCOSE BLD-MCNC: 165 MG/DL (ref 70–99)
GLUCOSE BLD-MCNC: 186 MG/DL (ref 70–99)
GLUCOSE BLD-MCNC: 212 MG/DL (ref 70–99)
GLUCOSE BLD-MCNC: 93 MG/DL (ref 70–99)
GLUCOSE SERPL-MCNC: 122 MG/DL (ref 70–99)
HCT VFR BLD AUTO: 31.2 % (ref 40.5–52.5)
HGB BLD-MCNC: 10.4 G/DL (ref 13.5–17.5)
LYMPHOCYTES # BLD: 1.8 K/UL (ref 1–5.1)
LYMPHOCYTES NFR BLD: 11 %
MCH RBC QN AUTO: 30.2 PG (ref 26–34)
MCHC RBC AUTO-ENTMCNC: 33.3 G/DL (ref 31–36)
MCV RBC AUTO: 90.6 FL (ref 80–100)
MONOCYTES # BLD: 0.5 K/UL (ref 0–1.3)
MONOCYTES NFR BLD: 3 %
MYELOCYTES NFR BLD MANUAL: 6 %
NEUTROPHILS # BLD: 14.2 K/UL (ref 1.7–7.7)
NEUTROPHILS NFR BLD: 76 %
NEUTS BAND NFR BLD MANUAL: 4 % (ref 0–7)
PERFORMED ON: ABNORMAL
PERFORMED ON: NORMAL
PLATELET # BLD AUTO: 267 K/UL (ref 135–450)
PLATELET BLD QL SMEAR: ADEQUATE
PMV BLD AUTO: 9.3 FL (ref 5–10.5)
POIKILOCYTOSIS BLD QL SMEAR: ABNORMAL
POTASSIUM SERPL-SCNC: 4.6 MMOL/L (ref 3.5–5.1)
RBC # BLD AUTO: 3.45 M/UL (ref 4.2–5.9)
SLIDE REVIEW: ABNORMAL
SODIUM SERPL-SCNC: 139 MMOL/L (ref 136–145)
WBC # BLD AUTO: 16.5 K/UL (ref 4–11)

## 2025-03-03 PROCEDURE — 94761 N-INVAS EAR/PLS OXIMETRY MLT: CPT

## 2025-03-03 PROCEDURE — 6370000000 HC RX 637 (ALT 250 FOR IP): Performed by: INTERNAL MEDICINE

## 2025-03-03 PROCEDURE — 94669 MECHANICAL CHEST WALL OSCILL: CPT

## 2025-03-03 PROCEDURE — 2500000003 HC RX 250 WO HCPCS: Performed by: INTERNAL MEDICINE

## 2025-03-03 PROCEDURE — 85025 COMPLETE CBC W/AUTO DIFF WBC: CPT

## 2025-03-03 PROCEDURE — 99232 SBSQ HOSP IP/OBS MODERATE 35: CPT | Performed by: INTERNAL MEDICINE

## 2025-03-03 PROCEDURE — 6360000002 HC RX W HCPCS: Performed by: INTERNAL MEDICINE

## 2025-03-03 PROCEDURE — 94640 AIRWAY INHALATION TREATMENT: CPT

## 2025-03-03 PROCEDURE — 80048 BASIC METABOLIC PNL TOTAL CA: CPT

## 2025-03-03 PROCEDURE — 97530 THERAPEUTIC ACTIVITIES: CPT

## 2025-03-03 PROCEDURE — 97110 THERAPEUTIC EXERCISES: CPT

## 2025-03-03 PROCEDURE — 2060000000 HC ICU INTERMEDIATE R&B

## 2025-03-03 PROCEDURE — 94660 CPAP INITIATION&MGMT: CPT

## 2025-03-03 PROCEDURE — 2700000000 HC OXYGEN THERAPY PER DAY

## 2025-03-03 PROCEDURE — 36415 COLL VENOUS BLD VENIPUNCTURE: CPT

## 2025-03-03 RX ORDER — PREDNISONE 20 MG/1
TABLET ORAL
Qty: 3 TABLET | Refills: 0
Start: 2025-03-03 | End: 2025-03-04

## 2025-03-03 RX ORDER — PREDNISONE 10 MG/1
10 TABLET ORAL DAILY
Status: DISCONTINUED | OUTPATIENT
Start: 2025-03-04 | End: 2025-03-05 | Stop reason: HOSPADM

## 2025-03-03 RX ORDER — ATORVASTATIN CALCIUM 40 MG/1
40 TABLET, FILM COATED ORAL NIGHTLY
Qty: 30 TABLET | Refills: 1
Start: 2025-03-03

## 2025-03-03 RX ORDER — BUPRENORPHINE HYDROCHLORIDE AND NALOXONE HYDROCHLORIDE DIHYDRATE 8; 2 MG/1; MG/1
1 TABLET SUBLINGUAL 2 TIMES DAILY
Qty: 4 TABLET | Refills: 0 | Status: SHIPPED | OUTPATIENT
Start: 2025-03-03 | End: 2025-03-05

## 2025-03-03 RX ORDER — AMIODARONE HYDROCHLORIDE 200 MG/1
200 TABLET ORAL DAILY
Qty: 30 TABLET | Refills: 0
Start: 2025-03-03

## 2025-03-03 RX ADMIN — BUPRENORPHINE HYDROCHLORIDE AND NALOXONE HYDROCHLORIDE DIHYDRATE 1 TABLET: 8; 2 TABLET SUBLINGUAL at 19:54

## 2025-03-03 RX ADMIN — FAMOTIDINE 20 MG: 20 TABLET, FILM COATED ORAL at 19:54

## 2025-03-03 RX ADMIN — BUPRENORPHINE HYDROCHLORIDE AND NALOXONE HYDROCHLORIDE DIHYDRATE 1 TABLET: 8; 2 TABLET SUBLINGUAL at 09:43

## 2025-03-03 RX ADMIN — APIXABAN 5 MG: 5 TABLET, FILM COATED ORAL at 09:44

## 2025-03-03 RX ADMIN — SODIUM CHLORIDE, PRESERVATIVE FREE 10 ML: 5 INJECTION INTRAVENOUS at 19:58

## 2025-03-03 RX ADMIN — IPRATROPIUM BROMIDE AND ALBUTEROL SULFATE 1 DOSE: .5; 2.5 SOLUTION RESPIRATORY (INHALATION) at 07:22

## 2025-03-03 RX ADMIN — IPRATROPIUM BROMIDE AND ALBUTEROL SULFATE 1 DOSE: .5; 2.5 SOLUTION RESPIRATORY (INHALATION) at 19:12

## 2025-03-03 RX ADMIN — SODIUM CHLORIDE, PRESERVATIVE FREE 10 ML: 5 INJECTION INTRAVENOUS at 09:53

## 2025-03-03 RX ADMIN — FAMOTIDINE 20 MG: 20 TABLET, FILM COATED ORAL at 09:44

## 2025-03-03 RX ADMIN — ATORVASTATIN CALCIUM 40 MG: 40 TABLET, FILM COATED ORAL at 19:54

## 2025-03-03 RX ADMIN — DIAZEPAM 5 MG: 5 TABLET ORAL at 20:01

## 2025-03-03 RX ADMIN — PREDNISONE 20 MG: 20 TABLET ORAL at 09:43

## 2025-03-03 RX ADMIN — INSULIN LISPRO 2 UNITS: 100 INJECTION, SOLUTION INTRAVENOUS; SUBCUTANEOUS at 19:56

## 2025-03-03 RX ADMIN — APIXABAN 5 MG: 5 TABLET, FILM COATED ORAL at 19:54

## 2025-03-03 RX ADMIN — SALINE NASAL SPRAY 1 SPRAY: 1.5 SOLUTION NASAL at 13:40

## 2025-03-03 RX ADMIN — SACUBITRIL AND VALSARTAN 1 TABLET: 24; 26 TABLET, FILM COATED ORAL at 19:54

## 2025-03-03 RX ADMIN — IPRATROPIUM BROMIDE AND ALBUTEROL SULFATE 1 DOSE: .5; 2.5 SOLUTION RESPIRATORY (INHALATION) at 12:34

## 2025-03-03 NOTE — PROGRESS NOTES
Progress Note    Admit Date:  2/5/2025    Subjective:  Mr. Soto is alert and oriented. He is sitting in his chair eating. He denies chest pain, nausea, vomiting, and diarrhea.   He admits to having some cough and congestion, but he is feeling overall improved today.   Used bipap last night   On  4 L O2 today.    Objective:   /70   Pulse 67   Temp 97.8 °F (36.6 °C) (Oral)   Resp 18   Ht 1.78 m (5' 10.08\")   Wt 54.9 kg (121 lb)   SpO2 99%   BMI 17.32 kg/m²        Intake/Output Summary (Last 24 hours) at 3/3/2025 0752  Last data filed at 3/3/2025 0130  Gross per 24 hour   Intake 942 ml   Output 200 ml   Net 742 ml       Physical Exam:   Gen: No distress. Alert. Chronically ill-appearing.  Eyes: PERRL. No sclera icterus. No conjunctival injection.   ENT: No discharge. Pharynx clear.   Neck: No JVD.   Trachea midline.  Resp: 2L O2 NC. No accessory muscle use. + mild rhonchi.  CV: Regular rate. Regular rhythm. No murmur.  No rub. No edema.   Capillary Refill: Brisk,< 3 seconds   Peripheral Pulses: +2 palpable, equal bilaterally   GI: Non-tender. Non-distended. No masses. No organomegaly. Normal bowel sounds. No hernia.   Skin: Warm and dry. No nodule on exposed extremities. No rash on exposed extremities.   M/S: No cyanosis. No joint deformity.  Neuro: Awake. Grossly nonfocal    Psych: Oriented x 3. No anxiety or agitation.         Medications:  predniSONE, 20 mg, Daily  apixaban, 5 mg, BID  famotidine, 20 mg, BID  insulin lispro, 0-8 Units, Q4H  ipratropium 0.5 mg-albuterol 2.5 mg, 1 Dose, BID RT  metoprolol succinate, 12.5 mg, Daily  amiodarone, 100 mg, Daily  sacubitril-valsartan, 1 tablet, BID  [Held by provider] spironolactone, 25 mg, Daily  atorvastatin, 40 mg, Nightly  buprenorphine-naloxone, 1 tablet, BID  sodium chloride flush, 5-40 mL, 2 times per day      PRN Medications:  sodium chloride, 1 spray, Q2H PRN  benzonatate, 100 mg, TID PRN  diazePAM, 5 mg, Q4H PRN  ipratropium 0.5 mg-albuterol 2.5    Similar-appearing multifocal airspace opacities.      Small left pleural effusion.         XR CHEST PORTABLE   Final Result   Worsening multifocal airspace disease, likely pneumonia.  Pulmonary edema   could have a similar appearance.      Small bilateral pleural effusions.         XR CHEST PORTABLE   Final Result   1. Stable support tubes, as described.   2. Stable cardiomegaly, with a persistent small left pleural effusion.   3. Stable multifocal airspace opacities throughout both lungs, likely a   combination of asymmetric edema and multifocal pneumonia.         XR CHEST PORTABLE   Final Result   Scattered infiltrates most pronounced in the mid and lower lungs.      Support tubes as described above.         XR CHEST 1 VIEW   Final Result   1. Patchy consolidative changes of the bilateral lungs are mildly improved   compared to the prior exam.   2. Lines and tubes as above.         XR CHEST PORTABLE   Final Result   1. Endotracheal tube 5.4 cm above the riley.   2. Nasogastric tube advances to conklin the stomach.   3. Extensive bilateral infiltrates worse in the mid lower lungs overall   slightly worse compared to the previous evaluation.         CT CHEST PULMONARY EMBOLISM W CONTRAST   Final Result   1. Worsening diffuse bilateral airspace disease favoring aspiration pneumonia   rather than pulmonary edema.   2. Interstitial edema.   3. Trace left pleural effusion.   4. Mediastinal adenopathy, likely reactive.         XR CHEST PORTABLE   Final Result   1. ET tube in place, approximately 1.5 cm above the riley. It should be   withdrawn by 4 cm.   2. Extensive airspace disease in the lower lung of fields, unchanged from   prior examination.         XR CHEST PORTABLE   Final Result   1. Dense pneumonic infiltrates in the right lower lung field and in the left   mid and lower lung field, with or without atelectatic changes.  These   findings are mostly new as compared to previous portable chest x-ray.   2. Minimal

## 2025-03-03 NOTE — PROGRESS NOTES
Secure message sent to MD Shin. Pt complaining of increased drowsiness, dry throat, and increased cough. Pt refusing tesslon pearls due to them making him nausea. Pt has repeatedly removed his o2 from his nose today and this nurse was informed in report pt did not wear his bi-pap for very long last night and his o2 would come out of his nose frequently, spo2 currently 96% on 5L and /62.     Awaiting new orders.

## 2025-03-03 NOTE — PLAN OF CARE
Problem: Discharge Planning  Goal: Discharge to home or other facility with appropriate resources  Outcome: Progressing     Problem: Safety - Adult  Goal: Free from fall injury  Outcome: Progressing     Problem: Pain  Goal: Verbalizes/displays adequate comfort level or baseline comfort level  Outcome: Progressing     Problem: Skin/Tissue Integrity  Goal: Skin integrity remains intact  Description: 1.  Monitor for areas of redness and/or skin breakdown  2.  Assess vascular access sites hourly  3.  Every 4-6 hours minimum:  Change oxygen saturation probe site  4.  Every 4-6 hours:  If on nasal continuous positive airway pressure, respiratory therapy assess nares and determine need for appliance change or resting period  Outcome: Progressing     Problem: Respiratory - Adult  Goal: Achieves optimal ventilation and oxygenation  Outcome: Progressing     Problem: Cardiovascular - Adult  Goal: Maintains optimal cardiac output and hemodynamic stability  Outcome: Progressing  Goal: Absence of cardiac dysrhythmias or at baseline  Outcome: Progressing     Problem: Skin/Tissue Integrity - Adult  Goal: Skin integrity remains intact  Description: 1.  Monitor for areas of redness and/or skin breakdown  2.  Assess vascular access sites hourly  3.  Every 4-6 hours minimum:  Change oxygen saturation probe site  4.  Every 4-6 hours:  If on nasal continuous positive airway pressure, respiratory therapy assess nares and determine need for appliance change or resting period  Outcome: Progressing  Goal: Incisions, wounds, or drain sites healing without S/S of infection  Outcome: Progressing  Goal: Oral mucous membranes remain intact  Outcome: Progressing     Problem: Infection - Adult  Goal: Absence of infection at discharge  Outcome: Progressing  Goal: Absence of infection during hospitalization  Outcome: Progressing  Goal: Absence of fever/infection during anticipated neutropenic period  Outcome: Progressing     Problem:  level of function  Outcome: Progressing

## 2025-03-03 NOTE — PROGRESS NOTES
Pt in bed during assessment. Pt requesting to use bedside commode, with assistance of this nurse, pt was able to ambulate to bedside commode and had bowel movement. Pt does not complain of pain at this time. Call light in reach. No new concerns. Vitals stable.     Bedside Mobility Assessment Tool (BMAT):     Assessment Level 1- Sit and Shake    1. From a semi-reclined position, ask patient to sit up and rotate to a seated position at the side of the bed. Can use the bedrail.    2. Ask patient to reach out and grab your hand and shake making sure patient reaches across his/her midline.   Pass- Patient is able to come to a seated position, maintain core strength. Maintains seated balance while reaching across midline. Move on to Assessment Level 2.     Assessment Level 2- Stretch and Point   1. With patient in seated position at the side of the bed, have patient place both feet on the floor (or stool) with knees no higher than hips.    2. Ask patient to stretch one leg and straighten the knee, then bend the ankle/flex and point the toes. If appropriate, repeat with the other leg.   Pass- Patient is able to demonstrate appropriate quad strength on intended weight bearing limb(s). Move onto Assessment Level 3.     Assessment Level 3- Stand   1. Ask patient to elevate off the bed or chair (seated to standing) using an assistive device (cane, bedrail).    2. Patient should be able to raise buttocks off be and hold for a count of five. May repeat once.   Pass- Patient maintains standing stability for at least 5 seconds, proceed to assessment level 4.    Assessment Level 4- Walk   1. Ask patient to march in place at bedside.    2. Then ask patient to advance step and return each foot. Some medical conditions may render a patient from stepping backwards, use your best clinical judgement.   Fail- Patient not able to complete tasks OR requires use of assistive device. Patient is MOBILITY LEVEL 3.       Mobility Level- 3

## 2025-03-03 NOTE — FLOWSHEET NOTE
03/02/25 2314   Vital Signs   Temp 98.2 °F (36.8 °C)   Temp Source Oral   Pulse 68   Heart Rate Source Monitor   /71   MAP (Calculated) 84   BP Location Left upper arm   BP Method Automatic   Patient Position High fowlers   Oxygen Therapy   SpO2 93 %   O2 Device High flow nasal cannula   O2 Flow Rate (L/min) 5 L/min

## 2025-03-03 NOTE — PROGRESS NOTES
03/02/25 4895   NIV Type   Equipment Type v60   Mode Bilevel   Mask Type Full face mask   Mask Size Medium   Settings/Measurements   IPAP 12 cmH20   CPAP/EPAP 8 cmH2O   Vt (Measured) 934 mL   Rate Ordered 18   FiO2  40 %   Minute Volume (L/min) 17 Liters   Mask Leak (lpm) 26 lpm   Patient's Home Machine No   Patient Observation   Patient Observations spo2 98% on 40% bipap

## 2025-03-03 NOTE — PLAN OF CARE
Problem: Skin/Tissue Integrity  Goal: Skin integrity remains intact  Description: 1.  Monitor for areas of redness and/or skin breakdown  2.  Assess vascular access sites hourly  3.  Every 4-6 hours minimum:  Change oxygen saturation probe site  4.  Every 4-6 hours:  If on nasal continuous positive airway pressure, respiratory therapy assess nares and determine need for appliance change or resting period  Recent Flowsheet Documentation  Taken 3/2/2025 2015 by Latoya Reynolds RN  Skin Integrity Remains Intact: Monitor for areas of redness and/or skin breakdown  3/2/2025 0913 by Irene Parker RN  Outcome: Progressing  Flowsheets  Taken 3/2/2025 0913 by Irene Parker RN  Skin Integrity Remains Intact: Monitor for areas of redness and/or skin breakdown  Taken 3/1/2025 2114 by Lenard Calix RN  Skin Integrity Remains Intact: Monitor for areas of redness and/or skin breakdown     Problem: Respiratory - Adult  Goal: Achieves optimal ventilation and oxygenation  Recent Flowsheet Documentation  Taken 3/2/2025 2015 by Latoya Reynolds RN  Achieves optimal ventilation and oxygenation: Assess for changes in respiratory status  3/2/2025 0913 by Irene Parker RN  Outcome: Progressing  Flowsheets  Taken 3/2/2025 0913 by Irene Parker RN  Achieves optimal ventilation and oxygenation:   Assess for changes in respiratory status   Position to facilitate oxygenation and minimize respiratory effort   Assess for changes in mentation and behavior  Taken 3/1/2025 2114 by Lenard Calix RN  Achieves optimal ventilation and oxygenation:   Assess for changes in respiratory status   Assess for changes in mentation and behavior   Position to facilitate oxygenation and minimize respiratory effort   Oxygen supplementation based on oxygen saturation or arterial blood gases   Assess and instruct to report shortness of breath or any respiratory difficulty     Problem: Cardiovascular - Adult  Goal:

## 2025-03-03 NOTE — PROGRESS NOTES
Bedside report and transfer of care given to TARIQ Klein. Pt currently resting in bed with the call light within reach. Pt denies any other care needs at this time.

## 2025-03-03 NOTE — PROGRESS NOTES
Inpatient Physical Therapy Treatment    Unit: PCU  Date:  3/3/2025  Patient Name:    Dayton Soto  Admitting diagnosis:  Acute respiratory failure (HCC) [J96.00]  Methamphetamine use (HCC) [F15.10]  Elevated troponin [R79.89]  Acute respiratory failure with hypoxia (HCC) [J96.01]  Multifocal pneumonia [J18.9]  Admit Date:  2/5/2025  Precautions/Restrictions/WB Status/ Lines/ Wounds/ Oxygen: Fall risk, Bed/chair alarm, Lines (IV and Supplemental O2 (5L)), Telemetry, Continuous pulse oximetry, and Isolation Precautions: Contact    Pt seen for partial cotreatment this date due to patient endurance.     Treatment Time:  10:09-10:35  Treatment Number:  3   Timed Code Treatment Minutes: 26 minutes  Total Treatment Minutes:  26  minutes    Patient Stated Goals for Therapy: no goals stated          Discharge Recommendations: SNF  DME needs for discharge: Defer to facility       Therapy recommendation for EMS Transport: can transport by wheelchair    Therapy recommendations for staff:   Assist of 1 for transfers with use of rolling walker (RW) and gait belt to/from BSC  to/from chair    History of Present Illness:   Per Dr. Palacio H&P 2/5/25:  \"59 y.o. male who presented to [facility] with septic shock.  PMHx significant for polysubstance use-methamphetamine, A-fib, valvular disease, on Eliquis on amiodarone, MRSA, cardiomyopathy  .       Patient was really diagnosed with pneumonia and was on antibiotics.  On arrival to the ED, noted to be short of breath and that he smoked methamphetamine about 4 to 5 hours prior to admission with worsening shortness of breath.     In the ED, initial heart rate 104, respiratory rate 36, blood pressure 156/105.  BMP was actually largely unremarkable.  Lactic acid was 3.8 on arrival, troponin was 186 initially 1.313 and then back on the 265.  CBC showing white blood cell count of 47.  INR 1.7.  Influenza, COVID-negative.  Initial VBG showing 7.44/32/27.  Initial chest x-ray showed dense  awareness.    Goals :   To be met in 3 visits:  1). Independent with LE Ex x 10 reps  2). Sit to/from stand: Min A - goal met 2/28/25  3). Bed to chair: Min A     To be met in 6 visits:  1).  Supine to/from sit: Independent  2).  Sit to/from stand: Independent  3).  Bed to chair: Independent  4).  Gait: Ambulate 50 ft.  with CGA and use of LRAD (least restrictive assistive device)  5).  Tolerate B LE exercises 3 sets of 10-15 reps    Rehabilitation Potential: Fair to good  Strengths for achieving goals include:   Supportive staff    Barriers to achieving goals include:    Complexity of condition, Chronicity of condition, Weakness, and Impaired cognition    Plan    To be seen 3-5 x/ week while in acute care setting for therapeutic exercises/activities, bed mobility training, functional transfer training, family/patient education, balance training, and gait training.    Signature: Irene Geronimo PT     If patient discharges from this facility prior to next visit, this note will serve as the Discharge Summary.    CHF Education  N/A

## 2025-03-03 NOTE — FLOWSHEET NOTE
03/02/25 2007   Vital Signs   Temp 97.9 °F (36.6 °C)   Temp Source Oral   Pulse 67   Heart Rate Source Monitor   Respirations 20   /61   MAP (Calculated) 76   BP Location Right upper arm   BP Method Automatic   Patient Position High fowlers   Oxygen Therapy   SpO2 97 %   O2 Device Nasal cannula     Pt resting comfortably in bed. Call light within reach. No needs expressed at this time. Will continue to monitor.

## 2025-03-03 NOTE — PROGRESS NOTES
RT Inhaler-Nebulizer Bronchodilator Protocol Note    There is a bronchodilator order in the chart from a provider indicating to follow the RT Bronchodilator Protocol and there is an “Initiate RT Inhaler-Nebulizer Bronchodilator Protocol” order as well (see protocol at bottom of note).    CXR Findings:  No results found.    The findings from the last RT Protocol Assessment were as follows:   History Pulmonary Disease: (P) Chronic pulmonary disease  Respiratory Pattern: (P) Regular pattern and RR 12-20 bpm  Breath Sounds: (P) Slightly diminished and/or crackles  Cough: (P) Strong, spontaneous, non-productive  Indication for Bronchodilator Therapy: (P) Decreased or absent breath sounds  Bronchodilator Assessment Score: (P) 4    Aerosolized bronchodilator medication orders have been revised according to the RT Inhaler-Nebulizer Bronchodilator Protocol below.    Respiratory Therapist to perform RT Therapy Protocol Assessment initially then follow the protocol.  Repeat RT Therapy Protocol Assessment PRN for score 0-3 or on second treatment, BID, and PRN for scores above 3.    No Indications - adjust the frequency to every 6 hours PRN wheezing or bronchospasm, if no treatments needed after 48 hours then discontinue using Per Protocol order mode.     If indication present, adjust the RT bronchodilator orders based on the Bronchodilator Assessment Score as indicated below.  Use Inhaler orders unless patient has one or more of the following: on home nebulizer, not able to hold breath for 10 seconds, is not alert and oriented, cannot activate and use MDI correctly, or respiratory rate 25 breaths per minute or more, then use the equivalent nebulizer order(s) with same Frequency and PRN reasons based on the score.  If a patient is on this medication at home then do not decrease Frequency below that used at home.    0-3 - enter or revise RT bronchodilator order(s) to equivalent RT Bronchodilator order with Frequency of every 4  hours PRN for wheezing or increased work of breathing using Per Protocol order mode.        4-6 - enter or revise RT Bronchodilator order(s) to two equivalent RT bronchodilator orders with one order with BID Frequency and one order with Frequency of every 4 hours PRN wheezing or increased work of breathing using Per Protocol order mode.        7-10 - enter or revise RT Bronchodilator order(s) to two equivalent RT bronchodilator orders with one order with TID Frequency and one order with Frequency of every 4 hours PRN wheezing or increased work of breathing using Per Protocol order mode.       11-13 - enter or revise RT Bronchodilator order(s) to one equivalent RT bronchodilator order with QID Frequency and an Albuterol order with Frequency of every 4 hours PRN wheezing or increased work of breathing using Per Protocol order mode.      Greater than 13 - enter or revise RT Bronchodilator order(s) to one equivalent RT bronchodilator order with every 4 hours Frequency and an Albuterol order with Frequency of every 2 hours PRN wheezing or increased work of breathing using Per Protocol order mode.       Electronically signed by Ernestina Cast RCP on 3/2/2025 at 8:18 PM

## 2025-03-03 NOTE — PROGRESS NOTES
Tolerance:   Pt completed therapy session with SOB/ROBERTS  fatigue      BP (mmHg) HR (bpm) SpO2 (%) on 5L Comments   Supine at rest     Mid-90%s     Seated at EOB     Low-90%s     After ~2 minutes standing     85% Unable to recover in standing position, returned to sitting   Seated  EOB     Titrated to 7L for recovery     End of session, seated EOB     Weaned back to 5L  94%        Objective:  Does this pt have an acute or acute on chronic diagnosis of CHF? No    Balance:  Sitting:    SBA static sitting at EOB x 10 minutes with verbal and manual cues for posture and proper breathing technique.  Standing:    SBA and With RW and gait belt    Bed Mobility:   Supine to Sit:    Not Tested  Sit to Supine:   SBA with bed rails  Rolling:   Not Tested  Scooting in sitting: Supervision  Scooting in supine:  Not Tested    Transfers:    Sit to stand:    SBA and With RW and gait belt  Stand to sit:    SBA and With RW and gait belt  Bed to chair:     Not Tested (pt declines)  Bed/ chair to standard toilet:  Not Tested  Bed/chair to BSC:   Not Tested  Functional Mobility:   SBA and With RW and gait belt several steps in room    ADLs:  Dressing:      UE:   Not Tested  LE:    Not Tested    Bathing:    UE:  Not Tested  LE:  Not Tested    Eating:   Independent beverage management    Toileting:  Not Tested    Grooming/hygiene: Min A combing hair at EOB    Ther Ex / Activities Initiated:   Sitting/standing tolerance  BUE therex completed unilaterally at EOB 2 sets x 10 reps of elbow flexion/extension with cues for PLB and pacing throughout.    Positioning Needs:   Pt in bed and alarm set  Call light provided and all needs within reach  PCA present    Patient/Family Education:   Pt educated on role of inpatient OT, plan of care, importance of continued activity, DC recommendations, functional transfer/mobility safety, transfer techniques, pursed lip breathing, pacing activity, HEP, and calling for assist with mobility    CHF  Kandi Smalls, OT on 3/3/2025 at 10:52 AM      If patient discharges from this facility prior to next visit, this note will serve as the Discharge Summary

## 2025-03-03 NOTE — CARE COORDINATION
Writer DAVID for Heena with [facility] related to pre-cert status. Awaiting call back. Following.    Writer spoke with Corie from [facility] and she states pt will need pre-cert and she is not sure if Hugo was able to initiate precert. Writer JEREM for Hugo. Awaiting call back.    Per Corie pre-cert is still pending at this time and she will send over updated PT notes from today to expedite process. Following.     Message sent to  related to pre-cert is still pending.

## 2025-03-03 NOTE — PROGRESS NOTES
Pulmonary & Critical Care Medicine ICU Progress Note    CC: PNA    Events of Last 24 hours:   Still having intermittent cough.  He is on 4 L of oxygen.    Invasive Lines: PIVs - Right femoral cvc- removed 2/10    MV:  2/5/25-2/9    IV:   dextrose      sodium chloride 5 mL/hr at 02/10/25 0629       Vitals:  /62   Pulse 75   Temp 97.4 °F (36.3 °C) (Oral)   Resp 18   Ht 1.78 m (5' 10.08\")   Wt 54.9 kg (121 lb)   SpO2 96%   BMI 17.32 kg/m²   on 5 L  Constitutional:  No acute distress. .   HEENT: no scleral icterus  Neck: No tracheal deviation present.    Cardiovascular: Normal heart sounds.   Pulmonary/Chest: No wheezes. Few rhonchi. +rales. No decreased breath sounds.    Abdominal: Soft.   Musculoskeletal: No cyanosis. No clubbing.  Skin: Skin is warm and dry.        Scheduled Meds:   predniSONE  20 mg Oral Daily    apixaban  5 mg Oral BID    famotidine  20 mg Oral BID    insulin lispro  0-8 Units SubCUTAneous Q4H    ipratropium 0.5 mg-albuterol 2.5 mg  1 Dose Inhalation BID RT    metoprolol succinate  12.5 mg Oral Daily    amiodarone  100 mg Oral Daily    sacubitril-valsartan  1 tablet Oral BID    [Held by provider] spironolactone  25 mg Oral Daily    atorvastatin  40 mg Oral Nightly    buprenorphine-naloxone  1 tablet SubLINGual BID    sodium chloride flush  5-40 mL IntraVENous 2 times per day     PRN Meds:  sodium chloride, benzonatate, diazePAM, ipratropium 0.5 mg-albuterol 2.5 mg, prochlorperazine **OR** prochlorperazine, glucose, dextrose bolus **OR** dextrose bolus, glucagon (rDNA), dextrose, sodium chloride flush, sodium chloride, potassium chloride **OR** potassium alternative oral replacement **OR** potassium chloride, magnesium sulfate, polyethylene glycol, acetaminophen **OR** acetaminophen    Results:  CBC:   Recent Labs     03/01/25  0435 03/02/25  0534 03/03/25  0415   WBC 16.3* 18.5* 16.5*   HGB 9.6* 9.7* 10.4*   HCT 28.9* 29.1* 31.2*   MCV 89.0 87.8 90.6    289 267     BMP:

## 2025-03-03 NOTE — DISCHARGE SUMMARY
Name:  Dayton Soto  Room:  /0305-01  MRN:    [id number]    Discharge Summary      This discharge summary is in conjunction with a complete physical exam done on the day of discharge.      Discharging Physician: Dr. Shin      Admit: 2/5/2025  Discharge:  3/5/2025    Diagnoses this Admission    Principal Problem:    Acute respiratory failure (HCC)  Active Problems:    Acute HFrEF (heart failure with reduced ejection fraction) (HCC)    Septic shock (HCC)    HCAP (healthcare-associated pneumonia)    Elevated troponin    Severe protein-calorie malnutrition    PAF (paroxysmal atrial fibrillation) (HCC)    Methamphetamine use (HCC)    ARDS (adult respiratory distress syndrome) (HCC)    Agitation    Multifocal pneumonia    Hemoptysis    Mucus plug in respiratory tract    Pulmonary hemorrhage    Aspiration pneumonia (HCC)    Paroxysmal atrial fibrillation (HCC)    COPD exacerbation (HCC)    Hyperkalemia  Resolved Problems:    * No resolved hospital problems. *          Procedures (Please Review Full Report for Details)  PROCEDURE: BRONCHOSCOPY   2/6/2025    PROCEDURE: Surveillance, diagnostic, therapeutic bronchoscopy with BAL   2/12/2025    Consults    IP CONSULT TO CARDIOLOGY  IP CONSULT TO CRITICAL CARE  PHARMACY TO DOSE VANCOMYCIN  IP CONSULT TO DIETITIAN  IP CONSULT TO VASCULAR ACCESS TEAM  IP CONSULT TO SPIRITUAL SERVICES  IP CONSULT TO CARDIOLOGY  IP CONSULT TO PALLIATIVE CARE  IP CONSULT TO HEART FAILURE NURSE/COORDINATOR  IP CONSULT TO VASCULAR ACCESS TEAM      HPI:    59 y.o. male who presented to [facility] with septic shock.  PMHx significant for polysubstance use-methamphetamine, A-fib, valvular disease, on Eliquis on amiodarone, MRSA, cardiomyopathy  .       Patient was really diagnosed with pneumonia and was on antibiotics.  On arrival to the ED, noted to be short of breath and that he smoked methamphetamine about 4 to 5 hours prior to admission with worsening shortness of breath.     In

## 2025-03-03 NOTE — FLOWSHEET NOTE
03/03/25 0322   Vital Signs   Temp 98 °F (36.7 °C)   Temp Source Oral   Pulse 69   Heart Rate Source Monitor   Respirations 22   BP 98/63   MAP (Calculated) 75   BP Location Left upper arm   BP Method Automatic   Patient Position High fowlers   Pain Assessment   Pain Assessment None - Denies Pain   Oxygen Therapy   SpO2 95 %   Pulse Oximetry Type Intermittent   Pulse Oximeter Device Mode Intermittent   Pulse Oximeter Device Location Right;Finger   O2 Device High flow nasal cannula   O2 Flow Rate (L/min) 5 L/min   Oxygen Therapy Supplemental oxygen

## 2025-03-03 NOTE — CARE COORDINATION
Spoke Hugo/Tamika of [facility]. Advised that OT/PT recs were updated on Friday.    Hugo needs Hens for insurance.  Notified Dr. Villegas that a discharge is needed.    HENS completed.  Hugo aware and will submit to insurance.

## 2025-03-04 LAB
ACID FAST STN SPEC QL: NORMAL
ACID FAST STN SPEC QL: NORMAL
GLUCOSE BLD-MCNC: 104 MG/DL (ref 70–99)
GLUCOSE BLD-MCNC: 117 MG/DL (ref 70–99)
GLUCOSE BLD-MCNC: 124 MG/DL (ref 70–99)
GLUCOSE BLD-MCNC: 144 MG/DL (ref 70–99)
GLUCOSE BLD-MCNC: 164 MG/DL (ref 70–99)
MYCOBACTERIUM SPEC CULT: NORMAL
MYCOBACTERIUM SPEC CULT: NORMAL
PERFORMED ON: ABNORMAL

## 2025-03-04 PROCEDURE — 6370000000 HC RX 637 (ALT 250 FOR IP): Performed by: INTERNAL MEDICINE

## 2025-03-04 PROCEDURE — 99232 SBSQ HOSP IP/OBS MODERATE 35: CPT | Performed by: INTERNAL MEDICINE

## 2025-03-04 PROCEDURE — 2500000003 HC RX 250 WO HCPCS: Performed by: INTERNAL MEDICINE

## 2025-03-04 PROCEDURE — 94640 AIRWAY INHALATION TREATMENT: CPT

## 2025-03-04 PROCEDURE — 2700000000 HC OXYGEN THERAPY PER DAY

## 2025-03-04 PROCEDURE — 2060000000 HC ICU INTERMEDIATE R&B

## 2025-03-04 PROCEDURE — 6360000002 HC RX W HCPCS: Performed by: INTERNAL MEDICINE

## 2025-03-04 PROCEDURE — 94761 N-INVAS EAR/PLS OXIMETRY MLT: CPT

## 2025-03-04 PROCEDURE — 94669 MECHANICAL CHEST WALL OSCILL: CPT

## 2025-03-04 RX ORDER — PREDNISONE 10 MG/1
TABLET ORAL
Qty: 5 TABLET | Refills: 0
Start: 2025-03-04

## 2025-03-04 RX ADMIN — FAMOTIDINE 20 MG: 20 TABLET, FILM COATED ORAL at 09:06

## 2025-03-04 RX ADMIN — SODIUM CHLORIDE, PRESERVATIVE FREE 10 ML: 5 INJECTION INTRAVENOUS at 20:07

## 2025-03-04 RX ADMIN — DIAZEPAM 5 MG: 5 TABLET ORAL at 14:40

## 2025-03-04 RX ADMIN — APIXABAN 5 MG: 5 TABLET, FILM COATED ORAL at 09:06

## 2025-03-04 RX ADMIN — IPRATROPIUM BROMIDE AND ALBUTEROL SULFATE 1 DOSE: .5; 2.5 SOLUTION RESPIRATORY (INHALATION) at 21:42

## 2025-03-04 RX ADMIN — DIAZEPAM 5 MG: 5 TABLET ORAL at 20:05

## 2025-03-04 RX ADMIN — BUPRENORPHINE HYDROCHLORIDE AND NALOXONE HYDROCHLORIDE DIHYDRATE 1 TABLET: 8; 2 TABLET SUBLINGUAL at 20:06

## 2025-03-04 RX ADMIN — DIAZEPAM 5 MG: 5 TABLET ORAL at 09:07

## 2025-03-04 RX ADMIN — SACUBITRIL AND VALSARTAN 1 TABLET: 24; 26 TABLET, FILM COATED ORAL at 20:05

## 2025-03-04 RX ADMIN — FAMOTIDINE 20 MG: 20 TABLET, FILM COATED ORAL at 20:06

## 2025-03-04 RX ADMIN — APIXABAN 5 MG: 5 TABLET, FILM COATED ORAL at 20:05

## 2025-03-04 RX ADMIN — PREDNISONE 10 MG: 10 TABLET ORAL at 09:07

## 2025-03-04 RX ADMIN — IPRATROPIUM BROMIDE AND ALBUTEROL SULFATE 1 DOSE: .5; 2.5 SOLUTION RESPIRATORY (INHALATION) at 07:24

## 2025-03-04 RX ADMIN — SALINE NASAL SPRAY 1 SPRAY: 1.5 SOLUTION NASAL at 08:56

## 2025-03-04 RX ADMIN — BUPRENORPHINE HYDROCHLORIDE AND NALOXONE HYDROCHLORIDE DIHYDRATE 1 TABLET: 8; 2 TABLET SUBLINGUAL at 09:07

## 2025-03-04 RX ADMIN — ATORVASTATIN CALCIUM 40 MG: 40 TABLET, FILM COATED ORAL at 20:06

## 2025-03-04 RX ADMIN — SODIUM CHLORIDE, PRESERVATIVE FREE 10 ML: 5 INJECTION INTRAVENOUS at 09:08

## 2025-03-04 NOTE — PROGRESS NOTES
HEART FAILURE CARE PLAN:    Comorbidities Reviewed: Yes   Patient has a past medical history of Back pain, chronic, Chronic back pain, Hypertension, MI (myocardial infarction) (HCC), and MRSA (methicillin resistant staph aureus) culture positive.     Weights Reviewed: Yes   Admission weight: 64.3 kg (141 lb 12.8 oz)   Wt Readings from Last 3 Encounters:   02/28/25 54.9 kg (121 lb)   02/01/25 62.3 kg (137 lb 4.8 oz)   07/09/24 56.3 kg (124 lb 1.6 oz)     Intake & Output Reviewed: Yes     Intake/Output Summary (Last 24 hours) at 3/4/2025 1033  Last data filed at 3/3/2025 1935  Gross per 24 hour   Intake --   Output 375 ml   Net -375 ml       ECHOCARDIOGRAM Reviewed: Yes   Patient's Ejection Fraction (EF) is less than or equal to 40%. Discuss HFrEF Guideline Directed Medical Therapy (GDMT) with Cardiologist or Hospitalist:          Medications Reviewed: Yes   SCHEDULED HOSPITAL MEDICATIONS:   predniSONE  10 mg Oral Daily    apixaban  5 mg Oral BID    famotidine  20 mg Oral BID    insulin lispro  0-8 Units SubCUTAneous Q4H    ipratropium 0.5 mg-albuterol 2.5 mg  1 Dose Inhalation BID RT    metoprolol succinate  12.5 mg Oral Daily    amiodarone  100 mg Oral Daily    sacubitril-valsartan  1 tablet Oral BID    [Held by provider] spironolactone  25 mg Oral Daily    atorvastatin  40 mg Oral Nightly    buprenorphine-naloxone  1 tablet SubLINGual BID    sodium chloride flush  5-40 mL IntraVENous 2 times per day     HOME MEDICATIONS:  Prior to Admission medications    Medication Sig Start Date End Date Taking? Authorizing Provider   predniSONE (DELTASONE) 10 MG tablet 1 daily- 5 days 3/4/25  Yes Brittany Shin MD   buprenorphine-naloxone (SUBOXONE) 8-2 MG SUBL SL tablet Place 1 tablet under the tongue in the morning and at bedtime for 2 days. Max Daily Amount: 2 tablets 3/3/25 3/5/25 Yes Brittany Shin MD   amiodarone (CORDARONE) 200 MG tablet Take 1 tablet by mouth daily 3/3/25  Yes Kip  MD Brittany   atorvastatin (LIPITOR) 40 MG tablet Take 1 tablet by mouth nightly 3/3/25  Yes Brittany Shin MD   empagliflozin (JARDIANCE) 10 MG tablet Take 1 tablet by mouth daily 1/31/25   Brittany Shin MD   apixaban (ELIQUIS) 5 MG TABS tablet Take 1 tablet by mouth 2 times daily 1/31/25   Brittany Shin MD   metoprolol succinate (TOPROL XL) 25 MG extended release tablet Take 0.5 tablets by mouth daily 2/1/25   Brittany Shin MD   sacubitril-valsartan (ENTRESTO) 24-26 MG per tablet Take 0.5 tablets by mouth 2 times daily 1/31/25   Brittany Shin MD   pantoprazole (PROTONIX) 40 MG tablet Take 1 tablet by mouth 2 times daily (before meals) 1/31/25   Brittany Shin MD   aspirin 81 MG EC tablet Take 1 tablet by mouth daily (Always take with food) 7/10/24   Andrade Acosta MD   spironolactone (ALDACTONE) 25 MG tablet Take 1 tablet by mouth daily Hold for SBP<95 mmHg 7/10/24   Andrade Acosta MD   furosemide (LASIX) 20 MG tablet Take 1 tablet by mouth daily 7/10/24   Andrade Acosta MD      Diet Reviewed: Yes   ADULT DIET; Regular; Low Sodium (2 gm)  ADULT ORAL NUTRITION SUPPLEMENT; Breakfast, Lunch, Dinner; Standard High Calorie/High Protein Oral Supplement    Goal of Care Reviewed: Yes   Patient and/or Family's stated Goal of Care this Admission: Reduce shortness of breath, increase activity tolerance, better understand heart failure and disease management, be more comfortable, and reduce lower extremity edema prior to discharge.     Electronically signed by Melany Bernstein RN on 3/4/2025 at 10:33 AM

## 2025-03-04 NOTE — FLOWSHEET NOTE
03/03/25 1915 03/03/25 2000   Vital Signs   Temp 97.6 °F (36.4 °C)  --    Temp Source Oral  --    Pulse 83  --    Heart Rate Source Monitor  --    Respirations 18  --    /68  --    MAP (Calculated) 85  --    BP Location Left upper arm  --    BP Method Automatic  --    Patient Position Semi fowlers  --    Pain Assessment   Pain Assessment None - Denies Pain  --    Oxygen Therapy   SpO2 (!) 89 % 93 %   O2 Device High flow nasal cannula High flow nasal cannula   O2 Flow Rate (L/min)  --  4 L/min     Assessment done and in flowsheets.  Patient is alert and oriented x4.  HFNC 4L/min - still mild SOB with exertion.  Applied mepilex on buttocks - noted redness.  Bed alarm on.  Call light within reach.

## 2025-03-04 NOTE — PROGRESS NOTES
CHEST PORTABLE   Final Result   Worsening multifocal airspace disease, likely pneumonia.  Pulmonary edema   could have a similar appearance.      Small bilateral pleural effusions.         XR CHEST PORTABLE   Final Result   1. Stable support tubes, as described.   2. Stable cardiomegaly, with a persistent small left pleural effusion.   3. Stable multifocal airspace opacities throughout both lungs, likely a   combination of asymmetric edema and multifocal pneumonia.         XR CHEST PORTABLE   Final Result   Scattered infiltrates most pronounced in the mid and lower lungs.      Support tubes as described above.         XR CHEST 1 VIEW   Final Result   1. Patchy consolidative changes of the bilateral lungs are mildly improved   compared to the prior exam.   2. Lines and tubes as above.         XR CHEST PORTABLE   Final Result   1. Endotracheal tube 5.4 cm above the riley.   2. Nasogastric tube advances to conklin the stomach.   3. Extensive bilateral infiltrates worse in the mid lower lungs overall   slightly worse compared to the previous evaluation.         CT CHEST PULMONARY EMBOLISM W CONTRAST   Final Result   1. Worsening diffuse bilateral airspace disease favoring aspiration pneumonia   rather than pulmonary edema.   2. Interstitial edema.   3. Trace left pleural effusion.   4. Mediastinal adenopathy, likely reactive.         XR CHEST PORTABLE   Final Result   1. ET tube in place, approximately 1.5 cm above the riley. It should be   withdrawn by 4 cm.   2. Extensive airspace disease in the lower lung of fields, unchanged from   prior examination.         XR CHEST PORTABLE   Final Result   1. Dense pneumonic infiltrates in the right lower lung field and in the left   mid and lower lung field, with or without atelectatic changes.  These   findings are mostly new as compared to previous portable chest x-ray.   2. Minimal bilateral pleural effusions or pleural thickening at the CP angles.   3. Minimal pulmonary  vascular prominence.             Assessment/Plan:  # Acute hypoxic and hypercapnic respiratory failure secondary to multifocal pneumonia-  - cannot rule out aspirational given imaging findings   - intubated in ER  on 2/5, and was on mechanical ventilation  -Patient remained severely hypoxic needing  paralysis  and proned ventilation initially and eventually improved   -given zyvox , cefepime and flagyl  - BP improved , wbc better - off paralysis  -extubated 2/9 but remained was on 100 % Vapotherm  with  worsening hypoxemia noted, lasix added with no benefit  - restarted steroids   - bronch cultures with NRF, continued  on zosyn  - pulmonary managing  -pt initially refused re intubation but then agreed.   - palliative care consulted  -->   - he is off vapotherm and is on nasal cannula.  He is agreeable to going to a SNF. He is on 4 L.      # Septic shock secondary to multifocal pneumonia  - leatha pna on imaging and wbc upto 47>70 K >10  -Blood cultures remain neg  -given empiric linezolid and cefepime as well as Flagyl  -Stress dose steroids with Solu-Cortef given and now off   -was hypotensive  and was on  Levophed and vasopressin   - BP improved and off pressors.   - - BAL with NRF   - Then on Zosyn as above  - stopped 2/20.  -labile blood pressure readings- continue to monitor      #Severe acidemia: Metabolic and respiratory acidosis:  -was On bicarb  drip and resolved      # ARF - sec to sepitc shock and hypotension  - hyperkalemic and acidotic , on bicarb fluids -given lokelma improved  - UOP maintained    - resolved     #Atrial fibrillation: On Eliquis  #NSTEMI, likely type II secondary to septic shock  #History of HFrEF, cardiomyopathy  - Elevated troponin was on -Heparin gtt>eliquis   - restarted amiodarone, Toprol, entrestro, holding aldacone  - given- iv lasix 20 given as tolerated   --> eliquis was held for hemoptysis intermittently, was on lovenox-> Eliquis restarted.     # Type II diabetes

## 2025-03-04 NOTE — CARE COORDINATION
Spoke with Hugo/Tamika of [facility]. Hugo spoke with Samir this morning. Samir is actively working on the precert and advised Hugo they will have an answer today.    Hugo will keep checking on precert.

## 2025-03-04 NOTE — PROGRESS NOTES
Spoke to Amina in case management. Unable to get pt transport tonight d/t late administration of suboxone and pt does not want to leave tonight. He says he wants to go during the day so he can be oriented to his surroundings first. Transport to be set up via case management @1000 3/5/25.

## 2025-03-04 NOTE — PLAN OF CARE
Problem: Discharge Planning  Goal: Discharge to home or other facility with appropriate resources  Outcome: Progressing  Flowsheets (Taken 3/2/2025 2015 by Latoya Reynolds, RN)  Discharge to home or other facility with appropriate resources: Identify barriers to discharge with patient and caregiver     Problem: Safety - Adult  Goal: Free from fall injury  3/4/2025 1032 by Melany Bernstein RN  Outcome: Progressing  Flowsheets (Taken 3/4/2025 0138 by Gemini Sanchez, RN)  Free From Fall Injury: Instruct family/caregiver on patient safety  3/4/2025 0138 by Gemini Sanchez, RN  Outcome: Progressing  Flowsheets (Taken 3/4/2025 0138)  Free From Fall Injury: Instruct family/caregiver on patient safety     Problem: Pain  Goal: Verbalizes/displays adequate comfort level or baseline comfort level  Outcome: Progressing  Flowsheets (Taken 3/2/2025 0407 by Lenard Calix, RN)  Verbalizes/displays adequate comfort level or baseline comfort level: Encourage patient to monitor pain and request assistance     Problem: Skin/Tissue Integrity  Goal: Skin integrity remains intact  Description: 1.  Monitor for areas of redness and/or skin breakdown  2.  Assess vascular access sites hourly  3.  Every 4-6 hours minimum:  Change oxygen saturation probe site  4.  Every 4-6 hours:  If on nasal continuous positive airway pressure, respiratory therapy assess nares and determine need for appliance change or resting period  Outcome: Progressing  Flowsheets (Taken 3/2/2025 2015 by Latoya Ryenolds, RN)  Skin Integrity Remains Intact: Monitor for areas of redness and/or skin breakdown     Problem: Respiratory - Adult  Goal: Achieves optimal ventilation and oxygenation  3/4/2025 1032 by Melany Bernstein, RN  Outcome: Progressing  Flowsheets (Taken 3/4/2025 0139 by Gemini Sanchez, RN)  Achieves optimal ventilation and oxygenation:   Assess for changes in respiratory status   Assess for changes in mentation and

## 2025-03-04 NOTE — PLAN OF CARE
Problem: Safety - Adult  Goal: Free from fall injury  3/4/2025 0138 by Gemini Sanchez, RN  Outcome: Progressing  Flowsheets (Taken 3/4/2025 0138)  Free From Fall Injury: Instruct family/caregiver on patient safety     Problem: Respiratory - Adult  Goal: Achieves optimal ventilation and oxygenation  3/4/2025 0139 by Gemini Sanchez, RN  Outcome: Progressing  Flowsheets (Taken 3/4/2025 0139)  Achieves optimal ventilation and oxygenation:   Assess for changes in respiratory status   Assess for changes in mentation and behavior

## 2025-03-04 NOTE — CARE COORDINATION
Spoke with Cleo of [facility]. Precert is back. Will need Suboxone prior to discharge today.    RN states patient cannot get until 9:00 pm tonight. Perfect Serve sent to Dr. Shin advising of request by facility.     Spoke with Melany POTTER who advised Dr. Shin advised the patient can receive Suboxone at 8:00 PM. CM discussed with Hugo/Tamika who advised if the patient is okay with the late discharge they will make it work. CM spoke with patient     Spoke with patient who does not want to leave after 8:00 pm but is agreeable to going in the morning.

## 2025-03-04 NOTE — PROGRESS NOTES
hours PRN for wheezing or increased work of breathing using Per Protocol order mode.        4-6 - enter or revise RT Bronchodilator order(s) to two equivalent RT bronchodilator orders with one order with BID Frequency and one order with Frequency of every 4 hours PRN wheezing or increased work of breathing using Per Protocol order mode.        7-10 - enter or revise RT Bronchodilator order(s) to two equivalent RT bronchodilator orders with one order with TID Frequency and one order with Frequency of every 4 hours PRN wheezing or increased work of breathing using Per Protocol order mode.       11-13 - enter or revise RT Bronchodilator order(s) to one equivalent RT bronchodilator order with QID Frequency and an Albuterol order with Frequency of every 4 hours PRN wheezing or increased work of breathing using Per Protocol order mode.      Greater than 13 - enter or revise RT Bronchodilator order(s) to one equivalent RT bronchodilator order with every 4 hours Frequency and an Albuterol order with Frequency of every 2 hours PRN wheezing or increased work of breathing using Per Protocol order mode.       Electronically signed by Ernestina Cast RCP on 3/3/2025 at 7:17 PM

## 2025-03-04 NOTE — FLOWSHEET NOTE
03/04/25 1111   Vital Signs   Temp 97.2 °F (36.2 °C)   Temp Source Oral   Pulse 63   Heart Rate Source Monitor   Respirations 16   BP (!) 112/54   MAP (Calculated) 73   BP Location Left upper arm   BP Method Automatic   Patient Position Semi fowlers   Oxygen Therapy   SpO2 98 %   O2 Device High flow nasal cannula   O2 Flow Rate (L/min) 4 L/min     Pt sitting in bed eating lunch tray. Pt has dry, frequent cough. Tessalon pearls pulled from [employer]. Pt does not want it. He said the nurse gave him a clear pill yesterday for cough and it caused him to loose his voice and become hoarse. He hasn't had a tessalon rupert since 3/2 and what was pulled is yellow colored. He still did not want it and is asking if something else could be ordered. Perfect serve sent to Dr. Shin.

## 2025-03-04 NOTE — PROGRESS NOTES
Pt very anxious and fidgeting in bed. Pt wanting to put his clothes on. Assisted pt with underwear and sweat pants. Pt thumping feet on bed. Pt given valium for anxiety. Will continue to monitor.

## 2025-03-04 NOTE — FLOWSHEET NOTE
03/04/25 0905   Vital Signs   Pulse 66   Heart Rate Source Monitor   Respirations 18   BP (!) 103/55   MAP (Calculated) 71   BP Location Left upper arm   BP Method Automatic   Patient Position Semi fowlers     AM assessment completed. Pt request Valium for anxiety. Held all BP meds this AM. Pt denies any other needs at this time.

## 2025-03-04 NOTE — PROGRESS NOTES
Vitals:    03/04/25 0352   BP: 103/63   Pulse: 80   Resp: 18   Temp: 97.8 °F (36.6 °C)   SpO2: 95%     Patient asleep but easy to wake up.  HFNC 4L/min, not in distress.  Call light within reach.

## 2025-03-04 NOTE — PROGRESS NOTES
Pt resting quietly in bed, eyes closed, easy RR noted but is easily aroused. No needs verbalized at this time. Will continue to monitor.

## 2025-03-05 VITALS
RESPIRATION RATE: 18 BRPM | BODY MASS INDEX: 17.47 KG/M2 | DIASTOLIC BLOOD PRESSURE: 62 MMHG | OXYGEN SATURATION: 92 % | TEMPERATURE: 98 F | HEIGHT: 70 IN | HEART RATE: 76 BPM | SYSTOLIC BLOOD PRESSURE: 97 MMHG | WEIGHT: 122 LBS

## 2025-03-05 LAB
GLUCOSE BLD-MCNC: 131 MG/DL (ref 70–99)
PERFORMED ON: ABNORMAL

## 2025-03-05 PROCEDURE — 6370000000 HC RX 637 (ALT 250 FOR IP): Performed by: INTERNAL MEDICINE

## 2025-03-05 PROCEDURE — 99239 HOSP IP/OBS DSCHRG MGMT >30: CPT | Performed by: INTERNAL MEDICINE

## 2025-03-05 PROCEDURE — 94640 AIRWAY INHALATION TREATMENT: CPT

## 2025-03-05 PROCEDURE — 94761 N-INVAS EAR/PLS OXIMETRY MLT: CPT

## 2025-03-05 PROCEDURE — 2700000000 HC OXYGEN THERAPY PER DAY

## 2025-03-05 PROCEDURE — 94669 MECHANICAL CHEST WALL OSCILL: CPT

## 2025-03-05 PROCEDURE — 6360000002 HC RX W HCPCS: Performed by: INTERNAL MEDICINE

## 2025-03-05 RX ADMIN — FAMOTIDINE 20 MG: 20 TABLET, FILM COATED ORAL at 08:14

## 2025-03-05 RX ADMIN — APIXABAN 5 MG: 5 TABLET, FILM COATED ORAL at 08:14

## 2025-03-05 RX ADMIN — IPRATROPIUM BROMIDE AND ALBUTEROL SULFATE 1 DOSE: .5; 2.5 SOLUTION RESPIRATORY (INHALATION) at 07:17

## 2025-03-05 RX ADMIN — PREDNISONE 10 MG: 10 TABLET ORAL at 08:14

## 2025-03-05 RX ADMIN — AMIODARONE HYDROCHLORIDE 100 MG: 200 TABLET ORAL at 08:14

## 2025-03-05 RX ADMIN — DIAZEPAM 5 MG: 5 TABLET ORAL at 09:23

## 2025-03-05 RX ADMIN — METOPROLOL SUCCINATE 12.5 MG: 25 TABLET, EXTENDED RELEASE ORAL at 08:14

## 2025-03-05 RX ADMIN — BUPRENORPHINE HYDROCHLORIDE AND NALOXONE HYDROCHLORIDE DIHYDRATE 1 TABLET: 8; 2 TABLET SUBLINGUAL at 08:14

## 2025-03-05 NOTE — PROGRESS NOTES
Vitals:    03/05/25 0422   BP: (!) 93/58   Pulse: 66   Resp: 18   Temp: 98.2 °F (36.8 °C)   SpO2: 98%     Patient is alert and oriented.  4L/min via nasal cannula.  Call light within reach.

## 2025-03-05 NOTE — CARE COORDINATION
DISCHARGE ORDER  Date/Time 3/5/2025 9:30 AM  Completed by: Teresa Boeck, TARIQ, Case Management    Patient Name: Dayton Soto    : 1965      Admit order Date and Status:   Noted discharge order. (verify MD's last order for status of admission/Traditional Medicare 3 [place] Inpatient qualifying stay required for SNF)    Confirmed discharge plan with:              Patient:  Yes              When pt confirms DC plan does any support person need to be contacted by CM No if yes who______                      Discharge to Facility: [facility]   Facility phone number for staff giving report: [phone]   Pre-certification completed: yes   Hospital Exemption Notification (HENS) completed: yes   Discharge orders and Continuity of Care faxed to facility:  [facility] [email or web address]      Transportation:               Medical Transport explained with choice list offered to pt/family.                Choice:No(no preference)  Agency used: [facility]   time:   10:00      Pt/family/Nursing/Facility aware of  time:  Yes Names: Hugo/Tamika alejandro Shingleton, VERONICA/paperwork, Sarah RN, patient  Ambulance form completed:  yes:      Date Last IMM Given: NA    Comments: Pt is being d/c'd to [facility] today. Pt's O2 sats are 92% on 3L.    Discharge timeout done with Sarah RN. All discharge needs and concerns addressed.    Discharging nurse to complete CAMILLE, reconcile AVS, and place final copy with patient's discharge packet. Discharging RN to ensure that written prescriptions for  Level II medications are sent with patient to the facility as per protocol.

## 2025-03-05 NOTE — FLOWSHEET NOTE
03/04/25 1923   Vital Signs   Temp 97.7 °F (36.5 °C)   Temp Source Oral   Pulse 69   Heart Rate Source Monitor   Respirations 24   BP (!) 100/47   MAP (Calculated) 65   BP Location Left upper arm   BP Method Automatic   Patient Position High fowlers   Oxygen Therapy   SpO2 98 %   O2 Device High flow nasal cannula   O2 Flow Rate (L/min) 4 L/min     Assessment done and in flowsheets.  Patient is alert and oriented x4.  O2 at 5L/min - dyspneic on exertion.  Call light within reach.

## 2025-03-05 NOTE — PLAN OF CARE
Problem: Respiratory - Adult  Goal: Achieves optimal ventilation and oxygenation  Outcome: Progressing  Flowsheets (Taken 3/5/2025 0419)  Achieves optimal ventilation and oxygenation: Assess for changes in respiratory status     Problem: Anxiety  Goal: Will report anxiety at manageable levels  Description: INTERVENTIONS:  1. Administer medication as ordered  2. Teach and rehearse alternative coping skills  3. Provide emotional support with 1:1 interaction with staff  Outcome: Progressing  Flowsheets (Taken 3/5/2025 0419)  Will report anxiety at manageable levels: Administer medication as ordered

## 2025-03-08 PROBLEM — R79.89 ELEVATED TROPONIN: Status: RESOLVED | Noted: 2025-01-28 | Resolved: 2025-03-08

## 2025-03-11 LAB
ACID FAST STN SPEC QL: NORMAL
ACID FAST STN SPEC QL: NORMAL
MYCOBACTERIUM SPEC CULT: NORMAL
MYCOBACTERIUM SPEC CULT: NORMAL

## 2025-03-16 LAB
FUNGUS SPEC CULT: ABNORMAL
LOEFFLER MB STN SPEC: ABNORMAL
LOEFFLER MB STN SPEC: ABNORMAL
ORGANISM: ABNORMAL

## 2025-03-25 NOTE — PROGRESS NOTES
Physician Progress Note      PATIENT:               TIFFANIE MARES  [facility] #:                  [id number]  :                       1965  ADMIT DATE:       2025 8:47 AM  DISCH DATE:        3/5/2025 10:21 AM  RESPONDING  PROVIDER #:        Brittany Shin MD          QUERY TEXT:    Patient admitted with acute respiratory failure. Noted to have severe   malnutrition in dietician note on 25. If possible, please document in   progress notes and discharge summary if you are evaluating and /or treating   any of the following:    The medical record reflects the following:  Risk Factors: Hx-  recently diagnosed with pneumonia, polysubstance   use-methamphetamine, A-fib, valvular disease, on Eliquis on amiodarone, MRSA,   cardiomyopathy  .  Clinical Indicators: Per dietician note on 25-Malnutrition Status:  Severe   malnutrition (25 1218) , Energy Intake:  50% or less of estimated   energy requirements for 5 or more days, Weight Loss:  No weight loss, Body Fat   Loss:  Moderate body fat loss Orbital, Buccal region, Muscle Mass Loss:    Moderate muscle mass loss Temples (temporalis), Clavicles (pectoralis &   deltoids), Hand (interosseous)  Treatment: Dietician consult- Continue NPO status until patient is medially   cleared to receive nutrition therapy. Monitor respiratory/vent status,   sedation type/amount (propofol is currently infusing at 30 mcg x 24 hours   which = 296 kcals from lipids), TG checks, and plan of care. Please obtain an   actual, current weight for this patient - last weight was obtained on 25   when patient was d/c'd from [facility] prior to this admission. Monitor   nutrition-related labs, bowel function, and weight trends.    ASPEN Criteria:    https://aspenjournals.onlinelibrary.graves.com/doi/full/10.1177/[email or web address]  5  Options provided:  -- Protein calorie malnutrition severe  -- Other - I will add my own diagnosis  -- Disagree - Not applicable / Not valid  --

## 2025-05-09 ENCOUNTER — TELEPHONE (OUTPATIENT)
Dept: CARDIOLOGY CLINIC | Age: 60
End: 2025-05-09

## 2025-06-03 ENCOUNTER — OFFICE VISIT (OUTPATIENT)
Dept: FAMILY MEDICINE CLINIC | Age: 60
End: 2025-06-03

## 2025-06-03 VITALS
HEART RATE: 59 BPM | OXYGEN SATURATION: 93 % | TEMPERATURE: 98 F | DIASTOLIC BLOOD PRESSURE: 69 MMHG | HEIGHT: 69 IN | SYSTOLIC BLOOD PRESSURE: 129 MMHG | WEIGHT: 144 LBS | BODY MASS INDEX: 21.33 KG/M2

## 2025-06-03 DIAGNOSIS — Z76.89 ENCOUNTER TO ESTABLISH CARE: Primary | ICD-10-CM

## 2025-06-03 ASSESSMENT — PATIENT HEALTH QUESTIONNAIRE - PHQ9
SUM OF ALL RESPONSES TO PHQ QUESTIONS 1-9: 0
SUM OF ALL RESPONSES TO PHQ QUESTIONS 1-9: 0
2. FEELING DOWN, DEPRESSED OR HOPELESS: NOT AT ALL
SUM OF ALL RESPONSES TO PHQ QUESTIONS 1-9: 0
SUM OF ALL RESPONSES TO PHQ QUESTIONS 1-9: 0
1. LITTLE INTEREST OR PLEASURE IN DOING THINGS: NOT AT ALL

## 2025-06-03 NOTE — PROGRESS NOTES
[facility]  Establish care visit   6/3/2025    Dayton Soto (:  1965) is a 59 y.o. male, here to establish care.    No chief complaint on file.       ASSESSMENT/ PLAN  1. Encounter to establish care  ***       No follow-ups on file.    HPI  ***    ROS  Review of Systems     HISTORIES  Current Outpatient Medications on File Prior to Visit   Medication Sig Dispense Refill    predniSONE (DELTASONE) 10 MG tablet 1 daily- 5 days 5 tablet 0    amiodarone (CORDARONE) 200 MG tablet Take 1 tablet by mouth daily 30 tablet 0    atorvastatin (LIPITOR) 40 MG tablet Take 1 tablet by mouth nightly 30 tablet 1    empagliflozin (JARDIANCE) 10 MG tablet Take 1 tablet by mouth daily 30 tablet 1    apixaban (ELIQUIS) 5 MG TABS tablet Take 1 tablet by mouth 2 times daily 60 tablet 0    metoprolol succinate (TOPROL XL) 25 MG extended release tablet Take 0.5 tablets by mouth daily 30 tablet 1    sacubitril-valsartan (ENTRESTO) 24-26 MG per tablet Take 0.5 tablets by mouth 2 times daily 30 tablet 0    pantoprazole (PROTONIX) 40 MG tablet Take 1 tablet by mouth 2 times daily (before meals) 60 tablet 1    aspirin 81 MG EC tablet Take 1 tablet by mouth daily (Always take with food) 30 tablet 3    spironolactone (ALDACTONE) 25 MG tablet Take 1 tablet by mouth daily Hold for SBP<95 mmHg 30 tablet 3    furosemide (LASIX) 20 MG tablet Take 1 tablet by mouth daily 60 tablet 3     No current facility-administered medications on file prior to visit.        No Known Allergies    Past Medical History:   Diagnosis Date    Back pain, chronic     Chronic back pain     Hypertension     MI (myocardial infarction) ([facility])     MRSA (methicillin resistant staph aureus) culture positive 2020    RIGHT FOREARM       Patient Active Problem List   Diagnosis    NSTEMI (non-ST elevated myocardial infarction) ([facility])    PSVT (paroxysmal supraventricular tachycardia)    LUIS FELIPE (acute kidney injury)    Left-sided chest pain    Abnormal

## 2025-06-29 ENCOUNTER — HOSPITAL ENCOUNTER (EMERGENCY)
Age: 60
Discharge: HOME | End: 2025-06-29
Attending: STUDENT IN AN ORGANIZED HEALTH CARE EDUCATION/TRAINING PROGRAM
Payer: MEDICAID

## 2025-06-29 VITALS
RESPIRATION RATE: 14 BRPM | WEIGHT: 145 LBS | BODY MASS INDEX: 21.48 KG/M2 | DIASTOLIC BLOOD PRESSURE: 57 MMHG | OXYGEN SATURATION: 95 % | TEMPERATURE: 99 F | HEART RATE: 66 BPM | HEIGHT: 69 IN | SYSTOLIC BLOOD PRESSURE: 93 MMHG

## 2025-06-29 DIAGNOSIS — R00.2 PALPITATIONS: Primary | ICD-10-CM

## 2025-06-29 LAB
ALBUMIN SERPL-MCNC: 3.9 G/DL (ref 3.4–5)
ALBUMIN/GLOB SERPL: 1 {RATIO} (ref 1.1–2.2)
ALP SERPL-CCNC: 97 U/L (ref 40–129)
ALT SERPL-CCNC: 11 U/L (ref 10–40)
ANION GAP SERPL CALCULATED.3IONS-SCNC: 14 MMOL/L (ref 3–16)
AST SERPL-CCNC: 20 U/L (ref 15–37)
BASE EXCESS BLDV CALC-SCNC: 2 MMOL/L (ref -3–3)
BASOPHILS # BLD: 0.1 K/UL (ref 0–0.2)
BASOPHILS NFR BLD: 0.5 %
BILIRUB SERPL-MCNC: 0.4 MG/DL (ref 0–1)
BUN SERPL-MCNC: 25 MG/DL (ref 7–20)
CALCIUM SERPL-MCNC: 10.2 MG/DL (ref 8.3–10.6)
CHLORIDE SERPL-SCNC: 95 MMOL/L (ref 99–110)
CO2 BLDV-SCNC: 30 MMOL/L
CO2 SERPL-SCNC: 27 MMOL/L (ref 21–32)
COHGB MFR BLDV: 1.6 % (ref 0–1.5)
CREAT SERPL-MCNC: 0.9 MG/DL (ref 0.9–1.3)
DEPRECATED RDW RBC AUTO: 13 % (ref 12.4–15.4)
EOSINOPHIL # BLD: 0.1 K/UL (ref 0–0.6)
EOSINOPHIL NFR BLD: 1.1 %
GFR SERPLBLD CREATININE-BSD FMLA CKD-EPI: >90 ML/MIN/{1.73_M2}
GLUCOSE SERPL-MCNC: 142 MG/DL (ref 70–99)
HCO3 BLDV-SCNC: 28.3 MMOL/L (ref 23–29)
HCT VFR BLD AUTO: 43.3 % (ref 40.5–52.5)
HGB BLD-MCNC: 14.9 G/DL (ref 13.5–17.5)
LYMPHOCYTES # BLD: 1.3 K/UL (ref 1–5.1)
LYMPHOCYTES NFR BLD: 13.6 %
MAGNESIUM SERPL-MCNC: 1.71 MG/DL (ref 1.8–2.4)
MCH RBC QN AUTO: 29.3 PG (ref 26–34)
MCHC RBC AUTO-ENTMCNC: 34.5 G/DL (ref 31–36)
MCV RBC AUTO: 85 FL (ref 80–100)
METHGB MFR BLDV: 0.3 %
MONOCYTES # BLD: 1.1 K/UL (ref 0–1.3)
MONOCYTES NFR BLD: 11.9 %
NEUTROPHILS # BLD: 7 K/UL (ref 1.7–7.7)
NEUTROPHILS NFR BLD: 72.9 %
NT-PROBNP SERPL-MCNC: 56 PG/ML (ref 0–124)
O2 CT VFR BLDV CALC: 17 VOL %
O2 THERAPY: ABNORMAL
PCO2 BLDV: 49.8 MMHG (ref 40–50)
PH BLDV: 7.37 [PH] (ref 7.35–7.45)
PLATELET # BLD AUTO: 185 K/UL (ref 135–450)
PMV BLD AUTO: 9.1 FL (ref 5–10.5)
PO2 BLDV: 42.9 MMHG (ref 25–40)
POTASSIUM SERPL-SCNC: 3.4 MMOL/L (ref 3.5–5.1)
PROT SERPL-MCNC: 7.7 G/DL (ref 6.4–8.2)
RBC # BLD AUTO: 5.1 M/UL (ref 4.2–5.9)
SAO2 % BLDV: 77 %
SODIUM SERPL-SCNC: 136 MMOL/L (ref 136–145)
TROPONIN, HIGH SENSITIVITY: 10 NG/L (ref 0–22)
TROPONIN, HIGH SENSITIVITY: 8 NG/L (ref 0–22)
WBC # BLD AUTO: 9.6 K/UL (ref 4–11)

## 2025-06-29 PROCEDURE — 6370000000 HC RX 637 (ALT 250 FOR IP): Performed by: STUDENT IN AN ORGANIZED HEALTH CARE EDUCATION/TRAINING PROGRAM

## 2025-06-29 PROCEDURE — 84484 ASSAY OF TROPONIN QUANT: CPT

## 2025-06-29 PROCEDURE — 85025 COMPLETE CBC W/AUTO DIFF WBC: CPT

## 2025-06-29 PROCEDURE — 93005 ELECTROCARDIOGRAM TRACING: CPT | Performed by: STUDENT IN AN ORGANIZED HEALTH CARE EDUCATION/TRAINING PROGRAM

## 2025-06-29 PROCEDURE — 80053 COMPREHEN METABOLIC PANEL: CPT

## 2025-06-29 PROCEDURE — 83880 ASSAY OF NATRIURETIC PEPTIDE: CPT

## 2025-06-29 PROCEDURE — 83735 ASSAY OF MAGNESIUM: CPT

## 2025-06-29 PROCEDURE — 36415 COLL VENOUS BLD VENIPUNCTURE: CPT

## 2025-06-29 PROCEDURE — 82803 BLOOD GASES ANY COMBINATION: CPT

## 2025-06-29 PROCEDURE — 99284 EMERGENCY DEPT VISIT MOD MDM: CPT

## 2025-06-29 RX ORDER — POTASSIUM CHLORIDE 1500 MG/1
40 TABLET, EXTENDED RELEASE ORAL ONCE
Status: COMPLETED | OUTPATIENT
Start: 2025-06-29 | End: 2025-06-29

## 2025-06-29 RX ADMIN — MAGNESIUM 64 MG (MAGNESIUM CHLORIDE) TABLET,DELAYED RELEASE 64 MG: at 22:57

## 2025-06-29 RX ADMIN — POTASSIUM CHLORIDE 40 MEQ: 1500 TABLET, EXTENDED RELEASE ORAL at 22:57

## 2025-06-29 ASSESSMENT — PAIN - FUNCTIONAL ASSESSMENT: PAIN_FUNCTIONAL_ASSESSMENT: NONE - DENIES PAIN

## 2025-06-30 LAB
EKG ATRIAL RATE: 89 BPM
EKG DIAGNOSIS: NORMAL
EKG P AXIS: 72 DEGREES
EKG P-R INTERVAL: 160 MS
EKG Q-T INTERVAL: 372 MS
EKG QRS DURATION: 110 MS
EKG QTC CALCULATION (BAZETT): 452 MS
EKG R AXIS: 58 DEGREES
EKG T AXIS: 41 DEGREES
EKG VENTRICULAR RATE: 89 BPM

## 2025-06-30 PROCEDURE — 93010 ELECTROCARDIOGRAM REPORT: CPT | Performed by: STUDENT IN AN ORGANIZED HEALTH CARE EDUCATION/TRAINING PROGRAM

## 2025-06-30 NOTE — DISCHARGE INSTRUCTIONS
Return the nearest ED if you have more palpitations, chest pain, shortness of breath, other concerning symptoms.  As we discussed, I do think all of your prescribed medications have benefit to you.  If you are only willing to take a few medications I would recommend strongly continuing the Eliquis, metoprolol, atorvastatin, Entresto, and spironolactone.  I think you should discuss this in detail with your cardiologist and have a rosibel discussion with them about how many medications you are willing to take.

## 2025-06-30 NOTE — ED PROVIDER NOTES
[facility] EMERGENCY DEPARTMENT      CHIEF COMPLAINT  Palpitations (From home via EMS - hasn't taken BP meds in about a month because he says he \"doesn't need it\". Started having palpitations today and felt like his heart was beating out of his chest. Also states BP in 220s at home. Hx meth use but has not used since February. )       HISTORY OF PRESENT ILLNESS  Dayton Soto is a 59 y.o. male  who presents to the ED complaining of palpitations, stating that he felt like his heart was racing and beating out of his chest.  States he also noticed that his blood pressure severely elevated up to 220.  Previously on several cardiac medications which she has not been taking recently, but did take most of them prior to arrival.  States he currently feels improved and does not have any complaints.    No other complaints, modifying factors or associated symptoms.     I have reviewed the following from the nursing documentation.    Past Medical History:   Diagnosis Date    Back pain, chronic     Chronic back pain     Hypertension     MI (myocardial infarction) (HCC)     MRSA (methicillin resistant staph aureus) culture positive 02/26/2020    RIGHT FOREARM     Past Surgical History:   Procedure Laterality Date    BRONCHOSCOPY N/A 2/12/2025    BRONCHOSCOPY ALVEOLAR LAVAGE performed by Amish Richard MD at [facility]     No family history on file.  Social History     Socioeconomic History    Marital status:      Spouse name: Not on file    Number of children: Not on file    Years of education: Not on file    Highest education level: Not on file   Occupational History    Not on file   Tobacco Use    Smoking status: Former     Types: Cigarettes    Smokeless tobacco: Never   Vaping Use    Vaping status: Every Day    Substances: Nicotine   Substance and Sexual Activity    Alcohol use: No    Drug use: Yes     Types: Methamphetamines (Crystal Meth)     Comment: 1 month ago    Sexual activity: Not Currently

## 2025-07-18 ENCOUNTER — APPOINTMENT (OUTPATIENT)
Dept: GENERAL RADIOLOGY | Age: 60
End: 2025-07-18
Payer: MEDICAID

## 2025-07-18 ENCOUNTER — HOSPITAL ENCOUNTER (OUTPATIENT)
Age: 60
Setting detail: OBSERVATION
Discharge: LEFT BEFORE DISCHARGE | End: 2025-07-19
Attending: EMERGENCY MEDICINE | Admitting: STUDENT IN AN ORGANIZED HEALTH CARE EDUCATION/TRAINING PROGRAM
Payer: MEDICAID

## 2025-07-18 DIAGNOSIS — R07.9 CHEST PAIN, UNSPECIFIED TYPE: Primary | ICD-10-CM

## 2025-07-18 DIAGNOSIS — E87.6 HYPOKALEMIA: ICD-10-CM

## 2025-07-18 DIAGNOSIS — F41.1 ANXIETY STATE: ICD-10-CM

## 2025-07-18 LAB
ALBUMIN SERPL-MCNC: 4 G/DL (ref 3.4–5)
ALBUMIN/GLOB SERPL: 1.4 {RATIO} (ref 1.1–2.2)
ALP SERPL-CCNC: 107 U/L (ref 40–129)
ALT SERPL-CCNC: 15 U/L (ref 10–40)
ANION GAP SERPL CALCULATED.3IONS-SCNC: 13 MMOL/L (ref 3–16)
AST SERPL-CCNC: 24 U/L (ref 15–37)
BASOPHILS # BLD: 0.1 K/UL (ref 0–0.2)
BASOPHILS NFR BLD: 1.2 %
BILIRUB SERPL-MCNC: 0.4 MG/DL (ref 0–1)
BUN SERPL-MCNC: 23 MG/DL (ref 7–20)
CALCIUM SERPL-MCNC: 8.9 MG/DL (ref 8.3–10.6)
CHLORIDE SERPL-SCNC: 97 MMOL/L (ref 99–110)
CO2 SERPL-SCNC: 29 MMOL/L (ref 21–32)
CREAT SERPL-MCNC: 1.1 MG/DL (ref 0.8–1.3)
D-DIMER QUANTITATIVE: 0.28 UG/ML FEU (ref 0–0.6)
DEPRECATED RDW RBC AUTO: 13.1 % (ref 12.4–15.4)
EOSINOPHIL # BLD: 0.2 K/UL (ref 0–0.6)
EOSINOPHIL NFR BLD: 2.1 %
GFR SERPLBLD CREATININE-BSD FMLA CKD-EPI: 77 ML/MIN/{1.73_M2}
GLUCOSE SERPL-MCNC: 140 MG/DL (ref 70–99)
HCT VFR BLD AUTO: 44.3 % (ref 40.5–52.5)
HGB BLD-MCNC: 15.1 G/DL (ref 13.5–17.5)
LYMPHOCYTES # BLD: 1.8 K/UL (ref 1–5.1)
LYMPHOCYTES NFR BLD: 20.4 %
MAGNESIUM SERPL-MCNC: 2 MG/DL (ref 1.8–2.4)
MCH RBC QN AUTO: 29.4 PG (ref 26–34)
MCHC RBC AUTO-ENTMCNC: 34 G/DL (ref 31–36)
MCV RBC AUTO: 86.3 FL (ref 80–100)
MONOCYTES # BLD: 0.8 K/UL (ref 0–1.3)
MONOCYTES NFR BLD: 9.8 %
NEUTROPHILS # BLD: 5.7 K/UL (ref 1.7–7.7)
NEUTROPHILS NFR BLD: 66.5 %
NT-PROBNP SERPL-MCNC: 39 PG/ML (ref 0–124)
PLATELET # BLD AUTO: 190 K/UL (ref 135–450)
PMV BLD AUTO: 10.1 FL (ref 5–10.5)
POTASSIUM SERPL-SCNC: 3.4 MMOL/L (ref 3.5–5.1)
PROT SERPL-MCNC: 6.9 G/DL (ref 6.4–8.2)
RBC # BLD AUTO: 5.13 M/UL (ref 4.2–5.9)
REASON FOR REJECTION: NORMAL
REJECTED TEST: NORMAL
SODIUM SERPL-SCNC: 139 MMOL/L (ref 136–145)
TROPONIN, HIGH SENSITIVITY: <6 NG/L (ref 0–22)
WBC # BLD AUTO: 8.6 K/UL (ref 4–11)

## 2025-07-18 PROCEDURE — 93005 ELECTROCARDIOGRAM TRACING: CPT | Performed by: EMERGENCY MEDICINE

## 2025-07-18 PROCEDURE — 83880 ASSAY OF NATRIURETIC PEPTIDE: CPT

## 2025-07-18 PROCEDURE — 85379 FIBRIN DEGRADATION QUANT: CPT

## 2025-07-18 PROCEDURE — 85025 COMPLETE CBC W/AUTO DIFF WBC: CPT

## 2025-07-18 PROCEDURE — 80053 COMPREHEN METABOLIC PANEL: CPT

## 2025-07-18 PROCEDURE — 99285 EMERGENCY DEPT VISIT HI MDM: CPT

## 2025-07-18 PROCEDURE — 71045 X-RAY EXAM CHEST 1 VIEW: CPT

## 2025-07-18 PROCEDURE — 83735 ASSAY OF MAGNESIUM: CPT

## 2025-07-18 PROCEDURE — 84484 ASSAY OF TROPONIN QUANT: CPT

## 2025-07-18 PROCEDURE — 36415 COLL VENOUS BLD VENIPUNCTURE: CPT

## 2025-07-18 RX ORDER — BUPRENORPHINE HYDROCHLORIDE AND NALOXONE HYDROCHLORIDE DIHYDRATE 8; 2 MG/1; MG/1
1 TABLET SUBLINGUAL DAILY
COMMUNITY

## 2025-07-18 RX ORDER — POTASSIUM CHLORIDE 1500 MG/1
40 TABLET, EXTENDED RELEASE ORAL ONCE
Status: COMPLETED | OUTPATIENT
Start: 2025-07-19 | End: 2025-07-19

## 2025-07-18 ASSESSMENT — PAIN - FUNCTIONAL ASSESSMENT: PAIN_FUNCTIONAL_ASSESSMENT: NONE - DENIES PAIN

## 2025-07-19 ENCOUNTER — APPOINTMENT (OUTPATIENT)
Age: 60
End: 2025-07-19
Attending: STUDENT IN AN ORGANIZED HEALTH CARE EDUCATION/TRAINING PROGRAM
Payer: MEDICAID

## 2025-07-19 ENCOUNTER — HOSPITAL ENCOUNTER (OUTPATIENT)
Age: 60
Setting detail: OBSERVATION
Discharge: HOME | End: 2025-07-21
Attending: STUDENT IN AN ORGANIZED HEALTH CARE EDUCATION/TRAINING PROGRAM | Admitting: INTERNAL MEDICINE
Payer: MEDICAID

## 2025-07-19 VITALS
HEIGHT: 70 IN | TEMPERATURE: 97.8 F | DIASTOLIC BLOOD PRESSURE: 59 MMHG | SYSTOLIC BLOOD PRESSURE: 99 MMHG | BODY MASS INDEX: 19.44 KG/M2 | HEART RATE: 47 BPM | OXYGEN SATURATION: 98 % | RESPIRATION RATE: 18 BRPM | WEIGHT: 135.8 LBS

## 2025-07-19 DIAGNOSIS — R07.9 CHEST PAIN, UNSPECIFIED TYPE: Primary | ICD-10-CM

## 2025-07-19 LAB
ALBUMIN SERPL-MCNC: 3.9 G/DL (ref 3.4–5)
ALBUMIN/GLOB SERPL: 1.1 {RATIO} (ref 1.1–2.2)
ALP SERPL-CCNC: 96 U/L (ref 40–129)
ALT SERPL-CCNC: 13 U/L (ref 10–40)
AMPHETAMINES UR QL SCN>1000 NG/ML: NORMAL
ANION GAP SERPL CALCULATED.3IONS-SCNC: 12 MMOL/L (ref 3–16)
AST SERPL-CCNC: 21 U/L (ref 15–37)
BARBITURATES UR QL SCN>200 NG/ML: NORMAL
BENZODIAZ UR QL SCN>200 NG/ML: NORMAL
BILIRUB SERPL-MCNC: 0.5 MG/DL (ref 0–1)
BUN SERPL-MCNC: 24 MG/DL (ref 7–20)
CALCIUM SERPL-MCNC: 8.5 MG/DL (ref 8.3–10.6)
CANNABINOIDS UR QL SCN>50 NG/ML: NORMAL
CHLORIDE SERPL-SCNC: 100 MMOL/L (ref 99–110)
CO2 SERPL-SCNC: 27 MMOL/L (ref 21–32)
COCAINE UR QL SCN: NORMAL
CREAT SERPL-MCNC: 1 MG/DL (ref 0.8–1.3)
DRUG SCREEN COMMENT UR-IMP: NORMAL
EKG ATRIAL RATE: 48 BPM
EKG ATRIAL RATE: 65 BPM
EKG DIAGNOSIS: NORMAL
EKG DIAGNOSIS: NORMAL
EKG P AXIS: 66 DEGREES
EKG P AXIS: 70 DEGREES
EKG P-R INTERVAL: 156 MS
EKG P-R INTERVAL: 162 MS
EKG Q-T INTERVAL: 438 MS
EKG Q-T INTERVAL: 480 MS
EKG QRS DURATION: 116 MS
EKG QRS DURATION: 116 MS
EKG QTC CALCULATION (BAZETT): 428 MS
EKG QTC CALCULATION (BAZETT): 455 MS
EKG R AXIS: 51 DEGREES
EKG R AXIS: 89 DEGREES
EKG T AXIS: 55 DEGREES
EKG T AXIS: 9 DEGREES
EKG VENTRICULAR RATE: 48 BPM
EKG VENTRICULAR RATE: 65 BPM
FENTANYL SCREEN, URINE: NORMAL
GFR SERPLBLD CREATININE-BSD FMLA CKD-EPI: 86 ML/MIN/{1.73_M2}
GLUCOSE SERPL-MCNC: 92 MG/DL (ref 70–99)
MAGNESIUM SERPL-MCNC: 1.84 MG/DL (ref 1.8–2.4)
METHADONE UR QL SCN>300 NG/ML: NORMAL
OPIATES UR QL SCN>300 NG/ML: NORMAL
OXYCODONE UR QL SCN: NORMAL
PCP UR QL SCN>25 NG/ML: NORMAL
PH UR STRIP: 6 [PH]
POTASSIUM SERPL-SCNC: 4 MMOL/L (ref 3.5–5.1)
PROT SERPL-MCNC: 7.3 G/DL (ref 6.4–8.2)
SODIUM SERPL-SCNC: 139 MMOL/L (ref 136–145)
TROPONIN, HIGH SENSITIVITY: 8 NG/L (ref 0–22)
TROPONIN, HIGH SENSITIVITY: 9 NG/L (ref 0–22)

## 2025-07-19 PROCEDURE — 80307 DRUG TEST PRSMV CHEM ANLYZR: CPT

## 2025-07-19 PROCEDURE — 36415 COLL VENOUS BLD VENIPUNCTURE: CPT

## 2025-07-19 PROCEDURE — 93005 ELECTROCARDIOGRAM TRACING: CPT | Performed by: STUDENT IN AN ORGANIZED HEALTH CARE EDUCATION/TRAINING PROGRAM

## 2025-07-19 PROCEDURE — 6370000000 HC RX 637 (ALT 250 FOR IP): Performed by: STUDENT IN AN ORGANIZED HEALTH CARE EDUCATION/TRAINING PROGRAM

## 2025-07-19 PROCEDURE — 99285 EMERGENCY DEPT VISIT HI MDM: CPT

## 2025-07-19 PROCEDURE — 93010 ELECTROCARDIOGRAM REPORT: CPT | Performed by: INTERNAL MEDICINE

## 2025-07-19 PROCEDURE — 6370000000 HC RX 637 (ALT 250 FOR IP): Performed by: EMERGENCY MEDICINE

## 2025-07-19 PROCEDURE — G0378 HOSPITAL OBSERVATION PER HR: HCPCS

## 2025-07-19 PROCEDURE — 80053 COMPREHEN METABOLIC PANEL: CPT

## 2025-07-19 PROCEDURE — 2500000003 HC RX 250 WO HCPCS: Performed by: NURSE PRACTITIONER

## 2025-07-19 PROCEDURE — 84484 ASSAY OF TROPONIN QUANT: CPT

## 2025-07-19 PROCEDURE — 6370000000 HC RX 637 (ALT 250 FOR IP): Performed by: NURSE PRACTITIONER

## 2025-07-19 RX ORDER — SODIUM CHLORIDE 0.9 % (FLUSH) 0.9 %
5-40 SYRINGE (ML) INJECTION PRN
Status: DISCONTINUED | OUTPATIENT
Start: 2025-07-19 | End: 2025-07-21 | Stop reason: HOSPADM

## 2025-07-19 RX ORDER — ACETAMINOPHEN 325 MG/1
650 TABLET ORAL EVERY 6 HOURS PRN
Status: DISCONTINUED | OUTPATIENT
Start: 2025-07-19 | End: 2025-07-21 | Stop reason: HOSPADM

## 2025-07-19 RX ORDER — POTASSIUM CHLORIDE 1500 MG/1
40 TABLET, EXTENDED RELEASE ORAL PRN
Status: DISCONTINUED | OUTPATIENT
Start: 2025-07-19 | End: 2025-07-19 | Stop reason: HOSPADM

## 2025-07-19 RX ORDER — ACETAMINOPHEN 650 MG/1
650 SUPPOSITORY RECTAL EVERY 6 HOURS PRN
Status: DISCONTINUED | OUTPATIENT
Start: 2025-07-19 | End: 2025-07-21 | Stop reason: HOSPADM

## 2025-07-19 RX ORDER — ONDANSETRON 2 MG/ML
4 INJECTION INTRAMUSCULAR; INTRAVENOUS EVERY 6 HOURS PRN
Status: DISCONTINUED | OUTPATIENT
Start: 2025-07-19 | End: 2025-07-21 | Stop reason: HOSPADM

## 2025-07-19 RX ORDER — BUPRENORPHINE HYDROCHLORIDE AND NALOXONE HYDROCHLORIDE DIHYDRATE 8; 2 MG/1; MG/1
2 TABLET SUBLINGUAL DAILY
Status: DISCONTINUED | OUTPATIENT
Start: 2025-07-19 | End: 2025-07-19 | Stop reason: HOSPADM

## 2025-07-19 RX ORDER — SACUBITRIL AND VALSARTAN 24; 26 MG/1; MG/1
0.5 TABLET, FILM COATED ORAL 2 TIMES DAILY
Status: DISCONTINUED | OUTPATIENT
Start: 2025-07-19 | End: 2025-07-19 | Stop reason: HOSPADM

## 2025-07-19 RX ORDER — ENOXAPARIN SODIUM 100 MG/ML
40 INJECTION SUBCUTANEOUS DAILY
Status: DISCONTINUED | OUTPATIENT
Start: 2025-07-19 | End: 2025-07-19

## 2025-07-19 RX ORDER — SODIUM CHLORIDE 9 MG/ML
INJECTION, SOLUTION INTRAVENOUS PRN
Status: DISCONTINUED | OUTPATIENT
Start: 2025-07-19 | End: 2025-07-19 | Stop reason: HOSPADM

## 2025-07-19 RX ORDER — POLYETHYLENE GLYCOL 3350 17 G/17G
17 POWDER, FOR SOLUTION ORAL DAILY PRN
Status: DISCONTINUED | OUTPATIENT
Start: 2025-07-19 | End: 2025-07-19 | Stop reason: HOSPADM

## 2025-07-19 RX ORDER — ACETAMINOPHEN 650 MG/1
650 SUPPOSITORY RECTAL EVERY 6 HOURS PRN
Status: DISCONTINUED | OUTPATIENT
Start: 2025-07-19 | End: 2025-07-19 | Stop reason: HOSPADM

## 2025-07-19 RX ORDER — SODIUM CHLORIDE 9 MG/ML
INJECTION, SOLUTION INTRAVENOUS PRN
Status: DISCONTINUED | OUTPATIENT
Start: 2025-07-19 | End: 2025-07-21 | Stop reason: HOSPADM

## 2025-07-19 RX ORDER — FUROSEMIDE 20 MG/1
20 TABLET ORAL DAILY
Status: DISCONTINUED | OUTPATIENT
Start: 2025-07-19 | End: 2025-07-21 | Stop reason: HOSPADM

## 2025-07-19 RX ORDER — SODIUM CHLORIDE 0.9 % (FLUSH) 0.9 %
5-40 SYRINGE (ML) INJECTION PRN
Status: DISCONTINUED | OUTPATIENT
Start: 2025-07-19 | End: 2025-07-19 | Stop reason: HOSPADM

## 2025-07-19 RX ORDER — METOPROLOL SUCCINATE 25 MG/1
12.5 TABLET, EXTENDED RELEASE ORAL DAILY
Status: DISCONTINUED | OUTPATIENT
Start: 2025-07-20 | End: 2025-07-21 | Stop reason: HOSPADM

## 2025-07-19 RX ORDER — ATORVASTATIN CALCIUM 40 MG/1
40 TABLET, FILM COATED ORAL NIGHTLY
Status: DISCONTINUED | OUTPATIENT
Start: 2025-07-19 | End: 2025-07-21 | Stop reason: HOSPADM

## 2025-07-19 RX ORDER — POTASSIUM CHLORIDE 1500 MG/1
40 TABLET, EXTENDED RELEASE ORAL PRN
Status: DISCONTINUED | OUTPATIENT
Start: 2025-07-19 | End: 2025-07-21 | Stop reason: HOSPADM

## 2025-07-19 RX ORDER — SODIUM CHLORIDE 0.9 % (FLUSH) 0.9 %
5-40 SYRINGE (ML) INJECTION EVERY 12 HOURS SCHEDULED
Status: DISCONTINUED | OUTPATIENT
Start: 2025-07-19 | End: 2025-07-19 | Stop reason: HOSPADM

## 2025-07-19 RX ORDER — POTASSIUM CHLORIDE 7.45 MG/ML
10 INJECTION INTRAVENOUS PRN
Status: DISCONTINUED | OUTPATIENT
Start: 2025-07-19 | End: 2025-07-21 | Stop reason: HOSPADM

## 2025-07-19 RX ORDER — SACUBITRIL AND VALSARTAN 24; 26 MG/1; MG/1
0.5 TABLET, FILM COATED ORAL 2 TIMES DAILY
Status: DISCONTINUED | OUTPATIENT
Start: 2025-07-19 | End: 2025-07-21 | Stop reason: HOSPADM

## 2025-07-19 RX ORDER — ACETAMINOPHEN 325 MG/1
650 TABLET ORAL EVERY 6 HOURS PRN
Status: DISCONTINUED | OUTPATIENT
Start: 2025-07-19 | End: 2025-07-19 | Stop reason: HOSPADM

## 2025-07-19 RX ORDER — BUPRENORPHINE HYDROCHLORIDE AND NALOXONE HYDROCHLORIDE DIHYDRATE 8; 2 MG/1; MG/1
1 TABLET SUBLINGUAL DAILY
Status: DISCONTINUED | OUTPATIENT
Start: 2025-07-19 | End: 2025-07-21 | Stop reason: HOSPADM

## 2025-07-19 RX ORDER — ASPIRIN 81 MG/1
81 TABLET ORAL DAILY
Status: DISCONTINUED | OUTPATIENT
Start: 2025-07-19 | End: 2025-07-21 | Stop reason: HOSPADM

## 2025-07-19 RX ORDER — ONDANSETRON 4 MG/1
4 TABLET, ORALLY DISINTEGRATING ORAL EVERY 8 HOURS PRN
Status: DISCONTINUED | OUTPATIENT
Start: 2025-07-19 | End: 2025-07-21 | Stop reason: HOSPADM

## 2025-07-19 RX ORDER — FUROSEMIDE 20 MG/1
20 TABLET ORAL DAILY
Status: DISCONTINUED | OUTPATIENT
Start: 2025-07-19 | End: 2025-07-19 | Stop reason: HOSPADM

## 2025-07-19 RX ORDER — METOPROLOL SUCCINATE 25 MG/1
12.5 TABLET, EXTENDED RELEASE ORAL DAILY
Status: DISCONTINUED | OUTPATIENT
Start: 2025-07-19 | End: 2025-07-19

## 2025-07-19 RX ORDER — POLYETHYLENE GLYCOL 3350 17 G/17G
17 POWDER, FOR SOLUTION ORAL DAILY PRN
Status: DISCONTINUED | OUTPATIENT
Start: 2025-07-19 | End: 2025-07-21 | Stop reason: HOSPADM

## 2025-07-19 RX ORDER — MAGNESIUM SULFATE IN WATER 40 MG/ML
2000 INJECTION, SOLUTION INTRAVENOUS PRN
Status: DISCONTINUED | OUTPATIENT
Start: 2025-07-19 | End: 2025-07-21 | Stop reason: HOSPADM

## 2025-07-19 RX ORDER — ONDANSETRON 4 MG/1
4 TABLET, ORALLY DISINTEGRATING ORAL EVERY 8 HOURS PRN
Status: DISCONTINUED | OUTPATIENT
Start: 2025-07-19 | End: 2025-07-19 | Stop reason: HOSPADM

## 2025-07-19 RX ORDER — BUPRENORPHINE HYDROCHLORIDE AND NALOXONE HYDROCHLORIDE DIHYDRATE 2; .5 MG/1; MG/1
3 TABLET SUBLINGUAL DAILY
Status: DISCONTINUED | OUTPATIENT
Start: 2025-07-19 | End: 2025-07-19 | Stop reason: HOSPADM

## 2025-07-19 RX ORDER — MAGNESIUM SULFATE IN WATER 40 MG/ML
2000 INJECTION, SOLUTION INTRAVENOUS PRN
Status: DISCONTINUED | OUTPATIENT
Start: 2025-07-19 | End: 2025-07-19 | Stop reason: HOSPADM

## 2025-07-19 RX ORDER — SODIUM CHLORIDE 0.9 % (FLUSH) 0.9 %
5-40 SYRINGE (ML) INJECTION EVERY 12 HOURS SCHEDULED
Status: DISCONTINUED | OUTPATIENT
Start: 2025-07-19 | End: 2025-07-21 | Stop reason: HOSPADM

## 2025-07-19 RX ORDER — SPIRONOLACTONE 25 MG/1
25 TABLET ORAL DAILY
Status: DISCONTINUED | OUTPATIENT
Start: 2025-07-20 | End: 2025-07-21 | Stop reason: HOSPADM

## 2025-07-19 RX ORDER — POTASSIUM CHLORIDE 7.45 MG/ML
10 INJECTION INTRAVENOUS PRN
Status: DISCONTINUED | OUTPATIENT
Start: 2025-07-19 | End: 2025-07-19 | Stop reason: HOSPADM

## 2025-07-19 RX ORDER — ONDANSETRON 2 MG/ML
4 INJECTION INTRAMUSCULAR; INTRAVENOUS EVERY 6 HOURS PRN
Status: DISCONTINUED | OUTPATIENT
Start: 2025-07-19 | End: 2025-07-19 | Stop reason: HOSPADM

## 2025-07-19 RX ADMIN — APIXABAN 5 MG: 5 TABLET, FILM COATED ORAL at 16:51

## 2025-07-19 RX ADMIN — APIXABAN 5 MG: 5 TABLET, FILM COATED ORAL at 20:39

## 2025-07-19 RX ADMIN — ATORVASTATIN CALCIUM 40 MG: 40 TABLET, FILM COATED ORAL at 20:38

## 2025-07-19 RX ADMIN — FUROSEMIDE 20 MG: 20 TABLET ORAL at 16:52

## 2025-07-19 RX ADMIN — SACUBITRIL AND VALSARTAN 0.5 TABLET: 24; 26 TABLET, FILM COATED ORAL at 16:51

## 2025-07-19 RX ADMIN — Medication 3 MG: at 21:49

## 2025-07-19 RX ADMIN — Medication 10 ML: at 20:35

## 2025-07-19 RX ADMIN — Medication 3 MG: at 03:26

## 2025-07-19 RX ADMIN — POTASSIUM CHLORIDE 40 MEQ: 1500 TABLET, EXTENDED RELEASE ORAL at 00:11

## 2025-07-19 RX ADMIN — ASPIRIN 81 MG: 81 TABLET, COATED ORAL at 16:51

## 2025-07-19 ASSESSMENT — HEART SCORE: ECG: NORMAL

## 2025-07-19 ASSESSMENT — LIFESTYLE VARIABLES
HOW MANY STANDARD DRINKS CONTAINING ALCOHOL DO YOU HAVE ON A TYPICAL DAY: PATIENT DOES NOT DRINK
HOW OFTEN DO YOU HAVE A DRINK CONTAINING ALCOHOL: NEVER
HOW MANY STANDARD DRINKS CONTAINING ALCOHOL DO YOU HAVE ON A TYPICAL DAY: PATIENT DOES NOT DRINK
HOW OFTEN DO YOU HAVE A DRINK CONTAINING ALCOHOL: NEVER

## 2025-07-19 ASSESSMENT — PAIN SCALES - GENERAL
PAINLEVEL_OUTOF10: 0
PAINLEVEL_OUTOF10: 0

## 2025-07-19 ASSESSMENT — PAIN - FUNCTIONAL ASSESSMENT: PAIN_FUNCTIONAL_ASSESSMENT: NONE - DENIES PAIN

## 2025-07-19 NOTE — PROGRESS NOTES
Suboxone was counted and placed in a safety bag by primary RN and witnessed by this RN.     Nat Jensen RN

## 2025-07-19 NOTE — ED PROVIDER NOTES
[facility] EMERGENCY DEPARTMENT  EMERGENCY DEPARTMENT ENCOUNTER        Patient Name: Dayton Soto  MRN: [id number]  Birthdate 1965  Date of evaluation: 7/19/2025  Provider: Jeanna Bennett MD  PCP: Unknown, Provider  Note Started: 11:29 AM EDT 7/19/25    I independently examined and evaluated Dayton Soto. I personally saw the patient and performed a substantive portion of the visit including all aspects of the medical decision making.  I made/approved the management plan and take responsibility for the patient management.  I am the primary physician of record.    CHIEF COMPLAINT  Desire for readmission       HISTORY OF PRESENT ILLNESS  History from : Patient    Limitations to history : None    In brief, Dayton Soto is a 60 y.o. male  has a past medical history of Arthritis, Back pain, chronic, CHF (congestive heart failure) (formerly [facility]), Chronic back pain, Hypertension, MI (myocardial infarction) (formerly [facility]), and MRSA (methicillin resistant staph aureus) culture positive (02/26/2020)., who presents to the ED for readmission.  Patient left AGAINST MEDICAL ADVICE from the inpatient unit earlier today because they would not give him his Suboxone because he was bradycardic.  He left [place] to take his Suboxone and took 16 mg.  He returns wishing to be readmitted and having his heart checked out.  He denies any new symptoms.  He has not had any chest pain since leaving AMA and has no new complaints.      REVIEW OF SYSTEMS  All systems reviewed, pertinent positives per HPI otherwise noted to be negative.    Focused exam revealed   PHYSICAL EXAM  ED Triage Vitals [07/19/25 1047]   BP Systolic BP Percentile Diastolic BP Percentile Temp Temp Source Pulse Respirations SpO2   111/66 -- -- 98.4 °F (36.9 °C) Oral 57 17 94 %      Height Weight - Scale         1.778 m (5' 10\") 65.3 kg (144 lb)           GENERAL APPEARANCE: Awake and alert. Cooperative. no distress.  HENT: Normocephalic. Atraumatic. Mucous membranes are

## 2025-07-19 NOTE — ED NOTES
Dayton Soto is a 60 y.o. male admitted for  Principal Problem:    Chest pain  Resolved Problems:    * No resolved hospital problems. *  .   Patient Home via self with   Chief Complaint   Patient presents with    Heart Problem     Recently left hospital this am from inpatient unit and went and took 2 suboxone (16mg) and returns to have \"my heart checked out\"   .  Patient is alert and Person, Place, Time, and Situation  Patient's baseline mobility: Baseline Mobility: Independent   Code Status: Prior   Cardiac Rhythm:       Is patient on baseline Oxygen: no             NIH Score:    C-SSRS: Risk of Suicide: No Risk  Bedside swallow:        Active LDA's:    Patient admitted with a zavaleta: no   Patient admitted with Central Line:  NA .     Family/Caregiver Present no        Vitals: MEWS Score: 1    Vitals:    07/19/25 1047   BP: 111/66   Pulse: 57   Resp: 17   Temp: 98.4 °F (36.9 °C)   TempSrc: Oral   SpO2: 94%   Weight: 65.3 kg (144 lb)   Height: 1.778 m (5' 10\")       Last documented pain score (0-10 scale)    Pain medication administered No.    Pertinent or High Risk Medications/Drips: No.    Pending Blood Product Administration: no    Abnormal labs: Abnormal Labs Reviewed - No data to display  Critical values: no      Abnormal Imaging: no             You may also review the ED PT Care Timeline found under the Summary Tab, ED Encounter Summary, Timeline Reports, ED Patient Care Timeline.     Recommendation    Pending orders/Uncompleted orders to hand off:  no     Additional Comments: readmit from AMA this morning  If any further questions, please call Sending RN at  [phone]

## 2025-07-19 NOTE — PROGRESS NOTES
0234: As per patient he is taking Suboxone 22mg daily and if it is not ordered, he will take his own supply; he got it from [place]. Informed MD via perfect serve.    0309: ordered by MD after checking its availability with pharmacist

## 2025-07-19 NOTE — DISCHARGE SUMMARY
Patient left AGAINST MEDICAL ADVICE.    Please see H&P by nocturnist on early this morning.  Patient was not seen by me.  Patient signed out and left AGAINST MEDICAL ADVICE.    Discharge condition cannot be determined

## 2025-07-19 NOTE — CARE COORDINATION
Patient admitted as Observation/OPIB with an anticipated short hospitalization length of stay. Chart reviewed and it appears that patient has minimal needs for discharge at this time. Discussed with patient’s nurse and requested that case management be notified if discharge needs are identified.     *Case management will continue to follow progress and update discharge plan as needed.

## 2025-07-19 NOTE — ED PROVIDER NOTES
THIS IS MY LONNIE SUPERVISORY AND SHARED VISIT NOTE:    I personally saw the patient and made/approved the management plan and take responsibility for the patient management.      I independently performed a history and physical on Dayton Soto.   All diagnostic, treatment, and disposition decisions were made by myself in conjunction with the advanced practice provider.  I personally saw the patient and performed a substantive portion of the visit including all aspects of the medical decision making.      For further details of Dayton Soto's emergency department encounter, please see Sahil Russo NP's documentation.    History: Patient is a 60-year-old male presenting today due to concern for chest pain with shortness of breath happening about 1 hour prior to arrival.  By the time he got to the emergency department he stated his pain was mostly gone and he just felt a shakiness all over.  He reports he had a soreness in the chest initially when it came on and then he started to get anxious following this.  He reports methamphetamine use in the past but states he has been clean since being admitted in February for his meth abuse.  He does use Suboxone.  He denied any falls or trauma or syncope today.  No reported dizziness.  He denied any unilateral numbness or weakness.  No abdominal pain or vomiting.  He did feel like he was going to break out into a sweat.  The pain stayed in the center of his chest.  EMS did give 324 mg aspirin and route.  He does report taking his oral Lasix along with oral Entresto and oral metoprolol but is not on his Eliquis.  Due to concern for significant chest pain, he came to the emergency department for further evaluation.  He is feeling much better now.      Physical exam:   Gen: No acute distress.  Alert and answering questions appropriately.  Psych: Anxious mood and affect  HEENT: NCAT, MMM  Neck: supple, normal range of motion  Cardiac: RRR, pulses 2+ in upper extremities, no

## 2025-07-19 NOTE — H&P
[facility] Medicine History & Physical    V 5.1    Date of Admission: 7/18/2025    Date of Service:  Pt seen/examined on 7/19/2025 at 0240    []Admitted to Inpatient with expected LOS greater than two midnights due to medical therapy.  [x]Placed in Observation status.    Assessment/Plan:        Chest pain: Presented with midsternal chest pain that lasted an hour received full dose aspirin via EMS. Troponin WNL x 2. EKG shows normal sinus rhythm. History of cardiomyopathy last echo January 2025 showed EF 30 to 35% patient does not have AICD or defibrillator.  -Cardiology consulted  -Repeat troponin ordered  -Limited echocardiogram ordered to evaluate EF and wall motion abnormality  -Cardiology consulted    Hypokalemia: Potassium low at 3.4 repleted with supplemental potassium.  -Repeat CMP ordered    GERD: Patient no longer taking PPI    Substance use disorder in remission: Continue home Suboxone, gets the medication filled from [facility] 22 mg daily    HFrEF: Last echocardiogram 1/29/2025 showed EF of 30 to 35% reduced from 40 to 45%. No prior heart catheterization. Currently patient appears euvolemic.  Restart home cardiac remodeling medications--metoprolol XL and Entresto. Likely due to prior methamphetamine use reports last use over 6 months ago. Does not appear to be in exacerbation at this time.    Atrial fibrillation: Stopped taking all his medications recently restarted metoprolol.  Patient agreeable to restarting Eliquis.  Metoprolol was restarted for morning dose but patient has been bradycardic therefore it was stopped.    Discussed management and the need for Hospitalization of the patient w/ the Emergency Department Provider: Sahil Russo NP      Chief Admission Complaint: Chest pain    Presenting Admission History:      60 y.o. male who presented to [facility] with reports of chest pain.  Patient reports he was sitting down watching something on his phone when he started to have sharp

## 2025-07-19 NOTE — FLOWSHEET NOTE
07/19/25 0130   Vital Signs   Temp 97.8 °F (36.6 °C)   Temp Source Oral   Pulse 53   Heart Rate Source Monitor   Respirations 18   /71   MAP (Calculated) 88   BP Location Right upper arm   BP Method Automatic   Patient Position High fowlers   Pain Assessment   Pain Assessment None - Denies Pain   Opioid-Induced Sedation   POSS Score 1   RASS   Mathews Agitation Sedation Scale (RASS) 0   Oxygen Therapy   SpO2 95 %   O2 Device None (Room air)   Height and Weight   Height 1.778 m (5' 10\")   Weight - Scale 61.6 kg (135 lb 12.8 oz)   Weight Method Bed scale   BSA (Calculated - sq m) 1.74 sq meters   BMI (Calculated) 19.5     Received from ER awake, alert, oriented. admission assessment complete. Vital signs are stable.  Respirations are even and easy, no signs or symptoms of distress.  No complaints of chest pain. Placed on telemetry. Instructed to be on NPO. CHG wipes done by patient. Call light within reach.

## 2025-07-19 NOTE — PROGRESS NOTES
Shift report given to Deion at bedside. Patient is stable. All end of shift needs have been met. No further assistance needed at this time.

## 2025-07-19 NOTE — FLOWSHEET NOTE
07/19/25 0930   Vital Signs   Temp 97.8 °F (36.6 °C)   Temp Source Oral   Pulse (!) 47   Heart Rate Source Monitor   BP (!) 99/59   MAP (Calculated) 72   BP Location Right upper arm   BP Method Automatic   Patient Position Semi fowlers   Pain Assessment   Pain Assessment None - Denies Pain   Opioid-Induced Sedation   POSS Score 1   RASS   Mathews Agitation Sedation Scale (RASS) 0   Oxygen Therapy   SpO2 98 %   O2 Device None (Room air)     Pt stated he wanted his suboxone regardless of bradycardia, MD notified. Per MD hold suboxone until eval. Pt unhappy with this and wishes to leave AMA. Notified charge RN, clinical, and MD. Pt home supply of suboxone counted in pt's presence and returned to pt. Signed form in pt chart. AMA explained to pt, let pt know to return to ED if he experiences any more chest pain or other symptoms. Pt agreed. Signed AMA form in pt chart. IV removed. Pt verbally aggressive and very agitated, security called to escort pt from unit.

## 2025-07-19 NOTE — FLOWSHEET NOTE
07/19/25 0930   Vital Signs   Temp 97.8 °F (36.6 °C)   Temp Source Oral   Pulse (!) 47   Heart Rate Source Monitor   BP (!) 99/59   MAP (Calculated) 72   BP Location Right upper arm   BP Method Automatic   Patient Position Semi fowlers   Pain Assessment   Pain Assessment None - Denies Pain   Opioid-Induced Sedation   POSS Score 1   RASS   Mathews Agitation Sedation Scale (RASS) 0   Oxygen Therapy   SpO2 98 %   O2 Device None (Room air)

## 2025-07-19 NOTE — PLAN OF CARE
Problem: Discharge Planning  Goal: Discharge to home or other facility with appropriate resources  Outcome: Progressing  Flowsheets (Taken 7/19/2025 0200)  Discharge to home or other facility with appropriate resources: Identify barriers to discharge with patient and caregiver     Problem: Safety - Adult  Goal: Free from fall injury  Outcome: Progressing     Problem: Risk for Elopement  Goal: Patient will not exit the unit/facility without proper excort  Outcome: Progressing  Flowsheets (Taken 7/19/2025 0200)  Nursing Interventions for Elopement Risk:   Assist with personal care needs such as toileting, eating, dressing, as needed to reduce the risk of wandering   Communicate/escalate to charge nurse the risk of elopement   Make sure patient has all necessary personal care items   Place patient in room far away from exits and stairways   Reduce environmental triggers   Shoes and clothing collected and placed in gown attire

## 2025-07-19 NOTE — H&P
Hospital Medicine History & Physical      PCP: Unknown, Provider    Date of Admission: 7/19/2025    Date of Service: Pt seen/examined on 07/19/25    Chief Complaint:    Chief Complaint   Patient presents with    Heart Problem     Recently left hospital this am from inpatient unit and went and took 2 suboxone (16mg) and returns to have \"my heart checked out\"         History Of Present Illness:      The patient is a 60 y.o. male with a PMHx of hypertension, cardiomyopathy with reduced EF in January 2025, atrial fibrillation, substance use disorder in remission on chronic Suboxone therapy, chronic back pain, MI, and MRSA infection of the right forearm in 2020 who presents to [facility] with complaints of chest pain.  Of note, patient initially presented to [facility] on 7/18/2025 and was admitted for similar complaints of midsternal chest pain, his troponin was negative x 2, EKG was NSR.  He was subsequently admitted by internal medicine and cardiology was consulted, echo was ordered to further evaluate EF and wall motion abnormality however, patient ended up leaving AMA this morning.  He is not back in the ED for the same complaints.  Patient will be admitted for further evaluation with 2D echocardiogram and cardiology consult.  History obtained from the patient and review of EMR.     Past Medical History:        Diagnosis Date    Back pain, chronic     Chronic back pain     Hypertension     MI (myocardial infarction) (HCC)     MRSA (methicillin resistant staph aureus) culture positive 02/26/2020    RIGHT FOREARM       Past Surgical History:        Procedure Laterality Date    BRONCHOSCOPY N/A 2/12/2025    BRONCHOSCOPY ALVEOLAR LAVAGE performed by Amish Richard MD at [facility]       Medications Prior to Admission:    Prior to Admission medications    Medication Sig Start Date End Date Taking? Authorizing Provider   buprenorphine-naloxone (SUBOXONE) 8-2 MG SUBL SL tablet Place 1 tablet under the tongue

## 2025-07-19 NOTE — PROGRESS NOTES
Patient refused to have his Suboxone stored in [facility], locked it in patient specific medication  patient's room. Counted the pills, witnessed by patient.

## 2025-07-19 NOTE — PROGRESS NOTES
4 Eyes Skin Assessment     NAME:  Dayton Soto  YOB: 1965  MEDICAL RECORD NUMBER:  [id number]    The patient is being assessed for  Admission    I agree that at least one RN has performed a thorough Head to Toe Skin Assessment on the patient. ALL assessment sites listed below have been assessed.      Areas assessed by both nurses:    Head, Face, Ears, Shoulders, Back, Chest, Arms, Elbows, Hands, Sacrum. Buttock, Coccyx, Ischium, Legs. Feet and Heels, Under Medical Devices , and Other          Does the Patient have a Wound? No noted wound(s)       Alex Prevention initiated by RN: No  Wound Care Orders initiated by RN: No    For hospital-acquired stage 1 & 2 and ALL Stage 3,4, Unstageable, DTI, NWPT, and Complex wounds: place order “IP Wound Care/Ostomy Nurse Eval and Treat” by RN under : No    New Ostomies, if present place, Ostomy referral order under : No     Nurse 1 eSignature: Electronically signed by Garland Fagan RN on 7/19/25 at 4:40 AM EDT    **SHARE this note so that the co-signing nurse can place an eSignature**    Nurse 2 eSignature: Electronically signed by Mikey Payton RN on 7/19/25 at 6:46 AM EDT

## 2025-07-19 NOTE — ED PROVIDER NOTES
[facility] EMERGENCY DEPARTMENT  EMERGENCY DEPARTMENT ENCOUNTER        Pt Name: Dayton Soto  MRN: [id number]  Birthdate 1965  Date of evaluation: 7/18/2025  Provider: SATINDER Mann CNP  PCP: Unknown, Provider  Note Started: 10:45 PM EDT 7/18/25      LONNIE. I have evaluated this patient.        CHIEF COMPLAINT       Chief Complaint   Patient presents with    Chest Pain     Pt arrived from home via EMS. Pt states he had two episodes of severe chest pain just over an hour ago and now feeling back to normal.  324 ASA en route       HISTORY OF PRESENT ILLNESS: 1 or more Elements     History From: Patient     Limitations to history : None    Social Determinants Significantly Affecting Health : None    Chief Complaint: Chest pain    Dayton Soto is a 60 y.o. male who presents to the emergency department today with concern for chest pain.  Patient reports symptoms of pain in chest this afternoon.  States he had 2 episodes of severe chest pain that is now resolved.  States that he has had pain for about an hour.  Now feeling back to normal.  Has no symptoms with pain currently.  EMS provided him 324 mg of aspirin and route.  He was not provided anything further.  He states that he clinically feels well at this time.  Has no other acute concerns.    Nursing Notes were all reviewed and agreed with or any disagreements were addressed in the HPI.    REVIEW OF SYSTEMS :      Review of Systems    Positives and Pertinent negatives as per HPI.     SURGICAL HISTORY     Past Surgical History:   Procedure Laterality Date    BRONCHOSCOPY N/A 2/12/2025    BRONCHOSCOPY ALVEOLAR LAVAGE performed by Amish Richard MD at [facility]       CURRENTMEDICATIONS       Previous Medications    AMIODARONE (CORDARONE) 200 MG TABLET    Take 1 tablet by mouth daily    APIXABAN (ELIQUIS) 5 MG TABS TABLET    Take 1 tablet by mouth 2 times daily    ASPIRIN 81 MG EC TABLET    Take 1 tablet by mouth daily (Always take with  elevated MI.  He has had a history of supraventricular tachycardia as well.  History of cardiomyopathy.   Patient reported symptoms of pain in chest prior to arrival and upon arrival his symptoms have resolved.  Denies any fevers or chills.  No shortness of breath or difficulty breathing.  No current chest pain.  2 troponins test were negative.  EKG was unremarkable.  At this time patient has a heart score of 4.  Denies recent cardiac stress test or cardiac cath.  Patient agrees with treatment plan and admission.  Will plan on rechecking troponin has been from less than 6-9.  Potassium is mildly at 3.4 which was replaced orally.  Dimer testing was negative.  BNP negative.  CBC unremarkable.  Chest x-ray interpreted by me without evidence of acute abnormality, pneumothorax or further acute etiology.  Patient agrees with treatment plan and admission.  No other acute concerns noted.    Disposition Considerations (tests considered but not done, Admit vs D/C, Shared Decision Making, Pt Expectation of Test or Tx.):      The patient tolerated their visit well.  The patient and / or the family were informed of the results of any tests, a time was given to answer questions, a plan was proposed and they agreed with plan.    I am the Primary Clinician of Record.  FINAL IMPRESSION      1. Chest pain, unspecified type    2. Hypokalemia    3. Anxiety state          DISPOSITION/PLAN     DISPOSITION                 PATIENT REFERRED TO:  No follow-up provider specified.    DISCHARGE MEDICATIONS:  New Prescriptions    No medications on file       DISCONTINUED MEDICATIONS:  Discontinued Medications    No medications on file              (Please note that portions of this note were completed with a voice recognition program.  Efforts were made to edit the dictations but occasionally words are mis-transcribed.)    SATINDER Mann CNP (electronically signed)      Sahil Russo APRN - CNP  07/19/25 0028

## 2025-07-19 NOTE — PROGRESS NOTES
Shift assessment complete see flow sheet respirations easy and even. Patient denies any pain or needs at this time. Bed is locked in the lowest position with call light within reach.  I was reading through previous notes from providers and found out patient left AMA this morning, Patient then told me he left AMA so he could take suboxone and then came back, he says he took 22 mg, but was unable to give a timeframe as to when that happened. Patient has 6 total pills on hand that have been locked up, he also has a knife that has been locked up. Patient admitted for having chest pain that he stated was a stabbing pain mid sternal to left sternal chest and did not radiate from that position. He stated he is not currently having any pain. I tried asking about if patient had any recent strenuous activity, spicy food, fatty foods and he denied answering them altogether. He would not let me palpate chest to see if it was reproducible. Patient is A&OX4. Patient is currently resting in bed and denies any needs at this time. Call light within reach with bed locked in the lowest position.       Medication of Suboxone was witnessed by Nat Jensen RN and placed in a safety bag and then again witnessed by Liza Salazar RN for a witness to be placed in lock box.

## 2025-07-19 NOTE — ED PROVIDER NOTES
[facility] EMERGENCY DEPARTMENT  EMERGENCY DEPARTMENT ENCOUNTER        Pt Name: Dayton Soto  MRN: [id number]  Birthdate 1965  Date of evaluation: 7/19/2025  Provider: Karime Gallego PA-C  PCP: Unknown, Provider  Note Started: 10:52 AM EDT 7/19/25       I have seen and evaluated this patient with my supervising physician Jeanna Bennett MD.      CHIEF COMPLAINT       Chief Complaint   Patient presents with    Heart Problem     Recently left hospital this am from inpatient unit and went and took 2 suboxone (16mg) and returns to have \"my heart checked out\"       HISTORY OF PRESENT ILLNESS: 1 or more Elements     History From: Patient    Limitations to history : None    Social Determinants Significantly Affecting Health : None    Chief Complaint: \"To have my heart checked out\"    Dayton Soto is a 60 y.o. male with a PMHx of HTN, COPD, HFrEF, paroxysmal atrial fibrillation, history of substance abuse, NSTEMI, who presents to the ED about 20 minutes after leaving AMA from his inpatient hospital admission this morning.  Patient reports he was told he cannot have his Suboxone due to his vitals, so he left the hospital AMA, took 16 mg of his Suboxone (he is usually prescribed 22mg daily), and then return to the emergency department wanting to be readmitted.  He is having no new symptoms, denies chest pain, shortness of breath, dizziness, paresthesias, peripheral edema, headache, fevers, nausea, vomiting, abdominal pain, confusion.  He denies taking any other substances or medications, although he does note that he took a small sip of water with medication.    Nursing Notes were all reviewed and agreed with or any disagreements were addressed in the HPI.    REVIEW OF SYSTEMS :      Review of Systems    Positives and Pertinent negatives as per HPI.     SURGICAL HISTORY     Past Surgical History:   Procedure Laterality Date    BRONCHOSCOPY N/A 2/12/2025    BRONCHOSCOPY ALVEOLAR LAVAGE performed by Hilary

## 2025-07-19 NOTE — FLOWSHEET NOTE
07/19/25 0508   Vital Signs   Pulse (!) 47   /69   MAP (Calculated) 82   BP Location Right upper arm   BP Method Automatic   Patient Position Semi fowlers     0501: [facility] called, patient's heart rate went down to 45. Assessed the patient, no complaints. MD was informed via perfect serve.    0515: as per MD she will stop Metoprolol, ordered echo, and consulted Cardiology.   -First, increase Lamictal to 150 mg twice a day  -Second, after 2 days on higher Lamictal dose, start Cymbalta 30 mg daily  -Then, after another 2 days, decrease Lexapro to 10 mg daily    -Stop Hydroxyzine and Propranolol   -Continue Klonopin 0.5 mg as needed up to daily for severe debilitating anxiety (for now). This will not be a long term medication.

## 2025-07-19 NOTE — ED NOTES
Dayton Soto is a 60 y.o. male admitted for  Principal Problem:    Chest pain  Resolved Problems:    * No resolved hospital problems. *  .   Patient Home via EMS transportation with   Chief Complaint   Patient presents with    Chest Pain     Pt arrived from home via EMS. Pt states he had two episodes of severe chest pain just over an hour ago and now feeling back to normal.  324 ASA en route   .  Patient is alert and Person, Place, Time, and Situation  Patient's baseline mobility: Baseline Mobility: Independent   Code Status: Prior   Cardiac Rhythm:       Is patient on baseline Oxygen: no how many Liters:   Abnormal Assessment Findings: On and off chest pain    Isolation: None      NIH Score:    C-SSRS: Risk of Suicide: No Risk  Bedside swallow:        Active LDA's:    Patient admitted with a zavaleta:  If the zavaleta is chronic was it exchanged:  Reason for zavaleta:   Patient admitted with Central Line:  . PICC line placement confirmed: YES OR NO:[id number]}   Reason for Central line:   Was central line Inserted from an outside facility:        Family/Caregiver Present no Any Concerns: no   Restraints no  Sitter no         Vitals: MEWS Score: 1    Vitals:    07/18/25 2239   BP: 124/80   Pulse: 70   Resp: 16   Temp: 98.7 °F (37.1 °C)   TempSrc: Oral   SpO2: 100%   Weight: 65.8 kg (145 lb)   Height: 1.753 m (5' 9\")       Last documented pain score (0-10 scale)    Pain medication administered No.    Pertinent or High Risk Medications/Drips: No.    Pending Blood Product Administration: no    Abnormal labs:   Abnormal Labs Reviewed   COMPREHENSIVE METABOLIC PANEL W/ REFLEX TO MG FOR LOW K - Abnormal; Notable for the following components:       Result Value    Potassium reflex Magnesium 3.4 (*)     Chloride 97 (*)     Glucose 140 (*)     BUN 23 (*)     All other components within normal limits     Critical values: no  Intervention for critical value(s):     Abnormal Imaging: no xRay            You may also review the ED PT Care

## 2025-07-20 PROBLEM — I20.89 ATYPICAL ANGINA: Status: ACTIVE | Noted: 2025-07-20

## 2025-07-20 LAB
ANION GAP SERPL CALCULATED.3IONS-SCNC: 11 MMOL/L (ref 3–16)
BUN SERPL-MCNC: 29 MG/DL (ref 7–20)
CALCIUM SERPL-MCNC: 9.1 MG/DL (ref 8.3–10.6)
CHLORIDE SERPL-SCNC: 101 MMOL/L (ref 99–110)
CO2 SERPL-SCNC: 26 MMOL/L (ref 21–32)
CREAT SERPL-MCNC: 1.2 MG/DL (ref 0.8–1.3)
DEPRECATED RDW RBC AUTO: 13.3 % (ref 12.4–15.4)
GFR SERPLBLD CREATININE-BSD FMLA CKD-EPI: 69 ML/MIN/{1.73_M2}
GLUCOSE SERPL-MCNC: 100 MG/DL (ref 70–99)
HCT VFR BLD AUTO: 42.2 % (ref 40.5–52.5)
HGB BLD-MCNC: 14.2 G/DL (ref 13.5–17.5)
MCH RBC QN AUTO: 29.5 PG (ref 26–34)
MCHC RBC AUTO-ENTMCNC: 33.8 G/DL (ref 31–36)
MCV RBC AUTO: 87.2 FL (ref 80–100)
PLATELET # BLD AUTO: 155 K/UL (ref 135–450)
PMV BLD AUTO: 9.4 FL (ref 5–10.5)
POTASSIUM SERPL-SCNC: 4 MMOL/L (ref 3.5–5.1)
RBC # BLD AUTO: 4.84 M/UL (ref 4.2–5.9)
SODIUM SERPL-SCNC: 138 MMOL/L (ref 136–145)
WBC # BLD AUTO: 5.2 K/UL (ref 4–11)

## 2025-07-20 PROCEDURE — 85027 COMPLETE CBC AUTOMATED: CPT

## 2025-07-20 PROCEDURE — G0378 HOSPITAL OBSERVATION PER HR: HCPCS

## 2025-07-20 PROCEDURE — 99223 1ST HOSP IP/OBS HIGH 75: CPT | Performed by: INTERNAL MEDICINE

## 2025-07-20 PROCEDURE — 80048 BASIC METABOLIC PNL TOTAL CA: CPT

## 2025-07-20 PROCEDURE — 6370000000 HC RX 637 (ALT 250 FOR IP): Performed by: INTERNAL MEDICINE

## 2025-07-20 PROCEDURE — 6370000000 HC RX 637 (ALT 250 FOR IP): Performed by: STUDENT IN AN ORGANIZED HEALTH CARE EDUCATION/TRAINING PROGRAM

## 2025-07-20 PROCEDURE — 36415 COLL VENOUS BLD VENIPUNCTURE: CPT

## 2025-07-20 PROCEDURE — 6370000000 HC RX 637 (ALT 250 FOR IP): Performed by: NURSE PRACTITIONER

## 2025-07-20 PROCEDURE — 6360000002 HC RX W HCPCS: Performed by: NURSE PRACTITIONER

## 2025-07-20 PROCEDURE — 2500000003 HC RX 250 WO HCPCS: Performed by: NURSE PRACTITIONER

## 2025-07-20 PROCEDURE — 84443 ASSAY THYROID STIM HORMONE: CPT

## 2025-07-20 PROCEDURE — 2500000003 HC RX 250 WO HCPCS: Performed by: INTERNAL MEDICINE

## 2025-07-20 RX ORDER — SODIUM CHLORIDE 9 MG/ML
INJECTION, SOLUTION INTRAVENOUS PRN
Status: DISCONTINUED | OUTPATIENT
Start: 2025-07-20 | End: 2025-07-21 | Stop reason: HOSPADM

## 2025-07-20 RX ORDER — NITROGLYCERIN 0.4 MG/1
0.8 TABLET SUBLINGUAL PRN
Status: COMPLETED | OUTPATIENT
Start: 2025-07-20 | End: 2025-07-21

## 2025-07-20 RX ORDER — METOPROLOL TARTRATE 50 MG
100 TABLET ORAL PRN
Status: DISCONTINUED | OUTPATIENT
Start: 2025-07-20 | End: 2025-07-21 | Stop reason: HOSPADM

## 2025-07-20 RX ORDER — METOPROLOL TARTRATE 50 MG
150 TABLET ORAL PRN
Status: DISCONTINUED | OUTPATIENT
Start: 2025-07-20 | End: 2025-07-21 | Stop reason: HOSPADM

## 2025-07-20 RX ORDER — NITROGLYCERIN 0.4 MG/1
0.4 TABLET SUBLINGUAL PRN
Status: COMPLETED | OUTPATIENT
Start: 2025-07-20 | End: 2025-07-21

## 2025-07-20 RX ORDER — SODIUM CHLORIDE 0.9 % (FLUSH) 0.9 %
5-40 SYRINGE (ML) INJECTION EVERY 12 HOURS SCHEDULED
Status: DISCONTINUED | OUTPATIENT
Start: 2025-07-20 | End: 2025-07-21 | Stop reason: HOSPADM

## 2025-07-20 RX ORDER — METOPROLOL TARTRATE 50 MG
50 TABLET ORAL PRN
Status: DISCONTINUED | OUTPATIENT
Start: 2025-07-20 | End: 2025-07-21 | Stop reason: HOSPADM

## 2025-07-20 RX ORDER — AMIODARONE HYDROCHLORIDE 200 MG/1
100 TABLET ORAL DAILY
Status: DISCONTINUED | OUTPATIENT
Start: 2025-07-20 | End: 2025-07-21 | Stop reason: HOSPADM

## 2025-07-20 RX ORDER — SODIUM CHLORIDE 0.9 % (FLUSH) 0.9 %
5-40 SYRINGE (ML) INJECTION PRN
Status: DISCONTINUED | OUTPATIENT
Start: 2025-07-20 | End: 2025-07-21 | Stop reason: HOSPADM

## 2025-07-20 RX ORDER — METOPROLOL TARTRATE 1 MG/ML
5 INJECTION, SOLUTION INTRAVENOUS EVERY 5 MIN PRN
Status: DISCONTINUED | OUTPATIENT
Start: 2025-07-20 | End: 2025-07-21 | Stop reason: HOSPADM

## 2025-07-20 RX ADMIN — Medication 3 MG: at 22:42

## 2025-07-20 RX ADMIN — ACETAMINOPHEN 650 MG: 325 TABLET ORAL at 15:07

## 2025-07-20 RX ADMIN — FUROSEMIDE 20 MG: 20 TABLET ORAL at 08:00

## 2025-07-20 RX ADMIN — SODIUM CHLORIDE, PRESERVATIVE FREE 10 ML: 5 INJECTION INTRAVENOUS at 08:00

## 2025-07-20 RX ADMIN — BUPRENORPHINE HYDROCHLORIDE AND NALOXONE HYDROCHLORIDE DIHYDRATE 1 TABLET: 8; 2 TABLET SUBLINGUAL at 08:00

## 2025-07-20 RX ADMIN — ASPIRIN 81 MG: 81 TABLET, COATED ORAL at 08:00

## 2025-07-20 RX ADMIN — SODIUM CHLORIDE, PRESERVATIVE FREE 10 ML: 5 INJECTION INTRAVENOUS at 19:40

## 2025-07-20 RX ADMIN — SACUBITRIL AND VALSARTAN 0.5 TABLET: 24; 26 TABLET, FILM COATED ORAL at 08:00

## 2025-07-20 RX ADMIN — AMIODARONE HYDROCHLORIDE 100 MG: 200 TABLET ORAL at 17:46

## 2025-07-20 RX ADMIN — APIXABAN 5 MG: 5 TABLET, FILM COATED ORAL at 08:00

## 2025-07-20 RX ADMIN — SPIRONOLACTONE 25 MG: 25 TABLET ORAL at 08:00

## 2025-07-20 RX ADMIN — SODIUM CHLORIDE, PRESERVATIVE FREE 10 ML: 5 INJECTION INTRAVENOUS at 17:48

## 2025-07-20 RX ADMIN — APIXABAN 5 MG: 5 TABLET, FILM COATED ORAL at 19:39

## 2025-07-20 RX ADMIN — ATORVASTATIN CALCIUM 40 MG: 40 TABLET, FILM COATED ORAL at 19:39

## 2025-07-20 ASSESSMENT — PAIN SCALES - GENERAL
PAINLEVEL_OUTOF10: 3
PAINLEVEL_OUTOF10: 0

## 2025-07-20 ASSESSMENT — PAIN DESCRIPTION - DESCRIPTORS: DESCRIPTORS: DISCOMFORT;ACHING

## 2025-07-20 ASSESSMENT — PAIN DESCRIPTION - FREQUENCY: FREQUENCY: INTERMITTENT

## 2025-07-20 ASSESSMENT — PAIN DESCRIPTION - PAIN TYPE: TYPE: ACUTE PAIN

## 2025-07-20 ASSESSMENT — PAIN DESCRIPTION - ORIENTATION: ORIENTATION: MID

## 2025-07-20 ASSESSMENT — PAIN DESCRIPTION - LOCATION: LOCATION: HEAD

## 2025-07-20 ASSESSMENT — PAIN - FUNCTIONAL ASSESSMENT: PAIN_FUNCTIONAL_ASSESSMENT: ACTIVITIES ARE NOT PREVENTED

## 2025-07-20 ASSESSMENT — PAIN DESCRIPTION - ONSET: ONSET: GRADUAL

## 2025-07-20 NOTE — PLAN OF CARE
Problem: Discharge Planning  Goal: Discharge to home or other facility with appropriate resources  Outcome: Progressing  Flowsheets (Taken 7/20/2025 0054)  Discharge to home or other facility with appropriate resources:   Identify barriers to discharge with patient and caregiver   Arrange for needed discharge resources and transportation as appropriate     Problem: Pain  Goal: Verbalizes/displays adequate comfort level or baseline comfort level  Outcome: Progressing  Flowsheets (Taken 7/20/2025 0054)  Verbalizes/displays adequate comfort level or baseline comfort level:   Encourage patient to monitor pain and request assistance   Assess pain using appropriate pain scale   Administer analgesics based on type and severity of pain and evaluate response   Implement non-pharmacological measures as appropriate and evaluate response     Problem: Chronic Conditions and Co-morbidities  Goal: Patient's chronic conditions and co-morbidity symptoms are monitored and maintained or improved  Outcome: Progressing  Flowsheets (Taken 7/20/2025 0054)  Care Plan - Patient's Chronic Conditions and Co-Morbidity Symptoms are Monitored and Maintained or Improved:   Monitor and assess patient's chronic conditions and comorbid symptoms for stability, deterioration, or improvement   Collaborate with multidisciplinary team to address chronic and comorbid conditions and prevent exacerbation or deterioration

## 2025-07-20 NOTE — PROGRESS NOTES
Patient alert and oriented resting in bed, watching tv. With telemetry. On room air. No peripheral IV line noted- verified to charge nurse per policy- inserted IV aseptically. Assessment completed. Respiration easy, even and unlabored. Patient tolerated night meds well. Entresto not given due to soft BP. Call light and bedside table within reach. Bed at lowest position, locked, side rails x2, bed alarm on. Pt refused to change her clothes to patient's gown.         Bedside Mobility Assessment Tool (BMAT):     Assessment Level 1- Sit and Shake    1. From a semi-reclined position, ask patient to sit up and rotate to a seated position at the side of the bed. Can use the bedrail.    2. Ask patient to reach out and grab your hand and shake making sure patient reaches across his/her midline.   Pass- Patient is able to come to a seated position, maintain core strength. Maintains seated balance while reaching across midline. Move on to Assessment Level 2.     Assessment Level 2- Stretch and Point   1. With patient in seated position at the side of the bed, have patient place both feet on the floor (or stool) with knees no higher than hips.    2. Ask patient to stretch one leg and straighten the knee, then bend the ankle/flex and point the toes. If appropriate, repeat with the other leg.   Pass- Patient is able to demonstrate appropriate quad strength on intended weight bearing limb(s). Move onto Assessment Level 3.     Assessment Level 3- Stand   1. Ask patient to elevate off the bed or chair (seated to standing) using an assistive device (cane, bedrail).    2. Patient should be able to raise buttocks off be and hold for a count of five. May repeat once.   Pass- Patient maintains standing stability for at least 5 seconds, proceed to assessment level 4.    Assessment Level 4- Walk   1. Ask patient to march in place at bedside.    2. Then ask patient to advance step and return each foot. Some medical conditions may render a

## 2025-07-20 NOTE — PROGRESS NOTES
Shift assessment complete see flow sheet respirations easy and even. Patient denies any pain or needs at this time. Bed is locked in the lowest position with call light within reach.      Metoprolol has been held due to low HR.

## 2025-07-20 NOTE — PROGRESS NOTES
2105.Informed dr Manzanares that patient is requesting something for sleeping.  2136. He ordered melatonin.

## 2025-07-20 NOTE — CONSULTS
buprenorphine-naloxone (SUBOXONE) 8-2 MG SUBL SL tablet Place 1 tablet under the tongue daily. 2 8mg tablets under tongue daily along with 3 2/0.5 tablets  for total dose of 22mg   Yes ProviderDirk MD   amiodarone (CORDARONE) 200 MG tablet Take 1 tablet by mouth daily 3/3/25  Yes Brittany Shin MD   metoprolol succinate (TOPROL XL) 25 MG extended release tablet Take 0.5 tablets by mouth daily 2/1/25  Yes Brittany Shin MD   sacubitril-valsartan (ENTRESTO) 24-26 MG per tablet Take 0.5 tablets by mouth 2 times daily 1/31/25  Yes Brittany Shin MD   furosemide (LASIX) 20 MG tablet Take 1 tablet by mouth daily 7/10/24  Yes Andrade Acosta MD   predniSONE (DELTASONE) 10 MG tablet 1 daily- 5 days  Patient not taking: Reported on 7/18/2025 3/4/25   Brittany Shin MD   atorvastatin (LIPITOR) 40 MG tablet Take 1 tablet by mouth nightly  Patient not taking: Reported on 6/3/2025 3/3/25   Brittany Shin MD   empagliflozin (JARDIANCE) 10 MG tablet Take 1 tablet by mouth daily  Patient not taking: Reported on 6/3/2025 1/31/25   Brittany Shin MD   apixaban (ELIQUIS) 5 MG TABS tablet Take 1 tablet by mouth 2 times daily  Patient not taking: Reported on 6/3/2025 1/31/25   Brittany Shin MD   pantoprazole (PROTONIX) 40 MG tablet Take 1 tablet by mouth 2 times daily (before meals)  Patient not taking: Reported on 6/3/2025 1/31/25   Brittany Shin MD   aspirin 81 MG EC tablet Take 1 tablet by mouth daily (Always take with food)  Patient not taking: Reported on 7/18/2025 7/10/24   Andrade Acosta MD   spironolactone (ALDACTONE) 25 MG tablet Take 1 tablet by mouth daily Hold for SBP<95 mmHg  Patient not taking: Reported on 6/3/2025 7/10/24   Andrade Acosta MD        Allergies:  Patient has no known allergies.     Review of Systems:   12 point ROS negative in all areas as listed below except as in 
regular rate and rhythm/no murmur

## 2025-07-20 NOTE — PROGRESS NOTES
Hospitalist Progress Note  7/20/2025 4:14 PM  Subjective:   Admit Date: 7/19/2025  PCP: Unknown, Provider    Overnight Events: denies any CP      Diet: ADULT DIET; Regular      Data:   CBC:   Recent Labs     07/18/25  2247 07/20/25  0413   WBC 8.6 5.2   HGB 15.1 14.2    155     BMP:    Recent Labs     07/18/25  2320 07/19/25  0625 07/20/25  0413    139 138   K 3.4* 4.0 4.0   CL 97* 100 101   CO2 29 27 26   BUN 23* 24* 29*   CREATININE 1.1 1.0 1.2   GLUCOSE 140* 92 100*     Hepatic:   Recent Labs     07/18/25 2320 07/19/25  0625   AST 24 21   ALT 15 13   BILITOT 0.4 0.5   ALKPHOS 107 96       Objective:   Vitals: BP (!) 96/59   Pulse 53   Temp 97.6 °F (36.4 °C) (Oral)   Resp 16   Ht 1.778 m (5' 10\")   Wt 62.7 kg (138 lb 3.2 oz)   SpO2 96%   BMI 19.83 kg/m²     Gen: No distress. Alert.   Eyes: PERRL. No sclera icterus. No conjunctival injection.   ENT: No discharge. Pharynx clear.   Neck: No JVD.  No Carotid Bruit. Trachea midline.  Resp: No accessory muscle use. No crackles. No wheezes. No rhonchi.   CV: Regular rate. Regular rhythm. No murmur.  No rub. No edema.   GI: Non-tender. Non-distended. No masses. No organomegaly. Normal bowel sounds. No hernia.   Skin: Warm and dry. No nodule on exposed extremities. No rash on exposed extremities.   M/S: No cyanosis. No joint deformity. No clubbing.   Neuro: Awake. Grossly nonfocal. Cranial nerves II through XII intact.   Psych: Oriented x 3. No anxiety or agitation.     Assessment:   Principal Problem:    Chest pain  Active Problems:    Paroxysmal A-fib (HCC)    Atypical angina  Resolved Problems:    * No resolved hospital problems. *    Plan:       Chest pain: Initially admitted on 7/18 with chest pain, left AMA. Back in the ED again with same c/o left sided chest pain, intermittent, currently resolved. Troponin WNL x 2. EKG shows normal sinus rhythm. History of cardiomyopathy last echo January 2025

## 2025-07-21 ENCOUNTER — APPOINTMENT (OUTPATIENT)
Age: 60
End: 2025-07-21
Payer: MEDICAID

## 2025-07-21 ENCOUNTER — APPOINTMENT (OUTPATIENT)
Dept: CT IMAGING | Age: 60
End: 2025-07-21
Payer: MEDICAID

## 2025-07-21 VITALS
BODY MASS INDEX: 20.19 KG/M2 | HEART RATE: 54 BPM | DIASTOLIC BLOOD PRESSURE: 67 MMHG | SYSTOLIC BLOOD PRESSURE: 123 MMHG | TEMPERATURE: 98 F | WEIGHT: 141 LBS | RESPIRATION RATE: 18 BRPM | HEIGHT: 70 IN | OXYGEN SATURATION: 96 %

## 2025-07-21 PROBLEM — I25.119 CORONARY ARTERY DISEASE INVOLVING NATIVE CORONARY ARTERY OF NATIVE HEART WITH ANGINA PECTORIS: Status: ACTIVE | Noted: 2025-07-21

## 2025-07-21 PROBLEM — I50.32 HEART FAILURE WITH IMPROVED EJECTION FRACTION (HFIMPEF) (HCC): Status: ACTIVE | Noted: 2025-07-21

## 2025-07-21 LAB
ECHO AO ROOT DIAM: 3 CM
ECHO AO ROOT INDEX: 1.67 CM/M2
ECHO AV CUSP MM: 2.4 CM
ECHO AV PEAK GRADIENT: 6 MMHG
ECHO AV PEAK VELOCITY: 1.3 M/S
ECHO BSA: 1.78 M2
ECHO EST RA PRESSURE: 3 MMHG
ECHO LA AREA 2C: 20.4 CM2
ECHO LA AREA 4C: 14.9 CM2
ECHO LA DIAMETER INDEX: 1.94 CM/M2
ECHO LA DIAMETER: 3.5 CM
ECHO LA MAJOR AXIS: 5 CM
ECHO LA MINOR AXIS: 5.1 CM
ECHO LA TO AORTIC ROOT RATIO: 1.17
ECHO LA VOL BP: 47 ML (ref 18–58)
ECHO LA VOL MOD A2C: 65 ML (ref 18–58)
ECHO LA VOL MOD A4C: 34 ML (ref 18–58)
ECHO LA VOL/BSA BIPLANE: 26 ML/M2 (ref 16–34)
ECHO LA VOLUME INDEX MOD A2C: 36 ML/M2 (ref 16–34)
ECHO LA VOLUME INDEX MOD A4C: 19 ML/M2 (ref 16–34)
ECHO LV E' LATERAL VELOCITY: 15.6 CM/S
ECHO LV E' SEPTAL VELOCITY: 11.1 CM/S
ECHO LV EDV A2C: 104 ML
ECHO LV EDV A4C: 112 ML
ECHO LV EDV INDEX A4C: 62 ML/M2
ECHO LV EDV NDEX A2C: 58 ML/M2
ECHO LV EF PHYSICIAN: 51 %
ECHO LV EJECTION FRACTION A2C: 58 %
ECHO LV EJECTION FRACTION A4C: 52 %
ECHO LV EJECTION FRACTION BIPLANE: 55 % (ref 55–100)
ECHO LV ESV A2C: 43 ML
ECHO LV ESV A4C: 54 ML
ECHO LV ESV INDEX A2C: 24 ML/M2
ECHO LV ESV INDEX A4C: 30 ML/M2
ECHO LV FRACTIONAL SHORTENING: 33 % (ref 28–44)
ECHO LV INTERNAL DIMENSION DIASTOLE INDEX: 3.17 CM/M2
ECHO LV INTERNAL DIMENSION DIASTOLIC: 5.7 CM (ref 4.2–5.9)
ECHO LV INTERNAL DIMENSION SYSTOLIC INDEX: 2.11 CM/M2
ECHO LV INTERNAL DIMENSION SYSTOLIC: 3.8 CM
ECHO LV ISOVOLUMETRIC RELAXATION TIME (IVRT): 90 MS
ECHO LV IVSD: 0.8 CM (ref 0.6–1)
ECHO LV MASS 2D: 183.7 G (ref 88–224)
ECHO LV MASS INDEX 2D: 102 G/M2 (ref 49–115)
ECHO LV POSTERIOR WALL DIASTOLIC: 0.9 CM (ref 0.6–1)
ECHO LV RELATIVE WALL THICKNESS RATIO: 0.32
ECHO MV A VELOCITY: 0.7 M/S
ECHO MV E VELOCITY: 0.76 M/S
ECHO MV E/A RATIO: 1.09
ECHO MV E/E' LATERAL: 4.87
ECHO MV E/E' RATIO (AVERAGED): 5.86
ECHO MV E/E' SEPTAL: 6.85
ECHO PV MAX VELOCITY: 0.8 M/S
ECHO PV PEAK GRADIENT: 3 MMHG
ECHO RA AREA 4C: 14.8 CM2
ECHO RA END SYSTOLIC VOLUME APICAL 4 CHAMBER INDEX BSA: 19 ML/M2
ECHO RA VOLUME: 35 ML
ECHO RIGHT VENTRICULAR SYSTOLIC PRESSURE (RVSP): 30 MMHG
ECHO RV BASAL DIMENSION: 3.4 CM
ECHO RV FREE WALL PEAK S': 11.9 CM/S
ECHO RV LONGITUDINAL DIMENSION: 6.8 CM
ECHO RV MID DIMENSION: 3 CM
ECHO RV TAPSE: 1.8 CM (ref 1.7–?)
ECHO TV PEAK GRADIENT: 1 MMHG
ECHO TV REGURGITANT MAX VELOCITY: 2.61 M/S
ECHO TV REGURGITANT PEAK GRADIENT: 27 MMHG
TSH SERPL DL<=0.005 MIU/L-ACNC: 1.53 UIU/ML (ref 0.27–4.2)

## 2025-07-21 PROCEDURE — 93306 TTE W/DOPPLER COMPLETE: CPT

## 2025-07-21 PROCEDURE — 93306 TTE W/DOPPLER COMPLETE: CPT | Performed by: STUDENT IN AN ORGANIZED HEALTH CARE EDUCATION/TRAINING PROGRAM

## 2025-07-21 PROCEDURE — 6360000004 HC RX CONTRAST MEDICATION: Performed by: INTERNAL MEDICINE

## 2025-07-21 PROCEDURE — 99238 HOSP IP/OBS DSCHRG MGMT 30/<: CPT | Performed by: INTERNAL MEDICINE

## 2025-07-21 PROCEDURE — 2500000003 HC RX 250 WO HCPCS: Performed by: NURSE PRACTITIONER

## 2025-07-21 PROCEDURE — 75580 N-INVAS EST C FFR SW ALY CTA: CPT | Performed by: STUDENT IN AN ORGANIZED HEALTH CARE EDUCATION/TRAINING PROGRAM

## 2025-07-21 PROCEDURE — 2500000003 HC RX 250 WO HCPCS: Performed by: INTERNAL MEDICINE

## 2025-07-21 PROCEDURE — 6360000002 HC RX W HCPCS: Performed by: NURSE PRACTITIONER

## 2025-07-21 PROCEDURE — 75580 N-INVAS EST C FFR SW ALY CTA: CPT

## 2025-07-21 PROCEDURE — G0378 HOSPITAL OBSERVATION PER HR: HCPCS

## 2025-07-21 PROCEDURE — 6370000000 HC RX 637 (ALT 250 FOR IP): Performed by: NURSE PRACTITIONER

## 2025-07-21 PROCEDURE — 6370000000 HC RX 637 (ALT 250 FOR IP): Performed by: INTERNAL MEDICINE

## 2025-07-21 PROCEDURE — 99232 SBSQ HOSP IP/OBS MODERATE 35: CPT | Performed by: STUDENT IN AN ORGANIZED HEALTH CARE EDUCATION/TRAINING PROGRAM

## 2025-07-21 PROCEDURE — 75574 CT ANGIO HRT W/3D IMAGE: CPT | Performed by: STUDENT IN AN ORGANIZED HEALTH CARE EDUCATION/TRAINING PROGRAM

## 2025-07-21 PROCEDURE — 75574 CT ANGIO HRT W/3D IMAGE: CPT

## 2025-07-21 RX ORDER — AMIODARONE HYDROCHLORIDE 100 MG/1
100 TABLET ORAL DAILY
Qty: 30 TABLET | Refills: 0 | Status: SHIPPED | OUTPATIENT
Start: 2025-07-22

## 2025-07-21 RX ORDER — SACUBITRIL AND VALSARTAN 24; 26 MG/1; MG/1
0.5 TABLET, FILM COATED ORAL 2 TIMES DAILY
Qty: 30 TABLET | Refills: 0 | Status: SHIPPED | OUTPATIENT
Start: 2025-07-21

## 2025-07-21 RX ORDER — IOPAMIDOL 755 MG/ML
120 INJECTION, SOLUTION INTRAVASCULAR
Status: COMPLETED | OUTPATIENT
Start: 2025-07-21 | End: 2025-07-21

## 2025-07-21 RX ADMIN — BUPRENORPHINE HYDROCHLORIDE AND NALOXONE HYDROCHLORIDE DIHYDRATE 1 TABLET: 8; 2 TABLET SUBLINGUAL at 07:58

## 2025-07-21 RX ADMIN — SODIUM CHLORIDE, PRESERVATIVE FREE 10 ML: 5 INJECTION INTRAVENOUS at 08:00

## 2025-07-21 RX ADMIN — SACUBITRIL AND VALSARTAN 0.5 TABLET: 24; 26 TABLET, FILM COATED ORAL at 07:58

## 2025-07-21 RX ADMIN — AMIODARONE HYDROCHLORIDE 100 MG: 200 TABLET ORAL at 07:58

## 2025-07-21 RX ADMIN — FUROSEMIDE 20 MG: 20 TABLET ORAL at 07:58

## 2025-07-21 RX ADMIN — IOPAMIDOL 120 ML: 755 INJECTION, SOLUTION INTRAVENOUS at 10:19

## 2025-07-21 RX ADMIN — ACETAMINOPHEN 650 MG: 325 TABLET ORAL at 07:58

## 2025-07-21 RX ADMIN — APIXABAN 5 MG: 5 TABLET, FILM COATED ORAL at 07:58

## 2025-07-21 RX ADMIN — SODIUM CHLORIDE, PRESERVATIVE FREE 10 ML: 5 INJECTION INTRAVENOUS at 07:59

## 2025-07-21 RX ADMIN — ASPIRIN 81 MG: 81 TABLET, COATED ORAL at 07:58

## 2025-07-21 RX ADMIN — SPIRONOLACTONE 25 MG: 25 TABLET ORAL at 07:58

## 2025-07-21 RX ADMIN — NITROGLYCERIN 0.8 MG: 0.4 TABLET SUBLINGUAL at 10:04

## 2025-07-21 ASSESSMENT — PAIN SCALES - GENERAL
PAINLEVEL_OUTOF10: 0
PAINLEVEL_OUTOF10: 3
PAINLEVEL_OUTOF10: 0

## 2025-07-21 ASSESSMENT — PAIN DESCRIPTION - LOCATION: LOCATION: HEAD

## 2025-07-21 ASSESSMENT — PAIN DESCRIPTION - DESCRIPTORS: DESCRIPTORS: ACHING;DISCOMFORT

## 2025-07-21 ASSESSMENT — PAIN DESCRIPTION - ORIENTATION: ORIENTATION: MID

## 2025-07-21 NOTE — ACP (ADVANCE CARE PLANNING)
Advance Care Planning     General Advance Care Planning (ACP) Conversation    Date of Conversation: 7/21/2025  Conducted with: Patient with Decision Making Capacity  Other persons present: None    Healthcare Decision Maker:   Primary Decision Maker: Keira Gomez - Brother/Sister - [phone]    Primary Decision Maker: Randy Esparza - Child - [phone]       Content/Action Overview:  Has ACP document(s) on file - reflects the patient's care preferences  Reviewed DNR/DNI and patient elects Full Code (Attempt Resuscitation)        Length of Voluntary ACP Conversation in minutes:  <16 minutes (Non-Billable)    Natalie Thompson RN

## 2025-07-21 NOTE — PROGRESS NOTES
DC instructions have been reviewed with patient with a verbal understanding. Patient requesting to now have medications sent to [facility] [place] [place] [facility], message left on voicemail to [facility]. Patient will be expected to be picked up by taxi at 1655 and is aware of this. IV has been removed with no complications. Patient denies any needs at this time.

## 2025-07-21 NOTE — PLAN OF CARE
Problem: Discharge Planning  Goal: Discharge to home or other facility with appropriate resources  7/21/2025 1820 by Francisco Fletcher RN  Outcome: Adequate for Discharge  7/21/2025 1446 by Francisco Fletcher RN  Outcome: Progressing  7/21/2025 1445 by Francisco Fletcher RN  Outcome: Progressing     Problem: Pain  Goal: Verbalizes/displays adequate comfort level or baseline comfort level  7/21/2025 1820 by Francisco Fletcher RN  Outcome: Adequate for Discharge  7/21/2025 1446 by Francisco Fletcher RN  Outcome: Progressing  7/21/2025 1445 by Francisco Fletcher RN  Outcome: Progressing     Problem: Chronic Conditions and Co-morbidities  Goal: Patient's chronic conditions and co-morbidity symptoms are monitored and maintained or improved  7/21/2025 1820 by Francisco Fletcher RN  Outcome: Adequate for Discharge  7/21/2025 1446 by Francisco Fletcher RN  Outcome: Progressing  7/21/2025 1445 by Francisco Fletcher RN  Outcome: Progressing     Problem: Safety - Adult  Goal: Free from fall injury  7/21/2025 1820 by Francisco Fletcher RN  Outcome: Adequate for Discharge  7/21/2025 1446 by Francisco Fletcher RN  Outcome: Progressing  7/21/2025 1445 by Francisco Fletcher RN  Outcome: Adequate for Discharge

## 2025-07-21 NOTE — PROGRESS NOTES
HEART FAILURE CARE PLAN:    Comorbidities Reviewed: Yes   Patient has a past medical history of Arthritis, Back pain, chronic, CHF (congestive heart failure) ([facility]), Chronic back pain, Coronary artery disease involving native coronary artery of native heart with angina pectoris, Hypertension, MI (myocardial infarction) ([facility]), and MRSA (methicillin resistant staph aureus) culture positive.     Weights Reviewed: Yes   Admission weight: 65.3 kg (144 lb)   Wt Readings from Last 3 Encounters:   07/21/25 64 kg (141 lb)   07/19/25 61.6 kg (135 lb 12.8 oz)   06/29/25 65.8 kg (145 lb)     Intake & Output Reviewed: Yes     Intake/Output Summary (Last 24 hours) at 7/21/2025 1446  Last data filed at 7/21/2025 1219  Gross per 24 hour   Intake 786 ml   Output --   Net 786 ml       ECHOCARDIOGRAM Reviewed: Yes   Patient's Ejection Fraction (EF) is greater than 40%     Medications Reviewed: Yes   SCHEDULED HOSPITAL MEDICATIONS:   sodium chloride flush  5-40 mL IntraVENous 2 times per day    amiodarone  100 mg Oral Daily    buprenorphine-naloxone  1 tablet SubLINGual Daily    furosemide  20 mg Oral Daily    metoprolol succinate  12.5 mg Oral Daily    apixaban  5 mg Oral BID    atorvastatin  40 mg Oral Nightly    aspirin  81 mg Oral Daily    sacubitril-valsartan  0.5 tablet Oral BID    spironolactone  25 mg Oral Daily    sodium chloride flush  5-40 mL IntraVENous 2 times per day     HOME MEDICATIONS:  Prior to Admission medications    Medication Sig Start Date End Date Taking? Authorizing Provider   buprenorphine-naloxone (SUBOXONE) 8-2 MG SUBL SL tablet Place 1 tablet under the tongue daily. 2 8mg tablets under tongue daily along with 3 2/0.5 tablets  for total dose of 22mg   Yes Provider, MD Dirk   amiodarone (CORDARONE) 200 MG tablet Take 1 tablet by mouth daily 3/3/25  Yes Brittany Shin MD   metoprolol succinate (TOPROL XL) 25 MG extended release tablet Take 0.5 tablets by mouth daily 2/1/25  Yes Kip

## 2025-07-21 NOTE — PROGRESS NOTES
Shift assessment complete see flow sheet respirations easy and even. Patient denies any needs at this time. Bed is locked in the lowest position with call light within reach.

## 2025-07-21 NOTE — DISCHARGE SUMMARY
Hospital Medicine Discharge Summary    Patient: Dayton Soto     Gender: male  : 1965   Age: 60 y.o.  MRN: [id number]    Admitting Physician: Vivek Ayon MD  Discharge Physician: Cindy Hameed,     Code Status: Full Code     Admit Date: 2025   Discharge Date: 2025      Discharge Diagnoses:    Active Hospital Problems    Diagnosis Date Noted    Heart failure with improved ejection fraction (HFimpEF) ([facility]) [I50.32] 2025    Coronary artery disease involving native coronary artery of native heart with angina pectoris [I25.119] 2025    Atypical angina [I20.89] 2025    Chest pain [R07.9] 2025    Paroxysmal A-fib ([facility]) [I48.0] 2025     Condition at Discharge: Stable, feels much better, hoping to go home    Hospital Course:     Chest pain: Initially admitted on  with chest pain, left AMA. Back in the ED again with same c/o left sided chest pain, intermittent, currently resolved. Troponin WNL x 2. EKG shows normal sinus rhythm. History of cardiomyopathy last echo 2025 showed EF 30 to 35% patient does not have AICD or defibrillator.  -Cardiology consulted  -Repeat troponin ordered  -Limited echocardiogram ordered to evaluate EF and wall motion abnormality  -Cardiology consulted  - plan CTA cardiac - no concerning findings, ok to dc on current medications     Bradycardia  Repeat EKG in the ED today showed bradycardia with HR in the 40s, T wave inversion noted in prior EKG has resolved. Pt is asymptomatic.  Resumed BB, monitor     Hypertension  B/P is stable, holding parameters added to antihypertensive meds     Hypokalemia, resolved: Potassium low at 3.4 repleted with supplemental potassium.  Repeat potassium this morning is now 4.0, continue to monitor with daily BMP     Hyperlipidemia  Resume statin     GERD: Patient no longer taking PPI, will resume medication     Substance use disorder in remission: Continue home Suboxone, gets the medication filled from

## 2025-07-21 NOTE — CARE COORDINATION
Case Management Assessment  Initial Evaluation    Date/Time of Evaluation: 7/21/2025 7:41 AM  Assessment Completed by: Natalie Thompson RN    If patient is discharged prior to next notation, then this note serves as note for discharge by case management.    Patient Name: Dayton Soto                   YOB: 1965  Diagnosis: Chest pain [R07.9]  Chest pain, unspecified type [R07.9]                   Date / Time: 7/19/2025 10:44 AM    Patient Admission Status: Observation   Readmission Risk (Low < 19, Mod (19-27), High > 27): Readmission Risk Score: 23.7    Current PCP: Unknown, Provider  PCP verified by CM? No (provider list provided)    Chart Reviewed: Yes      History Provided by: Patient  Patient Orientation: Alert and Oriented    Patient Cognition: Alert    Hospitalization in the last 30 days (Readmission):  Yes    If yes, Readmission Assessment in CM Navigator will be completed.    Advance Directives:      Code Status: Full Code   Patient's Primary Decision Maker is: Named in Scanned ACP Document    Primary Decision Maker: Keira Gomez - Brother/Sister - [phone]    Primary Decision Maker: Randy Esparza - Child - [phone]    Discharge Planning:    Patient lives with: Family Members Type of Home: House  Primary Care Giver: Self  Patient Support Systems include: Children, Family Members   Current Financial resources: Medicaid  Current community resources: None  Current services prior to admission: None            Current DME:              Type of Home Care services:  None    ADLS  Prior functional level: Independent in ADLs/IADLs  Current functional level: Independent in ADLs/IADLs    PT AM-PAC:   /24  OT AM-PAC:   /24    Family can provide assistance at DC: Yes  Would you like Case Management to discuss the discharge plan with any other family members/significant others, and if so, who? No  Plans to Return to Present Housing: Yes  Other Identified Issues/Barriers to RETURNING to current

## 2025-07-21 NOTE — PLAN OF CARE
Problem: Discharge Planning  Goal: Discharge to home or other facility with appropriate resources  7/21/2025 1446 by Francisco Fletcher RN  Outcome: Progressing  7/21/2025 1445 by Francisco Fletcher RN  Outcome: Progressing     Problem: Pain  Goal: Verbalizes/displays adequate comfort level or baseline comfort level  7/21/2025 1446 by Francisco Fletcher RN  Outcome: Progressing  7/21/2025 1445 by Francisco Fletcher RN  Outcome: Progressing     Problem: Chronic Conditions and Co-morbidities  Goal: Patient's chronic conditions and co-morbidity symptoms are monitored and maintained or improved  7/21/2025 1446 by Francisco Fletcher RN  Outcome: Progressing  7/21/2025 1445 by Francisco Fletcher RN  Outcome: Progressing     Problem: Safety - Adult  Goal: Free from fall injury  7/21/2025 1446 by Francisco Fletcher RN  Outcome: Progressing  7/21/2025 1445 by Francisco Fletcher RN  Outcome: Adequate for Discharge

## 2025-07-21 NOTE — DISCHARGE INSTRUCTIONS
Heart Failure Resources:  Heart Failure Interactive Workbook:  Go to [email or web address]/[email or web address] for a Free Heart Failure Interactive Workbook provided by The American [employer] Association. This interactive workbook will provide information on Healthier Living with Heart Failure. Please copy and paste link into search bar. Use your mouse to scroll through the pages.    HF [place] yovany:   Heart Failure Free smart phone yovany available for iPhone and Android download. Use your phone to track sodium intake, fluid intake, symptoms, and weight.     Low Sodium Diet / Recipes:  Go to www.[employer].eziCONEX website for “renal” diet which is Low Sodium! [employer] is a dialysis company, but this website offers free seasonal cookbooks. Each quarter, they will release 25-30 new recipes with a breakdown of calories, sodium, and glucose. You can also go to wwwAppLayer/recipes website for free recipes.     Discharge Instruction Video:  Scan the QR code below with your camera and click the canva.com link to open the video and watch educational information on Heart Failure and Medications from one of our nurses.   [email or web address]/edit    Home Exercise Program:   Identification of Green/Yellow/Red zones:  You should be able to identify when you feel good (green zone), if you have 1-2 symptoms of HF (yellow zone), or if you are in need of medical attention (red zone).  In your CHF education folder you were provided a “stop light tool” to outline this information.     We want to you to rate your exertion levels:    Our therapy team has discussed means of identification with you such as the \"Mann scale.\"  The Mann rating scale ranges from 6 to 20, where 6 means \"no exertion at all\" and 20 means \"maximal exertion.\" The goal is to use this to gauge how much effort it is taking for you to do your normal daily tasks.   You should be able to recognize when too much exertion is

## 2025-07-21 NOTE — PROGRESS NOTES
[facility]  Progress Note  [phone]        Reason for Consultation/Chief Complaint: \"I have been asked to see him for chest pain.\"    Subjective: Patient seen in consultation by my partner on 7/19/2025, Dr. Mayen.  See HPI as below.  Patient seen and examined today.  Sitting upright in his chair with history close on.  Denies any chest pain or shortness of breath.  Echocardiogram with improved LV function low normal around 50%.  Moderate aortic regurgitation.  Cardiac CTA with a calcium score of 85, mild disease in the RCA, possible struct disease of the mid LAD.  We are awaiting Heartflow FFR CT.    Telemetry sinus rhythm 60s up to 90s while awake.  Consult requested in afternoon of 7.19.25  History of Present Illness:  Dayton Soto is a 60 y.o. patient who presented to the hospital with complaints of left anterior chest pain on 7.18.25.  Pain was jabbing type non radiating last few seconds at a time but recurrent. He was just sitting in chair.  He had no radiation of chest pain and no shortness of breath sweating nausea or syncope.  No recurrence of chest pain since 7.18.25. No known CAD but has cardiomyopathy with moderate systolic dysfunction of left ventricle parox afib.  I had seen him in Feb 2025 for chest pain.He was hypoxic and had intermittent hemoptysis.  He had smoked Methamphetamine prior to that hospitalization in Feb 2025.  He has long standing h/o Meth  I have been asked to provide consultation regarding further management and testing.      Past Medical History:   has a past medical history of Arthritis, Back pain, chronic, CHF (congestive heart failure) ([facility]), Chronic back pain, Hypertension, MI (myocardial infarction) ([facility]), and MRSA (methicillin resistant staph aureus) culture positive.    Surgical History:   has a past surgical history that includes bronchoscopy (N/A, 02/12/2025) and Endoscopy, colon, diagnostic.     Social History:   reports that he has quit smoking. His

## 2025-07-21 NOTE — H&P
Brief Pre-Op Note/Sedation Assessment      Dayton Soto  1965  [id number]  2:25 PM    Planned Procedure: Transesophageal echocardiogram with possible external cardioversion, elective  Post Procedure Plan: Return to same level of care  Consent: I have discussed with the patient and/or the patient representative the indication, alternatives, and the possible risks and/or complications of the planned procedure and the anesthesia methods. The patient and/or patient representative appear to understand and agree to proceed.        Chief Complaint/Indication:   Symptomatic A-fib with RVR, decompensated heart failure with preserved ejection fraction        Pre-Procedure Medical History:  Vital Signs:  BP (!) 103/55   Pulse 52   Temp 98.5 °F (36.9 °C) (Oral)   Resp 18   Ht 1.778 m (5' 10\")   Wt 64 kg (141 lb)   SpO2 98%   BMI 20.23 kg/m²     Allergies:  No Known Allergies  Medications:    Current Facility-Administered Medications   Medication Dose Route Frequency Provider Last Rate Last Admin    metoprolol tartrate (LOPRESSOR) tablet 50 mg  50 mg Oral PRN Vinay Mayen MD        Or    metoprolol tartrate (LOPRESSOR) tablet 100 mg  100 mg Oral PRN Vinay Mayen MD        Or    metoprolol tartrate (LOPRESSOR) tablet 150 mg  150 mg Oral PRN Vinay Mayen MD        metoprolol (LOPRESSOR) injection 5 mg  5 mg IntraVENous Q5 Min PRN Vinay Mayen MD        sodium chloride flush 0.9 % injection 5-40 mL  5-40 mL IntraVENous 2 times per day Vinay Mayen MD   10 mL at 07/21/25 0759    sodium chloride flush 0.9 % injection 5-40 mL  5-40 mL IntraVENous PRN Vinay Mayen MD        0.9 % sodium chloride infusion   IntraVENous PRN Vinay Mayen MD        amiodarone (CORDARONE) tablet 100 mg  100 mg Oral Daily Vinay Mayen MD   100 mg at 07/21/25 0758    buprenorphine-naloxone (SUBOXONE) 8-2 MG SL tablet 1 tablet  1 tablet SubLINGual Daily Arabella Trammell APRN - CNP   1 tablet at

## 2025-07-21 NOTE — PROGRESS NOTES
Heartflow FFR CT is greater than 0.80 in all modeled vessels which suggests no physiologically significant stenosis.  Patient okay for discharge from cardiac perspective with current medications and his medications listed in progress note today.  Cardiology will sign off.  Patient to follow-up as scheduled.

## (undated) DEVICE — ELECTRODE ECG MONITR FOAM TEAR DROP ADLT RED

## (undated) DEVICE — SINGLE USE ASPIRATION NEEDLE: Brand: SINGLE USE ASPIRATION NEEDLE

## (undated) DEVICE — SYRINGE MED 50ML LUERSLIP TIP

## (undated) DEVICE — SINGLE USE SUCTION VALVE MAJ-209: Brand: SINGLE USE SUCTION VALVE (STERILE)

## (undated) DEVICE — BRUSH CYTO L150CM CHN L2MM BRIST DIA1.9MM PTFE S STL BULL

## (undated) DEVICE — CONMED SCOPE SAVER BITE BLOCK, 20X27 MM: Brand: SCOPE SAVER

## (undated) DEVICE — SHEET,DRAPE,40X58,STERILE: Brand: MEDLINE

## (undated) DEVICE — DISPOSABLE BIOPSY FORCEPS: Brand: DISPOSABLE BIOPSY FORCEPS

## (undated) DEVICE — SYRINGE MED 10ML SLIP TIP BLNT FILL AND LUERLOCK DISP

## (undated) DEVICE — SINGLE USE BIOPSY VALVE MAJ-210: Brand: SINGLE USE BIOPSY VALVE (STERILE)